# Patient Record
Sex: MALE | Race: WHITE | NOT HISPANIC OR LATINO | Employment: OTHER | URBAN - METROPOLITAN AREA
[De-identification: names, ages, dates, MRNs, and addresses within clinical notes are randomized per-mention and may not be internally consistent; named-entity substitution may affect disease eponyms.]

---

## 2023-04-09 PROBLEM — N18.31 CHRONIC RENAL IMPAIRMENT, STAGE 3A (MULTI): Status: ACTIVE | Noted: 2023-04-09

## 2023-04-09 PROBLEM — T14.8XXA WOUND OF SKIN: Status: ACTIVE | Noted: 2023-04-09

## 2023-04-09 PROBLEM — I73.9 PAD (PERIPHERAL ARTERY DISEASE) (CMS-HCC): Status: ACTIVE | Noted: 2023-04-09

## 2023-04-09 PROBLEM — F17.211 CIGARETTE NICOTINE DEPENDENCE IN REMISSION: Status: ACTIVE | Noted: 2023-04-09

## 2023-04-09 PROBLEM — E55.9 VITAMIN D DEFICIENCY: Status: ACTIVE | Noted: 2023-04-09

## 2023-04-09 PROBLEM — M79.89 SWELLING OF LOWER EXTREMITY: Status: ACTIVE | Noted: 2023-04-09

## 2023-04-09 PROBLEM — S81.802A LEG WOUND, LEFT, INITIAL ENCOUNTER: Status: ACTIVE | Noted: 2023-04-09

## 2023-04-09 PROBLEM — I65.29 ARTERIOSCLEROSIS OF CAROTID ARTERY: Status: ACTIVE | Noted: 2023-04-09

## 2023-04-09 PROBLEM — R60.9 EDEMA: Status: ACTIVE | Noted: 2023-04-09

## 2023-04-09 PROBLEM — R06.02 SOB (SHORTNESS OF BREATH) ON EXERTION: Status: ACTIVE | Noted: 2023-04-09

## 2023-04-09 PROBLEM — D75.839 THROMBOCYTOSIS: Status: ACTIVE | Noted: 2023-04-09

## 2023-04-09 PROBLEM — R79.89 SERUM CREATININE RAISED: Status: ACTIVE | Noted: 2023-04-09

## 2023-04-09 PROBLEM — J44.9 CHRONIC OBSTRUCTIVE PULMONARY DISEASE (MULTI): Status: ACTIVE | Noted: 2023-04-09

## 2023-04-09 PROBLEM — D72.829 ELEVATED WBC COUNT: Status: ACTIVE | Noted: 2023-04-09

## 2023-04-09 PROBLEM — E87.5 HYPERKALEMIA: Status: ACTIVE | Noted: 2023-04-09

## 2023-04-09 PROBLEM — I77.9 AORTO-ILIAC DISEASE (CMS-HCC): Status: ACTIVE | Noted: 2023-04-09

## 2023-04-09 PROBLEM — I25.10 ATHEROSCLEROTIC HEART DISEASE OF NATIVE CORONARY ARTERY WITHOUT ANGINA PECTORIS: Status: ACTIVE | Noted: 2023-04-09

## 2023-04-09 PROBLEM — R97.20 PSA ELEVATION: Status: ACTIVE | Noted: 2023-04-09

## 2023-04-09 PROBLEM — I65.23 ASYMPTOMATIC BILATERAL CAROTID ARTERY STENOSIS: Status: ACTIVE | Noted: 2023-04-09

## 2023-04-09 PROBLEM — R73.03 PREDIABETES: Status: ACTIVE | Noted: 2023-04-09

## 2023-04-09 PROBLEM — E78.5 HYPERLIPIDEMIA: Status: ACTIVE | Noted: 2023-04-09

## 2023-04-09 PROBLEM — C61 PROSTATE CANCER (MULTI): Status: ACTIVE | Noted: 2023-04-09

## 2023-04-09 PROBLEM — R31.29 MICROSCOPIC HEMATURIA: Status: ACTIVE | Noted: 2023-04-09

## 2023-04-09 PROBLEM — R53.81 MALAISE: Status: ACTIVE | Noted: 2023-04-09

## 2023-04-09 PROBLEM — S49.90XA SHOULDER INJURY: Status: ACTIVE | Noted: 2023-04-09

## 2023-04-09 PROBLEM — N52.9 ERECTILE DYSFUNCTION: Status: ACTIVE | Noted: 2023-04-09

## 2023-04-09 PROBLEM — R79.89 LOW VITAMIN B12 LEVEL: Status: ACTIVE | Noted: 2023-04-09

## 2023-04-09 PROBLEM — F17.201 NICOTINE DEPENDENCE IN REMISSION: Status: ACTIVE | Noted: 2023-04-09

## 2023-04-09 PROBLEM — I10 BENIGN ESSENTIAL HYPERTENSION: Status: ACTIVE | Noted: 2023-04-09

## 2023-04-09 PROBLEM — L03.116 LEFT LEG CELLULITIS: Status: ACTIVE | Noted: 2023-04-09

## 2023-04-09 RX ORDER — ASPIRIN 81 MG/1
1 TABLET ORAL DAILY
COMMUNITY

## 2023-04-09 RX ORDER — LOSARTAN POTASSIUM 100 MG/1
1 TABLET ORAL DAILY
COMMUNITY
Start: 2022-08-30 | End: 2023-11-02 | Stop reason: HOSPADM

## 2023-04-09 RX ORDER — ATORVASTATIN CALCIUM 40 MG/1
1 TABLET, FILM COATED ORAL DAILY
COMMUNITY
Start: 2014-12-19 | End: 2023-05-25 | Stop reason: SDUPTHER

## 2023-04-09 RX ORDER — VARENICLINE TARTRATE 0.5 (11)-1
KIT ORAL
COMMUNITY
Start: 2022-06-27 | End: 2023-08-25 | Stop reason: WASHOUT

## 2023-04-09 RX ORDER — IPRATROPIUM BROMIDE AND ALBUTEROL SULFATE 2.5; .5 MG/3ML; MG/3ML
3 SOLUTION RESPIRATORY (INHALATION) 3 TIMES DAILY PRN
COMMUNITY
Start: 2022-03-09

## 2023-04-09 RX ORDER — FLUTICASONE FUROATE, UMECLIDINIUM BROMIDE AND VILANTEROL TRIFENATATE 100; 62.5; 25 UG/1; UG/1; UG/1
1 POWDER RESPIRATORY (INHALATION)
COMMUNITY
Start: 2022-01-27 | End: 2023-04-28

## 2023-04-09 RX ORDER — LANOLIN ALCOHOL/MO/W.PET/CERES
1 CREAM (GRAM) TOPICAL DAILY
COMMUNITY
Start: 2017-07-24 | End: 2023-10-10 | Stop reason: HOSPADM

## 2023-04-09 RX ORDER — CHLORTHALIDONE 25 MG/1
1 TABLET ORAL DAILY
COMMUNITY
Start: 2018-05-18 | End: 2023-06-16

## 2023-04-09 RX ORDER — CARVEDILOL 25 MG/1
1 TABLET ORAL 2 TIMES DAILY
COMMUNITY
Start: 2019-05-03 | End: 2023-05-25 | Stop reason: SDUPTHER

## 2023-04-09 RX ORDER — IBUPROFEN 100 MG/5ML
1 SUSPENSION, ORAL (FINAL DOSE FORM) ORAL DAILY
COMMUNITY
End: 2023-10-05 | Stop reason: ENTERED-IN-ERROR

## 2023-04-09 RX ORDER — ALBUTEROL SULFATE 90 UG/1
2 AEROSOL, METERED RESPIRATORY (INHALATION) EVERY 4 HOURS
COMMUNITY
Start: 2022-01-25 | End: 2023-04-28

## 2023-04-09 RX ORDER — COLLAGENASE SANTYL 250 [ARB'U]/G
OINTMENT TOPICAL
COMMUNITY
Start: 2022-05-25 | End: 2023-10-10 | Stop reason: HOSPADM

## 2023-04-24 DIAGNOSIS — R06.02 SOB (SHORTNESS OF BREATH) ON EXERTION: ICD-10-CM

## 2023-04-28 DIAGNOSIS — J43.9 PULMONARY EMPHYSEMA, UNSPECIFIED EMPHYSEMA TYPE (MULTI): ICD-10-CM

## 2023-04-28 RX ORDER — ALBUTEROL SULFATE 90 UG/1
2 AEROSOL, METERED RESPIRATORY (INHALATION) EVERY 4 HOURS
Qty: 3 G | Refills: 1 | Status: SHIPPED | OUTPATIENT
Start: 2023-04-28 | End: 2023-08-16

## 2023-04-28 RX ORDER — FLUTICASONE FUROATE, UMECLIDINIUM BROMIDE AND VILANTEROL TRIFENATATE 100; 62.5; 25 UG/1; UG/1; UG/1
POWDER RESPIRATORY (INHALATION)
Qty: 3 EACH | Refills: 3 | Status: SHIPPED | OUTPATIENT
Start: 2023-04-28

## 2023-05-18 ENCOUNTER — APPOINTMENT (OUTPATIENT)
Dept: PRIMARY CARE | Facility: CLINIC | Age: 73
End: 2023-05-18
Payer: MEDICARE

## 2023-05-25 ENCOUNTER — OFFICE VISIT (OUTPATIENT)
Dept: PRIMARY CARE | Facility: CLINIC | Age: 73
End: 2023-05-25
Payer: MEDICARE

## 2023-05-25 VITALS
DIASTOLIC BLOOD PRESSURE: 72 MMHG | SYSTOLIC BLOOD PRESSURE: 136 MMHG | WEIGHT: 158 LBS | HEART RATE: 72 BPM | BODY MASS INDEX: 25.5 KG/M2

## 2023-05-25 DIAGNOSIS — C61 PROSTATE CANCER (MULTI): ICD-10-CM

## 2023-05-25 DIAGNOSIS — Z87.891 FORMER HEAVY TOBACCO SMOKER: ICD-10-CM

## 2023-05-25 DIAGNOSIS — I77.9 AORTO-ILIAC DISEASE (CMS-HCC): ICD-10-CM

## 2023-05-25 DIAGNOSIS — R73.03 PREDIABETES: ICD-10-CM

## 2023-05-25 DIAGNOSIS — I10 HYPERTENSION, UNSPECIFIED TYPE: ICD-10-CM

## 2023-05-25 DIAGNOSIS — J44.9 CHRONIC OBSTRUCTIVE PULMONARY DISEASE, UNSPECIFIED COPD TYPE (MULTI): ICD-10-CM

## 2023-05-25 DIAGNOSIS — E78.5 HYPERLIPIDEMIA, UNSPECIFIED HYPERLIPIDEMIA TYPE: ICD-10-CM

## 2023-05-25 DIAGNOSIS — N18.31 CHRONIC RENAL IMPAIRMENT, STAGE 3A (MULTI): ICD-10-CM

## 2023-05-25 DIAGNOSIS — D75.839 THROMBOCYTOSIS: ICD-10-CM

## 2023-05-25 DIAGNOSIS — Z12.5 SCREENING PSA (PROSTATE SPECIFIC ANTIGEN): ICD-10-CM

## 2023-05-25 DIAGNOSIS — R79.89 LOW VITAMIN B12 LEVEL: ICD-10-CM

## 2023-05-25 DIAGNOSIS — E78.2 MIXED HYPERLIPIDEMIA: ICD-10-CM

## 2023-05-25 DIAGNOSIS — J96.02 ACUTE RESPIRATORY FAILURE WITH HYPERCAPNIA (MULTI): Primary | ICD-10-CM

## 2023-05-25 DIAGNOSIS — R53.81 MALAISE: ICD-10-CM

## 2023-05-25 DIAGNOSIS — E55.9 VITAMIN D DEFICIENCY: ICD-10-CM

## 2023-05-25 DIAGNOSIS — I10 BENIGN ESSENTIAL HYPERTENSION: ICD-10-CM

## 2023-05-25 DIAGNOSIS — R22.1 NECK MASS: ICD-10-CM

## 2023-05-25 PROCEDURE — G0439 PPPS, SUBSEQ VISIT: HCPCS | Performed by: INTERNAL MEDICINE

## 2023-05-25 PROCEDURE — 99214 OFFICE O/P EST MOD 30 MIN: CPT | Performed by: INTERNAL MEDICINE

## 2023-05-25 PROCEDURE — 3075F SYST BP GE 130 - 139MM HG: CPT | Performed by: INTERNAL MEDICINE

## 2023-05-25 PROCEDURE — 1160F RVW MEDS BY RX/DR IN RCRD: CPT | Performed by: INTERNAL MEDICINE

## 2023-05-25 PROCEDURE — 1170F FXNL STATUS ASSESSED: CPT | Performed by: INTERNAL MEDICINE

## 2023-05-25 PROCEDURE — 1159F MED LIST DOCD IN RCRD: CPT | Performed by: INTERNAL MEDICINE

## 2023-05-25 PROCEDURE — 3078F DIAST BP <80 MM HG: CPT | Performed by: INTERNAL MEDICINE

## 2023-05-25 PROCEDURE — 1036F TOBACCO NON-USER: CPT | Performed by: INTERNAL MEDICINE

## 2023-05-25 RX ORDER — ATORVASTATIN CALCIUM 40 MG/1
40 TABLET, FILM COATED ORAL DAILY
Qty: 90 TABLET | Refills: 2 | Status: SHIPPED | OUTPATIENT
Start: 2023-05-25 | End: 2023-11-02 | Stop reason: HOSPADM

## 2023-05-25 RX ORDER — CARVEDILOL 25 MG/1
25 TABLET ORAL 2 TIMES DAILY
Qty: 90 TABLET | Refills: 2 | Status: SHIPPED | OUTPATIENT
Start: 2023-05-25 | End: 2023-08-16

## 2023-05-25 ASSESSMENT — ENCOUNTER SYMPTOMS
SLEEP DISTURBANCE: 0
HEADACHES: 0
RECTAL PAIN: 0
CHOKING: 0
DIZZINESS: 0
CHEST TIGHTNESS: 0
APPETITE CHANGE: 0
EYE DISCHARGE: 0
EYE REDNESS: 0
FREQUENCY: 0
MYALGIAS: 0
ADENOPATHY: 1
DIAPHORESIS: 0
TROUBLE SWALLOWING: 0
NUMBNESS: 0
FACIAL SWELLING: 0
CHILLS: 0
POLYPHAGIA: 0
RHINORRHEA: 0
POLYDIPSIA: 0
PALPITATIONS: 0
FLANK PAIN: 0
EYE PAIN: 0
SHORTNESS OF BREATH: 0
ABDOMINAL PAIN: 0
SEIZURES: 0
LIGHT-HEADEDNESS: 0
ABDOMINAL DISTENTION: 0
FACIAL ASYMMETRY: 0
BLOOD IN STOOL: 0
WEAKNESS: 0
STRIDOR: 0
SORE THROAT: 0
BACK PAIN: 0
ARTHRALGIAS: 0
SINUS PRESSURE: 0
NECK STIFFNESS: 0
WHEEZING: 0
JOINT SWELLING: 0
NECK PAIN: 0
DIFFICULTY URINATING: 0
HEMATURIA: 0
WOUND: 1
ANAL BLEEDING: 0
COLOR CHANGE: 0
SPEECH DIFFICULTY: 0
DIARRHEA: 0
DYSURIA: 0
NAUSEA: 0
VOMITING: 0
SINUS PAIN: 0
CONSTIPATION: 0
TREMORS: 0
VOICE CHANGE: 0
BRUISES/BLEEDS EASILY: 0
PHOTOPHOBIA: 0
EYE ITCHING: 0
ACTIVITY CHANGE: 0
COUGH: 0
FATIGUE: 0

## 2023-05-25 ASSESSMENT — ACTIVITIES OF DAILY LIVING (ADL)
TAKING_MEDICATION: INDEPENDENT
BATHING: INDEPENDENT
DOING_HOUSEWORK: INDEPENDENT
GROCERY_SHOPPING: INDEPENDENT
MANAGING_FINANCES: INDEPENDENT
DRESSING: INDEPENDENT

## 2023-05-25 ASSESSMENT — PATIENT HEALTH QUESTIONNAIRE - PHQ9
SUM OF ALL RESPONSES TO PHQ9 QUESTIONS 1 AND 2: 1
2. FEELING DOWN, DEPRESSED OR HOPELESS: NOT AT ALL
1. LITTLE INTEREST OR PLEASURE IN DOING THINGS: SEVERAL DAYS

## 2023-05-25 NOTE — PROGRESS NOTES
Subjective   Patient ID: Phong Wong is a 72 y.o. male who presents for Medicare Annual Wellness Visit Subsequent.    Patient presents for follow-up.  He has been compliant with his medications and diet but not exercise.  He has been complaining of a lump on the right side of his neck for the past 3 weeks.  He reports no pain.  It has not changed in size.  He otherwise feels okay.  His wound on his left leg may be slightly worse.  He denies any headaches, no dizziness, no chest pain or shortness of breath.  Denies abdominal pain no nausea vomiting or diarrhea.  He reports no new musculoskeletal complaints.  His partner feels that he is not getting out of the house much.  Denies         Review of Systems   Constitutional:  Negative for activity change, appetite change, chills, diaphoresis and fatigue.   HENT:  Negative for congestion, dental problem, drooling, ear discharge, ear pain, facial swelling, hearing loss, mouth sores, nosebleeds, postnasal drip, rhinorrhea, sinus pressure, sinus pain, sneezing, sore throat, tinnitus, trouble swallowing and voice change.    Eyes:  Negative for photophobia, pain, discharge, redness, itching and visual disturbance.   Respiratory:  Negative for cough, choking, chest tightness, shortness of breath, wheezing and stridor.    Cardiovascular:  Negative for chest pain, palpitations and leg swelling.   Gastrointestinal:  Negative for abdominal distention, abdominal pain, anal bleeding, blood in stool, constipation, diarrhea, nausea, rectal pain and vomiting.   Endocrine: Negative for cold intolerance, heat intolerance, polydipsia, polyphagia and polyuria.   Genitourinary:  Negative for decreased urine volume, difficulty urinating, dysuria, enuresis, flank pain, frequency, genital sores, hematuria and urgency.   Musculoskeletal:  Negative for arthralgias, back pain, gait problem, joint swelling, myalgias, neck pain and neck stiffness.   Skin:  Positive for wound. Negative for color  change, pallor and rash.   Neurological:  Negative for dizziness, tremors, seizures, syncope, facial asymmetry, speech difficulty, weakness, light-headedness, numbness and headaches.   Hematological:  Positive for adenopathy. Does not bruise/bleed easily.   Psychiatric/Behavioral:  Negative for sleep disturbance.        Objective   /72   Pulse 72   Wt 71.7 kg (158 lb)   BMI 25.50 kg/m²     Physical Exam  Constitutional:       Appearance: Normal appearance.   Neck:      Comments: 1 x 1 cm firm mass on right side of neck.  Nontender  Cardiovascular:      Rate and Rhythm: Normal rate and regular rhythm.      Heart sounds: No murmur heard.     No gallop.   Pulmonary:      Effort: No respiratory distress.      Breath sounds: No wheezing or rales.   Abdominal:      General: There is no distension.      Palpations: There is no mass.      Tenderness: There is no abdominal tenderness. There is no guarding.   Musculoskeletal:      Right lower leg: No edema.      Left lower leg: No edema.   Skin:     Comments: Erythema left lower leg.  No drainage   Neurological:      Mental Status: He is alert.         Assessment/Plan   diagnoses and all orders for this visit:  Acute respiratory failure with hypercapnia (CMS/HCC)-follow-up with pulmonary  Aorto-iliac disease (CMS/HCC) we will modify risk factors.  Follow-up with vascular  Chronic obstructive pulmonary disease, unspecified COPD type (CMS/HCC)-stable symptoms with present management  Thrombocytosis (CMS/HCC)-recheck CBC  Prostate cancer (CMS/HCC)-check PSA-recheck creatinine  Chronic renal impairment, stage 3a  -     Urinalysis with Reflex Microscopic; Future  Hypertension, unspecified type-he will follow a low-salt diet and exercise.  -     Albumin , Urine Random; Future  -     Comprehensive Metabolic Panel; Future  -     Uric Acid; Future  Prediabetes-we will recheck hemoglobin A1c.  Benign essential hypertension-low-salt diet and exercise-we will check a fast lipid  profile  Mixed hyperlipidemia  -     Lipid Panel; Future  Low vitamin B12 level  Malaise  -     CBC and Auto Differential; Future  -     TSH with reflex to Free T4 if abnormal; Future  Vitamin D deficiency  -     Vitamin D, Total; Future  Screening PSA (prostate specific antigen)  -     Prostate Specific Antigen; Future  Neck mass  -     CT soft tissue neck w IV contrast; Future  Former heavy tobacco smoker  -     CT lung screening low dose; Future  Health maintenance-refuses immunizations.  Cologuard has been done  Leg wound-he will schedule appointment with wound clinic

## 2023-05-25 NOTE — PATIENT INSTRUCTIONS
Please take medication as prescribed.  Schedule appointment with wound clinic.  Obtain fasting blood work.  Schedule yearly CT scans.  Follow-up in 1 month.

## 2023-05-31 ENCOUNTER — LAB (OUTPATIENT)
Dept: LAB | Facility: LAB | Age: 73
End: 2023-05-31
Payer: MEDICARE

## 2023-05-31 DIAGNOSIS — Z12.5 SCREENING PSA (PROSTATE SPECIFIC ANTIGEN): ICD-10-CM

## 2023-05-31 DIAGNOSIS — R53.81 MALAISE: ICD-10-CM

## 2023-05-31 DIAGNOSIS — R73.03 PREDIABETES: ICD-10-CM

## 2023-05-31 DIAGNOSIS — E78.2 MIXED HYPERLIPIDEMIA: ICD-10-CM

## 2023-05-31 DIAGNOSIS — I10 HYPERTENSION, UNSPECIFIED TYPE: ICD-10-CM

## 2023-05-31 DIAGNOSIS — N18.31 CHRONIC RENAL IMPAIRMENT, STAGE 3A (MULTI): ICD-10-CM

## 2023-05-31 DIAGNOSIS — E55.9 VITAMIN D DEFICIENCY: ICD-10-CM

## 2023-05-31 LAB
ALANINE AMINOTRANSFERASE (SGPT) (U/L) IN SER/PLAS: 14 U/L (ref 10–52)
ALBUMIN (G/DL) IN SER/PLAS: 4 G/DL (ref 3.4–5)
ALKALINE PHOSPHATASE (U/L) IN SER/PLAS: 118 U/L (ref 33–136)
ANION GAP IN SER/PLAS: 16 MMOL/L (ref 10–20)
ASPARTATE AMINOTRANSFERASE (SGOT) (U/L) IN SER/PLAS: 20 U/L (ref 9–39)
BAND NEUTROPHILS (10*3/UL) BLOOD MANUAL COUNT - WAM: 0.97 X10E9/L (ref 0–0.5)
BASOPHILS (10*3/UL) IN BLOOD BY MANUAL COUNT - WAM: 0.48 X10E9/L (ref 0–0.1)
BASOPHILS/100 LEUKOCYTES IN BLOOD BY MANUAL COUNT - WAM: 5.1 % (ref 0–2)
BILIRUBIN TOTAL (MG/DL) IN SER/PLAS: 0.8 MG/DL (ref 0–1.2)
BURR CELLS PRESENCE IN BLOOD BY LIGHT MICROSCOPY: NORMAL
CALCIDIOL (25 OH VITAMIN D3) (NG/ML) IN SER/PLAS: 53 NG/ML
CALCIUM (MG/DL) IN SER/PLAS: 9.6 MG/DL (ref 8.6–10.6)
CARBON DIOXIDE, TOTAL (MMOL/L) IN SER/PLAS: 25 MMOL/L (ref 21–32)
CHLORIDE (MMOL/L) IN SER/PLAS: 107 MMOL/L (ref 98–107)
CHOLESTEROL (MG/DL) IN SER/PLAS: 127 MG/DL (ref 0–199)
CHOLESTEROL IN HDL (MG/DL) IN SER/PLAS: 36.1 MG/DL
CHOLESTEROL/HDL RATIO: 3.5
CREATININE (MG/DL) IN SER/PLAS: 1.92 MG/DL (ref 0.5–1.3)
EOSINOPHILS (10*3/UL) IN BLOOD BY MANUAL COUNT - WAM: 0.4 X10E9/L (ref 0–0.4)
EOSINOPHILS/100 LEUKOCYTES IN BLOOD BY MANUAL COUNT - WAM: 4.3 % (ref 0–6)
ERYTHROCYTE DISTRIBUTION WIDTH (RATIO) BY AUTOMATED COUNT: 19.2 % (ref 11.5–14.5)
ERYTHROCYTE MEAN CORPUSCULAR HEMOGLOBIN CONCENTRATION (G/DL) BY AUTOMATED: 31.3 G/DL (ref 32–36)
ERYTHROCYTE MEAN CORPUSCULAR VOLUME (FL) BY AUTOMATED COUNT: 90 FL (ref 80–100)
ERYTHROCYTES (10*6/UL) IN BLOOD BY AUTOMATED COUNT: 5.21 X10E12/L (ref 4.5–5.9)
ESTIMATED AVERAGE GLUCOSE FOR HBA1C: 111 MG/DL
GFR MALE: 37 ML/MIN/1.73M2
GLUCOSE (MG/DL) IN SER/PLAS: 96 MG/DL (ref 74–99)
HEMATOCRIT (%) IN BLOOD BY AUTOMATED COUNT: 47 % (ref 41–52)
HEMOGLOBIN (G/DL) IN BLOOD: 14.7 G/DL (ref 13.5–17.5)
HEMOGLOBIN A1C/HEMOGLOBIN TOTAL IN BLOOD: 5.5 %
IMMATURE GRANULOCYTES/100 LEUKOCYTES IN BLOOD BY AUTOMATED COUNT: 8.9 % (ref 0–0.9)
LDL: 64 MG/DL (ref 0–99)
LEUKOCYTES (10*3/UL) IN BLOOD BY AUTOMATED COUNT: 9.4 X10E9/L (ref 4.4–11.3)
LYMPHOCYTES (10*3/UL) IN BLOOD BY MANUAL COUNT - WAM: 1.36 X10E9/L (ref 0.8–3)
LYMPHOCYTES/100 LEUKOCYTES IN BLOOD BY MANUAL COUNT - WAM: 14.5 % (ref 13–44)
MANUAL DIFFERENTIAL Y/N: ABNORMAL
MONOCYTES (10*3/UL) IN BLOOD BY MANUAL COUNT - WAM: 0.88 X10E9/L (ref 0.05–0.8)
MONOCYTES/100 LEUKOCYTES IN BLOOD BY MANUAL COUNT - WAM: 9.4 % (ref 2–10)
MYELOCYTES (10*3/UL) IN BLOOD BY MANUAL COUNT - WAM: 0.08 X10E9/L (ref 0–0)
MYELOCYTES/100 LEUKOCYTES IN BLOOD BY MANUAL COUNT - WAM: 0.8 % (ref 0–0)
NEUTROPHILS (SEGS+BANDS) (10*3/UL) MANUAL COUNT - WAM: 6.2 X10E9/L (ref 1.6–5.5)
NEUTROPHILS BAND FORM/100 LEUKOCYTES IN BLOOD BY MANUAL COUNT - WAM: 10.3 % (ref 0–5)
NRBC (PER 100 WBCS) BY AUTOMATED COUNT: 1.1 /100 WBC (ref 0–0)
PLATELETS (10*3/UL) IN BLOOD AUTOMATED COUNT: 304 X10E9/L (ref 150–450)
POTASSIUM (MMOL/L) IN SER/PLAS: 4.6 MMOL/L (ref 3.5–5.3)
PROSTATE SPECIFIC AG (NG/ML) IN SER/PLAS: 0.21 NG/ML (ref 0–4)
PROTEIN TOTAL: 6.4 G/DL (ref 6.4–8.2)
RBC MORPHOLOGY IN BLOOD: NORMAL
SEGMENTED NEUTROPHILS (10*3/UL) BLOOD MANUAL - WAM: 5.23 X10E9/L (ref 1.6–5)
SEGMENTED NEUTROPHILS/100 LEUKOCYTES BY MANUAL COUNT -: 55.6 % (ref 40–80)
SODIUM (MMOL/L) IN SER/PLAS: 143 MMOL/L (ref 136–145)
THYROTROPIN (MIU/L) IN SER/PLAS BY DETECTION LIMIT <= 0.05 MIU/L: 2.77 MIU/L (ref 0.44–3.98)
TRIGLYCERIDE (MG/DL) IN SER/PLAS: 136 MG/DL (ref 0–149)
URATE (MG/DL) IN SER/PLAS: 10.2 MG/DL (ref 4–7.5)
UREA NITROGEN (MG/DL) IN SER/PLAS: 38 MG/DL (ref 6–23)
VLDL: 27 MG/DL (ref 0–40)

## 2023-05-31 PROCEDURE — 84550 ASSAY OF BLOOD/URIC ACID: CPT

## 2023-05-31 PROCEDURE — 80053 COMPREHEN METABOLIC PANEL: CPT

## 2023-05-31 PROCEDURE — 36415 COLL VENOUS BLD VENIPUNCTURE: CPT

## 2023-05-31 PROCEDURE — 85025 COMPLETE CBC W/AUTO DIFF WBC: CPT

## 2023-05-31 PROCEDURE — 84153 ASSAY OF PSA TOTAL: CPT

## 2023-05-31 PROCEDURE — 83036 HEMOGLOBIN GLYCOSYLATED A1C: CPT

## 2023-05-31 PROCEDURE — 84443 ASSAY THYROID STIM HORMONE: CPT

## 2023-05-31 PROCEDURE — 80061 LIPID PANEL: CPT

## 2023-05-31 PROCEDURE — 82306 VITAMIN D 25 HYDROXY: CPT

## 2023-06-02 LAB
ALBUMIN (MG/L) IN URINE: 255.5 MG/L
ALBUMIN/CREATININE (UG/MG) IN URINE: 181.2 UG/MG CRT (ref 0–30)
APPEARANCE, URINE: ABNORMAL
BILIRUBIN, URINE: NEGATIVE
BLOOD, URINE: NEGATIVE
COLOR, URINE: YELLOW
CREATININE (MG/DL) IN URINE: 141 MG/DL (ref 20–370)
GLUCOSE, URINE: NEGATIVE MG/DL
KETONES, URINE: NEGATIVE MG/DL
LEUKOCYTE ESTERASE, URINE: NEGATIVE
MUCUS, URINE: NORMAL /LPF
NITRITE, URINE: NEGATIVE
PH, URINE: 5 (ref 5–8)
PROTEIN, URINE: ABNORMAL MG/DL
RBC, URINE: <1 /HPF (ref 0–5)
SPECIFIC GRAVITY, URINE: 1.01 (ref 1–1.03)
URIC ACID (URATE) CRYSTALS, URINE: NORMAL /HPF
UROBILINOGEN, URINE: <2 MG/DL (ref 0–1.9)
WBC, URINE: 1 /HPF (ref 0–5)

## 2023-06-04 DIAGNOSIS — N18.30 CHRONIC RENAL IMPAIRMENT, STAGE 3 (MODERATE), UNSPECIFIED WHETHER STAGE 3A OR 3B CKD (MULTI): Primary | ICD-10-CM

## 2023-06-15 DIAGNOSIS — I10 BENIGN ESSENTIAL HYPERTENSION: Primary | ICD-10-CM

## 2023-06-15 DIAGNOSIS — R91.8 LUNG MASS: ICD-10-CM

## 2023-06-15 DIAGNOSIS — R22.1 NECK MASS: Primary | ICD-10-CM

## 2023-06-16 RX ORDER — CHLORTHALIDONE 25 MG/1
TABLET ORAL
Qty: 90 TABLET | Refills: 1 | Status: SHIPPED | OUTPATIENT
Start: 2023-06-16 | End: 2023-10-10 | Stop reason: HOSPADM

## 2023-07-12 DIAGNOSIS — R06.02 SOB (SHORTNESS OF BREATH) ON EXERTION: ICD-10-CM

## 2023-08-09 DIAGNOSIS — I10 BENIGN ESSENTIAL HYPERTENSION: ICD-10-CM

## 2023-08-16 RX ORDER — ALBUTEROL SULFATE 90 UG/1
2 AEROSOL, METERED RESPIRATORY (INHALATION) EVERY 4 HOURS
Qty: 9 G | Refills: 2 | Status: SHIPPED | OUTPATIENT
Start: 2023-08-16 | End: 2023-10-10 | Stop reason: HOSPADM

## 2023-08-16 RX ORDER — CARVEDILOL 25 MG/1
25 TABLET ORAL 2 TIMES DAILY
Qty: 180 TABLET | Refills: 1 | Status: SHIPPED | OUTPATIENT
Start: 2023-08-16 | End: 2023-10-10 | Stop reason: HOSPADM

## 2023-08-25 ENCOUNTER — OFFICE VISIT (OUTPATIENT)
Dept: PRIMARY CARE | Facility: CLINIC | Age: 73
End: 2023-08-25
Payer: MEDICARE

## 2023-08-25 VITALS
BODY MASS INDEX: 23.73 KG/M2 | HEART RATE: 60 BPM | WEIGHT: 147 LBS | SYSTOLIC BLOOD PRESSURE: 114 MMHG | DIASTOLIC BLOOD PRESSURE: 68 MMHG

## 2023-08-25 DIAGNOSIS — I25.10 ATHEROSCLEROSIS OF NATIVE CORONARY ARTERY OF NATIVE HEART WITHOUT ANGINA PECTORIS: ICD-10-CM

## 2023-08-25 DIAGNOSIS — R73.01 IMPAIRED FASTING GLUCOSE: ICD-10-CM

## 2023-08-25 DIAGNOSIS — I10 BENIGN ESSENTIAL HYPERTENSION: ICD-10-CM

## 2023-08-25 DIAGNOSIS — E78.2 MIXED HYPERLIPIDEMIA: ICD-10-CM

## 2023-08-25 DIAGNOSIS — I77.9 AORTO-ILIAC DISEASE (CMS-HCC): ICD-10-CM

## 2023-08-25 DIAGNOSIS — C61 PROSTATE CANCER (MULTI): ICD-10-CM

## 2023-08-25 DIAGNOSIS — N18.31 CHRONIC RENAL IMPAIRMENT, STAGE 3A (MULTI): ICD-10-CM

## 2023-08-25 DIAGNOSIS — I73.9 PAD (PERIPHERAL ARTERY DISEASE) (CMS-HCC): ICD-10-CM

## 2023-08-25 DIAGNOSIS — H35.3231 EXUDATIVE AGE-RELATED MACULAR DEGENERATION, BILATERAL, WITH ACTIVE CHOROIDAL NEOVASCULARIZATION (MULTI): Primary | ICD-10-CM

## 2023-08-25 DIAGNOSIS — I65.29 ARTERIOSCLEROSIS OF CAROTID ARTERY, UNSPECIFIED LATERALITY: ICD-10-CM

## 2023-08-25 DIAGNOSIS — J44.9 CHRONIC OBSTRUCTIVE PULMONARY DISEASE, UNSPECIFIED COPD TYPE (MULTI): ICD-10-CM

## 2023-08-25 DIAGNOSIS — L98.491 NON-PRESSURE CHRONIC ULCER OF SKIN OF OTHER SITES LIMITED TO BREAKDOWN OF SKIN (MULTI): ICD-10-CM

## 2023-08-25 PROBLEM — R22.1 NECK MASS: Status: ACTIVE | Noted: 2023-08-25

## 2023-08-25 PROBLEM — L85.3 XEROSIS CUTIS: Status: ACTIVE | Noted: 2022-02-08

## 2023-08-25 PROBLEM — I87.2 CHRONIC VENOUS INSUFFICIENCY: Status: ACTIVE | Noted: 2022-02-08

## 2023-08-25 PROBLEM — L30.8 OTHER SPECIFIED DERMATITIS: Status: ACTIVE | Noted: 2022-02-08

## 2023-08-25 PROBLEM — K55.069: Status: ACTIVE | Noted: 2023-08-25

## 2023-08-25 PROBLEM — H61.22 IMPACTED CERUMEN OF LEFT EAR: Status: ACTIVE | Noted: 2023-08-25

## 2023-08-25 PROBLEM — R22.0 MASS OF SOFT PALATE: Status: ACTIVE | Noted: 2023-08-25

## 2023-08-25 LAB
POC FINGERSTICK BLOOD GLUCOSE: 141 MG/DL (ref 70–100)
POC HEMOGLOBIN A1C: 5.1 % (ref 4.2–6.5)

## 2023-08-25 PROCEDURE — 83036 HEMOGLOBIN GLYCOSYLATED A1C: CPT | Performed by: INTERNAL MEDICINE

## 2023-08-25 PROCEDURE — 1036F TOBACCO NON-USER: CPT | Performed by: INTERNAL MEDICINE

## 2023-08-25 PROCEDURE — 3078F DIAST BP <80 MM HG: CPT | Performed by: INTERNAL MEDICINE

## 2023-08-25 PROCEDURE — 1159F MED LIST DOCD IN RCRD: CPT | Performed by: INTERNAL MEDICINE

## 2023-08-25 PROCEDURE — 99213 OFFICE O/P EST LOW 20 MIN: CPT | Performed by: INTERNAL MEDICINE

## 2023-08-25 PROCEDURE — 3074F SYST BP LT 130 MM HG: CPT | Performed by: INTERNAL MEDICINE

## 2023-08-25 PROCEDURE — 82962 GLUCOSE BLOOD TEST: CPT | Performed by: INTERNAL MEDICINE

## 2023-08-25 PROCEDURE — 1160F RVW MEDS BY RX/DR IN RCRD: CPT | Performed by: INTERNAL MEDICINE

## 2023-08-25 ASSESSMENT — ENCOUNTER SYMPTOMS
ABDOMINAL DISTENTION: 0
SEIZURES: 0
WEAKNESS: 0
RECTAL PAIN: 0
VOICE CHANGE: 0
CHILLS: 0
APPETITE CHANGE: 1
STRIDOR: 0
POLYDIPSIA: 0
SINUS PAIN: 0
FREQUENCY: 0
ACTIVITY CHANGE: 0
EYE ITCHING: 0
COLOR CHANGE: 0
ADENOPATHY: 0
HYPERTENSION: 1
ANAL BLEEDING: 0
EYE DISCHARGE: 0
WHEEZING: 0
FLANK PAIN: 0
PALPITATIONS: 0
NECK PAIN: 0
RHINORRHEA: 0
DIFFICULTY URINATING: 0
BLOOD IN STOOL: 0
TROUBLE SWALLOWING: 0
DIARRHEA: 0
FATIGUE: 1
NUMBNESS: 0
EYE REDNESS: 0
POLYPHAGIA: 0
HEADACHES: 0
NAUSEA: 0
BACK PAIN: 0
PHOTOPHOBIA: 0
EYE PAIN: 0
CHOKING: 0
HEMATURIA: 0
ABDOMINAL PAIN: 0
SPEECH DIFFICULTY: 0
COUGH: 0
BRUISES/BLEEDS EASILY: 0
DYSURIA: 0
WOUND: 0
VOMITING: 0
NECK STIFFNESS: 0
SLEEP DISTURBANCE: 0
FACIAL SWELLING: 0
MYALGIAS: 0
ARTHRALGIAS: 0
TREMORS: 0
SHORTNESS OF BREATH: 1
CHEST TIGHTNESS: 0
CONSTIPATION: 0
JOINT SWELLING: 0
SINUS PRESSURE: 0
DIZZINESS: 0
FACIAL ASYMMETRY: 0
DIAPHORESIS: 0
SORE THROAT: 0
LIGHT-HEADEDNESS: 0

## 2023-08-25 NOTE — PROGRESS NOTES
Subjective   Patient ID: Phong Wong is a 73 y.o. male who presents for Hypertension and Diabetes.    Patient presents for follow-up.  He has been compliant with his medications and diet but not exercise.  He has started chemo and will start radiation. .  He does complain of some fatigue his appetite is improved.  He had all of his teeth removed. He is tolerating his treatments well.  He denies any headaches, no dizziness, no sinus problems, no chest pain but does report baseline dyspnea on exertion.  He denies abdominal pain no nausea vomiting or diarrhea.  He reports no pain.  His lower extremity swelling has resolved.  He does have some fatigue.    Hypertension  Associated symptoms include shortness of breath. Pertinent negatives include no chest pain, headaches, neck pain or palpitations.   Diabetes  Pertinent negatives for hypoglycemia include no dizziness, headaches, pallor, seizures, speech difficulty or tremors. Associated symptoms include fatigue. Pertinent negatives for diabetes include no chest pain, no polydipsia, no polyphagia, no polyuria and no weakness.        Review of Systems   Constitutional:  Positive for appetite change and fatigue. Negative for activity change, chills and diaphoresis.   HENT:  Negative for congestion, dental problem, drooling, ear discharge, ear pain, facial swelling, hearing loss, mouth sores, nosebleeds, postnasal drip, rhinorrhea, sinus pressure, sinus pain, sneezing, sore throat, tinnitus, trouble swallowing and voice change.    Eyes:  Negative for photophobia, pain, discharge, redness, itching and visual disturbance.   Respiratory:  Positive for shortness of breath. Negative for cough, choking, chest tightness, wheezing and stridor.    Cardiovascular:  Negative for chest pain, palpitations and leg swelling.   Gastrointestinal:  Negative for abdominal distention, abdominal pain, anal bleeding, blood in stool, constipation, diarrhea, nausea, rectal pain and vomiting.    Endocrine: Negative for cold intolerance, heat intolerance, polydipsia, polyphagia and polyuria.   Genitourinary:  Negative for decreased urine volume, difficulty urinating, dysuria, enuresis, flank pain, frequency, genital sores, hematuria and urgency.   Musculoskeletal:  Negative for arthralgias, back pain, gait problem, joint swelling, myalgias, neck pain and neck stiffness.   Skin:  Negative for color change, pallor, rash and wound.   Neurological:  Negative for dizziness, tremors, seizures, syncope, facial asymmetry, speech difficulty, weakness, light-headedness, numbness and headaches.   Hematological:  Negative for adenopathy. Does not bruise/bleed easily.   Psychiatric/Behavioral:  Negative for sleep disturbance.        Objective   /68   Pulse 60   Wt 66.7 kg (147 lb)   BMI 23.73 kg/m²     Physical Exam  Constitutional:       Appearance: Normal appearance.   Cardiovascular:      Rate and Rhythm: Normal rate and regular rhythm.      Heart sounds: No murmur heard.     No gallop.   Pulmonary:      Effort: No respiratory distress.      Breath sounds: No wheezing or rales.   Abdominal:      General: There is no distension.      Palpations: There is no mass.      Tenderness: There is no abdominal tenderness. There is no guarding.   Musculoskeletal:      Right lower leg: No edema.      Left lower leg: No edema.   Neurological:      Mental Status: He is alert.         Assessment/Plan   Diagnoses and all orders for this visit:  Exudative age-related macular degeneration, bilateral, with active choroidal neovascularization (CMS/HCC)- stable  Impaired fasting glucose  -     POCT Fingerstick Glucose manually resulted  -     POCT glycosylated hemoglobin (Hb A1C) manually resulted  Non-pressure chronic ulcer of skin of other sites limited to breakdown of skin (CMS/HCC)-resolved  PAD (peripheral artery disease) (CMS/HCC)-modify risk factors  Mixed hyperlipidemia-we will continue with statin  Benign essential  hypertension-stable on present medication  Atherosclerosis of native coronary artery of native heart without angina pectoris-modify risk factors.  Arteriosclerosis of carotid artery, unspecified laterality  Aorto-iliac disease (CMS/HCC)  Chronic renal impairment, stage 3a (CMS/HCC)  Prostate cancer (CMS/HCC)-follow-up with urology  Chronic obstructive pulmonary disease, unspecified COPD type (CMS/HCC)-use trilogy as prescribed.  Throat cancer-follow-up with oncology  [facility].  Refuses immunizations.

## 2023-09-29 ENCOUNTER — HOSPITAL ENCOUNTER (OUTPATIENT)
Dept: RADIATION ONCOLOGY | Facility: HOSPITAL | Age: 73
Setting detail: RADIATION/ONCOLOGY SERIES
Discharge: STILL ADMITTED OR ADMINISTRATIVE | End: 2023-09-29
Payer: MEDICARE

## 2023-10-02 ENCOUNTER — HOSPITAL ENCOUNTER (OUTPATIENT)
Dept: RADIATION ONCOLOGY | Facility: HOSPITAL | Age: 73
Setting detail: RADIATION/ONCOLOGY SERIES
Discharge: STILL ADMITTED OR ADMINISTRATIVE | End: 2023-10-02
Payer: MEDICARE

## 2023-10-02 VITALS
DIASTOLIC BLOOD PRESSURE: 61 MMHG | TEMPERATURE: 96.6 F | WEIGHT: 135.58 LBS | RESPIRATION RATE: 18 BRPM | HEART RATE: 122 BPM | BODY MASS INDEX: 21.88 KG/M2 | SYSTOLIC BLOOD PRESSURE: 128 MMHG

## 2023-10-02 DIAGNOSIS — R22.0 MASS OF SOFT PALATE: Primary | ICD-10-CM

## 2023-10-02 DIAGNOSIS — C05.1 MALIGNANT NEOPLASM OF SOFT PALATE (MULTI): ICD-10-CM

## 2023-10-02 DIAGNOSIS — R22.1 NECK MASS: ICD-10-CM

## 2023-10-02 DIAGNOSIS — Z51.0 ENCOUNTER FOR ANTINEOPLASTIC RADIATION THERAPY: ICD-10-CM

## 2023-10-02 LAB
RAD ONC MSQ ACTUAL FRACTIONS DELIVERED: 33
RAD ONC MSQ ACTUAL SESSION DELIVERED DOSE: 200 CGRAY
RAD ONC MSQ ACTUAL TOTAL DOSE: 6600 CGRAY
RAD ONC MSQ ELAPSED DAYS: 47
RAD ONC MSQ LAST DATE: NORMAL
RAD ONC MSQ PRESCRIBED FRACTIONAL DOSE: 200 CGRAY
RAD ONC MSQ PRESCRIBED NUMBER OF FRACTIONS: 35
RAD ONC MSQ PRESCRIBED TECHNIQUE: NORMAL
RAD ONC MSQ PRESCRIBED TOTAL DOSE: 7000 CGRAY
RAD ONC MSQ PRESCRIPTION PATTERN COMMENT: NORMAL
RAD ONC MSQ START DATE: NORMAL
RAD ONC MSQ TREATMENT COURSE NUMBER: 2
RAD ONC MSQ TREATMENT SITE: NORMAL

## 2023-10-02 PROCEDURE — 77014 CHG CT GUIDANCE RADIATION THERAPY FLDS PLACEMENT: CPT | Performed by: RADIOLOGY

## 2023-10-02 PROCEDURE — 77386 HC INTENSITY-MODULATED RADIATION THERAPY (IMRT), COMPLEX: CPT | Performed by: RADIOLOGY

## 2023-10-02 RX ORDER — HEPARIN 100 UNIT/ML
500 SYRINGE INTRAVENOUS AS NEEDED
Status: CANCELLED | OUTPATIENT
Start: 2023-10-02

## 2023-10-02 RX ORDER — NALOXONE HYDROCHLORIDE 4 MG/.1ML
4 SPRAY NASAL AS NEEDED
Qty: 2 EACH | Refills: 3
Start: 2023-10-02 | End: 2023-10-04

## 2023-10-02 RX ORDER — HEPARIN SODIUM,PORCINE/PF 10 UNIT/ML
50 SYRINGE (ML) INTRAVENOUS AS NEEDED
Status: CANCELLED | OUTPATIENT
Start: 2023-10-02

## 2023-10-02 NOTE — PROGRESS NOTES
Patient ID: Phong Wong is a 73 y.o. male.  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 Juan Jose Fletcher   MedOn: Dr. Kyunghee Burkitt, X. Katherine Feng, PA-C   RadOnc: Dr. Vianca Steen     CURRENT THERAPY:   8/16 - 10/4/23: Recieved concurrent chemoradiation with 7 weekly Carboplatin (2mg/AUC)/Paclitaxel (45mg/m2)  and 70gy RT in 35fx     CURRENT SITES OF DISEASE:  Soft palate, BOT, Left palatine tonsil  B/L Cervical LN     ONCOLOGIC ISSUES:   Odynophagia  Fatigue     ONCOLOGIC HISTORY  - Pt had 2 months hx of throat discomfort with lump in right neck  - 6/13/23: CT neck showed a mass 2.8 x 2.5 x 2.9 cm in the right lower cervical neck soft tissues. Two suspicious nodes were observed, one at level 3 on the right and another at level 2 on the left.  - 6/28/23: seen by Dr. Fletcher. 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 Based on the findings, Dr. Fletcher did not recommend surgery due to the location  of the primary tumor and the presence of yvette disease  - 6/30/23: PET/CT showed intense FDG avidity within the soft palate, extending to the base of the tongue and left palatine tonsil. Multiple FDG avid left cervical level II nodes were observed, along with an FDG avid mass in the region of the right cervical  level II/III, infiltrating the right SCM muscle and encasing and invading the right jugular vein. FDG avidity was also detected in the region of the left fossa Rosenmuller and left medial pterygoid muscle.  - 8/16 - 10/4/23: Recieved concurrent chemoradiation with 7 weekly Carboplatin (2mg/AUC)/Paclitaxel (45mg/m2)  and 70gy RT in 35fx           Past Medical History:   Past Medical History:  07/27/2017: Personal history of diseases of the blood and blood-  forming organs and certain disorders involving the immune mechanism      Comment:   History of thrombocytosis   HTN, HLD, COPD, prostate cancer in 2017 (s/p radiation)  Surgical History:    Past Surgical History:   Procedure Laterality Date    CT ABDOMEN PELVIS ANGIOGRAM W AND/OR WO IV CONTRAST  9/3/2014    CT ABDOMEN PELVIS ANGIOGRAM W AND/OR WO IV CONTRAST 9/3/2014 [facility]    IR ANGIOGRAM AORTA ABDOMEN  2014    IR ANGIOGRAM AORTA ABDOMEN 2014 [facility]   Aortoiliofemoral vascular bypass in   Social History:    Social History     Socioeconomic History    Marital status:      Spouse name: Not on file    Number of children: Not on file    Years of education: Not on file    Highest education level: Not on file   Occupational History    Not on file   Tobacco Use    Smoking status: Former     Packs/day: 1.00     Years: 40.00     Additional pack years: 0.00     Total pack years: 40.00     Types: Cigarettes     Quit date:      Years since quittin.7    Smokeless tobacco: Never   Substance and Sexual Activity    Alcohol use: Yes     Alcohol/week: 12.0 standard drinks of alcohol     Types: 12 Cans of beer per week    Drug use: Never    Sexual activity: Not on file   Other Topics Concern    Not on file   Social History Narrative    Not on file     Social Determinants of Health     Financial Resource Strain: Not on file   Food Insecurity: Not on file   Transportation Needs: Not on file   Physical Activity: Not on file   Stress: Not on file   Social Connections: Not on file   Intimate Partner Violence: Not on file   Housing Stability: Not on file      Family History:    Family History   Problem Relation Name Age of Onset    Aortic aneurysm Father          abdominal     Family Oncology History:    Cancer-related family history is not on file.        HISTORY OF PRESENT ILLNESS    Chief Complaint  Squamous Cell Carcinoma of oropharynx    Interval History  Phong Wnog is a 73 y.o. male with recent diagnosis of locally advanced oral cavity cancer, completed  concurrent chemoradiation with weekly Carboplatin (2mg/AUC)/Paclitaxel  (45mg/m2) on 10/4/23.    Subjective  Patient presents on last day for concurrent chemoradiation. He completed 7 cycle of Carbo/Taxol. He reports the following:    Over the last 2 weeks he has become increasingly fatigued and weak. He had a difficult time getting to into the car this afternoon.  He denies much pain in his throat, only requiring tylenol for throat pain.  He reports loss of appetite and copious mucous production leading to significant decrease in PO intake. Patient's SO reports he's has only taken in 1 carton of Boost yesterday.   He has a lot of burning and pain from radiation in skin of his neck.     ROS  Review of Systems   Constitutional:  Positive for fatigue. Negative for chills and fever. Unexpected weight change: weight loss.  HENT:   Positive for mouth sores, sore throat and trouble swallowing. Negative for hearing loss, lump/mass, nosebleeds and tinnitus.    Respiratory:  Negative for cough and shortness of breath.    Cardiovascular:  Negative for chest pain and leg swelling.   Gastrointestinal:  Negative for abdominal pain, constipation, diarrhea, nausea and vomiting.   Musculoskeletal:  Negative for arthralgias.   Skin:  Positive for wound (skin of left neck with burning pain). Negative for rash.   Neurological:  Positive for dizziness and light-headedness. Negative for headaches and numbness.       Allergies  Allergies   Allergen Reactions    Codeine Nausea Only        Medications  Current Outpatient Medications   Medication Instructions    albuterol 90 mcg/actuation inhaler 2 puffs, inhalation, Every 4 hours    ascorbic acid (Vitamin C) 1,000 mg tablet 1 tablet, oral, Daily    aspirin 81 mg EC tablet 1 tablet, oral, Daily    atorvastatin (LIPITOR) 40 mg, oral, Daily    carvedilol (COREG) 25 mg, oral, 2 times daily    chlorthalidone (Hygroton) 25 mg tablet TAKE 1 TABLET EVERY DAY    collagenase (SantyL) 250 unit/gram  ointment Apply to wound as directed once daily for 30 days    cyanocobalamin (Vitamin B-12) 1,000 mcg tablet 1 tablet, oral, Daily    fluticasone-umeclidin-vilanter (Trelegy Ellipta) 100-62.5-25 mcg blister with device INHALE 1 PUFF EVERY DAY    ipratropium-albuteroL (Duo-Neb) 0.5-2.5 mg/3 mL nebulizer solution INHALE 1 VIAL 3 times daily    LORazepam (Ativan) 0.5 mg tablet TAKE 1 TABLET BY MOUTH ONCE DAILY FOR AGITATION (TAKE 35 MINUTES BEFORE RADIATION)    losartan (Cozaar) 100 mg tablet 1 tablet, oral, Daily    naloxone (NARCAN) 4 mg, nasal, As needed, May repeat every 2-3 minutes if needed, alternating nostrils, until medical assistance becomes available.          OBJECTIVE:  VS:  BP 88/67   Pulse 103   Temp 36.7 °C (98.1 °F)   Resp 18   SpO2 98%   Weight: 9/27: 63.9kg, 10/4: 59.4kg    Physical Exam  Constitutional:       Appearance: He is well-developed. He is ill-appearing.   HENT:      Head: Normocephalic and atraumatic.      Right Ear: External ear normal. No tenderness.      Left Ear: External ear normal. No tenderness.      Nose: Nose normal.      Mouth/Throat:      Mouth: No injury or oral lesions.      Tongue: No lesions.      Pharynx: Oropharynx is clear. No posterior oropharyngeal erythema.      Comments: Thick mucous present  Eyes:      Extraocular Movements: Extraocular movements intact.      Conjunctiva/sclera: Conjunctivae normal.      Pupils: Pupils are equal, round, and reactive to light.   Neck:      Thyroid: No thyroid mass.   Cardiovascular:      Rate and Rhythm: Regular rhythm. Tachycardia present.   Pulmonary:      Effort: Pulmonary effort is normal. No respiratory distress.      Breath sounds: Normal breath sounds.   Abdominal:      General: Bowel sounds are normal. There is no distension or abdominal bruit.      Palpations: Abdomen is soft. There is no mass.      Tenderness: There is no abdominal tenderness.   Musculoskeletal:         General: Normal range of motion.      Cervical  back: Normal range of motion and neck supple. No signs of trauma. Normal range of motion.      Right lower leg: No edema.      Left lower leg: No edema.   Lymphadenopathy:      Cervical: No cervical adenopathy.      Upper Body:      Right upper body: No axillary adenopathy.      Left upper body: No axillary adenopathy.   Skin:     General: Skin is warm and dry.      Coloration: Skin is pale.      Findings: Lesion (radiation dermatitis in bilateral neck) present.   Neurological:      General: No focal deficit present.      Mental Status: He is alert and oriented to person, place, and time.      Gait: Gait is intact.   Psychiatric:         Mood and Affect: Mood and affect normal.         Behavior: Behavior is cooperative.           Diagnostic Results     Results from last 7 days   Lab Units 10/07/23  1107 10/06/23  0448 10/05/23  1034 10/04/23  1350   WBC AUTO x10*3/uL 2.2* 1.6* 1.0* 1.1*   HEMOGLOBIN g/dL 10.1* 8.4* 5.4* 6.3*   HEMATOCRIT % 29.9* 26.7* 16.0* 18.5*   PLATELETS AUTO x10*3/uL 180 129* 111* 164   NEUTROS ABS x10*3/uL  --  1.18*  --   --    LYMPHS ABS AUTO x10*3/uL  --  0.10*  --   --    MONOS ABS AUTO x10*3/uL  --  0.27  --   --    EOS ABS MAN x10*3/uL 0.00  --  0.00 0.00   EOS ABS AUTO x10*3/uL  --  0.00  --   --    NEUTROS PCT AUTO %  --  75.2  --   --    LYMPHO PCT MAN % 5.0  --  3.0 8.0   LYMPHS PCT AUTO %  --  6.4  --   --    MONO PCT MAN % 10.0  --  24.0 29.0   MONOS PCT AUTO %  --  17.2  --   --    EOSINO PCT MAN % 0.0  --  0.0 0.0   EOS PCT AUTO %  --  0.0  --   --        Recent Labs     10/05/23  1034 10/05/23  2119 10/06/23  0448 10/07/23  1107   GLUCOSE 123* 121* 114* 117*   * 137 137 140   K 2.7* 3.3* 3.2* 3.2*   CL 95* 100 100 99   CO2 29 27 25 30   BUN 32* 31* 34* 44*   CREATININE 1.59* 1.26 1.20 1.29   EGFR 46* 61 65 59*   CALCIUM 7.8* 7.5* 8.1* 8.2*   MG 1.80  --   --  1.90   PHOS 3.0  --   --   --    ALBUMIN 2.7*  --   --   --    PROT 4.6*  --   --   --      Results from last 7  days   Lab Units 10/05/23  1034 10/04/23  1350   BILIRUBIN TOTAL mg/dL 1.0 1.3*   ALK PHOS U/L 57 69   ALT U/L 9* 10   AST U/L 13 10       Results for orders placed or performed during the hospital encounter of 10/02/23 (from the past 96 hour(s))   Rad Onc Msq Treatment Summary   Result Value Ref Range    Treatment Site Soft palate + B neck     Course Number 2     Prescribed Fractional Dose 200 cGray    Prescribed Total Dose 7,000 cGray    Actual Fractions Delivered 33     Prescription Pattern Comment Daily CBCT. Align to the bony anatomy     Actual Session Delivered Dose 200 cGray    Actual Total Dose 6,600 cGray    Prescribed Technique IMRT     Elapsed Days 47     Start Date 8/16/2023     Last Date 10/2/2023     Prescribed Number of Fractions 35          No images are attached to the encounter.    Assessment/Plan      ASSESSMENT  Phong Wong is a 73 y.o. male with recent diagnosis of locally advanced oral cavity cancer. Completed concurrent chemoradiation with 7 weekly Carboplatin (2mg/AUC)/Paclitaxel  (45mg/m2) on 10/4/2     # Locally advanced oral cavity cancer, T3N2M0  - 6/30/23: PET/CT showed intense FDG avidity within the soft palate, extending to the base of the tongue and left palatine tonsil. Multiple uptake in left cervical level II nodes, right cervical level  II/III, infiltrating the right SCM muscle and encasing and invading the right jugular vein. FDG avidity was also detected in the region of the left fossa Rosenmuller and left medial pterygoid muscle.  - 7/13/23: pt is doing well, no pain, discussed with patient about carboplatin+ paclitaxel as his chemotherapy along with radiation due to his GFR 30s.  Chemo related side effects were reviewed with patient, consent obtained.   - Patient required dental extractions and as a result start date was delayed from 7/26 to 8/15/23  - 8/30: tolerating chemo well, does have some fatigue but no n/v. No peripheral neuropathy   - 9/6: Continue to tolerate chemo  well, no n/v, is feeling more tired. Eating well, tolerating soft foods and supplementing with Boost VHC 2x per day. Denies peripheral neuropathy  - 9/13: continue to tolerate chemo well w/o n/v. Energy is lower but stable. Eating soft foods and Boost VHC x 2 per day  - 9/20: Tolerating chemo well, minimal odynophagia, does have some weight loss.  - 10/4: Completed last dose of Carbo/Taxol last week. Recall patient did not have a [facility] visit as both providers are out of office. He had las RT today and noted to have significantly increased fatigue, weakness, weight loss in the last week resulting from decreased PO intake.     # Anemia  - Hgb has been dropping since starting chemo, however today his hgb is <7 and will require a blood transfusion. This is likely due to chemotherapy as there are no s/s of bleeds.   - Blood consent obtained today, 1U PRBC ordered for tomorrow, arranged for blood transfusion tomorrow.    # Odynophagia  - 9/6: Starting to some pain with swallowing this week. Now taking Oxycodone 5mg before meals and has good control  - 9/13: Odynophagia well controlled with Oxycodone 5mg prior to meals. Using glutamine rinses. Does have some weight loss.    - 9/20: Mild odynophagia well controlled with oxycodone 5mg as needed.   - 10/4: Pt became to sedated with Oxycodone 10mg last week so this was held and his throat pain is currently controlled with Tylenol      # Neutropenia: improving  - 9/6: . Proceed to C4 today, will monitor prior to next cycle. May need to hold C4  - Return precaution for neutropenic fevers provided for patient and his significant other, they voiced understanding   - 9/13 ANC 1290.  - 9/20 . Ok for C6 Carbo/Taxol. Return precaution provided for neutropenic fevers again.   - 10/4 . Afebrile. Return precaution for neutropenic fever provided again.     # Dehydration  - Patient reported to have significantly decreased PO fluid intake  - 10/4 Creat 1.58, GFR 46  -  Replete with 1L NS in Infusion     # Hypokalemia  - 9/20: K 3.3.  Likely due to chemo  - Replete with KCl 20mg ER PO x 1 in infusion  - - 9/27: K 3.8   - 10/4: K 2.9, Replete with KCL 40mg IV in infusion    # Hypomagnesemia  - 9/20: Mg 1.48. Likely due to chemo  - Replete with Mag 2g in infusion.   - 10/4: Mg 1.94    # Weight Loss  - Drastic difference in appearance on today's visit vs last [facility] visit 2 weeks ago. Pt with temporal wasting and sunken cheeks. Dietician consulted on nutrition and weight loss.     # Weakness  - Multifactorial, due to chemo, anemia, dehydration, decrease PO intake. See Above.    # Left neck skin lesion  - Pt with burning pain if left skin of neck. Did not have ointment with them today in infusion  - Obtained aquaphor with meplex dressing and instructed patient to apply to neck in Infusion today    PLAN  -- 1L NS in Infusion for dehydration   -- KCL 40mEQ IV over 2 hrs for hypokalemia  -- Start OTC Mucinex for thick mucous  -- Encourage patient to increase PO intake  -- RTC tomorrow for ACC visit and blood transfusion, likely will require admission for failure to thrive if patient condition does not improve  -- Go to ED if a fever >100.4 F develops  -- Call 911 to go to the nearest ED if weakness worsens.

## 2023-10-03 ENCOUNTER — HOSPITAL ENCOUNTER (OUTPATIENT)
Dept: RADIATION ONCOLOGY | Facility: HOSPITAL | Age: 73
Setting detail: RADIATION/ONCOLOGY SERIES
Discharge: STILL ADMITTED OR ADMINISTRATIVE | End: 2023-10-03
Payer: MEDICARE

## 2023-10-03 DIAGNOSIS — C05.1 MALIGNANT NEOPLASM OF SOFT PALATE (MULTI): ICD-10-CM

## 2023-10-03 DIAGNOSIS — Z51.0 ENCOUNTER FOR ANTINEOPLASTIC RADIATION THERAPY: ICD-10-CM

## 2023-10-03 LAB
RAD ONC MSQ ACTUAL FRACTIONS DELIVERED: 34
RAD ONC MSQ ACTUAL SESSION DELIVERED DOSE: 200 CGRAY
RAD ONC MSQ ACTUAL TOTAL DOSE: 6800 CGRAY
RAD ONC MSQ ELAPSED DAYS: 48
RAD ONC MSQ LAST DATE: NORMAL
RAD ONC MSQ PRESCRIBED FRACTIONAL DOSE: 200 CGRAY
RAD ONC MSQ PRESCRIBED NUMBER OF FRACTIONS: 35
RAD ONC MSQ PRESCRIBED TECHNIQUE: NORMAL
RAD ONC MSQ PRESCRIBED TOTAL DOSE: 7000 CGRAY
RAD ONC MSQ PRESCRIPTION PATTERN COMMENT: NORMAL
RAD ONC MSQ START DATE: NORMAL
RAD ONC MSQ TREATMENT COURSE NUMBER: 2
RAD ONC MSQ TREATMENT SITE: NORMAL

## 2023-10-03 PROCEDURE — 77386 HC INTENSITY-MODULATED RADIATION THERAPY (IMRT), COMPLEX: CPT | Performed by: RADIOLOGY

## 2023-10-03 PROCEDURE — 77014 CHG CT GUIDANCE RADIATION THERAPY FLDS PLACEMENT: CPT | Performed by: RADIOLOGY

## 2023-10-04 ENCOUNTER — NUTRITION (OUTPATIENT)
Dept: HEMATOLOGY/ONCOLOGY | Facility: HOSPITAL | Age: 73
End: 2023-10-04
Payer: MEDICARE

## 2023-10-04 ENCOUNTER — INFUSION (OUTPATIENT)
Dept: HEMATOLOGY/ONCOLOGY | Facility: HOSPITAL | Age: 73
End: 2023-10-04
Payer: MEDICARE

## 2023-10-04 ENCOUNTER — OFFICE VISIT (OUTPATIENT)
Dept: HEMATOLOGY/ONCOLOGY | Facility: HOSPITAL | Age: 73
End: 2023-10-04
Payer: MEDICARE

## 2023-10-04 ENCOUNTER — DOCUMENTATION (OUTPATIENT)
Dept: HEMATOLOGY/ONCOLOGY | Facility: HOSPITAL | Age: 73
End: 2023-10-04
Payer: MEDICARE

## 2023-10-04 ENCOUNTER — PHARMACY VISIT (OUTPATIENT)
Dept: PHARMACY | Facility: CLINIC | Age: 73
End: 2023-10-04
Payer: COMMERCIAL

## 2023-10-04 ENCOUNTER — RADIATION ONCOLOGY OTV (OUTPATIENT)
Dept: RADIATION ONCOLOGY | Facility: HOSPITAL | Age: 73
End: 2023-10-04
Payer: MEDICARE

## 2023-10-04 ENCOUNTER — HOSPITAL ENCOUNTER (OUTPATIENT)
Dept: RADIATION ONCOLOGY | Facility: HOSPITAL | Age: 73
Setting detail: RADIATION/ONCOLOGY SERIES
Discharge: STILL ADMITTED OR ADMINISTRATIVE | End: 2023-10-04
Payer: MEDICARE

## 2023-10-04 VITALS
WEIGHT: 130.95 LBS | SYSTOLIC BLOOD PRESSURE: 102 MMHG | RESPIRATION RATE: 18 BRPM | HEART RATE: 114 BPM | DIASTOLIC BLOOD PRESSURE: 67 MMHG | BODY MASS INDEX: 21.14 KG/M2 | TEMPERATURE: 97.3 F

## 2023-10-04 VITALS
DIASTOLIC BLOOD PRESSURE: 67 MMHG | TEMPERATURE: 98.1 F | SYSTOLIC BLOOD PRESSURE: 88 MMHG | RESPIRATION RATE: 18 BRPM | OXYGEN SATURATION: 98 % | HEART RATE: 103 BPM

## 2023-10-04 DIAGNOSIS — Z51.0 ENCOUNTER FOR ANTINEOPLASTIC RADIATION THERAPY: ICD-10-CM

## 2023-10-04 DIAGNOSIS — D64.81 ANEMIA ASSOCIATED WITH CHEMOTHERAPY: ICD-10-CM

## 2023-10-04 DIAGNOSIS — T45.1X5A CHEMOTHERAPY-INDUCED FATIGUE: ICD-10-CM

## 2023-10-04 DIAGNOSIS — T14.8XXA WOUND OF SKIN: ICD-10-CM

## 2023-10-04 DIAGNOSIS — E86.0 DEHYDRATION: ICD-10-CM

## 2023-10-04 DIAGNOSIS — R53.83 CHEMOTHERAPY-INDUCED FATIGUE: ICD-10-CM

## 2023-10-04 DIAGNOSIS — C05.1 MALIGNANT NEOPLASM OF SOFT PALATE (MULTI): ICD-10-CM

## 2023-10-04 DIAGNOSIS — E87.6 HYPOKALEMIA: ICD-10-CM

## 2023-10-04 DIAGNOSIS — C10.9 SQUAMOUS CELL CARCINOMA OF OROPHARYNX (MULTI): ICD-10-CM

## 2023-10-04 DIAGNOSIS — R79.89 SERUM CREATININE RAISED: ICD-10-CM

## 2023-10-04 DIAGNOSIS — C10.9 SQUAMOUS CELL CARCINOMA OF OROPHARYNX (MULTI): Primary | ICD-10-CM

## 2023-10-04 DIAGNOSIS — R63.0 LOSS OF APPETITE: ICD-10-CM

## 2023-10-04 DIAGNOSIS — T45.1X5A ANEMIA ASSOCIATED WITH CHEMOTHERAPY: ICD-10-CM

## 2023-10-04 LAB
ABO GROUP (TYPE) IN BLOOD: NORMAL
ABO GROUP (TYPE) IN BLOOD: NORMAL
ALBUMIN SERPL BCP-MCNC: 3.5 G/DL (ref 3.4–5)
ALP SERPL-CCNC: 69 U/L (ref 33–136)
ALT SERPL W P-5'-P-CCNC: 10 U/L (ref 10–52)
ANION GAP SERPL CALC-SCNC: 17 MMOL/L (ref 10–20)
ANTIBODY SCREEN: NORMAL
AST SERPL W P-5'-P-CCNC: 10 U/L (ref 9–39)
BASOPHILS # BLD MANUAL: 0 X10*3/UL (ref 0–0.1)
BASOPHILS NFR BLD MANUAL: 0 %
BILIRUB SERPL-MCNC: 1.3 MG/DL (ref 0–1.2)
BUN SERPL-MCNC: 38 MG/DL (ref 6–23)
CALCIUM SERPL-MCNC: 9 MG/DL (ref 8.6–10.3)
CHLORIDE SERPL-SCNC: 87 MMOL/L (ref 98–107)
CO2 SERPL-SCNC: 32 MMOL/L (ref 21–32)
CREAT SERPL-MCNC: 1.58 MG/DL (ref 0.5–1.3)
DACRYOCYTES BLD QL SMEAR: ABNORMAL
DOHLE BOD BLD QL SMEAR: PRESENT
EOSINOPHIL # BLD MANUAL: 0 X10*3/UL (ref 0–0.4)
EOSINOPHIL NFR BLD MANUAL: 0 %
ERYTHROCYTE [DISTWIDTH] IN BLOOD BY AUTOMATED COUNT: 15 % (ref 11.5–14.5)
GFR SERPL CREATININE-BSD FRML MDRD: 46 ML/MIN/1.73M*2
GLUCOSE SERPL-MCNC: 132 MG/DL (ref 74–99)
HCT VFR BLD AUTO: 18.5 % (ref 41–52)
HGB BLD-MCNC: 6.3 G/DL (ref 13.5–17.5)
IMM GRANULOCYTES # BLD AUTO: 0.15 X10*3/UL (ref 0–0.5)
IMM GRANULOCYTES NFR BLD AUTO: 13.8 % (ref 0–0.9)
LYMPHOCYTES # BLD MANUAL: 0.09 X10*3/UL (ref 0.8–3)
LYMPHOCYTES NFR BLD MANUAL: 8 %
MAGNESIUM SERPL-MCNC: 1.94 MG/DL (ref 1.6–2.4)
MCH RBC QN AUTO: 28.8 PG (ref 26–34)
MCHC RBC AUTO-ENTMCNC: 34.1 G/DL (ref 32–36)
MCV RBC AUTO: 85 FL (ref 80–100)
MONOCYTES # BLD MANUAL: 0.32 X10*3/UL (ref 0.05–0.8)
MONOCYTES NFR BLD MANUAL: 29 %
NEUTROPHILS # BLD MANUAL: 0.7 X10*3/UL (ref 1.6–5.5)
NEUTS BAND # BLD MANUAL: 0.18 X10*3/UL (ref 0–0.5)
NEUTS BAND NFR BLD MANUAL: 16 %
NEUTS SEG # BLD MANUAL: 0.52 X10*3/UL (ref 1.6–5)
NEUTS SEG NFR BLD MANUAL: 47 %
NRBC BLD-RTO: 0 /100 WBCS (ref 0–0)
OVALOCYTES BLD QL SMEAR: ABNORMAL
PLATELET # BLD AUTO: 164 X10*3/UL (ref 150–450)
PMV BLD AUTO: 10.4 FL (ref 7.5–11.5)
POTASSIUM SERPL-SCNC: 2.9 MMOL/L (ref 3.5–5.3)
PROT SERPL-MCNC: 6.5 G/DL (ref 6.4–8.2)
RAD ONC MSQ ACTUAL FRACTIONS DELIVERED: 35
RAD ONC MSQ ACTUAL SESSION DELIVERED DOSE: 200 CGRAY
RAD ONC MSQ ACTUAL TOTAL DOSE: 7000 CGRAY
RAD ONC MSQ ELAPSED DAYS: 49
RAD ONC MSQ LAST DATE: NORMAL
RAD ONC MSQ PRESCRIBED FRACTIONAL DOSE: 200 CGRAY
RAD ONC MSQ PRESCRIBED NUMBER OF FRACTIONS: 35
RAD ONC MSQ PRESCRIBED TECHNIQUE: NORMAL
RAD ONC MSQ PRESCRIBED TOTAL DOSE: 7000 CGRAY
RAD ONC MSQ PRESCRIPTION PATTERN COMMENT: NORMAL
RAD ONC MSQ START DATE: NORMAL
RAD ONC MSQ TREATMENT COURSE NUMBER: 2
RAD ONC MSQ TREATMENT SITE: NORMAL
RBC # BLD AUTO: 2.19 X10*6/UL (ref 4.5–5.9)
RBC MORPH BLD: ABNORMAL
RH FACTOR (ANTIGEN D): NORMAL
RH FACTOR (ANTIGEN D): NORMAL
SODIUM SERPL-SCNC: 133 MMOL/L (ref 136–145)
TOTAL CELLS COUNTED BLD: 100
TOXIC GRANULES BLD QL SMEAR: PRESENT
WBC # BLD AUTO: 1.1 X10*3/UL (ref 4.4–11.3)

## 2023-10-04 PROCEDURE — 1160F RVW MEDS BY RX/DR IN RCRD: CPT | Performed by: STUDENT IN AN ORGANIZED HEALTH CARE EDUCATION/TRAINING PROGRAM

## 2023-10-04 PROCEDURE — 1036F TOBACCO NON-USER: CPT | Performed by: STUDENT IN AN ORGANIZED HEALTH CARE EDUCATION/TRAINING PROGRAM

## 2023-10-04 PROCEDURE — 96376 TX/PRO/DX INJ SAME DRUG ADON: CPT

## 2023-10-04 PROCEDURE — 96365 THER/PROPH/DIAG IV INF INIT: CPT

## 2023-10-04 PROCEDURE — 83735 ASSAY OF MAGNESIUM: CPT | Performed by: RADIOLOGY

## 2023-10-04 PROCEDURE — 85027 COMPLETE CBC AUTOMATED: CPT | Performed by: RADIOLOGY

## 2023-10-04 PROCEDURE — 77386 HC INTENSITY-MODULATED RADIATION THERAPY (IMRT), COMPLEX: CPT | Performed by: RADIOLOGY

## 2023-10-04 PROCEDURE — 85007 BL SMEAR W/DIFF WBC COUNT: CPT | Performed by: STUDENT IN AN ORGANIZED HEALTH CARE EDUCATION/TRAINING PROGRAM

## 2023-10-04 PROCEDURE — 1159F MED LIST DOCD IN RCRD: CPT | Performed by: STUDENT IN AN ORGANIZED HEALTH CARE EDUCATION/TRAINING PROGRAM

## 2023-10-04 PROCEDURE — 3074F SYST BP LT 130 MM HG: CPT | Performed by: STUDENT IN AN ORGANIZED HEALTH CARE EDUCATION/TRAINING PROGRAM

## 2023-10-04 PROCEDURE — 80053 COMPREHEN METABOLIC PANEL: CPT | Performed by: RADIOLOGY

## 2023-10-04 PROCEDURE — 86900 BLOOD TYPING SEROLOGIC ABO: CPT | Mod: 59 | Performed by: STUDENT IN AN ORGANIZED HEALTH CARE EDUCATION/TRAINING PROGRAM

## 2023-10-04 PROCEDURE — 77427 RADIATION TX MANAGEMENT X5: CPT | Performed by: RADIOLOGY

## 2023-10-04 PROCEDURE — 2500000004 HC RX 250 GENERAL PHARMACY W/ HCPCS (ALT 636 FOR OP/ED): Performed by: STUDENT IN AN ORGANIZED HEALTH CARE EDUCATION/TRAINING PROGRAM

## 2023-10-04 PROCEDURE — 99215 OFFICE O/P EST HI 40 MIN: CPT | Performed by: STUDENT IN AN ORGANIZED HEALTH CARE EDUCATION/TRAINING PROGRAM

## 2023-10-04 PROCEDURE — 1126F AMNT PAIN NOTED NONE PRSNT: CPT | Performed by: STUDENT IN AN ORGANIZED HEALTH CARE EDUCATION/TRAINING PROGRAM

## 2023-10-04 PROCEDURE — 99417 PROLNG OP E/M EACH 15 MIN: CPT | Performed by: STUDENT IN AN ORGANIZED HEALTH CARE EDUCATION/TRAINING PROGRAM

## 2023-10-04 PROCEDURE — 86850 RBC ANTIBODY SCREEN: CPT | Performed by: STUDENT IN AN ORGANIZED HEALTH CARE EDUCATION/TRAINING PROGRAM

## 2023-10-04 PROCEDURE — 36415 COLL VENOUS BLD VENIPUNCTURE: CPT | Performed by: STUDENT IN AN ORGANIZED HEALTH CARE EDUCATION/TRAINING PROGRAM

## 2023-10-04 PROCEDURE — 86901 BLOOD TYPING SEROLOGIC RH(D): CPT | Mod: 59 | Performed by: STUDENT IN AN ORGANIZED HEALTH CARE EDUCATION/TRAINING PROGRAM

## 2023-10-04 PROCEDURE — 3078F DIAST BP <80 MM HG: CPT | Performed by: STUDENT IN AN ORGANIZED HEALTH CARE EDUCATION/TRAINING PROGRAM

## 2023-10-04 PROCEDURE — 77014 CHG CT GUIDANCE RADIATION THERAPY FLDS PLACEMENT: CPT | Performed by: RADIOLOGY

## 2023-10-04 PROCEDURE — RXMED WILLOW AMBULATORY MEDICATION CHARGE

## 2023-10-04 PROCEDURE — 77336 RADIATION PHYSICS CONSULT: CPT | Performed by: RADIOLOGY

## 2023-10-04 PROCEDURE — G2212 PROLONG OUTPT/OFFICE VIS: HCPCS | Performed by: STUDENT IN AN ORGANIZED HEALTH CARE EDUCATION/TRAINING PROGRAM

## 2023-10-04 RX ORDER — POTASSIUM CHLORIDE 14.9 MG/ML
20 INJECTION INTRAVENOUS
Status: CANCELLED | OUTPATIENT
Start: 2023-10-04 | End: 2023-10-04

## 2023-10-04 RX ORDER — ACETAMINOPHEN 325 MG/1
650 TABLET ORAL ONCE
OUTPATIENT
Start: 2023-10-05

## 2023-10-04 RX ORDER — POTASSIUM CHLORIDE 14.9 MG/ML
20 INJECTION INTRAVENOUS ONCE
Status: DISCONTINUED | OUTPATIENT
Start: 2023-10-04 | End: 2023-10-04 | Stop reason: HOSPADM

## 2023-10-04 RX ORDER — HEPARIN SODIUM,PORCINE/PF 10 UNIT/ML
50 SYRINGE (ML) INTRAVENOUS AS NEEDED
Status: CANCELLED | OUTPATIENT
Start: 2023-10-04

## 2023-10-04 RX ORDER — ALBUTEROL SULFATE 0.83 MG/ML
3 SOLUTION RESPIRATORY (INHALATION) AS NEEDED
OUTPATIENT
Start: 2023-10-05

## 2023-10-04 RX ORDER — DEXAMETHASONE 4 MG/1
4 TABLET ORAL EVERY MORNING
Qty: 14 TABLET | Refills: 0 | Status: SHIPPED | OUTPATIENT
Start: 2023-10-04 | End: 2023-10-04 | Stop reason: SDUPTHER

## 2023-10-04 RX ORDER — PROCHLORPERAZINE MALEATE 10 MG
10 TABLET ORAL EVERY 6 HOURS PRN
COMMUNITY
Start: 2023-08-17 | End: 2023-10-10 | Stop reason: HOSPADM

## 2023-10-04 RX ORDER — ONDANSETRON HYDROCHLORIDE 8 MG/1
8 TABLET, FILM COATED ORAL EVERY 8 HOURS PRN
COMMUNITY
Start: 2023-08-17 | End: 2023-11-29 | Stop reason: ALTCHOICE

## 2023-10-04 RX ORDER — NALOXONE HYDROCHLORIDE 4 MG/.1ML
4 SPRAY NASAL AS NEEDED
Qty: 2 EACH | Refills: 1 | Status: SHIPPED | OUTPATIENT
Start: 2023-10-04 | End: 2023-11-02 | Stop reason: HOSPADM

## 2023-10-04 RX ORDER — OXYCODONE HYDROCHLORIDE 5 MG/1
5 TABLET ORAL EVERY 6 HOURS PRN
COMMUNITY
Start: 2023-09-05 | End: 2023-10-19 | Stop reason: ALTCHOICE

## 2023-10-04 RX ORDER — FAMOTIDINE 10 MG/ML
20 INJECTION INTRAVENOUS ONCE AS NEEDED
OUTPATIENT
Start: 2023-10-05

## 2023-10-04 RX ORDER — POTASSIUM CHLORIDE 14.9 MG/ML
20 INJECTION INTRAVENOUS ONCE
Status: CANCELLED | OUTPATIENT
Start: 2023-10-04

## 2023-10-04 RX ORDER — DIPHENHYDRAMINE HYDROCHLORIDE 50 MG/ML
50 INJECTION INTRAMUSCULAR; INTRAVENOUS AS NEEDED
OUTPATIENT
Start: 2023-10-05

## 2023-10-04 RX ORDER — DIPHENHYDRAMINE HCL 25 MG
25 CAPSULE ORAL ONCE
OUTPATIENT
Start: 2023-10-05

## 2023-10-04 RX ORDER — DEXAMETHASONE 4 MG/1
4 TABLET ORAL EVERY MORNING
Qty: 14 TABLET | Refills: 0 | Status: SHIPPED | OUTPATIENT
Start: 2023-10-04 | End: 2023-11-02 | Stop reason: HOSPADM

## 2023-10-04 RX ORDER — HEPARIN 100 UNIT/ML
500 SYRINGE INTRAVENOUS AS NEEDED
Status: CANCELLED | OUTPATIENT
Start: 2023-10-04

## 2023-10-04 RX ORDER — EPINEPHRINE 0.3 MG/.3ML
0.3 INJECTION SUBCUTANEOUS EVERY 5 MIN PRN
OUTPATIENT
Start: 2023-10-05

## 2023-10-04 RX ORDER — POTASSIUM CHLORIDE 14.9 MG/ML
20 INJECTION INTRAVENOUS
Status: COMPLETED | OUTPATIENT
Start: 2023-10-04 | End: 2023-10-04

## 2023-10-04 RX ADMIN — POTASSIUM CHLORIDE 20 MEQ: 14.9 INJECTION, SOLUTION INTRAVENOUS at 16:16

## 2023-10-04 RX ADMIN — POTASSIUM CHLORIDE 20 MEQ: 14.9 INJECTION, SOLUTION INTRAVENOUS at 17:15

## 2023-10-04 RX ADMIN — SODIUM CHLORIDE 1500 ML: 9 INJECTION, SOLUTION INTRAVENOUS at 15:38

## 2023-10-04 ASSESSMENT — COLUMBIA-SUICIDE SEVERITY RATING SCALE - C-SSRS
6. HAVE YOU EVER DONE ANYTHING, STARTED TO DO ANYTHING, OR PREPARED TO DO ANYTHING TO END YOUR LIFE?: NO
1. IN THE PAST MONTH, HAVE YOU WISHED YOU WERE DEAD OR WISHED YOU COULD GO TO SLEEP AND NOT WAKE UP?: NO
2. HAVE YOU ACTUALLY HAD ANY THOUGHTS OF KILLING YOURSELF?: NO

## 2023-10-04 ASSESSMENT — PATIENT HEALTH QUESTIONNAIRE - PHQ9
2. FEELING DOWN, DEPRESSED OR HOPELESS: NOT AT ALL
SUM OF ALL RESPONSES TO PHQ9 QUESTIONS 1 AND 2: 0
1. LITTLE INTEREST OR PLEASURE IN DOING THINGS: NOT AT ALL

## 2023-10-04 ASSESSMENT — PAIN SCALES - GENERAL: PAINLEVEL: 0-NO PAIN

## 2023-10-04 NOTE — PROGRESS NOTES
1422 Hemoglobin 6.3 result reported by , Christiane Iyer.  1433 Message sent to RIVER Velazquez, and result acknowledged.  1458 Potassium 2.9 level reported by , Christiane Iyer.  Result reported to RIVER Velazquez.

## 2023-10-04 NOTE — PROGRESS NOTES
"NUTRITION Follow-up NOTE  Reason for Visit:  Phong Wong is a 73 y.o. male with squamous cell carcinoma of oropharynx who presents for continued weight loss, electrolyte imbalances, and low hemoglobin.    10/4 marks last radiation treatment. Patient seen in infusion. Receiving IVF; receiving blood transfusion on 10/5. Primarily spoke with patient's wife as patient was in pain.     Anthropometrics:  Anthropometrics  IBW/kg (Dietitian Calculated): 64.9 kg (UBW 66 kg)  Percent of IBW: 91.5 %  Weight Change  Weight History / % Weight Change: 4% in 2 days; 11% in 1 month  Significant Weight Loss: Yes  Interpretation of Weight Loss: >5% in 1 month      Wt Readings from Last 10 Encounters:   10/04/23 59.4 kg (130 lb 15.3 oz)   10/02/23 61.5 kg (135 lb 9.3 oz)   08/25/23 66.7 kg (147 lb)   05/25/23 71.7 kg (158 lb)   09/30/22 71.3 kg (157 lb 2 oz)   08/30/22 66.2 kg (146 lb)   06/27/22 68.5 kg (151 lb)   06/27/22 68.9 kg (152 lb 0.1 oz)   05/27/22 70.8 kg (156 lb 0.8 oz)   05/16/22 68.9 kg (152 lb)     Lab Results   Component Value Date    HGB 6.3 (LL) 10/04/2023     Lab Results   Component Value Date     (L) 10/04/2023    K 2.9 (LL) 10/04/2023    CL 87 (L) 10/04/2023    CO2 32 10/04/2023     Lab Results   Component Value Date    CREATININE 1.58 (H) 10/04/2023          Food And Nutrient Intake:  Food and Nutrient History  Food and Nutrient History: Patient with little food over past week due to low appetite. No longer drinking Boost VHC (530 kcal, 22 g protien)  Energy Intake: Poor < 50 %  Fluid Intake: Having \"some\" fluids. Receiving IVF in infusion.  Food Allergy: NKFA.  GI Symptoms: constipation (Denies N/V. Constipation may be due to low intake over past week. Last bm today.)  Oral Problems: chewing difficulty (Following soft diet; primarily ONS and other liquids.)  Dentition:  (dental removal. Removed 8/4.)  Sleep Duration/Quality:  (Patient sleeping majority of day due to pain and fatigue. Sleep is impacting " food intake in addition to low appetite.)     Food Intake  Amount of Food: Minimal. Maybe once per day.                                            Food Supplement Intake  Oral Nutrition Supplements: Boost Very High Calorie (Infrequent over past week.)    Medication and Complementary/Alternative Medicine Use  Prescription Medication Use: Discussed possibility of meeting with supportive oncology for appetite stimulant. Wife stated they would rather hold off at this time and see how appetite progresses with end of treatment.      Nutrition Focused Physical Exam:  Subcutaneous Fat Loss  Orbital Fat Pads: Severe (dark circles, hollowing and loose skin)  Buccal Fat Pads: Severe (hollow, sunken and narrow face)  Muscle Wasting  Temporalis: Severe (hollowed scooping depression)  Pectoralis (Clavicular Region): Severe (protruding prominent clavicle)  Edema  Edema: none        Energy Needs  Estimated Energy Needs  Total Energy Estimated Needs (kCal): 1900 kCal  Total Estimated Energy Need per Day (kCal/kg): 30 kCal/kg  Method for Estimating Needs: 30 kcal/kg IBW  Estimated Fluid Needs  Total Fluid Estimated Needs (mL): 1900 mL  Total Fluid Estimated Needs (mL/kg): 30 mL/kg  Method for Estimating Needs: 1 ml/kcal or per team  Estimated Protein Needs  Total Protein Estimated Needs (g): 64 g  Total Protein Estimated Needs (g/kg): 1 g/kg  Method for Estimating Needs: 1.0 g/kg IBW        Diagnosis   Malnutrition Diagnosis  Patient has Malnutrition Diagnosis: Yes  Diagnosis Status: New  Malnutrition Diagnosis: Severe malnutrition related to acute disease or injury ((cancer treatment))  As Evidenced by: severe fat and muscle loss and >5 weight loss in 1 month.  Nutrition Diagnosis  Patient has Nutrition Diagnosis: Yes  Diagnosis Status (1): Ongoing  Nutrition Diagnosis 1: Biting/chewing (masticatory) difficility  Related to (1): Squamous Cell Carcinoma of Oropharynx  As Evidenced by (1): full dental extraction on  8/4.    Interventions/Recommendations   Food and Nutrition Delivery  Meals & Snacks: Energy-modified diet  Goals: Include high calorie fluids.        Patient Instructions   Encourage patient to continue with Boost VHC, with goal of 3 per day to reduce weight loss. Eat soft foods as able. Continue with fluids (water, gatorade) and glutamine.     Monitoring and Evaluation   Food/Nutrient Related History Monitoring  Monitoring and Evaluation Plan: Energy intake, Fluid intake, Vitamin intake  Energy Intake: Estimated energy intake  Fluid Intake: Estimated fluid intake  Vitamin Intake: Measured vitamin intake  Criteria: Would benefit from MVI given low intake.  Biochemical Data, Medical Tests and Procedures  Monitoring and Evaluation Plan: Electrolyte/renal panel, Nutritional anemia profile  Electrolyte and Renal Panel: Sodium, Potassium, Chloride  Nutritional Anemia Profile: Hemoglobin, Hematocrit        Time Spent  Prep time on day of patient encounter: 5 minutes  Time spent directly with patient, family or caregiver: 15 minutes  Additional Time Spent on Patient Care Activities: 0 minutes  Documentation Time: 20 minutes  Other Time Spent: 0 minutes  Total: 40 minutes        Readiness to Change : Good  Level of Understanding : Good  Anticipated Compliant : Fair

## 2023-10-04 NOTE — PATIENT INSTRUCTIONS
Encourage patient to continue with Boost VHC, with goal of 3 per day to reduce weight loss. Eat soft foods as able. Continue with fluids (water, gatorade) and glutamine.

## 2023-10-04 NOTE — PROGRESS NOTES
Radiation Oncology On Treatment Visit    Patient Name:  Phong Wong  MRN:  [id number]  :  1950    Referring Provider: No ref. provider found  Primary Care Provider: Harshil Weiss MD  Care Team: Patient Care Team:  Harshil Weiss MD as PCP - General  Harshil Weiss MD as PCP - [employer] PCP  Vianca Steen MD as Radiation Oncologist (Radiation Oncology)    Date of Service: 10/4/2023     Diagnosis:   Specialty Problems          Radiation Oncology Problems    Prostate cancer (CMS/HCC)        Squamous cell carcinoma of oropharynx (CMS/HCC)         Treatment Summary:  Radiation Treatments       No active radiation treatments to show.          SUBJECTIVE: Pt has completed 35/35fx for HN CA. He continues to require Silavdine creams and mepilex for his bilateral moist desquamation. He has decreased appetite,  leading to ongoing weight loss, nutrition following.   The patient had ongoing issues with increasing somnolence over the weekend concerning his wife, the patient was recently increased to oxycodone 10mg q6h PRN for pain, which may have been the cause. The patient was prescribed naloxone and counseled to lessen his opioid use, and take tylenol. At this time has been taking tylenol 500mg 3-4 times a day. No swallowing difficulties, but has thick secretions. Continues to use glutamine mixed with gatorade.     OBJECTIVE:   Vital Signs:  /67 (BP Location: Left arm, Patient Position: Standing, BP Cuff Size: Adult)   Pulse (!) 114   Temp 36.3 °C (97.3 °F) (Temporal)   Resp 18   Wt 59.4 kg (130 lb 15.3 oz)   BMI 21.14 kg/m²    Pain Scale: The patient's current pain level was assessed.  They report currently having a pain of 2 out of 10.    Other Pertinent Findings: Sitting on wheelchair. No acute distress. Bilateral neck confluent moist desquamation. Early mucositis in oral cavity with thick secretions.    Toxicity Assessment          10/4/2023    13:08   Toxicity Assessment   Adverse  Events Reviewed (WDL) No (Exceptions to WDL)   Treatment  and neck   Anorexia Grade 2       loss of appetite   Anxiety Grade 1       on Ativan prior to treatment   Dehydration Grade 2       loss of drive to drink   Depression Grade 0   Dermatitis Radiation Grade 3       Silvadine and mepilex   Diarrhea Grade 0   Fatigue Grade 3       rest and activity balance   Nausea Grade 0   Pain Grade 0   Treatment Related Secondary Malignancy Grade 0   Tumor Pain Grade 0   Vomiting Grade 0   Dysphagia Grade 1       slight   Mucositis Oral Grade 2       redness   Dry Mouth Grade 2       worse at night   Oral Pain Grade 0   Hoarseness Grade 0   Voice Alteration Grade 0          Assessment / Plan:  The patient is doing well, tolerated radiation relatively well. Discussed the ongoing changes in his pain medications, and if he requires it, to take oxycodone 5mg about 2 or 3 times a day. Will send for narcan as well. Patient will be going to infusion today, labs were drawn as well. Will have follow up scheduled for vitals and skin check, as well as infusions scheduled next week.     George Sapp MD PGY-3    I saw and evaluated the patient with the resident. I personally obtained the key and critical portions of the history and physical exam, reviewed IGRT/dosimetry and directly counseled the patient of the treatment plan. I reviewed the resident documentation and discussed the patient with the resident. I agree with the resident's medical decision making as documented in the note.     Fortunato Ellington MD

## 2023-10-05 ENCOUNTER — APPOINTMENT (OUTPATIENT)
Dept: RADIOLOGY | Facility: HOSPITAL | Age: 73
End: 2023-10-05
Payer: MEDICARE

## 2023-10-05 ENCOUNTER — HOSPITAL ENCOUNTER (OUTPATIENT)
Facility: HOSPITAL | Age: 73
Setting detail: OBSERVATION
Discharge: NURSING, REHAB OR LONG-TERM CARE | End: 2023-10-10
Attending: EMERGENCY MEDICINE | Admitting: INTERNAL MEDICINE
Payer: MEDICARE

## 2023-10-05 ENCOUNTER — PHARMACY VISIT (OUTPATIENT)
Dept: PHARMACY | Facility: CLINIC | Age: 73
End: 2023-10-05
Payer: COMMERCIAL

## 2023-10-05 DIAGNOSIS — D64.81 ANEMIA ASSOCIATED WITH CHEMOTHERAPY: ICD-10-CM

## 2023-10-05 DIAGNOSIS — E87.6 HYPOKALEMIA: ICD-10-CM

## 2023-10-05 DIAGNOSIS — D64.9 ANEMIA, UNSPECIFIED TYPE: ICD-10-CM

## 2023-10-05 DIAGNOSIS — R22.1 NECK MASS: ICD-10-CM

## 2023-10-05 DIAGNOSIS — T45.1X5A ANEMIA ASSOCIATED WITH CHEMOTHERAPY: ICD-10-CM

## 2023-10-05 DIAGNOSIS — H35.3231 EXUDATIVE AGE-RELATED MACULAR DEGENERATION, BILATERAL, WITH ACTIVE CHOROIDAL NEOVASCULARIZATION (MULTI): ICD-10-CM

## 2023-10-05 DIAGNOSIS — R53.1 GENERALIZED WEAKNESS: Primary | ICD-10-CM

## 2023-10-05 DIAGNOSIS — E78.5 HYPERLIPIDEMIA, UNSPECIFIED HYPERLIPIDEMIA TYPE: ICD-10-CM

## 2023-10-05 DIAGNOSIS — R13.12 OROPHARYNGEAL DYSPHAGIA: ICD-10-CM

## 2023-10-05 LAB
ABO GROUP (TYPE) IN BLOOD: NORMAL
ABO GROUP (TYPE) IN BLOOD: NORMAL
ALBUMIN SERPL BCP-MCNC: 2.7 G/DL (ref 3.4–5)
ALP SERPL-CCNC: 57 U/L (ref 33–136)
ALT SERPL W P-5'-P-CCNC: 9 U/L (ref 10–52)
ANION GAP SERPL CALC-SCNC: 13 MMOL/L (ref 10–20)
ANION GAP SERPL CALC-SCNC: 14 MMOL/L (ref 10–20)
ANTIBODY SCREEN: NORMAL
AST SERPL W P-5'-P-CCNC: 13 U/L (ref 9–39)
BASOPHILS # BLD MANUAL: 0 X10*3/UL (ref 0–0.1)
BASOPHILS NFR BLD MANUAL: 0 %
BILIRUB SERPL-MCNC: 1 MG/DL (ref 0–1.2)
BUN SERPL-MCNC: 31 MG/DL (ref 6–23)
BUN SERPL-MCNC: 32 MG/DL (ref 6–23)
CALCIUM SERPL-MCNC: 7.5 MG/DL (ref 8.6–10.3)
CALCIUM SERPL-MCNC: 7.8 MG/DL (ref 8.6–10.3)
CHLORIDE SERPL-SCNC: 100 MMOL/L (ref 98–107)
CHLORIDE SERPL-SCNC: 95 MMOL/L (ref 98–107)
CO2 SERPL-SCNC: 27 MMOL/L (ref 21–32)
CO2 SERPL-SCNC: 29 MMOL/L (ref 21–32)
CREAT SERPL-MCNC: 1.26 MG/DL (ref 0.5–1.3)
CREAT SERPL-MCNC: 1.59 MG/DL (ref 0.5–1.3)
EOSINOPHIL # BLD MANUAL: 0 X10*3/UL (ref 0–0.4)
EOSINOPHIL NFR BLD MANUAL: 0 %
ERYTHROCYTE [DISTWIDTH] IN BLOOD BY AUTOMATED COUNT: 15.3 % (ref 11.5–14.5)
GFR SERPL CREATININE-BSD FRML MDRD: 46 ML/MIN/1.73M*2
GFR SERPL CREATININE-BSD FRML MDRD: 61 ML/MIN/1.73M*2
GLUCOSE SERPL-MCNC: 121 MG/DL (ref 74–99)
GLUCOSE SERPL-MCNC: 123 MG/DL (ref 74–99)
HCT VFR BLD AUTO: 16 % (ref 41–52)
HGB BLD-MCNC: 5.4 G/DL (ref 13.5–17.5)
IMM GRANULOCYTES # BLD AUTO: 0 X10*3/UL (ref 0–0.5)
IMM GRANULOCYTES NFR BLD AUTO: 0 % (ref 0–0.9)
LYMPHOCYTES # BLD MANUAL: 0.03 X10*3/UL (ref 0.8–3)
LYMPHOCYTES NFR BLD MANUAL: 3 %
MAGNESIUM SERPL-MCNC: 1.8 MG/DL (ref 1.6–2.4)
MCH RBC QN AUTO: 29.5 PG (ref 26–34)
MCHC RBC AUTO-ENTMCNC: 33.8 G/DL (ref 32–36)
MCV RBC AUTO: 87 FL (ref 80–100)
METAMYELOCYTES # BLD MANUAL: 0.01 X10*3/UL
METAMYELOCYTES NFR BLD MANUAL: 1 %
MONOCYTES # BLD MANUAL: 0.24 X10*3/UL (ref 0.05–0.8)
MONOCYTES NFR BLD MANUAL: 24 %
NEUTROPHILS # BLD MANUAL: 0.68 X10*3/UL (ref 1.6–5.5)
NEUTS BAND # BLD MANUAL: 0.12 X10*3/UL (ref 0–0.5)
NEUTS BAND NFR BLD MANUAL: 12 %
NEUTS SEG # BLD MANUAL: 0.56 X10*3/UL (ref 1.6–5)
NEUTS SEG NFR BLD MANUAL: 56 %
NRBC BLD-RTO: 2 /100 WBCS (ref 0–0)
PHOSPHATE SERPL-MCNC: 3 MG/DL (ref 2.5–4.9)
PLATELET # BLD AUTO: 111 X10*3/UL (ref 150–450)
PMV BLD AUTO: 10.7 FL (ref 7.5–11.5)
POTASSIUM SERPL-SCNC: 2.7 MMOL/L (ref 3.5–5.3)
POTASSIUM SERPL-SCNC: 3.3 MMOL/L (ref 3.5–5.3)
PROT SERPL-MCNC: 4.6 G/DL (ref 6.4–8.2)
RBC # BLD AUTO: 1.83 X10*6/UL (ref 4.5–5.9)
RBC MORPH BLD: ABNORMAL
RH FACTOR (ANTIGEN D): NORMAL
RH FACTOR (ANTIGEN D): NORMAL
SODIUM SERPL-SCNC: 135 MMOL/L (ref 136–145)
SODIUM SERPL-SCNC: 137 MMOL/L (ref 136–145)
TOTAL CELLS COUNTED BLD: 100
VARIANT LYMPHS # BLD MANUAL: 0.04 X10*3/UL (ref 0–0.3)
VARIANT LYMPHS NFR BLD: 4 %
WBC # BLD AUTO: 1 X10*3/UL (ref 4.4–11.3)

## 2023-10-05 PROCEDURE — 96366 THER/PROPH/DIAG IV INF ADDON: CPT | Performed by: EMERGENCY MEDICINE

## 2023-10-05 PROCEDURE — 86901 BLOOD TYPING SEROLOGIC RH(D): CPT | Performed by: STUDENT IN AN ORGANIZED HEALTH CARE EDUCATION/TRAINING PROGRAM

## 2023-10-05 PROCEDURE — 2580000001 HC RX 258 IV SOLUTIONS: Performed by: STUDENT IN AN ORGANIZED HEALTH CARE EDUCATION/TRAINING PROGRAM

## 2023-10-05 PROCEDURE — 84100 ASSAY OF PHOSPHORUS: CPT | Performed by: STUDENT IN AN ORGANIZED HEALTH CARE EDUCATION/TRAINING PROGRAM

## 2023-10-05 PROCEDURE — 36415 COLL VENOUS BLD VENIPUNCTURE: CPT | Performed by: EMERGENCY MEDICINE

## 2023-10-05 PROCEDURE — 96361 HYDRATE IV INFUSION ADD-ON: CPT | Performed by: EMERGENCY MEDICINE

## 2023-10-05 PROCEDURE — RXMED WILLOW AMBULATORY MEDICATION CHARGE

## 2023-10-05 PROCEDURE — 36415 COLL VENOUS BLD VENIPUNCTURE: CPT | Performed by: STUDENT IN AN ORGANIZED HEALTH CARE EDUCATION/TRAINING PROGRAM

## 2023-10-05 PROCEDURE — 85027 COMPLETE CBC AUTOMATED: CPT | Performed by: STUDENT IN AN ORGANIZED HEALTH CARE EDUCATION/TRAINING PROGRAM

## 2023-10-05 PROCEDURE — 96365 THER/PROPH/DIAG IV INF INIT: CPT | Performed by: EMERGENCY MEDICINE

## 2023-10-05 PROCEDURE — 2500000004 HC RX 250 GENERAL PHARMACY W/ HCPCS (ALT 636 FOR OP/ED): Performed by: EMERGENCY MEDICINE

## 2023-10-05 PROCEDURE — P9040 RBC LEUKOREDUCED IRRADIATED: HCPCS

## 2023-10-05 PROCEDURE — 86920 COMPATIBILITY TEST SPIN: CPT | Mod: 59

## 2023-10-05 PROCEDURE — 96375 TX/PRO/DX INJ NEW DRUG ADDON: CPT | Performed by: EMERGENCY MEDICINE

## 2023-10-05 PROCEDURE — 2500000001 HC RX 250 WO HCPCS SELF ADMINISTERED DRUGS (ALT 637 FOR MEDICARE OP): Performed by: STUDENT IN AN ORGANIZED HEALTH CARE EDUCATION/TRAINING PROGRAM

## 2023-10-05 PROCEDURE — 80048 BASIC METABOLIC PNL TOTAL CA: CPT | Performed by: NURSE PRACTITIONER

## 2023-10-05 PROCEDURE — 99222 1ST HOSP IP/OBS MODERATE 55: CPT | Performed by: NURSE PRACTITIONER

## 2023-10-05 PROCEDURE — 80053 COMPREHEN METABOLIC PANEL: CPT | Performed by: STUDENT IN AN ORGANIZED HEALTH CARE EDUCATION/TRAINING PROGRAM

## 2023-10-05 PROCEDURE — 2500000004 HC RX 250 GENERAL PHARMACY W/ HCPCS (ALT 636 FOR OP/ED): Performed by: STUDENT IN AN ORGANIZED HEALTH CARE EDUCATION/TRAINING PROGRAM

## 2023-10-05 PROCEDURE — 83735 ASSAY OF MAGNESIUM: CPT | Performed by: STUDENT IN AN ORGANIZED HEALTH CARE EDUCATION/TRAINING PROGRAM

## 2023-10-05 PROCEDURE — G0378 HOSPITAL OBSERVATION PER HR: HCPCS

## 2023-10-05 PROCEDURE — 71045 X-RAY EXAM CHEST 1 VIEW: CPT | Mod: FOREIGN READ | Performed by: RADIOLOGY

## 2023-10-05 PROCEDURE — 85007 BL SMEAR W/DIFF WBC COUNT: CPT | Performed by: STUDENT IN AN ORGANIZED HEALTH CARE EDUCATION/TRAINING PROGRAM

## 2023-10-05 PROCEDURE — 36430 TRANSFUSION BLD/BLD COMPNT: CPT

## 2023-10-05 PROCEDURE — 99285 EMERGENCY DEPT VISIT HI MDM: CPT | Mod: 25 | Performed by: EMERGENCY MEDICINE

## 2023-10-05 PROCEDURE — 71045 X-RAY EXAM CHEST 1 VIEW: CPT | Mod: FR

## 2023-10-05 RX ORDER — POTASSIUM CHLORIDE 1.5 G/1.58G
40 POWDER, FOR SOLUTION ORAL ONCE
Status: COMPLETED | OUTPATIENT
Start: 2023-10-05 | End: 2023-10-05

## 2023-10-05 RX ORDER — POTASSIUM CHLORIDE 14.9 MG/ML
20 INJECTION INTRAVENOUS ONCE
Status: COMPLETED | OUTPATIENT
Start: 2023-10-05 | End: 2023-10-05

## 2023-10-05 RX ORDER — OXYCODONE HYDROCHLORIDE 5 MG/1
5 TABLET ORAL 3 TIMES DAILY PRN
Status: DISCONTINUED | OUTPATIENT
Start: 2023-10-05 | End: 2023-10-08

## 2023-10-05 RX ORDER — ALBUTEROL SULFATE 0.83 MG/ML
2.5 SOLUTION RESPIRATORY (INHALATION) EVERY 4 HOURS
Status: DISCONTINUED | OUTPATIENT
Start: 2023-10-05 | End: 2023-10-05

## 2023-10-05 RX ORDER — SILVER SULFADIAZINE 10 G/1000G
1 CREAM TOPICAL 3 TIMES DAILY
COMMUNITY
End: 2023-10-10 | Stop reason: HOSPADM

## 2023-10-05 RX ORDER — ONDANSETRON 4 MG/1
4 TABLET, ORALLY DISINTEGRATING ORAL EVERY 8 HOURS PRN
Status: DISCONTINUED | OUTPATIENT
Start: 2023-10-05 | End: 2023-10-10 | Stop reason: HOSPADM

## 2023-10-05 RX ORDER — BUDESONIDE 0.5 MG/2ML
0.5 INHALANT ORAL
Status: DISCONTINUED | OUTPATIENT
Start: 2023-10-05 | End: 2023-10-10 | Stop reason: HOSPADM

## 2023-10-05 RX ORDER — IPRATROPIUM BROMIDE AND ALBUTEROL SULFATE 2.5; .5 MG/3ML; MG/3ML
3 SOLUTION RESPIRATORY (INHALATION) 4 TIMES DAILY PRN
Status: DISCONTINUED | OUTPATIENT
Start: 2023-10-05 | End: 2023-10-10 | Stop reason: HOSPADM

## 2023-10-05 RX ORDER — CHLORTHALIDONE 25 MG/1
1 TABLET ORAL DAILY
COMMUNITY
End: 2023-10-10 | Stop reason: HOSPADM

## 2023-10-05 RX ORDER — HYDROMORPHONE HYDROCHLORIDE 1 MG/ML
1 INJECTION, SOLUTION INTRAMUSCULAR; INTRAVENOUS; SUBCUTANEOUS ONCE
Status: COMPLETED | OUTPATIENT
Start: 2023-10-05 | End: 2023-10-05

## 2023-10-05 RX ORDER — ASPIRIN 81 MG/1
81 TABLET ORAL DAILY
Status: DISCONTINUED | OUTPATIENT
Start: 2023-10-05 | End: 2023-10-10 | Stop reason: HOSPADM

## 2023-10-05 RX ORDER — ATORVASTATIN CALCIUM 40 MG/1
40 TABLET, FILM COATED ORAL NIGHTLY
Status: DISCONTINUED | OUTPATIENT
Start: 2023-10-06 | End: 2023-10-10 | Stop reason: HOSPADM

## 2023-10-05 RX ORDER — POLYETHYLENE GLYCOL 3350 17 G/17G
17 POWDER, FOR SOLUTION ORAL DAILY PRN
Status: DISCONTINUED | OUTPATIENT
Start: 2023-10-05 | End: 2023-10-10 | Stop reason: HOSPADM

## 2023-10-05 RX ORDER — ATORVASTATIN CALCIUM 40 MG/1
40 TABLET, FILM COATED ORAL NIGHTLY
COMMUNITY
End: 2023-10-10 | Stop reason: HOSPADM

## 2023-10-05 RX ORDER — DEXTROMETHORPHAN HYDROBROMIDE, GUAIFENESIN 5; 100 MG/5ML; MG/5ML
650 LIQUID ORAL EVERY 8 HOURS PRN
COMMUNITY
End: 2023-10-10 | Stop reason: HOSPADM

## 2023-10-05 RX ORDER — DOCUSATE SODIUM 100 MG/1
100 CAPSULE, LIQUID FILLED ORAL 2 TIMES DAILY
Status: DISCONTINUED | OUTPATIENT
Start: 2023-10-05 | End: 2023-10-10 | Stop reason: HOSPADM

## 2023-10-05 RX ORDER — DEXAMETHASONE 4 MG/1
4 TABLET ORAL EVERY MORNING
Status: DISCONTINUED | OUTPATIENT
Start: 2023-10-06 | End: 2023-10-10 | Stop reason: HOSPADM

## 2023-10-05 RX ORDER — ALBUTEROL SULFATE 90 UG/1
2 AEROSOL, METERED RESPIRATORY (INHALATION) EVERY 4 HOURS
Status: DISCONTINUED | OUTPATIENT
Start: 2023-10-05 | End: 2023-10-05 | Stop reason: ALTCHOICE

## 2023-10-05 RX ORDER — FLUTICASONE FUROATE AND VILANTEROL 100; 25 UG/1; UG/1
1 POWDER RESPIRATORY (INHALATION)
Status: DISCONTINUED | OUTPATIENT
Start: 2023-10-05 | End: 2023-10-05 | Stop reason: ALTCHOICE

## 2023-10-05 RX ORDER — ONDANSETRON HYDROCHLORIDE 2 MG/ML
4 INJECTION, SOLUTION INTRAVENOUS EVERY 8 HOURS PRN
Status: DISCONTINUED | OUTPATIENT
Start: 2023-10-05 | End: 2023-10-10 | Stop reason: HOSPADM

## 2023-10-05 RX ORDER — PANTOPRAZOLE SODIUM 40 MG/1
40 TABLET, DELAYED RELEASE ORAL
Status: DISCONTINUED | OUTPATIENT
Start: 2023-10-06 | End: 2023-10-10 | Stop reason: HOSPADM

## 2023-10-05 RX ORDER — ALBUTEROL SULFATE 0.83 MG/ML
2.5 SOLUTION RESPIRATORY (INHALATION) EVERY 4 HOURS PRN
Status: DISCONTINUED | OUTPATIENT
Start: 2023-10-05 | End: 2023-10-10 | Stop reason: HOSPADM

## 2023-10-05 RX ORDER — ASCORBIC ACID 500 MG
1000 TABLET ORAL DAILY
Status: DISCONTINUED | OUTPATIENT
Start: 2023-10-06 | End: 2023-10-10 | Stop reason: HOSPADM

## 2023-10-05 RX ORDER — FORMOTEROL FUMARATE DIHYDRATE 20 UG/2ML
20 SOLUTION RESPIRATORY (INHALATION)
Status: DISCONTINUED | OUTPATIENT
Start: 2023-10-05 | End: 2023-10-10 | Stop reason: HOSPADM

## 2023-10-05 RX ORDER — HEPARIN SODIUM 5000 [USP'U]/ML
5000 INJECTION, SOLUTION INTRAVENOUS; SUBCUTANEOUS EVERY 8 HOURS
Status: DISCONTINUED | OUTPATIENT
Start: 2023-10-05 | End: 2023-10-05

## 2023-10-05 RX ADMIN — POTASSIUM CHLORIDE 40 MEQ: 1.5 FOR SOLUTION ORAL at 12:10

## 2023-10-05 RX ADMIN — POTASSIUM CHLORIDE 20 MEQ: 14.9 INJECTION, SOLUTION INTRAVENOUS at 12:10

## 2023-10-05 RX ADMIN — HYDROMORPHONE HYDROCHLORIDE 1 MG: 1 INJECTION, SOLUTION INTRAMUSCULAR; INTRAVENOUS; SUBCUTANEOUS at 15:00

## 2023-10-05 RX ADMIN — HYDROMORPHONE HYDROCHLORIDE 0.5 MG: 1 INJECTION, SOLUTION INTRAMUSCULAR; INTRAVENOUS; SUBCUTANEOUS at 10:50

## 2023-10-05 RX ADMIN — SODIUM CHLORIDE, SODIUM LACTATE, POTASSIUM CHLORIDE, AND CALCIUM CHLORIDE 1000 ML: 600; 310; 30; 20 INJECTION, SOLUTION INTRAVENOUS at 10:25

## 2023-10-05 SDOH — ECONOMIC STABILITY: TRANSPORTATION INSECURITY
IN THE PAST 12 MONTHS, HAS LACK OF TRANSPORTATION KEPT YOU FROM MEETINGS, WORK, OR FROM GETTING THINGS NEEDED FOR DAILY LIVING?: NO

## 2023-10-05 SDOH — SOCIAL STABILITY: SOCIAL INSECURITY: WITHIN THE LAST YEAR, HAVE YOU BEEN AFRAID OF YOUR PARTNER OR EX-PARTNER?: NO

## 2023-10-05 SDOH — ECONOMIC STABILITY: INCOME INSECURITY: IN THE LAST 12 MONTHS, WAS THERE A TIME WHEN YOU WERE NOT ABLE TO PAY THE MORTGAGE OR RENT ON TIME?: NO

## 2023-10-05 SDOH — SOCIAL STABILITY: SOCIAL INSECURITY: ABUSE: ADULT

## 2023-10-05 SDOH — ECONOMIC STABILITY: FOOD INSECURITY: WITHIN THE PAST 12 MONTHS, THE FOOD YOU BOUGHT JUST DIDN'T LAST AND YOU DIDN'T HAVE MONEY TO GET MORE.: NEVER TRUE

## 2023-10-05 SDOH — HEALTH STABILITY: PHYSICAL HEALTH: ON AVERAGE, HOW MANY DAYS PER WEEK DO YOU ENGAGE IN MODERATE TO STRENUOUS EXERCISE (LIKE A BRISK WALK)?: 0 DAYS

## 2023-10-05 SDOH — HEALTH STABILITY: MENTAL HEALTH: HOW MANY STANDARD DRINKS CONTAINING ALCOHOL DO YOU HAVE ON A TYPICAL DAY?: PATIENT DOES NOT DRINK

## 2023-10-05 SDOH — ECONOMIC STABILITY: INCOME INSECURITY: HOW HARD IS IT FOR YOU TO PAY FOR THE VERY BASICS LIKE FOOD, HOUSING, MEDICAL CARE, AND HEATING?: NOT VERY HARD

## 2023-10-05 SDOH — SOCIAL STABILITY: SOCIAL INSECURITY: WERE YOU ABLE TO COMPLETE ALL THE BEHAVIORAL HEALTH SCREENINGS?: YES

## 2023-10-05 SDOH — SOCIAL STABILITY: SOCIAL INSECURITY: WITHIN THE LAST YEAR, HAVE YOU BEEN HUMILIATED OR EMOTIONALLY ABUSED IN OTHER WAYS BY YOUR PARTNER OR EX-PARTNER?: NO

## 2023-10-05 SDOH — ECONOMIC STABILITY: HOUSING INSECURITY
IN THE LAST 12 MONTHS, WAS THERE A TIME WHEN YOU DID NOT HAVE A STEADY PLACE TO SLEEP OR SLEPT IN A SHELTER (INCLUDING NOW)?: NO

## 2023-10-05 SDOH — SOCIAL STABILITY: SOCIAL INSECURITY: HAS ANYONE EVER THREATENED TO HURT YOUR FAMILY OR YOUR PETS?: NO

## 2023-10-05 SDOH — HEALTH STABILITY: PHYSICAL HEALTH: ON AVERAGE, HOW MANY MINUTES DO YOU ENGAGE IN EXERCISE AT THIS LEVEL?: 0 MIN

## 2023-10-05 SDOH — SOCIAL STABILITY: SOCIAL NETWORK
DO YOU BELONG TO ANY CLUBS OR ORGANIZATIONS SUCH AS CHURCH GROUPS UNIONS, FRATERNAL OR ATHLETIC GROUPS, OR SCHOOL GROUPS?: YES

## 2023-10-05 SDOH — SOCIAL STABILITY: SOCIAL INSECURITY: DO YOU FEEL ANYONE HAS EXPLOITED OR TAKEN ADVANTAGE OF YOU FINANCIALLY OR OF YOUR PERSONAL PROPERTY?: NO

## 2023-10-05 SDOH — SOCIAL STABILITY: SOCIAL INSECURITY: DOES ANYONE TRY TO KEEP YOU FROM HAVING/CONTACTING OTHER FRIENDS OR DOING THINGS OUTSIDE YOUR HOME?: NO

## 2023-10-05 SDOH — ECONOMIC STABILITY: TRANSPORTATION INSECURITY
IN THE PAST 12 MONTHS, HAS THE LACK OF TRANSPORTATION KEPT YOU FROM MEDICAL APPOINTMENTS OR FROM GETTING MEDICATIONS?: NO

## 2023-10-05 SDOH — SOCIAL STABILITY: SOCIAL INSECURITY: HAVE YOU HAD THOUGHTS OF HARMING ANYONE ELSE?: NO

## 2023-10-05 SDOH — SOCIAL STABILITY: SOCIAL INSECURITY
WITHIN THE LAST YEAR, HAVE YOU BEEN KICKED, HIT, SLAPPED, OR OTHERWISE PHYSICALLY HURT BY YOUR PARTNER OR EX-PARTNER?: NO

## 2023-10-05 SDOH — ECONOMIC STABILITY: FOOD INSECURITY: WITHIN THE PAST 12 MONTHS, YOU WORRIED THAT YOUR FOOD WOULD RUN OUT BEFORE YOU GOT MONEY TO BUY MORE.: NEVER TRUE

## 2023-10-05 SDOH — SOCIAL STABILITY: SOCIAL NETWORK
IN A TYPICAL WEEK, HOW MANY TIMES DO YOU TALK ON THE PHONE WITH FAMILY, FRIENDS, OR NEIGHBORS?: MORE THAN THREE TIMES A WEEK

## 2023-10-05 SDOH — HEALTH STABILITY: MENTAL HEALTH
STRESS IS WHEN SOMEONE FEELS TENSE, NERVOUS, ANXIOUS, OR CAN'T SLEEP AT NIGHT BECAUSE THEIR MIND IS TROUBLED. HOW STRESSED ARE YOU?: TO SOME EXTENT

## 2023-10-05 SDOH — SOCIAL STABILITY: SOCIAL INSECURITY: ARE THERE ANY APPARENT SIGNS OF INJURIES/BEHAVIORS THAT COULD BE RELATED TO ABUSE/NEGLECT?: NO

## 2023-10-05 SDOH — HEALTH STABILITY: MENTAL HEALTH: HOW OFTEN DO YOU HAVE A DRINK CONTAINING ALCOHOL?: NEVER

## 2023-10-05 SDOH — ECONOMIC STABILITY: INCOME INSECURITY: IN THE PAST 12 MONTHS, HAS THE ELECTRIC, GAS, OIL, OR WATER COMPANY THREATENED TO SHUT OFF SERVICE IN YOUR HOME?: NO

## 2023-10-05 SDOH — HEALTH STABILITY: MENTAL HEALTH: HOW OFTEN DO YOU HAVE 6 OR MORE DRINKS ON ONE OCCASION?: NEVER

## 2023-10-05 SDOH — SOCIAL STABILITY: SOCIAL INSECURITY
WITHIN THE LAST YEAR, HAVE TO BEEN RAPED OR FORCED TO HAVE ANY KIND OF SEXUAL ACTIVITY BY YOUR PARTNER OR EX-PARTNER?: NO

## 2023-10-05 SDOH — ECONOMIC STABILITY: HOUSING INSECURITY: IN THE LAST 12 MONTHS, HOW MANY PLACES HAVE YOU LIVED?: 1

## 2023-10-05 SDOH — SOCIAL STABILITY: SOCIAL INSECURITY: ARE YOU OR HAVE YOU BEEN THREATENED OR ABUSED PHYSICALLY, EMOTIONALLY, OR SEXUALLY BY ANYONE?: NO

## 2023-10-05 SDOH — SOCIAL STABILITY: SOCIAL NETWORK: HOW OFTEN DO YOU ATTEND CHURCH OR RELIGIOUS SERVICES?: NEVER

## 2023-10-05 SDOH — SOCIAL STABILITY: SOCIAL NETWORK: HOW OFTEN DO YOU GET TOGETHER WITH FRIENDS OR RELATIVES?: MORE THAN THREE TIMES A WEEK

## 2023-10-05 SDOH — SOCIAL STABILITY: SOCIAL NETWORK: ARE YOU MARRIED, WIDOWED, DIVORCED, SEPARATED, NEVER MARRIED, OR LIVING WITH A PARTNER?: MARRIED

## 2023-10-05 SDOH — SOCIAL STABILITY: SOCIAL INSECURITY: DO YOU FEEL UNSAFE GOING BACK TO THE PLACE WHERE YOU ARE LIVING?: NO

## 2023-10-05 SDOH — SOCIAL STABILITY: SOCIAL NETWORK: HOW OFTEN DO YOU ATTENT MEETINGS OF THE CLUB OR ORGANIZATION YOU BELONG TO?: NEVER

## 2023-10-05 ASSESSMENT — COGNITIVE AND FUNCTIONAL STATUS - GENERAL
WALKING IN HOSPITAL ROOM: A LOT
TURNING FROM BACK TO SIDE WHILE IN FLAT BAD: A LITTLE
DRESSING REGULAR UPPER BODY CLOTHING: A LOT
DRESSING REGULAR LOWER BODY CLOTHING: A LITTLE
MOVING TO AND FROM BED TO CHAIR: A LITTLE
DAILY ACTIVITIY SCORE: 17
CLIMB 3 TO 5 STEPS WITH RAILING: A LOT
STANDING UP FROM CHAIR USING ARMS: A LITTLE
MOBILITY SCORE: 16
TOILETING: A LITTLE
MOVING FROM LYING ON BACK TO SITTING ON SIDE OF FLAT BED WITH BEDRAILS: A LITTLE
HELP NEEDED FOR BATHING: A LOT
PERSONAL GROOMING: A LITTLE

## 2023-10-05 ASSESSMENT — PAIN - FUNCTIONAL ASSESSMENT
PAIN_FUNCTIONAL_ASSESSMENT: 0-10

## 2023-10-05 ASSESSMENT — PATIENT HEALTH QUESTIONNAIRE - PHQ9
SUM OF ALL RESPONSES TO PHQ9 QUESTIONS 1 & 2: 0
1. LITTLE INTEREST OR PLEASURE IN DOING THINGS: NOT AT ALL
2. FEELING DOWN, DEPRESSED OR HOPELESS: NOT AT ALL

## 2023-10-05 ASSESSMENT — PAIN SCALES - GENERAL
PAINLEVEL_OUTOF10: 7
PAINLEVEL_OUTOF10: 6
PAINLEVEL_OUTOF10: 6
PAINLEVEL_OUTOF10: 0 - NO PAIN
PAINLEVEL_OUTOF10: 7
PAINLEVEL_OUTOF10: 2
PAINLEVEL_OUTOF10: 0 - NO PAIN
PAINLEVEL_OUTOF10: 2
PAINLEVEL_OUTOF10: 5 - MODERATE PAIN

## 2023-10-05 ASSESSMENT — LIFESTYLE VARIABLES
AUDIT-C TOTAL SCORE: 0
AUDIT-C TOTAL SCORE: 0
HOW OFTEN DO YOU HAVE A DRINK CONTAINING ALCOHOL: NEVER
HOW MANY STANDARD DRINKS CONTAINING ALCOHOL DO YOU HAVE ON A TYPICAL DAY: PATIENT DOES NOT DRINK
AUDIT-C TOTAL SCORE: 0
SKIP TO QUESTIONS 9-10: 1
HOW OFTEN DO YOU HAVE 6 OR MORE DRINKS ON ONE OCCASION: NEVER
SKIP TO QUESTIONS 9-10: 1

## 2023-10-05 ASSESSMENT — ENCOUNTER SYMPTOMS
SHORTNESS OF BREATH: 1
WEAKNESS: 1

## 2023-10-05 ASSESSMENT — COLUMBIA-SUICIDE SEVERITY RATING SCALE - C-SSRS
2. HAVE YOU ACTUALLY HAD ANY THOUGHTS OF KILLING YOURSELF?: NO
1. IN THE PAST MONTH, HAVE YOU WISHED YOU WERE DEAD OR WISHED YOU COULD GO TO SLEEP AND NOT WAKE UP?: NO
6. HAVE YOU EVER DONE ANYTHING, STARTED TO DO ANYTHING, OR PREPARED TO DO ANYTHING TO END YOUR LIFE?: NO

## 2023-10-05 NOTE — PROGRESS NOTES
Pharmacy Medication History Review    Phong Wong is a 73 y.o. male admitted for Anemia. Pharmacy reviewed the patient's [id number] medications and allergies for accuracy.    The list below reflectives the updated PTA list. Please review each medication in order reconciliation for additional clarification and justification.  (Not in a hospital admission)       The list below reflectives the updated allergy list. Please review each documented allergy for additional clarification and justification.  Allergies  Reviewed by PRISCILA Cordero on 10/5/2023        Severity Reactions Comments    Codeine Not Specified Nausea Only             Below are additional concerns with the patient's PTA list.  Prior to Admission Medications   Prescriptions Last Dose Informant Patient Reported? Taking?   LORazepam (Ativan) 0.5 mg tablet Not Taking  No No   Sig: TAKE 1 TABLET BY MOUTH ONCE DAILY FOR AGITATION (TAKE 35 MINUTES BEFORE RADIATION)   Patient not taking: Reported on 10/5/2023   albuterol 90 mcg/actuation inhaler Unknown  No No   Sig: INHALE 2 PUFFS EVERY 4 HOURS   Patient not taking: Reported on 10/5/2023   aspirin 81 mg EC tablet 10/4/2023 at pm  Yes No   Sig: Take 1 tablet (81 mg) by mouth once daily.   atorvastatin (Lipitor) 40 mg tablet 10/4/2023 at pm  No No   Sig: Take 1 tablet (40 mg) by mouth once daily.   Patient not taking: Reported on 10/4/2023   atorvastatin (Lipitor) 40 mg tablet 10/4/2023 at pm  Yes No   Sig: Take 1 tablet (40 mg) by mouth once daily at bedtime.   carvedilol (Coreg) 25 mg tablet More than a month  No No   Sig: TAKE 1 TABLET TWICE DAILY   Patient not taking: Reported on 10/4/2023   chlorthalidone (Hygroton) 25 mg tablet Unknown  No No   Sig: TAKE 1 TABLET EVERY DAY   Patient not taking: Reported on 10/5/2023   chlorthalidone (Hygroton) 25 mg tablet 10/4/2023 at pm  Yes No   Sig: Take 1 tablet (25 mg) by mouth once daily.   collagenase (SantyL) 250 unit/gram ointment   Yes No   Sig: Apply  to wound as directed once daily for 30 days   cyanocobalamin (Vitamin B-12) 1,000 mcg tablet   Yes No   Sig: Take 1 tablet (1,000 mcg) by mouth once daily.   dexAMETHasone (Decadron) 4 mg tablet 10/5/2023 at am  No No   Sig: Take 1 tablet (4 mg) by mouth once daily in the morning for 14 days.   fluticasone-umeclidin-vilanter (Trelegy Ellipta) 100-62.5-25 mcg blister with device 10/5/2023 at am  No No   Sig: INHALE 1 PUFF EVERY DAY   ipratropium-albuteroL (Duo-Neb) 0.5-2.5 mg/3 mL nebulizer solution Unknown  Yes No   Sig: 3 mL 3 times a day as needed for wheezing or shortness of breath.   losartan (Cozaar) 100 mg tablet More than a month  Yes No   Sig: Take 1 tablet (100 mg) by mouth once daily.   naloxone (Narcan) 4 mg/0.1 mL nasal spray Unknown  No No   Sig: Administer 1 spray (4 mg) into affected nostril(s) if needed for opioid reversal or respiratory depression for up to 2 doses. May repeat every 2-3 minutes if needed, alternating nostrils, until medical assistance becomes available.   Patient not taking: Reported on 10/5/2023   ondansetron (Zofran) 8 mg tablet Unknown  Yes No   Sig: Take 1 tablet (8 mg) by mouth every 8 hours if needed for nausea or vomiting.   oxyCODONE (Roxicodone) 5 mg immediate release tablet   Yes No   Sig: Take 1 tablet (5 mg) by mouth every 6 hours if needed for severe pain (7 - 10). Per wife recently got the 10mg but this was too much for patient. Was extremely drowsy and hallucinating   prochlorperazine (Compazine) 10 mg tablet Unknown  Yes No   Sig: Take 1 tablet (10 mg) by mouth every 6 hours if needed for nausea or vomiting.   silver sulfADIAZINE (Silvadene) 1 % cream   Yes No   Sig: Apply 1 Application topically 3 times a day.      Facility-Administered Medications: None      Per wife at bedside    Audrey Rasheed [facility]

## 2023-10-05 NOTE — PROGRESS NOTES
Home with wife      10/05/23 0161   Current Planned Discharge Disposition   Current Planned Discharge Disposition Home

## 2023-10-05 NOTE — NURSING NOTE
Received to Obs. 18 accompanied by ER nurse, and significant other Emelia. Pt. Required sliding over from bed to stretcher by 2 people. Pt. Soiled with stool upon arrival. ER nurse had 2nd unit of blood in  hand to be started in this unit. Pt. Resp. Even and unlabored. Sig. Other at bedside.

## 2023-10-05 NOTE — PROGRESS NOTES
10/05/23 1511   [facility] Disability Status   Are you deaf or do you have serious difficulty hearing? N   Are you blind or do you have serious difficulty seeing, even when wearing glasses? N   Because of a physical, mental, or emotional condition, do you have serious difficulty concentrating, remembering, or making decisions? (5 years old or older) N   Do you have serious difficulty walking or climbing stairs? Y  (has WC for long distances)   Do you have serious difficulty dressing or bathing? Y   Because of a physical, mental, or emotional condition, do you have serious difficulty doing errands alone such as visiting the doctor? Y  (was driving recently)

## 2023-10-05 NOTE — PROGRESS NOTES
10/05/23 1512   Physical Activity   On average, how many days per week do you engage in moderate to strenuous exercise (like a brisk walk)? 0 days   On average, how many minutes do you engage in exercise at this level? 0 min   Financial Resource Strain   How hard is it for you to pay for the very basics like food, housing, medical care, and heating? Not very   Housing Stability   In the last 12 months, was there a time when you were not able to pay the mortgage or rent on time? N   In the last 12 months, how many places have you lived? 1   In the last 12 months, was there a time when you did not have a steady place to sleep or slept in a shelter (including now)? N   Transportation Needs   In the past 12 months, has lack of transportation kept you from medical appointments or from getting medications? no   In the past 12 months, has lack of transportation kept you from meetings, work, or from getting things needed for daily living? No   Food Insecurity   Within the past 12 months, you worried that your food would run out before you got the money to buy more. Never true   Within the past 12 months, the food you bought just didn't last and you didn't have money to get more. Never true   Stress   Do you feel stress - tense, restless, nervous, or anxious, or unable to sleep at night because your mind is troubled all the time - these days? To some exte   Social Connections   In a typical week, how many times do you talk on the phone with family, friends, or neighbors? More than 3   How often do you get together with friends or relatives? More than 3   How often do you attend Buddhism or Episcopal services? Never   Do you belong to any clubs or organizations such as Buddhism groups, unions, fraternal or athletic groups, or school groups? Yes   How often do you attend meetings of the clubs or organizations you belong to? Never   Are you , , , , never , or living with a partner?     Intimate Partner Violence   Within the last year, have you been afraid of your partner or ex-partner? No   Within the last year, have you been humiliated or emotionally abused in other ways by your partner or ex-partner? No   Within the last year, have you been kicked, hit, slapped, or otherwise physically hurt by your partner or ex-partner? No   Within the last year, have you been raped or forced to have any kind of sexual activity by your partner or ex-partner? No   Alcohol Use   Q1: How often do you have a drink containing alcohol? Never  (no alcohol x 1 month)   Q2: How many drinks containing alcohol do you have on a typical day when you are drinking? None   Q3: How often do you have six or more drinks on one occasion? Never   Utilities   In the past 12 months has the electric, gas, oil, or water company threatened to shut off services in your home? No

## 2023-10-05 NOTE — ED PROVIDER NOTES
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  Patient reports he was due to present for blood transfusion this morning however he was unable to secondary to weakness.  Additionally, he notes significant right-sided neck pain following radiation therapy yesterday.  He denies any fevers or chills, chest pain, shortness of breath.  He does endorse decreased p.o. intake lately however denies any difficulty swallowing.                  [place] Coma Scale Score: 15                  Patient History   Past Medical History:   Diagnosis Date    Personal history of diseases of the blood and blood-forming organs and certain disorders involving the immune mechanism 2017    History of thrombocytosis     Past Surgical History:   Procedure Laterality Date    CT ABDOMEN PELVIS ANGIOGRAM W AND/OR WO IV CONTRAST  9/3/2014    CT ABDOMEN PELVIS ANGIOGRAM W AND/OR WO IV CONTRAST 9/3/2014 [facility]    IR ANGIOGRAM AORTA ABDOMEN  2014    IR ANGIOGRAM AORTA ABDOMEN 2014 [facility]     Family History   Problem Relation Name Age of Onset    Aortic aneurysm Father          abdominal     Social History     Tobacco Use    Smoking status: Former     Packs/day: 1.00     Years: 40.00     Additional pack years: 0.00     Total pack years: 40.00     Types: Cigarettes     Quit date:      Years since quittin.7    Smokeless tobacco: Never   Substance Use Topics    Alcohol use: Yes     Alcohol/week: 12.0 standard drinks of alcohol     Types: 12 Cans of beer per week    Drug use: Never       Physical Exam   ED Triage Vitals   Temp Heart Rate Resp BP   10/05/23 0943 10/05/23 0943 10/05/23 0943 10/05/23 0943   36.4 °C (97.5 °F) 98 16  108/65      SpO2 Temp src Heart Rate Source Patient Position   10/05/23 0943 -- -- 10/05/23 1015   97 %   Lying      BP Location FiO2 (%)     10/05/23 1015 --     Left arm        Physical Exam  Constitutional:       Appearance: He is not toxic-appearing.   HENT:      Mouth/Throat:      Mouth: Mucous membranes are dry.   Eyes:      Extraocular Movements: Extraocular movements intact.      Pupils: Pupils are equal, round, and reactive to light.   Neck:        Comments: Area of hyperpigmentation with tenderness to palpation  Cardiovascular:      Rate and Rhythm: Normal rate and regular rhythm.   Pulmonary:      Effort: Pulmonary effort is normal.      Breath sounds: Normal breath sounds.   Abdominal:      General: Abdomen is flat.      Palpations: Abdomen is soft.   Musculoskeletal:         General: Normal range of motion.      Cervical back: Erythema present. No rigidity or crepitus. No spinous process tenderness.   Skin:     General: Skin is warm and dry.   Neurological:      General: No focal deficit present.      Mental Status: He is alert.         ED Course & MDM   Diagnoses as of 10/06/23 1345   Generalized weakness   Anemia, unspecified type   Hypokalemia       Medical Decision Making  73-year-old male with history of squamous cell carcinoma of the head and neck on chemotherapy and status post radiation therapy yesterday who presents with neck pain and weakness.  Patient had blood work at his appointment for radiation yesterday that revealed anemia however he was unable to make it to his blood transfusion appointment this morning secondary to generalized weakness.  On exam, patient's vitals are normal, he appears mildly uncomfortable but is nontoxic appearing otherwise he has skin breakdown with overlying erythema and tenderness to palpation of the right neck consistent with postradiation changes.  No palpable crepitus, no fevers or chills, does not appear to be superimposed bacterial infection.  We will proceed  with blood work, type and screen and pain control.    Patient was found to be anemic as well as markedly hypokalemic.  Patient was consented for blood products and administered packed red blood cells in addition to receiving potassium replacement.  Patient will be admitted for pain control, blood transfusion and potassium replacement.       Fabrice Gunter,   Resident  10/06/23 0204       Fabrice Gunter,   Resident  10/06/23 2139

## 2023-10-05 NOTE — H&P
History Of Present Illness  Phong Wong is a 73 y.o. male presenting with anemia, hypokalemia, and weakness.  He is accompanied by his significant other who is at the bedside.  Patient himself is awake and responsive, but drowsy.  His significant other is at the bedside providing most of the history.  She endorses that he just finished his radiation therapy yesterday, a course of 7 weeks of radiation.  He gets blood drawn with every radiation appointment and on his labs yesterday he was noted to be anemic and hypokalemic.  Patient does have past medical history of COPD, CAD, CAD/PAD, hypertension, prostate cancer s/p  chemo (reported to be in remission), and oropharyngeal cancer, received final radiation treatment yesterday. 7 weeks of radiation.  His significant other does endorses that he has been more noticeably weak and short of breath over this past week.  He was reported to come to the emergency room after his labs were resulted and the anemia and hypokalemia was noted.     Past Medical History  COPD, CAD, CAD/PAD, hypertension, prostate cancer s/p  chemo (reported to be in remission), and oropharyngeal cancer, received final radiation treatment yesterday. 7 weeks of radiation.    Surgical History  He has a past surgical history that includes CT angio abdomen pelvis w and or wo IV IV contrast (9/3/2014) and IR angiogram aorta abdomen (11/6/2014).     Social History  He reports that he quit smoking about 7 years ago. His smoking use included cigarettes. He has a 40.00 pack-year smoking history. He has never used smokeless tobacco. He reports current alcohol use of about 12.0 standard drinks of alcohol per week. He reports that he does not use drugs.    Family History  Family History   Problem Relation Name Age of Onset    Aortic aneurysm Father          abdominal        Allergies  Codeine    Review of Systems   Respiratory:  Positive for shortness of breath.    Neurological:  Positive for weakness.   All other  "systems reviewed and are negative.       Physical Exam  Constitutional:       Comments: Thin, underweight    Cardiovascular:      Rate and Rhythm: Normal rate and regular rhythm.      Pulses: Normal pulses.      Heart sounds: Normal heart sounds. No murmur heard.     No gallop.   Pulmonary:      Effort: Pulmonary effort is normal. No respiratory distress.      Breath sounds: No wheezing or rhonchi.      Comments: Diminished bilaterally   Abdominal:      General: Abdomen is flat. There is no distension.      Palpations: Abdomen is soft.      Tenderness: There is no abdominal tenderness. There is no guarding.   Musculoskeletal:         General: Normal range of motion.   Skin:     General: Skin is warm.      Capillary Refill: Capillary refill takes less than 2 seconds.      Findings: Erythema present.      Comments: Discoloration/ erythema to neck (from radiation)   Neurological:      Mental Status: He is alert and oriented to person, place, and time.          Last Recorded Vitals  Blood pressure (!) 92/44, pulse 70, temperature 37.2 °C (98.9 °F), temperature source Temporal, resp. rate 15, height 1.702 m (5' 7\"), weight 54 kg (119 lb 0.8 oz), SpO2 96 %.    Relevant Results    Scheduled medications    Continuous medications    PRN medications    Results for orders placed or performed during the hospital encounter of 10/05/23 (from the past 24 hour(s))   CBC and Auto Differential   Result Value Ref Range    WBC 1.0 (LL) 4.4 - 11.3 x10*3/uL    nRBC 2.0 (H) 0.0 - 0.0 /100 WBCs    RBC 1.83 (L) 4.50 - 5.90 x10*6/uL    Hemoglobin 5.4 (LL) 13.5 - 17.5 g/dL    Hematocrit 16.0 (L) 41.0 - 52.0 %    MCV 87 80 - 100 fL    MCH 29.5 26.0 - 34.0 pg    MCHC 33.8 32.0 - 36.0 g/dL    RDW 15.3 (H) 11.5 - 14.5 %    Platelets 111 (L) 150 - 450 x10*3/uL    MPV 10.7 7.5 - 11.5 fL    Immature Granulocytes %, Automated 0.0 0.0 - 0.9 %    Immature Granulocytes Absolute, Automated 0.00 0.00 - 0.50 x10*3/uL   Phosphorus   Result Value Ref Range "    Phosphorus 3.0 2.5 - 4.9 mg/dL   Magnesium   Result Value Ref Range    Magnesium 1.80 1.60 - 2.40 mg/dL   Comprehensive metabolic panel   Result Value Ref Range    Glucose 123 (H) 74 - 99 mg/dL    Sodium 135 (L) 136 - 145 mmol/L    Potassium 2.7 (LL) 3.5 - 5.3 mmol/L    Chloride 95 (L) 98 - 107 mmol/L    Bicarbonate 29 21 - 32 mmol/L    Anion Gap 14 10 - 20 mmol/L    Urea Nitrogen 32 (H) 6 - 23 mg/dL    Creatinine 1.59 (H) 0.50 - 1.30 mg/dL    eGFR 46 (L) >60 mL/min/1.73m*2    Calcium 7.8 (L) 8.6 - 10.3 mg/dL    Albumin 2.7 (L) 3.4 - 5.0 g/dL    Alkaline Phosphatase 57 33 - 136 U/L    Total Protein 4.6 (L) 6.4 - 8.2 g/dL    AST 13 9 - 39 U/L    Bilirubin, Total 1.0 0.0 - 1.2 mg/dL    ALT 9 (L) 10 - 52 U/L   Type And Screen   Result Value Ref Range    ABO TYPE AB     Rh TYPE NEG     ANTIBODY SCREEN NEG    Manual Differential   Result Value Ref Range    Neutrophils %, Manual 56.0 40.0 - 80.0 %    Bands %, Manual 12.0 0.0 - 5.0 %    Lymphocytes %, Manual 3.0 13.0 - 44.0 %    Monocytes %, Manual 24.0 2.0 - 10.0 %    Eosinophils %, Manual 0.0 0.0 - 6.0 %    Basophils %, Manual 0.0 0.0 - 2.0 %    Atypical Lymphocytes %, Manual 4.0 0.0 - 2.0 %    Metamyelocytes %, Manual 1.0 0.0 - 0.0 %    Seg Neutrophils Absolute, Manual 0.56 (L) 1.60 - 5.00 x10*3/uL    Bands Absolute, Manual 0.12 0.00 - 0.50 x10*3/uL    Lymphocytes Absolute, Manual 0.03 (L) 0.80 - 3.00 x10*3/uL    Monocytes Absolute, Manual 0.24 0.05 - 0.80 x10*3/uL    Eosinophils Absolute, Manual 0.00 0.00 - 0.40 x10*3/uL    Basophils Absolute, Manual 0.00 0.00 - 0.10 x10*3/uL    Atypical Lymphs Absolute, Manual 0.04 0.00 - 0.30 x10*3/uL    Metamyelocytes Absolute, Manual 0.01 0.00 - 0.00 x10*3/uL    Total Cells Counted 100     Neutrophils Absolute, Manual 0.68 (L) 1.60 - 5.50 x10*3/uL    RBC Morphology No significant RBC morphology present    VERIFY ABO/Rh Group Test   Result Value Ref Range    ABO TYPE AB     Rh TYPE NEG    Prepare RBC: 2 Units, Irradiated,  Leukocytes Reduced (CMV reduced risk)   Result Value Ref Range    PRODUCT CODE N5966J64     Unit Number [id number]     Unit ABO A     Unit RH NEG     XM INTEP COMP     Dispense Status XM     Blood Expiration Date October 09, 2023 23:59 EDT     PRODUCT BLOOD TYPE      UNIT VOLUME 350         XR chest 1 view    Result Date: 10/5/2023  STUDY: Chest Radiograph; 10-5-2023 at 11:16 AM INDICATION: Generalized weakness. COMPARISON: 8- XR CHEST 2V ACCESSION NUMBER(S): [id number] ORDERING CLINICIAN: LUIS ROMAN TECHNIQUE:  Frontal chest was obtained at 11:02 hours. FINDINGS: CARDIOMEDIASTINAL SILHOUETTE: Cardiomediastinal silhouette is normal in size and configuration.  LUNGS: Lungs are clear.  ABDOMEN: No remarkable upper abdominal findings.  BONES: No acute osseous changes.    No radiographic evidence of acute cardiopulmonary disease. Signed by Phuc Horton MD          Assessment/Plan   Principal Problem:    Anemia  Phong Wong is a 73 y.o. male presenting with anemia, hypokalemia, and weakness.  He is accompanied by his significant other who is at the bedside.  Patient himself is awake and responsive, but drowsy.  His significant other is at the bedside providing most of the history.  She endorses that he just finished his radiation therapy yesterday, a course of 7 weeks of radiation.  He gets blood drawn with every radiation appointment and on his labs yesterday he was noted to be anemic and hypokalemic.  Patient does have past medical history of COPD, CAD, CAD/PAD, hypertension, prostate cancer s/p  chemo (reported to be in remission), and oropharyngeal cancer, received final radiation treatment yesterday. 7 weeks of radiation.  His significant other does endorses that he has been more noticeably weak and short of breath over this past week.  He was reported to come to the emergency room after his labs were resulted and the anemia and hypokalemia was noted.       Anemia  -ED provider spoke to his  oncologist, anemia believed to be secondary to radiation  -Hemoglobin 5.4, 2 units packed red blood cells ordered  -Recheck in a.m.  -Likely secondary to radiation, but will monitor for signs and symptoms of bleeding  -no heparin or lovenox due to anemia    Hypokalemia  -K  2.7  -IV and oral supplements ordered while in ED  -Likely secondary to chlorthalidone  -Hold chlorthalidone while here  -Recheck of potassium ordered for tonight  -Telemetry     History of hypertension  -Significant other at bedside endorses he was taken off of his carvedilol and losartan due to soft Bps   -Blood pressures remain soft, hold chlorthalidone due to soft BPs and hypokalemia    CKD  -Creatinine 1.59, appears at baseline  -Avoid nephrotoxic medications    COPD  -Trilogy at home, formulary ordered while here  -As needed nebulizers    Cancer related pain  -Drowsy but awakens and follows commands on assessment, received IV pain meds in ED.  Drowsiness likely secondary to IV narcotics.  Pupils responsive and equal  -Continue with p.o. oxycodone    DC plan:  DC home when medically stable    DVT prophylaxis:  Held due to anemia    Labs/Testing reviewed        I spent 45 minutes in the professional and overall care of this patient.      Renetta Joseph, APRN-CNP

## 2023-10-05 NOTE — PROGRESS NOTES
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  Patient reports he was due to present for blood transfusion this morning however he was unable to secondary to weakness.  Additionally, he notes significant right-sided neck pain following radiation therapy yesterday.     Plan per Medical/Surgical team: treatment of weakness & anemia with transfusion of 2 units of PRBC, hematology consult, IV fluids  Status: observation  Payor source: [employer]  Discharge disposition: home with wife  Potential Barriers: H/H 5.4/16.0, K 2.7  ADOD: 10/6/2023  AMBER Diamond RN, BSN Transitional Care Coordinator ED# [phone]      10/05/23 2595   Discharge Planning   Living Arrangements Spouse/significant other   Support Systems Spouse/significant other   Assistance Needed wound care on neck & buttock   Type of Residence Private residence   Number of Stairs to Enter Residence 2   Number of Stairs Within Residence 2  (sleeps in recliner on first floor)   Do you have animals or pets at home? No   Home or Post Acute Services None   Patient expects to be discharged to: home with wife   Does the patient need discharge transport arranged? No

## 2023-10-05 NOTE — PROGRESS NOTES
Patient ID: Phong Wong is a 73 y.o. male.    Subjective    HPI    Objective    BSA: There is no height or weight on file to calculate BSA.  BP 88/67   Pulse 103   Temp 36.7 °C (98.1 °F)   Resp 18   SpO2 98%      Physical Exam    Performance Status:  {ECOG performance status:80198}    Assessment/Plan    Oncology History    No history exists.        {Assess/PlanSmartLinks:72768}

## 2023-10-06 ENCOUNTER — PHARMACY VISIT (OUTPATIENT)
Dept: PHARMACY | Facility: CLINIC | Age: 73
End: 2023-10-06
Payer: COMMERCIAL

## 2023-10-06 DIAGNOSIS — C10.9 SQUAMOUS CELL CARCINOMA OF OROPHARYNX (MULTI): Primary | ICD-10-CM

## 2023-10-06 PROBLEM — Z78.9 IMPAIRED MOBILITY AND ADLS: Status: ACTIVE | Noted: 2023-10-06

## 2023-10-06 PROBLEM — Z74.09 IMPAIRED MOBILITY AND ADLS: Status: ACTIVE | Noted: 2023-10-06

## 2023-10-06 PROBLEM — Z78.9 DEFICIT IN ACTIVITIES OF DAILY LIVING (ADL): Status: ACTIVE | Noted: 2023-10-06

## 2023-10-06 LAB
ANION GAP SERPL CALC-SCNC: 15 MMOL/L (ref 10–20)
APPEARANCE UR: CLEAR
BACTERIA #/AREA URNS AUTO: ABNORMAL /HPF
BASOPHILS # BLD AUTO: 0.01 X10*3/UL (ref 0–0.1)
BASOPHILS NFR BLD AUTO: 0.6 %
BILIRUB UR STRIP.AUTO-MCNC: NEGATIVE MG/DL
BLOOD EXPIRATION DATE: NORMAL
BLOOD EXPIRATION DATE: NORMAL
BUN SERPL-MCNC: 34 MG/DL (ref 6–23)
BURR CELLS BLD QL SMEAR: NORMAL
CALCIUM SERPL-MCNC: 8.1 MG/DL (ref 8.6–10.3)
CHLORIDE SERPL-SCNC: 100 MMOL/L (ref 98–107)
CO2 SERPL-SCNC: 25 MMOL/L (ref 21–32)
COLOR UR: YELLOW
CREAT SERPL-MCNC: 1.2 MG/DL (ref 0.5–1.3)
DACRYOCYTES BLD QL SMEAR: NORMAL
DISPENSE STATUS: NORMAL
DISPENSE STATUS: NORMAL
EOSINOPHIL # BLD AUTO: 0 X10*3/UL (ref 0–0.4)
EOSINOPHIL NFR BLD AUTO: 0 %
ERYTHROCYTE [DISTWIDTH] IN BLOOD BY AUTOMATED COUNT: 15.9 % (ref 11.5–14.5)
GFR SERPL CREATININE-BSD FRML MDRD: 65 ML/MIN/1.73M*2
GLUCOSE SERPL-MCNC: 114 MG/DL (ref 74–99)
GLUCOSE UR STRIP.AUTO-MCNC: NEGATIVE MG/DL
HCT VFR BLD AUTO: 26.7 % (ref 41–52)
HGB BLD-MCNC: 8.4 G/DL (ref 13.5–17.5)
IMM GRANULOCYTES # BLD AUTO: 0.01 X10*3/UL (ref 0–0.5)
IMM GRANULOCYTES NFR BLD AUTO: 0.6 % (ref 0–0.9)
KETONES UR STRIP.AUTO-MCNC: NEGATIVE MG/DL
LEUKOCYTE ESTERASE UR QL STRIP.AUTO: NEGATIVE
LYMPHOCYTES # BLD AUTO: 0.1 X10*3/UL (ref 0.8–3)
LYMPHOCYTES NFR BLD AUTO: 6.4 %
MCH RBC QN AUTO: 29.2 PG (ref 26–34)
MCHC RBC AUTO-ENTMCNC: 31.5 G/DL (ref 32–36)
MCV RBC AUTO: 93 FL (ref 80–100)
MONOCYTES # BLD AUTO: 0.27 X10*3/UL (ref 0.05–0.8)
MONOCYTES NFR BLD AUTO: 17.2 %
NEUTROPHILS # BLD AUTO: 1.18 X10*3/UL (ref 1.6–5.5)
NEUTROPHILS NFR BLD AUTO: 75.2 %
NITRITE UR QL STRIP.AUTO: NEGATIVE
NRBC BLD-RTO: 0 /100 WBCS (ref 0–0)
OVALOCYTES BLD QL SMEAR: NORMAL
PH UR STRIP.AUTO: 5 [PH]
PLATELET # BLD AUTO: 129 X10*3/UL (ref 150–450)
PMV BLD AUTO: 10.9 FL (ref 7.5–11.5)
POLYCHROMASIA BLD QL SMEAR: NORMAL
POTASSIUM SERPL-SCNC: 3.2 MMOL/L (ref 3.5–5.3)
PRODUCT BLOOD TYPE: 2800
PRODUCT BLOOD TYPE: NORMAL
PRODUCT CODE: NORMAL
PRODUCT CODE: NORMAL
PROT UR STRIP.AUTO-MCNC: ABNORMAL MG/DL
RBC # BLD AUTO: 2.88 X10*6/UL (ref 4.5–5.9)
RBC # UR STRIP.AUTO: NEGATIVE /UL
RBC #/AREA URNS AUTO: ABNORMAL /HPF
RBC MORPH BLD: NORMAL
SODIUM SERPL-SCNC: 137 MMOL/L (ref 136–145)
SP GR UR STRIP.AUTO: 1.02
UNIT ABO: NORMAL
UNIT ABO: NORMAL
UNIT NUMBER: NORMAL
UNIT NUMBER: NORMAL
UNIT RH: NORMAL
UNIT RH: NORMAL
UNIT VOLUME: 350
UNIT VOLUME: 350
UROBILINOGEN UR STRIP.AUTO-MCNC: <2 MG/DL
WBC # BLD AUTO: 1.6 X10*3/UL (ref 4.4–11.3)
WBC #/AREA URNS AUTO: ABNORMAL /HPF
XM INTEP: NORMAL
XM INTEP: NORMAL

## 2023-10-06 PROCEDURE — 84132 ASSAY OF SERUM POTASSIUM: CPT | Performed by: NURSE PRACTITIONER

## 2023-10-06 PROCEDURE — 2500000004 HC RX 250 GENERAL PHARMACY W/ HCPCS (ALT 636 FOR OP/ED): Performed by: NURSE PRACTITIONER

## 2023-10-06 PROCEDURE — G0378 HOSPITAL OBSERVATION PER HR: HCPCS

## 2023-10-06 PROCEDURE — 97166 OT EVAL MOD COMPLEX 45 MIN: CPT | Mod: GO

## 2023-10-06 PROCEDURE — 81001 URINALYSIS AUTO W/SCOPE: CPT | Performed by: EMERGENCY MEDICINE

## 2023-10-06 PROCEDURE — 96375 TX/PRO/DX INJ NEW DRUG ADDON: CPT | Performed by: EMERGENCY MEDICINE

## 2023-10-06 PROCEDURE — 2500000001 HC RX 250 WO HCPCS SELF ADMINISTERED DRUGS (ALT 637 FOR MEDICARE OP): Performed by: NURSE PRACTITIONER

## 2023-10-06 PROCEDURE — 99232 SBSQ HOSP IP/OBS MODERATE 35: CPT | Performed by: NURSE PRACTITIONER

## 2023-10-06 PROCEDURE — 36415 COLL VENOUS BLD VENIPUNCTURE: CPT | Performed by: NURSE PRACTITIONER

## 2023-10-06 PROCEDURE — 85025 COMPLETE CBC W/AUTO DIFF WBC: CPT | Performed by: NURSE PRACTITIONER

## 2023-10-06 RX ORDER — FUROSEMIDE 10 MG/ML
40 INJECTION INTRAMUSCULAR; INTRAVENOUS ONCE
Status: COMPLETED | OUTPATIENT
Start: 2023-10-06 | End: 2023-10-06

## 2023-10-06 RX ORDER — BUDESONIDE 1 MG/2ML
1 INHALANT ORAL
Qty: 60 ML | Refills: 0 | Status: SHIPPED | OUTPATIENT
Start: 2023-10-06 | End: 2023-11-29 | Stop reason: ALTCHOICE

## 2023-10-06 RX ORDER — POTASSIUM CHLORIDE 20 MEQ/1
40 TABLET, EXTENDED RELEASE ORAL ONCE
Status: COMPLETED | OUTPATIENT
Start: 2023-10-06 | End: 2023-10-06

## 2023-10-06 RX ADMIN — POTASSIUM CHLORIDE 40 MEQ: 1500 TABLET, EXTENDED RELEASE ORAL at 10:16

## 2023-10-06 RX ADMIN — DEXAMETHASONE 4 MG: 4 TABLET ORAL at 09:53

## 2023-10-06 RX ADMIN — OXYCODONE HYDROCHLORIDE 5 MG: 5 TABLET ORAL at 03:49

## 2023-10-06 RX ADMIN — PANTOPRAZOLE SODIUM 40 MG: 40 TABLET, DELAYED RELEASE ORAL at 10:15

## 2023-10-06 RX ADMIN — FUROSEMIDE 40 MG: 10 INJECTION, SOLUTION INTRAMUSCULAR; INTRAVENOUS at 12:07

## 2023-10-06 RX ADMIN — OXYCODONE HYDROCHLORIDE AND ACETAMINOPHEN 1000 MG: 500 TABLET ORAL at 09:53

## 2023-10-06 RX ADMIN — OXYCODONE HYDROCHLORIDE 5 MG: 5 TABLET ORAL at 10:14

## 2023-10-06 ASSESSMENT — ACTIVITIES OF DAILY LIVING (ADL)
PATIENT'S MEMORY ADEQUATE TO SAFELY COMPLETE DAILY ACTIVITIES?: YES
WALKS IN HOME: NEEDS ASSISTANCE
FEEDING YOURSELF: NEEDS ASSISTANCE
ADEQUATE_TO_COMPLETE_ADL: YES
HEARING - LEFT EAR: FUNCTIONAL
HEARING - RIGHT EAR: FUNCTIONAL
ADL_ASSISTANCE: NEEDS ASSISTANCE
BATHING_ASSISTANCE: MAXIMAL
EFFECT OF PAIN ON DAILY ACTIVITIES: DECREASED PARTICIPATION
BATHING: NEEDS ASSISTANCE
DRESSING YOURSELF: NEEDS ASSISTANCE
TOILETING: NEEDS ASSISTANCE
GROOMING: NEEDS ASSISTANCE
BATHING_ASSISTANCE: OTHER (COMMENT)
JUDGMENT_ADEQUATE_SAFELY_COMPLETE_DAILY_ACTIVITIES: YES

## 2023-10-06 ASSESSMENT — COGNITIVE AND FUNCTIONAL STATUS - GENERAL
TOILETING: TOTAL
DRESSING REGULAR LOWER BODY CLOTHING: A LOT
EATING MEALS: A LITTLE
PERSONAL GROOMING: A LOT
HELP NEEDED FOR BATHING: A LOT
DRESSING REGULAR UPPER BODY CLOTHING: A LOT
DAILY ACTIVITIY SCORE: 12

## 2023-10-06 ASSESSMENT — PAIN DESCRIPTION - DESCRIPTORS: DESCRIPTORS: BURNING;ACHING;SORE

## 2023-10-06 ASSESSMENT — PAIN SCALES - PAIN ASSESSMENT IN ADVANCED DEMENTIA (PAINAD): BREATHING: NORMAL

## 2023-10-06 ASSESSMENT — PAIN SCALES - GENERAL
PAINLEVEL_OUTOF10: 7
PAINLEVEL_OUTOF10: 9
PAINLEVEL_OUTOF10: 10 - WORST POSSIBLE PAIN
PAINLEVEL_OUTOF10: 0 - NO PAIN

## 2023-10-06 ASSESSMENT — PAIN - FUNCTIONAL ASSESSMENT
PAIN_FUNCTIONAL_ASSESSMENT: 0-10
PAIN_FUNCTIONAL_ASSESSMENT: 0-10

## 2023-10-06 NOTE — NURSING NOTE
Visiting with wife. Attempting to eat soft foods despite discomfort with swallowing. Bed alarm engaged.

## 2023-10-06 NOTE — PROGRESS NOTES
Phong Wong is a 73 y.o. male on day 0 of admission presenting with Anemia.      Subjective   Awake in bed, reports feeling better than he did yesterday    Objective     Last Recorded Vitals  /60 (BP Location: Right arm, Patient Position: Lying)   Pulse 68   Temp 36.9 °C (98.4 °F) (Tympanic)   Resp 17   Wt 54 kg (119 lb 0.8 oz)   SpO2 97%   Intake/Output last 3 Shifts:    Intake/Output Summary (Last 24 hours) at 10/6/2023 1138  Last data filed at 10/5/2023 2230  Gross per 24 hour   Intake 1004.17 ml   Output --   Net 1004.17 ml       Admission Weight  Weight: 54 kg (119 lb 0.8 oz) (10/05/23 0943)    Daily Weight  10/05/23 : 54 kg (119 lb 0.8 oz)    Image Results  XR chest 1 view  Narrative: STUDY:  Chest Radiograph; 10-5-2023 at 11:16 AM  INDICATION:  Generalized weakness.  COMPARISON:  8- XR CHEST 2V  ACCESSION NUMBER(S):  [id number]  ORDERING CLINICIAN:  LUIS ROMAN  TECHNIQUE:  Frontal chest was obtained at 11:02 hours.  FINDINGS:  CARDIOMEDIASTINAL SILHOUETTE:  Cardiomediastinal silhouette is normal in size and configuration.     LUNGS:  Lungs are clear.     ABDOMEN:  No remarkable upper abdominal findings.     BONES:  No acute osseous changes.  Impression: No radiographic evidence of acute cardiopulmonary disease.  Signed by Phuc Horton MD      Physical Exam  Constitutional:       Comments: Thin   Cardiovascular:      Rate and Rhythm: Normal rate and regular rhythm.      Heart sounds: No murmur heard.     No gallop.   Pulmonary:      Effort: Pulmonary effort is normal.      Breath sounds: Wheezing present.      Comments: Diminished bilaterally with slight expiratory wheezing   Abdominal:      General: Abdomen is flat. Bowel sounds are normal.      Palpations: Abdomen is soft.   Musculoskeletal:         General: No swelling.   Skin:     Findings: Erythema present.      Comments: Erythema and some scabbing to right side of neck (s/p radiation)   Neurological:      Mental Status: He is  alert and oriented to person, place, and time. Mental status is at baseline.   Psychiatric:         Behavior: Behavior normal.             Relevant Results    Scheduled medications  ascorbic acid, 1,000 mg, oral, Daily  [Held by provider] aspirin, 81 mg, oral, Daily  atorvastatin, 40 mg, oral, Nightly  budesonide, 0.5 mg, nebulization, BID  dexAMETHasone, 4 mg, oral, q AM  docusate sodium, 100 mg, oral, BID  formoterol, 20 mcg, nebulization, BID  furosemide, 40 mg, intravenous, Once  pantoprazole, 40 mg, oral, Daily before breakfast  tiotropium, 2 puff, inhalation, Daily      Continuous medications     PRN medications  PRN medications: albuterol, ipratropium-albuteroL, ondansetron ODT **OR** ondansetron, oxyCODONE, polyethylene glycol    Results for orders placed or performed during the hospital encounter of 10/05/23 (from the past 24 hour(s))   VERIFY ABO/Rh Group Test   Result Value Ref Range    ABO TYPE AB     Rh TYPE NEG    Basic metabolic panel   Result Value Ref Range    Glucose 121 (H) 74 - 99 mg/dL    Sodium 137 136 - 145 mmol/L    Potassium 3.3 (L) 3.5 - 5.3 mmol/L    Chloride 100 98 - 107 mmol/L    Bicarbonate 27 21 - 32 mmol/L    Anion Gap 13 10 - 20 mmol/L    Urea Nitrogen 31 (H) 6 - 23 mg/dL    Creatinine 1.26 0.50 - 1.30 mg/dL    eGFR 61 >60 mL/min/1.73m*2    Calcium 7.5 (L) 8.6 - 10.3 mg/dL   Prepare RBC: 2 Units, Irradiated, Leukocytes Reduced (CMV reduced risk)   Result Value Ref Range    PRODUCT CODE G3622V98     Unit Number [id number]     Unit ABO A     Unit RH NEG     XM INTEP COMP     Dispense Status TR     Blood Expiration Date October 09, 2023 23:59 EDT     PRODUCT BLOOD TYPE      UNIT VOLUME 350     PRODUCT CODE Z7933Y88     Unit Number [id number]     Unit ABO AB     Unit RH NEG     XM INTEP COMP     Dispense Status TR     Blood Expiration Date November 02, 2023 23:59 EDT     PRODUCT BLOOD TYPE 2800     UNIT VOLUME 350    Basic metabolic panel   Result Value Ref Range    Glucose 114  (H) 74 - 99 mg/dL    Sodium 137 136 - 145 mmol/L    Potassium 3.2 (L) 3.5 - 5.3 mmol/L    Chloride 100 98 - 107 mmol/L    Bicarbonate 25 21 - 32 mmol/L    Anion Gap 15 10 - 20 mmol/L    Urea Nitrogen 34 (H) 6 - 23 mg/dL    Creatinine 1.20 0.50 - 1.30 mg/dL    eGFR 65 >60 mL/min/1.73m*2    Calcium 8.1 (L) 8.6 - 10.3 mg/dL   CBC and Auto Differential   Result Value Ref Range    WBC 1.6 (L) 4.4 - 11.3 x10*3/uL    nRBC 0.0 0.0 - 0.0 /100 WBCs    RBC 2.88 (L) 4.50 - 5.90 x10*6/uL    Hemoglobin 8.4 (L) 13.5 - 17.5 g/dL    Hematocrit 26.7 (L) 41.0 - 52.0 %    MCV 93 80 - 100 fL    MCH 29.2 26.0 - 34.0 pg    MCHC 31.5 (L) 32.0 - 36.0 g/dL    RDW 15.9 (H) 11.5 - 14.5 %    Platelets 129 (L) 150 - 450 x10*3/uL    MPV 10.9 7.5 - 11.5 fL    Neutrophils % 75.2 40.0 - 80.0 %    Immature Granulocytes %, Automated 0.6 0.0 - 0.9 %    Lymphocytes % 6.4 13.0 - 44.0 %    Monocytes % 17.2 2.0 - 10.0 %    Eosinophils % 0.0 0.0 - 6.0 %    Basophils % 0.6 0.0 - 2.0 %    Neutrophils Absolute 1.18 (L) 1.60 - 5.50 x10*3/uL    Immature Granulocytes Absolute, Automated 0.01 0.00 - 0.50 x10*3/uL    Lymphocytes Absolute 0.10 (L) 0.80 - 3.00 x10*3/uL    Monocytes Absolute 0.27 0.05 - 0.80 x10*3/uL    Eosinophils Absolute 0.00 0.00 - 0.40 x10*3/uL    Basophils Absolute 0.01 0.00 - 0.10 x10*3/uL   Morphology   Result Value Ref Range    RBC Morphology See Below     Polychromasia Mild     Ovalocytes Few     Teardrop Cells Few     Ge Cells Few      XR chest 1 view    Result Date: 10/5/2023  STUDY: Chest Radiograph; 10-5-2023 at 11:16 AM INDICATION: Generalized weakness. COMPARISON: 8- XR CHEST 2V ACCESSION NUMBER(S): [id number] ORDERING CLINICIAN: LUIS ROMAN TECHNIQUE:  Frontal chest was obtained at 11:02 hours. FINDINGS: CARDIOMEDIASTINAL SILHOUETTE: Cardiomediastinal silhouette is normal in size and configuration.  LUNGS: Lungs are clear.  ABDOMEN: No remarkable upper abdominal findings.  BONES: No acute osseous changes.    No  radiographic evidence of acute cardiopulmonary disease. Signed by Phuc Horton MD          Assessment/Plan   This patient currently has cardiac telemetry ordered; if you would like to modify or discontinue the telemetry order, click here to go to the orders activity to modify/discontinue the order.        Phong Wong is a 73 y.o. male presenting with anemia, hypokalemia, and weakness.  He is accompanied by his significant other who is at the bedside.  Patient himself is awake and responsive, but drowsy.  His significant other is at the bedside providing most of the history.  She endorses that he just finished his radiation therapy yesterday, a course of 7 weeks of radiation.  He gets blood drawn with every radiation appointment and on his labs yesterday he was noted to be anemic and hypokalemic.  Patient does have past medical history of COPD, CAD, CAD/PAD, hypertension, prostate cancer s/p  chemo (reported to be in remission), and oropharyngeal cancer, received final radiation treatment yesterday. 7 weeks of radiation.  His significant other does endorses that he has been more noticeably weak and short of breath over this past week.  He was reported to come to the emergency room after his labs were resulted and the anemia and hypokalemia was noted.       Principal Problem:    Anemia        Malnutrition     -s/p radiation and cancer  -consider dietician consult outpatient     I agree with the dietitian's malnutrition diagnosis.     Anemia  -ED provider spoke to his oncologist, anemia believed to be secondary to radiation  -Hemoglobin 5.4 on admit, 2 units packed red blood cells ordered  -Hgb 8.4 this AM  -Likely secondary to radiation, but will monitor for signs and symptoms of bleeding  -no heparin or lovenox due to anemia  -hemoc was consulted by ED, however oncology is not staffed here at [facility]. Given stabilized hgb and improved K, will DC with follow up      Hypokalemia  -K  2.7 on shivam   -IV and oral  supplements ordered while in ED  -Likely secondary to chlorthalidone  -Hold chlorthalidone while here  -Recheck of potassium ordered for tonight  -Telemetry  - repeat 3.2 this AM. Supplemented with 40 PO MEq  -slight wheezing on exam, no distress. 40 mg IV lasix as he got 2 units of blood yesterday. He is already on decadron and has nebulizer and medication for nebulizer at home (RN verified with significant other)     History of hypertension  -Significant other at bedside endorses he was taken off of his carvedilol and losartan due to soft Bps   -Blood pressures remain soft, hold chlorthalidone due to soft BPs and hypokalemia     CKD  -Creatinine 1.59, appears at baseline  -Avoid nephrotoxic medications     COPD  -Trilogy at home, formulary ordered while here  -As needed nebulizers     DC plan:  DC home when medically stable     DVT prophylaxis:  Held due to anemia     Labs/Testing reviewed      Labs/Testing reviewed    Interdisciplinary team rounding completed with hospitalist, nurse, TCC    NP discussed plan and lab/testing results with Dr. Nowak        I spent 45 minutes in the professional and overall care of this patient. Monitor wheezing following IV lasix, could be due to 2 units PRBC yesterday. Already on decadron, has nebulizer at home. Likely Dc today with follow up labs and oncology appt at DC. -130, continue to hold chlorthalidone and follow with PCP         1340 Patient initially expressed wanting to go home today, however after further talking to patient with SO at bedside, concern he cannot care for self at home. Interested in SNF/ Acute rehab. PT/OT ordered, DC pending jerry Joseph, LIDIA-CNP

## 2023-10-06 NOTE — PROGRESS NOTES
Pending consult to heme onc.  [place] [facility] [place]  Patient did receive 2 units of pRBCs during this admission.  Hemoglobin was 8.4 this morning.  Plan remains for patient to return home upon discharge.  Will continue to follow for discharge planning needs.    1410  Patient is not sure he feels safe going home now due to weakness.  Adry Jackson will order PT/OT.  Patient was given SNF list from [facility] to review.  I asked patient to review list and pick his top 3-4 facilities for referrals to be sent.      Klarissa Mccord RN

## 2023-10-06 NOTE — CARE PLAN
Problem: Balance  Goal: STG - Maintains dynamic sitting balance without upper extremity support to complete ADL  Description: INTERVENTIONS:  1. Practice sitting on the edge of a bed/mat with minimal support.  2. Educate patient about maintining total hip precautions while maintaining balance.  3. Educate patient about pressure relief.  4. Educate patient about use of assistive device.  Outcome: Progressing     Problem: Bathing  Goal: STG - Patient will bathe body with min assist  Outcome: Progressing     Problem: Grooming  Goal: STG - Patient completes grooming with sup/setup assist  Outcome: Progressing     Problem: Transfers  Goal: STG - Patient will perform toilet transfer with min assist   Description: BSC and/or toilet  Walker vs pivot transfer  Outcome: Progressing     Expected end date: 10/20/23

## 2023-10-06 NOTE — PROGRESS NOTES
Occupational Therapy    Evaluation    Patient Name: Phong Wong  MRN: [id number]  Today's Date: 10/6/2023  Time Calculation  Start Time: 1706  Stop Time: 1735  Time Calculation (min): 29 min    Assessment  IP OT Assessment  OT Assessment: Patient is deconditioned and would benefit from further skilled OT for max functional potential.  Prognosis: Fair  Barriers to Discharge: Other (Comment) (Pain; Wife unable to care for patient in current condition)  Evaluation/Treatment Tolerance: Patient limited by fatigue, Patient limited by pain  Medical Staff Made Aware: Yes    Plan:  Treatment Interventions: ADL retraining, Functional transfer training, Endurance training, Patient/family training, Equipment evaluation/education  OT Frequency: 3 times per week  OT Discharge Recommendations: Moderate intensity level of continued care  OT Recommended Transfer Status: Maximum assist    Subjective     General:  General  Reason for Referral: Difficulty with ADL; Anemia, hypokalemia, weakness  Referred By: OSMIN Stone  Past Medical History Relevant to Rehab: presented to ED due to c/o increasing weakness and SOB; PMH: COPD, CAD/PAD, hypertension, prostate cancer s/p  chemo (reported to be in remission), and oropharyngeal cancer, received final radiation treatment yesterday (7 weeks of radiation)  Family/Caregiver Present: Yes  Caregiver Feedback: Wife present and expressed concerns regarding ability to care for patient at home  Prior to Session Communication: Bedside nurse (RN cleared patient for OT session with no indication of adverse effects of participation.)  Patient Position Received: Bed, 3 rail up, Alarm off, not on at start of session  Preferred Learning Style: verbal  General Comment: Awake in bed; agreeable to Eval; Wife assisted with answering questions because it's painful for patient to speak too much    Precautions:  Medical Precautions: Fall precautions  Precautions Comment: IV, tele, male external  catheter; raw skin both sides of neck; patient reports presence of bed sore     Pain:  Pain Assessment  Pain Assessment: 0-10  Pain Score: 10 - Worst possible pain (Initially reported no pain then c/o 10/10 pain with minimal movement)  Pain Type: Chronic pain  Pain Location: Neck  Pain Orientation: Right, Left  Pain Descriptors: Burning, Aching, Sore  Pain Frequency: Intermittent  Pain Onset: Unable to tell  Effect of Pain on Daily Activities: Decreased participation  Pain Interventions: Repositioned, Rest  Response to Interventions: favorable    Objective   Cognition:  Overall Cognitive Status: Impaired (A&O x4; impaired insight; self-limiting)       Home Living:  Type of Home: House  Lives With: Spouse  Home Adaptive Equipment: Walker rolling or standard, Cane, Reacher, Long-handled shoehorn (not using  DME/AE prior to admission)  Home Layout: Multi-level  Home Access: Other (Comment) (2 SHAYLEE to foyer plus 2 stairs to living room level; bedroom and full bathroom 2nd floor)  Bathroom Shower/Tub: Tub/shower unit  Bathroom Toilet: Standard  Bathroom Equipment: Shower chair with back, Grab bars in shower  Bathroom Accessibility: 1/2 bathroom foyer level; full bathroom 2nd floor  Home Living Comments: Patient sleeps in lounge chair in living room     Prior Function:  Level of [place]: Needs assistance with ADLs, Needs assistance with homemaking  Receives Help From: Family  ADL Assistance: Needs assistance (Wife has been assisting with ADL due to patient gets very fatigued)  Bath: Other (Comment) (Patient has been sponge bathing primarily; goes upstairs for a shower 1x/wk)  Homemaking Assistance: Needs assistance (Wife primarily completes IADL, but reports patient helps with laundry)  Hand Dominance: Right  Prior Function Comments: Wife reports that patient is physically able to complete ADL, but she has been assisting due to paient gets very fatigued.  Wife reports it has been a month or so since patient was able  to perform ADL with total independence.     ADL:  Eating Assistance: Stand by  Eating Deficit: Increased time to complete, Supervision/safety, Verbal cueing  Grooming Assistance: Maximal (anticipated)  Grooming Deficit: Supervision/safety, Increased time to complete, Verbal cueing, Setup  Bathing Assistance: Maximal (simulated)  Bathing Deficit:  (Patient able to reach under each arm and portions of each leg, but only attempts minimally before refusing to continue further)  LE Dressing Assistance: Maximal (Patient able to reach each sock at bed level, but refused to attempt donning/doffing)  LE Dressing Deficit: Verbal cueing, Increased time to complete (Increased effort)  Toileting Assistance with Device: Total  Toileting Deficit: Urinary Catheter, Incontinent  Functional Assistance: Maximal  ADL Comments: Despite significant encouragement, patient refused to fully attempt ADL    Activity Tolerance:  Endurance: Tolerates less than 10 min exercise, no significant change in vital signs  Activity Tolerance Comments: Poor    Bed Mobility/Transfers: Bed Mobility  Bed Mobility: Yes  Bed Mobility 1  Bed Mobility 1: Supine to sitting (attempted)  Level of Assistance 1: Dependent  Bed Mobility Comments 1: Unable to fully progress to sitting eob due to patient refused to fully attempt beyond slightly moving legs toward eob, which patient can do with min assist.  Patient is limited by neck pain, but was not receptive to attempting bed mobility in different ways to minimize pain.   and Transfers  Transfer: No (Patient declined)      Vision:  WFL    Sensation: WFL     Strength:  Strength Comments: BUE strength WFL (limited BUE endurance and functional use due to c/o neck pain)     Coordination:  Movements are Fluid and Coordinated: Yes     Hand Function:  Hand Function  Gross Grasp: Functional  Coordination: Functional    Extremities: RUE   RUE : Within Functional Limits and LUE   LUE: Within Functional Limits    Outcome  Measures: [facility] Daily Activity  Putting on and taking off regular lower body clothing: A lot  Bathing (including washing, rinsing, drying): A lot  Putting on and taking off regular upper body clothing: A lot  Toileting, which includes using toilet, bedpan or urinal: Total  Taking care of personal grooming such as brushing teeth: A lot  Eating Meals: A little  Daily Activity - Total Score: 12      Education Documentation  Precautions, taught by Steffen Shankar OT at 10/6/2023  6:43 PM.  Learner: Family, Patient  Readiness: Refuses  Method: Explanation, Demonstration  Response: Verbalizes Understanding, Needs Reinforcement    Home Exercise Program, taught by Steffen Shankar OT at 10/6/2023  6:43 PM.  Learner: Family, Patient  Readiness: Refuses  Method: Explanation, Demonstration  Response: Verbalizes Understanding, Needs Reinforcement    ADL Training, taught by Steffen Shankar OT at 10/6/2023  6:43 PM.  Learner: Family, Patient  Readiness: Refuses  Method: Explanation, Demonstration  Response: Verbalizes Understanding, Needs Reinforcement    Education Comments  No comments found.

## 2023-10-06 NOTE — DISCHARGE SUMMARY
Phong Wong is a 73 y.o. male on day 0 of admission presenting with Anemia.      Subjective   Awake in bed, reports feeling better than he did yesterday    Objective     Last Recorded Vitals  /61 (BP Location: Right arm, Patient Position: Lying)   Pulse 69   Temp 36.7 °C (98.1 °F) (Tympanic)   Resp 16   Wt 54 kg (119 lb 0.8 oz)   SpO2 95%   Intake/Output last 3 Shifts:    Intake/Output Summary (Last 24 hours) at 10/6/2023 1312  Last data filed at 10/5/2023 2230  Gross per 24 hour   Intake 1004.17 ml   Output --   Net 1004.17 ml         Admission Weight  Weight: 54 kg (119 lb 0.8 oz) (10/05/23 0943)    Daily Weight  10/05/23 : 54 kg (119 lb 0.8 oz)    Image Results  XR chest 1 view  Narrative: STUDY:  Chest Radiograph; 10-5-2023 at 11:16 AM  INDICATION:  Generalized weakness.  COMPARISON:  8- XR CHEST 2V  ACCESSION NUMBER(S):  [id number]  ORDERING CLINICIAN:  LUIS ROMAN  TECHNIQUE:  Frontal chest was obtained at 11:02 hours.  FINDINGS:  CARDIOMEDIASTINAL SILHOUETTE:  Cardiomediastinal silhouette is normal in size and configuration.     LUNGS:  Lungs are clear.     ABDOMEN:  No remarkable upper abdominal findings.     BONES:  No acute osseous changes.  Impression: No radiographic evidence of acute cardiopulmonary disease.  Signed by Phuc Horton MD      Physical Exam  Constitutional:       Comments: Thin   Cardiovascular:      Rate and Rhythm: Normal rate and regular rhythm.      Heart sounds: No murmur heard.     No gallop.   Pulmonary:      Effort: Pulmonary effort is normal.      Breath sounds: Wheezing present.      Comments: Diminished bilaterally with slight expiratory wheezing   Abdominal:      General: Abdomen is flat. Bowel sounds are normal.      Palpations: Abdomen is soft.   Musculoskeletal:         General: No swelling.   Skin:     Findings: Erythema present.      Comments: Erythema and some scabbing to right side of neck (s/p radiation)   Neurological:      Mental Status: He is  alert and oriented to person, place, and time. Mental status is at baseline.   Psychiatric:         Behavior: Behavior normal.             Relevant Results    Scheduled medications  ascorbic acid, 1,000 mg, oral, Daily  [Held by provider] aspirin, 81 mg, oral, Daily  atorvastatin, 40 mg, oral, Nightly  budesonide, 0.5 mg, nebulization, BID  dexAMETHasone, 4 mg, oral, q AM  docusate sodium, 100 mg, oral, BID  formoterol, 20 mcg, nebulization, BID  pantoprazole, 40 mg, oral, Daily before breakfast  tiotropium, 2 puff, inhalation, Daily      Continuous medications     PRN medications  PRN medications: albuterol, ipratropium-albuteroL, ondansetron ODT **OR** ondansetron, oxyCODONE, polyethylene glycol    Results for orders placed or performed during the hospital encounter of 10/05/23 (from the past 24 hour(s))   VERIFY ABO/Rh Group Test   Result Value Ref Range    ABO TYPE AB     Rh TYPE NEG    Basic metabolic panel   Result Value Ref Range    Glucose 121 (H) 74 - 99 mg/dL    Sodium 137 136 - 145 mmol/L    Potassium 3.3 (L) 3.5 - 5.3 mmol/L    Chloride 100 98 - 107 mmol/L    Bicarbonate 27 21 - 32 mmol/L    Anion Gap 13 10 - 20 mmol/L    Urea Nitrogen 31 (H) 6 - 23 mg/dL    Creatinine 1.26 0.50 - 1.30 mg/dL    eGFR 61 >60 mL/min/1.73m*2    Calcium 7.5 (L) 8.6 - 10.3 mg/dL   Prepare RBC: 2 Units, Irradiated, Leukocytes Reduced (CMV reduced risk)   Result Value Ref Range    PRODUCT CODE U9768R03     Unit Number [id number]     Unit ABO A     Unit RH NEG     XM INTEP COMP     Dispense Status TR     Blood Expiration Date October 09, 2023 23:59 EDT     PRODUCT BLOOD TYPE      UNIT VOLUME 350     PRODUCT CODE I7964V77     Unit Number [id number]     Unit ABO AB     Unit RH NEG     XM INTEP COMP     Dispense Status TR     Blood Expiration Date November 02, 2023 23:59 EDT     PRODUCT BLOOD TYPE 2800     UNIT VOLUME 350    Basic metabolic panel   Result Value Ref Range    Glucose 114 (H) 74 - 99 mg/dL    Sodium 137 136 -  145 mmol/L    Potassium 3.2 (L) 3.5 - 5.3 mmol/L    Chloride 100 98 - 107 mmol/L    Bicarbonate 25 21 - 32 mmol/L    Anion Gap 15 10 - 20 mmol/L    Urea Nitrogen 34 (H) 6 - 23 mg/dL    Creatinine 1.20 0.50 - 1.30 mg/dL    eGFR 65 >60 mL/min/1.73m*2    Calcium 8.1 (L) 8.6 - 10.3 mg/dL   CBC and Auto Differential   Result Value Ref Range    WBC 1.6 (L) 4.4 - 11.3 x10*3/uL    nRBC 0.0 0.0 - 0.0 /100 WBCs    RBC 2.88 (L) 4.50 - 5.90 x10*6/uL    Hemoglobin 8.4 (L) 13.5 - 17.5 g/dL    Hematocrit 26.7 (L) 41.0 - 52.0 %    MCV 93 80 - 100 fL    MCH 29.2 26.0 - 34.0 pg    MCHC 31.5 (L) 32.0 - 36.0 g/dL    RDW 15.9 (H) 11.5 - 14.5 %    Platelets 129 (L) 150 - 450 x10*3/uL    MPV 10.9 7.5 - 11.5 fL    Neutrophils % 75.2 40.0 - 80.0 %    Immature Granulocytes %, Automated 0.6 0.0 - 0.9 %    Lymphocytes % 6.4 13.0 - 44.0 %    Monocytes % 17.2 2.0 - 10.0 %    Eosinophils % 0.0 0.0 - 6.0 %    Basophils % 0.6 0.0 - 2.0 %    Neutrophils Absolute 1.18 (L) 1.60 - 5.50 x10*3/uL    Immature Granulocytes Absolute, Automated 0.01 0.00 - 0.50 x10*3/uL    Lymphocytes Absolute 0.10 (L) 0.80 - 3.00 x10*3/uL    Monocytes Absolute 0.27 0.05 - 0.80 x10*3/uL    Eosinophils Absolute 0.00 0.00 - 0.40 x10*3/uL    Basophils Absolute 0.01 0.00 - 0.10 x10*3/uL   Morphology   Result Value Ref Range    RBC Morphology See Below     Polychromasia Mild     Ovalocytes Few     Teardrop Cells Few     Ge Cells Few    Urinalysis with Reflex Microscopic   Result Value Ref Range    Color, Urine Yellow Straw, Yellow    Appearance, Urine Clear Clear    Specific Gravity, Urine 1.016 1.005 - 1.035    pH, Urine 5.0 5.0, 5.5, 6.0, 6.5, 7.0, 7.5, 8.0    Protein, Urine 30 (1+) (N) NEGATIVE mg/dL    Glucose, Urine NEGATIVE NEGATIVE mg/dL    Blood, Urine NEGATIVE NEGATIVE    Ketones, Urine NEGATIVE NEGATIVE mg/dL    Bilirubin, Urine NEGATIVE NEGATIVE    Urobilinogen, Urine <2.0 <2.0 mg/dL    Nitrite, Urine NEGATIVE NEGATIVE    Leukocyte Esterase, Urine NEGATIVE NEGATIVE    Urinalysis Microscopic Only   Result Value Ref Range    WBC, Urine NONE 1-5, NONE /HPF    RBC, Urine NONE NONE, 1-2, 3-5 /HPF    Bacteria, Urine 1+ (A) NONE SEEN /HPF     XR chest 1 view    Result Date: 10/5/2023  STUDY: Chest Radiograph; 10-5-2023 at 11:16 AM INDICATION: Generalized weakness. COMPARISON: 8- XR CHEST 2V ACCESSION NUMBER(S): [id number] ORDERING CLINICIAN: LUIS ROMAN TECHNIQUE:  Frontal chest was obtained at 11:02 hours. FINDINGS: CARDIOMEDIASTINAL SILHOUETTE: Cardiomediastinal silhouette is normal in size and configuration.  LUNGS: Lungs are clear.  ABDOMEN: No remarkable upper abdominal findings.  BONES: No acute osseous changes.    No radiographic evidence of acute cardiopulmonary disease. Signed by Phuc Horton MD          Assessment/Plan   This patient currently has cardiac telemetry ordered; if you would like to modify or discontinue the telemetry order, click here to go to the orders activity to modify/discontinue the order.        Phong Wong is a 73 y.o. male presenting with anemia, hypokalemia, and weakness.  He is accompanied by his significant other who is at the bedside.  Patient himself is awake and responsive, but drowsy.  His significant other is at the bedside providing most of the history.  She endorses that he just finished his radiation therapy yesterday, a course of 7 weeks of radiation.  He gets blood drawn with every radiation appointment and on his labs yesterday he was noted to be anemic and hypokalemic.  Patient does have past medical history of COPD, CAD, CAD/PAD, hypertension, prostate cancer s/p  chemo (reported to be in remission), and oropharyngeal cancer, received final radiation treatment yesterday. 7 weeks of radiation.  His significant other does endorses that he has been more noticeably weak and short of breath over this past week.  He was reported to come to the emergency room after his labs were resulted and the anemia and hypokalemia was noted.        Principal Problem:    Anemia        Malnutrition     -s/p radiation and cancer  -consider dietician consult outpatient     I agree with the dietitian's malnutrition diagnosis.     Anemia  -ED provider spoke to his oncologist, anemia believed to be secondary to radiation  -Hemoglobin 5.4 on admit, 2 units packed red blood cells ordered  -Hgb 8.4 this AM  -Likely secondary to radiation, but will monitor for signs and symptoms of bleeding  -no heparin or lovenox due to anemia  -hemoc was consulted by ED, however oncology is not staffed here at [facility]. Given stabilized hgb and improved K, will DC with follow up      Hypokalemia  -K  2.7 on shivam   -IV and oral supplements ordered while in ED  -Likely secondary to chlorthalidone  -Hold chlorthalidone while here  -Recheck of potassium ordered for tonight  -Telemetry  - repeat 3.2 this AM. Supplemented with 40 PO MEq, follow up labs ordered OP, stop chlorthalidone   -slight wheezing on exam, no distress. 40 mg IV lasix as he got 2 units of blood yesterday. He is already on decadron and has nebulizer and medication for nebulizer at home (RN verified with significant other)     History of hypertension  -Significant other at bedside endorses he was taken off of his carvedilol and losartan due to soft Bps   -Blood pressures remain soft, hold chlorthalidone due to soft BPs and hypokalemia     CKD  -Creatinine 1.59, appears at baseline  -Avoid nephrotoxic medications     COPD  -Trilogy at home, formulary ordered while here  -As needed nebulizers     DC plan:  DC home when medically stable     DVT prophylaxis:  Held due to anemia     Labs/Testing reviewed      Labs/Testing reviewed    Interdisciplinary team rounding completed with hospitalist, nurse, TCC    NP discussed plan and lab/testing results with Dr. Nowak        I spent 45 minutes in the professional and overall care of this patient. Monitor wheezing following IV lasix, could be due to 2 units PRBC yesterday. Already  on decadron, has nebulizer at home. Likely Dc today with follow up labs and oncology appt at DC. -130, continue to hold chlorthalidone and follow with PCP         Renetta Joseph, LIDIA-CNP

## 2023-10-07 LAB
ANION GAP SERPL CALC-SCNC: 14 MMOL/L (ref 10–20)
BASOPHILS # BLD MANUAL: 0 X10*3/UL (ref 0–0.1)
BASOPHILS NFR BLD MANUAL: 0 %
BUN SERPL-MCNC: 44 MG/DL (ref 6–23)
CALCIUM SERPL-MCNC: 8.2 MG/DL (ref 8.6–10.3)
CHLORIDE SERPL-SCNC: 99 MMOL/L (ref 98–107)
CO2 SERPL-SCNC: 30 MMOL/L (ref 21–32)
CREAT SERPL-MCNC: 1.29 MG/DL (ref 0.5–1.3)
DACRYOCYTES BLD QL SMEAR: ABNORMAL
EOSINOPHIL # BLD MANUAL: 0 X10*3/UL (ref 0–0.4)
EOSINOPHIL NFR BLD MANUAL: 0 %
ERYTHROCYTE [DISTWIDTH] IN BLOOD BY AUTOMATED COUNT: 16.1 % (ref 11.5–14.5)
GFR SERPL CREATININE-BSD FRML MDRD: 59 ML/MIN/1.73M*2
GLUCOSE SERPL-MCNC: 117 MG/DL (ref 74–99)
HCT VFR BLD AUTO: 29.9 % (ref 41–52)
HGB BLD-MCNC: 10.1 G/DL (ref 13.5–17.5)
IMM GRANULOCYTES # BLD AUTO: 0.01 X10*3/UL (ref 0–0.5)
IMM GRANULOCYTES NFR BLD AUTO: 0.4 % (ref 0–0.9)
LYMPHOCYTES # BLD MANUAL: 0.11 X10*3/UL (ref 0.8–3)
LYMPHOCYTES NFR BLD MANUAL: 5 %
MAGNESIUM SERPL-MCNC: 1.9 MG/DL (ref 1.6–2.4)
MCH RBC QN AUTO: 29.2 PG (ref 26–34)
MCHC RBC AUTO-ENTMCNC: 33.8 G/DL (ref 32–36)
MCV RBC AUTO: 86 FL (ref 80–100)
MONOCYTES # BLD MANUAL: 0.22 X10*3/UL (ref 0.05–0.8)
MONOCYTES NFR BLD MANUAL: 10 %
NEUTROPHILS # BLD MANUAL: 1.87 X10*3/UL (ref 1.6–5.5)
NEUTS BAND # BLD MANUAL: 0.13 X10*3/UL (ref 0–0.5)
NEUTS BAND NFR BLD MANUAL: 6 %
NEUTS SEG # BLD MANUAL: 1.74 X10*3/UL (ref 1.6–5)
NEUTS SEG NFR BLD MANUAL: 79 %
NRBC BLD-RTO: 0 /100 WBCS (ref 0–0)
OVALOCYTES BLD QL SMEAR: ABNORMAL
PLATELET # BLD AUTO: 180 X10*3/UL (ref 150–450)
PMV BLD AUTO: 10.6 FL (ref 7.5–11.5)
POLYCHROMASIA BLD QL SMEAR: ABNORMAL
POTASSIUM SERPL-SCNC: 3.2 MMOL/L (ref 3.5–5.3)
RBC # BLD AUTO: 3.46 X10*6/UL (ref 4.5–5.9)
RBC MORPH BLD: ABNORMAL
SODIUM SERPL-SCNC: 140 MMOL/L (ref 136–145)
TOTAL CELLS COUNTED BLD: 100
WBC # BLD AUTO: 2.2 X10*3/UL (ref 4.4–11.3)

## 2023-10-07 PROCEDURE — 94640 AIRWAY INHALATION TREATMENT: CPT

## 2023-10-07 PROCEDURE — 36415 COLL VENOUS BLD VENIPUNCTURE: CPT | Performed by: NURSE PRACTITIONER

## 2023-10-07 PROCEDURE — 2500000001 HC RX 250 WO HCPCS SELF ADMINISTERED DRUGS (ALT 637 FOR MEDICARE OP): Performed by: NURSE PRACTITIONER

## 2023-10-07 PROCEDURE — 2500000004 HC RX 250 GENERAL PHARMACY W/ HCPCS (ALT 636 FOR OP/ED): Performed by: NURSE PRACTITIONER

## 2023-10-07 PROCEDURE — G0378 HOSPITAL OBSERVATION PER HR: HCPCS

## 2023-10-07 PROCEDURE — 83735 ASSAY OF MAGNESIUM: CPT | Performed by: NURSE PRACTITIONER

## 2023-10-07 PROCEDURE — 2500000002 HC RX 250 W HCPCS SELF ADMINISTERED DRUGS (ALT 637 FOR MEDICARE OP, ALT 636 FOR OP/ED): Performed by: NURSE PRACTITIONER

## 2023-10-07 PROCEDURE — 80048 BASIC METABOLIC PNL TOTAL CA: CPT | Performed by: NURSE PRACTITIONER

## 2023-10-07 PROCEDURE — 85007 BL SMEAR W/DIFF WBC COUNT: CPT | Performed by: NURSE PRACTITIONER

## 2023-10-07 PROCEDURE — 85027 COMPLETE CBC AUTOMATED: CPT | Performed by: NURSE PRACTITIONER

## 2023-10-07 PROCEDURE — 99232 SBSQ HOSP IP/OBS MODERATE 35: CPT | Performed by: NURSE PRACTITIONER

## 2023-10-07 RX ORDER — POLYETHYLENE GLYCOL 3350 17 G/17G
17 POWDER, FOR SOLUTION ORAL DAILY
Status: DISCONTINUED | OUTPATIENT
Start: 2023-10-07 | End: 2023-10-10 | Stop reason: HOSPADM

## 2023-10-07 RX ORDER — POTASSIUM CHLORIDE 1.5 G/1.58G
20 POWDER, FOR SOLUTION ORAL ONCE
Status: DISCONTINUED | OUTPATIENT
Start: 2023-10-07 | End: 2023-10-07

## 2023-10-07 RX ORDER — AMLODIPINE BESYLATE 2.5 MG/1
2.5 TABLET ORAL DAILY
Status: DISCONTINUED | OUTPATIENT
Start: 2023-10-07 | End: 2023-10-07

## 2023-10-07 RX ORDER — LOSARTAN POTASSIUM 25 MG/1
25 TABLET ORAL DAILY
Status: DISCONTINUED | OUTPATIENT
Start: 2023-10-07 | End: 2023-10-08

## 2023-10-07 RX ORDER — POTASSIUM CHLORIDE 1.5 G/1.58G
40 POWDER, FOR SOLUTION ORAL DAILY
Status: DISCONTINUED | OUTPATIENT
Start: 2023-10-07 | End: 2023-10-10 | Stop reason: HOSPADM

## 2023-10-07 RX ADMIN — LOSARTAN POTASSIUM 25 MG: 25 TABLET, FILM COATED ORAL at 17:35

## 2023-10-07 RX ADMIN — BUDESONIDE 0.5 MG: 0.5 INHALANT RESPIRATORY (INHALATION) at 20:12

## 2023-10-07 RX ADMIN — BUDESONIDE 0.5 MG: 0.5 INHALANT RESPIRATORY (INHALATION) at 08:59

## 2023-10-07 RX ADMIN — OXYCODONE HYDROCHLORIDE 5 MG: 5 TABLET ORAL at 13:03

## 2023-10-07 RX ADMIN — DEXAMETHASONE 4 MG: 4 TABLET ORAL at 13:03

## 2023-10-07 RX ADMIN — POTASSIUM CHLORIDE 40 MEQ: 1.5 POWDER, FOR SOLUTION ORAL at 17:36

## 2023-10-07 RX ADMIN — DOCUSATE SODIUM 100 MG: 100 CAPSULE, LIQUID FILLED ORAL at 21:07

## 2023-10-07 RX ADMIN — FORMOTEROL FUMARATE DIHYDRATE 20 MCG: 20 SOLUTION RESPIRATORY (INHALATION) at 20:12

## 2023-10-07 RX ADMIN — FORMOTEROL FUMARATE DIHYDRATE 20 MCG: 20 SOLUTION RESPIRATORY (INHALATION) at 09:00

## 2023-10-07 RX ADMIN — PANTOPRAZOLE SODIUM 40 MG: 40 TABLET, DELAYED RELEASE ORAL at 06:53

## 2023-10-07 RX ADMIN — TIOTROPIUM BROMIDE INHALATION SPRAY 2 PUFF: 3.12 SPRAY, METERED RESPIRATORY (INHALATION) at 09:01

## 2023-10-07 RX ADMIN — ATORVASTATIN CALCIUM 40 MG: 40 TABLET, FILM COATED ORAL at 21:07

## 2023-10-07 ASSESSMENT — ENCOUNTER SYMPTOMS
HEADACHES: 0
FATIGUE: 1
ARTHRALGIAS: 0
DIZZINESS: 1
CONSTIPATION: 0
LIGHT-HEADEDNESS: 1
SORE THROAT: 1
WOUND: 1
DIARRHEA: 0
VOMITING: 0
NUMBNESS: 0
CHILLS: 0
SHORTNESS OF BREATH: 0
LEG SWELLING: 0
ABDOMINAL PAIN: 0
COUGH: 0
TROUBLE SWALLOWING: 1
NAUSEA: 0
FEVER: 0

## 2023-10-07 ASSESSMENT — PAIN - FUNCTIONAL ASSESSMENT
PAIN_FUNCTIONAL_ASSESSMENT: 0-10
PAIN_FUNCTIONAL_ASSESSMENT: 0-10

## 2023-10-07 ASSESSMENT — PAIN SCALES - GENERAL
PAINLEVEL_OUTOF10: 0 - NO PAIN
PAINLEVEL_OUTOF10: 0 - NO PAIN

## 2023-10-07 NOTE — NURSING NOTE
Mepilex on patient;s sacral area changed. Two open areas noted one to either side of sacrum. No drainage noted, left side wound is circular, approximately 57cm in diameter, wound on right is smaller, about 4cm with irregular edges. New Mepilex placed after patient was incontinent of stool.

## 2023-10-07 NOTE — PROGRESS NOTES
Subjective   Feels ok, denies new complaints.   Voice muffled.     Objective     Last Recorded Vitals  /78  Pulse 79   Temp 37 °C (98.6 °F) (Tympanic)   Resp 20   Wt 54 kg (119 lb 0.8 oz)   SpO2 97%     Physical Exam:  Constitutional: NAD, alert and cooperative  Eyes: no icterus  ENMT: mucous membranes moist, no lesions  Head/Neck: neck wound with peeling skin and weeping   Respiratory/Thorax: diminished bilaterally, non-labored breathing, no cough, on RA  Cardiovascular: distant apical, RRR, no murmurs heard  Gastrointestinal: ND/S/NT  : no [place], no SP/flank discomfort  Musculoskeletal: no joint swelling, ROM intact  Extremities: no edema  Neurological: non-focal  Skin: warm and dry  Psych: calm, stable mood     Scheduled medications  ascorbic acid, 1,000 mg, oral, Daily  [Held by provider] aspirin, 81 mg, oral, Daily  atorvastatin, 40 mg, oral, Nightly  budesonide, 0.5 mg, nebulization, BID  dexAMETHasone, 4 mg, oral, q AM  docusate sodium, 100 mg, oral, BID  formoterol, 20 mcg, nebulization, BID  pantoprazole, 40 mg, oral, Daily before breakfast  polyethylene glycol, 17 g, oral, Daily  potassium chloride, 20 mEq, oral, Once  tiotropium, 2 puff, inhalation, Daily    I've reviewed all imaging and labs relevant to this admission      Assessment/Plan   Phong Wong is a 73 y.o. male with past medical history of COPD, CAD/PAD, hypertension, CKD, HLD, remote prostate cancer s/p chemo (in remission), oropharyngeal cancer on current chemo and radiation, p/w generalized weakness, SOB,  work up notable for anemia and hypokalemia.    Anemia  Pancytopenia   -likely due to chemo   -s/p 2 U PRBC, HH incremented to 10.1/29.9  -pt denies any s/s bleeding, has never had any scopes, OP follow up      Hypokalemia  -likely secondary to chlorthalidone (held)  -started on daily Kcl for now, recheck Mg    Oropharyngeal cancer  Neck wound due to RT  -wound care c/s   -swallow eval for possible dysphagia     HTN  -BP now  elevated, add Losartan back      CKD  -Creatinine improved, monitor      COPD  - stable, as needed nebulizers     DC plan:  -PT/OT, SNF planning      DVT/GI prophylaxis:  -SCDs, consider pharmacologic ppx based on HH, continue PPI    D/w Dr Patsy Altamirano, APRN-CNP

## 2023-10-08 LAB
ANION GAP SERPL CALC-SCNC: 12 MMOL/L (ref 10–20)
BUN SERPL-MCNC: 39 MG/DL (ref 6–23)
CALCIUM SERPL-MCNC: 7.8 MG/DL (ref 8.6–10.3)
CHLORIDE SERPL-SCNC: 97 MMOL/L (ref 98–107)
CO2 SERPL-SCNC: 30 MMOL/L (ref 21–32)
CREAT SERPL-MCNC: 1.13 MG/DL (ref 0.5–1.3)
ERYTHROCYTE [DISTWIDTH] IN BLOOD BY AUTOMATED COUNT: 15.9 % (ref 11.5–14.5)
GFR SERPL CREATININE-BSD FRML MDRD: 69 ML/MIN/1.73M*2
GLUCOSE SERPL-MCNC: 118 MG/DL (ref 74–99)
HCT VFR BLD AUTO: 30.7 % (ref 41–52)
HGB BLD-MCNC: 10.3 G/DL (ref 13.5–17.5)
MCH RBC QN AUTO: 29.4 PG (ref 26–34)
MCHC RBC AUTO-ENTMCNC: 33.6 G/DL (ref 32–36)
MCV RBC AUTO: 88 FL (ref 80–100)
NRBC BLD-RTO: 0 /100 WBCS (ref 0–0)
PLATELET # BLD AUTO: 225 X10*3/UL (ref 150–450)
PMV BLD AUTO: 10.5 FL (ref 7.5–11.5)
POTASSIUM SERPL-SCNC: 3.3 MMOL/L (ref 3.5–5.3)
RBC # BLD AUTO: 3.5 X10*6/UL (ref 4.5–5.9)
SODIUM SERPL-SCNC: 136 MMOL/L (ref 136–145)
WBC # BLD AUTO: 2.7 X10*3/UL (ref 4.4–11.3)

## 2023-10-08 PROCEDURE — 80048 BASIC METABOLIC PNL TOTAL CA: CPT | Performed by: NURSE PRACTITIONER

## 2023-10-08 PROCEDURE — 36415 COLL VENOUS BLD VENIPUNCTURE: CPT | Performed by: NURSE PRACTITIONER

## 2023-10-08 PROCEDURE — 2500000001 HC RX 250 WO HCPCS SELF ADMINISTERED DRUGS (ALT 637 FOR MEDICARE OP): Performed by: NURSE PRACTITIONER

## 2023-10-08 PROCEDURE — 94640 AIRWAY INHALATION TREATMENT: CPT

## 2023-10-08 PROCEDURE — 2500000004 HC RX 250 GENERAL PHARMACY W/ HCPCS (ALT 636 FOR OP/ED): Performed by: NURSE PRACTITIONER

## 2023-10-08 PROCEDURE — 2500000004 HC RX 250 GENERAL PHARMACY W/ HCPCS (ALT 636 FOR OP/ED): Performed by: INTERNAL MEDICINE

## 2023-10-08 PROCEDURE — G0378 HOSPITAL OBSERVATION PER HR: HCPCS

## 2023-10-08 PROCEDURE — 99232 SBSQ HOSP IP/OBS MODERATE 35: CPT | Performed by: NURSE PRACTITIONER

## 2023-10-08 PROCEDURE — 97161 PT EVAL LOW COMPLEX 20 MIN: CPT | Mod: GP

## 2023-10-08 PROCEDURE — 85027 COMPLETE CBC AUTOMATED: CPT | Performed by: NURSE PRACTITIONER

## 2023-10-08 PROCEDURE — 96376 TX/PRO/DX INJ SAME DRUG ADON: CPT | Performed by: EMERGENCY MEDICINE

## 2023-10-08 PROCEDURE — 2500000002 HC RX 250 W HCPCS SELF ADMINISTERED DRUGS (ALT 637 FOR MEDICARE OP, ALT 636 FOR OP/ED): Performed by: NURSE PRACTITIONER

## 2023-10-08 RX ORDER — POTASSIUM CHLORIDE 1.5 G/1.58G
20 POWDER, FOR SOLUTION ORAL ONCE
Status: DISCONTINUED | OUTPATIENT
Start: 2023-10-08 | End: 2023-10-09

## 2023-10-08 RX ORDER — MORPHINE SULFATE 20 MG/ML
5 SOLUTION ORAL EVERY 6 HOURS PRN
Status: DISCONTINUED | OUTPATIENT
Start: 2023-10-08 | End: 2023-10-10 | Stop reason: HOSPADM

## 2023-10-08 RX ORDER — LOSARTAN POTASSIUM 50 MG/1
50 TABLET ORAL DAILY
Status: DISCONTINUED | OUTPATIENT
Start: 2023-10-09 | End: 2023-10-10 | Stop reason: HOSPADM

## 2023-10-08 RX ORDER — LOSARTAN POTASSIUM 25 MG/1
25 TABLET ORAL ONCE
Status: COMPLETED | OUTPATIENT
Start: 2023-10-08 | End: 2023-10-08

## 2023-10-08 RX ORDER — LIDOCAINE HYDROCHLORIDE 20 MG/ML
1.25 SOLUTION OROPHARYNGEAL EVERY 6 HOURS
Status: DISCONTINUED | OUTPATIENT
Start: 2023-10-08 | End: 2023-10-10

## 2023-10-08 RX ORDER — SPIRONOLACTONE 25 MG/1
25 TABLET ORAL
Status: DISCONTINUED | OUTPATIENT
Start: 2023-10-08 | End: 2023-10-10 | Stop reason: HOSPADM

## 2023-10-08 RX ORDER — SILVER SULFADIAZINE 10 G/1000G
CREAM TOPICAL DAILY
Status: DISCONTINUED | OUTPATIENT
Start: 2023-10-08 | End: 2023-10-10 | Stop reason: HOSPADM

## 2023-10-08 RX ADMIN — MORPHINE SULFATE 5 MG: 20 SOLUTION ORAL at 19:38

## 2023-10-08 RX ADMIN — PANTOPRAZOLE SODIUM 40 MG: 40 TABLET, DELAYED RELEASE ORAL at 06:12

## 2023-10-08 RX ADMIN — FORMOTEROL FUMARATE DIHYDRATE 20 MCG: 20 SOLUTION RESPIRATORY (INHALATION) at 19:13

## 2023-10-08 RX ADMIN — ATORVASTATIN CALCIUM 40 MG: 40 TABLET, FILM COATED ORAL at 21:00

## 2023-10-08 RX ADMIN — SILVER SULFADIAZINE 1 APPLICATION: 10 CREAM TOPICAL at 15:49

## 2023-10-08 RX ADMIN — LOSARTAN POTASSIUM 25 MG: 25 TABLET, FILM COATED ORAL at 09:07

## 2023-10-08 RX ADMIN — SPIRONOLACTONE 25 MG: 25 TABLET ORAL at 21:23

## 2023-10-08 RX ADMIN — LOSARTAN POTASSIUM 25 MG: 25 TABLET, FILM COATED ORAL at 10:30

## 2023-10-08 RX ADMIN — HYDROMORPHONE HYDROCHLORIDE 0.4 MG: 1 INJECTION, SOLUTION INTRAMUSCULAR; INTRAVENOUS; SUBCUTANEOUS at 06:12

## 2023-10-08 RX ADMIN — OXYCODONE HYDROCHLORIDE AND ACETAMINOPHEN 1000 MG: 500 TABLET ORAL at 09:07

## 2023-10-08 RX ADMIN — LIDOCAINE HYDROCHLORIDE 1.25 ML: 20 SOLUTION ORAL; TOPICAL at 23:24

## 2023-10-08 RX ADMIN — DEXAMETHASONE 4 MG: 4 TABLET ORAL at 09:07

## 2023-10-08 RX ADMIN — LIDOCAINE HYDROCHLORIDE 1.25 ML: 20 SOLUTION ORAL; TOPICAL at 18:33

## 2023-10-08 RX ADMIN — OXYCODONE HYDROCHLORIDE 5 MG: 5 TABLET ORAL at 00:33

## 2023-10-08 RX ADMIN — BUDESONIDE 0.5 MG: 0.5 INHALANT RESPIRATORY (INHALATION) at 19:13

## 2023-10-08 RX ADMIN — DOCUSATE SODIUM 100 MG: 100 CAPSULE, LIQUID FILLED ORAL at 09:07

## 2023-10-08 RX ADMIN — POTASSIUM CHLORIDE 40 MEQ: 1.5 POWDER, FOR SOLUTION ORAL at 09:07

## 2023-10-08 ASSESSMENT — PAIN SCALES - GENERAL
PAINLEVEL_OUTOF10: 10 - WORST POSSIBLE PAIN
PAINLEVEL_OUTOF10: 9
PAINLEVEL_OUTOF10: 10 - WORST POSSIBLE PAIN
PAINLEVEL_OUTOF10: 5 - MODERATE PAIN
PAINLEVEL_OUTOF10: 6
PAINLEVEL_OUTOF10: 9
PAINLEVEL_OUTOF10: 5 - MODERATE PAIN
PAINLEVEL_OUTOF10: 10 - WORST POSSIBLE PAIN

## 2023-10-08 ASSESSMENT — COGNITIVE AND FUNCTIONAL STATUS - GENERAL
MOVING TO AND FROM BED TO CHAIR: A LITTLE
WALKING IN HOSPITAL ROOM: TOTAL
MOVING FROM LYING ON BACK TO SITTING ON SIDE OF FLAT BED WITH BEDRAILS: A LOT
STANDING UP FROM CHAIR USING ARMS: A LITTLE
WALKING IN HOSPITAL ROOM: A LOT
CLIMB 3 TO 5 STEPS WITH RAILING: A LOT
DRESSING REGULAR UPPER BODY CLOTHING: A LITTLE
PERSONAL GROOMING: A LITTLE
MOVING TO AND FROM BED TO CHAIR: TOTAL
MOBILITY SCORE: 16
STANDING UP FROM CHAIR USING ARMS: TOTAL
MOBILITY SCORE: 8
DRESSING REGULAR LOWER BODY CLOTHING: A LITTLE
HELP NEEDED FOR BATHING: A LITTLE
CLIMB 3 TO 5 STEPS WITH RAILING: TOTAL
MOVING FROM LYING ON BACK TO SITTING ON SIDE OF FLAT BED WITH BEDRAILS: A LITTLE
DAILY ACTIVITIY SCORE: 19
TURNING FROM BACK TO SIDE WHILE IN FLAT BAD: A LITTLE
TOILETING: A LITTLE
TURNING FROM BACK TO SIDE WHILE IN FLAT BAD: A LOT

## 2023-10-08 ASSESSMENT — PAIN - FUNCTIONAL ASSESSMENT
PAIN_FUNCTIONAL_ASSESSMENT: 0-10

## 2023-10-08 ASSESSMENT — ACTIVITIES OF DAILY LIVING (ADL): ADL_ASSISTANCE: NEEDS ASSISTANCE

## 2023-10-08 NOTE — PROGRESS NOTES
Physical Therapy    Physical Therapy Evaluation    Patient Name: Phong Wong  MRN: [id number]  Today's Date: 10/8/2023   Time Calculation  Start Time: 1216  Stop Time: 1244  Time Calculation (min): 28 min    Assessment/Plan   PT Assessment  PT Assessment Results: Decreased strength, Decreased endurance, Impaired balance, Decreased mobility, Pain  Rehab Prognosis: Fair  Barriers to Discharge: Elevated neck pain  Evaluation/Treatment Tolerance: Patient limited by pain  Medical Staff Made Aware: Yes  Strengths: Ability to acquire knowledge, Support and attitude of living partners  Barriers to Participation:  (elevated neck pain)  End of Session Communication: Bedside nurse  Assessment Comment: Pt presents with deficits in strength, elevated neck pain, and inability ot trasnfer out of bed this date. Currently pt is below the reported PLOF beingunable to sist edge of bed. Pt would benefit from continued PT during the current admission to improve tolerance to attempt and improve tolerance to upright activity as pain management allows.  End of Session Patient Position: Bed, 3 rail up, Alarm off, not on at start of session  IP OR SWING BED PT PLAN  Inpatient or Swing Bed: Inpatient  PT Plan  PT Plan: Skilled PT  PT Frequency: 2 times per week  PT Discharge Recommendations: Moderate intensity level of continued care  PT Recommended Transfer Status: Total assist      Subjective   General Visit Information:  General  Reason for Referral: Impaired mobility  Referred By: REED Mcguire  Past Medical History Relevant to Rehab: COPD, CAD,/PAD, HTN, CKD, HLD, remote prostate cancer s/p chemo, oropharyngeal cancer current chemo ad radiation.  Family/Caregiver Present: Yes (Wife)  Caregiver Feedback:  (some PLOF information obtained from pt's wife d/t elevated throat pain reported by pt.)  Prior to Session Communication: Bedside nurse  Patient Position Received: Bed, 3 rail up, Alarm off, not on at start of session  Preferred Learning Style:  verbal, kinesthetic, visual  General Comment:  (Pt supine in bed upon PT arrival. Cleared to participate with RN, and agreeable to PT evaluation.)  Home Living:  Home Living  Type of Home: House  Lives With: Spouse  Home Adaptive Equipment: Walker rolling or standard, Cane (Rollator)  Home Layout: Multi-level  Home Access: Stairs to enter with rails (2 steps from patio, then 2 steps to living room with 1 HR)  Entrance Stairs-Number of Steps: 4  Bathroom Shower/Tub: Tub/shower unit  Bathroom Toilet: Standard  Bathroom Accessibility: 1/2 bathroom foyer level; full bathroom 2nd floor  Prior Level of Function:  Prior Function Per Pt/Caregiver Report  Level of Coleman: Needs assistance with functional transfers  Receives Help From: Family  ADL Assistance: Needs assistance (d/t fatigue)  Ambulatory Assistance: Independent (Cane PRN but most of the time no AD)  Precautions:  Precautions  Medical Precautions: Fall precautions  Vital Signs:       Objective   Pain:  Pain Assessment  Pain Assessment: 0-10  Pain Score: 10 - Worst possible pain  Pain Location: Neck  Pain Orientation: Anterior  Pain Interventions:  (Positive repsonse to cold compress placed on neck by wife.)  Cognition:  Cognition  Orientation Level: Disoriented to place (Reported place as [place], [place]. Pt oriented to place)    General Assessments:  General Observation  General Observation: Increased time required for hygiene care as pt had BM during subjective portion of PT evaluation. Increased time required for rest breaks d/t elevated neck pain.             Activity Tolerance  Endurance: Tolerates less than 10 min exercise, no significant change in vital signs, Decreased tolerance for upright activites (Pt unable to tolerate upright activity at this time d/t elevated neck pain.)              Functional Assessments:  Bed Mobility  Bed Mobility: Yes  Bed Mobility 1  Bed Mobility 1: Rolling right, Rolling left  Level of Assistance 1: Moderate assistance,  Minimal verbal cues  Bed Mobility Comments 1: Rolling left and right x 1. VC for bilateral knee bend to decrease difficulty of rolling. Pt able to participate with grab and pull on bed rail to left but not to right.    Transfers  Transfer: No  Extremity/Trunk Assessments:  RLE   RLE : Exceptions to WFL  Strength RLE  RLE Overall Strength:  (At least 2+/5)  LLE   LLE :  (At least 2+/5)  Outcome Measures:  [facility] Basic Mobility  Turning from your back to your side while in a flat bed without using bedrails: A lot  Moving from lying on your back to sitting on the side of a flat bed without using bedrails: A lot  Moving to and from bed to chair (including a wheelchair): Total  Standing up from a chair using your arms (e.g. wheelchair or bedside chair): Total  To walk in hospital room: Total  Climbing 3-5 steps with railing: Total  Basic Mobility - Total Score: 8    Encounter Problems       Encounter Problems (Active)       Balance       STG - Maintains dynamic sitting balance without upper extremity support to complete ADL (Progressing)       Start:  10/06/23    Expected End:  10/20/23       INTERVENTIONS:  1. Practice sitting on the edge of a bed/mat with minimal support.  2. Educate patient about maintining total hip precautions while maintaining balance.  3. Educate patient about pressure relief.  4. Educate patient about use of assistive device.            Balance       Pt performs all sitting balance with mod assist x 1 and standing balance with mod assist x 1 with LRAD (Progressing)       Start:  10/08/23    Expected End:  10/22/23               Mobility       STG - Patient will ambulate >10 ft with mod assist x 1 and LRAD (Progressing)       Start:  10/08/23    Expected End:  10/22/23               Safety       STG - Patient will demonstrate safety requirements appropriate to situation/environment (Progressing)       Start:  10/08/23    Expected End:  10/22/23               Strength       Pt will demonstrate BLE  MMT grade of at least 4/5 in all MMT tested during initial PT evaluation (Progressing)       Start:  10/08/23    Expected End:  10/22/23               Transfers       STG - Patient will perform toilet transfer with min assist  (Progressing)       Start:  10/06/23    Expected End:  10/20/23       BSC and/or toilet  Walker vs pivot transfer            Transfers       STG - Patient to transfer to and from sit to supine with min assist x 1 (Progressing)       Start:  10/08/23    Expected End:  10/22/23            STG - Patient will transfer sit to and from stand with mod assist x 1 and LRAD (Progressing)       Start:  10/08/23    Expected End:  10/22/23                   Education Documentation  Precautions, taught by Asad Lee PT at 10/8/2023  1:21 PM.  Learner: Family, Patient  Readiness: Acceptance  Method: Explanation, Demonstration  Response: Verbalizes Understanding, Needs Reinforcement    Body Mechanics, taught by Asad Lee PT at 10/8/2023  1:21 PM.  Learner: Family, Patient  Readiness: Acceptance  Method: Explanation, Demonstration  Response: Verbalizes Understanding, Needs Reinforcement    Mobility Training, taught by Asad Lee PT at 10/8/2023  1:21 PM.  Learner: Family, Patient  Readiness: Acceptance  Method: Explanation, Demonstration  Response: Verbalizes Understanding, Needs Reinforcement    Education Comments  No comments found.

## 2023-10-08 NOTE — CARE PLAN
Problem: Balance  Goal: Pt performs all sitting balance with mod assist x 1 and standing balance with mod assist x 1 with LRAD  Outcome: Progressing     Problem: Mobility  Goal: STG - Patient will ambulate >10 ft with mod assist x 1 and LRAD  Outcome: Progressing     Problem: Safety  Goal: STG - Patient will demonstrate safety requirements appropriate to situation/environment  Outcome: Progressing     Problem: Transfers  Goal: STG - Patient to transfer to and from sit to supine with min assist x 1  Outcome: Progressing  Goal: STG - Patient will transfer sit to and from stand with mod assist x 1 and LRAD  Outcome: Progressing     Problem: Strength  Goal: Pt will demonstrate BLE MMT grade of at least 4/5 in all MMT tested during initial PT evaluation  Outcome: Progressing

## 2023-10-08 NOTE — PROGRESS NOTES
Subjective   Reports mouth and neck pain, does not want to take PO meds.   Feels depressed.     Objective     Last Recorded Vitals  /78  Pulse 79   Temp 37 °C (98.6 °F) (Tympanic)   Resp 20   Wt 54 kg (119 lb 0.8 oz)   SpO2 97%     Physical Exam:  Constitutional: NAD, alert and cooperative  Eyes: no icterus  ENMT: mucous membranes moist, no lesions  Head/Neck: neck wound with peeling skin and weeping   Respiratory/Thorax: diminished bilaterally, non-labored breathing, no cough, on RA  Cardiovascular: distant apical, RRR, no murmurs heard  Gastrointestinal: ND/S/NT  : no Clark, no SP/flank discomfort  Musculoskeletal: no joint swelling, ROM intact  Extremities: no edema  Neurological: non-focal  Skin: warm and dry  Psych: calm, depressed mood     Scheduled medications      albuterol 2.5 mg /3 mL (0.083 %) nebulizer solution 2.5 mg, 2.5 mg, nebulization, q4h PRN, Keon Trujillo MD    ascorbic acid (Vitamin C) tablet 1,000 mg, 1,000 mg, oral, Daily, [facility] LIDIA Funez-CNP, 1,000 mg at 10/08/23 0907    [Held by provider] aspirin EC tablet 81 mg, 81 mg, oral, Daily, [facility] LIDIA Funez-CNP    atorvastatin (Lipitor) tablet 40 mg, 40 mg, oral, Nightly, [facility] LIDIA Funez-CNP, 40 mg at 10/07/23 2107    budesonide (Pulmicort) 0.5 mg/2 mL nebulizer solution 0.5 mg, 0.5 mg, nebulization, BID, [facility] LIDIA Funez-CNP, 0.5 mg at 10/07/23 2012    dexAMETHasone (Decadron) tablet 4 mg, 4 mg, oral, q AM, [facility] LIDIA Funez-CNP, 4 mg at 10/08/23 0907    docusate sodium (Colace) capsule 100 mg, 100 mg, oral, BID, [facility] LIDIA Funez-CNP, 100 mg at 10/08/23 0907    formoterol (Perforomist) 20 mcg/2 mL nebulizer solution 20 mcg, 20 mcg, nebulization, BID, Renetta Funez, LIDIA-CNP, 20 mcg at 10/07/23 2012    HYDROmorphone (Dilaudid) injection 0.4 mg, 0.4 mg, intravenous, q3h PRN, Sneha Altamirano, APRN-CNP    ipratropium-albuteroL (Duo-Neb) 0.5-2.5 mg/3 mL nebulizer solution 3 mL, 3 mL,  nebulization, 4x daily PRN, OSMIN Stone    lidocaine (Xylocaine) 2 % mouth solution 1.25 mL, 1.25 mL, Swish & Spit, q6h, OSMIN Zarate, 1.25 mL at 10/08/23 1833    [START ON 10/9/2023] losartan (Cozaar) tablet 50 mg, 50 mg, oral, Daily, OSMIN Zarate    morphine 100 mg/5 mL (20 mg/mL) concentrated oral solution 5 mg, 5 mg, oral, q6h PRN, OSMIN Zarate    ondansetron ODT (Zofran-ODT) disintegrating tablet 4 mg, 4 mg, oral, q8h PRN **OR** ondansetron (Zofran) injection 4 mg, 4 mg, intravenous, q8h PRN, OSMIN Stone    pantoprazole (ProtoNix) EC tablet 40 mg, 40 mg, oral, Daily before breakfast, OSMIN Stone, 40 mg at 10/08/23 0612    polyethylene glycol (Glycolax, Miralax) packet 17 g, 17 g, oral, Daily PRN, OSMIN Stone    polyethylene glycol (Glycolax, Miralax) packet 17 g, 17 g, oral, Daily, OSMIN Zarate    potassium chloride (Klor-Con) packet 40 mEq, 40 mEq, oral, Daily, OSMIN Zarate, 40 mEq at 10/08/23 0907    silver sulfADIAZINE (Silvadene) 1 % cream, , Topical, Daily, OSMIN Zarate, 1 Application at 10/08/23 1549    spironolactone (Aldactone) tablet 25 mg, 25 mg, oral, q24h VANIA, OSMIN Zarate    tiotropium (Spiriva Respimat) 2.5 mcg/actuation inhaler 2 puff, 2 puff, inhalation, Daily, 2 puff at 10/07/23 0901 **AND** [DISCONTINUED] fluticasone furoate-vilanteroL (Breo Ellipta) 100-25 mcg/dose inhaler 1 puff, 1 puff, inhalation, Daily, Renetta Funez, LIDIA-CNP    I've reviewed all imaging and labs relevant to this admission      Assessment/Plan   Phong Wong is a 73 y.o. male with past medical history of COPD, CAD/PAD, hypertension, CKD, HLD, remote prostate cancer s/p chemo (in remission), oropharyngeal cancer on current chemo and radiation, p/w generalized weakness, SOB,  work up notable for anemia and hypokalemia.    Anemia  Pancytopenia   -likely due to chemo    -s/p 2 U PRBC, HH incremented to 10.1/29.9 yesterday, no labs drawn today (d/w RN)  -pt denies any s/s bleeding, has never had any scopes, OP follow up      Hypokalemia  -no labs drawn today - reordered   -likely secondary to chlorthalidone (held) and poor PO intake   -continue daily Kcl (monitor on added Aldactone), Mg stable     Oropharyngeal cancer  Neck wound due to RT  -wound care c/s, continue home Silvadene  -swallow eval  -Pall care c/s for pain control / symptom management including depression     HTN  -BP elevated, Losartan titrated, Aldactone added (monitor K)      CKD  -Creatinine improved, monitor      COPD  - stable, as needed nebulizers    DVT/GI prophylaxis:  -SCDs, consider pharmacologic ppx based on HH, continue PPI     DC plan:  -PT/OT, SNF planning  -Pall care c/s      D/w Dr Preet Altamirano, APRN-CNP

## 2023-10-08 NOTE — PROGRESS NOTES
Phong Wong is a 73 y.o. male on day 0 of admission presenting with Anemia.    Subjective          Objective     Pt is medically ready. He cannot make his own decision, per him.  PT needs to see  I spoke to listed contact, kristen, who said she is his s.o.  She will look at list I left at bedside, but asked that I send to [facility] [place]    Last Recorded Vitals                  Assessment/Plan   Principal Problem:    Anemia  Active Problems:    Impaired mobility and ADLs    Deficit in activities of daily living (ADL)               Mariah Figueredo RN

## 2023-10-09 LAB
ANION GAP SERPL CALC-SCNC: 12 MMOL/L (ref 10–20)
BUN SERPL-MCNC: 37 MG/DL (ref 6–23)
CALCIUM SERPL-MCNC: 8.2 MG/DL (ref 8.6–10.3)
CHLORIDE SERPL-SCNC: 97 MMOL/L (ref 98–107)
CO2 SERPL-SCNC: 32 MMOL/L (ref 21–32)
CREAT SERPL-MCNC: 1.17 MG/DL (ref 0.5–1.3)
ERYTHROCYTE [DISTWIDTH] IN BLOOD BY AUTOMATED COUNT: 15.9 % (ref 11.5–14.5)
GFR SERPL CREATININE-BSD FRML MDRD: 67 ML/MIN/1.73M*2
GLUCOSE SERPL-MCNC: 96 MG/DL (ref 74–99)
HCT VFR BLD AUTO: 30.6 % (ref 41–52)
HGB BLD-MCNC: 10.1 G/DL (ref 13.5–17.5)
MCH RBC QN AUTO: 29.3 PG (ref 26–34)
MCHC RBC AUTO-ENTMCNC: 33 G/DL (ref 32–36)
MCV RBC AUTO: 89 FL (ref 80–100)
NRBC BLD-RTO: 0 /100 WBCS (ref 0–0)
PLATELET # BLD AUTO: 209 X10*3/UL (ref 150–450)
PMV BLD AUTO: 10.5 FL (ref 7.5–11.5)
POTASSIUM SERPL-SCNC: 3.3 MMOL/L (ref 3.5–5.3)
RBC # BLD AUTO: 3.45 X10*6/UL (ref 4.5–5.9)
SODIUM SERPL-SCNC: 138 MMOL/L (ref 136–145)
WBC # BLD AUTO: 2.4 X10*3/UL (ref 4.4–11.3)

## 2023-10-09 PROCEDURE — 2500000001 HC RX 250 WO HCPCS SELF ADMINISTERED DRUGS (ALT 637 FOR MEDICARE OP): Performed by: PHYSICIAN ASSISTANT

## 2023-10-09 PROCEDURE — 85027 COMPLETE CBC AUTOMATED: CPT | Performed by: NURSE PRACTITIONER

## 2023-10-09 PROCEDURE — 80048 BASIC METABOLIC PNL TOTAL CA: CPT | Performed by: NURSE PRACTITIONER

## 2023-10-09 PROCEDURE — 94760 N-INVAS EAR/PLS OXIMETRY 1: CPT

## 2023-10-09 PROCEDURE — 36415 COLL VENOUS BLD VENIPUNCTURE: CPT | Performed by: NURSE PRACTITIONER

## 2023-10-09 PROCEDURE — G0378 HOSPITAL OBSERVATION PER HR: HCPCS

## 2023-10-09 PROCEDURE — 97530 THERAPEUTIC ACTIVITIES: CPT | Mod: GO

## 2023-10-09 PROCEDURE — 92610 EVALUATE SWALLOWING FUNCTION: CPT | Mod: GN

## 2023-10-09 PROCEDURE — 99223 1ST HOSP IP/OBS HIGH 75: CPT | Performed by: INTERNAL MEDICINE

## 2023-10-09 PROCEDURE — 2500000001 HC RX 250 WO HCPCS SELF ADMINISTERED DRUGS (ALT 637 FOR MEDICARE OP): Performed by: NURSE PRACTITIONER

## 2023-10-09 PROCEDURE — 97535 SELF CARE MNGMENT TRAINING: CPT | Mod: GO,59

## 2023-10-09 PROCEDURE — 94640 AIRWAY INHALATION TREATMENT: CPT

## 2023-10-09 PROCEDURE — 99232 SBSQ HOSP IP/OBS MODERATE 35: CPT | Performed by: NURSE PRACTITIONER

## 2023-10-09 PROCEDURE — 2500000004 HC RX 250 GENERAL PHARMACY W/ HCPCS (ALT 636 FOR OP/ED): Performed by: NURSE PRACTITIONER

## 2023-10-09 PROCEDURE — 2500000002 HC RX 250 W HCPCS SELF ADMINISTERED DRUGS (ALT 637 FOR MEDICARE OP, ALT 636 FOR OP/ED): Performed by: NURSE PRACTITIONER

## 2023-10-09 RX ORDER — POTASSIUM CHLORIDE 14.9 MG/ML
20 INJECTION INTRAVENOUS
Status: DISCONTINUED | OUTPATIENT
Start: 2023-10-09 | End: 2023-10-09

## 2023-10-09 RX ORDER — POTASSIUM CHLORIDE 1.5 G/1.58G
40 POWDER, FOR SOLUTION ORAL ONCE
Status: COMPLETED | OUTPATIENT
Start: 2023-10-09 | End: 2023-10-09

## 2023-10-09 RX ORDER — POTASSIUM CHLORIDE 20 MEQ/1
40 TABLET, EXTENDED RELEASE ORAL ONCE
Status: DISCONTINUED | OUTPATIENT
Start: 2023-10-09 | End: 2023-10-09

## 2023-10-09 RX ADMIN — BUDESONIDE 0.5 MG: 0.5 INHALANT RESPIRATORY (INHALATION) at 07:19

## 2023-10-09 RX ADMIN — POTASSIUM CHLORIDE 40 MEQ: 1.5 POWDER, FOR SOLUTION ORAL at 08:26

## 2023-10-09 RX ADMIN — TIOTROPIUM BROMIDE INHALATION SPRAY 2 PUFF: 3.12 SPRAY, METERED RESPIRATORY (INHALATION) at 07:22

## 2023-10-09 RX ADMIN — ATORVASTATIN CALCIUM 40 MG: 40 TABLET, FILM COATED ORAL at 22:35

## 2023-10-09 RX ADMIN — FORMOTEROL FUMARATE DIHYDRATE 20 MCG: 20 SOLUTION RESPIRATORY (INHALATION) at 07:19

## 2023-10-09 RX ADMIN — OXYCODONE HYDROCHLORIDE AND ACETAMINOPHEN 1000 MG: 500 TABLET ORAL at 08:27

## 2023-10-09 RX ADMIN — LOSARTAN POTASSIUM 50 MG: 50 TABLET, FILM COATED ORAL at 08:27

## 2023-10-09 RX ADMIN — FORMOTEROL FUMARATE DIHYDRATE 20 MCG: 20 SOLUTION RESPIRATORY (INHALATION) at 19:37

## 2023-10-09 RX ADMIN — BUDESONIDE 0.5 MG: 0.5 INHALANT RESPIRATORY (INHALATION) at 19:38

## 2023-10-09 RX ADMIN — DEXAMETHASONE 4 MG: 4 TABLET ORAL at 08:27

## 2023-10-09 RX ADMIN — SILVER SULFADIAZINE 1 APPLICATION: 10 CREAM TOPICAL at 08:43

## 2023-10-09 ASSESSMENT — COGNITIVE AND FUNCTIONAL STATUS - GENERAL
DAILY ACTIVITIY SCORE: 9
PERSONAL GROOMING: A LOT
TURNING FROM BACK TO SIDE WHILE IN FLAT BAD: A LITTLE
STANDING UP FROM CHAIR USING ARMS: A LOT
PERSONAL GROOMING: TOTAL
TOILETING: TOTAL
EATING MEALS: A LITTLE
DAILY ACTIVITIY SCORE: 12
DRESSING REGULAR LOWER BODY CLOTHING: A LOT
DRESSING REGULAR LOWER BODY CLOTHING: A LOT
WALKING IN HOSPITAL ROOM: A LOT
MOVING FROM LYING ON BACK TO SITTING ON SIDE OF FLAT BED WITH BEDRAILS: A LITTLE
MOVING TO AND FROM BED TO CHAIR: A LITTLE
MOBILITY SCORE: 15
HELP NEEDED FOR BATHING: A LOT
DRESSING REGULAR UPPER BODY CLOTHING: TOTAL
EATING MEALS: A LITTLE
DRESSING REGULAR UPPER BODY CLOTHING: A LOT
TOILETING: TOTAL
CLIMB 3 TO 5 STEPS WITH RAILING: A LOT
HELP NEEDED FOR BATHING: TOTAL

## 2023-10-09 ASSESSMENT — PAIN - FUNCTIONAL ASSESSMENT
PAIN_FUNCTIONAL_ASSESSMENT: 0-10
PAIN_FUNCTIONAL_ASSESSMENT: 0-10
PAIN_FUNCTIONAL_ASSESSMENT: FLACC (FACE, LEGS, ACTIVITY, CRY, CONSOLABILITY)
PAIN_FUNCTIONAL_ASSESSMENT: 0-10

## 2023-10-09 ASSESSMENT — PAIN SCALES - GENERAL
PAINLEVEL_OUTOF10: 0 - NO PAIN
PAINLEVEL_OUTOF10: 0 - NO PAIN
PAINLEVEL_OUTOF10: 3
PAINLEVEL_OUTOF10: 3

## 2023-10-09 NOTE — CONSULTS
Inpatient consult to Palliative Care  Consult performed by: Irene Yi MD  Consult ordered by: Sneha Altamirano, APRN-CNP        Reason For Consult  Reason for Consult: symptom management       Subjective   HISTORY OF PRESENT ILLNESS: Phong Wong is a 73 y.o. 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  Patient just completed the 7-week course of chemoradiation.  Palliative care consulted for symptom management.    Cancer history  June 2023-diagnosed with SCC oropharynx  8/16 - 10/4/23: Recieved concurrent chemoradiation with 7 weekly Carboplatin (2mg/AUC)/Paclitaxel (45mg/m2)  and 70gy RT in 35fx   Pain Assessment:  Onset: Pain started months back secondary to cancer.  But this new pain since radiation  Location:  In the anterior neck area.  Just completed radiation.  Skin with postradiation burn changes  Duration: Intermittent.  Reports this pain to be intermittent-sudden in onset, disappears fast.  Characteristics: Describes as sharp pain that is sharp in duration, but otherwise dull pain in the background.  Patient reports that his pain is much better today-since admission.  At home has been on oxycodone 10 mg (up to 4 times a day).  Over the weekend had required 3 doses of 0.4 mg, 0.5 mg and 1 mg IV (X once).  Patient states that his pain is better managed today.  Okay to continue with home regimen of oxycodone 10 mg now     Symptom Assessment:  Pain: as above  Headache: none  Dizziness:none  Lack of energy: somewhat  Difficulty sleeping: a little.  Associated with pain.  Patient reports feeling better today  Anxiety: somewhat.  Patient is on lorazepam 0.5 mg as needed/once a day-takes it as anxious  Pain in mouth/swallowing: very much  secondary to underlying cancer.  Has been using lidocaine mouth solution  Dry mouth: very much.  Associated with postradiation changes  Taste changes: very much.  On soft diet  Shortness of breath: none  Lack of appetite: as above;underlying cancer.  On soft diet  Denying any nausea or vomiting currently      SOCIAL HISTORY  Patient lives at home with his partner  Social History:  reports that he quit smoking about 7 years ago. His smoking use included cigarettes. He has a 40.00 pack-year smoking history. He has never used smokeless tobacco. He reports current alcohol use of about 12.0 standard drinks of alcohol per week. He reports that he does not use drugs.  Personal history    Moravian and Importance of Moravian:  Not discussed Hinduism or spirituality today    Caregiving/Caregiver Support  Post chemoradiation, currently weak.  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     Family History  Family History   Problem Relation Name Age of Onset    Aortic aneurysm Father          abdominal            Objective   Scheduled medications  ascorbic acid, 1,000 mg, oral, Daily  [Held by provider] aspirin, 81 mg, oral, Daily  atorvastatin, 40 mg, oral, Nightly  budesonide, 0.5 mg, nebulization, BID  dexAMETHasone, 4 mg, oral, q AM  docusate sodium, 100 mg, oral, BID  formoterol, 20 mcg, nebulization, BID  lidocaine, 1.25 mL, Swish & Spit, q6h  losartan, 50 mg, oral, Daily  pantoprazole, 40  mg, oral, Daily before breakfast  polyethylene glycol, 17 g, oral, Daily  [Held by provider] potassium chloride, 40 mEq, oral, Daily  silver sulfADIAZINE, , Topical, Daily  spironolactone, 25 mg, oral, q24h VANIA  tiotropium, 2 puff, inhalation, Daily      Continuous medications     PRN medications  PRN medications: albuterol, HYDROmorphone, ipratropium-albuteroL, morphine, ondansetron ODT **OR** ondansetron, polyethylene glycol          Allergies:   Allergies   Allergen Reactions    Codeine Nausea Only                  PHYSICAL EXAMINATION  Vital Signs:   Vital signs reviewed  Vitals:    10/09/23 1151   BP: 154/61   Pulse: 74   Resp: 17   Temp: 36.7 °C (98.1 °F)   SpO2: 95%        Physical Exam    Review of Systems:   Review of systems negative unless noted in HPI.  Constitutional:  activity change, appetite change, fatigue and unexpected weight change.   HENT:  sore throat and trouble swallowing.    Eyes:  Negative for discharge and visual disturbance.   Respiratory:  Negative for cough and shortness of breath.    Cardiovascular:  Negative for chest pain, palpitations and leg swelling.   Gastrointestinal:  Negative for abdominal pain, constipation, diarrhea and nausea.   Musculoskeletal:  Negative for myalgias.   Skin: post radiation skin changes in the neck  Neurological:  Negative for dizziness, weakness and headaches.   Psychiatric/Behavioral:  Negative for confusion and hallucinations.        Physical Exam:  Constitutional:       General: Frail, cachetic patient.  Alert, oriented X3  HENT:      Head: Normocephalic.      Mouth: Dry mouth     Neck-erythematous, skin changes noted on anterior aspect of neck-postradiation skin  Eyes:      Conjunctiva/sclera: Conjunctivae clear, sclerae white. No discharge.     Pupils: Pupils are equal, round, and reactive to light.   Neck:      Aortoiliofemoral vascular bypass in 2014   Cardiovascular:      Rate and Rhythm: Normal rate and regular rhythm.      Heart sounds: S1,  S2  Pulmonary:      Effort: Generally decreased air entry, no respiratory distress      Breath sounds: Clear to auscultation  Abdominal:      General: There is no distension.      Tenderness: There is no abdominal tenderness. There is no guarding.     Musculoskeletal:         General: No deformity.   Skin:     Coloration: Skin is not jaundiced.   Neurological:      General: No focal deficit present.      Mental Status: He is oriented to person, place, and time.   Psychiatric:         Behavior: Behavior normal. Behavior is cooperative.       Relevant Results  Lab Results   Component Value Date    WBC 2.4 (L) 10/09/2023    HGB 10.1 (L) 10/09/2023    HCT 30.6 (L) 10/09/2023    MCV 89 10/09/2023     10/09/2023      Lab Results   Component Value Date    GLUCOSE 96 10/09/2023    CALCIUM 8.2 (L) 10/09/2023     10/09/2023    K 3.3 (L) 10/09/2023    CO2 32 10/09/2023    CL 97 (L) 10/09/2023    BUN 37 (H) 10/09/2023    CREATININE 1.17 10/09/2023      Lab Results   Component Value Date    ALT 9 (L) 10/05/2023    AST 13 10/05/2023    ALKPHOS 57 10/05/2023    BILITOT 1.0 10/05/2023        Relevant Imaging    XR chest 1 view    Result Date: 10/5/2023   FINDINGS: CARDIOMEDIASTINAL SILHOUETTE: Cardiomediastinal silhouette is normal in size and configuration.  LUNGS: Lungs are clear.  ABDOMEN: No remarkable upper abdominal findings.  BONES: No acute osseous changes. No radiographic evidence of acute cardiopulmonary disease. S        ASSESSMENT/PLAN   73 y.o. 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  Patient just completed the 7-week course of chemoradiation.  Palliative care consulted for symptom management.    Pain  Pain is: cancer related pain and pain  related to postradiation treatment to the neck  Type: somatic and neuropathic combination due to radiation as well as underlying cancer  Pain control: well-controlled.  Patient reports that his pain is much better controlled today compared to last 2 days.  Has been on morphine 5 mg every 6 as needed and Dilaudid 0.4 mg every 3 as needed  Home regimen: Has been on oxycodone 10 mg every 6 as per oarss  Opioid Use  Medication Management:   - OARRS report reviewed with no aberrant behavior; consistent with  prescriptions/records and patient history  -Patient okay to continue with home regime-would recommend using oxycodone 10 mg every 4 as needed if necessary.  Will need further follow-up with oncology +/- supportive oncology as outpatient for ongoing pain management support  Postradiation neck changes-erythema/burn from XRT.  Patient has been seen by wound nurse-Silvadene ointment prescribed  Mucositis + odynophagia  Recommend BMX every 4 as needed (swish and spit), mouth care, artificial saliva  Dyspnea  No shortness of breath reported at this point.  Comfortable on room air  Nausea/vomiting not reported  Constipation  No constipation; currently on docusate 100 mg BID.  As patient on opioids, would recommend a stimulant with softener.  Recommend senna 2 tabs twice daily  Altered Mood  Chronic anxiety (not new) related to health concerns   controlled with home regimen  Current regimen: Lorazepam 0.5 mg-takes it once a day  Sleeping Difficulty:  No concerns reported now  Decreased appetite  Related to malignancy, chemotherapy, taste changes, and disease process  Nutrition following  Weight loss  Current regimen: Patient currently on soft diet regime; defer to oncology interdisciplinary team regarding  management of nutrition (??+/- need for enteral nutrition)  Goals of Care/Advance care planning  Defer to oncology team    Anemia/pancytopenia: Secondary to chemo RT.  Patient was admitted with hemoglobin of 5.4, white  cell count 1, platelets 111.  Patient's status post blood transfusion.  Counts uptrending-White cell count 2.4, hemoglobin 10.1, platelets 209    Squamous cell cancer of the oropharynx  Diagnosed in June 2023.  Patient completed 7-week course of chemo RT (treatment plan as described above under cancer history)       Discharge and Social Considerations  Current plan is for patient to be discharged to SNF  Would recommend oncology +/-supportive oncology follow-up after discharge    Patient care discussed with Obs NP Renetta Bae and also with Oncology PA Lyssa Patten         Thank you for consulting palliative care for this patient                                      \

## 2023-10-09 NOTE — PROGRESS NOTES
"Occupational Therapy    Occupational Therapy Treatment    Name: Phong Wong  MRN: [id number]  : 1950  Date: 10/09/23  Time Calculation  Start Time: 1258  Stop Time: 1339  Time Calculation (min): 41 min    Assessment:  OT Assessment: Patient is deconditioned and would benefit from continued skilled OT for max functional potential.  Prognosis: Good  Barriers to Discharge: None  Evaluation/Treatment Tolerance: Patient tolerated treatment well (Patient rests as needed to manage discomfort.  Patient stated, \"Everything is in slow motion.\")  Medical Staff Made Aware: Yes  Plan:  Treatment Interventions: ADL retraining, Functional transfer training, Endurance training, Patient/family training, Equipment evaluation/education  OT Frequency: 3 times per week  OT Discharge Recommendations: Moderate intensity level of continued care  OT Recommended Transfer Status: Maximum assist    Subjective   General:  OT Last Visit  OT Received On: 10/09/23  Family/Caregiver Present: Yes (significant other present)  Prior to Session Communication: Bedside nurse (RN cleared patient for OT session with no indication of adverse effects of participation.)  Patient Position Received: Bed, 2 rail up, Alarm off, not on at start of session  Preferred Learning Style: verbal  General Comment: Sitting up in bed; agreeable to tx session.  Patient reported feeling much better.     Pain Assessment:  Pain Assessment  Pain Assessment: 0-10  Pain Score: 3  Pain Type: Acute pain  Pain Location: Neck  Pain Orientation: Right, Left, Anterior  Pain Descriptors: Burning, Aching, Sore  Pain Frequency: Intermittent  Pain Onset: Unable to tell  Clinical Progression: Gradually improving  Effect of Pain on Daily Activities: Decreased participation  Pain Interventions:  (RN provided meds and reported that patient received wound care this date.)  Response to Interventions: favorable     Objective   Activities of Daily Living: UE Bathing  UE Bathing Comments: " Patient declined bathing tasks during session     LE Dressing  LE Dressing: Yes  Sock Level of Assistance: Maximum assistance, Minimal verbal cues  LE Dressing Where Assessed: Edge of bed  LE Dressing Comments: Patient able to pull up each leg while seated eob and partially doff each sock.    Toileting  Toileting Level of Assistance: Dependent  Where Assessed: Bed level  Toileting Comments: Incontinent of stool.  Patient unable to assist with hygiene or management of supplies, but able to maintain side-lying position left and right to allow for completion of hygiene.    Functional Standing Tolerance:  Functional Standing Tolerance  Functional Standing Tolerance Comments: Patient declined attempting to stand  Bed Mobility/Transfers: Bed Mobility  Bed Mobility: Yes  Bed Mobility 1  Bed Mobility 1: Supine to sitting  Level of Assistance 1: Moderate assistance, Moderate verbal cues  Bed Mobility Comments 1: increased time req'd; hob elevated; able to use bed rail to assist  Bed Mobility 2  Bed Mobility  2: Sitting to supine  Level of Assistance 2: Maximum assistance, Moderate verbal cues  Bed Mobility Comments 2: flat bed  Bed Mobility 3  Bed Mobility 3: Rolling right, Rolling left  Level of Assistance 3: Moderate assistance, Minimal verbal cues  Bed Mobility Comments 3: able to use bed rails to assist on each side    Transfers  Transfer: No (Patient declined)  Transfer 1  Trials/Comments 1: Max assist x1 for partial stand and scoot toward hob.  Patient able to push from surface with BUE to assist with lifting hips and able to bear weight through BLE to scoot 4-5x.  Patient declined to attempt full stand.     Therapy/Activity:      Balance/Neuromuscular Re-Education  Balance/Neuromuscular Re-Education Activity Performed: Yes  Balance/Neuromuscular Re-Education Activity 1: Good static sitting balance at eob; Fair dynamic sitting balance with CGA req'd when attempting LB ADL     Other Activity:  Other Activity  Other  Activity Performed: Yes  Other Activity 1: Instructed on safe technique, body positioning, and hand placement to perform bed mobility and to encourage active participation.  Facilitated safe participation in ADL.    Outcome Measures:  [facility] Daily Activity  Putting on and taking off regular lower body clothing: A lot  Bathing (including washing, rinsing, drying): A lot  Putting on and taking off regular upper body clothing: A lot  Toileting, which includes using toilet, bedpan or urinal: Total  Taking care of personal grooming such as brushing teeth: A lot  Eating Meals: A little  Daily Activity - Total Score: 12    Education Documentation  ADL Training, taught by Steffen Shankar OT at 10/9/2023  2:18 PM.  Learner: Significant Other, Patient  Readiness: Acceptance  Method: Explanation  Response: Verbalizes Understanding    Education Comments  No comments found.

## 2023-10-09 NOTE — CARE PLAN
Problem: Balance  Goal: STG - Maintains dynamic sitting balance without upper extremity support to complete ADL  Description: INTERVENTIONS:  1. Practice sitting on the edge of a bed/mat with minimal support.  2. Educate patient about maintining total hip precautions while maintaining balance.  3. Educate patient about pressure relief.  4. Educate patient about use of assistive device.  Outcome: Progressing     Problem: Bathing  Goal: STG - Patient will bathe body with min assist  Outcome: Progressing     Problem: Grooming  Goal: STG - Patient completes grooming with sup/setup assist  Outcome: Progressing     Problem: Transfers  Goal: STG - Patient will perform toilet transfer with min assist   Description: BSC and/or toilet  Walker vs pivot transfer  Outcome: Progressing

## 2023-10-09 NOTE — CARE PLAN
Problem: Bathing  Goal: STG - Patient will bathe body with min assist  10/9/2023 0604 by Khushbu Gracia RN  Outcome: Progressing  10/9/2023 0604 by Khushbu Gracia RN  Outcome: Progressing  10/9/2023 0602 by Khushbu Gracia RN  Outcome: Progressing     Problem: Grooming  Goal: STG - Patient completes grooming with sup/setup assist  10/9/2023 0604 by Khushbu Gracia RN  Outcome: Progressing  10/9/2023 0604 by Khushbu Gracia RN  Outcome: Progressing  10/9/2023 0602 by Khushbu Gracia RN  Outcome: Progressing     Problem: Fall/Injury  Goal: Not fall by end of shift  10/9/2023 0604 by Khushbu Gracia RN  Outcome: Progressing  10/9/2023 0604 by Khushbu Gracia RN  Outcome: Progressing  10/9/2023 0602 by Khushbu Gracia RN  Outcome: Progressing  Goal: Be free from injury by end of the shift  10/9/2023 0604 by Khushbu Gracia RN  Outcome: Progressing  10/9/2023 0604 by Khushbu Gracia RN  Outcome: Progressing  10/9/2023 0602 by Khushbu Gracia RN  Outcome: Progressing  Goal: Verbalize understanding of personal risk factors for fall in the hospital  10/9/2023 0604 by Khushbu Gracia RN  Outcome: Progressing  10/9/2023 0604 by Khushbu Gracia RN  Outcome: Progressing  10/9/2023 0602 by Khushbu Gracia RN  Outcome: Progressing  Goal: Verbalize understanding of risk factor reduction measures to prevent injury from fall in the home  10/9/2023 0604 by Khushbu Gracia RN  Outcome: Progressing  10/9/2023 0604 by Khushbu Gracia RN  Outcome: Progressing  10/9/2023 0602 by Khushbu Gracia RN  Outcome: Progressing  Goal: Use assistive devices by end of the shift  10/9/2023 0604 by Khushbu Gracia RN  Outcome: Progressing  10/9/2023 0604 by Khushbu Gracia RN  Outcome: Progressing  10/9/2023 0602 by Khushbu Gracia RN  Outcome: Progressing  Goal: Pace activities to prevent fatigue by end of the shift  10/9/2023 0604 by Khushbu Gracia RN  Outcome: Progressing  10/9/2023 0604 by Khushbu Gracia RN  Outcome:  Progressing  10/9/2023 0602 by Khushbu Gracia RN  Outcome: Progressing   The patient's goals for the shift include      The clinical goals for the shift include pt will report decrease in pain by end of shift

## 2023-10-09 NOTE — CARE PLAN
The patient's goals for the shift include pain able to swallow some food    The clinical goals for the shift include decrease in pain    The patient has been resting in bed with family at bedside.  Wound to buttocks area changed, area cleaned and new dressing applied. The patient was given verbal education on silver that was applied to radiation burns to cobian, verbalized understanding.     Over the shift, the patient did not make progress toward the following goals. Barriers to progression include patient was hesitant to try a different diet. Recommendations to address these barriers include soft diet.

## 2023-10-09 NOTE — CONSULTS
Wound Care Consult     Visit Date: 10/9/2023      Patient Name: Phong Wong         MRN: [id number]           YOB: 1950     Reason for Consult: Assessment complete. Co visit with Teri Rene RN and [facility] student. Additional supplies left in room.         Wound History: Patient recently finished multiple weeks of radiation      Pertinent Labs:   Albumin   Date Value Ref Range Status   10/05/2023 2.7 (L) 3.4 - 5.0 g/dL Final     ALBUMIN (MG/L) IN URINE   Date Value Ref Range Status   06/01/2023 255.5 Not Established mg/L Final       Wound Assessment:  Wound 10/05/23 Radiation Throat Anterior;Bilateral (Active)   Wound Image   10/09/23 1037   Site Assessment Painful;Red;Sloughing;Yellow;Blanchable erythema 10/09/23 1037   Tressa-Wound Assessment Intact;Dry;Clean;Blanchable erythema 10/09/23 1037   Non-staged Wound Description Full thickness 10/09/23 1037   Shape Irregular  10/09/23 1037   Wound Length (cm) 6.5 cm 10/09/23 1037   Wound Width (cm) 32 cm 10/09/23 1037   Wound Surface Area (cm^2) 208 cm^2 10/09/23 1037   Wound Depth (cm) 0.3 cm 10/09/23 1037   Wound Volume (cm^3) 62.4 cm^3 10/09/23 1037   State of Healing Slough 10/09/23 1037   Margins Undefined edges 10/09/23 1037   Drainage Description Serous 10/09/23 1037   Drainage Amount Scant 10/09/23 1037   Dressing Open to air;Foam 10/09/23 1037   Dressing Status Other (Comment) 10/09/23 1037       Wound 10/05/23 Pressure Injury Buttocks Left;Right (Active)   Site Assessment Pink;Red 10/09/23 0800   Pressure Injury Stage 2 10/09/23 0800   Drainage Amount None 10/08/23 1940   Dressing Foam;Xeroform 10/09/23 0800       Wound Team Summary Assessment: Notified Renetta Funez of wound care recommendations of silvadene ointment and secure with Mepilex Transfer dressing.      Wound Team Plan: While in bed patient should only be on one fitted sheet, and one chux. Please, do not use brief while patient is resting in bed. Elevate heels off the bed surface at  all times. Turn and reposition at least every 2 hours.     Thank you for this consultations, while inpatient please contact with any questions or changes in condition.     Wendy Cohen RN BSN,[facility],CWOCN  [phone]    10/9/2023  2:07 PM

## 2023-10-09 NOTE — CARE PLAN
The patient's goals for the shift include      The clinical goals for the shift include pt will report decrease in pain by end of shift    Problem: Bathing  Goal: STG - Patient will bathe body with min assist  Outcome: Progressing     Problem: Grooming  Goal: STG - Patient completes grooming with sup/setup assist  Outcome: Progressing     Problem: Fall/Injury  Goal: Not fall by end of shift  Outcome: Progressing  Goal: Be free from injury by end of the shift  Outcome: Progressing  Goal: Verbalize understanding of personal risk factors for fall in the hospital  Outcome: Progressing  Goal: Verbalize understanding of risk factor reduction measures to prevent injury from fall in the home  Outcome: Progressing  Goal: Use assistive devices by end of the shift  Outcome: Progressing  Goal: Pace activities to prevent fatigue by end of the shift  Outcome: Progressing

## 2023-10-09 NOTE — PROGRESS NOTES
Spiritual Care Visit     Other health caregiver(s) were attending to the pt.  (Physical therapist) There will be another visit scheduled.

## 2023-10-09 NOTE — PROGRESS NOTES
"Phong Wong is a 73 y.o. male on day 0 of admission presenting with Anemia.    Subjective   Awake in bed, reports pain is currently controlled        Objective   Physical Exam  Constitutional:       Comments: Thin, underweight    Cardiovascular:      Rate and Rhythm: Normal rate and regular rhythm.      Pulses: Normal pulses.      Heart sounds: Normal heart sounds. No murmur heard.     No gallop.   Pulmonary:      Effort: Pulmonary effort is normal. No respiratory distress.      Breath sounds: No wheezing or rhonchi.      Comments: Diminished bilaterally   Abdominal:      General: Abdomen is flat. There is no distension.      Palpations: Abdomen is soft.      Tenderness: There is no abdominal tenderness. There is no guarding.   Musculoskeletal:         General: Normal range of motion.   Skin:     General: Skin is warm.      Capillary Refill: Capillary refill takes less than 2 seconds.      Findings: Erythema present.      Comments: Discoloration/ erythema to neck (from radiation). Currently more dark red in color and scabbing   Neurological:      Mental Status: He is alert and oriented to person, place, and time.     Last Recorded Vitals  Blood pressure 154/61, pulse 74, temperature 36.7 °C (98.1 °F), temperature source Temporal, resp. rate 17, height 1.702 m (5' 7\"), weight 54 kg (119 lb 0.8 oz), SpO2 95 %.  Intake/Output last 3 Shifts:  I/O last 3 completed shifts:  In: 240 (4.4 mL/kg) [P.O.:240]  Out: 2250 (41.7 mL/kg) [Urine:2250 (1.2 mL/kg/hr)]  Weight: 54 kg     Relevant Results    Scheduled medications  ascorbic acid, 1,000 mg, oral, Daily  [Held by provider] aspirin, 81 mg, oral, Daily  atorvastatin, 40 mg, oral, Nightly  budesonide, 0.5 mg, nebulization, BID  dexAMETHasone, 4 mg, oral, q AM  docusate sodium, 100 mg, oral, BID  formoterol, 20 mcg, nebulization, BID  lidocaine, 1.25 mL, Swish & Spit, q6h  losartan, 50 mg, oral, Daily  pantoprazole, 40 mg, oral, Daily before breakfast  polyethylene glycol, 17 " g, oral, Daily  [Held by provider] potassium chloride, 40 mEq, oral, Daily  silver sulfADIAZINE, , Topical, Daily  spironolactone, 25 mg, oral, q24h VANIA  tiotropium, 2 puff, inhalation, Daily      Continuous medications     PRN medications  PRN medications: albuterol, HYDROmorphone, ipratropium-albuteroL, morphine, ondansetron ODT **OR** ondansetron, polyethylene glycol    Results for orders placed or performed during the hospital encounter of 10/05/23 (from the past 96 hour(s))   VERIFY ABO/Rh Group Test   Result Value Ref Range    ABO TYPE AB     Rh TYPE NEG    Basic metabolic panel   Result Value Ref Range    Glucose 121 (H) 74 - 99 mg/dL    Sodium 137 136 - 145 mmol/L    Potassium 3.3 (L) 3.5 - 5.3 mmol/L    Chloride 100 98 - 107 mmol/L    Bicarbonate 27 21 - 32 mmol/L    Anion Gap 13 10 - 20 mmol/L    Urea Nitrogen 31 (H) 6 - 23 mg/dL    Creatinine 1.26 0.50 - 1.30 mg/dL    eGFR 61 >60 mL/min/1.73m*2    Calcium 7.5 (L) 8.6 - 10.3 mg/dL   Prepare RBC: 2 Units, Irradiated, Leukocytes Reduced (CMV reduced risk)   Result Value Ref Range    PRODUCT CODE N9288C79     Unit Number [id number]     Unit ABO A     Unit RH NEG     XM INTEP COMP     Dispense Status TR     Blood Expiration Date October 09, 2023 23:59 EDT     PRODUCT BLOOD TYPE      UNIT VOLUME 350     PRODUCT CODE W0710N03     Unit Number [id number]     Unit ABO AB     Unit RH NEG     XM INTEP COMP     Dispense Status TR     Blood Expiration Date November 02, 2023 23:59 EDT     PRODUCT BLOOD TYPE 2800     UNIT VOLUME 350    Basic metabolic panel   Result Value Ref Range    Glucose 114 (H) 74 - 99 mg/dL    Sodium 137 136 - 145 mmol/L    Potassium 3.2 (L) 3.5 - 5.3 mmol/L    Chloride 100 98 - 107 mmol/L    Bicarbonate 25 21 - 32 mmol/L    Anion Gap 15 10 - 20 mmol/L    Urea Nitrogen 34 (H) 6 - 23 mg/dL    Creatinine 1.20 0.50 - 1.30 mg/dL    eGFR 65 >60 mL/min/1.73m*2    Calcium 8.1 (L) 8.6 - 10.3 mg/dL   CBC and Auto Differential   Result Value Ref  Range    WBC 1.6 (L) 4.4 - 11.3 x10*3/uL    nRBC 0.0 0.0 - 0.0 /100 WBCs    RBC 2.88 (L) 4.50 - 5.90 x10*6/uL    Hemoglobin 8.4 (L) 13.5 - 17.5 g/dL    Hematocrit 26.7 (L) 41.0 - 52.0 %    MCV 93 80 - 100 fL    MCH 29.2 26.0 - 34.0 pg    MCHC 31.5 (L) 32.0 - 36.0 g/dL    RDW 15.9 (H) 11.5 - 14.5 %    Platelets 129 (L) 150 - 450 x10*3/uL    MPV 10.9 7.5 - 11.5 fL    Neutrophils % 75.2 40.0 - 80.0 %    Immature Granulocytes %, Automated 0.6 0.0 - 0.9 %    Lymphocytes % 6.4 13.0 - 44.0 %    Monocytes % 17.2 2.0 - 10.0 %    Eosinophils % 0.0 0.0 - 6.0 %    Basophils % 0.6 0.0 - 2.0 %    Neutrophils Absolute 1.18 (L) 1.60 - 5.50 x10*3/uL    Immature Granulocytes Absolute, Automated 0.01 0.00 - 0.50 x10*3/uL    Lymphocytes Absolute 0.10 (L) 0.80 - 3.00 x10*3/uL    Monocytes Absolute 0.27 0.05 - 0.80 x10*3/uL    Eosinophils Absolute 0.00 0.00 - 0.40 x10*3/uL    Basophils Absolute 0.01 0.00 - 0.10 x10*3/uL   Morphology   Result Value Ref Range    RBC Morphology See Below     Polychromasia Mild     Ovalocytes Few     Teardrop Cells Few     [place] Cells Few    Urinalysis with Reflex Microscopic   Result Value Ref Range    Color, Urine Yellow Straw, Yellow    Appearance, Urine Clear Clear    Specific Gravity, Urine 1.016 1.005 - 1.035    pH, Urine 5.0 5.0, 5.5, 6.0, 6.5, 7.0, 7.5, 8.0    Protein, Urine 30 (1+) (N) NEGATIVE mg/dL    Glucose, Urine NEGATIVE NEGATIVE mg/dL    Blood, Urine NEGATIVE NEGATIVE    Ketones, Urine NEGATIVE NEGATIVE mg/dL    Bilirubin, Urine NEGATIVE NEGATIVE    Urobilinogen, Urine <2.0 <2.0 mg/dL    Nitrite, Urine NEGATIVE NEGATIVE    Leukocyte Esterase, Urine NEGATIVE NEGATIVE   Urinalysis Microscopic Only   Result Value Ref Range    WBC, Urine NONE 1-5, NONE /HPF    RBC, Urine NONE NONE, 1-2, 3-5 /HPF    Bacteria, Urine 1+ (A) NONE SEEN /HPF   CBC and Auto Differential   Result Value Ref Range    WBC 2.2 (L) 4.4 - 11.3 x10*3/uL    nRBC 0.0 0.0 - 0.0 /100 WBCs    RBC 3.46 (L) 4.50 - 5.90 x10*6/uL     Hemoglobin 10.1 (L) 13.5 - 17.5 g/dL    Hematocrit 29.9 (L) 41.0 - 52.0 %    MCV 86 80 - 100 fL    MCH 29.2 26.0 - 34.0 pg    MCHC 33.8 32.0 - 36.0 g/dL    RDW 16.1 (H) 11.5 - 14.5 %    Platelets 180 150 - 450 x10*3/uL    MPV 10.6 7.5 - 11.5 fL    Immature Granulocytes %, Automated 0.4 0.0 - 0.9 %    Immature Granulocytes Absolute, Automated 0.01 0.00 - 0.50 x10*3/uL   Basic Metabolic Panel   Result Value Ref Range    Glucose 117 (H) 74 - 99 mg/dL    Sodium 140 136 - 145 mmol/L    Potassium 3.2 (L) 3.5 - 5.3 mmol/L    Chloride 99 98 - 107 mmol/L    Bicarbonate 30 21 - 32 mmol/L    Anion Gap 14 10 - 20 mmol/L    Urea Nitrogen 44 (H) 6 - 23 mg/dL    Creatinine 1.29 0.50 - 1.30 mg/dL    eGFR 59 (L) >60 mL/min/1.73m*2    Calcium 8.2 (L) 8.6 - 10.3 mg/dL   Manual Differential   Result Value Ref Range    Neutrophils %, Manual 79.0 40.0 - 80.0 %    Bands %, Manual 6.0 0.0 - 5.0 %    Lymphocytes %, Manual 5.0 13.0 - 44.0 %    Monocytes %, Manual 10.0 2.0 - 10.0 %    Eosinophils %, Manual 0.0 0.0 - 6.0 %    Basophils %, Manual 0.0 0.0 - 2.0 %    Seg Neutrophils Absolute, Manual 1.74 1.60 - 5.00 x10*3/uL    Bands Absolute, Manual 0.13 0.00 - 0.50 x10*3/uL    Lymphocytes Absolute, Manual 0.11 (L) 0.80 - 3.00 x10*3/uL    Monocytes Absolute, Manual 0.22 0.05 - 0.80 x10*3/uL    Eosinophils Absolute, Manual 0.00 0.00 - 0.40 x10*3/uL    Basophils Absolute, Manual 0.00 0.00 - 0.10 x10*3/uL    Total Cells Counted 100     Neutrophils Absolute, Manual 1.87 1.60 - 5.50 x10*3/uL    RBC Morphology See Below     Polychromasia Mild     Ovalocytes Few     Teardrop Cells Few    Magnesium   Result Value Ref Range    Magnesium 1.90 1.60 - 2.40 mg/dL   CBC   Result Value Ref Range    WBC 2.7 (L) 4.4 - 11.3 x10*3/uL    nRBC 0.0 0.0 - 0.0 /100 WBCs    RBC 3.50 (L) 4.50 - 5.90 x10*6/uL    Hemoglobin 10.3 (L) 13.5 - 17.5 g/dL    Hematocrit 30.7 (L) 41.0 - 52.0 %    MCV 88 80 - 100 fL    MCH 29.4 26.0 - 34.0 pg    MCHC 33.6 32.0 - 36.0 g/dL    RDW  15.9 (H) 11.5 - 14.5 %    Platelets 225 150 - 450 x10*3/uL    MPV 10.5 7.5 - 11.5 fL   Basic Metabolic Panel   Result Value Ref Range    Glucose 118 (H) 74 - 99 mg/dL    Sodium 136 136 - 145 mmol/L    Potassium 3.3 (L) 3.5 - 5.3 mmol/L    Chloride 97 (L) 98 - 107 mmol/L    Bicarbonate 30 21 - 32 mmol/L    Anion Gap 12 10 - 20 mmol/L    Urea Nitrogen 39 (H) 6 - 23 mg/dL    Creatinine 1.13 0.50 - 1.30 mg/dL    eGFR 69 >60 mL/min/1.73m*2    Calcium 7.8 (L) 8.6 - 10.3 mg/dL   Basic Metabolic Panel   Result Value Ref Range    Glucose 96 74 - 99 mg/dL    Sodium 138 136 - 145 mmol/L    Potassium 3.3 (L) 3.5 - 5.3 mmol/L    Chloride 97 (L) 98 - 107 mmol/L    Bicarbonate 32 21 - 32 mmol/L    Anion Gap 12 10 - 20 mmol/L    Urea Nitrogen 37 (H) 6 - 23 mg/dL    Creatinine 1.17 0.50 - 1.30 mg/dL    eGFR 67 >60 mL/min/1.73m*2    Calcium 8.2 (L) 8.6 - 10.3 mg/dL   CBC   Result Value Ref Range    WBC 2.4 (L) 4.4 - 11.3 x10*3/uL    nRBC 0.0 0.0 - 0.0 /100 WBCs    RBC 3.45 (L) 4.50 - 5.90 x10*6/uL    Hemoglobin 10.1 (L) 13.5 - 17.5 g/dL    Hematocrit 30.6 (L) 41.0 - 52.0 %    MCV 89 80 - 100 fL    MCH 29.3 26.0 - 34.0 pg    MCHC 33.0 32.0 - 36.0 g/dL    RDW 15.9 (H) 11.5 - 14.5 %    Platelets 209 150 - 450 x10*3/uL    MPV 10.5 7.5 - 11.5 fL      XR chest 1 view    Result Date: 10/5/2023  STUDY: Chest Radiograph; 10-5-2023 at 11:16 AM INDICATION: Generalized weakness. COMPARISON: 8- XR CHEST 2V ACCESSION NUMBER(S): [id number] ORDERING CLINICIAN: LUIS ROMAN TECHNIQUE:  Frontal chest was obtained at 11:02 hours. FINDINGS: CARDIOMEDIASTINAL SILHOUETTE: Cardiomediastinal silhouette is normal in size and configuration.  LUNGS: Lungs are clear.  ABDOMEN: No remarkable upper abdominal findings.  BONES: No acute osseous changes.    No radiographic evidence of acute cardiopulmonary disease. Signed by Phuc Horton MD                Malnutrition          I agree with the dietitian's malnutrition diagnosis.      Assessment/Plan    Principal Problem:    Anemia  Active Problems:    Impaired mobility and ADLs    Deficit in activities of daily living (ADL)    Phong Wong is a 73 y.o. male presenting with anemia, hypokalemia, and weakness.  He is accompanied by his significant other who is at the bedside.  Patient himself is awake and responsive, but drowsy.  His significant other is at the bedside providing most of the history.  She endorses that he just finished his radiation therapy yesterday, a course of 7 weeks of radiation.  He gets blood drawn with every radiation appointment and on his labs yesterday he was noted to be anemic and hypokalemic.  Patient does have past medical history of COPD, CAD, CAD/PAD, hypertension, prostate cancer s/p  chemo (reported to be in remission), and oropharyngeal cancer, received final radiation treatment yesterday. 7 weeks of radiation.  His significant other does endorses that he has been more noticeably weak and short of breath over this past week.  He was reported to come to the emergency room after his labs were resulted and the anemia and hypokalemia was noted.     Anemia  Pancytopenia   -likely due to chemo   -s/p 2 U PRBC, Hgb stable since transfusion  -pt denies any s/s bleeding, has never had any scopes, OP follow up      Hypokalemia  -3.3 this AM, repleted   -likely secondary to chlorthalidone (held) and poor PO intake   -continue daily Kcl (monitor on, added Aldactone), Mg stable   -continue aldactone added this stay      Oropharyngeal cancer  Neck wound due to RT  -wound care c/s, continue home Silvadene  -swallow eval  -Pall care c/s for pain control , appreciate recs. Currently pain is improved.   -malnutrition: dietician consulted       HTN  -BP elevated, Losartan titrated, Aldactone added (monitor K)      CKD  -Creatinine improved, monitor      COPD  - stable, as needed nebulizers     DVT/GI prophylaxis:  -SCDs, consider pharmacologic ppx based on HH, continue PPI     DC plan:  -PT/OT, SNF  planning  -Pall care c/s         I spent 40  minutes in the professional and overall care of this patient.      Renetta Joseph, LIDIA-CNP

## 2023-10-09 NOTE — PROGRESS NOTES
Speech-Language Pathology    Inpatient Speech-Language Pathology Clinical Swallow Evaluation    Patient Name: Phong Wong  MRN: [id number]  Today's Date: 10/9/2023   Time Calculation  Start Time: 1015  Stop Time: 1025  Time Calculation (min): 10 min         Current Problem:   Patient Active Problem List   Diagnosis    Arteriosclerosis of carotid artery    Asymptomatic bilateral carotid artery stenosis    Aorto-iliac disease (CMS/HCC)    Atherosclerotic heart disease of native coronary artery without angina pectoris    PAD (peripheral artery disease) (CMS/HCC)    Benign essential hypertension    Chronic obstructive pulmonary disease (CMS/HCC)    Chronic renal impairment, stage 3a (CMS/HCC)    Cigarette nicotine dependence in remission    Nicotine dependence in remission    Edema    Elevated WBC count    Erectile dysfunction    Hyperkalemia    Hyperlipidemia    Left leg cellulitis    Low vitamin B12 level    Malaise    Microscopic hematuria    Prediabetes    Prostate cancer (CMS/HCC)    PSA elevation    Serum creatinine raised    Shoulder injury    SOB (shortness of breath) on exertion    Leg wound, left, initial encounter    Swelling of lower extremity    Thrombocytosis    Vitamin D deficiency    Wound of skin    Acute respiratory failure with hypercapnia (CMS/HCC)    Acute intestinal infarction (CMS/HCC)    Chronic venous insufficiency    Impacted cerumen of left ear    Mass of soft palate    Neck mass    Non-pressure chronic ulcer of skin of other sites limited to breakdown of skin (CMS/HCC)    Other specified dermatitis    Xerosis cutis    Exudative age-related macular degeneration, bilateral, with active choroidal neovascularization (CMS/HCC)    Squamous cell carcinoma of oropharynx (CMS/HCC)    Anemia associated with chemotherapy    Anemia    Impaired mobility and ADLs    Deficit in activities of daily living (ADL)         Recommendations:  Risk for Aspiration: Yes  Additional Recommendations: Dysphagia  "treatment  Medication Administration Recommendations: Crushed, With Pureed  Dysphagia Goals: Patient will tolerate recommended diet without observed clinical signs of aspiration    Assessment:   Evaluation limited d/t Pt declined all PO trials, including breakfast meal items when available.   Nsg informed SLP that Pt took meds crushed in puree, with c/o odynophagia if crushed pieces of pills were \"too big\".      Plan:  Inpatient/Swing Bed or Outpatient: Inpatient  Treatment/Interventions: Assess diet tolerance  SLP Plan: Skilled SLP  SLP Frequency: 2x per week  Duration: 1 week  Discussed POC: Patient  Patient/Caregiver Agreeable: Yes      Subjective   Current Problem:  Pt with recent h/o oropharyngeal cancer, newly completed chemo/radiation therapy prior to admission. Visible neck wound, which Pt is protective of. SLP aware that palliative care is consulted.  Pt reports difficulty eating, low appetite, but refuses diet consistency changes to facilitate PO intake when offered.    General Visit Information:  Patient Class: Inpatient  Living Environment: Home  Ordering Physician: Evelia  Reason for Referral: Dysphagia  Referred By: REED Mcguire  Past Medical History Relevant to Rehab: COPD, CAD,/PAD, HTN, CKD, HLD, remote prostate cancer s/p chemo, oropharyngeal cancer current chemo ad radiation.  Patient Seen During This Visit: Yes  [employer] Diet: Pt reports Regular diet  Current Diet : Regular      Objective   Pain:  Pain Assessment: 0-10  Pain Score: 0 - No pain     Oral/Motor Assessment:  Oral Hygiene: WFL  Dentition: Edentulous  Oral Motor: Within Functional Limits    Consistencies Trialed:  Consistencies Trialed: Unable to assess  Consistencies Trialed:  (Pt declined)    Clinical Observations:  Patient Positioning: Upright in Bed  Management of Oral Secretions: Adequate      "

## 2023-10-09 NOTE — PROGRESS NOTES
Left message for [facility] on [facility]--[facility] and [facility] all accepted.  Patient will need auth.    [facility] returned my call and [facility] is [facility].  Asked [facility] to start auth with [employer].  SNF updated that they are [facility] and we will start auth.              Klarissa Mccord RN

## 2023-10-10 VITALS
RESPIRATION RATE: 16 BRPM | BODY MASS INDEX: 18.69 KG/M2 | HEART RATE: 76 BPM | TEMPERATURE: 97.2 F | WEIGHT: 119.05 LBS | HEIGHT: 67 IN | SYSTOLIC BLOOD PRESSURE: 132 MMHG | OXYGEN SATURATION: 98 % | DIASTOLIC BLOOD PRESSURE: 68 MMHG

## 2023-10-10 DIAGNOSIS — C10.9 SQUAMOUS CELL CARCINOMA OF OROPHARYNX (MULTI): Primary | ICD-10-CM

## 2023-10-10 LAB
ANION GAP SERPL CALC-SCNC: 14 MMOL/L (ref 10–20)
BUN SERPL-MCNC: 36 MG/DL (ref 6–23)
CALCIUM SERPL-MCNC: 8 MG/DL (ref 8.6–10.3)
CHLORIDE SERPL-SCNC: 96 MMOL/L (ref 98–107)
CO2 SERPL-SCNC: 29 MMOL/L (ref 21–32)
CREAT SERPL-MCNC: 1.14 MG/DL (ref 0.5–1.3)
ERYTHROCYTE [DISTWIDTH] IN BLOOD BY AUTOMATED COUNT: 16.1 % (ref 11.5–14.5)
GFR SERPL CREATININE-BSD FRML MDRD: 69 ML/MIN/1.73M*2
GLUCOSE SERPL-MCNC: 98 MG/DL (ref 74–99)
HCT VFR BLD AUTO: 31.2 % (ref 41–52)
HGB BLD-MCNC: 10.1 G/DL (ref 13.5–17.5)
MCH RBC QN AUTO: 28.6 PG (ref 26–34)
MCHC RBC AUTO-ENTMCNC: 32.4 G/DL (ref 32–36)
MCV RBC AUTO: 88 FL (ref 80–100)
NRBC BLD-RTO: 0 /100 WBCS (ref 0–0)
PLATELET # BLD AUTO: 224 X10*3/UL (ref 150–450)
PMV BLD AUTO: 10.7 FL (ref 7.5–11.5)
POTASSIUM SERPL-SCNC: 3.5 MMOL/L (ref 3.5–5.3)
RBC # BLD AUTO: 3.53 X10*6/UL (ref 4.5–5.9)
SODIUM SERPL-SCNC: 135 MMOL/L (ref 136–145)
WBC # BLD AUTO: 3.2 X10*3/UL (ref 4.4–11.3)

## 2023-10-10 PROCEDURE — 36415 COLL VENOUS BLD VENIPUNCTURE: CPT | Performed by: NURSE PRACTITIONER

## 2023-10-10 PROCEDURE — 2500000004 HC RX 250 GENERAL PHARMACY W/ HCPCS (ALT 636 FOR OP/ED): Performed by: NURSE PRACTITIONER

## 2023-10-10 PROCEDURE — 2500000001 HC RX 250 WO HCPCS SELF ADMINISTERED DRUGS (ALT 637 FOR MEDICARE OP): Performed by: NURSE PRACTITIONER

## 2023-10-10 PROCEDURE — 99232 SBSQ HOSP IP/OBS MODERATE 35: CPT | Performed by: NURSE PRACTITIONER

## 2023-10-10 PROCEDURE — 85027 COMPLETE CBC AUTOMATED: CPT | Performed by: NURSE PRACTITIONER

## 2023-10-10 PROCEDURE — 80048 BASIC METABOLIC PNL TOTAL CA: CPT | Performed by: NURSE PRACTITIONER

## 2023-10-10 PROCEDURE — 92526 ORAL FUNCTION THERAPY: CPT | Mod: GN

## 2023-10-10 PROCEDURE — G0378 HOSPITAL OBSERVATION PER HR: HCPCS

## 2023-10-10 RX ORDER — SPIRONOLACTONE 25 MG/1
25 TABLET ORAL
Start: 2023-10-11 | End: 2023-11-02 | Stop reason: HOSPADM

## 2023-10-10 RX ORDER — MORPHINE SULFATE 20 MG/ML
5 SOLUTION ORAL EVERY 6 HOURS PRN
Qty: 30 ML | Refills: 0 | Status: SHIPPED | OUTPATIENT
Start: 2023-10-10 | End: 2023-11-02 | Stop reason: HOSPADM

## 2023-10-10 RX ORDER — SILVER SULFADIAZINE 10 G/1000G
CREAM TOPICAL DAILY
Start: 2023-10-11 | End: 2024-01-12 | Stop reason: ALTCHOICE

## 2023-10-10 RX ADMIN — OXYCODONE HYDROCHLORIDE AND ACETAMINOPHEN 1000 MG: 500 TABLET ORAL at 09:29

## 2023-10-10 RX ADMIN — PANTOPRAZOLE SODIUM 40 MG: 40 TABLET, DELAYED RELEASE ORAL at 07:11

## 2023-10-10 RX ADMIN — DOCUSATE SODIUM 100 MG: 100 CAPSULE, LIQUID FILLED ORAL at 09:30

## 2023-10-10 RX ADMIN — LOSARTAN POTASSIUM 50 MG: 50 TABLET, FILM COATED ORAL at 09:30

## 2023-10-10 RX ADMIN — SPIRONOLACTONE 25 MG: 25 TABLET ORAL at 09:29

## 2023-10-10 RX ADMIN — DEXAMETHASONE 4 MG: 4 TABLET ORAL at 09:00

## 2023-10-10 RX ADMIN — SILVER SULFADIAZINE: 10 CREAM TOPICAL at 09:00

## 2023-10-10 ASSESSMENT — COGNITIVE AND FUNCTIONAL STATUS - GENERAL
MOVING FROM LYING ON BACK TO SITTING ON SIDE OF FLAT BED WITH BEDRAILS: A LOT
WALKING IN HOSPITAL ROOM: A LOT
DRESSING REGULAR UPPER BODY CLOTHING: A LOT
MOBILITY SCORE: 12
PERSONAL GROOMING: A LOT
EATING MEALS: A LOT
MOVING TO AND FROM BED TO CHAIR: A LOT
TOILETING: A LOT
STANDING UP FROM CHAIR USING ARMS: A LOT
HELP NEEDED FOR BATHING: A LOT
DRESSING REGULAR LOWER BODY CLOTHING: A LOT
DAILY ACTIVITIY SCORE: 12
CLIMB 3 TO 5 STEPS WITH RAILING: A LOT
TURNING FROM BACK TO SIDE WHILE IN FLAT BAD: A LOT

## 2023-10-10 ASSESSMENT — PAIN - FUNCTIONAL ASSESSMENT
PAIN_FUNCTIONAL_ASSESSMENT: 0-10
PAIN_FUNCTIONAL_ASSESSMENT: 0-10

## 2023-10-10 ASSESSMENT — PAIN SCALES - GENERAL
PAINLEVEL_OUTOF10: 0 - NO PAIN
PAINLEVEL_OUTOF10: 3

## 2023-10-10 NOTE — NURSING NOTE
Pt discharge to [facility] at this time. Discharge  instructions and medications reviewed with pt. Discharge paperwork sent with pt. Report called to facility. All personal belongings sent with pt.

## 2023-10-10 NOTE — PROGRESS NOTES
Speech-Language Pathology    Inpatient  Speech-Language Pathology Treatment     Patient Name: Phong Wong  MRN: [id number]  Today's Date: 10/10/2023  Time Calculation  Start Time: 0850  Stop Time: 0910  Time Calculation (min): 20 min         Current Problem:   Patient Active Problem List   Diagnosis    Arteriosclerosis of carotid artery    Asymptomatic bilateral carotid artery stenosis    Aorto-iliac disease (CMS/HCC)    Atherosclerotic heart disease of native coronary artery without angina pectoris    PAD (peripheral artery disease) (CMS/HCC)    Benign essential hypertension    Chronic obstructive pulmonary disease (CMS/HCC)    Chronic renal impairment, stage 3a (CMS/HCC)    Cigarette nicotine dependence in remission    Nicotine dependence in remission    Edema    Elevated WBC count    Erectile dysfunction    Hyperkalemia    Hyperlipidemia    Left leg cellulitis    Low vitamin B12 level    Malaise    Microscopic hematuria    Prediabetes    Prostate cancer (CMS/HCC)    PSA elevation    Serum creatinine raised    Shoulder injury    SOB (shortness of breath) on exertion    Leg wound, left, initial encounter    Swelling of lower extremity    Thrombocytosis    Vitamin D deficiency    Wound of skin    Acute respiratory failure with hypercapnia (CMS/HCC)    Acute intestinal infarction (CMS/HCC)    Chronic venous insufficiency    Impacted cerumen of left ear    Mass of soft palate    Neck mass    Non-pressure chronic ulcer of skin of other sites limited to breakdown of skin (CMS/HCC)    Other specified dermatitis    Xerosis cutis    Exudative age-related macular degeneration, bilateral, with active choroidal neovascularization (CMS/HCC)    Squamous cell carcinoma of oropharynx (CMS/HCC)    Anemia associated with chemotherapy    Anemia    Impaired mobility and ADLs    Deficit in activities of daily living (ADL)         SLP Assessment:   Pt seen bedside for dysphagia tx. Pt slow to eat, with only sips of thin liquids trialed  during 20min treatment period, and deferred puree breakfast items for later time.     Plan:  Inpatient/Swing Bed or Outpatient: Inpatient  SLP TX Plan: Continue Plan of Care  SLP Plan: Skilled SLP  SLP Frequency: 2x per week  Duration: 1 week  SLP Discharge Recommendations: Continue skilled SLP services at the next level of care  Discussed POC: Patient  Patient/Caregiver Agreeable: Yes      Subjective     Most Recent Visit:  SLP Received On: 10/07/23    General Visit Information:   Reason for Referral: Dysphagia  Referred By: REED Mcguire  Past Medical History Relevant to Rehab: COPD, CAD,/PAD, HTN, CKD, HLD, remote prostate cancer s/p chemo, oropharyngeal cancer current chemo ad radiation.  Patient Seen During This Visit: Yes  Pt agreeable to trying soft diet for ease/comfort of PO intake. Meds to cont to be crushed in puree.      Objective     Therapeutic Swallow:  Therapeutic Swallow Intervention : PO Trials  Solid Diet Recommendations: Soft & bite sized/chopped (IDDSI Level 6)  Liquid Diet Recommendations: Thin (IDDSI Level 0)  Swallow Comments: Breakfast items present, deferred puree trials for later time. Pt did trial thin liquids: 4x sips by cup with no overt s/s aspiration; 3/5 single sips by straw with immediate cough. Pt did not report concerns with coughing with PO intake. Pt noted to orally hold bolus for approx 3-5 seconds prior to swallow initiation.    ST Plan of Care:  Pt will tolerate PO trials without overt signs and symptoms of aspiration 95% of opportunities in order to determine least restrictive diet level.   - Ongoing 10/10/23

## 2023-10-10 NOTE — CONSULTS
Nutrition Assessment Note  Assessment    Assessment:  Reason for Assessment  Reason for Assessment: Provider consult order    Pt admitted for anemia    Chart reviewed and pt visited. Pt known to nutrition services. Per pt diffuculty swallowing medications only. # 2 months ago, usually eats 2 meals a day.     Pt agreeable to supplement while admitted.    Past Medical History:   Diagnosis Date    Personal history of diseases of the blood and blood-forming organs and certain disorders involving the immune mechanism 07/27/2017    History of thrombocytosis     Results for orders placed or performed during the hospital encounter of 10/05/23 (from the past 24 hour(s))   Basic Metabolic Panel   Result Value Ref Range    Glucose 98 74 - 99 mg/dL    Sodium 135 (L) 136 - 145 mmol/L    Potassium 3.5 3.5 - 5.3 mmol/L    Chloride 96 (L) 98 - 107 mmol/L    Bicarbonate 29 21 - 32 mmol/L    Anion Gap 14 10 - 20 mmol/L    Urea Nitrogen 36 (H) 6 - 23 mg/dL    Creatinine 1.14 0.50 - 1.30 mg/dL    eGFR 69 >60 mL/min/1.73m*2    Calcium 8.0 (L) 8.6 - 10.3 mg/dL   CBC   Result Value Ref Range    WBC 3.2 (L) 4.4 - 11.3 x10*3/uL    nRBC 0.0 0.0 - 0.0 /100 WBCs    RBC 3.53 (L) 4.50 - 5.90 x10*6/uL    Hemoglobin 10.1 (L) 13.5 - 17.5 g/dL    Hematocrit 31.2 (L) 41.0 - 52.0 %    MCV 88 80 - 100 fL    MCH 28.6 26.0 - 34.0 pg    MCHC 32.4 32.0 - 36.0 g/dL    RDW 16.1 (H) 11.5 - 14.5 %    Platelets 224 150 - 450 x10*3/uL    MPV 10.7 7.5 - 11.5 fL     Scheduled medications  ascorbic acid, 1,000 mg, oral, Daily  [Held by provider] aspirin, 81 mg, oral, Daily  atorvastatin, 40 mg, oral, Nightly  budesonide, 0.5 mg, nebulization, BID  dexAMETHasone, 4 mg, oral, q AM  docusate sodium, 100 mg, oral, BID  formoterol, 20 mcg, nebulization, BID  losartan, 50 mg, oral, Daily  pantoprazole, 40 mg, oral, Daily before breakfast  polyethylene glycol, 17 g, oral, Daily  [Held by provider] potassium chloride, 40 mEq, oral, Daily  silver sulfADIAZINE, ,  "Topical, Daily  spironolactone, 25 mg, oral, q24h VANIA  tiotropium, 2 puff, inhalation, Daily      Continuous medications     PRN medications  PRN medications: albuterol, HYDROmorphone, ipratropium-albuteroL, lidocaine-diphenhydrAMINE-Maalox 1:1:1, magic mouthwash (lidocaine, diphenhydrAMINE, Maalox 1:1:1), morphine, ondansetron ODT **OR** ondansetron, polyethylene glycol  Dietary Orders (From admission, onward)       Start     Ordered    10/10/23 1237  Oral nutritional supplements  Until discontinued        Question Answer Comment   Deliver with All meals    Select supplement: Ensure Plus High Protein        10/10/23 1240    10/10/23 0958  Adult diet Regular; Bite size food 6; Thin 0  Diet effective now        Question Answer Comment   Diet type Regular    Texture Bite size food 6    Fluid consistency Thin 0        10/10/23 0957                    Anthropometrics:  Height: 170.2 cm (5' 7.01\")  Weight: 54 kg (119 lb 0.8 oz)  BMI (Calculated): 18.64    Weight Change: 0    Weight Change  Weight History / % Weight Change: 66.7kg 8/28/23, 71.7kg 5/25/23  Significant Weight Loss: Yes  Interpretation of Weight Loss: >10% in 6 months      Estimated Energy Needs  Total Energy Estimated Needs (kCal): 1620 kCal  Total Estimated Energy Need per Day (kCal/kg): 1890 kCal/kg  Method for Estimating Needs: 30-35    Estimated Protein Needs  Total Protein Estimated Needs (g): 80 g  Total Protein Estimated Needs (g/kg): 135 g/kg  Method for Estimating Needs: 1.5-2.5    Estimated Fluid Needs  Method for Estimating Needs: 1ml/kcal or per MD    Nutrition Focused Physical Findings:  Subcutaneous Fat Loss  Orbital Fat Pads: Severe (dark circles, hollowing and loose skin)  Buccal Fat Pads: Mild-Moderate (flat cheeks, minimal bounce)    Muscle Wasting  Temporalis: Severe (hollowed scooping depression)  Pectoralis (Clavicular Region): Severe (protruding prominent clavicle)    Edema  Edema: none      Physical Findings (Nutrition " Deficiency/Toxicity)  Skin: Positive (burn, stage 2 pressure injury buttocks)    Diagnosis   Diagnosis:  Malnutrition Diagnosis  Patient has Malnutrition Diagnosis: Yes  Diagnosis Status: New  Malnutrition Diagnosis: Severe malnutrition related to chronic disease or condition  As Evidenced by: >7.5% wt loss in 3 months, >10% wt loss in 6 months, prolonged poor intake prior to hospital admit of < 75% of estimated energy needs in > 1 month, mild to severe subcutaneous fat loss and severe muscle wasting present    Patient has Nutrition Diagnosis: Yes  Nutrition Diagnosis 1: Increased nutrient needs  Diagnosis Status (1): New  Related to (1): pressure injury  As Evidenced by (1): stage 2 buttocks       Interventions/Recommendations   Interventions/Recommendations:  Ensure TID for encouraged intake.  Consider appetite stimulant  Should pt intake remain sub optimal, consider alternate route for nutrition    Education Documentation  No documentation found.        Monitoring and Evaluation   Monitoring/Evaluation:  Monitor diet and supplement intake and tolerance  Daily weights  Labs as clinically indicated      Follow Up  Time Spent (min): 60 minutes  Last Date of Nutrition Visit: 10/10/23  Nutrition Follow-Up Needed?: Dietitian to reassess per policy  Follow up Comment: NA full assessment

## 2023-10-10 NOTE — PROGRESS NOTES
Auth still pending with [employer] for [facility] [address].  Asked [facility] to send updates  to the  facility.  Asked for auth to be escalated by [facility].  Will continue to follow for discharge planning needs.               Klarissa Mccord RN

## 2023-10-10 NOTE — DISCHARGE SUMMARY
"Phong Wong is a 73 y.o. male on day 0 of admission presenting with Anemia.    Subjective   Awake in bed.  Reports he is doing well.  Does endorse pain to neck.       Objective     Physical Exam  Physical Exam  Constitutional:       Comments: Thin, underweight    Cardiovascular:      Rate and Rhythm: Normal rate and regular rhythm.      Pulses: Normal pulses.      Heart sounds: Normal heart sounds. No murmur heard.     No gallop.   Pulmonary:      Effort: Pulmonary effort is normal. No respiratory distress.      Breath sounds: No wheezing or rhonchi.      Comments: Diminished bilaterally   Abdominal:      General: Abdomen is flat. There is no distension.      Palpations: Abdomen is soft.      Tenderness: There is no abdominal tenderness. There is no guarding.   Musculoskeletal:         General: Normal range of motion.   Skin:     General: Skin is warm.      Capillary Refill: Capillary refill takes less than 2 seconds.      Findings: Erythema present.      Comments: Discoloration/ erythema to neck (from radiation). Currently more dark red in color and scabbing   Neurological:      Mental Status: He is alert and oriented to person, place, and time.     Last Recorded Vitals  Blood pressure 135/74, pulse 78, temperature 36.3 °C (97.3 °F), temperature source Temporal, resp. rate 15, height 1.702 m (5' 7.01\"), weight 54 kg (119 lb 0.8 oz), SpO2 95 %.  Intake/Output last 3 Shifts:  No intake/output data recorded.    Relevant Results    Scheduled medications  ascorbic acid, 1,000 mg, oral, Daily  [Held by provider] aspirin, 81 mg, oral, Daily  atorvastatin, 40 mg, oral, Nightly  budesonide, 0.5 mg, nebulization, BID  dexAMETHasone, 4 mg, oral, q AM  docusate sodium, 100 mg, oral, BID  formoterol, 20 mcg, nebulization, BID  losartan, 50 mg, oral, Daily  pantoprazole, 40 mg, oral, Daily before breakfast  polyethylene glycol, 17 g, oral, Daily  [Held by provider] potassium chloride, 40 mEq, oral, Daily  silver sulfADIAZINE, , " Topical, Daily  spironolactone, 25 mg, oral, q24h VANIA  tiotropium, 2 puff, inhalation, Daily      Continuous medications     PRN medications  PRN medications: albuterol, HYDROmorphone, ipratropium-albuteroL, lidocaine-diphenhydrAMINE-Maalox 1:1:1, morphine, ondansetron ODT **OR** ondansetron, polyethylene glycol    Results for orders placed or performed during the hospital encounter of 10/05/23 (from the past 96 hour(s))   CBC and Auto Differential   Result Value Ref Range    WBC 2.2 (L) 4.4 - 11.3 x10*3/uL    nRBC 0.0 0.0 - 0.0 /100 WBCs    RBC 3.46 (L) 4.50 - 5.90 x10*6/uL    Hemoglobin 10.1 (L) 13.5 - 17.5 g/dL    Hematocrit 29.9 (L) 41.0 - 52.0 %    MCV 86 80 - 100 fL    MCH 29.2 26.0 - 34.0 pg    MCHC 33.8 32.0 - 36.0 g/dL    RDW 16.1 (H) 11.5 - 14.5 %    Platelets 180 150 - 450 x10*3/uL    MPV 10.6 7.5 - 11.5 fL    Immature Granulocytes %, Automated 0.4 0.0 - 0.9 %    Immature Granulocytes Absolute, Automated 0.01 0.00 - 0.50 x10*3/uL   Basic Metabolic Panel   Result Value Ref Range    Glucose 117 (H) 74 - 99 mg/dL    Sodium 140 136 - 145 mmol/L    Potassium 3.2 (L) 3.5 - 5.3 mmol/L    Chloride 99 98 - 107 mmol/L    Bicarbonate 30 21 - 32 mmol/L    Anion Gap 14 10 - 20 mmol/L    Urea Nitrogen 44 (H) 6 - 23 mg/dL    Creatinine 1.29 0.50 - 1.30 mg/dL    eGFR 59 (L) >60 mL/min/1.73m*2    Calcium 8.2 (L) 8.6 - 10.3 mg/dL   Manual Differential   Result Value Ref Range    Neutrophils %, Manual 79.0 40.0 - 80.0 %    Bands %, Manual 6.0 0.0 - 5.0 %    Lymphocytes %, Manual 5.0 13.0 - 44.0 %    Monocytes %, Manual 10.0 2.0 - 10.0 %    Eosinophils %, Manual 0.0 0.0 - 6.0 %    Basophils %, Manual 0.0 0.0 - 2.0 %    Seg Neutrophils Absolute, Manual 1.74 1.60 - 5.00 x10*3/uL    Bands Absolute, Manual 0.13 0.00 - 0.50 x10*3/uL    Lymphocytes Absolute, Manual 0.11 (L) 0.80 - 3.00 x10*3/uL    Monocytes Absolute, Manual 0.22 0.05 - 0.80 x10*3/uL    Eosinophils Absolute, Manual 0.00 0.00 - 0.40 x10*3/uL    Basophils Absolute,  Manual 0.00 0.00 - 0.10 x10*3/uL    Total Cells Counted 100     Neutrophils Absolute, Manual 1.87 1.60 - 5.50 x10*3/uL    RBC Morphology See Below     Polychromasia Mild     Ovalocytes Few     Teardrop Cells Few    Magnesium   Result Value Ref Range    Magnesium 1.90 1.60 - 2.40 mg/dL   CBC   Result Value Ref Range    WBC 2.7 (L) 4.4 - 11.3 x10*3/uL    nRBC 0.0 0.0 - 0.0 /100 WBCs    RBC 3.50 (L) 4.50 - 5.90 x10*6/uL    Hemoglobin 10.3 (L) 13.5 - 17.5 g/dL    Hematocrit 30.7 (L) 41.0 - 52.0 %    MCV 88 80 - 100 fL    MCH 29.4 26.0 - 34.0 pg    MCHC 33.6 32.0 - 36.0 g/dL    RDW 15.9 (H) 11.5 - 14.5 %    Platelets 225 150 - 450 x10*3/uL    MPV 10.5 7.5 - 11.5 fL   Basic Metabolic Panel   Result Value Ref Range    Glucose 118 (H) 74 - 99 mg/dL    Sodium 136 136 - 145 mmol/L    Potassium 3.3 (L) 3.5 - 5.3 mmol/L    Chloride 97 (L) 98 - 107 mmol/L    Bicarbonate 30 21 - 32 mmol/L    Anion Gap 12 10 - 20 mmol/L    Urea Nitrogen 39 (H) 6 - 23 mg/dL    Creatinine 1.13 0.50 - 1.30 mg/dL    eGFR 69 >60 mL/min/1.73m*2    Calcium 7.8 (L) 8.6 - 10.3 mg/dL   Basic Metabolic Panel   Result Value Ref Range    Glucose 96 74 - 99 mg/dL    Sodium 138 136 - 145 mmol/L    Potassium 3.3 (L) 3.5 - 5.3 mmol/L    Chloride 97 (L) 98 - 107 mmol/L    Bicarbonate 32 21 - 32 mmol/L    Anion Gap 12 10 - 20 mmol/L    Urea Nitrogen 37 (H) 6 - 23 mg/dL    Creatinine 1.17 0.50 - 1.30 mg/dL    eGFR 67 >60 mL/min/1.73m*2    Calcium 8.2 (L) 8.6 - 10.3 mg/dL   CBC   Result Value Ref Range    WBC 2.4 (L) 4.4 - 11.3 x10*3/uL    nRBC 0.0 0.0 - 0.0 /100 WBCs    RBC 3.45 (L) 4.50 - 5.90 x10*6/uL    Hemoglobin 10.1 (L) 13.5 - 17.5 g/dL    Hematocrit 30.6 (L) 41.0 - 52.0 %    MCV 89 80 - 100 fL    MCH 29.3 26.0 - 34.0 pg    MCHC 33.0 32.0 - 36.0 g/dL    RDW 15.9 (H) 11.5 - 14.5 %    Platelets 209 150 - 450 x10*3/uL    MPV 10.5 7.5 - 11.5 fL   Basic Metabolic Panel   Result Value Ref Range    Glucose 98 74 - 99 mg/dL    Sodium 135 (L) 136 - 145 mmol/L     Potassium 3.5 3.5 - 5.3 mmol/L    Chloride 96 (L) 98 - 107 mmol/L    Bicarbonate 29 21 - 32 mmol/L    Anion Gap 14 10 - 20 mmol/L    Urea Nitrogen 36 (H) 6 - 23 mg/dL    Creatinine 1.14 0.50 - 1.30 mg/dL    eGFR 69 >60 mL/min/1.73m*2    Calcium 8.0 (L) 8.6 - 10.3 mg/dL   CBC   Result Value Ref Range    WBC 3.2 (L) 4.4 - 11.3 x10*3/uL    nRBC 0.0 0.0 - 0.0 /100 WBCs    RBC 3.53 (L) 4.50 - 5.90 x10*6/uL    Hemoglobin 10.1 (L) 13.5 - 17.5 g/dL    Hematocrit 31.2 (L) 41.0 - 52.0 %    MCV 88 80 - 100 fL    MCH 28.6 26.0 - 34.0 pg    MCHC 32.4 32.0 - 36.0 g/dL    RDW 16.1 (H) 11.5 - 14.5 %    Platelets 224 150 - 450 x10*3/uL    MPV 10.7 7.5 - 11.5 fL     XR chest 1 view    Result Date: 10/5/2023  STUDY: Chest Radiograph; 10-5-2023 at 11:16 AM INDICATION: Generalized weakness. COMPARISON: 8- XR CHEST 2V ACCESSION NUMBER(S): [id number] ORDERING CLINICIAN: LUIS ROMAN TECHNIQUE:  Frontal chest was obtained at 11:02 hours. FINDINGS: CARDIOMEDIASTINAL SILHOUETTE: Cardiomediastinal silhouette is normal in size and configuration.  LUNGS: Lungs are clear.  ABDOMEN: No remarkable upper abdominal findings.  BONES: No acute osseous changes.    No radiographic evidence of acute cardiopulmonary disease. Signed by Phuc Horton MD          Assessment/Plan   Principal Problem:    Anemia  Active Problems:    Impaired mobility and ADLs    Deficit in activities of daily living (ADL)    Phong Wong is a 73 y.o. male presenting with anemia, hypokalemia, and weakness.  He is accompanied by his significant other who is at the bedside.  Patient himself is awake and responsive, but drowsy.  His significant other is at the bedside providing most of the history.  She endorses that he just finished his radiation therapy yesterday, a course of 7 weeks of radiation.  He gets blood drawn with every radiation appointment and on his labs yesterday he was noted to be anemic and hypokalemic.  Patient does have past medical history of COPD, CAD,  CAD/PAD, hypertension, prostate cancer s/p  chemo (reported to be in remission), and oropharyngeal cancer, received final radiation treatment yesterday. 7 weeks of radiation.  His significant other does endorses that he has been more noticeably weak and short of breath over this past week.  He was reported to come to the emergency room after his labs were resulted and the anemia and hypokalemia was noted.      Anemia  Pancytopenia   -likely due to chemo   -s/p 2 U PRBC, Hgb stable since transfusion  -pt denies any s/s bleeding, has never had any scopes, OP follow up      Hypokalemia  -3.3 this AM, repleted   -likely secondary to chlorthalidone (held) and poor PO intake   -continue daily Kcl (monitor on, added Aldactone), Mg stable   -continue aldactone added this stay      Oropharyngeal cancer  Neck wound due to RT  -wound care c/s, continue home Silvadene  -swallow eval  -Pall care c/s for pain control , appreciate recs. Currently pain is improved.   -malnutrition: dietician consulted       HTN  -BP elevated, Losartan titrated, Aldactone added (monitor K)      CKD  -Creatinine improved, monitor      COPD  - stable, as needed nebulizers     DVT/GI prophylaxis:  -SCDs, consider pharmacologic ppx based on HH, continue PPI     DC plan:  -PT/OT, SNF planning             Your medication list        START taking these medications        Instructions Last Dose Given Next Dose Due   budesonide 1 mg/2 mL nebulizer solution  Commonly known as: Pulmicort      Take 2 mL (1 mg) by nebulization 2 times a day. Rinse mouth after use       lidocaine-diphenhydrAMINE-Maalox 1:1:1 282--40 mg/30 mL liquid mouthwash  Commonly known as: Magic Mouthwash      Swish and spit 10 mL every 6 hours if needed (oral pain).       morphine 100 mg/5 mL (20 mg/mL) concentrated oral solution      Take 0.3 mL (6 mg) by mouth every 6 hours if needed for severe pain (7 - 10) for up to 3 days.       spironolactone 25 mg tablet  Commonly known as:  Aldactone  Start taking on: October 11, 2023      Take 1 tablet (25 mg) by mouth once every 24 hours. Do not start before October 11, 2023.              CHANGE how you take these medications        Instructions Last Dose Given Next Dose Due   silver sulfADIAZINE 1 % cream  Commonly known as: Silvadene  Start taking on: October 11, 2023  What changed:   how much to take  when to take this      Apply topically once daily. Do not start before October 11, 2023.              CONTINUE taking these medications        Instructions Last Dose Given Next Dose Due   aspirin 81 mg EC tablet           dexAMETHasone 4 mg tablet  Commonly known as: Decadron      Take 1 tablet (4 mg) by mouth once daily in the morning for 14 days.       ipratropium-albuteroL 0.5-2.5 mg/3 mL nebulizer solution  Commonly known as: Duo-Neb           losartan 100 mg tablet  Commonly known as: Cozaar           naloxone 4 mg/0.1 mL nasal spray  Commonly known as: Narcan      Administer 1 spray (4 mg) into affected nostril(s) if needed for opioid reversal or respiratory depression for up to 2 doses. May repeat every 2-3 minutes if needed, alternating nostrils, until medical assistance becomes available.       ondansetron 8 mg tablet  Commonly known as: Zofran           oxyCODONE 5 mg immediate release tablet  Commonly known as: Roxicodone           Trelegy Ellipta 100-62.5-25 mcg blister with device  Generic drug: fluticasone-umeclidin-vilanter      INHALE 1 PUFF EVERY DAY              STOP taking these medications      chlorthalidone 25 mg tablet  Commonly known as: [place]groton               ASK your doctor about these medications        Instructions Last Dose Given Next Dose Due   atorvastatin 40 mg tablet  Commonly known as: Lipitor      Take 1 tablet (40 mg) by mouth once daily.                 Where to Get Your Medications        These medications were sent to [facility]  [address], [address], [place] [address]      Hours: 8 AM to 6  PM Mon-Fri, 9 AM to 1 PM Saturday Phone: [phone]   budesonide 1 mg/2 mL nebulizer solution       You can get these medications from any pharmacy    Bring a paper prescription for each of these medications  morphine 100 mg/5 mL (20 mg/mL) concentrated oral solution       Information about where to get these medications is not yet available    Ask your nurse or doctor about these medications  lidocaine-diphenhydrAMINE-Maalox 1:1:1 173--40 mg/30 mL liquid mouthwash  silver sulfADIAZINE 1 % cream  spironolactone 25 mg tablet           I spent 40 minutes in the professional and overall care of this patient.      Renetta Joseph, APRN-CNP

## 2023-10-10 NOTE — PROGRESS NOTES
"Phong Wong is a 73 y.o. male on day 0 of admission presenting with Anemia.    Subjective   Awake in bed.  Reports he is doing well.  Does endorse pain to neck.       Objective     Physical Exam  Physical Exam  Constitutional:       Comments: Thin, underweight    Cardiovascular:      Rate and Rhythm: Normal rate and regular rhythm.      Pulses: Normal pulses.      Heart sounds: Normal heart sounds. No murmur heard.     No gallop.   Pulmonary:      Effort: Pulmonary effort is normal. No respiratory distress.      Breath sounds: No wheezing or rhonchi.      Comments: Diminished bilaterally   Abdominal:      General: Abdomen is flat. There is no distension.      Palpations: Abdomen is soft.      Tenderness: There is no abdominal tenderness. There is no guarding.   Musculoskeletal:         General: Normal range of motion.   Skin:     General: Skin is warm.      Capillary Refill: Capillary refill takes less than 2 seconds.      Findings: Erythema present.      Comments: Discoloration/ erythema to neck (from radiation). Currently more dark red in color and scabbing   Neurological:      Mental Status: He is alert and oriented to person, place, and time.     Last Recorded Vitals  Blood pressure 125/57, pulse 77, temperature 36 °C (96.8 °F), temperature source Temporal, resp. rate 16, height 1.702 m (5' 7\"), weight 54 kg (119 lb 0.8 oz), SpO2 95 %.  Intake/Output last 3 Shifts:  No intake/output data recorded.    Relevant Results    Scheduled medications  ascorbic acid, 1,000 mg, oral, Daily  [Held by provider] aspirin, 81 mg, oral, Daily  atorvastatin, 40 mg, oral, Nightly  budesonide, 0.5 mg, nebulization, BID  dexAMETHasone, 4 mg, oral, q AM  docusate sodium, 100 mg, oral, BID  formoterol, 20 mcg, nebulization, BID  losartan, 50 mg, oral, Daily  pantoprazole, 40 mg, oral, Daily before breakfast  polyethylene glycol, 17 g, oral, Daily  [Held by provider] potassium chloride, 40 mEq, oral, Daily  silver sulfADIAZINE, , " Topical, Daily  spironolactone, 25 mg, oral, q24h VANIA  tiotropium, 2 puff, inhalation, Daily      Continuous medications     PRN medications  PRN medications: albuterol, HYDROmorphone, ipratropium-albuteroL, lidocaine-diphenhydrAMINE-Maalox 1:1:1, magic mouthwash (lidocaine, diphenhydrAMINE, Maalox 1:1:1), morphine, ondansetron ODT **OR** ondansetron, polyethylene glycol    Results for orders placed or performed during the hospital encounter of 10/05/23 (from the past 96 hour(s))   CBC and Auto Differential   Result Value Ref Range    WBC 2.2 (L) 4.4 - 11.3 x10*3/uL    nRBC 0.0 0.0 - 0.0 /100 WBCs    RBC 3.46 (L) 4.50 - 5.90 x10*6/uL    Hemoglobin 10.1 (L) 13.5 - 17.5 g/dL    Hematocrit 29.9 (L) 41.0 - 52.0 %    MCV 86 80 - 100 fL    MCH 29.2 26.0 - 34.0 pg    MCHC 33.8 32.0 - 36.0 g/dL    RDW 16.1 (H) 11.5 - 14.5 %    Platelets 180 150 - 450 x10*3/uL    MPV 10.6 7.5 - 11.5 fL    Immature Granulocytes %, Automated 0.4 0.0 - 0.9 %    Immature Granulocytes Absolute, Automated 0.01 0.00 - 0.50 x10*3/uL   Basic Metabolic Panel   Result Value Ref Range    Glucose 117 (H) 74 - 99 mg/dL    Sodium 140 136 - 145 mmol/L    Potassium 3.2 (L) 3.5 - 5.3 mmol/L    Chloride 99 98 - 107 mmol/L    Bicarbonate 30 21 - 32 mmol/L    Anion Gap 14 10 - 20 mmol/L    Urea Nitrogen 44 (H) 6 - 23 mg/dL    Creatinine 1.29 0.50 - 1.30 mg/dL    eGFR 59 (L) >60 mL/min/1.73m*2    Calcium 8.2 (L) 8.6 - 10.3 mg/dL   Manual Differential   Result Value Ref Range    Neutrophils %, Manual 79.0 40.0 - 80.0 %    Bands %, Manual 6.0 0.0 - 5.0 %    Lymphocytes %, Manual 5.0 13.0 - 44.0 %    Monocytes %, Manual 10.0 2.0 - 10.0 %    Eosinophils %, Manual 0.0 0.0 - 6.0 %    Basophils %, Manual 0.0 0.0 - 2.0 %    Seg Neutrophils Absolute, Manual 1.74 1.60 - 5.00 x10*3/uL    Bands Absolute, Manual 0.13 0.00 - 0.50 x10*3/uL    Lymphocytes Absolute, Manual 0.11 (L) 0.80 - 3.00 x10*3/uL    Monocytes Absolute, Manual 0.22 0.05 - 0.80 x10*3/uL    Eosinophils  Absolute, Manual 0.00 0.00 - 0.40 x10*3/uL    Basophils Absolute, Manual 0.00 0.00 - 0.10 x10*3/uL    Total Cells Counted 100     Neutrophils Absolute, Manual 1.87 1.60 - 5.50 x10*3/uL    RBC Morphology See Below     Polychromasia Mild     Ovalocytes Few     Teardrop Cells Few    Magnesium   Result Value Ref Range    Magnesium 1.90 1.60 - 2.40 mg/dL   CBC   Result Value Ref Range    WBC 2.7 (L) 4.4 - 11.3 x10*3/uL    nRBC 0.0 0.0 - 0.0 /100 WBCs    RBC 3.50 (L) 4.50 - 5.90 x10*6/uL    Hemoglobin 10.3 (L) 13.5 - 17.5 g/dL    Hematocrit 30.7 (L) 41.0 - 52.0 %    MCV 88 80 - 100 fL    MCH 29.4 26.0 - 34.0 pg    MCHC 33.6 32.0 - 36.0 g/dL    RDW 15.9 (H) 11.5 - 14.5 %    Platelets 225 150 - 450 x10*3/uL    MPV 10.5 7.5 - 11.5 fL   Basic Metabolic Panel   Result Value Ref Range    Glucose 118 (H) 74 - 99 mg/dL    Sodium 136 136 - 145 mmol/L    Potassium 3.3 (L) 3.5 - 5.3 mmol/L    Chloride 97 (L) 98 - 107 mmol/L    Bicarbonate 30 21 - 32 mmol/L    Anion Gap 12 10 - 20 mmol/L    Urea Nitrogen 39 (H) 6 - 23 mg/dL    Creatinine 1.13 0.50 - 1.30 mg/dL    eGFR 69 >60 mL/min/1.73m*2    Calcium 7.8 (L) 8.6 - 10.3 mg/dL   Basic Metabolic Panel   Result Value Ref Range    Glucose 96 74 - 99 mg/dL    Sodium 138 136 - 145 mmol/L    Potassium 3.3 (L) 3.5 - 5.3 mmol/L    Chloride 97 (L) 98 - 107 mmol/L    Bicarbonate 32 21 - 32 mmol/L    Anion Gap 12 10 - 20 mmol/L    Urea Nitrogen 37 (H) 6 - 23 mg/dL    Creatinine 1.17 0.50 - 1.30 mg/dL    eGFR 67 >60 mL/min/1.73m*2    Calcium 8.2 (L) 8.6 - 10.3 mg/dL   CBC   Result Value Ref Range    WBC 2.4 (L) 4.4 - 11.3 x10*3/uL    nRBC 0.0 0.0 - 0.0 /100 WBCs    RBC 3.45 (L) 4.50 - 5.90 x10*6/uL    Hemoglobin 10.1 (L) 13.5 - 17.5 g/dL    Hematocrit 30.6 (L) 41.0 - 52.0 %    MCV 89 80 - 100 fL    MCH 29.3 26.0 - 34.0 pg    MCHC 33.0 32.0 - 36.0 g/dL    RDW 15.9 (H) 11.5 - 14.5 %    Platelets 209 150 - 450 x10*3/uL    MPV 10.5 7.5 - 11.5 fL   Basic Metabolic Panel   Result Value Ref Range     Glucose 98 74 - 99 mg/dL    Sodium 135 (L) 136 - 145 mmol/L    Potassium 3.5 3.5 - 5.3 mmol/L    Chloride 96 (L) 98 - 107 mmol/L    Bicarbonate 29 21 - 32 mmol/L    Anion Gap 14 10 - 20 mmol/L    Urea Nitrogen 36 (H) 6 - 23 mg/dL    Creatinine 1.14 0.50 - 1.30 mg/dL    eGFR 69 >60 mL/min/1.73m*2    Calcium 8.0 (L) 8.6 - 10.3 mg/dL   CBC   Result Value Ref Range    WBC 3.2 (L) 4.4 - 11.3 x10*3/uL    nRBC 0.0 0.0 - 0.0 /100 WBCs    RBC 3.53 (L) 4.50 - 5.90 x10*6/uL    Hemoglobin 10.1 (L) 13.5 - 17.5 g/dL    Hematocrit 31.2 (L) 41.0 - 52.0 %    MCV 88 80 - 100 fL    MCH 28.6 26.0 - 34.0 pg    MCHC 32.4 32.0 - 36.0 g/dL    RDW 16.1 (H) 11.5 - 14.5 %    Platelets 224 150 - 450 x10*3/uL    MPV 10.7 7.5 - 11.5 fL     XR chest 1 view    Result Date: 10/5/2023  STUDY: Chest Radiograph; 10-5-2023 at 11:16 AM INDICATION: Generalized weakness. COMPARISON: 8- XR CHEST 2V ACCESSION NUMBER(S): [id number] ORDERING CLINICIAN: LUIS ROMAN TECHNIQUE:  Frontal chest was obtained at 11:02 hours. FINDINGS: CARDIOMEDIASTINAL SILHOUETTE: Cardiomediastinal silhouette is normal in size and configuration.  LUNGS: Lungs are clear.  ABDOMEN: No remarkable upper abdominal findings.  BONES: No acute osseous changes.    No radiographic evidence of acute cardiopulmonary disease. Signed by Phuc Horton MD          Assessment/Plan   Principal Problem:    Anemia  Active Problems:    Impaired mobility and ADLs    Deficit in activities of daily living (ADL)    Phong Wong is a 73 y.o. male presenting with anemia, hypokalemia, and weakness.  He is accompanied by his significant other who is at the bedside.  Patient himself is awake and responsive, but drowsy.  His significant other is at the bedside providing most of the history.  She endorses that he just finished his radiation therapy yesterday, a course of 7 weeks of radiation.  He gets blood drawn with every radiation appointment and on his labs yesterday he was noted to be anemic and  hypokalemic.  Patient does have past medical history of COPD, CAD, CAD/PAD, hypertension, prostate cancer s/p  chemo (reported to be in remission), and oropharyngeal cancer, received final radiation treatment yesterday. 7 weeks of radiation.  His significant other does endorses that he has been more noticeably weak and short of breath over this past week.  He was reported to come to the emergency room after his labs were resulted and the anemia and hypokalemia was noted.      Anemia  Pancytopenia   -likely due to chemo   -s/p 2 U PRBC, Hgb stable since transfusion  -pt denies any s/s bleeding, has never had any scopes, OP follow up      Hypokalemia  -3.3 this AM, repleted   -likely secondary to chlorthalidone (held) and poor PO intake   -continue daily Kcl (monitor on, added Aldactone), Mg stable   -continue aldactone added this stay      Oropharyngeal cancer  Neck wound due to RT  -wound care c/s, continue home Silvadene  -swallow eval  -Pall care c/s for pain control , appreciate recs. Currently pain is improved.   -malnutrition: dietician consulted       HTN  -BP elevated, Losartan titrated, Aldactone added (monitor K)      CKD  -Creatinine improved, monitor      COPD  - stable, as needed nebulizers     DVT/GI prophylaxis:  -SCDs, consider pharmacologic ppx based on HH, continue PPI     DC plan:  -PT/OT, SNF planning            I spent 40 minutes in the professional and overall care of this patient.      LIDIA Sanchez-CNP

## 2023-10-11 ENCOUNTER — TELEPHONE (OUTPATIENT)
Dept: RADIATION ONCOLOGY | Facility: HOSPITAL | Age: 73
End: 2023-10-11
Payer: MEDICARE

## 2023-10-11 NOTE — TELEPHONE ENCOUNTER
Called pt to remind of appointment on 10/18/23 at 2:30. Pt's phone went to voicemail left number if needs to reschedule.

## 2023-10-12 ENCOUNTER — APPOINTMENT (OUTPATIENT)
Dept: HEMATOLOGY/ONCOLOGY | Facility: HOSPITAL | Age: 73
End: 2023-10-12
Payer: MEDICARE

## 2023-10-12 ENCOUNTER — NURSING HOME VISIT (OUTPATIENT)
Dept: POST ACUTE CARE | Facility: EXTERNAL LOCATION | Age: 73
End: 2023-10-12
Payer: MEDICARE

## 2023-10-12 DIAGNOSIS — C10.9 OROPHARYNGEAL CANCER (MULTI): ICD-10-CM

## 2023-10-12 DIAGNOSIS — D64.81 ANEMIA DUE TO ANTINEOPLASTIC CHEMOTHERAPY: Primary | ICD-10-CM

## 2023-10-12 DIAGNOSIS — J45.909 ASTHMA, UNSPECIFIED ASTHMA SEVERITY, UNSPECIFIED WHETHER COMPLICATED, UNSPECIFIED WHETHER PERSISTENT (HHS-HCC): ICD-10-CM

## 2023-10-12 DIAGNOSIS — E46 PROTEIN-CALORIE MALNUTRITION, UNSPECIFIED SEVERITY (MULTI): ICD-10-CM

## 2023-10-12 DIAGNOSIS — E87.6 HYPOKALEMIA: ICD-10-CM

## 2023-10-12 DIAGNOSIS — T45.1X5A ANEMIA DUE TO ANTINEOPLASTIC CHEMOTHERAPY: Primary | ICD-10-CM
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
## 2023-10-12 DIAGNOSIS — N18.31 CHRONIC RENAL IMPAIRMENT, STAGE 3A (MULTI): ICD-10-CM

## 2023-10-12 DIAGNOSIS — I10 BENIGN ESSENTIAL HYPERTENSION: ICD-10-CM

## 2023-10-12 DIAGNOSIS — R52 PAIN AFTER RADIATION THERAPY: ICD-10-CM

## 2023-10-12 DIAGNOSIS — T14.8XXA WOUND OF SKIN: ICD-10-CM

## 2023-10-12 PROCEDURE — 99310 SBSQ NF CARE HIGH MDM 45: CPT | Performed by: FAMILY MEDICINE
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
## 2023-10-13 ENCOUNTER — TELEPHONE (OUTPATIENT)
Dept: HEMATOLOGY/ONCOLOGY | Facility: HOSPITAL | Age: 73
End: 2023-10-13
Payer: MEDICARE

## 2023-10-13 NOTE — TELEPHONE ENCOUNTER
NUTRITION Follow-up NOTE  Reason for Visit:  Phong Wong is a 73 y.o. male with squamous cell carcinoma of oropharynx who presents for weight loss, electrolyte imbalance, and anemia.    Attempted to follow up with patient over the phone. Left voicemail with callback number. Will see in [facility] 10/17.     Lab Results   Component Value Date/Time    GLUCOSE 98 10/10/2023 0424     (L) 10/10/2023 0424    K 3.5 10/10/2023 0424    CL 96 (L) 10/10/2023 0424    CO2 29 10/10/2023 0424    ANIONGAP 14 10/10/2023 0424    BUN 36 (H) 10/10/2023 0424    CREATININE 1.14 10/10/2023 0424    EGFR 69 10/10/2023 0424    CALCIUM 8.0 (L) 10/10/2023 0424    ALBUMIN 2.7 (L) 10/05/2023 1034    ALKPHOS 57 10/05/2023 1034    PROT 4.6 (L) 10/05/2023 1034    AST 13 10/05/2023 1034    BILITOT 1.0 10/05/2023 1034    ALT 9 (L) 10/05/2023 1034     Lab Results   Component Value Date/Time    VITD25 53 05/31/2023 1210       Wt Readings from Last 10 Encounters:   10/10/23 54 kg (119 lb 0.8 oz)   10/04/23 59.4 kg (130 lb 15.3 oz)   10/02/23 61.5 kg (135 lb 9.3 oz)   08/25/23 66.7 kg (147 lb)   05/25/23 71.7 kg (158 lb)   09/30/22 71.3 kg (157 lb 2 oz)   08/30/22 66.2 kg (146 lb)   06/27/22 68.5 kg (151 lb)   06/27/22 68.9 kg (152 lb 0.1 oz)   05/27/22 70.8 kg (156 lb 0.8 oz)

## 2023-10-14 PROBLEM — H61.22 IMPACTED CERUMEN OF LEFT EAR: Status: RESOLVED | Noted: 2023-08-25 | Resolved: 2023-10-14

## 2023-10-14 PROBLEM — R52 PAIN AFTER RADIATION THERAPY: Status: ACTIVE | Noted: 2023-10-14

## 2023-10-14 PROBLEM — D72.829 ELEVATED WBC COUNT: Status: RESOLVED | Noted: 2023-04-09 | Resolved: 2023-10-14

## 2023-10-14 PROBLEM — J45.909 ASTHMA (HHS-HCC): Status: ACTIVE | Noted: 2023-10-14

## 2023-10-14 PROBLEM — E87.6 HYPOKALEMIA: Status: ACTIVE | Noted: 2023-10-14

## 2023-10-14 PROBLEM — E46 PROTEIN-CALORIE MALNUTRITION, UNSPECIFIED SEVERITY (MULTI): Status: ACTIVE | Noted: 2023-10-14
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
## 2023-10-14 NOTE — ASSESSMENT & PLAN NOTE
K+ 3.9 on 10/10 likely secondary to chlorthalidone in hospital and poor PO intake. Chlorthalidone stopped aldactone started Mg stable continue aldactone

## 2023-10-14 NOTE — ASSESSMENT & PLAN NOTE
H&H on 10/12 - 9.8/29.8, likely due to chemo, s/p 2 U PRBC, while in hospital Hgb stable since transfusion pt denies any s/s bleeding, has never had any scopes, OP follow up
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
## 2023-10-17 ENCOUNTER — APPOINTMENT (OUTPATIENT)
Dept: HEMATOLOGY/ONCOLOGY | Facility: HOSPITAL | Age: 73
End: 2023-10-17
Payer: MEDICARE

## 2023-10-17 ENCOUNTER — NURSING HOME VISIT (OUTPATIENT)
Dept: POST ACUTE CARE | Facility: EXTERNAL LOCATION | Age: 73
End: 2023-10-17
Payer: MEDICARE

## 2023-10-17 DIAGNOSIS — R52 PAIN: ICD-10-CM

## 2023-10-17 DIAGNOSIS — C10.9 OROPHARYNGEAL CANCER (MULTI): ICD-10-CM

## 2023-10-17 DIAGNOSIS — K59.00 CONSTIPATION, UNSPECIFIED CONSTIPATION TYPE: ICD-10-CM

## 2023-10-17 DIAGNOSIS — N18.2 STAGE 2 CHRONIC KIDNEY DISEASE: ICD-10-CM

## 2023-10-17 DIAGNOSIS — I10 BENIGN ESSENTIAL HYPERTENSION: ICD-10-CM

## 2023-10-17 DIAGNOSIS — D64.9 ANEMIA, UNSPECIFIED TYPE: Primary | ICD-10-CM

## 2023-10-17 DIAGNOSIS — K21.9 GASTROESOPHAGEAL REFLUX DISEASE, UNSPECIFIED WHETHER ESOPHAGITIS PRESENT: ICD-10-CM

## 2023-10-17 DIAGNOSIS — E78.5 HYPERLIPIDEMIA, UNSPECIFIED HYPERLIPIDEMIA TYPE: ICD-10-CM

## 2023-10-17 DIAGNOSIS — R26.89 IMPAIRED GAIT AND MOBILITY: ICD-10-CM

## 2023-10-17 DIAGNOSIS — M62.81 GENERALIZED MUSCLE WEAKNESS: ICD-10-CM

## 2023-10-17 DIAGNOSIS — J44.9 CHRONIC OBSTRUCTIVE PULMONARY DISEASE, UNSPECIFIED COPD TYPE (MULTI): ICD-10-CM

## 2023-10-17 DIAGNOSIS — E87.6 HYPOKALEMIA: ICD-10-CM

## 2023-10-17 DIAGNOSIS — S11.90XS OPEN NECK WOUND, SEQUELA: ICD-10-CM

## 2023-10-17 DIAGNOSIS — D61.818 PANCYTOPENIA (MULTI): ICD-10-CM

## 2023-10-17 PROCEDURE — 99310 SBSQ NF CARE HIGH MDM 45: CPT | Performed by: NURSE PRACTITIONER

## 2023-10-17 ASSESSMENT — ENCOUNTER SYMPTOMS
LIGHT-HEADEDNESS: 1
DIARRHEA: 0
ABDOMINAL PAIN: 0
FATIGUE: 1
WOUND: 1
ARTHRALGIAS: 0
VOMITING: 0
COUGH: 0
DIZZINESS: 1
NAUSEA: 0
NUMBNESS: 0
LEG SWELLING: 0
CHILLS: 0
CONSTIPATION: 0
HEADACHES: 0
FEVER: 0
SHORTNESS OF BREATH: 0

## 2023-10-17 NOTE — PROGRESS NOTES
Patient ID: Phong Wong is a 73 y.o. male.  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 Juan Jose Fletcher   MedOn: Dr. Kyunghee Burkitt, X. Katherine Feng, PA-C   RadOnc: Dr. Vianca Steen     Prior Therapy:   8/16 - 10/4/23: Recieved concurrent chemoradiation with 7 weekly Carboplatin (2mg/AUC)/Paclitaxel (45mg/m2)  and 70gy RT in 35fx     Site of Disease:  Soft palate, BOT, Left palatine tonsil  B/L Cervical LN     Oncologic Issues:   Odynophagia  Fatigue     ONCOLOGIC HISTORY  - Pt had 2 months hx of throat discomfort with lump in right neck  - 6/13/23: CT neck showed a mass 2.8 x 2.5 x 2.9 cm in the right lower cervical neck soft tissues. Two suspicious nodes were observed, one at level 3 on the right and another at level 2 on the left.  - 6/28/23: seen by Dr. Fletcher. 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 Based on the findings, Dr. Fletcher did not recommend surgery due to the location  of the primary tumor and the presence of yvette disease  - 6/30/23: PET/CT showed intense FDG avidity within the soft palate, extending to the base of the tongue and left palatine tonsil. Multiple FDG avid left cervical level II nodes were observed, along with an FDG avid mass in the region of the right cervical  level II/III, infiltrating the right SCM muscle and encasing and invading the right jugular vein. FDG avidity was also detected in the region of the left fossa Rosenmuller and left medial pterygoid muscle.  - 8/16 - 10/4/23: Recieved concurrent chemoradiation with 7 weekly Carboplatin (2mg/AUC)/Paclitaxel (45mg/m2)  and 70gy RT in 35fx    Admissions:  10/5 - 10/10/23: Admitted for extreme weakness, HECTOR, pancytopenia including anemia requiring blood transfusion. D/C'ed to acute rehab        Past Medical History:   Past Medical History:  07/27/2017:  Personal history of diseases of the blood and blood-  forming organs and certain disorders involving the immune mechanism      Comment:  History of thrombocytosis   HTN, HLD, COPD, prostate cancer in 2017 (s/p radiation)  Surgical History:    Past Surgical History:   Procedure Laterality Date    CT ABDOMEN PELVIS ANGIOGRAM W AND/OR WO IV CONTRAST  9/3/2014    CT ABDOMEN PELVIS ANGIOGRAM W AND/OR WO IV CONTRAST 9/3/2014 [facility]    IR ANGIOGRAM AORTA ABDOMEN  2014    IR ANGIOGRAM AORTA ABDOMEN 2014 [facility]   Aortoiliofemoral vascular bypass in   Social History:    Social History     Socioeconomic History    Marital status:      Spouse name: Not on file    Number of children: Not on file    Years of education: Not on file    Highest education level: Not on file   Occupational History    Not on file   Tobacco Use    Smoking status: Former     Packs/day: 1.00     Years: 40.00     Additional pack years: 0.00     Total pack years: 40.00     Types: Cigarettes     Quit date:      Years since quittin.7    Smokeless tobacco: Never   Substance and Sexual Activity    Alcohol use: Yes     Alcohol/week: 12.0 standard drinks of alcohol     Types: 12 Cans of beer per week    Drug use: Never    Sexual activity: Not on file   Other Topics Concern    Not on file   Social History Narrative    Not on file     Social Determinants of Health     Financial Resource Strain: Low Risk  (10/5/2023)    Overall Financial Resource Strain (CARDIA)     Difficulty of Paying Living Expenses: Not very hard   Food Insecurity: No Food Insecurity (10/5/2023)    Hunger Vital Sign     Worried About Running Out of Food in the Last Year: Never true     Ran Out of Food in the Last Year: Never true   Transportation Needs: No Transportation Needs (10/5/2023)    PRAPARE - Transportation     Lack of Transportation (Medical): No     Lack of Transportation (Non-Medical): No   Physical Activity: Inactive  (10/5/2023)    Exercise Vital Sign     Days of Exercise per Week: 0 days     Minutes of Exercise per Session: 0 min   Stress: Stress Concern Present (10/5/2023)    [employer] [place] [employer] - Occupational Stress Questionnaire     Feeling of Stress : To some extent   Social Connections: Moderately Integrated (10/5/2023)    Social Connection and Isolation Panel [NHANES]     Frequency of Communication with Friends and Family: More than three times a week     Frequency of Social Gatherings with Friends and Family: More than three times a week     Attends [employer] Services: Never     Active Member of Clubs or Organizations: Yes     Attends Club or Organization Meetings: Never     Marital Status:    Intimate Partner Violence: Not At Risk (10/5/2023)    Humiliation, Afraid, Rape, and Kick questionnaire     Fear of Current or Ex-Partner: No     Emotionally Abused: No     Physically Abused: No     Sexually Abused: No   Housing Stability: Low Risk  (10/5/2023)    Housing Stability Vital Sign     Unable to Pay for Housing in the Last Year: No     Number of Places Lived in the Last Year: 1     Unstable Housing in the Last Year: No      Family History:    Family History   Problem Relation Name Age of Onset    Aortic aneurysm Father          abdominal     Family Oncology History:    Cancer-related family history is not on file.      Subjective   Chief Complaint: Squamous Cell Carcinoma of oropharynx    HPI  Interval History  Phong Wong is a 73 y.o. male with recent diagnosis of locally advanced oral cavity cancer, completed concurrent chemoradiation with 7 weekly Carboplatin (2mg/AUC)/Paclitaxel (45mg/m2) on 10/4/23.    Patient presents for 2 week post treatment follow up.   He was admitted 10/5 - 10/10/23 for extreme weakness, HECTOR, pancytopenia including anemia requiring blood transfusion. D/C'ed to acute rehab,  He is very drowsy today in clinic. His partner reports he has been this say since discharge  to acute rehab on 10/10/23. Has had very minimum PO intake of food/liquids. Has not been able to complete PT/OT at the [facility] due to extreme fatigue.    On review of patient's med list from [facility] ([address]), it appears patient has orders for Morphine 100mg/5mL, 5mL PO Q6hr PRN for pain. When referenced with his discharge paperwork from [facility] on 10/10, he was suppose to have Morphine 100mg/5mL, 0.3mL PO Q6hr.     Called [facility] and spoke with patient's nurse, whom confirmed that patient has been dosed to 5mL since he arrived at the [facility], last dose at 1AM, for 100mg of Morphine PO.   Patient also on Losartan 100mg QD and Spironolactone 25mg QD   Patient's extreme drowsiness could be related to delayed metabolism of high dose of opioid and hypotension.       ROS  Review of Systems   Constitutional:  Positive for fatigue. Negative for chills and fever. Unexpected weight change: weight loss.  HENT:   Negative for hearing loss, lump/mass, mouth sores, nosebleeds, sore throat, tinnitus and trouble swallowing.    Respiratory:  Negative for cough and shortness of breath.    Cardiovascular:  Negative for chest pain and leg swelling.   Gastrointestinal:  Negative for abdominal pain, constipation, diarrhea, nausea and vomiting.   Musculoskeletal:  Negative for arthralgias.   Skin:  Positive for wound (skin of left neck with burning pain). Negative for rash.   Neurological:  Positive for dizziness and light-headedness. Negative for headaches and numbness.       Allergies  Allergies   Allergen Reactions    Codeine Nausea Only        Medications  Current Outpatient Medications   Medication Instructions    aspirin 81 mg EC tablet 1 tablet, oral, Daily    atorvastatin (LIPITOR) 40 mg, oral, Daily    budesonide (Pulmicort) 1 mg/2 mL nebulizer solution Take 2 mL (1 mg) by nebulization 2 times a day. Rinse mouth after use    dexAMETHasone (DECADRON) 4 mg, oral, Every morning    fluticasone-umeclidin-vilanter (Trelegy Ellipta)  100-62.5-25 mcg blister with device INHALE 1 PUFF EVERY DAY    ipratropium-albuteroL (Duo-Neb) 0.5-2.5 mg/3 mL nebulizer solution 3 mL 3 times a day as needed for wheezing or shortness of breath.    lido-diphen-Maalox 1:1:1 Magic Mouthwash 10 mL, Swish & Spit, Every 6 hours PRN    losartan (Cozaar) 100 mg tablet 1 tablet, oral, Daily    naloxone (NARCAN) 4 mg, nasal, As needed, May repeat every 2-3 minutes if needed, alternating nostrils, until medical assistance becomes available.    ondansetron (ZOFRAN) 8 mg, oral, Every 8 hours PRN    oxyCODONE (ROXICODONE) 5 mg, oral, Every 6 hours PRN, Per wife recently got the 10mg but this was too much for patient. Was extremely drowsy and hallucinating     silver sulfADIAZINE (Silvadene) 1 % cream Topical, Daily    spironolactone (ALDACTONE) 25 mg, oral, Every 24 hours scheduled        Objective   VS:  /66 (BP Location: Left arm, Patient Position: Sitting)   Pulse (!) 113 Comment: RIVER Patten Notified  Temp 35.9 °C (96.6 °F) (Core)   SpO2 (!) 78% Comment: Wave length inconsistant, PA notified , patient asympomatic  Weight   Wt Readings from Last 7 Encounters:   10/10/23 54 kg (119 lb 0.8 oz)   10/04/23 59.4 kg (130 lb 15.3 oz)   10/02/23 61.5 kg (135 lb 9.3 oz)   08/25/23 66.7 kg (147 lb)   05/25/23 71.7 kg (158 lb)   09/30/22 71.3 kg (157 lb 2 oz)   08/30/22 66.2 kg (146 lb)         Physical Exam  Constitutional:       Appearance: He is underweight. He is ill-appearing.   HENT:      Head: Normocephalic and atraumatic.      Right Ear: External ear normal. No tenderness.      Left Ear: External ear normal. No tenderness.      Nose: Nose normal.      Mouth/Throat:      Mouth: No injury or oral lesions.      Tongue: No lesions.      Pharynx: Oropharynx is clear. No posterior oropharyngeal erythema.      Comments: Thick mucous present  Eyes:      Extraocular Movements: Extraocular movements intact.      Conjunctiva/sclera: Conjunctivae normal.      Pupils: Pupils are equal,  round, and reactive to light.   Neck:      Thyroid: No thyroid mass.   Cardiovascular:      Rate and Rhythm: Regular rhythm. Tachycardia present.   Pulmonary:      Effort: Pulmonary effort is normal. No respiratory distress.      Breath sounds: Normal breath sounds.   Abdominal:      General: Bowel sounds are normal. There is no distension or abdominal bruit.      Palpations: Abdomen is soft. There is no mass.      Tenderness: There is no abdominal tenderness.   Musculoskeletal:         General: Normal range of motion.      Cervical back: Normal range of motion and neck supple.      Right lower leg: No edema.      Left lower leg: No edema.   Lymphadenopathy:      Cervical: No cervical adenopathy.      Upper Body:      Right upper body: No axillary adenopathy.      Left upper body: No axillary adenopathy.   Skin:     General: Skin is warm and dry.      Coloration: Skin is pale.      Findings: Lesion (radiation dermatitis in bilateral neck) present.      Comments: Desquamation of skin of bilateral neck       Neurological:      General: No focal deficit present.      Mental Status: He is oriented to person, place, and time. He is lethargic.      Gait: Gait is intact.   Psychiatric:         Mood and Affect: Mood and affect normal.           Diagnostic Results   No labs obtained at time of this clinic visit as a rapid response was called while patient was at outpatient lab.                           Assessment/Plan    ASSESSMENT  Phong Wong is a 73 y.o. male with recent diagnosis of locally advanced oral cavity cancer. Completed concurrent chemoradiation with 7 weekly Carboplatin (2mg/AUC)/Paclitaxel (45mg/m2) on 10/4/2. Admitted 10/5 - 10/10 for extreme weakness, HECTOR, pancytopenia including anemia requiring blood transfusion. D/C'ed to acute rehab. Presented to clinic today with extreme drowsiness, hypotension, hypoxia, likely opioid overdose , ultimately leading to rapid response call. Code team was called and  transferred patient to ED for escalation of care.      # Opioid overdose  - Patient's SNF has been erroneously given much too high of Morphine PO dose for PRN pain management. He's been dosed with 100mg PO Morphine at a time with most recent dose at 1AM this morning.   - With his poor baseline kidney function, likely causing low metabolism of morphine, causing his extreme drowsiness.    - Unfortunately not able to administer readily in Narcan in outpatient setting . Code Blue was called    # Hypotension/Dehydration  - Labile BP in clinic, SBP ranging from 60s to 110s. Poor skin turgor.   - Low PO in the last week at SNF, however has been given Losartan 100mg QD and Spironolactone 25mg QD, likely worsening hypotension    # Hypoxia  - O2 fluctuating from 70s to 90s, has poor waveform on pulse ox  - Patient with shallow breathing, and falling asleep every 5 seconds    # Locally advanced oral cavity cancer, T3N2M0  - 6/30/23: PET/CT showed intense FDG avidity within the soft palate, extending to the base of the tongue and left palatine tonsil. Multiple uptake in left cervical level II nodes, right cervical level  II/III, infiltrating the right SCM muscle and encasing and invading the right jugular vein. FDG avidity was also detected in the region of the left fossa Rosenmuller and left medial pterygoid muscle.  - 7/13/23: pt is doing well, no pain, discussed with patient about carboplatin+ paclitaxel as his chemotherapy along with radiation due to his GFR 30s.  Chemo related side effects were reviewed with patient, consent obtained.   - Patient required dental extractions and as a result start date was delayed from 7/26 to 8/15/23  - 8/30: tolerating chemo well, does have some fatigue but no n/v. No peripheral neuropathy   - 9/6: Continue to tolerate chemo well, no n/v, is feeling more tired. Eating well, tolerating soft foods and supplementing with Boost VHC 2x per day. Denies peripheral neuropathy  - 9/13: continue to  tolerate chemo well w/o n/v. Energy is lower but stable. Eating soft foods and Boost VHC x 2 per day  - 9/20: Tolerating chemo well, minimal odynophagia, does have some weight loss.  - 10/4: Completed last dose of Carbo/Taxol last week. Recall patient did not have a medon visit as both providers are out of office. He had las RT today and noted to have significantly increased fatigue, weakness, weight loss in the last week resulting from decreased PO intake.     # Anemia  - Hgb has been dropping since starting chemo, however today his hgb is <7 and will require a blood transfusion. This is likely due to chemotherapy as there are no s/s of bleeds.   - Blood consent obtained today, 1U PRBC ordered for tomorrow, arranged for blood transfusion tomorrow.    # Odynophagia  - 9/6: Starting to some pain with swallowing this week. Now taking Oxycodone 5mg before meals and has good control  - 9/13: Odynophagia well controlled with Oxycodone 5mg prior to meals. Using glutamine rinses. Does have some weight loss.    - 9/20: Mild odynophagia well controlled with oxycodone 5mg as needed.   - 10/4: Pt became to sedated with Oxycodone 10mg last week so this was held and his throat pain is currently controlled with Tylenol   - 10/18: Reports odynophagia largely resolvead     # Neutropenia: improving  - 9/6: . Proceed to C4 today, will monitor prior to next cycle. May need to hold C4  - Return precaution for neutropenic fevers provided for patient and his significant other, they voiced understanding   - 9/13 ANC 1290.  - 9/20 . Ok for C6 Carbo/Taxol. Return precaution provided for neutropenic fevers again.   - 10/4 . Afebrile. Return precaution for neutropenic fever provided again.      # Hypokalemia  - 9/20: K 3.3.  Likely due to chemo  - Replete with KCl 20mg ER PO x 1 in infusion  - - 9/27: K 3.8   - 10/4: K 2.9, Replete with KCL 40mg IV in infusion    # Hypomagnesemia  - 9/20: Mg 1.48. Likely due to chemo  -  Replete with Mag 2g in infusion.   - 10/4: Mg 1.94    # Weight Loss  - Continue to have very poor PO intake. Pt with temporal wasting and sunken cheeks. Dietician consulted on nutrition and weight loss.     # Weakness  - Multifactorial, due to chemo, anemia, dehydration, decrease PO intake. See Above.    # Left neck skin lesion  - Pt with burning pain if left skin of neck. Did not have ointment with them today in infusion  - Obtained aquaphor with meplex dressing and instructed patient to apply to neck in Infusion today    PLAN  -- Rapid response called for hypotension, hypoxia, likely opioid overdose  -- Accompanied patient and his partner to ED, provided in person hand off to ED physician      Acute Rehab - [facility]  Phone: [phone]  Fax: [phone]

## 2023-10-17 NOTE — LETTER
Patient: Phong Wong  : 1950    Encounter Date: 10/17/2023    Name: Phong Wong    YOB: 1950    Code Status: FULL CODE    Chief Complaint:  New patient; initial visit;  Anemia; Pancytopenia; etc....      HPI   73 year old male with medical history of COPD, CAD, CAD/PAD, hypertension, prostate cancer s/p  chemo (reported to be in remission), and oropharyngeal cancer, received final radiation treatment recently.    HOSPITAL COURSE:  Patient presented to [facility] ER with anemia, hypokalemia, and weakness.    He was accompanied by his significant other.  Patient was awake and responsive, but drowsy.    His significant other provided most of the history.    She reported that he finished his radiation therapy the day before he presented to the ER.  It was a total of 7 weeks of radiation.    Patient was more noticeably weak and short of breath the week before he presented to the ER.   He was getting a blood draw with every radiation appointment.  In his most recent labs he was noted to be anemic and hypokalemic.    Patient was told to go to the emergency room after his labs were resulted.   He had anemia, pancytopenia  and hypokalemia.  On 10/5/23 H & H was 5.4 & 16.   On 10/5/23 WBC were 1 and platelets were 111.  On 10/10/23 H & H 10.1 & 31.2.  On 10/10/23 WBC 3.2 and platelets were 224.  Patient was found to have pancytopenia, likely due to the chemo.    He was given 2 U PRBC and his hemoglobin improved.   His potassium level was low 3.3 which was likely secondary to chlorthalidone which was held and his poor PO intake.  Patient has oropharyngeal cancer and a neck wound from his recent radiation therapy.  Wound care was consulted recommendation was to continue  with home Silvadene.  He had a swallow evaluation performed. Palliative care consulted for pain control.   Dietician was consulted for malnutrition.   Plans to send patient to SNF after discharge from hospital.    Patient admitted to the [facility] skilled services on 10/10/23.    Hospital and chronic diagnoses as follows:    Anemia; Pancytopenia:    It is likely due to chemo.  On 10/5/23 H & H was 5.4 & 16.  Patient is s/p 2 U PRBC.  Hgb has improved since transfusion.  On 10/10/23 H & H 10.1 & 31.2  Labs as follows:  On 10/5/23 H & H was 5.4 & 16.   On 10/5/23 WBC were 1 and platelets were 111.  On 10/10/23 H & H 10.1 & 31.2.  On 10/10/23 WBC 3.2 and platelets were 224.  Patient denied any s/s bleeding.  He has not had any scopes.  Recommended that patient follow up with GI outpatient.   Patient seen today.  Calm and cooperative.  Follows commands.  Denies chest pain.  No headaches.  No dizziness.  No SOB.       Oropharyngeal cancer; Neck wound due to radiation; pain:  Discharged on dexamethasone 4 mg PO Q AM.   Wound care was consulted in the hospital.  Recommendations to continue home Silvadene for the neck wound.  Swallow evaluation was performed.   Palliative care was consulted for pain control.  Pain was controlled in the hospital.  Discharged on oxycodone PRN and morphine PRN for pain.  No complaints of pain today.   Patient also has malnutrition, dietician was consulted.    Needs routinely follow up with oncology.     Hypokalemia  Potassium was 3.3.  It was likely secondary to chlorthalidone (held) and poor PO intake.  Patient was treated with potassium chloride in the hospital.  He was also started on aldactone.     Benign essential HTN:  Patient's Bps were elevated.  His Losartan was titrated and Aldactone was added (monitor K).  Discharged on Losartan 100 mg PO every day and spironolactone 25 mg PO every day.   Recent /60.   No complaints of chest pain.     CKD stage 2:  His renal function was monitored in the hospital.  His creatinine improved.  Recent renal function on 10/12/23: CR 1.4 ; BUN 37; GFR 50.     COPD  On Duonebs, budesonide, formoterol and tiotropium.   On RA.  No oxygen desaturations reported.     GERD:  On  "pantoprazole.  No complaints of reflux or N/V.    Hyperlipidemia:  On atorvastatin.    Constipation:  On Colace and Miralax.   No complaints of constipation.    Generalized muscle weakness; impaired gait and mobility:  Patient at the [facility] for skilled services/PT/OT.  Very weak, unable to ambulate.                 Reviewed [facility] hospital records from recent hospitalization.  Reviewed  EMR  Reviewed medical, social, surgical and family history.  Reviewed all current medications and performed medication reconciliation.  Performed prescription drug management.  Reviewed vital signs AND lab results  Reviewed [facility] Documentation  Discussed patient with nursing.  Spent greater than 50 minutes on visit.        ROS: 10 point ROS performed; Negative unless noted in HPI.    Social History:  Former cigarette smoker-quit smoking about 7 years ago.  Smoked for 40 pack years.  Alcohol use-12 drinks of alcohol each week.  No illicit drug use.    Surgical History:  CT angio abdomen pelvis.  IR angiogram aorta abdomen.    Family History:  Father-Aortic aneurysm.    RECENT LABS:  BMP: Date: 10/12/2023 Na 137 K 3.9 Cr 1.4 BUN 37 glucose 72 Ca 8.1 GFR 50  CBC: Date: 10/12/2023 WBC 3 Hgb 9.8 hematocrit 29.6 platelets 239  Liver function: Date: 10/12/2023 ALT 9 AST 13 alkaline phos.  51 bilirubin 0.9 albumin 2.8 protein 5           Lab Results   Component Value Date    WBC 4.8 10/22/2023    HGB 7.8 (L) 10/22/2023    HCT 24.9 (L) 10/22/2023     10/22/2023    CHOL 127 05/31/2023    TRIG 136 05/31/2023    HDL 36.1 (A) 05/31/2023    ALT 10 10/22/2023    AST 15 10/22/2023     (L) 10/22/2023    K 4.8 10/22/2023     10/22/2023    CREATININE 1.20 10/22/2023    BUN 13 10/22/2023    CO2 23 10/22/2023    TSH 2.77 05/31/2023    PSA 0.21 05/31/2023    INR 1.1 10/18/2023    HGBA1C 5.1 08/25/2023             /60   Pulse 84   Temp 36.7 °C (98 °F)   Resp 18   Ht 1.702 m (5' 7\")   Wt 54.1 kg (119 " lb 3.2 oz)   SpO2 98%   BMI 18.67 kg/m²      Physical Exam  Vitals and nursing note reviewed.   Constitutional:       General: He is awake.      Appearance: He is underweight. He is ill-appearing.   HENT:      Head: Normocephalic.      Right Ear: External ear normal.      Left Ear: External ear normal.      Nose: Nose normal.      Mouth/Throat:      Mouth: Mucous membranes are moist.      Pharynx: Oropharynx is clear.   Eyes:      Extraocular Movements: Extraocular movements intact.      Conjunctiva/sclera: Conjunctivae normal.      Pupils: Pupils are equal, round, and reactive to light.   Neck:      Comments: Discoloration and erythema to neck from radiation.  Cardiovascular:      Rate and Rhythm: Normal rate and regular rhythm.      Pulses: Normal pulses.      Heart sounds: Normal heart sounds.   Pulmonary:      Effort: Pulmonary effort is normal.      Breath sounds: Normal breath sounds.      Comments: Diminished lung sounds in bilateral bases.   Abdominal:      General: Bowel sounds are normal.      Palpations: Abdomen is soft.   Musculoskeletal:         General: Normal range of motion.      Comments: Generalized muscle weakness.   Skin:     General: Skin is warm and dry.   Neurological:      General: No focal deficit present.      Mental Status: He is alert and oriented to person, place, and time. Mental status is at baseline.      Motor: Weakness present.      Coordination: Coordination abnormal.      Gait: Gait abnormal.   Psychiatric:         Mood and Affect: Mood is depressed. Affect is flat.         Behavior: Behavior normal. Behavior is cooperative.          Assessment/Plan   Ordered CMP and CBC with diff. For tomorrow.    Anemia; Pancytopenia:    Anemia was treated in the hospital, now s/p PRBC.  Monitor CBC.  Monitor H & H.  Monitor for s/s of bleeding.     Oropharyngeal cancer; Neck wound due to radiation; pain:  Continue dexamethasone 4 mg PO Q AM.   Wound care was consulted in the  hospital.  Continue Silvadene for the neck wound.  Swallow evaluation was performed.   Palliative care was consulted for pain control.  Pain was controlled in the hospital.  Discharged on oxycodone PRN and morphine PRN for pain.  Monitor for pain.  Patient also has malnutrition, dietician was consulted in the hospital.   Follow up with oncologist.  Follow up with in house dietician at the [place].    Hypokalemia:  Treated in the hospital.  Monitor potassium level.     Benign essential HTN:  Monitor Bps.  Continue Losartan 100 mg PO every day and spironolactone 25 mg PO every day.      CKD:  Monitor renal function.  Encourage PO fluids.     COPD  Continue Duonebs, budesonide, formoterol and tiotropium.   Monitor oxygen saturations.    GERD:  Continue pantoprazole.  Monitor for reflux.    Hyperlipidemia:  Continue atorvastatin.    Constipation:  Continue Colace and Miralax.   Monitor for constipation.    Generalized muscle weakness; impaired gait and mobility:  Continue at the [facility] for skilled services/PT/OT.      Problem List Items Addressed This Visit          Cardiac and Vasculature    Benign essential hypertension    Hyperlipidemia       Genitourinary and Reproductive    Hypokalemia       Hematology and Neoplasia    Oropharyngeal cancer (CMS/HCC)    Pancytopenia (CMS/HCC)       Pulmonary and Pneumonias    Chronic obstructive pulmonary disease (CMS/HCC)     Other Visit Diagnoses       Anemia, unspecified type    -  Primary    Open neck wound, sequela        Pain        Stage 2 chronic kidney disease        Gastroesophageal reflux disease, unspecified whether esophagitis present        Constipation, unspecified constipation type        Generalized muscle weakness        Impaired gait and mobility                OSMIN Harris       Electronically Signed By: OSMIN Harris   10/22/23  9:21 PM

## 2023-10-18 ENCOUNTER — NUTRITION (OUTPATIENT)
Dept: HEMATOLOGY/ONCOLOGY | Facility: HOSPITAL | Age: 73
End: 2023-10-18
Payer: MEDICARE

## 2023-10-18 ENCOUNTER — SOCIAL WORK (OUTPATIENT)
Dept: CASE MANAGEMENT | Facility: HOSPITAL | Age: 73
End: 2023-10-18
Payer: MEDICARE

## 2023-10-18 ENCOUNTER — HOSPITAL ENCOUNTER (INPATIENT)
Facility: HOSPITAL | Age: 73
LOS: 15 days | Discharge: NURSING, REHAB OR LONG-TERM CARE | DRG: 917 | End: 2023-11-02
Attending: EMERGENCY MEDICINE | Admitting: INTERNAL MEDICINE
Payer: MEDICARE

## 2023-10-18 ENCOUNTER — OFFICE VISIT (OUTPATIENT)
Dept: HEMATOLOGY/ONCOLOGY | Facility: HOSPITAL | Age: 73
DRG: 917 | End: 2023-10-18
Payer: MEDICARE

## 2023-10-18 ENCOUNTER — LAB (OUTPATIENT)
Dept: LAB | Facility: HOSPITAL | Age: 73
DRG: 917 | End: 2023-10-18
Payer: MEDICARE

## 2023-10-18 ENCOUNTER — APPOINTMENT (OUTPATIENT)
Dept: RADIOLOGY | Facility: HOSPITAL | Age: 73
DRG: 917 | End: 2023-10-18
Payer: MEDICARE

## 2023-10-18 ENCOUNTER — APPOINTMENT (OUTPATIENT)
Dept: RADIATION ONCOLOGY | Facility: HOSPITAL | Age: 73
End: 2023-10-18
Payer: MEDICARE

## 2023-10-18 ENCOUNTER — APPOINTMENT (OUTPATIENT)
Dept: HEMATOLOGY/ONCOLOGY | Facility: HOSPITAL | Age: 73
End: 2023-10-18
Payer: MEDICARE

## 2023-10-18 VITALS
SYSTOLIC BLOOD PRESSURE: 119 MMHG | OXYGEN SATURATION: 78 % | TEMPERATURE: 96.6 F | DIASTOLIC BLOOD PRESSURE: 66 MMHG | HEART RATE: 113 BPM

## 2023-10-18 DIAGNOSIS — C10.9 OROPHARYNGEAL CANCER (MULTI): Primary | ICD-10-CM

## 2023-10-18 DIAGNOSIS — C10.9 SQUAMOUS CELL CARCINOMA OF OROPHARYNX (MULTI): ICD-10-CM

## 2023-10-18 DIAGNOSIS — N17.9 AKI (ACUTE KIDNEY INJURY) (CMS-HCC): ICD-10-CM

## 2023-10-18 DIAGNOSIS — R19.7 DIARRHEA DUE TO MALABSORPTION (HHS-HCC): ICD-10-CM

## 2023-10-18 DIAGNOSIS — Z74.09 IMPAIRED MOBILITY AND ADLS: ICD-10-CM

## 2023-10-18 DIAGNOSIS — C61 PROSTATE CANCER (MULTI): ICD-10-CM

## 2023-10-18 DIAGNOSIS — C10.9 OROPHARYNGEAL CANCER (MULTI): ICD-10-CM

## 2023-10-18 DIAGNOSIS — M79.605 PAIN IN BOTH LOWER EXTREMITIES: ICD-10-CM

## 2023-10-18 DIAGNOSIS — R22.1 NECK MASS: ICD-10-CM

## 2023-10-18 DIAGNOSIS — M79.604 PAIN IN BOTH LOWER EXTREMITIES: ICD-10-CM

## 2023-10-18 DIAGNOSIS — R52 PAIN AFTER RADIATION THERAPY: ICD-10-CM

## 2023-10-18 DIAGNOSIS — Z78.9 IMPAIRED MOBILITY AND ADLS: ICD-10-CM

## 2023-10-18 DIAGNOSIS — R33.9 URINARY RETENTION: ICD-10-CM

## 2023-10-18 DIAGNOSIS — K90.9 DIARRHEA DUE TO MALABSORPTION (HHS-HCC): ICD-10-CM

## 2023-10-18 DIAGNOSIS — T40.2X1A OPIOID OVERDOSE, ACCIDENTAL OR UNINTENTIONAL, INITIAL ENCOUNTER (MULTI): ICD-10-CM
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
## 2023-10-18 DIAGNOSIS — I10 BENIGN ESSENTIAL HYPERTENSION: ICD-10-CM

## 2023-10-18 DIAGNOSIS — I95.89 HYPOTENSION DUE TO HYPOVOLEMIA: ICD-10-CM

## 2023-10-18 DIAGNOSIS — R40.0 DROWSINESS: ICD-10-CM

## 2023-10-18 DIAGNOSIS — E43 SEVERE PROTEIN-CALORIE MALNUTRITION (MULTI): ICD-10-CM

## 2023-10-18 DIAGNOSIS — E46 PROTEIN-CALORIE MALNUTRITION, UNSPECIFIED SEVERITY (MULTI): Primary | ICD-10-CM

## 2023-10-18 DIAGNOSIS — R60.0 LOCALIZED EDEMA: ICD-10-CM

## 2023-10-18 DIAGNOSIS — R09.02 HYPOXIA: ICD-10-CM

## 2023-10-18 DIAGNOSIS — E86.1 HYPOTENSION DUE TO HYPOVOLEMIA: ICD-10-CM

## 2023-10-18 DIAGNOSIS — R53.1 GENERAL WEAKNESS: ICD-10-CM

## 2023-10-18 DIAGNOSIS — R57.1 HYPOVOLEMIC SHOCK (MULTI): ICD-10-CM

## 2023-10-18 LAB
ALBUMIN SERPL BCP-MCNC: 2.8 G/DL (ref 3.4–5)
ALP SERPL-CCNC: 56 U/L (ref 33–136)
ALT SERPL W P-5'-P-CCNC: 7 U/L (ref 10–52)
ANION GAP BLDA CALCULATED.4IONS-SCNC: 11 MMO/L (ref 10–25)
ANION GAP BLDV CALCULATED.4IONS-SCNC: 11 MMOL/L (ref 10–25)
ANION GAP BLDV CALCULATED.4IONS-SCNC: 13 MMOL/L (ref 10–25)
ANION GAP SERPL CALC-SCNC: 16 MMOL/L (ref 10–20)
APTT PPP: 30 SECONDS (ref 27–38)
AST SERPL W P-5'-P-CCNC: 11 U/L (ref 9–39)
BASE EXCESS BLDA CALC-SCNC: 3.8 MMOL/L (ref -2–3)
BASE EXCESS BLDV CALC-SCNC: -0.8 MMOL/L (ref -2–3)
BASE EXCESS BLDV CALC-SCNC: 1.3 MMOL/L (ref -2–3)
BASOPHILS # BLD AUTO: 0.02 X10*3/UL (ref 0–0.1)
BASOPHILS NFR BLD AUTO: 0.3 %
BILIRUB SERPL-MCNC: 0.7 MG/DL (ref 0–1.2)
BNP SERPL-MCNC: 47 PG/ML (ref 0–99)
BODY TEMPERATURE: 37 DEGREES CELSIUS
BUN SERPL-MCNC: 88 MG/DL (ref 6–23)
CA-I BLDA-SCNC: 1.19 MMOL/L (ref 1.1–1.33)
CA-I BLDV-SCNC: 1 MMOL/L (ref 1.1–1.33)
CA-I BLDV-SCNC: 1.13 MMOL/L (ref 1.1–1.33)
CALCIUM SERPL-MCNC: 8.5 MG/DL (ref 8.6–10.6)
CARDIAC TROPONIN I PNL SERPL HS: 104 NG/L (ref 0–53)
CARDIAC TROPONIN I PNL SERPL HS: 108 NG/L (ref 0–53)
CHLORIDE BLDA-SCNC: 101 MMOL/L (ref 98–107)
CHLORIDE BLDV-SCNC: 105 MMOL/L (ref 98–107)
CHLORIDE BLDV-SCNC: 99 MMOL/L (ref 98–107)
CHLORIDE SERPL-SCNC: 99 MMOL/L (ref 98–107)
CO2 SERPL-SCNC: 27 MMOL/L (ref 21–32)
CREAT SERPL-MCNC: 2.52 MG/DL (ref 0.5–1.3)
EOSINOPHIL # BLD AUTO: 0.04 X10*3/UL (ref 0–0.4)
EOSINOPHIL NFR BLD AUTO: 0.6 %
ERYTHROCYTE [DISTWIDTH] IN BLOOD BY AUTOMATED COUNT: 16.7 % (ref 11.5–14.5)
GFR SERPL CREATININE-BSD FRML MDRD: 27 ML/MIN/1.73M*2
GLUCOSE BLDA-MCNC: 94 MG/DL (ref 74–99)
GLUCOSE BLDV-MCNC: 109 MG/DL (ref 74–99)
GLUCOSE BLDV-MCNC: 139 MG/DL (ref 74–99)
GLUCOSE SERPL-MCNC: 132 MG/DL (ref 74–99)
HCO3 BLDA-SCNC: 27.4 MMOL/L (ref 22–26)
HCO3 BLDV-SCNC: 24.8 MMOL/L (ref 22–26)
HCO3 BLDV-SCNC: 27.8 MMOL/L (ref 22–26)
HCT VFR BLD AUTO: 22.4 % (ref 41–52)
HCT VFR BLD EST: 19 % (ref 41–52)
HCT VFR BLD EST: 21 % (ref 41–52)
HCT VFR BLD EST: 24 % (ref 41–52)
HGB BLD-MCNC: 7.6 G/DL (ref 13.5–17.5)
HGB BLDA-MCNC: 6.9 G/DL (ref 13.5–17.5)
HGB BLDV-MCNC: 6.2 G/DL (ref 13.5–17.5)
HGB BLDV-MCNC: 8 G/DL (ref 13.5–17.5)
IMM GRANULOCYTES # BLD AUTO: 0.05 X10*3/UL (ref 0–0.5)
IMM GRANULOCYTES NFR BLD AUTO: 0.7 % (ref 0–0.9)
INHALED O2 CONCENTRATION: 100 %
INR PPP: 1.1 (ref 0.9–1.1)
LACTATE BLDA-SCNC: 0.8 MMOL/L (ref 0.4–2)
LACTATE BLDV-SCNC: 3.5 MMOL/L (ref 0.4–2)
LACTATE BLDV-SCNC: 4.7 MMOL/L (ref 0.4–2)
LYMPHOCYTES # BLD AUTO: 0.62 X10*3/UL (ref 0.8–3)
LYMPHOCYTES NFR BLD AUTO: 9 %
MAGNESIUM SERPL-MCNC: 2.25 MG/DL (ref 1.6–2.4)
MCH RBC QN AUTO: 29.2 PG (ref 26–34)
MCHC RBC AUTO-ENTMCNC: 33.9 G/DL (ref 32–36)
MCV RBC AUTO: 86 FL (ref 80–100)
MONOCYTES # BLD AUTO: 0.69 X10*3/UL (ref 0.05–0.8)
MONOCYTES NFR BLD AUTO: 10 %
NEUTROPHILS # BLD AUTO: 5.49 X10*3/UL (ref 1.6–5.5)
NEUTROPHILS NFR BLD AUTO: 79.4 %
NRBC BLD-RTO: 0 /100 WBCS (ref 0–0)
OXYHGB MFR BLDA: 97.1 % (ref 94–98)
OXYHGB MFR BLDV: 21 % (ref 45–75)
OXYHGB MFR BLDV: 29.2 % (ref 45–75)
PCO2 BLDA: 36 MM HG (ref 38–42)
PCO2 BLDV: 45 MM HG (ref 41–51)
PCO2 BLDV: 54 MM HG (ref 41–51)
PH BLDA: 7.49 PH (ref 7.38–7.42)
PH BLDV: 7.32 PH (ref 7.33–7.43)
PH BLDV: 7.35 PH (ref 7.33–7.43)
PLATELET # BLD AUTO: 516 X10*3/UL (ref 150–450)
PMV BLD AUTO: 9.8 FL (ref 7.5–11.5)
PO2 BLDA: 129 MM HG (ref 85–95)
PO2 BLDV: 23 MM HG (ref 35–45)
PO2 BLDV: 26 MM HG (ref 35–45)
POTASSIUM BLDA-SCNC: 4 MMOL/L (ref 3.5–5.3)
POTASSIUM BLDV-SCNC: 4.1 MMOL/L (ref 3.5–5.3)
POTASSIUM BLDV-SCNC: 5.7 MMOL/L (ref 3.5–5.3)
POTASSIUM SERPL-SCNC: 3.9 MMOL/L (ref 3.5–5.3)
PROT SERPL-MCNC: 5.5 G/DL (ref 6.4–8.2)
PROTHROMBIN TIME: 12.8 SECONDS (ref 9.8–12.8)
RBC # BLD AUTO: 2.6 X10*6/UL (ref 4.5–5.9)
RBC MORPH BLD: NORMAL
SAO2 % BLDA: 99 % (ref 94–100)
SAO2 % BLDV: 21 % (ref 45–75)
SAO2 % BLDV: 30 % (ref 45–75)
SARS-COV-2 RNA RESP QL NAA+PROBE: NOT DETECTED
SODIUM BLDA-SCNC: 135 MMOL/L (ref 136–145)
SODIUM BLDV-SCNC: 135 MMOL/L (ref 136–145)
SODIUM BLDV-SCNC: 136 MMOL/L (ref 136–145)
SODIUM SERPL-SCNC: 138 MMOL/L (ref 136–145)
WBC # BLD AUTO: 6.9 X10*3/UL (ref 4.4–11.3)

## 2023-10-18 PROCEDURE — 99285 EMERGENCY DEPT VISIT HI MDM: CPT | Performed by: EMERGENCY MEDICINE

## 2023-10-18 PROCEDURE — 85730 THROMBOPLASTIN TIME PARTIAL: CPT | Mod: CMCLAB | Performed by: EMERGENCY MEDICINE

## 2023-10-18 PROCEDURE — 1210000001 HC SEMI-PRIVATE ROOM DAILY

## 2023-10-18 PROCEDURE — 71045 X-RAY EXAM CHEST 1 VIEW: CPT | Mod: FY

## 2023-10-18 PROCEDURE — 99215 OFFICE O/P EST HI 40 MIN: CPT | Performed by: STUDENT IN AN ORGANIZED HEALTH CARE EDUCATION/TRAINING PROGRAM

## 2023-10-18 PROCEDURE — 84484 ASSAY OF TROPONIN QUANT: CPT | Performed by: STUDENT IN AN ORGANIZED HEALTH CARE EDUCATION/TRAINING PROGRAM

## 2023-10-18 PROCEDURE — 1160F RVW MEDS BY RX/DR IN RCRD: CPT | Performed by: STUDENT IN AN ORGANIZED HEALTH CARE EDUCATION/TRAINING PROGRAM

## 2023-10-18 PROCEDURE — 84484 ASSAY OF TROPONIN QUANT: CPT | Performed by: EMERGENCY MEDICINE

## 2023-10-18 PROCEDURE — 83605 ASSAY OF LACTIC ACID: CPT

## 2023-10-18 PROCEDURE — 2500000004 HC RX 250 GENERAL PHARMACY W/ HCPCS (ALT 636 FOR OP/ED): Performed by: STUDENT IN AN ORGANIZED HEALTH CARE EDUCATION/TRAINING PROGRAM

## 2023-10-18 PROCEDURE — 71045 X-RAY EXAM CHEST 1 VIEW: CPT | Performed by: STUDENT IN AN ORGANIZED HEALTH CARE EDUCATION/TRAINING PROGRAM

## 2023-10-18 PROCEDURE — G2212 PROLONG OUTPT/OFFICE VIS: HCPCS | Performed by: STUDENT IN AN ORGANIZED HEALTH CARE EDUCATION/TRAINING PROGRAM

## 2023-10-18 PROCEDURE — 96360 HYDRATION IV INFUSION INIT: CPT | Mod: 59

## 2023-10-18 PROCEDURE — 83605 ASSAY OF LACTIC ACID: CPT | Mod: CMCLAB

## 2023-10-18 PROCEDURE — 80053 COMPREHEN METABOLIC PANEL: CPT | Mod: CMCLAB | Performed by: EMERGENCY MEDICINE

## 2023-10-18 PROCEDURE — 1036F TOBACCO NON-USER: CPT | Performed by: STUDENT IN AN ORGANIZED HEALTH CARE EDUCATION/TRAINING PROGRAM

## 2023-10-18 PROCEDURE — 83735 ASSAY OF MAGNESIUM: CPT | Mod: CMCLAB | Performed by: EMERGENCY MEDICINE

## 2023-10-18 PROCEDURE — 84295 ASSAY OF SERUM SODIUM: CPT | Mod: CMCLAB

## 2023-10-18 PROCEDURE — 93010 ELECTROCARDIOGRAM REPORT: CPT | Performed by: EMERGENCY MEDICINE

## 2023-10-18 PROCEDURE — 83880 ASSAY OF NATRIURETIC PEPTIDE: CPT | Mod: CMCLAB | Performed by: EMERGENCY MEDICINE

## 2023-10-18 PROCEDURE — 85610 PROTHROMBIN TIME: CPT | Mod: CMCLAB | Performed by: EMERGENCY MEDICINE

## 2023-10-18 PROCEDURE — 51702 INSERT TEMP BLADDER CATH: CPT

## 2023-10-18 PROCEDURE — 83605 ASSAY OF LACTIC ACID: CPT | Mod: CMCLAB | Performed by: EMERGENCY MEDICINE

## 2023-10-18 PROCEDURE — 87075 CULTR BACTERIA EXCEPT BLOOD: CPT | Performed by: STUDENT IN AN ORGANIZED HEALTH CARE EDUCATION/TRAINING PROGRAM

## 2023-10-18 PROCEDURE — 99417 PROLNG OP E/M EACH 15 MIN: CPT | Performed by: STUDENT IN AN ORGANIZED HEALTH CARE EDUCATION/TRAINING PROGRAM

## 2023-10-18 PROCEDURE — 2500000004 HC RX 250 GENERAL PHARMACY W/ HCPCS (ALT 636 FOR OP/ED): Performed by: EMERGENCY MEDICINE

## 2023-10-18 PROCEDURE — 3074F SYST BP LT 130 MM HG: CPT | Performed by: STUDENT IN AN ORGANIZED HEALTH CARE EDUCATION/TRAINING PROGRAM

## 2023-10-18 PROCEDURE — 2500000001 HC RX 250 WO HCPCS SELF ADMINISTERED DRUGS (ALT 637 FOR MEDICARE OP): Performed by: STUDENT IN AN ORGANIZED HEALTH CARE EDUCATION/TRAINING PROGRAM

## 2023-10-18 PROCEDURE — 85025 COMPLETE CBC W/AUTO DIFF WBC: CPT | Mod: CMCLAB | Performed by: EMERGENCY MEDICINE

## 2023-10-18 PROCEDURE — 1159F MED LIST DOCD IN RCRD: CPT | Performed by: STUDENT IN AN ORGANIZED HEALTH CARE EDUCATION/TRAINING PROGRAM

## 2023-10-18 PROCEDURE — 3078F DIAST BP <80 MM HG: CPT | Performed by: STUDENT IN AN ORGANIZED HEALTH CARE EDUCATION/TRAINING PROGRAM

## 2023-10-18 PROCEDURE — 1126F AMNT PAIN NOTED NONE PRSNT: CPT | Performed by: STUDENT IN AN ORGANIZED HEALTH CARE EDUCATION/TRAINING PROGRAM

## 2023-10-18 PROCEDURE — 87635 SARS-COV-2 COVID-19 AMP PRB: CPT | Mod: CMCLAB | Performed by: EMERGENCY MEDICINE

## 2023-10-18 PROCEDURE — 36415 COLL VENOUS BLD VENIPUNCTURE: CPT | Mod: CMCLAB | Performed by: EMERGENCY MEDICINE

## 2023-10-18 PROCEDURE — 2500000001 HC RX 250 WO HCPCS SELF ADMINISTERED DRUGS (ALT 637 FOR MEDICARE OP)

## 2023-10-18 RX ORDER — CHLORTHALIDONE 25 MG/1
TABLET ORAL DAILY
COMMUNITY
End: 2023-10-19 | Stop reason: SINTOL

## 2023-10-18 RX ORDER — ACETAMINOPHEN 325 MG/1
650 TABLET ORAL EVERY 6 HOURS PRN
Status: DISCONTINUED | OUTPATIENT
Start: 2023-10-18 | End: 2023-11-02 | Stop reason: HOSPADM

## 2023-10-18 RX ORDER — ONDANSETRON HYDROCHLORIDE 8 MG/1
8 TABLET, FILM COATED ORAL EVERY 8 HOURS PRN
Status: DISCONTINUED | OUTPATIENT
Start: 2023-10-18 | End: 2023-11-02 | Stop reason: HOSPADM

## 2023-10-18 RX ORDER — SODIUM CHLORIDE, SODIUM LACTATE, POTASSIUM CHLORIDE, CALCIUM CHLORIDE 600; 310; 30; 20 MG/100ML; MG/100ML; MG/100ML; MG/100ML
500 INJECTION, SOLUTION INTRAVENOUS CONTINUOUS
Status: ACTIVE | OUTPATIENT
Start: 2023-10-18 | End: 2023-10-18

## 2023-10-18 RX ORDER — BUDESONIDE 0.5 MG/2ML
1 INHALANT ORAL
Status: DISCONTINUED | OUTPATIENT
Start: 2023-10-18 | End: 2023-10-29

## 2023-10-18 RX ORDER — OXYCODONE HYDROCHLORIDE 5 MG/1
5 TABLET ORAL EVERY 6 HOURS PRN
Status: DISCONTINUED | OUTPATIENT
Start: 2023-10-18 | End: 2023-10-19 | Stop reason: SDUPTHER

## 2023-10-18 RX ORDER — CARVEDILOL 25 MG/1
TABLET ORAL
COMMUNITY
End: 2023-10-19 | Stop reason: ALTCHOICE

## 2023-10-18 RX ORDER — FLUTICASONE FUROATE AND VILANTEROL 100; 25 UG/1; UG/1
1 POWDER RESPIRATORY (INHALATION)
Status: DISCONTINUED | OUTPATIENT
Start: 2023-10-19 | End: 2023-11-02 | Stop reason: HOSPADM

## 2023-10-18 RX ORDER — IPRATROPIUM BROMIDE AND ALBUTEROL SULFATE 2.5; .5 MG/3ML; MG/3ML
3 SOLUTION RESPIRATORY (INHALATION) 3 TIMES DAILY PRN
Status: DISCONTINUED | OUTPATIENT
Start: 2023-10-18 | End: 2023-11-02 | Stop reason: HOSPADM

## 2023-10-18 RX ORDER — LIDOCAINE HYDROCHLORIDE 20 MG/ML
JELLY TOPICAL
Status: COMPLETED
Start: 2023-10-18 | End: 2023-10-18

## 2023-10-18 RX ORDER — SILVER SULFADIAZINE 10 G/1000G
CREAM TOPICAL DAILY
Status: DISCONTINUED | OUTPATIENT
Start: 2023-10-19 | End: 2023-11-02 | Stop reason: HOSPADM

## 2023-10-18 RX ORDER — VANCOMYCIN HYDROCHLORIDE 1 G/200ML
1000 INJECTION, SOLUTION INTRAVENOUS ONCE
Status: COMPLETED | OUTPATIENT
Start: 2023-10-18 | End: 2023-10-18

## 2023-10-18 RX ORDER — ASPIRIN 81 MG/1
81 TABLET ORAL DAILY
Status: DISCONTINUED | OUTPATIENT
Start: 2023-10-19 | End: 2023-10-22

## 2023-10-18 RX ORDER — LIDOCAINE HYDROCHLORIDE 20 MG/ML
1 JELLY TOPICAL ONCE
Status: COMPLETED | OUTPATIENT
Start: 2023-10-18 | End: 2023-10-18

## 2023-10-18 RX ADMIN — OXYCODONE HYDROCHLORIDE 5 MG: 5 TABLET ORAL at 21:12

## 2023-10-18 RX ADMIN — LIDOCAINE HYDROCHLORIDE 1 APPLICATION: 20 JELLY TOPICAL at 23:16

## 2023-10-18 RX ADMIN — SODIUM CHLORIDE, POTASSIUM CHLORIDE, SODIUM LACTATE AND CALCIUM CHLORIDE 1000 ML: 600; 310; 30; 20 INJECTION, SOLUTION INTRAVENOUS at 15:03

## 2023-10-18 RX ADMIN — SODIUM CHLORIDE, POTASSIUM CHLORIDE, SODIUM LACTATE AND CALCIUM CHLORIDE 500 ML/HR: 600; 310; 30; 20 INJECTION, SOLUTION INTRAVENOUS at 20:53

## 2023-10-18 RX ADMIN — VANCOMYCIN HYDROCHLORIDE 1000 MG: 1 INJECTION, SOLUTION INTRAVENOUS at 21:49

## 2023-10-18 RX ADMIN — AMPICILLIN SODIUM AND SULBACTAM SODIUM 3 G: 2; 1 INJECTION, POWDER, FOR SOLUTION INTRAMUSCULAR; INTRAVENOUS at 20:54

## 2023-10-18 ASSESSMENT — LIFESTYLE VARIABLES
HAVE YOU EVER FELT YOU SHOULD CUT DOWN ON YOUR DRINKING: NO
REASON UNABLE TO ASSESS: NO
HAVE PEOPLE ANNOYED YOU BY CRITICIZING YOUR DRINKING: NO
EVER FELT BAD OR GUILTY ABOUT YOUR DRINKING: NO
EVER HAD A DRINK FIRST THING IN THE MORNING TO STEADY YOUR NERVES TO GET RID OF A HANGOVER: NO

## 2023-10-18 ASSESSMENT — PAIN - FUNCTIONAL ASSESSMENT: PAIN_FUNCTIONAL_ASSESSMENT: WONG-BAKER FACES

## 2023-10-18 ASSESSMENT — PAIN SCALES - WONG BAKER
WONGBAKER_NUMERICALRESPONSE: HURTS LITTLE MORE
WONGBAKER_NUMERICALRESPONSE: HURTS WHOLE LOT

## 2023-10-18 ASSESSMENT — PAIN SCALES - GENERAL: PAINLEVEL: 0-NO PAIN

## 2023-10-18 NOTE — PROGRESS NOTES
NUTRITION Follow-up NOTE  Reason for Visit:  Phong Wong is a 73 y.o. male who presents for follow up with med onc and RT.  His spouse is with him  I am told he is residing at the [facility] [place] phone [phone]  He was last seen on 10-4-2023- he continues with poor po intake   Patient was unable to stand for weight today- he appears very Fatigued- was not awake for conversation,   He is in a wheel chair,    Lab Results   Component Value Date/Time    GLUCOSE 132 (H) 10/18/2023 1513     10/18/2023 1513    K 3.9 10/18/2023 1513    CL 99 10/18/2023 1513    CO2 27 10/18/2023 1513    ANIONGAP 16 10/18/2023 1513    BUN 88 (H) 10/18/2023 1513    CREATININE 2.52 (H) 10/18/2023 1513    EGFR 27 (L) 10/18/2023 1513    CALCIUM 8.5 (L) 10/18/2023 1513    ALBUMIN 2.8 (L) 10/18/2023 1513    ALKPHOS 56 10/18/2023 1513    PROT 5.5 (L) 10/18/2023 1513    AST 11 10/18/2023 1513    BILITOT 0.7 10/18/2023 1513    ALT 7 (L) 10/18/2023 1513     Lab Results   Component Value Date/Time    VITD25 53 05/31/2023 1210   The above labs werenot available to me at the time of my meeting with the patient.  Also it should be noted after I met with patient a code blue was called in the lab and he was admitted     Anthropometrics:       Was able to stand for a weight today. It is suspected that weight loss continues. Based on po intake and how patient presents    Wt Readings from Last 10 Encounters:   10/18/23 54 kg (119 lb 0.8 oz)   10/10/23 54 kg (119 lb 0.8 oz)   10/04/23 59.4 kg (130 lb 15.3 oz)   10/02/23 61.5 kg (135 lb 9.3 oz)   08/25/23 66.7 kg (147 lb)   05/25/23 71.7 kg (158 lb)   09/30/22 71.3 kg (157 lb 2 oz)   08/30/22 66.2 kg (146 lb)   06/27/22 68.5 kg (151 lb)   06/27/22 68.9 kg (152 lb 0.1 oz)        Food And Nutrient Intake:  Food and Nutrient History  Food and Nutrient History: rehab serving foodhe doesnot like  Energy Intake: Poor < 50 %  GI Symptoms: anorexia, vomiting (yesterday vomited)  Oral Problems: chewing  difficulty, dysgeusia (not in pain anymore,but wasmoaning)     At Facility- taking mechanical soft diet- minced and moist MM5 permitted thin liquids    At facility is ordered Boost VHC TID                                                 Food Supplement Intake  Oral Nutrition Supplements:  (last1.5 weks ate less food so is drinking So now drinking 1 boost VHC)           Nutrition Focused Physical Exam:  Subcutaneous Fat Loss  Orbital Fat Pads: Severe (dark circles, hollowing and loose skin)  Buccal Fat Pads: Severe (hollow, sunken and narrow face)  Muscle Wasting  Temporalis: Severe (hollowed scooping depression)  Pectoralis (Clavicular Region): Severe (protruding prominent clavicle)  Quadriceps: Severe (depressions on inner and outer thigh)  Edema  Edema: none        Energy Needs     1620 calories (30 sydnee/kg on 54 kg)   Due to current labs will place protein needs at 54 g  1620 ml of free water       Diagnosis   Malnutrition Diagnosis  Patient has Malnutrition Diagnosis: Yes  Diagnosis Status: Ongoing  Malnutrition Diagnosis: Severe malnutrition related to starvation  As Evidenced by: severe fat and muscle loos with potential further weight loss and continued poor po intake  Nutrition Diagnosis  Patient has Nutrition Diagnosis: Yes  Diagnosis Status (1): Ongoing  Nutrition Diagnosis 1: Biting/chewing (masticatory) difficility    Interventions/Recommendations   Food and Nutrition Delivery  Enteral Nutrition: Insert enteral feeding tube        There are no Patient Instructions on file for this visit.    Monitoring and Evaluation   Food/Nutrient Related History Monitoring  Monitoring and Evaluation Plan: Enteral and parenteral nutrition intake (at this time felt patient needed enteral nutrtion support- I felt a NG would be a potential choice.  Spouse wanted to give it until next week to make this decision as she felt he is turning the corner.  She understood the reasopning for the intervention)

## 2023-10-18 NOTE — ED PROVIDER NOTES
"HPI   Chief Complaint   Patient presents with    Altered Mental Status     Lethargic on arrival to appointment         73-year-old male with a history of chemo-induced pancytopenia, head and neck cancer just finishing focal beam radiation not currently on active chemo cycle, chronic COPD not on home oxygen presenting to the emergency department as CODE BLUE from [facility].  Ancillary history was obtained from the patient's partner, as well as [facility] clinical staff who \"present with the patient.  According to staff patient is currently at a rehab facility or nursing home, he over the past 3 days has received 100 mg of morphine p.o. daily for the past 3 days last dose at 1 in the morning, noted to be somnolent at a follow-up appointment today with diminished respiratory effort and rate, hypoxic and CODE BLUE was activated.  On arrival patient is able to provide his own surrogate history stating that he has been in usual state of health awake alert and oriented x3 denies any falls injuries or trauma no vomiting or diarrhea.  He endorses poor oral intake due to neck discomfort from his radiation denying any vomiting.  No headache no vision disturbance.  He denies any chest pain or trouble breathing.                  Noa Coma Scale Score: 14                  Patient History   Past Medical History:   Diagnosis Date    Personal history of diseases of the blood and blood-forming organs and certain disorders involving the immune mechanism 07/27/2017    History of thrombocytosis     Past Surgical History:   Procedure Laterality Date    CT ABDOMEN PELVIS ANGIOGRAM W AND/OR WO IV CONTRAST  9/3/2014    CT ABDOMEN PELVIS ANGIOGRAM W AND/OR WO IV CONTRAST 9/3/2014 [facility]    IR ANGIOGRAM AORTA ABDOMEN  11/6/2014    IR ANGIOGRAM AORTA ABDOMEN 11/6/2014 [facility]IB LEGAC[facility]     Family History   Problem Relation Name Age of Onset    Aortic aneurysm Father          abdominal     Social History     Tobacco Use    Smoking " status: Former     Packs/day: 1.00     Years: 40.00     Additional pack years: 0.00     Total pack years: 40.00     Types: Cigarettes     Quit date: 2016     Years since quittin.8    Smokeless tobacco: Never   Substance Use Topics    Alcohol use: Yes     Alcohol/week: 12.0 standard drinks of alcohol     Types: 12 Cans of beer per week    Drug use: Never       Physical Exam   ED Triage Vitals   Temp Heart Rate Resp BP   10/18/23 1450 10/18/23 1440 10/18/23 1440 10/18/23 1440   35.5 °C (95.9 °F) 68 26 79/58      SpO2 Temp Source Heart Rate Source Patient Position   10/18/23 1440 10/18/23 1450 10/18/23 1440 --   (!) 86 % Tympanic Monitor       BP Location FiO2 (%)     10/18/23 1440 10/18/23 1450     Right arm 100 %       Physical Exam  Constitutional:       General: He is not in acute distress.     Comments: Chronically ill-appearing, thin cachectic   HENT:      Head: Normocephalic and atraumatic.      Mouth/Throat:      Comments: Dry mm  Eyes:      Extraocular Movements: Extraocular movements intact.      Comments: Meiotic, sym pupils   Neck:      Comments: Radiation burns to ant and b/l neck, no overlying erythema/induration, no weeping  Pulmonary:      Effort: Pulmonary effort is normal.      Breath sounds: Normal breath sounds.      Comments: Diminished b/l  Chest:      Chest wall: No mass or lacerations.   Abdominal:      Palpations: Abdomen is soft.      Comments: Mild distension, no ttp, no fluid wave   Musculoskeletal:         General: No swelling. Normal range of motion.      Cervical back: Neck supple.   Neurological:      Mental Status: He is oriented to person, place, and time. He is lethargic.      Comments: Awake, slightly sleepy         ED Course & MDM   Diagnoses as of 10/18/23 1646   Hypovolemic shock (CMS/[facility])   Opioid overdose, accidental or unintentional, initial encounter (CMS/[facility])   General weakness   HECTOR (acute kidney injury) (CMS/[facility])       Medical Decision Making  EKG interpreted  independently by me revealing normal sinus rhythm with rate of 90, R axis deviation noted, no ST or T wave segment changes concerning for ischemia normal R wave progression    -Patient presenting as CODE BLUE, now awake.  I confirmed that the patient's current mental state is actually improved come paired to when he was seen and evaluated in the clinic prompting CODE BLUE activation.  The concern was for patient having overdosed on morphine given the escalated dose that he recently started 3 days ago, the miotic pupils, describe diminished respiratory effort and depth would suggest that the patient may have had a component of overdose prompting his hypoxia.  He is certainly a lot more awake currently, he has normal rate, depth of his respiratory function he denies any chest pain or trouble breathing.  He was placed on pulse oximetry and capnography we will continue to monitor.  We will hold on Narcan at this point but treat if the patient declines  -EKG was reassuring nonischemic  -According to patient and significant other he has not had any melena or hematochezia no bleeding events I doubt critical anemia, we will check electrolytes, blood counts.  Patient does have a known history of pancytopenia we will transfuse as indicated.  -Patient has no chest pain or trouble breathing he is awake he is oriented he is able to give me a good history, there is no obvious RV strain on EKG, and I doubt pulmonary embolism.  Currently patient saturation in the room on low supplemental oxygen is actually markedly improved.  -Patient was hypotensive on arrival dry tongue, poor skin turgor poor oral intake as endorsed by him and his treatment team concerning for dehydration, the patient was bolused 500 mg LR with improvement.  No fever no sign of any infection, his neck demonstrates skin wounds consistent with his radiation treatment but there is no sign of superinfection.  No concern for infection at this time, we will monitor the  patient closely currently his hypotension seems best explained by hypovolemia in the setting of poor oral intake.    UPDATE  -Patient received 1.5 L of LR with marked improvement of blood pressure, 110s systolic, the patient is mentating slightly better.  -Labs were obtained, reviewed independently, significant for acute on chronic anemia, hemoglobin 7.6, the patient is not having any melena or hematochezia, soft abdomen no falls injuries or trauma I have no concern for blood loss.  More likely this is secondary to chronic disease with a known history of recurrent pancytopenia.  Not on active chemo cycle currently.  CBC otherwise notable for thrombocytosis  -Lytes w an acute renal insufficiency, appears prerenal, hydrating but furthers my suspicion that his hypotensive event was more secondary to hypovolemia.  -Given the patient's renal failure, we will admit him for hydration, observation    Procedure  Procedures     Richy Kelly, DO  10/20/23 0735

## 2023-10-18 NOTE — PROGRESS NOTES
Spiritual Care Visit    Responding to Code Blue,  visited with the patient's significant other while the code team was helping the patient, and helped escort the significant other to the ED.  spent time with her in the ED, offering support, answering questions, and helping clarify what was going on.  offered a non-anxious and supportive presence throughout the time together. She expressed no other spiritual needs at this time.     Rev. Bandar Mejia, Supportive Oncology     Clinical Encounter Type  Visited With: Family  Routine Visit: Introduction  Continue Visiting: Yes  Crisis Visit:  (Code Blue)

## 2023-10-18 NOTE — ED TRIAGE NOTES
Pt brought in from [facility] for outpt eval. On arrival, pt was lethargic and slow to respond. RA SaO2 85%. Placed on O2 15L with some response. Pt given 100mg Morphine at facility before transport

## 2023-10-18 NOTE — PROGRESS NOTES
Covering SW responded to Code for patient who was in Lab. Patient sig deepti Emelia was present. SW provided support. Patient went to ED. He is currently at the [facility] and came with the w/c. DAMON took w/c and offered to call facility to  w/c. Stanley tatum also came with the van and her car was at the facility. She needed a ride back. DAMON called [facility] [phone] to check on the ride.  was on the way to . He met patient at the ED entrance to get the w/c and sig other. SW assisted.

## 2023-10-19 LAB
ABO GROUP (TYPE) IN BLOOD: NORMAL
ALBUMIN SERPL BCP-MCNC: 2.5 G/DL (ref 3.4–5)
ALP SERPL-CCNC: 51 U/L (ref 33–136)
ALT SERPL W P-5'-P-CCNC: 8 U/L (ref 10–52)
ANION GAP SERPL CALC-SCNC: 12 MMOL/L (ref 10–20)
ANTIBODY SCREEN: NORMAL
APPEARANCE UR: CLEAR
AST SERPL W P-5'-P-CCNC: 14 U/L (ref 9–39)
BASOPHILS # BLD MANUAL: 0.18 X10*3/UL (ref 0–0.1)
BASOPHILS NFR BLD MANUAL: 4.6 %
BILIRUB SERPL-MCNC: 0.6 MG/DL (ref 0–1.2)
BILIRUB UR STRIP.AUTO-MCNC: NEGATIVE MG/DL
BUN SERPL-MCNC: 65 MG/DL (ref 6–23)
CALCIUM SERPL-MCNC: 8.1 MG/DL (ref 8.6–10.6)
CHLORIDE SERPL-SCNC: 104 MMOL/L (ref 98–107)
CO2 SERPL-SCNC: 27 MMOL/L (ref 21–32)
COLOR UR: YELLOW
CREAT SERPL-MCNC: 1.66 MG/DL (ref 0.5–1.3)
EOSINOPHIL # BLD MANUAL: 0 X10*3/UL (ref 0–0.4)
EOSINOPHIL NFR BLD MANUAL: 0 %
ERYTHROCYTE [DISTWIDTH] IN BLOOD BY AUTOMATED COUNT: 16.3 % (ref 11.5–14.5)
ERYTHROCYTE [DISTWIDTH] IN BLOOD BY AUTOMATED COUNT: 16.5 % (ref 11.5–14.5)
GFR SERPL CREATININE-BSD FRML MDRD: 44 ML/MIN/1.73M*2
GLUCOSE SERPL-MCNC: 84 MG/DL (ref 74–99)
GLUCOSE UR STRIP.AUTO-MCNC: NEGATIVE MG/DL
HCT VFR BLD AUTO: 18.7 % (ref 41–52)
HCT VFR BLD AUTO: 19.1 % (ref 41–52)
HGB BLD-MCNC: 6.3 G/DL (ref 13.5–17.5)
HGB BLD-MCNC: 6.5 G/DL (ref 13.5–17.5)
HOLD SPECIMEN: NORMAL
IMM GRANULOCYTES # BLD AUTO: 0.01 X10*3/UL (ref 0–0.5)
IMM GRANULOCYTES NFR BLD AUTO: 0.3 % (ref 0–0.9)
KETONES UR STRIP.AUTO-MCNC: NEGATIVE MG/DL
LACTATE SERPL-SCNC: 0.9 MMOL/L (ref 0.4–2)
LEUKOCYTE ESTERASE UR QL STRIP.AUTO: NEGATIVE
LYMPHOCYTES # BLD MANUAL: 0.62 X10*3/UL (ref 0.8–3)
LYMPHOCYTES NFR BLD MANUAL: 15.4 %
MAGNESIUM SERPL-MCNC: 1.82 MG/DL (ref 1.6–2.4)
MCH RBC QN AUTO: 29.2 PG (ref 26–34)
MCH RBC QN AUTO: 29.3 PG (ref 26–34)
MCHC RBC AUTO-ENTMCNC: 33.7 G/DL (ref 32–36)
MCHC RBC AUTO-ENTMCNC: 34 G/DL (ref 32–36)
MCV RBC AUTO: 86 FL (ref 80–100)
MCV RBC AUTO: 87 FL (ref 80–100)
MONOCYTES # BLD MANUAL: 0.12 X10*3/UL (ref 0.05–0.8)
MONOCYTES NFR BLD MANUAL: 3.1 %
MUCOUS THREADS #/AREA URNS AUTO: ABNORMAL /LPF
NEUTS SEG # BLD MANUAL: 3.08 X10*3/UL (ref 1.6–5)
NEUTS SEG NFR BLD MANUAL: 76.9 %
NITRITE UR QL STRIP.AUTO: NEGATIVE
NRBC BLD-RTO: 0 /100 WBCS (ref 0–0)
NRBC BLD-RTO: 0 /100 WBCS (ref 0–0)
PH UR STRIP.AUTO: 6 [PH]
PHOSPHATE SERPL-MCNC: 3.5 MG/DL (ref 2.5–4.9)
PLATELET # BLD AUTO: 350 X10*3/UL (ref 150–450)
PLATELET # BLD AUTO: 351 X10*3/UL (ref 150–450)
PMV BLD AUTO: 9.8 FL (ref 7.5–11.5)
PMV BLD AUTO: 9.8 FL (ref 7.5–11.5)
POTASSIUM SERPL-SCNC: 3.4 MMOL/L (ref 3.5–5.3)
PROCALCITONIN SERPL-MCNC: 0.85 NG/ML
PROT SERPL-MCNC: 4.8 G/DL (ref 6.4–8.2)
PROT UR STRIP.AUTO-MCNC: NEGATIVE MG/DL
RBC # BLD AUTO: 2.16 X10*6/UL (ref 4.5–5.9)
RBC # BLD AUTO: 2.22 X10*6/UL (ref 4.5–5.9)
RBC # UR STRIP.AUTO: ABNORMAL /UL
RBC #/AREA URNS AUTO: ABNORMAL /HPF
RBC MORPH BLD: ABNORMAL
RH FACTOR (ANTIGEN D): NORMAL
SODIUM SERPL-SCNC: 140 MMOL/L (ref 136–145)
SP GR UR STRIP.AUTO: 1.01
SQUAMOUS #/AREA URNS AUTO: ABNORMAL /HPF
TOTAL CELLS COUNTED BLD: 65
UROBILINOGEN UR STRIP.AUTO-MCNC: 4 MG/DL
VANCOMYCIN SERPL-MCNC: 9.5 UG/ML (ref 5–20)
WBC # BLD AUTO: 4 X10*3/UL (ref 4.4–11.3)
WBC # BLD AUTO: 4 X10*3/UL (ref 4.4–11.3)
WBC #/AREA URNS AUTO: ABNORMAL /HPF

## 2023-10-19 PROCEDURE — 85027 COMPLETE CBC AUTOMATED: CPT | Mod: CMCLAB

## 2023-10-19 PROCEDURE — 87449 NOS EACH ORGANISM AG IA: CPT | Mod: CMCLAB

## 2023-10-19 PROCEDURE — 99232 SBSQ HOSP IP/OBS MODERATE 35: CPT

## 2023-10-19 PROCEDURE — 2500000002 HC RX 250 W HCPCS SELF ADMINISTERED DRUGS (ALT 637 FOR MEDICARE OP, ALT 636 FOR OP/ED): Performed by: STUDENT IN AN ORGANIZED HEALTH CARE EDUCATION/TRAINING PROGRAM

## 2023-10-19 PROCEDURE — P9040 RBC LEUKOREDUCED IRRADIATED: HCPCS

## 2023-10-19 PROCEDURE — 84100 ASSAY OF PHOSPHORUS: CPT | Performed by: STUDENT IN AN ORGANIZED HEALTH CARE EDUCATION/TRAINING PROGRAM

## 2023-10-19 PROCEDURE — 85027 COMPLETE CBC AUTOMATED: CPT | Mod: CMCLAB | Performed by: STUDENT IN AN ORGANIZED HEALTH CARE EDUCATION/TRAINING PROGRAM

## 2023-10-19 PROCEDURE — 84145 PROCALCITONIN (PCT): CPT | Performed by: STUDENT IN AN ORGANIZED HEALTH CARE EDUCATION/TRAINING PROGRAM

## 2023-10-19 PROCEDURE — 36415 COLL VENOUS BLD VENIPUNCTURE: CPT | Mod: CMCLAB | Performed by: STUDENT IN AN ORGANIZED HEALTH CARE EDUCATION/TRAINING PROGRAM

## 2023-10-19 PROCEDURE — C9113 INJ PANTOPRAZOLE SODIUM, VIA: HCPCS

## 2023-10-19 PROCEDURE — 2500000004 HC RX 250 GENERAL PHARMACY W/ HCPCS (ALT 636 FOR OP/ED): Performed by: STUDENT IN AN ORGANIZED HEALTH CARE EDUCATION/TRAINING PROGRAM

## 2023-10-19 PROCEDURE — 2500000001 HC RX 250 WO HCPCS SELF ADMINISTERED DRUGS (ALT 637 FOR MEDICARE OP)

## 2023-10-19 PROCEDURE — 36415 COLL VENOUS BLD VENIPUNCTURE: CPT | Mod: CMCLAB

## 2023-10-19 PROCEDURE — 85007 BL SMEAR W/DIFF WBC COUNT: CPT | Mod: CMCLAB | Performed by: STUDENT IN AN ORGANIZED HEALTH CARE EDUCATION/TRAINING PROGRAM

## 2023-10-19 PROCEDURE — 36430 TRANSFUSION BLD/BLD COMPNT: CPT

## 2023-10-19 PROCEDURE — 86850 RBC ANTIBODY SCREEN: CPT | Performed by: STUDENT IN AN ORGANIZED HEALTH CARE EDUCATION/TRAINING PROGRAM

## 2023-10-19 PROCEDURE — 2500000001 HC RX 250 WO HCPCS SELF ADMINISTERED DRUGS (ALT 637 FOR MEDICARE OP): Performed by: STUDENT IN AN ORGANIZED HEALTH CARE EDUCATION/TRAINING PROGRAM

## 2023-10-19 PROCEDURE — 87899 AGENT NOS ASSAY W/OPTIC: CPT

## 2023-10-19 PROCEDURE — 81001 URINALYSIS AUTO W/SCOPE: CPT

## 2023-10-19 PROCEDURE — 83605 ASSAY OF LACTIC ACID: CPT | Performed by: STUDENT IN AN ORGANIZED HEALTH CARE EDUCATION/TRAINING PROGRAM

## 2023-10-19 PROCEDURE — 86920 COMPATIBILITY TEST SPIN: CPT

## 2023-10-19 PROCEDURE — 80202 ASSAY OF VANCOMYCIN: CPT | Mod: CMCLAB | Performed by: STUDENT IN AN ORGANIZED HEALTH CARE EDUCATION/TRAINING PROGRAM

## 2023-10-19 PROCEDURE — 2500000004 HC RX 250 GENERAL PHARMACY W/ HCPCS (ALT 636 FOR OP/ED)

## 2023-10-19 PROCEDURE — 80053 COMPREHEN METABOLIC PANEL: CPT | Performed by: STUDENT IN AN ORGANIZED HEALTH CARE EDUCATION/TRAINING PROGRAM

## 2023-10-19 PROCEDURE — 83735 ASSAY OF MAGNESIUM: CPT | Mod: CMCLAB | Performed by: STUDENT IN AN ORGANIZED HEALTH CARE EDUCATION/TRAINING PROGRAM

## 2023-10-19 PROCEDURE — 1210000001 HC SEMI-PRIVATE ROOM DAILY

## 2023-10-19 RX ORDER — VANCOMYCIN HYDROCHLORIDE 1 G/20ML
INJECTION, POWDER, LYOPHILIZED, FOR SOLUTION INTRAVENOUS DAILY PRN
Status: DISCONTINUED | OUTPATIENT
Start: 2023-10-19 | End: 2023-10-20

## 2023-10-19 RX ORDER — PANTOPRAZOLE SODIUM 40 MG/10ML
40 INJECTION, POWDER, LYOPHILIZED, FOR SOLUTION INTRAVENOUS
Status: DISCONTINUED | OUTPATIENT
Start: 2023-10-19 | End: 2023-10-20

## 2023-10-19 RX ORDER — DEXTROSE MONOHYDRATE, SODIUM CHLORIDE, AND POTASSIUM CHLORIDE 50; 1.49; 9 G/1000ML; G/1000ML; G/1000ML
75 INJECTION, SOLUTION INTRAVENOUS CONTINUOUS
Status: DISCONTINUED | OUTPATIENT
Start: 2023-10-19 | End: 2023-10-19

## 2023-10-19 RX ORDER — MAGNESIUM SULFATE HEPTAHYDRATE 40 MG/ML
2 INJECTION, SOLUTION INTRAVENOUS ONCE
Status: COMPLETED | OUTPATIENT
Start: 2023-10-19 | End: 2023-10-19

## 2023-10-19 RX ORDER — DEXTROSE MONOHYDRATE, SODIUM CHLORIDE, AND POTASSIUM CHLORIDE 50; 1.49; 9 G/1000ML; G/1000ML; G/1000ML
75 INJECTION, SOLUTION INTRAVENOUS CONTINUOUS
Status: DISCONTINUED | OUTPATIENT
Start: 2023-10-19 | End: 2023-10-20

## 2023-10-19 RX ORDER — OXYCODONE HCL 5 MG/5 ML
5 SOLUTION, ORAL ORAL EVERY 6 HOURS PRN
Status: DISCONTINUED | OUTPATIENT
Start: 2023-10-19 | End: 2023-11-02 | Stop reason: HOSPADM

## 2023-10-19 RX ORDER — ACETAMINOPHEN 500 MG
1000 TABLET ORAL EVERY 6 HOURS PRN
COMMUNITY
End: 2023-11-02 | Stop reason: HOSPADM

## 2023-10-19 RX ADMIN — OXYCODONE HYDROCHLORIDE 5 MG: 5 SOLUTION ORAL at 16:04

## 2023-10-19 RX ADMIN — IPRATROPIUM BROMIDE AND ALBUTEROL SULFATE 3 ML: .5; 3 SOLUTION RESPIRATORY (INHALATION) at 09:35

## 2023-10-19 RX ADMIN — PANTOPRAZOLE SODIUM 40 MG: 40 INJECTION, POWDER, FOR SOLUTION INTRAVENOUS at 18:23

## 2023-10-19 RX ADMIN — SILVER SULFADIAZINE: 10 CREAM TOPICAL at 09:45

## 2023-10-19 RX ADMIN — BUDESONIDE 1 MG: 0.5 SUSPENSION RESPIRATORY (INHALATION) at 20:55

## 2023-10-19 RX ADMIN — MAGNESIUM SULFATE HEPTAHYDRATE 2 G: 40 INJECTION, SOLUTION INTRAVENOUS at 10:27

## 2023-10-19 RX ADMIN — AMPICILLIN SODIUM AND SULBACTAM SODIUM 3 G: 2; 1 INJECTION, POWDER, FOR SOLUTION INTRAMUSCULAR; INTRAVENOUS at 09:45

## 2023-10-19 RX ADMIN — FLUTICASONE FUROATE AND VILANTEROL TRIFENATATE 1 PUFF: 100; 25 POWDER RESPIRATORY (INHALATION) at 09:35

## 2023-10-19 RX ADMIN — AMPICILLIN SODIUM AND SULBACTAM SODIUM 3 G: 2; 1 INJECTION, POWDER, FOR SOLUTION INTRAMUSCULAR; INTRAVENOUS at 21:47

## 2023-10-19 RX ADMIN — BUDESONIDE 1 MG: 0.5 SUSPENSION RESPIRATORY (INHALATION) at 03:07

## 2023-10-19 RX ADMIN — POTASSIUM CHLORIDE, DEXTROSE MONOHYDRATE AND SODIUM CHLORIDE 75 ML/HR: 150; 5; 900 INJECTION, SOLUTION INTRAVENOUS at 16:04

## 2023-10-19 RX ADMIN — BUDESONIDE 1 MG: 0.5 SUSPENSION RESPIRATORY (INHALATION) at 09:34

## 2023-10-19 RX ADMIN — ASPIRIN 81 MG: 81 TABLET, COATED ORAL at 09:00

## 2023-10-19 ASSESSMENT — PAIN SCALES - GENERAL: PAINLEVEL_OUTOF10: 5 - MODERATE PAIN

## 2023-10-19 NOTE — H&P
HPI:  Mr. Wong is a 72 yo male with hx of COPD, CAD, PAD, hypertension, pancytopenia (2/2 chemo), prostate cancer s/p chemo (reported to be in remission), and oropharyngeal cancer (s/p Carboplatin/Paclitaxel and XRT, last tx 10/4/23) presenting with weakness and lethargy from outpt [facility] appointment. Most of history is from his partner (Pauline). She reports that pt became more lethargic throughout the day and was difficult to awaken. Pt was getting his outpt labs drawn and became hypotensive (79/58) and was taken to the ED. Of note, pt was recently admitted to [facility] for weakness and anemia (hgb 5.4 from baseline of 8-9). Anemia was thought to be due to chemotherapy and pt was discharged to a SNF. Pt's partner reports several weeks of poor PO intake due to poor appetite and pain at the radiation site. He has been taking 1 boost per day (down from 2 due to poor appetite). She denies pt having any difficulty with swallowing.    In the ED, pt denies any fever, chills, pain, or SOB. Pt notes most of his discomfort is due to the burns on his neck however it is currently not bothering him.     Home Meds:  aspirin 81 mg 1 tablet oral Daily  atorvastatin calcium 40 mg oral Daily  Patient not taking: Reported on 10/4/2023  budesonide 1 mg/2 mL Take 2 mL (1 mg) by nebulization 2 times a day. Rinse mouth after use  dexamethasone 4 mg oral Every morning  fluticasone/umeclidin/vilanter 100-62.5-25 mcg INHALE 1 PUFF EVERY DAY  ipratropium/albuterol sulfate 0.5-2.5 mg/3 mL 3 mL 3 times a day as needed for wheezing or shortness of breath.  losartan potassium 100 mg 1 tablet oral Daily  magnesium, aluminum hydroxide,lidocaine... 581--40 mg/30 mL 10 mL Swish & Spit Every 6 hours PRN  morphine sulfate 6 mg oral Every 6 hours PRN  naloxone HCl 4 mg nasal As needed  ondansetron HCl 8 mg oral Every 8 hours PRN  oxycodone HCl 5 mg oral Every 6 hours PRN, Per wife recently got the 10mg but this was too much for patient. Was  "extremely drowsy and hallucinating  silver sulfadiazine 1 % Topical Daily  spironolactone 25 mg oral Every 24 hours scheduled    ED Course:  VITALS:  Temp35.5 (95.9) HR85 RR22 /52 O2:100    LABS:  CBC: 6.9>7.6 (hgb 10.1 in 10/10/23)<516  BMP: 138/3.9/99/27/88/2.5 (bl Cr: 1.2)  M.25  LFTs: ALT 7 AST 11 alkphos 56 alb 2.8 Tbili 0.7  COAG: INR 1.1  VB.322/253/23/27.8 lactate 4.7  Trop: 108  BNP: 47 (: 46, 3/22: 589)    -Interventions: 1L LR  COVID: negative    IMAGING:  CXR:    Impression:     1.  No evidence of acute cardiopulmonary process.           OBJECTIVES:  Vitals:  BP (!) 101/47   Pulse 78   Temp 36 °C (96.8 °F)   Resp 14   Ht 1.778 m (5' 10\")   Wt 54 kg (119 lb 0.8 oz)   SpO2 93%   BMI 17.08 kg/m²      Physical Exam:  General: thin, chronically ill appearing, no acute distress, AAOx3, drowsy but awakes easily  HEENT: EOM intact, PERRLA, open burn areas in anterior neck area  CV: regular rate and rhythm, normal S1/S2, no murmur  Pulm: normal respiratory effort, expiratory wheezing, on 2L NC  Abd: soft, nontender, nondistended  Extremities: warm, no LE edema  Neuro: moves all extremities equally, CN II - XII grossly intact  Psych: normal mood and affect  Skin: warm, dry, intact     ASSESSMENT&PLAN    ASSESSMENT AND PLAN:  Mr. Wong is a 74 yo male with hx of COPD, CAD, PAD, hypertension, pancytopenia (2/2 chemo), prostate cancer s/p chemo (reported to be in remission), and oropharyngeal cancer (s/p Carboplatin/Paclitaxel and XRT, last tx 10/4/23) presenting with weakness [facility] in setting of several weeks of poor PO intake. Pt not showing overt infectious symptoms but iso immunocompromised state and initial elevated lactate and hypotension, will start empiric abx.     #weakness, lethargy  Ddx: poor PO intake vs infectious vs uremia vs iatrogenic (pain meds)  -obtain BCx, procal  -hold home coreg 25 bid, chlorthalidone 25, losartan 100, spirono " 25  -start vanc and unasyn (possible source could be open skin wounds, did not empirically cover for pseudomonas as not neutropenic and no prior cx growing pseudomonas)    #lactic acidosis  -ddx: dehydration vs infectious  -s/p 1L LR in ED, plan on another 1L of LR   -obtain AM lactate    #HECTOR  -likely iso poor PO intake and FTT  -hold home chlorthalidone 25, losartan 100, spirono 25    #acute on chronic anemia  -pt had a recent admission 10/3-10/6 at [facility] for hgb 5 from baseline of 8-9. Thought to be 2/2 chemo. Incremented from hgb 5 to 10 after 2 units  -suspect hgb 10 due to over incrementation at the time as current hgb 7.6 is closer to his baseline and lack of overt bleeding  -Hold dvt ppx  -obtain type and screen    #copd  -c/w home inhaler (ellipta)    #orophargeal Ca (s/p chemo XRT, completed tx 10/4/23)  -hold home dexamethasone 4mg iso possible infection  -pain regimen: tylenol prn, oxy 5 prn for moderate pain (of note, pt had become lethargic in the past with oxy 10)    F: prn  E: prn  N: regular  GI ppx: none  DVT ppx: held iso anemia    Code Status: FULL CODE  Surrogate Decision Maker: Emelia (partner): [phone]

## 2023-10-19 NOTE — ED NOTES
Pharmacy Medication History Review    Phong Wong is a 73 y.o. male admitted for Hypovolemic shock (CMS/[facility]). Pharmacy reviewed the patient's [id number] medications and allergies for accuracy.    The list below reflects the updated PTA list. Please review each medication in order reconciliation for additional clarification and justification.  Prior to Admission Medications   Prescriptions Last Dose Informant Comments   acetaminophen (Tylenol) 500 mg tablet  Other    Sig: Take 2 tablets (1,000 mg) by mouth every 6 hours if needed for mild pain (1 - 3). Not to exceed 4000mg daily    aspirin 81 mg EC tablet  Other    Sig: Take 1 tablet (81 mg) by mouth once daily.    atorvastatin (Lipitor) 40 mg tablet Not included in SNF med list Other Previously reported that patient wasn't taking on 10/4/23   Sig: Take 1 tablet (40 mg) by mouth once daily.    budesonide (Pulmicort) 1 mg/2 mL nebulizer solution  Other    Sig: Take 2 mL (1 mg) by nebulization 2 times a day. Rinse mouth after use    ------------------------------------------------------------      fluticasone-umeclidin-vilanter (Trelegy Ellipta) 100-62.5-25 mcg blister with device  Other    Sig: INHALE 1 PUFF EVERY DAY    ipratropium-albuteroL (Duo-Neb) 0.5-2.5 mg/3 mL nebulizer solution  Other    Sig: Take 3 mL by nebulization 3 times a day as needed for wheezing or shortness of breath.    lido-diphen-Maalox 1:1:1 Magic Mouthwash Not included in SNF list Other    Sig: Swish and spit 10 mL every 6 hours if needed (oral pain).    losartan (Cozaar) 100 mg tablet  Other    Sig: Take 1 tablet (100 mg) by mouth once daily.    morphine 100 mg/5 mL (20 mg/mL) concentrated oral solution  Other    Sig: Take 0.3 mL (6 mg) by mouth every 6 hours if needed for severe pain (7 - 10) for up to 3 days.    naloxone (Narcan) 4 mg/0.1 mL nasal spray Not included in SNF med list Other    Sig: Administer 1 spray (4 mg) into affected nostril(s) if needed for opioid reversal or  respiratory depression for up to 2 doses. May repeat every 2-3 minutes if needed, alternating nostrils, until medical assistance becomes available.    ondansetron (Zofran) 8 mg tablet  Other    Sig: Take 1 tablet (8 mg) by mouth every 8 hours if needed for nausea or vomiting.    silver sulfADIAZINE (Silvadene) 1 % cream  Other    Sig: Apply topically once daily. Do not start before October 11, 2023.    Patient taking differently: Apply 1 Application topically once daily. On neck    spironolactone (Aldactone) 25 mg tablet  Other    Sig: Take 1 tablet (25 mg) by mouth once every 24 hours. Do not start before October 11, 2023.       Facility-Administered Medications: None            The list below reflectives the updated allergy list. Please review each documented allergy for additional clarification and justification.  Allergies  Reviewed by Vianca Garnett RN on 10/18/2023        Severity Reactions Comments    Codeine Not Specified Nausea Only           Sources: [facility]) med list, dispense history    Home pharmacy: [id number] [place] [id number] [address]    Below are additional concerns with the patient's PTA list.  Patient is currently at an acute skilled nursing facility, so his medications on based on the current medications he receives from facility. The patient previously had the following medications but they were not included in the SNF list and he has not been taking them at the [facility]: atorvastatin 40mg, carvedilol 25mg BID, chlorthalidone 25mg every day, dexamethasone. Some BP meds were stopped because his BP was more well controlled.    Vianca Prater  [facility] PGY1 Pharmacy Resident  Please reach out via Epic Chat for questions, or if no response call [employer] or [facility]

## 2023-10-19 NOTE — PROGRESS NOTES
Wound Care Progress Note     Visit Date: 10/19/2023      Patient Name: Phong Wong         MRN: [id number]                Reason for Visit: consult for skin injury to hips and sacrum        Wound History:Hx of COPD, CAD, PAD, hypertension, pancytopenia (2/2 chemo), prostate cancer s/p chemo (reported to be in remission), and oropharyngeal cancer (s/p Carboplatin/Paclitaxel and XRT, last tx 10/4/23)      Pertinent Labs:   Albumin   Date Value Ref Range Status   10/19/2023 2.5 (L) 3.4 - 5.0 g/dL Final     ALBUMIN (MG/L) IN URINE   Date Value Ref Range Status   06/01/2023 255.5 Not Established mg/L Final           Wound Assessment:  Wound 10/18/23 Pressure Injury Sacrum (Active)   Date First Assessed/Time First Assessed: 10/18/23 1500   Present on Original Admission: Yes  Hand Hygiene Completed: Yes  Primary Wound Type: (c) Pressure Injury  Location: Sacrum  Wound Outcome: Unknown (No longer present)      Assessments 10/19/2023 12:14 PM   Wound Image     Site Assessment Purple   Tressa-Wound Assessment (!) Scarred;Pink;Other (Comment)   Pressure Injury Stage Deep Tissue   Wound Length (cm) 1 cm   Wound Width (cm) 0.5 cm   Wound Surface Area (cm^2) 0.5 cm^2   Dressing Silicone border dressing   Dressing Changed New       Active Orders   Date Order Priority Status Authorizing Provider   10/19/23 1119 Inpatient Consult to Wound and Ostomy Nurse Routine Active Mercedez Dunbar MD           NEW    Urethral Catheter Non-latex (Active)   Placement Date/Time: 10/18/23 2300   Hand Hygiene Completed: Yes  Catheter Type: Non-latex  Catheter Balloon Size: 10 mL  Urine Returned: Yes   Number of days: 0     Urethral Catheter Non-latex (Active)   Site Assessment Clean;Skin intact 10/18/23 2312   Collection Container Standard drainage bag 10/18/23 2312   Securement Method Securing device (Describe) 10/18/23 2312   Reason for Continuing Urinary Catheterization acute urinary retention 10/18/23 2312   Output (mL) 1000 mL 10/18/23 2312   $  Urethral Catheter Charge Indwelling cath 10/18/23 2312                   Wound 10/05/23 Radiation Throat Anterior;Bilateral (Active)   Date First Assessed/Time First Assessed: 10/05/23 1018   Present on Original Admission: Yes  Primary Wound Type: Radiation  Location: Throat  Wound Location Orientation: Anterior;Bilateral   Number of days: 14       Wound 10/05/23 Pressure Injury Buttocks Left;Right (Active)   Date First Assessed/Time First Assessed: 10/05/23 1018   Present on Original Admission: Yes  Primary Wound Type: Pressure Injury  Location: Buttocks  Wound Location Orientation: Left;Right   Number of days: 14       Wound 10/18/23 Pressure Injury Sacrum (Active)   Date First Assessed/Time First Assessed: 10/18/23 1500   Present on Original Admission: Yes  Hand Hygiene Completed: Yes  Primary Wound Type: (c) Pressure Injury  Location: Sacrum  Wound Outcome: Unknown (No longer present)   Number of days: 0     Wound 10/05/23 Radiation Throat Anterior;Bilateral (Active)       Wound 10/05/23 Pressure Injury Buttocks Left;Right (Active)       Wound 10/18/23 Pressure Injury Sacrum (Active)   Wound Image   10/19/23 1214   Site Assessment Purple 10/19/23 1214   Tressa-Wound Assessment Scarred;Pink;Other (Comment) 10/19/23 1214   Pressure Injury Stage DTPI 10/19/23 1214   Wound Length (cm) 1 cm 10/19/23 1214   Wound Width (cm) 0.5 cm 10/19/23 1214   Wound Surface Area (cm^2) 0.5 cm^2 10/19/23 1214   Dressing Silicone border dressing 10/19/23 1214   Dressing Changed New 10/19/23 1214       Wound location:sacrum   size: 1 x 0.5 cm                            undermining: no      tracking: no                                       Wound type: deep tissue pressure injury   Wound bed: blue/ purple intact   Draining: none  Periwound skin: Large area of brown chronic skin discoloration, (3 ) areas of previous injury now healed and scarred . B/L hips intact.   Therapeutic surface: Soy     Recommendation:   Apply Mepilex Sacral  Border dressing. Assess under dressing each shift. Change every other day  Apply Mepilex to knees to pad body prominences  Apply EHOB waffle mattress to [place] then transfer with patient to hospitla bed  Turn every 2 hours   Elevate heels          Wound Team Plan: Reconsult as needed      Leora Castellano RN CWON   10/19/2023  12:47 PM

## 2023-10-19 NOTE — PROGRESS NOTES
"Phong Wong is a 73 y.o. male on day 1 of admission presenting with Hypovolemic shock (CMS/HCC).    Subjective   NAEO. SBPs soft, 90-100s. Endorses pain at anterior neck wound sites. Denies fever, chills, dizziness, SOB, CP, abdominal pain, N/V/D. Oriented to P/P/T but unsure of why he was in the hospital, trouble recalling what happened yesterday.       Objective     Visit Vitals  /78   Pulse 85   Temp 36 °C (96.8 °F)   Resp 18   Ht 1.778 m (5' 10\")   Wt 54 kg (119 lb 0.8 oz)   SpO2 100%   BMI 17.08 kg/m²   Smoking Status Former   BSA 1.63 m²      Physical Exam  Constitutional:       General: He is not in acute distress.     Appearance: He is underweight. He is ill-appearing (chronic).      Comments: Drowsy but awakes easily   HENT:      Mouth/Throat:      Mouth: Mucous membranes are dry. Injury (excoriations on upper palate with ?yellow exudate, no active signs of bleeding) present.   Eyes:      Extraocular Movements: Extraocular movements intact.   Cardiovascular:      Rate and Rhythm: Normal rate and regular rhythm.   Pulmonary:      Effort: Pulmonary effort is normal. No respiratory distress.      Breath sounds: Normal breath sounds.      Comments: On RA  Abdominal:      General: Abdomen is flat. Bowel sounds are normal.      Palpations: Abdomen is soft.   Genitourinary:     Comments: Clark catheter  Musculoskeletal:      Cervical back: Signs of trauma (anterior neck open burns, no discharge) present.      Right lower leg: No edema.      Left lower leg: No edema.   Skin:     General: Skin is warm and dry.      Findings: Bruising (arms) present.   Neurological:      Mental Status: He is oriented to person, place, and time.      Cranial Nerves: No cranial nerve deficit.         Intake/Output last 3 Shifts:  I/O last 3 completed shifts:  In: 1300 (24.1 mL/kg) [IV Piggyback:1300]  Out: 2725 (50.5 mL/kg) [Urine:2725 (1.4 mL/kg/hr)]  Weight: 54 kg     This patient has a urinary catheter   Reason for the urinary " catheter remaining today? urinary retention/bladder outlet obstruction, acute or chronic    Assessment/Plan   Principal Problem:    Hypovolemic shock (CMS/HCC)    Phong Wong is a 73M with PMH of oropharyngeal cancer s/p chemo/XRT, prostate cancer s/p XRT, pancytopenia, HTN, CAD, PAD, and COPD who was admitted on 10/18/23 for lethargy and weakness from outpatient heme/onc appointment ISO several weeks of poor PO intake and opioid use. Patient hypotensive and hypoxic with elevated lactate on admission, concerning for infection. Recent odynophagia after chemo/XRT, questionable infectious etiology given neutropenia and clinical picture. Now pending sepsis workup, GI, nutrition, wound care consults.    UPDATES 10/19:  - Consult GI, nutrition, wound care  - Sepsis workup pending, de-escalate antibiotics tomorrow if negative    #Weakness  #Hypotension, resolved  #Hypoxia, resolved  :: Differential includes sepsis/infection, morphine overdose, poor PO intake, chemo/XRT  :: S/p LR 1.5L and NRFM 15L  - Empiric vanc/Unaysn to cover soft tissue infections  - UA, Ucx, Bcx pending  - PT/OT recs    #Failure to thrive  #Odynophagia  #Oropharyngeal cancer T3N2M0 s/p carbo/Taxol/XRT (8/15/23-10/4/23)  :: Med-onc Dr. Bruce Garcia/Lyssa Patten PA-C, rad-onc Dr. Vianca Steen  :: CXR with no acute cardiopulmonary process  - Continue Tylenol prn, oxy 5 liquid prior to meals, STOP morphine  - Maintenance D5W NS @ 75cc/hr  - Consult GI, concern for HSV/CMV esophagitis given neutropenia and new odynophagia  - Consult nutrition for poor PO intake, currently on puree with Ensure   - Spoke with patient about potential need for enteral nutrition, he will consider it  - Consult wound care    #HECTOR  #Acute urinary retention  #Prostate cancer s/p XRT (2018)  :: Admission Cr 2.5 (baseline Cr 1.2)  :: Admission PVR 1205cc > Clark 1725cc tea-colored urine out  - Likely multifactorial d/t poor PO intake, acute urinary retention  - Trend Cr, hold  nephrotoxic meds  - Continue Clark    #Acute on chronic anemia  #Neutropenia  :: ANC and Hb has been downtrending throughout recent chemo/XRT   - No signs of overt bleeding  - Active T&S, transfuse if Hb<7  - Hold AC at this time, SCDs only    #HTN  #CAD  #PAD  - Hold home ASA ISO acute anemia  - Hold home losartan ISO HECTOR    #COPD  - Continue home budesonide nebs, Breo Ellipta inh, duonebs prn    #Lactic acidosis, resolved  :: Admission lactate 4.7, VBG pH 7.32, pCO2 54, pO2 23  - Suspect dehydration vs infection  - Now s/p LR 1.5L with ABG pH 7.49, CO2 36, pO2 129  - Repeat AM lactate 0.9    F: D5W NS @ 75cc/hr  E: K>4, Mg>2  N: puree with Ensure  A: PIV  DVT ppx: SCDs  CODE STATUS: full code (confirmed on admission)  NOK: Emelia (wife) [phone]

## 2023-10-19 NOTE — CONSULTS
[facility]   [facility]  INITIAL CONSULT NOTE     Source of Information: The source of the history was patient     Consult requested by: Service: Robin    Reason for Consult: Odynophagia    Admission Chief Complaint: Failure to thrive      SUBJECTIVE     Mr. Wong is a 74 yo male with hx of COPD, CAD, PAD, hypertension, pancytopenia (2/2 chemo), prostate cancer s/p chemo (reported to be in remission), and oropharyngeal cancer (s/p Carboplatin/Paclitaxel and XRT, last tx 10/4/23) admitted since 10/18/23 with weakness and lethargy from OP oncology appointment.     Pt was getting his outpt labs drawn and became hypotensive (79/58) and was taken to the ED. Of note, pt was recently admitted to [facility] for weakness and anemia (hgb 5.4 from baseline of 8-9). Anemia was thought to be due to chemotherapy and pt was discharged to a SNF. Pt's partner reports several weeks of poor PO intake due to poor appetite and pain at the radiation site. He has been taking 1 boost per day (down from 2 due to poor appetite). Endorses difficulty with swallowing mainly due to severe pain in his throat. Denies any sensation of food getting stuck. This has led to weight loss and lack of adequate daily nutrition.     Denies taking any blood thinners except for aspirin. Denies any gastric surgeries in the past.     ROS: 12 point ROS negative unless    ROS: Complete review of systems obtained, negative unless otherwise indicated above.     Allergies   Allergen Reactions    Codeine Nausea Only     Past Medical History:   Diagnosis Date    Personal history of diseases of the blood and blood-forming organs and certain disorders involving the immune mechanism 07/27/2017    History of thrombocytosis     Past Surgical History:   Procedure Laterality Date    CT ABDOMEN PELVIS ANGIOGRAM W AND/OR WO IV CONTRAST  9/3/2014    CT ABDOMEN PELVIS ANGIOGRAM W AND/OR WO IV CONTRAST 9/3/2014 [facility] ANCILLARY  LEGACY    IR ANGIOGRAM AORTA ABDOMEN  11/6/2014    IR ANGIOGRAM AORTA ABDOMEN 11/6/2014 [facility] SURG AIB LEGACY     Family History   Problem Relation Name Age of Onset    Aortic aneurysm Father          abdominal     Social History     Social History Narrative    Not on file     [unfilled]    Medications:  Scheduled medications  ampicillin-sulbactam, 3 g, intravenous, q12h  aspirin, 81 mg, oral, Daily  budesonide, 1 mg, nebulization, BID  fluticasone furoate-vilanteroL, 1 puff, inhalation, Daily  pantoprazole, 40 mg, intravenous, Daily before breakfast  silver sulfADIAZINE, , Topical, Daily      Continuous medications  potassium chloride-D5-0.9%NaCl, 75 mL/hr, Last Rate: 75 mL/hr (10/19/23 1604)      PRN medications  PRN medications: acetaminophen, ipratropium-albuteroL, lidocaine-diphenhydrAMINE-Maalox 1:1:1, ondansetron, oxyCODONE, phenoL, vancomycin       EXAM     Vital signs:  [unfilled]    Physical Exam  Alert and oriented x 3  CTAB  Neck with severe inflammation and necrotic tissue likely from radiation effects  Skin warm and dry  Abd soft, NT. Appears to have laparoscopic incisions scar. Unclear what surgery this was from.       DATA                                                                            Labs     Lab Results   Component Value Date    WBC 4.0 (L) 10/19/2023    WBC 4.0 (L) 10/19/2023    WBC 6.9 10/18/2023    HGB 6.3 (LL) 10/19/2023    HGB 6.5 (LL) 10/19/2023    HGB 7.6 (L) 10/18/2023    MCV 87 10/19/2023    MCV 86 10/19/2023    MCV 86 10/18/2023     10/19/2023     10/19/2023     (H) 10/18/2023       Lab Results   Component Value Date    GLUCOSE 84 10/19/2023    CALCIUM 8.1 (L) 10/19/2023     10/19/2023    K 3.4 (L) 10/19/2023    CO2 27 10/19/2023     10/19/2023    BUN 65 (H) 10/19/2023    CREATININE 1.66 (H) 10/19/2023       Lab Results   Component Value Date    ALT 8 (L) 10/19/2023    ALT 7 (L) 10/18/2023    ALT 9 (L) 10/05/2023    AST 14 10/19/2023    AST 11  10/18/2023    AST 13 10/05/2023    ALKPHOS 51 10/19/2023    ALKPHOS 56 10/18/2023    ALKPHOS 57 10/05/2023    BILITOT 0.6 10/19/2023    BILITOT 0.7 10/18/2023    BILITOT 1.0 10/05/2023                                                                                  Imaging           === 06/04/23 ===    US RENAL COMPLETE    - Impression -  Atrophied appearance of the left kidney.    Splenomegaly.  === 07/13/23 ===    CT RADIATION THERAPY PLANNING                                                                           GI Procedures       ASSESSMENT / PLAN                  Assessment and Recommendations:   Mr. Wong is a 74 yo male with hx of COPD, CAD, PAD, hypertension, pancytopenia (2/2 chemo), prostate cancer s/p chemo (reported to be in remission), and oropharyngeal cancer (s/p Carboplatin/Paclitaxel and XRT, last tx 10/4/23) admitted since 10/18/23 with weakness and lethargy from OP oncology appointment. GI consulted for evaluation of poor PO intake from severe odynophagia 2/2 radiation induced esophagitis and oropharyngeal mucositis.     Recommendations  - NPO at MN  - Plan for EGD with anesthesia on 10/20/23 for PEG tube placement and evaluation for esophagitis.   - Please give 2 gram Cefazolin around 30 minutes prior to scheduled endoscopy time (give when transport is called)    Thank you for the consultation.      Please do not hesitate to contact us again on by paging the consultation team again between the weekday hours of 7 AM - 5 PM. If there is an urgent concern during the weekend, after-hours, or holidays; then please page the on-call GI fellow at [phone]. Thank you.     ------------------------------------------------------------------------  Zach Martin MD  Gastroenterology Fellow    After 5PM and on Weekends, please page on-call fellow.    Final recommendations pending attending attestation.      I saw and evaluated the patient. I personally obtained the key and critical portions of the history and  physical exam or was physically present for key and critical portions performed by the trainee. I agree with the trainee's medical decision making as described in the trainee's note.    Nazario Hunt MD

## 2023-10-19 NOTE — PROGRESS NOTES
"Vancomycin Dosing by Pharmacy- INITIAL    Phong Wong is a 73 y.o. year old male who Pharmacy has been consulted for vancomycin dosing for cellulitis, skin and soft tissue. Based on the patient's indication and renal status this patient will be dosed based on a goal trough/random level of 15-20.     Renal function is significant for HECTOR, sCr currently declining. Will dose by levels.    Visit Vitals  BP (!) 101/47   Pulse 78   Temp 36 °C (96.8 °F)   Resp 14        Lab Results   Component Value Date    CREATININE 2.52 (H) 10/18/2023    CREATININE 1.14 10/10/2023    CREATININE 1.17 10/09/2023    CREATININE 1.13 10/08/2023        Patient weight is No results found for: \"PTWEIGHT\"    No results found for: \"CULTURE\"     I/O last 3 completed shifts:  In: 1000 (18.5 mL/kg) [IV Piggyback:1000]  Out: - (0 mL/kg)   Weight: 54 kg   [unfilled]    Lab Results   Component Value Date    PATIENTTEMP 37.0 10/18/2023    PATIENTTEMP 37.0 10/18/2023    PATIENTTEMP 37.0 10/18/2023          Assessment/Plan     Patient will be given a loading dose of 1,000 mg.  Follow-up level will be ordered on 10/19 at 20:00 unless clinically indicated sooner.  Will continue to monitor renal function daily while on vancomycin and order serum creatinine at least every 48 hours if not already ordered.  Follow for continued vancomycin needs, clinical response, and signs/symptoms of toxicity.       Ray Briggs, PharmD       "

## 2023-10-20 ENCOUNTER — ANESTHESIA (OUTPATIENT)
Dept: GASTROENTEROLOGY | Facility: HOSPITAL | Age: 73
DRG: 917 | End: 2023-10-20
Payer: MEDICARE

## 2023-10-20 ENCOUNTER — APPOINTMENT (OUTPATIENT)
Dept: GASTROENTEROLOGY | Facility: HOSPITAL | Age: 73
DRG: 917 | End: 2023-10-20
Payer: MEDICARE

## 2023-10-20 ENCOUNTER — APPOINTMENT (OUTPATIENT)
Dept: VASCULAR MEDICINE | Facility: HOSPITAL | Age: 73
DRG: 917 | End: 2023-10-20
Payer: MEDICARE

## 2023-10-20 ENCOUNTER — ANESTHESIA EVENT (OUTPATIENT)
Dept: GASTROENTEROLOGY | Facility: HOSPITAL | Age: 73
DRG: 917 | End: 2023-10-20
Payer: MEDICARE

## 2023-10-20 PROBLEM — D61.818 PANCYTOPENIA (MULTI): Status: ACTIVE | Noted: 2023-10-05

## 2023-10-20 PROBLEM — M79.604 PAIN IN BOTH LOWER EXTREMITIES: Status: ACTIVE | Noted: 2023-10-20

## 2023-10-20 PROBLEM — M79.605 PAIN IN BOTH LOWER EXTREMITIES: Status: ACTIVE | Noted: 2023-10-20

## 2023-10-20 LAB
ALBUMIN SERPL BCP-MCNC: 2 G/DL (ref 3.4–5)
ANION GAP SERPL CALC-SCNC: 13 MMOL/L (ref 10–20)
BASOPHILS # BLD AUTO: 0.02 X10*3/UL (ref 0–0.1)
BASOPHILS # BLD MANUAL: 0 X10*3/UL (ref 0–0.1)
BASOPHILS NFR BLD AUTO: 0.5 %
BASOPHILS NFR BLD MANUAL: 0 %
BLOOD EXPIRATION DATE: NORMAL
BUN SERPL-MCNC: 34 MG/DL (ref 6–23)
CALCIUM SERPL-MCNC: 6.8 MG/DL (ref 8.6–10.6)
CHLORIDE SERPL-SCNC: 110 MMOL/L (ref 98–107)
CO2 SERPL-SCNC: 23 MMOL/L (ref 21–32)
CREAT SERPL-MCNC: 1.23 MG/DL (ref 0.5–1.3)
DISPENSE STATUS: NORMAL
EOSINOPHIL # BLD AUTO: 0.05 X10*3/UL (ref 0–0.4)
EOSINOPHIL # BLD MANUAL: 0 X10*3/UL (ref 0–0.4)
EOSINOPHIL NFR BLD AUTO: 1.3 %
EOSINOPHIL NFR BLD MANUAL: 0 %
ERYTHROCYTE [DISTWIDTH] IN BLOOD BY AUTOMATED COUNT: 17 % (ref 11.5–14.5)
ERYTHROCYTE [DISTWIDTH] IN BLOOD BY AUTOMATED COUNT: 17.2 % (ref 11.5–14.5)
GFR SERPL CREATININE-BSD FRML MDRD: 63 ML/MIN/1.73M*2
GLUCOSE SERPL-MCNC: 298 MG/DL (ref 74–99)
HCT VFR BLD AUTO: 19.7 % (ref 41–52)
HCT VFR BLD AUTO: 21.4 % (ref 41–52)
HGB BLD-MCNC: 6.7 G/DL (ref 13.5–17.5)
HGB BLD-MCNC: 7.3 G/DL (ref 13.5–17.5)
IMM GRANULOCYTES # BLD AUTO: 0.03 X10*3/UL (ref 0–0.5)
IMM GRANULOCYTES # BLD AUTO: 0.03 X10*3/UL (ref 0–0.5)
IMM GRANULOCYTES NFR BLD AUTO: 0.8 % (ref 0–0.9)
IMM GRANULOCYTES NFR BLD AUTO: 0.8 % (ref 0–0.9)
LACTATE BLDV-SCNC: 4.5 MMOL/L (ref 0.4–2)
LEGIONELLA AG UR QL: NEGATIVE
LYMPHOCYTES # BLD AUTO: 0.41 X10*3/UL (ref 0.8–3)
LYMPHOCYTES # BLD MANUAL: 0.19 X10*3/UL (ref 0.8–3)
LYMPHOCYTES NFR BLD AUTO: 10.6 %
LYMPHOCYTES NFR BLD MANUAL: 4.9 %
MAGNESIUM SERPL-MCNC: 1.8 MG/DL (ref 1.6–2.4)
MCH RBC QN AUTO: 28.6 PG (ref 26–34)
MCH RBC QN AUTO: 28.7 PG (ref 26–34)
MCHC RBC AUTO-ENTMCNC: 34 G/DL (ref 32–36)
MCHC RBC AUTO-ENTMCNC: 34.1 G/DL (ref 32–36)
MCV RBC AUTO: 84 FL (ref 80–100)
MCV RBC AUTO: 84 FL (ref 80–100)
MONOCYTES # BLD AUTO: 0.43 X10*3/UL (ref 0.05–0.8)
MONOCYTES # BLD MANUAL: 0.11 X10*3/UL (ref 0.05–0.8)
MONOCYTES NFR BLD AUTO: 11.2 %
MONOCYTES NFR BLD MANUAL: 3 %
NEUTROPHILS # BLD AUTO: 2.91 X10*3/UL (ref 1.6–5.5)
NEUTROPHILS NFR BLD AUTO: 75.6 %
NEUTS SEG # BLD MANUAL: 3.5 X10*3/UL (ref 1.6–5)
NEUTS SEG NFR BLD MANUAL: 92.1 %
NRBC BLD-RTO: 0 /100 WBCS (ref 0–0)
NRBC BLD-RTO: 0 /100 WBCS (ref 0–0)
PHOSPHATE SERPL-MCNC: 2.4 MG/DL (ref 2.5–4.9)
PLATELET # BLD AUTO: 286 X10*3/UL (ref 150–450)
PLATELET # BLD AUTO: 342 X10*3/UL (ref 150–450)
PMV BLD AUTO: 9.7 FL (ref 7.5–11.5)
PMV BLD AUTO: 9.8 FL (ref 7.5–11.5)
POTASSIUM SERPL-SCNC: 3.9 MMOL/L (ref 3.5–5.3)
PRODUCT BLOOD TYPE: 2800
PRODUCT CODE: NORMAL
RBC # BLD AUTO: 2.34 X10*6/UL (ref 4.5–5.9)
RBC # BLD AUTO: 2.54 X10*6/UL (ref 4.5–5.9)
RBC MORPH BLD: ABNORMAL
S PNEUM AG UR QL: NEGATIVE
SODIUM SERPL-SCNC: 142 MMOL/L (ref 136–145)
TOTAL CELLS COUNTED BLD: 101
UNIT ABO: NORMAL
UNIT NUMBER: NORMAL
UNIT RH: NORMAL
UNIT VOLUME: 350
WBC # BLD AUTO: 3.8 X10*3/UL (ref 4.4–11.3)
WBC # BLD AUTO: 3.9 X10*3/UL (ref 4.4–11.3)
XM INTEP: NORMAL

## 2023-10-20 PROCEDURE — 2500000002 HC RX 250 W HCPCS SELF ADMINISTERED DRUGS (ALT 637 FOR MEDICARE OP, ALT 636 FOR OP/ED): Performed by: STUDENT IN AN ORGANIZED HEALTH CARE EDUCATION/TRAINING PROGRAM

## 2023-10-20 PROCEDURE — 3E0G76Z INTRODUCTION OF NUTRITIONAL SUBSTANCE INTO UPPER GI, VIA NATURAL OR ARTIFICIAL OPENING: ICD-10-PCS | Performed by: HOSPITALIST

## 2023-10-20 PROCEDURE — 43246 EGD PLACE GASTROSTOMY TUBE: CPT | Performed by: INTERNAL MEDICINE

## 2023-10-20 PROCEDURE — 0DB28ZX EXCISION OF MIDDLE ESOPHAGUS, VIA NATURAL OR ARTIFICIAL OPENING ENDOSCOPIC, DIAGNOSTIC: ICD-10-PCS | Performed by: INTERNAL MEDICINE

## 2023-10-20 PROCEDURE — 3700000001 HC GENERAL ANESTHESIA TIME - INITIAL BASE CHARGE: Performed by: INTERNAL MEDICINE

## 2023-10-20 PROCEDURE — 7100000009 HC PHASE TWO TIME - INITIAL BASE CHARGE: Performed by: INTERNAL MEDICINE

## 2023-10-20 PROCEDURE — 2500000005 HC RX 250 GENERAL PHARMACY W/O HCPCS

## 2023-10-20 PROCEDURE — 43239 EGD BIOPSY SINGLE/MULTIPLE: CPT | Performed by: INTERNAL MEDICINE

## 2023-10-20 PROCEDURE — A43246 PR EDG PERCUTANEOUS PLACEMENT GASTROSTOMY TUBE: Performed by: ANESTHESIOLOGY

## 2023-10-20 PROCEDURE — 88341 IMHCHEM/IMCYTCHM EA ADD ANTB: CPT | Mod: TC,SUR | Performed by: INTERNAL MEDICINE

## 2023-10-20 PROCEDURE — 1170000001 HC PRIVATE ONCOLOGY ROOM DAILY

## 2023-10-20 PROCEDURE — 2500000004 HC RX 250 GENERAL PHARMACY W/ HCPCS (ALT 636 FOR OP/ED)

## 2023-10-20 PROCEDURE — 7100000010 HC PHASE TWO TIME - EACH INCREMENTAL 1 MINUTE: Performed by: INTERNAL MEDICINE

## 2023-10-20 PROCEDURE — 93970 EXTREMITY STUDY: CPT | Performed by: INTERNAL MEDICINE

## 2023-10-20 PROCEDURE — 0DB18ZX EXCISION OF UPPER ESOPHAGUS, VIA NATURAL OR ARTIFICIAL OPENING ENDOSCOPIC, DIAGNOSTIC: ICD-10-PCS | Performed by: INTERNAL MEDICINE

## 2023-10-20 PROCEDURE — 88341 IMHCHEM/IMCYTCHM EA ADD ANTB: CPT | Performed by: PATHOLOGY

## 2023-10-20 PROCEDURE — 36415 COLL VENOUS BLD VENIPUNCTURE: CPT | Mod: CMCLAB

## 2023-10-20 PROCEDURE — 85007 BL SMEAR W/DIFF WBC COUNT: CPT | Mod: CMCLAB | Performed by: STUDENT IN AN ORGANIZED HEALTH CARE EDUCATION/TRAINING PROGRAM

## 2023-10-20 PROCEDURE — 88305 TISSUE EXAM BY PATHOLOGIST: CPT | Performed by: PATHOLOGY

## 2023-10-20 PROCEDURE — C9113 INJ PANTOPRAZOLE SODIUM, VIA: HCPCS

## 2023-10-20 PROCEDURE — 85025 COMPLETE CBC W/AUTO DIFF WBC: CPT | Mod: CMCLAB

## 2023-10-20 PROCEDURE — 2500000004 HC RX 250 GENERAL PHARMACY W/ HCPCS (ALT 636 FOR OP/ED): Performed by: STUDENT IN AN ORGANIZED HEALTH CARE EDUCATION/TRAINING PROGRAM

## 2023-10-20 PROCEDURE — 2780000003 HC OR 278 NO HCPCS: Performed by: INTERNAL MEDICINE

## 2023-10-20 PROCEDURE — 88342 IMHCHEM/IMCYTCHM 1ST ANTB: CPT | Performed by: PATHOLOGY

## 2023-10-20 PROCEDURE — 85027 COMPLETE CBC AUTOMATED: CPT | Performed by: STUDENT IN AN ORGANIZED HEALTH CARE EDUCATION/TRAINING PROGRAM

## 2023-10-20 PROCEDURE — 80069 RENAL FUNCTION PANEL: CPT | Mod: CMCLAB

## 2023-10-20 PROCEDURE — 88305 TISSUE EXAM BY PATHOLOGIST: CPT | Mod: TC,SUR | Performed by: INTERNAL MEDICINE

## 2023-10-20 PROCEDURE — 93970 EXTREMITY STUDY: CPT

## 2023-10-20 PROCEDURE — 0DH63UZ INSERTION OF FEEDING DEVICE INTO STOMACH, PERCUTANEOUS APPROACH: ICD-10-PCS | Performed by: INTERNAL MEDICINE

## 2023-10-20 PROCEDURE — 99100 ANES PT EXTEME AGE<1 YR&>70: CPT | Performed by: ANESTHESIOLOGY

## 2023-10-20 PROCEDURE — 83735 ASSAY OF MAGNESIUM: CPT | Mod: CMCLAB

## 2023-10-20 PROCEDURE — 3700000002 HC GENERAL ANESTHESIA TIME - EACH INCREMENTAL 1 MINUTE: Performed by: INTERNAL MEDICINE

## 2023-10-20 PROCEDURE — 99232 SBSQ HOSP IP/OBS MODERATE 35: CPT

## 2023-10-20 PROCEDURE — A43246 PR EDG PERCUTANEOUS PLACEMENT GASTROSTOMY TUBE

## 2023-10-20 PROCEDURE — 36415 COLL VENOUS BLD VENIPUNCTURE: CPT | Mod: CMCLAB | Performed by: STUDENT IN AN ORGANIZED HEALTH CARE EDUCATION/TRAINING PROGRAM

## 2023-10-20 RX ORDER — PROPOFOL 10 MG/ML
INJECTION, EMULSION INTRAVENOUS AS NEEDED
Status: DISCONTINUED | OUTPATIENT
Start: 2023-10-20 | End: 2023-10-20

## 2023-10-20 RX ORDER — OXYCODONE HYDROCHLORIDE 5 MG/1
5 TABLET ORAL EVERY 4 HOURS PRN
OUTPATIENT
Start: 2023-10-20

## 2023-10-20 RX ORDER — ACETAMINOPHEN 325 MG/1
650 TABLET ORAL EVERY 4 HOURS PRN
OUTPATIENT
Start: 2023-10-20

## 2023-10-20 RX ORDER — FENTANYL CITRATE 50 UG/ML
50 INJECTION, SOLUTION INTRAMUSCULAR; INTRAVENOUS EVERY 5 MIN PRN
OUTPATIENT
Start: 2023-10-20

## 2023-10-20 RX ORDER — ONDANSETRON HYDROCHLORIDE 2 MG/ML
4 INJECTION, SOLUTION INTRAVENOUS ONCE AS NEEDED
OUTPATIENT
Start: 2023-10-20

## 2023-10-20 RX ORDER — PANTOPRAZOLE SODIUM 40 MG/1
40 TABLET, DELAYED RELEASE ORAL
Status: DISCONTINUED | OUTPATIENT
Start: 2023-10-20 | End: 2023-10-21

## 2023-10-20 RX ORDER — LIDOCAINE HYDROCHLORIDE 10 MG/ML
0.1 INJECTION, SOLUTION EPIDURAL; INFILTRATION; INTRACAUDAL; PERINEURAL ONCE
OUTPATIENT
Start: 2023-10-20 | End: 2023-10-20

## 2023-10-20 RX ORDER — LIDOCAINE HYDROCHLORIDE 20 MG/ML
INJECTION, SOLUTION INFILTRATION; PERINEURAL AS NEEDED
Status: DISCONTINUED | OUTPATIENT
Start: 2023-10-20 | End: 2023-10-20

## 2023-10-20 RX ORDER — DEXTROSE MONOHYDRATE, SODIUM CHLORIDE, AND POTASSIUM CHLORIDE 50; 1.49; 9 G/1000ML; G/1000ML; G/1000ML
75 INJECTION, SOLUTION INTRAVENOUS CONTINUOUS
Status: DISPENSED | OUTPATIENT
Start: 2023-10-20 | End: 2023-10-21

## 2023-10-20 RX ORDER — SODIUM CHLORIDE, SODIUM LACTATE, POTASSIUM CHLORIDE, CALCIUM CHLORIDE 600; 310; 30; 20 MG/100ML; MG/100ML; MG/100ML; MG/100ML
100 INJECTION, SOLUTION INTRAVENOUS CONTINUOUS
OUTPATIENT
Start: 2023-10-20

## 2023-10-20 RX ADMIN — POTASSIUM CHLORIDE, DEXTROSE MONOHYDRATE AND SODIUM CHLORIDE 75 ML/HR: 150; 5; 900 INJECTION, SOLUTION INTRAVENOUS at 15:58

## 2023-10-20 RX ADMIN — FLUTICASONE FUROATE AND VILANTEROL TRIFENATATE 1 PUFF: 100; 25 POWDER RESPIRATORY (INHALATION) at 10:36

## 2023-10-20 RX ADMIN — LIDOCAINE HYDROCHLORIDE 60 MG: 20 INJECTION, SOLUTION INFILTRATION; PERINEURAL at 12:25

## 2023-10-20 RX ADMIN — VANCOMYCIN HYDROCHLORIDE 1250 MG: 1.25 INJECTION, POWDER, LYOPHILIZED, FOR SOLUTION INTRAVENOUS at 09:00

## 2023-10-20 RX ADMIN — PROPOFOL 40 MG: 10 INJECTION, EMULSION INTRAVENOUS at 12:28

## 2023-10-20 RX ADMIN — SILVER SULFADIAZINE: 10 CREAM TOPICAL at 10:36

## 2023-10-20 RX ADMIN — PANTOPRAZOLE SODIUM 40 MG: 40 INJECTION, POWDER, FOR SOLUTION INTRAVENOUS at 09:35

## 2023-10-20 RX ADMIN — PROPOFOL 20 MG: 10 INJECTION, EMULSION INTRAVENOUS at 12:30

## 2023-10-20 RX ADMIN — PROPOFOL 40 MG: 10 INJECTION, EMULSION INTRAVENOUS at 12:26

## 2023-10-20 RX ADMIN — PROPOFOL 30 MG: 10 INJECTION, EMULSION INTRAVENOUS at 12:42

## 2023-10-20 RX ADMIN — BUDESONIDE 1 MG: 0.5 SUSPENSION RESPIRATORY (INHALATION) at 10:45

## 2023-10-20 RX ADMIN — PANTOPRAZOLE SODIUM 40 MG: 40 TABLET, DELAYED RELEASE ORAL at 19:20

## 2023-10-20 RX ADMIN — SODIUM CHLORIDE, SODIUM LACTATE, POTASSIUM CHLORIDE, AND CALCIUM CHLORIDE: 600; 310; 30; 20 INJECTION, SOLUTION INTRAVENOUS at 12:14

## 2023-10-20 RX ADMIN — LIDOCAINE HYDROCHLORIDE 20 MG: 20 INJECTION, SOLUTION INFILTRATION; PERINEURAL at 12:28

## 2023-10-20 RX ADMIN — PROPOFOL 40 MG: 10 INJECTION, EMULSION INTRAVENOUS at 12:51

## 2023-10-20 RX ADMIN — AMPICILLIN SODIUM AND SULBACTAM SODIUM 3 G: 2; 1 INJECTION, POWDER, FOR SOLUTION INTRAMUSCULAR; INTRAVENOUS at 09:45

## 2023-10-20 RX ADMIN — PROPOFOL 30 MG: 10 INJECTION, EMULSION INTRAVENOUS at 12:40

## 2023-10-20 SDOH — SOCIAL STABILITY: SOCIAL INSECURITY: DO YOU FEEL ANYONE HAS EXPLOITED OR TAKEN ADVANTAGE OF YOU FINANCIALLY OR OF YOUR PERSONAL PROPERTY?: NO

## 2023-10-20 SDOH — SOCIAL STABILITY: SOCIAL INSECURITY: ARE YOU OR HAVE YOU BEEN THREATENED OR ABUSED PHYSICALLY, EMOTIONALLY, OR SEXUALLY BY ANYONE?: NO

## 2023-10-20 SDOH — SOCIAL STABILITY: SOCIAL INSECURITY: ARE THERE ANY APPARENT SIGNS OF INJURIES/BEHAVIORS THAT COULD BE RELATED TO ABUSE/NEGLECT?: NO

## 2023-10-20 SDOH — SOCIAL STABILITY: SOCIAL INSECURITY: DO YOU FEEL UNSAFE GOING BACK TO THE PLACE WHERE YOU ARE LIVING?: NO

## 2023-10-20 SDOH — SOCIAL STABILITY: SOCIAL INSECURITY: DOES ANYONE TRY TO KEEP YOU FROM HAVING/CONTACTING OTHER FRIENDS OR DOING THINGS OUTSIDE YOUR HOME?: NO

## 2023-10-20 SDOH — HEALTH STABILITY: MENTAL HEALTH: CURRENT SMOKER: 0

## 2023-10-20 SDOH — SOCIAL STABILITY: SOCIAL INSECURITY: HAS ANYONE EVER THREATENED TO HURT YOUR FAMILY OR YOUR PETS?: NO

## 2023-10-20 SDOH — SOCIAL STABILITY: SOCIAL INSECURITY: ABUSE: ADULT

## 2023-10-20 SDOH — SOCIAL STABILITY: SOCIAL INSECURITY: HAVE YOU HAD THOUGHTS OF HARMING ANYONE ELSE?: NO

## 2023-10-20 SDOH — SOCIAL STABILITY: SOCIAL INSECURITY: WERE YOU ABLE TO COMPLETE ALL THE BEHAVIORAL HEALTH SCREENINGS?: YES

## 2023-10-20 ASSESSMENT — ACTIVITIES OF DAILY LIVING (ADL)
JUDGMENT_ADEQUATE_SAFELY_COMPLETE_DAILY_ACTIVITIES: YES
TOILETING: NEEDS ASSISTANCE
PATIENT'S MEMORY ADEQUATE TO SAFELY COMPLETE DAILY ACTIVITIES?: YES
HEARING - RIGHT EAR: FUNCTIONAL
HEARING - LEFT EAR: FUNCTIONAL
LACK_OF_TRANSPORTATION: NO
WALKS IN HOME: UNABLE TO ASSESS
ADEQUATE_TO_COMPLETE_ADL: YES
FEEDING YOURSELF: NEEDS ASSISTANCE
BATHING: NEEDS ASSISTANCE
GROOMING: NEEDS ASSISTANCE
DRESSING YOURSELF: NEEDS ASSISTANCE

## 2023-10-20 ASSESSMENT — COGNITIVE AND FUNCTIONAL STATUS - GENERAL
HELP NEEDED FOR BATHING: A LOT
WALKING IN HOSPITAL ROOM: A LOT
PERSONAL GROOMING: A LOT
TOILETING: TOTAL
DRESSING REGULAR LOWER BODY CLOTHING: A LOT
TURNING FROM BACK TO SIDE WHILE IN FLAT BAD: A LOT
DAILY ACTIVITIY SCORE: 12
DRESSING REGULAR UPPER BODY CLOTHING: A LOT
PATIENT BASELINE BEDBOUND: YES
MOVING TO AND FROM BED TO CHAIR: A LOT
EATING MEALS: A LITTLE
STANDING UP FROM CHAIR USING ARMS: A LOT
CLIMB 3 TO 5 STEPS WITH RAILING: TOTAL
MOVING FROM LYING ON BACK TO SITTING ON SIDE OF FLAT BED WITH BEDRAILS: A LITTLE
MOBILITY SCORE: 12

## 2023-10-20 ASSESSMENT — LIFESTYLE VARIABLES
HOW OFTEN DO YOU HAVE A DRINK CONTAINING ALCOHOL: MONTHLY OR LESS
AUDIT-C TOTAL SCORE: 3
SKIP TO QUESTIONS 9-10: 0
HOW OFTEN DO YOU HAVE 6 OR MORE DRINKS ON ONE OCCASION: MONTHLY
AUDIT-C TOTAL SCORE: 3
HOW MANY STANDARD DRINKS CONTAINING ALCOHOL DO YOU HAVE ON A TYPICAL DAY: 1 OR 2

## 2023-10-20 ASSESSMENT — PATIENT HEALTH QUESTIONNAIRE - PHQ9
SUM OF ALL RESPONSES TO PHQ9 QUESTIONS 1 & 2: 2
1. LITTLE INTEREST OR PLEASURE IN DOING THINGS: SEVERAL DAYS
2. FEELING DOWN, DEPRESSED OR HOPELESS: SEVERAL DAYS

## 2023-10-20 ASSESSMENT — PAIN SCALES - GENERAL
PAINLEVEL_OUTOF10: 0 - NO PAIN

## 2023-10-20 ASSESSMENT — ENCOUNTER SYMPTOMS
SORE THROAT: 0
TROUBLE SWALLOWING: 0

## 2023-10-20 ASSESSMENT — PAIN - FUNCTIONAL ASSESSMENT
PAIN_FUNCTIONAL_ASSESSMENT: 0-10
PAIN_FUNCTIONAL_ASSESSMENT: 0-10

## 2023-10-20 NOTE — SIGNIFICANT EVENT
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  PEG tube placed in the body of the stomach  The duodenum appeared normal.        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  A [employer] PEG tube measuring 20 Fr was successfully placed in the body of the stomach via the pull technique after the site was identified via transillumination, visualized indentation and needle passed through abdominal wall; distance from external bolster to external end of tube: 2.5 cm; scope reinserted and tube rotated freely to confirm placement  The duodenum appeared normal.        Recommendation  Okay to use PEG tube for feeds, flushes and meds after 3 hours.   Start Pantoprazole 40 BID  GI Consult team to round on patient, post procedure.  Await pathology results    Thank you for the consultation.  The consulting team will sign off now.  Please do not hesitate to contact us again on by paging the consultation team again between the weekday hours of 7 AM - 5 PM. If there is an urgent concern during the weekend, after-hours, or holidays; then please page the [place] GI fellow at [phone]. Thank you.    Zach Martin

## 2023-10-20 NOTE — PROGRESS NOTES
"Vancomycin Dosing by Pharmacy- FOLLOW UP    Phong Wong is a 73 y.o. year old male who Pharmacy has been consulted for vancomycin dosing for cellulitis, skin and soft tissue. Based on the patient's indication and renal status this patient is being dosed based on a goal trough/random level of 10-15.     Renal function is currently improving, still continue to dose by level    Most recent trough level: 9.5 mcg/mL    Visit Vitals  /55   Pulse 70   Temp 36.5 °C (97.7 °F) (Temporal)   Resp 13        Lab Results   Component Value Date    CREATININE 1.66 (H) 10/19/2023    CREATININE 2.52 (H) 10/18/2023    CREATININE 1.14 10/10/2023    CREATININE 1.17 10/09/2023        Patient weight is No results found for: \"PTWEIGHT\"    No results found for: \"CULTURE\"     I/O last 3 completed shifts:  In: 1839.2 (34.1 mL/kg) [I.V.:539.2 (10 mL/kg); IV Piggyback:1300]  Out: 2725 (50.5 mL/kg) [Urine:2725 (1.4 mL/kg/hr)]  Weight: 54 kg   [unfilled]    Lab Results   Component Value Date    PATIENTTEMP 37.0 10/18/2023    PATIENTTEMP 37.0 10/18/2023    PATIENTTEMP 37.0 10/18/2023        Assessment/Plan    Below goal random/trough level. Orders placed for new vancomycin regimen of 1250mg X1 dose  The next level will be obtained on 10/21 w/ Am labs. May be obtained sooner if clinically indicated.   Will continue to monitor renal function daily while on vancomycin and order serum creatinine at least every 48 hours if not already ordered.  Follow for continued vancomycin needs, clinical response, and signs/symptoms of toxicity.       Quynh Qureshi, PharmD           "

## 2023-10-20 NOTE — PROGRESS NOTES
Vancomycin Dosing by Pharmacy- Cessation of Therapy    Consult to pharmacy for vancomycin dosing has been discontinued by the prescriber, pharmacy will sign off at this time.    Please call pharmacy if there are further questions or re-enter a consult if vancomycin is resumed.     Kd Urbina, JemimaD

## 2023-10-20 NOTE — ANESTHESIA POSTPROCEDURE EVALUATION
Patient: Phong Wong    Procedure Summary       Date: 10/20/23 Room / Location: [facility]    Anesthesia Start: 1214 Anesthesia Stop: 1313    Procedure: EGD Diagnosis:       Pain after radiation therapy      Protein-calorie malnutrition, unspecified severity (CMS/HCC)      Oropharyngeal cancer (CMS/HCC)    Scheduled Providers: Azeem Bob MD; Lila Muniz MD; Lila Molina RN Responsible Provider: Lila Muniz MD    Anesthesia Type: MAC ASA Status: 3            Anesthesia Type: MAC    Vitals Value Taken Time   /60 10/20/23 1335   Temp 37.3 °C (99.1 °F) 10/20/23 1315   Pulse 66 10/20/23 1335   Resp 14 10/20/23 1335   SpO2 100 % 10/20/23 1335       Anesthesia Post Evaluation    Patient location during evaluation: PACU  Level of consciousness: awake  Pain management: adequate  Airway patency: patent  Cardiovascular status: acceptable  Respiratory status: acceptable  Hydration status: acceptable        There were no known notable events for this encounter.
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
## 2023-10-20 NOTE — ANESTHESIA PREPROCEDURE EVALUATION
Patient: Phong Wong    Procedure Information       Date/Time: 10/20/23 0700    Procedure: EGD    Location: [facility] ([facility]            Relevant Problems   Anesthesia (within normal limits)      Cardiovascular   (+) Arteriosclerosis of carotid artery   (+) Asymptomatic bilateral carotid artery stenosis   (+) Atherosclerotic heart disease of native coronary artery without angina pectoris   (+) Benign essential hypertension   (+) Hyperlipidemia   (+) PAD (peripheral artery disease) (CMS/HCC)      /Renal   (+) Chronic renal impairment, stage 3a (CMS/HCC)      Neuro/Psych   (+) Arteriosclerosis of carotid artery   (+) Asymptomatic bilateral carotid artery stenosis      Pulmonary   (+) Asthma   (+) Chronic obstructive pulmonary disease (CMS/HCC)   (+) SOB (shortness of breath) on exertion      GI/Hepatic   (+) Oropharyngeal cancer (CMS/HCC)      Hematology   (+) Anemia associated with chemotherapy   (+) Pancytopenia (CMS/HCC)       Clinical information reviewed:                 There were no vitals filed for this visit.    Past Surgical History:   Procedure Laterality Date    CT ABDOMEN PELVIS ANGIOGRAM W AND/OR WO IV CONTRAST  9/3/2014    CT ABDOMEN PELVIS ANGIOGRAM W AND/OR WO IV CONTRAST 9/3/2014 [facility]    IR ANGIOGRAM AORTA ABDOMEN  11/6/2014    IR ANGIOGRAM AORTA ABDOMEN 11/6/2014 [facility] [place] [facility]     Past Medical History:   Diagnosis Date    Personal history of diseases of the blood and blood-forming organs and certain disorders involving the immune mechanism 07/27/2017    History of thrombocytosis     No current facility-administered medications for this visit.    Current Outpatient Medications:     acetaminophen (Tylenol) 500 mg tablet, Take 2 tablets (1,000 mg) by mouth every 6 hours if needed for mild pain (1 - 3). Not to exceed 4000mg daily, Disp: , Rfl:     aspirin 81 mg EC tablet, Take 1 tablet (81 mg) by mouth once daily., Disp: , Rfl:     atorvastatin (Lipitor) 40 mg tablet,  Take 1 tablet (40 mg) by mouth once daily. (Patient not taking: Reported on 10/4/2023), Disp: 90 tablet, Rfl: 2    budesonide (Pulmicort) 1 mg/2 mL nebulizer solution, Take 2 mL (1 mg) by nebulization 2 times a day. Rinse mouth after use, Disp: 60 mL, Rfl: 0    dexAMETHasone (Decadron) 4 mg tablet, Take 1 tablet (4 mg) by mouth once daily in the morning for 14 days., Disp: 14 tablet, Rfl: 0    fluticasone-umeclidin-vilanter (Trelegy Ellipta) 100-62.5-25 mcg blister with device, INHALE 1 PUFF EVERY DAY, Disp: 3 each, Rfl: 3    ipratropium-albuteroL (Duo-Neb) 0.5-2.5 mg/3 mL nebulizer solution, Take 3 mL by nebulization 3 times a day as needed for wheezing or shortness of breath., Disp: , Rfl:     lido-diphen-Maalox 1:1:1 Magic Mouthwash, Swish and spit 10 mL every 6 hours if needed (oral pain)., Disp: , Rfl: 0    losartan (Cozaar) 100 mg tablet, Take 1 tablet (100 mg) by mouth once daily., Disp: , Rfl:     naloxone (Narcan) 4 mg/0.1 mL nasal spray, Administer 1 spray (4 mg) into affected nostril(s) if needed for opioid reversal or respiratory depression for up to 2 doses. May repeat every 2-3 minutes if needed, alternating nostrils, until medical assistance becomes available., Disp: 2 each, Rfl: 1    ondansetron (Zofran) 8 mg tablet, Take 1 tablet (8 mg) by mouth every 8 hours if needed for nausea or vomiting., Disp: , Rfl:     silver sulfADIAZINE (Silvadene) 1 % cream, Apply topically once daily. Do not start before October 11, 2023. (Patient taking differently: Apply 1 Application topically once daily. On neck), Disp: , Rfl:     spironolactone (Aldactone) 25 mg tablet, Take 1 tablet (25 mg) by mouth once every 24 hours. Do not start before October 11, 2023., Disp: , Rfl:     Facility-Administered Medications Ordered in Other Visits:     acetaminophen (Tylenol) tablet 650 mg, 650 mg, oral, q6h PRN, Vianca Bruno MD    ampicillin-sulbactam (Unasyn) in sodium chloride 0.9 % 100 mL 3 g, 3 g, intravenous, q12h, Rodriguez  Paola Rosa MD, Stopped at 10/19/23 2217    aspirin EC tablet 81 mg, 81 mg, oral, Daily, Vianca Bruno MD, 81 mg at 10/19/23 0900    budesonide (Pulmicort) 0.5 mg/2 mL nebulizer solution 1 mg, 1 mg, nebulization, BID, Vianca Bruno MD, 1 mg at 10/19/23 2055    ceFAZolin (Ancef) 2 g in dextrose 5 % in water (D5W) 100 mL IV, 2 g, intravenous, Once, Michael Rosa MD    dextrose 5 % and sodium chloride 0.9 % with KCl 20 mEq/L infusion, 75 mL/hr, intravenous, Continuous, Helena Martinez MD, Last Rate: 75 mL/hr at 10/19/23 1604, 75 mL/hr at 10/19/23 1604    fluticasone furoate-vilanteroL (Breo Ellipta) 100-25 mcg/dose inhaler 1 puff, 1 puff, inhalation, Daily, Vianca Bruno MD, 1 puff at 10/19/23 0935    ipratropium-albuteroL (Duo-Neb) 0.5-2.5 mg/3 mL nebulizer solution 3 mL, 3 mL, nebulization, TID PRN, Vianca Bruno MD, 3 mL at 10/19/23 0935    lido-diphen-Maalox 1:1:1 Magic Mouthwash, 10 mL, Swish & Spit, q6h PRN, Vianca Bruno MD    ondansetron (Zofran) tablet 8 mg, 8 mg, oral, q8h PRN, Vianca Bruno MD    oxyCODONE (Roxicodone) solution 5 mg, 5 mg, oral, q6h PRN, Michael Rosa MD, 5 mg at 10/19/23 1604    pantoprazole (ProtoNix) injection 40 mg, 40 mg, intravenous, Daily before breakfast, Michael Rosa MD, 40 mg at 10/19/23 1823    phenoL (Chloraseptic) 1.4 % mouth/throat spray 1 spray, 1 spray, Mouth/Throat, q2h PRN, Vianca Bruno MD    silver sulfADIAZINE (Silvadene) 1 % cream, , Topical, Daily, Vianca Bruno MD, Given at 10/19/23 0945    vancomycin (Vancocin) in dextrose 5 % water (D5W) 250 mL IV 1,250 mg, 1,250 mg, intravenous, Once, Vianca Bruno MD    vancomycin (Vancocin) placeholder, , miscellaneous, Daily PRN, Vianca Bruno MD  Prior to Admission medications    Medication Sig Start Date End Date Taking? Authorizing Provider   acetaminophen (Tylenol) 500 mg tablet Take 2 tablets (1,000 mg) by mouth every 6 hours if needed for mild pain (1 - 3). Not to exceed 4000mg daily     Historical Provider, MD   aspirin 81 mg EC tablet Take 1 tablet (81 mg) by mouth once daily.    Historical Provider, MD   atorvastatin (Lipitor) 40 mg tablet Take 1 tablet (40 mg) by mouth once daily.  Patient not taking: Reported on 10/4/2023 5/25/23   Harshil Weiss MD   budesonide (Pulmicort) 1 mg/2 mL nebulizer solution Take 2 mL (1 mg) by nebulization 2 times a day. Rinse mouth after use 10/6/23   OSMIN Stone   dexAMETHasone (Decadron) 4 mg tablet Take 1 tablet (4 mg) by mouth once daily in the morning for 14 days. 10/4/23 10/18/23  Lyssa Patten PA-C   fluticasone-umeclidin-vilanter (Trelegy Ellipta) 100-62.5-25 mcg blister with device INHALE 1 PUFF EVERY DAY 4/28/23   Harshil Weiss MD   ipratropium-albuteroL (Duo-Neb) 0.5-2.5 mg/3 mL nebulizer solution Take 3 mL by nebulization 3 times a day as needed for wheezing or shortness of breath. 3/9/22   Historical Provider, MD   lido-diphen-Maalox 1:1:1 Magic Mouthwash Swish and spit 10 mL every 6 hours if needed (oral pain). 10/10/23 11/9/23  OSMIN Stone   losartan (Cozaar) 100 mg tablet Take 1 tablet (100 mg) by mouth once daily. 8/30/22   Historical Provider, MD   morphine 100 mg/5 mL (20 mg/mL) concentrated oral solution Take 0.3 mL (6 mg) by mouth every 6 hours if needed for severe pain (7 - 10) for up to 3 days. 10/10/23 10/19/23  OSMIN Stone   naloxone (Narcan) 4 mg/0.1 mL nasal spray Administer 1 spray (4 mg) into affected nostril(s) if needed for opioid reversal or respiratory depression for up to 2 doses. May repeat every 2-3 minutes if needed, alternating nostrils, until medical assistance becomes available. 10/4/23   George Sapp MD   ondansetron (Zofran) 8 mg tablet Take 1 tablet (8 mg) by mouth every 8 hours if needed for nausea or vomiting. 8/17/23   Historical Provider, MD   silver sulfADIAZINE (Silvadene) 1 % cream Apply topically once daily. Do not start before October 11, 2023.  Patient taking  differently: Apply 1 Application topically once daily. On neck 10/11/23   OSMIN Stone   spironolactone (Aldactone) 25 mg tablet Take 1 tablet (25 mg) by mouth once every 24 hours. Do not start before 2023. 10/11/23   OSMIN Stone   carvedilol (Coreg) 25 mg tablet Take by mouth 2 times a day with meals.  10/19/23  Historical Provider, MD   chlorthalidone (Hygroton) 25 mg tablet Take by mouth once daily.  10/19/23  Historical Provider, MD   oxyCODONE (Roxicodone) 5 mg immediate release tablet Take 1 tablet (5 mg) by mouth every 6 hours if needed for severe pain (7 - 10). Per wife recently got the 10mg but this was too much for patient. Was extremely drowsy and hallucinating 9/5/23 10/19/23  Historical Provider, MD     Allergies   Allergen Reactions    Codeine Nausea Only     Social History     Tobacco Use    Smoking status: Former     Packs/day: 1.00     Years: 40.00     Additional pack years: 0.00     Total pack years: 40.00     Types: Cigarettes     Quit date:      Years since quittin.8    Smokeless tobacco: Never   Substance Use Topics    Alcohol use: Yes     Alcohol/week: 12.0 standard drinks of alcohol     Types: 12 Cans of beer per week         Chemistry    Lab Results   Component Value Date/Time     10/20/2023 0542    K 3.9 10/20/2023 0542     (H) 10/20/2023 0542    CO2 23 10/20/2023 0542    BUN 34 (H) 10/20/2023 0542    CREATININE 1.23 10/20/2023 0542    Lab Results   Component Value Date/Time    CALCIUM 6.8 (L) 10/20/2023 0542    ALKPHOS 51 10/19/2023 0736    AST 14 10/19/2023 0736    ALT 8 (L) 10/19/2023 0736    BILITOT 0.6 10/19/2023 0736          Lab Results   Component Value Date/Time    WBC 3.9 (L) 10/20/2023 0050    HGB 7.3 (L) 10/20/2023 0050    HCT 21.4 (L) 10/20/2023 005     10/20/2023 0050     Lab Results   Component Value Date/Time    PROTIME 12.8 10/18/2023 1513    INR 1.1 10/18/2023 1513       NPO Detail:  No data recorded      Physical Exam    Airway  Mallampati: II  TM distance: >3 FB  Neck ROM: full     Cardiovascular    Dental   (+) upper dentures, lower dentures     Pulmonary    Abdominal            Anesthesia Plan    ASA 3     MAC     The patient is not a current smoker.    intravenous induction   Anesthetic plan and risks discussed with patient and spouse.    Plan discussed with CAA and attending.

## 2023-10-21 LAB
ALBUMIN SERPL BCP-MCNC: 2.4 G/DL (ref 3.4–5)
ALBUMIN SERPL BCP-MCNC: 2.5 G/DL (ref 3.4–5)
ANION GAP SERPL CALC-SCNC: 10 MMOL/L (ref 10–20)
ANION GAP SERPL CALC-SCNC: 14 MMOL/L (ref 10–20)
BASOPHILS # BLD AUTO: 0.01 X10*3/UL (ref 0–0.1)
BASOPHILS NFR BLD AUTO: 0.3 %
BUN SERPL-MCNC: 15 MG/DL (ref 6–23)
BUN SERPL-MCNC: 20 MG/DL (ref 6–23)
CALCIUM SERPL-MCNC: 7.8 MG/DL (ref 8.6–10.6)
CALCIUM SERPL-MCNC: 8 MG/DL (ref 8.6–10.6)
CHLORIDE SERPL-SCNC: 104 MMOL/L (ref 98–107)
CHLORIDE SERPL-SCNC: 107 MMOL/L (ref 98–107)
CO2 SERPL-SCNC: 24 MMOL/L (ref 21–32)
CO2 SERPL-SCNC: 25 MMOL/L (ref 21–32)
CREAT SERPL-MCNC: 1.19 MG/DL (ref 0.5–1.3)
CREAT SERPL-MCNC: 1.22 MG/DL (ref 0.5–1.3)
EOSINOPHIL # BLD AUTO: 0.03 X10*3/UL (ref 0–0.4)
EOSINOPHIL NFR BLD AUTO: 0.8 %
ERYTHROCYTE [DISTWIDTH] IN BLOOD BY AUTOMATED COUNT: 17.7 % (ref 11.5–14.5)
GFR SERPL CREATININE-BSD FRML MDRD: 63 ML/MIN/1.73M*2
GFR SERPL CREATININE-BSD FRML MDRD: 65 ML/MIN/1.73M*2
GLUCOSE SERPL-MCNC: 93 MG/DL (ref 74–99)
GLUCOSE SERPL-MCNC: 94 MG/DL (ref 74–99)
HCT VFR BLD AUTO: 24.9 % (ref 41–52)
HGB BLD-MCNC: 7.8 G/DL (ref 13.5–17.5)
IMM GRANULOCYTES # BLD AUTO: 0.01 X10*3/UL (ref 0–0.5)
IMM GRANULOCYTES NFR BLD AUTO: 0.3 % (ref 0–0.9)
LYMPHOCYTES # BLD AUTO: 0.39 X10*3/UL (ref 0.8–3)
LYMPHOCYTES NFR BLD AUTO: 10.6 %
MAGNESIUM SERPL-MCNC: 1.71 MG/DL (ref 1.6–2.4)
MAGNESIUM SERPL-MCNC: 2.23 MG/DL (ref 1.6–2.4)
MCH RBC QN AUTO: 28.6 PG (ref 26–34)
MCHC RBC AUTO-ENTMCNC: 31.3 G/DL (ref 32–36)
MCV RBC AUTO: 91 FL (ref 80–100)
MONOCYTES # BLD AUTO: 0.42 X10*3/UL (ref 0.05–0.8)
MONOCYTES NFR BLD AUTO: 11.4 %
NEUTROPHILS # BLD AUTO: 2.82 X10*3/UL (ref 1.6–5.5)
NEUTROPHILS NFR BLD AUTO: 76.6 %
NRBC BLD-RTO: 0 /100 WBCS (ref 0–0)
PHOSPHATE SERPL-MCNC: 2.9 MG/DL (ref 2.5–4.9)
PHOSPHATE SERPL-MCNC: 3.4 MG/DL (ref 2.5–4.9)
PLATELET # BLD AUTO: 321 X10*3/UL (ref 150–450)
PMV BLD AUTO: 9.3 FL (ref 7.5–11.5)
POTASSIUM SERPL-SCNC: 3.8 MMOL/L (ref 3.5–5.3)
POTASSIUM SERPL-SCNC: 3.9 MMOL/L (ref 3.5–5.3)
RBC # BLD AUTO: 2.73 X10*6/UL (ref 4.5–5.9)
SODIUM SERPL-SCNC: 138 MMOL/L (ref 136–145)
SODIUM SERPL-SCNC: 138 MMOL/L (ref 136–145)
WBC # BLD AUTO: 3.7 X10*3/UL (ref 4.4–11.3)

## 2023-10-21 PROCEDURE — 2500000005 HC RX 250 GENERAL PHARMACY W/O HCPCS

## 2023-10-21 PROCEDURE — 85025 COMPLETE CBC W/AUTO DIFF WBC: CPT | Mod: CMCLAB

## 2023-10-21 PROCEDURE — 92610 EVALUATE SWALLOWING FUNCTION: CPT | Mod: GN

## 2023-10-21 PROCEDURE — 83735 ASSAY OF MAGNESIUM: CPT | Mod: CMCLAB

## 2023-10-21 PROCEDURE — 51701 INSERT BLADDER CATHETER: CPT

## 2023-10-21 PROCEDURE — 2500000004 HC RX 250 GENERAL PHARMACY W/ HCPCS (ALT 636 FOR OP/ED): Performed by: STUDENT IN AN ORGANIZED HEALTH CARE EDUCATION/TRAINING PROGRAM

## 2023-10-21 PROCEDURE — 2500000004 HC RX 250 GENERAL PHARMACY W/ HCPCS (ALT 636 FOR OP/ED)

## 2023-10-21 PROCEDURE — 1170000001 HC PRIVATE ONCOLOGY ROOM DAILY

## 2023-10-21 PROCEDURE — 99232 SBSQ HOSP IP/OBS MODERATE 35: CPT

## 2023-10-21 PROCEDURE — C9113 INJ PANTOPRAZOLE SODIUM, VIA: HCPCS

## 2023-10-21 PROCEDURE — 2500000001 HC RX 250 WO HCPCS SELF ADMINISTERED DRUGS (ALT 637 FOR MEDICARE OP): Performed by: STUDENT IN AN ORGANIZED HEALTH CARE EDUCATION/TRAINING PROGRAM

## 2023-10-21 PROCEDURE — 80069 RENAL FUNCTION PANEL: CPT

## 2023-10-21 PROCEDURE — 36415 COLL VENOUS BLD VENIPUNCTURE: CPT | Mod: CMCLAB

## 2023-10-21 PROCEDURE — 83735 ASSAY OF MAGNESIUM: CPT

## 2023-10-21 RX ORDER — THIAMINE HYDROCHLORIDE 100 MG/ML
100 INJECTION, SOLUTION INTRAMUSCULAR; INTRAVENOUS DAILY
Status: COMPLETED | OUTPATIENT
Start: 2023-10-21 | End: 2023-10-27

## 2023-10-21 RX ORDER — PANTOPRAZOLE SODIUM 40 MG/10ML
40 INJECTION, POWDER, LYOPHILIZED, FOR SOLUTION INTRAVENOUS 2 TIMES DAILY
Status: DISCONTINUED | OUTPATIENT
Start: 2023-10-21 | End: 2023-10-24 | Stop reason: ALTCHOICE

## 2023-10-21 RX ORDER — MAGNESIUM SULFATE HEPTAHYDRATE 40 MG/ML
2 INJECTION, SOLUTION INTRAVENOUS ONCE
Status: COMPLETED | OUTPATIENT
Start: 2023-10-21 | End: 2023-10-21

## 2023-10-21 RX ADMIN — SILVER SULFADIAZINE: 10 CREAM TOPICAL at 08:49

## 2023-10-21 RX ADMIN — PANTOPRAZOLE SODIUM 40 MG: 40 TABLET, DELAYED RELEASE ORAL at 06:25

## 2023-10-21 RX ADMIN — THIAMINE HYDROCHLORIDE 100 MG: 100 INJECTION, SOLUTION INTRAMUSCULAR; INTRAVENOUS at 13:41

## 2023-10-21 RX ADMIN — POTASSIUM CHLORIDE, DEXTROSE MONOHYDRATE AND SODIUM CHLORIDE 75 ML/HR: 150; 5; 900 INJECTION, SOLUTION INTRAVENOUS at 04:03

## 2023-10-21 RX ADMIN — MAGNESIUM SULFATE HEPTAHYDRATE 2 G: 40 INJECTION, SOLUTION INTRAVENOUS at 13:41

## 2023-10-21 RX ADMIN — PANTOPRAZOLE SODIUM 40 MG: 40 INJECTION, POWDER, FOR SOLUTION INTRAVENOUS at 21:31

## 2023-10-21 RX ADMIN — ASPIRIN 81 MG: 81 TABLET, COATED ORAL at 08:49

## 2023-10-21 RX ADMIN — POTASSIUM PHOSPHATE, MONOBASIC POTASSIUM PHOSPHATE, DIBASIC 15 MMOL: 224; 236 INJECTION, SOLUTION, CONCENTRATE INTRAVENOUS at 15:56

## 2023-10-21 ASSESSMENT — COGNITIVE AND FUNCTIONAL STATUS - GENERAL
WALKING IN HOSPITAL ROOM: A LOT
HELP NEEDED FOR BATHING: A LOT
TURNING FROM BACK TO SIDE WHILE IN FLAT BAD: A LOT
TOILETING: A LOT
DAILY ACTIVITIY SCORE: 12
STANDING UP FROM CHAIR USING ARMS: A LOT
MOVING TO AND FROM BED TO CHAIR: A LITTLE
EATING MEALS: A LOT
DAILY ACTIVITIY SCORE: 12
DRESSING REGULAR UPPER BODY CLOTHING: A LOT
DRESSING REGULAR UPPER BODY CLOTHING: A LOT
WALKING IN HOSPITAL ROOM: A LOT
CLIMB 3 TO 5 STEPS WITH RAILING: A LOT
DRESSING REGULAR LOWER BODY CLOTHING: A LOT
MOVING TO AND FROM BED TO CHAIR: A LITTLE
MOVING FROM LYING ON BACK TO SITTING ON SIDE OF FLAT BED WITH BEDRAILS: A LITTLE
HELP NEEDED FOR BATHING: A LOT
PERSONAL GROOMING: A LOT
EATING MEALS: A LOT
DRESSING REGULAR LOWER BODY CLOTHING: A LOT
PERSONAL GROOMING: A LOT
CLIMB 3 TO 5 STEPS WITH RAILING: A LOT
MOVING FROM LYING ON BACK TO SITTING ON SIDE OF FLAT BED WITH BEDRAILS: A LITTLE
MOBILITY SCORE: 14
STANDING UP FROM CHAIR USING ARMS: A LOT
TURNING FROM BACK TO SIDE WHILE IN FLAT BAD: A LOT
TOILETING: A LOT
MOBILITY SCORE: 14

## 2023-10-21 ASSESSMENT — PAIN - FUNCTIONAL ASSESSMENT: PAIN_FUNCTIONAL_ASSESSMENT: 0-10

## 2023-10-21 ASSESSMENT — PAIN SCALES - GENERAL
PAINLEVEL_OUTOF10: 0 - NO PAIN
PAINLEVEL_OUTOF10: 0 - NO PAIN

## 2023-10-21 NOTE — CARE PLAN
The patient's goals for the shift include      The clinical goals for the shift include free of injury and no pain complaints    Problem: Fall/Injury  Goal: Not fall by end of shift  10/20/2023 2009 by Mary Beth Stephens RN  Outcome: Progressing  10/20/2023 2008 by Mary Beth Stephens RN  Outcome: Progressing  Goal: Be free from injury by end of the shift  10/20/2023 2009 by Mary Beth Stephens RN  Outcome: Progressing  10/20/2023 2008 by Mary Beth Stephens RN  Outcome: Progressing  Goal: Verbalize understanding of personal risk factors for fall in the hospital  10/20/2023 2009 by Mary Beth Stephens RN  Outcome: Progressing  10/20/2023 2008 by Mary Beth Stephens RN  Outcome: Progressing  Goal: Verbalize understanding of risk factor reduction measures to prevent injury from fall in the home  10/20/2023 2009 by Mary Beth Stephens RN  Outcome: Progressing  10/20/2023 2008 by Mary Beth Stephens RN  Outcome: Progressing     Problem: Skin  Goal: Decreased wound size/increased tissue granulation at next dressing change  10/20/2023 2208 by Mary Beth Stephens RN  Flowsheets (Taken 10/20/2023 2208)  Decreased wound size/increased tissue granulation at next dressing change: Promote sleep for wound healing  10/20/2023 2009 by Mary Beth Stephens RN  Outcome: Progressing  10/20/2023 2008 by Mary Beth Stephens RN  Outcome: Progressing  Goal: Participates in plan/prevention/treatment measures  10/20/2023 2208 by Mary Beth Stephens RN  Flowsheets (Taken 10/20/2023 2208)  Participates in plan/prevention/treatment measures: Elevate heels  10/20/2023 2009 by Mary Beth Stephens RN  Outcome: Progressing  10/20/2023 2008 by Mary Beth Stephens RN  Outcome: Progressing  Goal: Prevent/manage excess moisture  10/20/2023 2208 by Mary Beth Stephens RN  Flowsheets (Taken 10/20/2023 2208)  Prevent/manage excess moisture: Cleanse incontinence/protect with barrier cream  10/20/2023 2009 by Mary Beth Stephens  RN  Outcome: Progressing  10/20/2023 2008 by Mary Beth Stephens RN  Outcome: Progressing  Goal: Prevent/minimize sheer/friction injuries  10/20/2023 2208 by Mary Beth Stephens RN  Flowsheets (Taken 10/20/2023 2208)  Prevent/minimize sheer/friction injuries: Turn/reposition every 2 hours/use positioning/transfer devices  10/20/2023 2009 by Mary Beth Stephens RN  Outcome: Progressing  10/20/2023 2008 by Mary Beth Stephens RN  Outcome: Progressing  Goal: Promote/optimize nutrition  10/20/2023 2208 by Mary Beth Stephens RN  Flowsheets (Taken 10/20/2023 2208)  Promote/optimize nutrition: Assist with feeding  10/20/2023 2009 by Mary Beth Stephens RN  Outcome: Progressing  10/20/2023 2008 by Mary Beth Stephens RN  Outcome: Progressing  Goal: Promote skin healing  10/20/2023 2208 by Mary Beth Stephens RN  Flowsheets (Taken 10/20/2023 2208)  Promote skin healing: Turn/reposition every 2 hours/use positioning/transfer devices  10/20/2023 2009 by Mary Beth Stephens RN  Outcome: Progressing  10/20/2023 2008 by Mary Beth Stephens RN  Outcome: Progressing

## 2023-10-21 NOTE — PROGRESS NOTES
Brief GI Note:    Examined patient at bedside. PEG site was examined:    -PEG site clean, dry, and nonerythematous  -minimal pain to palpation  -PEG measured ~3.5cm with ~1 cm between bumper and skin  -PEG spins 360 degrees without difficulty  -OK to administer meds, water and enteral feeds through the PEG now  -Would avoid placing any gauze or barriers between the skin and external bumper to avoid buried bumper syndrome  -Monitor site daily for bleeding  -Ensure that patient has an abdominal binder on at all times to prevent self-extubation  -Nutrition consult for enteral feeding  -PEG supplies should be provided at discharge      Thank you for this interesting consult. Gastroenterology will SIGN OFF. If any further questions:  -During weekday hours of 7am-5pm please do not hesitate to contact me on [employer] Chat or page [phone] if there are any further questions between the weekday hours of 7 AM - 5 PM.   -After hours, on weekends, and on holidays, please page [place] [phone]. Thank you.

## 2023-10-21 NOTE — CONSULTS
"Nutrition Initial Assessment:  Reason for Assessment: Tube feeding recommendations  Phong Wong is a 73M with PMH of oropharyngeal cancer s/p chemo/XRT, prostate cancer s/p XRT, pancytopenia, HTN, CAD, PAD, and COPD who was admitted on 10/18/23 for lethargy and weakness from outpatient heme/onc appointment [facility] several weeks of poor PO intake and opioid use. Patient hypotensive and hypoxic with elevated lactate on admission, concerning for infection. Recent odynophagia after chemo/XRT, questionable infectious etiology given neutropenia and clinical picture.     EGD and PEG tube placed 10/20  Pt is on a regular diet    Patient is a 73 y.o. male presenting with: Hypovolemic Shock    Nutrition History:  Food and Nutrient History: Met with patient this morning. Pt reports he ate 75% of oatmeal in total for the past few days. Pt reports that he does not like the food at rehab and does not eat. Pt denies nausea, vomiting, diarrhea, constipation, and stomach pain. Pt reports he does not know when his last BM occured. Pt wanting to go back to sleep and reports that he was drinking boost BID prior to admission. Pt was following with RDN outpatient and last seen 10/18  Food Allergies/Intolerances:  None  Energy intake: Energy Intake: Poor < 50 %  GI Symptoms: None  Vitamin/Herbal Supplement Use: Boost BID  Oral Problems:  pt report the PEG was placed.     Anthropometrics:  Height: 177.8 cm (5' 10\")  Weight: 54 kg (119 lb 0.8 oz)  BMI (Calculated): 17.08  IBW/kg (Dietitian Calculated): 75 kg    Objective/Subjective Weight History:   Wt Readings from Last 7 Encounters:   10/21/23 54 kg (119 lb 0.8 oz)   10/10/23 54 kg (119 lb 0.8 oz)   10/04/23 59.4 kg (130 lb 15.3 oz) (9% wt loss x ~ 2 weeks)-    10/02/23 61.5 kg (135 lb 9.3 oz)   08/25/23 66.7 kg (147 lb) (19% wt loss x ~ 2 months) - significant    05/25/23 71.7 kg (158 lb) (24.7% wt loss x ~ 5 months) - significant    09/30/22 71.3 kg (157 lb 2 oz)       Weight Change " %:  Weight History / % Weight Change: Pt reports wt loss  Significant Weight Loss: Yes  Interpretation of Weight Loss: >5% in 1 month (9% wt loss x 2 weeks; 19% wt loss x ~ 2 months; 24.&% wt loss x ~ 5 months)       Nutrition Focused Physical Exam Findings:  defer: Pt refuse, wanting to sleep  Edema:  Edema: none   Physical Findings:  Skin: Positive    Objective Data:  Nutrition Significant Labs:  Results for orders placed or performed during the hospital encounter of 10/18/23 (from the past 24 hour(s))   CBC and Auto Differential   Result Value Ref Range    WBC 3.8 (L) 4.4 - 11.3 x10*3/uL    nRBC 0.0 0.0 - 0.0 /100 WBCs    RBC 2.34 (L) 4.50 - 5.90 x10*6/uL    Hemoglobin 6.7 (L) 13.5 - 17.5 g/dL    Hematocrit 19.7 (L) 41.0 - 52.0 %    MCV 84 80 - 100 fL    MCH 28.6 26.0 - 34.0 pg    MCHC 34.0 32.0 - 36.0 g/dL    RDW 17.2 (H) 11.5 - 14.5 %    Platelets 286 150 - 450 x10*3/uL    MPV 9.8 7.5 - 11.5 fL    Immature Granulocytes %, Automated 0.8 0.0 - 0.9 %    Immature Granulocytes Absolute, Automated 0.03 0.00 - 0.50 x10*3/uL   Manual Differential   Result Value Ref Range    Neutrophils %, Manual 92.1 40.0 - 80.0 %    Lymphocytes %, Manual 4.9 13.0 - 44.0 %    Monocytes %, Manual 3.0 2.0 - 10.0 %    Eosinophils %, Manual 0.0 0.0 - 6.0 %    Basophils %, Manual 0.0 0.0 - 2.0 %    Seg Neutrophils Absolute, Manual 3.50 1.60 - 5.00 x10*3/uL    Lymphocytes Absolute, Manual 0.19 (L) 0.80 - 3.00 x10*3/uL    Monocytes Absolute, Manual 0.11 0.05 - 0.80 x10*3/uL    Eosinophils Absolute, Manual 0.00 0.00 - 0.40 x10*3/uL    Basophils Absolute, Manual 0.00 0.00 - 0.10 x10*3/uL    Total Cells Counted 101     RBC Morphology No significant RBC morphology present    CBC and Auto Differential   Result Value Ref Range    WBC 3.7 (L) 4.4 - 11.3 x10*3/uL    nRBC 0.0 0.0 - 0.0 /100 WBCs    RBC 2.73 (L) 4.50 - 5.90 x10*6/uL    Hemoglobin 7.8 (L) 13.5 - 17.5 g/dL    Hematocrit 24.9 (L) 41.0 - 52.0 %    MCV 91 80 - 100 fL    MCH 28.6 26.0 - 34.0  pg    MCHC 31.3 (L) 32.0 - 36.0 g/dL    RDW 17.7 (H) 11.5 - 14.5 %    Platelets 321 150 - 450 x10*3/uL    MPV 9.3 7.5 - 11.5 fL    Neutrophils % 76.6 40.0 - 80.0 %    Immature Granulocytes %, Automated 0.3 0.0 - 0.9 %    Lymphocytes % 10.6 13.0 - 44.0 %    Monocytes % 11.4 2.0 - 10.0 %    Eosinophils % 0.8 0.0 - 6.0 %    Basophils % 0.3 0.0 - 2.0 %    Neutrophils Absolute 2.82 1.60 - 5.50 x10*3/uL    Immature Granulocytes Absolute, Automated 0.01 0.00 - 0.50 x10*3/uL    Lymphocytes Absolute 0.39 (L) 0.80 - 3.00 x10*3/uL    Monocytes Absolute 0.42 0.05 - 0.80 x10*3/uL    Eosinophils Absolute 0.03 0.00 - 0.40 x10*3/uL    Basophils Absolute 0.01 0.00 - 0.10 x10*3/uL   Renal Function Panel   Result Value Ref Range    Glucose 93 74 - 99 mg/dL    Sodium 138 136 - 145 mmol/L    Potassium 3.8 3.5 - 5.3 mmol/L    Chloride 107 98 - 107 mmol/L    Bicarbonate 25 21 - 32 mmol/L    Anion Gap 10 10 - 20 mmol/L    Urea Nitrogen 20 6 - 23 mg/dL    Creatinine 1.22 0.50 - 1.30 mg/dL    eGFR 63 >60 mL/min/1.73m*2    Calcium 8.0 (L) 8.6 - 10.6 mg/dL    Phosphorus 2.9 2.5 - 4.9 mg/dL    Albumin 2.4 (L) 3.4 - 5.0 g/dL   Magnesium   Result Value Ref Range    Magnesium 1.71 1.60 - 2.40 mg/dL     Nutrition Specific Mediations:  Scheduled medications  aspirin, 81 mg, oral, Daily  budesonide, 1 mg, nebulization, BID  fluticasone furoate-vilanteroL, 1 puff, inhalation, Daily  pantoprazole, 40 mg, oral, BID AC  silver sulfADIAZINE, , Topical, Daily      Continuous medications  potassium chloride-D5-0.9%NaCl, 75 mL/hr, Last Rate: 75 mL/hr (10/21/23 0624)      PRN medications  PRN medications: acetaminophen, ipratropium-albuteroL, lidocaine-diphenhydrAMINE-Maalox 1:1:1, ondansetron, oxyCODONE, phenoL    I/O:     Intake/Output Summary (Last 24 hours) at 10/21/2023 1203  Last data filed at 10/21/2023 1000  Gross per 24 hour   Intake 1652.5 ml   Output 890 ml   Net 762.5 ml       Dietary Orders (From admission, onward)       Start     Ordered     10/21/23 1100  Adult diet Regular  Diet effective now        Question:  Diet type  Answer:  Regular    10/21/23 1059                     Estimated Needs:   Total Energy Estimated Needs (kCal): 1700 kCal   Method for Estimating Needs: MSJ= 1306 x 1.3; ABW x 35  Daily kcal needs range: 1137-1779  Total Protein Estimated Needs (g): 65 g   Method for Estimating Needs: ABW x 1.2-1.5  Daily protein needs range: 65-80  Total Fluid Estimated Needs (mL):  (per team)   Method for Estimating Needs: per team       Nutrition Diagnosis:  Malnutrition Diagnosis  Patient has Malnutrition Diagnosis: Yes  Diagnosis Status: New  Malnutrition Diagnosis: Severe malnutrition related to starvation  As Evidenced by: <=50% of estimated energy requirement for >= 1 month; >10% wt loss x ~ 6months; >7.5% wt loss x ~ 3 months;    (9% wt loss x ~ 2 weeks; 19% wt loss x ~ 2 months; 24.7% wt loss x ~ 5 months) BMI = 17.08    Nutrition Interventions and Recommendations:        Nutrition Prescription:  When able Initiate Isosource 1.5 @ 10 ml/hr and increase by 10 ml every 10 hours as tolerated to a goal rate of 50 ml/hr   Provides 1200 ml total volume, 1800 kcal, 82g PRO, 917 ml free water  Additional Flushes per team, TF provides 917 ml free water  Monitor Phosphorus, Potassium + Magnesium while titrating TF up, if drops occur please hold TF at current rate + replete (once WNL, can continue titrating up).   Recommend 100 mg IV/PO thiamin x 7-10 days d/t patient being at risk for refeeding syndrome.        Time Spent/Follow-up Reminder:   Follow Up  Time Spent (min): 90 minutes  Last Date of Nutrition Visit: 10/21/23  Nutrition Follow-Up Needed?: Dietitian to reassess per policy  Follow up Comment: Peg placed, TF recs provided

## 2023-10-21 NOTE — PROGRESS NOTES
Speech-Language Pathology    Inpatient Speech-Language Pathology Clinical Swallow Evaluation    Patient Name: Phong Wong  MRN: [id number]  Today's Date: 10/21/2023             Current Problem:   72 yo male with hx of COPD, CAD, PAD, hypertension, pancytopenia (2/2 chemo), prostate cancer s/p chemo (reported to be in remission), and oropharyngeal cancer (s/p Carboplatin/Paclitaxel and XRT, last tx 10/4/23) presenting with weakness and lethargy from outpt [facility] appointment. Pt now s/p EGD with PEG placement 10/20 findings concerning for esophagitis.     Speech Language Pathologist consulted for dysphagia and poor po intake with odynophagia 6/10 pain. Previous visit with  ENT per MD in June 2023.     Recommendations:  NPO continue alternative forms of nutrition and medications.    Complete oral care frequently throughout the day.   Allow 5-7 ice chips or small sips of water every 1-2 hours; after oral care.    Modified Barium Swallow Study.   ENT consult given oropharyngeal CA with odynophagia       Assessment:  Mr. Wong received in bed alert and cooperative. Pt oriented x2-3 with intact voice. Oral mechanism exam remarkable for bilateral radiation neck burns with blistering and oozing tissue. Pt consumed water sips x3 with notable multiple swallows per sips followed by evidence of airway invasion via post prandial cough. Additional PO trials not presented given risk of aspiration and suspected pharyngeal deficits. Pt will require additional imaging of the swallow via Modified Barium Swallow Study to determine PO safety and efficiency.       Plan:  Complete MBSS   Pt is motivated and wants to continue to eat.    Goals w/ frequency 2x a week  Patient will tolerate the least restrictive diet without overt s/s of oropharyngeal dysphagia 100% of the time.     Extensive education provided to patient re: current swallow function, recommendations/results and dysphagia POC.     Consultations/Referrals/Coordination of  Services: ENT

## 2023-10-22 VITALS
SYSTOLIC BLOOD PRESSURE: 135 MMHG | TEMPERATURE: 98 F | WEIGHT: 119.2 LBS | DIASTOLIC BLOOD PRESSURE: 60 MMHG | OXYGEN SATURATION: 98 % | RESPIRATION RATE: 18 BRPM | HEIGHT: 67 IN | HEART RATE: 84 BPM | BODY MASS INDEX: 18.71 KG/M2

## 2023-10-22 LAB
ALBUMIN SERPL BCP-MCNC: 2.3 G/DL (ref 3.4–5)
ALBUMIN SERPL BCP-MCNC: 2.4 G/DL (ref 3.4–5)
ALP SERPL-CCNC: 61 U/L (ref 33–136)
ALT SERPL W P-5'-P-CCNC: 10 U/L (ref 10–52)
ANION GAP SERPL CALC-SCNC: 12 MMOL/L (ref 10–20)
ANION GAP SERPL CALC-SCNC: 13 MMOL/L (ref 10–20)
AST SERPL W P-5'-P-CCNC: 15 U/L (ref 9–39)
BACTERIA BLD CULT: NORMAL
BASOPHILS # BLD AUTO: 0.02 X10*3/UL (ref 0–0.1)
BASOPHILS NFR BLD AUTO: 0.4 %
BILIRUB SERPL-MCNC: 0.6 MG/DL (ref 0–1.2)
BUN SERPL-MCNC: 13 MG/DL (ref 6–23)
BUN SERPL-MCNC: 13 MG/DL (ref 6–23)
CALCIUM SERPL-MCNC: 7.7 MG/DL (ref 8.6–10.6)
CALCIUM SERPL-MCNC: 7.8 MG/DL (ref 8.6–10.6)
CHLORIDE SERPL-SCNC: 104 MMOL/L (ref 98–107)
CHLORIDE SERPL-SCNC: 105 MMOL/L (ref 98–107)
CO2 SERPL-SCNC: 23 MMOL/L (ref 21–32)
CO2 SERPL-SCNC: 24 MMOL/L (ref 21–32)
CREAT SERPL-MCNC: 1.15 MG/DL (ref 0.5–1.3)
CREAT SERPL-MCNC: 1.2 MG/DL (ref 0.5–1.3)
EOSINOPHIL # BLD AUTO: 0.05 X10*3/UL (ref 0–0.4)
EOSINOPHIL NFR BLD AUTO: 1 %
ERYTHROCYTE [DISTWIDTH] IN BLOOD BY AUTOMATED COUNT: 17.2 % (ref 11.5–14.5)
GFR SERPL CREATININE-BSD FRML MDRD: 65 ML/MIN/1.73M*2
GFR SERPL CREATININE-BSD FRML MDRD: 68 ML/MIN/1.73M*2
GLUCOSE SERPL-MCNC: 101 MG/DL (ref 74–99)
GLUCOSE SERPL-MCNC: 95 MG/DL (ref 74–99)
HCT VFR BLD AUTO: 24.9 % (ref 41–52)
HGB BLD-MCNC: 7.8 G/DL (ref 13.5–17.5)
IMM GRANULOCYTES # BLD AUTO: 0.03 X10*3/UL (ref 0–0.5)
IMM GRANULOCYTES NFR BLD AUTO: 0.6 % (ref 0–0.9)
LYMPHOCYTES # BLD AUTO: 0.38 X10*3/UL (ref 0.8–3)
LYMPHOCYTES NFR BLD AUTO: 7.9 %
MAGNESIUM SERPL-MCNC: 1.86 MG/DL (ref 1.6–2.4)
MAGNESIUM SERPL-MCNC: 2.39 MG/DL (ref 1.6–2.4)
MCH RBC QN AUTO: 29.3 PG (ref 26–34)
MCHC RBC AUTO-ENTMCNC: 31.3 G/DL (ref 32–36)
MCV RBC AUTO: 94 FL (ref 80–100)
MONOCYTES # BLD AUTO: 0.48 X10*3/UL (ref 0.05–0.8)
MONOCYTES NFR BLD AUTO: 10 %
NEUTROPHILS # BLD AUTO: 3.83 X10*3/UL (ref 1.6–5.5)
NEUTROPHILS NFR BLD AUTO: 80.1 %
NRBC BLD-RTO: 0 /100 WBCS (ref 0–0)
PHOSPHATE SERPL-MCNC: 3.1 MG/DL (ref 2.5–4.9)
PHOSPHATE SERPL-MCNC: 3.3 MG/DL (ref 2.5–4.9)
PLATELET # BLD AUTO: 306 X10*3/UL (ref 150–450)
PMV BLD AUTO: 9.5 FL (ref 7.5–11.5)
POTASSIUM SERPL-SCNC: 4.1 MMOL/L (ref 3.5–5.3)
POTASSIUM SERPL-SCNC: 4.8 MMOL/L (ref 3.5–5.3)
PROT SERPL-MCNC: 4.5 G/DL (ref 6.4–8.2)
RBC # BLD AUTO: 2.66 X10*6/UL (ref 4.5–5.9)
SODIUM SERPL-SCNC: 135 MMOL/L (ref 136–145)
SODIUM SERPL-SCNC: 137 MMOL/L (ref 136–145)
WBC # BLD AUTO: 4.8 X10*3/UL (ref 4.4–11.3)

## 2023-10-22 PROCEDURE — 36415 COLL VENOUS BLD VENIPUNCTURE: CPT | Mod: CMCLAB

## 2023-10-22 PROCEDURE — 80053 COMPREHEN METABOLIC PANEL: CPT | Mod: CMCLAB

## 2023-10-22 PROCEDURE — 84100 ASSAY OF PHOSPHORUS: CPT | Mod: CMCLAB

## 2023-10-22 PROCEDURE — 99232 SBSQ HOSP IP/OBS MODERATE 35: CPT

## 2023-10-22 PROCEDURE — 2500000004 HC RX 250 GENERAL PHARMACY W/ HCPCS (ALT 636 FOR OP/ED)

## 2023-10-22 PROCEDURE — 2500000001 HC RX 250 WO HCPCS SELF ADMINISTERED DRUGS (ALT 637 FOR MEDICARE OP)

## 2023-10-22 PROCEDURE — 80069 RENAL FUNCTION PANEL: CPT | Mod: CMCLAB

## 2023-10-22 PROCEDURE — 97161 PT EVAL LOW COMPLEX 20 MIN: CPT | Mod: GP | Performed by: PHYSICAL MEDICINE & REHABILITATION

## 2023-10-22 PROCEDURE — 83735 ASSAY OF MAGNESIUM: CPT | Mod: CMCLAB

## 2023-10-22 PROCEDURE — C9113 INJ PANTOPRAZOLE SODIUM, VIA: HCPCS

## 2023-10-22 PROCEDURE — 85025 COMPLETE CBC W/AUTO DIFF WBC: CPT | Mod: CMCLAB

## 2023-10-22 PROCEDURE — 1170000001 HC PRIVATE ONCOLOGY ROOM DAILY

## 2023-10-22 RX ORDER — NAPROXEN SODIUM 220 MG/1
81 TABLET, FILM COATED ORAL DAILY
Status: DISCONTINUED | OUTPATIENT
Start: 2023-10-22 | End: 2023-11-02 | Stop reason: HOSPADM

## 2023-10-22 RX ORDER — TAMSULOSIN HYDROCHLORIDE 0.4 MG/1
0.4 CAPSULE ORAL
Status: DISCONTINUED | OUTPATIENT
Start: 2023-10-22 | End: 2023-10-22

## 2023-10-22 RX ORDER — DOXAZOSIN 4 MG/1
4 TABLET ORAL DAILY
Status: DISCONTINUED | OUTPATIENT
Start: 2023-10-22 | End: 2023-11-02 | Stop reason: HOSPADM

## 2023-10-22 RX ORDER — MAGNESIUM SULFATE HEPTAHYDRATE 40 MG/ML
2 INJECTION, SOLUTION INTRAVENOUS ONCE
Status: COMPLETED | OUTPATIENT
Start: 2023-10-22 | End: 2023-10-22

## 2023-10-22 RX ADMIN — MAGNESIUM SULFATE HEPTAHYDRATE 2 G: 40 INJECTION, SOLUTION INTRAVENOUS at 14:24

## 2023-10-22 RX ADMIN — PANTOPRAZOLE SODIUM 40 MG: 40 INJECTION, POWDER, FOR SOLUTION INTRAVENOUS at 08:39

## 2023-10-22 RX ADMIN — SILVER SULFADIAZINE: 10 CREAM TOPICAL at 08:39

## 2023-10-22 RX ADMIN — PANTOPRAZOLE SODIUM 40 MG: 40 INJECTION, POWDER, FOR SOLUTION INTRAVENOUS at 21:00

## 2023-10-22 RX ADMIN — DOXAZOSIN 4 MG: 4 TABLET ORAL at 16:36

## 2023-10-22 RX ADMIN — THIAMINE HYDROCHLORIDE 100 MG: 100 INJECTION, SOLUTION INTRAMUSCULAR; INTRAVENOUS at 08:39

## 2023-10-22 RX ADMIN — ASPIRIN 81 MG CHEWABLE TABLET 81 MG: 81 TABLET CHEWABLE at 08:39

## 2023-10-22 ASSESSMENT — COGNITIVE AND FUNCTIONAL STATUS - GENERAL
STANDING UP FROM CHAIR USING ARMS: A LITTLE
MOVING FROM LYING ON BACK TO SITTING ON SIDE OF FLAT BED WITH BEDRAILS: A LITTLE
MOVING TO AND FROM BED TO CHAIR: A LITTLE
MOBILITY SCORE: 15
TURNING FROM BACK TO SIDE WHILE IN FLAT BAD: A LITTLE
CLIMB 3 TO 5 STEPS WITH RAILING: TOTAL
WALKING IN HOSPITAL ROOM: A LOT

## 2023-10-22 ASSESSMENT — PAIN SCALES - GENERAL
PAINLEVEL_OUTOF10: 0 - NO PAIN
PAINLEVEL_OUTOF10: 0 - NO PAIN

## 2023-10-22 ASSESSMENT — PAIN - FUNCTIONAL ASSESSMENT: PAIN_FUNCTIONAL_ASSESSMENT: 0-10

## 2023-10-22 NOTE — CARE PLAN
Problem: Skin  Goal: Prevent/manage excess moisture  10/22/2023 0332 by Mary Beth Stephens RN  Flowsheets (Taken 10/20/2023 2208)  Prevent/manage excess moisture: Cleanse incontinence/protect with barrier cream     Problem: Skin  Goal: Prevent/minimize sheer/friction injuries  10/22/2023 0332 by Mary Beth Stephens RN  Flowsheets (Taken 10/20/2023 2208)  Prevent/minimize sheer/friction injuries: Turn/reposition every 2 hours/use positioning/transfer devices     Problem: Skin  Goal: Prevent/minimize sheer/friction injuries  10/22/2023 0330 by Mary Beth Stephens RN  Outcome: Progressing     Problem: Skin  Goal: Promote/optimize nutrition  10/22/2023 0332 by Mary Beth Stephens RN  Flowsheets (Taken 10/20/2023 2208)  Promote/optimize nutrition: Assist with feeding     Problem: Skin  Goal: Promote skin healing  10/22/2023 0332 by Mary Beth Stephens RN  Flowsheets (Taken 10/20/2023 2208)  Promote skin healing: Turn/reposition every 2 hours/use positioning/transfer devices   The patient's goals for the shift include

## 2023-10-22 NOTE — PROGRESS NOTES
Physical Therapy    Physical Therapy Evaluation    Patient Name: Phong Wong  MRN: [id number]  Today's Date: 10/22/2023   Time Calculation  Start Time: 0920  Stop Time: 0930  Time Calculation (min): 10 min    Assessment/Plan   PT Assessment  PT Assessment Results: Decreased strength, Decreased endurance, Impaired balance, Decreased mobility  Rehab Prognosis: Fair  Evaluation/Treatment Tolerance: Patient limited by fatigue, Patient limited by pain (Pain in dorsum of feet which patient reports is new pain. Comfortable and without pain when returned to supine)  Medical Staff Made Aware: Yes  End of Session Communication: Bedside nurse  Assessment Comment: Patient presents with decline in functional mobility as compared to baseline with need for min A during bed mobility, transfers and side steps at EOB. Patient could benefit from continued PT services in order to maximize functional mobility prior to D/C.  End of Session Patient Position: Bed, 3 rail up, Alarm on  IP OR SWING BED PT PLAN  Inpatient or Swing Bed: Inpatient  PT Plan  Treatment/Interventions: Bed mobility, Transfer training, Gait training, Stair training, Balance training, Neuromuscular re-education, Strengthening, Endurance training, Range of motion, Therapeutic exercise, Therapeutic activity, Positioning, Postural re-education  PT Plan: Skilled PT  PT Frequency: 3 times per week  PT Discharge Recommendations: Moderate intensity level of continued care  PT Recommended Transfer Status: Assist x1, Assistive device  PT - OK to Discharge: Yes (Evaluation Completed)      Subjective   General Visit Information:  General  Reason for Referral: 73 year old male wtih recent dx of oral cavity cancer admitted with opioid overdose from facility  Past Medical History Relevant to Rehab: HTN, COPD  Prior to Session Communication: Bedside nurse  Patient Position Received: Bed, 3 rail up  General Comment: Patient fatigued upon minimal exertion but pleasant and agreeable to  PT Eval  Home Living:  Home Living  Type of Home: House  Lives With: Significant other (able to assist 24/7)  Home Adaptive Equipment: Cane, Walker rolling or standard, Wheelchair-manual  Home Layout: Two level, Stairs to alternate level with rails  Alternate Level Stairs-Rails: Right  Alternate Level Stairs-Number of Steps: 2  Home Access: Stairs to enter with rails  Entrance Stairs-Rails: Right  Entrance Stairs-Number of Steps: 2  Prior Level of Function:  Prior Function Per Pt/Caregiver Report  Level of [place]: Independent with ADLs and functional transfers (From SNF, reports he was not ambulating at SNF)  Ambulatory Assistance: Independent  Precautions:  Precautions  Medical Precautions: Fall precautions      Objective   Pain:  Pain Assessment  Pain Assessment: 0-10  Pain Score: 0 - No pain  Cognition:  Cognition  Overall Cognitive Status: Within Functional Limits    Activity Tolerance  Endurance: Tolerates less than 10 min exercise with changes in vital signs    Static Sitting Balance  Static Sitting-Balance Support: Bilateral upper extremity supported, Feet supported  Static Sitting-Level of Assistance: Contact guard    Static Standing Balance  Static Standing-Balance Support: Bilateral upper extremity supported  Static Standing-Level of Assistance: Minimum assistance  Functional Assessments:  Bed Mobility  Bed Mobility: Yes  Bed Mobility 1  Bed Mobility 1: Supine to sitting  Level of Assistance 1: Minimum assistance  Bed Mobility 2  Bed Mobility  2: Sitting to supine  Level of Assistance 2: Minimum assistance    Transfers  Transfer: Yes  Transfer 1  Transfer From 1: Sit to  Transfer to 1: Stand  Transfer Device 1: Walker  Transfer Level of Assistance 1: Minimum assistance  Transfers 2  Transfer From 2: Stand to  Transfer to 2: Sit  Transfer Device 2: Walker  Transfer Level of Assistance 2: Minimum assistance    Ambulation/Gait Training  Ambulation/Gait Training Performed: Yes  Ambulation/Gait Training  1  Device 1: Rolling walker  Assistance 1: Minimum assistance  Quality of Gait 1: Narrow base of support (difficulty weight shifting, forward flexed posture)  Comments/Distance (ft) 1: 3 side steps EOB (L)  Extremity/Trunk Assessments:  RUE   RUE : Exceptions to WFL (Grossly 3+/5)  LUE   LUE:  (Grossly 3+/5)  RLE   RLE : Exceptions to WFL (Grossly 3+/5)  LLE   LLE :  (Grossly 3+/5)  Outcome Measures:  [facility] Basic Mobility  Turning from your back to your side while in a flat bed without using bedrails: A little  Moving from lying on your back to sitting on the side of a flat bed without using bedrails: A little  Moving to and from bed to chair (including a wheelchair): A little  Standing up from a chair using your arms (e.g. wheelchair or bedside chair): A little  To walk in hospital room: A lot  Climbing 3-5 steps with railing: Total  Basic Mobility - Total Score: 15    Encounter Problems       Encounter Problems (Active)       Mobility       STG - Patient will ambulate household distance with RW and mod I (Progressing)       Start:  10/22/23    Expected End:  11/05/23            STG - Patient will navigate 2+ 2 steps with rail (Progressing)       Start:  10/22/23    Expected End:  11/05/23               Transfers       STG - Transfer from bed to chair mod I with RW (Progressing)       Start:  10/22/23    Expected End:  11/05/23            STG - Patient will perform bed mobility independent  (Progressing)       Start:  10/22/23    Expected End:  11/05/23            STG - Patient will transfer sit to and from stand mod I with RW (Progressing)       Start:  10/22/23    Expected End:  11/05/23

## 2023-10-22 NOTE — PROGRESS NOTES
1327: Spoke to pts s/o (Emelia) regarding discharge planning. Both her and pt agree they would like referral placed for  Rehab in [place] instead of returning to [facility]. Referral placed and team updated.

## 2023-10-22 NOTE — CARE PLAN
Problem: Fall/Injury  Goal: Not fall by end of shift  10/22/2023 0330 by Mary Beth Stephens RN  Outcome: Progressing     Problem: Fall/Injury  Goal: Be free from injury by end of the shift  10/22/2023 0330 by Mary Beth Stephens RN  Outcome: Progressing     Problem: Fall/Injury  Goal: Verbalize understanding of personal risk factors for fall in the hospital  10/22/2023 0330 by Mary Beth Stephens RN  Outcome: Progressing     Problem: Fall/Injury  Goal: Verbalize understanding of risk factor reduction measures to prevent injury from fall in the home  10/22/2023 0330 by Mary Beth Stephens RN  Outcome: Progressing     Problem: Skin  Goal: Participates in plan/prevention/treatment measures  10/22/2023 0330 by Mary Beth Stephens RN  Outcome: Progressing     Problem: Skin  Goal: Prevent/manage excess moisture  10/22/2023 0330 by Mary Beth Stephens RN  Outcome: Progressing     Problem: Skin  Goal: Prevent/minimize sheer/friction injuries  10/22/2023 0330 by Mary Beth Stephens RN  Outcome: Progressing     Problem: Skin  Goal: Promote/optimize nutrition  10/22/2023 0330 by Mary Beth Stephens RN  Outcome: Progressing     Problem: Skin  Goal: Promote skin healing  10/22/2023 0330 by Mary Beth Stephens RN  Outcome: Progressing     Problem: Pain  Goal: Takes deep breaths with improved pain control throughout the shift  10/22/2023 0330 by Mary Beth Stephens RN  Outcome: Progressing     Problem: Pain  Goal: Turns in bed with improved pain control throughout the shift  10/22/2023 0330 by Mary Beth Stephens RN  Outcome: Progressing       The clinical goals for the shift include Pt will have no complaints of pain throughout shift.

## 2023-10-22 NOTE — PROGRESS NOTES
"Phong Wong is a 73 y.o. male on day 4 of admission presenting with Hypovolemic shock (CMS/HCC).    Subjective   Tolerating PEG tube feeds. Clark reinserted yesterday after failing trial of void and straight cath x3. Feels well has no complaints       Objective     Physical Exam  Constitutional:       General: He is not in acute distress.     Appearance: He is underweight. He is ill-appearing (chronic).   HENT:      Mouth/Throat:      Mouth: Mucous membranes are dry. Injury (excoriations on upper palate with ?yellow exudate, no active signs of bleeding) present.   Eyes:      Extraocular Movements: Extraocular movements intact.   Cardiovascular:      Rate and Rhythm: Normal rate and regular rhythm.   Pulmonary:      Effort: Pulmonary effort is normal. No respiratory distress.      Breath sounds: Normal breath sounds.      Comments: On RA  Abdominal:      General: Abdomen is flat. Bowel sounds are normal.      Palpations: Abdomen is soft.   Genitourinary:     Comments: Clark catheter  Musculoskeletal:         General: Tenderness (bilateral calves and with foot dorsi/plantar flexion) present. No signs of injury.      Cervical back: Signs of trauma (anterior neck open burns, no discharge) present.      Right lower leg: No edema.      Left lower leg: No edema.   Skin:     General: Skin is warm and dry.      Findings: Bruising (arms) present.   Neurological:      Mental Status: He is oriented to person, place, and time.      Cranial Nerves: No cranial nerve deficit.     Last Recorded Vitals  Blood pressure 154/70, pulse 78, temperature 36.8 °C (98.2 °F), temperature source Temporal, resp. rate 18, height 1.778 m (5' 10\"), weight 54 kg (119 lb 0.8 oz), SpO2 99 %.  Intake/Output last 3 Shifts:  I/O last 3 completed shifts:  In: 2112.5 (39.1 mL/kg) [I.V.:50 (0.9 mL/kg); NG/GT:30; IV Piggyback:2032.5]  Out: 1540 (28.5 mL/kg) [Urine:1540 (0.8 mL/kg/hr)]  Weight: 54 kg         Assessment/Plan   Principal Problem:    Hypovolemic " shock (CMS/HCC)  Active Problems:    Pain in both lower extremities    Phong Wong is a 73M with PMH of oropharyngeal cancer s/p chemo/XRT, prostate cancer s/p XRT, pancytopenia, HTN, CAD, PAD, and COPD who was admitted on 10/18/23 for lethargy and weakness from outpatient heme/onc appointment ISO several weeks of poor PO intake and opioid use. Patient hypotensive and hypoxic with elevated lactate on admission, concerning for infection. Recent odynophagia after chemo/XRT, questionable infectious etiology given neutropenia and clinical picture. s/p EGD and PEG tube. Started tube feeds.      UPDATES 10/22:  - s/p EGD and PEG tube placement 10/20, uptitrating TF   - BID RFP, Phos to monitor for refeeding syndrome  - SLP recommend NPO, MBBS Monday  - Clark reinserted for retention, will start flomax and attempt TOV tomorrow     #Failure to thrive  #Odynophagia  #Oropharyngeal cancer T3N2M0 s/p carbo/Taxol/XRT (8/15/23-10/4/23)  :: Med-onc Dr. Bruce Garcia/Lyssa Patten PA-C, rad-onc Dr. Vianca Steen  :: CXR with no acute cardiopulmonary process  - Continue Tylenol prn, oxy 5 liquid prior to meals, STOP morphine  - Maintenance D5W NS @ 75cc/hr  - Consult GI, EGD 10/20: evidence of radiation and reflux esophagitis  - Consult nutrition for poor PO intake              - PEG tube placement 10/20, starting TF per nutrition recs              - SLP consulted; recommend NPO for know with plans for MBSS Monday  - Consult wound care, appreciate recs     #Weakness  #Hypotension, resolved  #Hypoxia, resolved  :: Differential includes sepsis/infection, morphine overdose, poor PO intake, chemo/XRT  :: S/p LR 1.5L and NRFM 15L  - Empiric vanc/Unaysn to cover soft tissue infections 10/18-10/20  - UA neg, urine PNA panel neg, Bcx NGTD  - Maintenance D5W NS @ 75cc/hr  - PT/OT recs     #Acute urinary retention  #HECTOR, resolved  #Prostate cancer s/p XRT (2018)  :: Admission Cr 2.5 (baseline Cr 1.2)  :: Admission PVR 1205cc > Clark 1725cc  tea-colored urine out  - Likely multifactorial d/t poor PO intake, acute urinary retention  - Trend Cr, hold nephrotoxic meds  - Tamsulosin started 10/22, plan for TOV 10/23     #Acute on chronic anemia  #Neutropenia  :: ANC and Hb has been downtrending throughout recent chemo/XRT   - S/p 1u PRBC this admission  - No signs of overt bleeding  - Active T&S, transfuse if Hb<7  - Hold AC at this time, SCDs only     #HTN  #CAD  #PAD  - Hold home ASA ISO acute anemia  - Hold home losartan ISO HECTOR     #COPD  - Continue home budesonide nebs, Breo Ellipta inh, duonebs prn     #Lactic acidosis, resolved  :: Admission lactate 4.7, VBG pH 7.32, pCO2 54, pO2 23  - Suspect dehydration vs infection  - Now s/p LR 1.5L with ABG pH 7.49, CO2 36, pO2 129  - Repeat AM lactate 0.9     F: D5W NS @ 75cc/hr  E: K>4, Mg>2, Phos >3  N: NPO, TF via PEG  A: PIV  DVT ppx: SCDs  CODE STATUS: full code (confirmed on admission)  NOK: Emelia (wife) [phone]      Raf Cardona MD

## 2023-10-22 NOTE — PROGRESS NOTES
Name: Phong Wong    YOB: 1950    Code Status: FULL CODE    Chief Complaint:  New patient; initial visit;  Anemia; Pancytopenia; etc....      HPI   73 year old male with medical history of COPD, CAD, CAD/PAD, hypertension, prostate cancer s/p  chemo (reported to be in remission), and oropharyngeal cancer, received final radiation treatment recently.    HOSPITAL COURSE:  Patient presented to [facility] ER with anemia, hypokalemia, and weakness.    He was accompanied by his significant other.  Patient was awake and responsive, but drowsy.    His significant other provided most of the history.    She reported that he finished his radiation therapy the day before he presented to the ER.  It was a total of 7 weeks of radiation.    Patient was more noticeably weak and short of breath the week before he presented to the ER.   He was getting a blood draw with every radiation appointment.  In his most recent labs he was noted to be anemic and hypokalemic.    Patient was told to go to the emergency room after his labs were resulted.   He had anemia, pancytopenia  and hypokalemia.  On 10/5/23 H & H was 5.4 & 16.   On 10/5/23 WBC were 1 and platelets were 111.  On 10/10/23 H & H 10.1 & 31.2.  On 10/10/23 WBC 3.2 and platelets were 224.  Patient was found to have pancytopenia, likely due to the chemo.    He was given 2 U PRBC and his hemoglobin improved.   His potassium level was low 3.3 which was likely secondary to chlorthalidone which was held and his poor PO intake.  Patient has oropharyngeal cancer and a neck wound from his recent radiation therapy.  Wound care was consulted recommendation was to continue  with home Silvadene.  He had a swallow evaluation performed. Palliative care consulted for pain control.   Dietician was consulted for malnutrition.   Plans to send patient to SNF after discharge from hospital.    Patient admitted to the [facility] for skilled services on 10/10/23.    Hospital and  chronic diagnoses as follows:    Anemia; Pancytopenia:    It is likely due to chemo.  On 10/5/23 H & H was 5.4 & 16.  Patient is s/p 2 U PRBC.  Hgb has improved since transfusion.  On 10/10/23 H & H 10.1 & 31.2  Labs as follows:  On 10/5/23 H & H was 5.4 & 16.   On 10/5/23 WBC were 1 and platelets were 111.  On 10/10/23 H & H 10.1 & 31.2.  On 10/10/23 WBC 3.2 and platelets were 224.  Patient denied any s/s bleeding.  He has not had any scopes.  Recommended that patient follow up with GI outpatient.   Patient seen today.  Calm and cooperative.  Follows commands.  Denies chest pain.  No headaches.  No dizziness.  No SOB.       Oropharyngeal cancer; Neck wound due to radiation; pain:  Discharged on dexamethasone 4 mg PO Q AM.   Wound care was consulted in the hospital.  Recommendations to continue home Silvadene for the neck wound.  Swallow evaluation was performed.   Palliative care was consulted for pain control.  Pain was controlled in the hospital.  Discharged on oxycodone PRN and morphine PRN for pain.  No complaints of pain today.   Patient also has malnutrition, dietician was consulted.    Needs routinely follow up with oncology.     Hypokalemia  Potassium was 3.3.  It was likely secondary to chlorthalidone (held) and poor PO intake.  Patient was treated with potassium chloride in the hospital.  He was also started on aldactone.     Benign essential HTN:  Patient's Bps were elevated.  His Losartan was titrated and Aldactone was added (monitor K).  Discharged on Losartan 100 mg PO every day and spironolactone 25 mg PO every day.   Recent /60.   No complaints of chest pain.     CKD stage 2:  His renal function was monitored in the hospital.  His creatinine improved.  Recent renal function on 10/12/23: CR 1.4 ; BUN 37; GFR 50.     COPD  On Duonebs, budesonide, formoterol and tiotropium.   On RA.  No oxygen desaturations reported.     GERD:  On pantoprazole.  No complaints of reflux or  "N/V.    Hyperlipidemia:  On atorvastatin.    Constipation:  On Colace and Miralax.   No complaints of constipation.    Generalized muscle weakness; impaired gait and mobility:  Patient at the [facility] for skilled services/PT/OT.  Very weak, unable to ambulate.                 Reviewed [facility] hospital records from recent hospitalization.  Reviewed  EMR  Reviewed medical, social, surgical and family history.  Reviewed all current medications and performed medication reconciliation.  Performed prescription drug management.  Reviewed vital signs AND lab results  Reviewed [facility] Documentation  Discussed patient with nursing.  Spent greater than 50 minutes on visit.        ROS: 10 point ROS performed; Negative unless noted in HPI.    Social History:  Former cigarette smoker-quit smoking about 7 years ago.  Smoked for 40 pack years.  Alcohol use-12 drinks of alcohol each week.  No illicit drug use.    Surgical History:  CT angio abdomen pelvis.  IR angiogram aorta abdomen.    Family History:  Father-Aortic aneurysm.    RECENT LABS:  BMP: Date: 10/12/2023 Na 137 K 3.9 Cr 1.4 BUN 37 glucose 72 Ca 8.1 GFR 50  CBC: Date: 10/12/2023 WBC 3 Hgb 9.8 hematocrit 29.6 platelets 239  Liver function: Date: 10/12/2023 ALT 9 AST 13 alkaline phos.  51 bilirubin 0.9 albumin 2.8 protein 5           Lab Results   Component Value Date    WBC 4.8 10/22/2023    HGB 7.8 (L) 10/22/2023    HCT 24.9 (L) 10/22/2023     10/22/2023    CHOL 127 05/31/2023    TRIG 136 05/31/2023    HDL 36.1 (A) 05/31/2023    ALT 10 10/22/2023    AST 15 10/22/2023     (L) 10/22/2023    K 4.8 10/22/2023     10/22/2023    CREATININE 1.20 10/22/2023    BUN 13 10/22/2023    CO2 23 10/22/2023    TSH 2.77 05/31/2023    PSA 0.21 05/31/2023    INR 1.1 10/18/2023    HGBA1C 5.1 08/25/2023             /60   Pulse 84   Temp 36.7 °C (98 °F)   Resp 18   Ht 1.702 m (5' 7\")   Wt 54.1 kg (119 lb 3.2 oz)   SpO2 98%   BMI 18.67 kg/m²  "     Physical Exam  Vitals and nursing note reviewed.   Constitutional:       General: He is awake.      Appearance: He is underweight. He is ill-appearing.   HENT:      Head: Normocephalic.      Right Ear: External ear normal.      Left Ear: External ear normal.      Nose: Nose normal.      Mouth/Throat:      Mouth: Mucous membranes are moist.      Pharynx: Oropharynx is clear.   Eyes:      Extraocular Movements: Extraocular movements intact.      Conjunctiva/sclera: Conjunctivae normal.      Pupils: Pupils are equal, round, and reactive to light.   Neck:      Comments: Discoloration and erythema to neck from radiation.  Cardiovascular:      Rate and Rhythm: Normal rate and regular rhythm.      Pulses: Normal pulses.      Heart sounds: Normal heart sounds.   Pulmonary:      Effort: Pulmonary effort is normal.      Breath sounds: Normal breath sounds.      Comments: Diminished lung sounds in bilateral bases.   Abdominal:      General: Bowel sounds are normal.      Palpations: Abdomen is soft.   Musculoskeletal:         General: Normal range of motion.      Comments: Generalized muscle weakness.   Skin:     General: Skin is warm and dry.   Neurological:      General: No focal deficit present.      Mental Status: He is alert and oriented to person, place, and time. Mental status is at baseline.      Motor: Weakness present.      Coordination: Coordination abnormal.      Gait: Gait abnormal.   Psychiatric:         Mood and Affect: Mood is depressed. Affect is flat.         Behavior: Behavior normal. Behavior is cooperative.          Assessment/Plan    Ordered CMP and CBC with diff. For tomorrow.    Anemia; Pancytopenia:    Anemia was treated in the hospital, now s/p PRBC.  Monitor CBC.  Monitor H & H.  Monitor for s/s of bleeding.     Oropharyngeal cancer; Neck wound due to radiation; pain:  Continue dexamethasone 4 mg PO Q AM.   Wound care was consulted in the hospital.  Continue Silvadene for the neck wound.  Swallow  evaluation was performed.   Palliative care was consulted for pain control.  Pain was controlled in the hospital.  Discharged on oxycodone PRN and morphine PRN for pain.  Monitor for pain.  Patient also has malnutrition, dietician was consulted in the hospital.   Follow up with oncologist.  Follow up with in house dietician at the [facility].    Hypokalemia:  Treated in the hospital.  Monitor potassium level.     Benign essential HTN:  Monitor Bps.  Continue Losartan 100 mg PO every day and spironolactone 25 mg PO every day.      CKD:  Monitor renal function.  Encourage PO fluids.     COPD  Continue Duonebs, budesonide, formoterol and tiotropium.   Monitor oxygen saturations.    GERD:  Continue pantoprazole.  Monitor for reflux.    Hyperlipidemia:  Continue atorvastatin.    Constipation:  Continue Colace and Miralax.   Monitor for constipation.    Generalized muscle weakness; impaired gait and mobility:  Continue at the [facility] for skilled services/PT/OT.      Problem List Items Addressed This Visit          Cardiac and Vasculature    Benign essential hypertension    Hyperlipidemia       Genitourinary and Reproductive    Hypokalemia       Hematology and Neoplasia    Oropharyngeal cancer (CMS/HCC)    Pancytopenia (CMS/HCC)       Pulmonary and Pneumonias    Chronic obstructive pulmonary disease (CMS/HCC)     Other Visit Diagnoses       Anemia, unspecified type    -  Primary    Open neck wound, sequela        Pain        Stage 2 chronic kidney disease        Gastroesophageal reflux disease, unspecified whether esophagitis present        Constipation, unspecified constipation type        Generalized muscle weakness        Impaired gait and mobility                Aleida Can, APRN-CNP

## 2023-10-22 NOTE — NURSING NOTE
At 2100 pt bladder scanned showing a residual of 500 ml. Team notified and order placed to straight cath. Straight cath attempt unsuccessful and team notified. Order placed to insert rainey. Rainey successfully inserted immediately putting out 500 ml of clear/yellow urine.

## 2023-10-23 ENCOUNTER — APPOINTMENT (OUTPATIENT)
Dept: RADIOLOGY | Facility: HOSPITAL | Age: 73
DRG: 917 | End: 2023-10-23
Payer: MEDICARE

## 2023-10-23 LAB
ALBUMIN SERPL BCP-MCNC: 2.3 G/DL (ref 3.4–5)
ALP SERPL-CCNC: 62 U/L (ref 33–136)
ALT SERPL W P-5'-P-CCNC: 10 U/L (ref 10–52)
ANION GAP SERPL CALC-SCNC: 12 MMOL/L (ref 10–20)
AST SERPL W P-5'-P-CCNC: 13 U/L (ref 9–39)
BACTERIA BLD CULT: NORMAL
BASOPHILS # BLD AUTO: 0.01 X10*3/UL (ref 0–0.1)
BASOPHILS NFR BLD AUTO: 0.3 %
BILIRUB SERPL-MCNC: 0.4 MG/DL (ref 0–1.2)
BUN SERPL-MCNC: 15 MG/DL (ref 6–23)
CALCIUM SERPL-MCNC: 7.5 MG/DL (ref 8.6–10.6)
CHLORIDE SERPL-SCNC: 103 MMOL/L (ref 98–107)
CO2 SERPL-SCNC: 24 MMOL/L (ref 21–32)
CREAT SERPL-MCNC: 1.12 MG/DL (ref 0.5–1.3)
EOSINOPHIL # BLD AUTO: 0.02 X10*3/UL (ref 0–0.4)
EOSINOPHIL NFR BLD AUTO: 0.6 %
ERYTHROCYTE [DISTWIDTH] IN BLOOD BY AUTOMATED COUNT: 16.8 % (ref 11.5–14.5)
GFR SERPL CREATININE-BSD FRML MDRD: 70 ML/MIN/1.73M*2
GLUCOSE SERPL-MCNC: 92 MG/DL (ref 74–99)
HCT VFR BLD AUTO: 21.9 % (ref 41–52)
HGB BLD-MCNC: 7 G/DL (ref 13.5–17.5)
IMM GRANULOCYTES # BLD AUTO: 0.01 X10*3/UL (ref 0–0.5)
IMM GRANULOCYTES NFR BLD AUTO: 0.3 % (ref 0–0.9)
LYMPHOCYTES # BLD AUTO: 0.28 X10*3/UL (ref 0.8–3)
LYMPHOCYTES NFR BLD AUTO: 8.9 %
MAGNESIUM SERPL-MCNC: 1.84 MG/DL (ref 1.6–2.4)
MCH RBC QN AUTO: 28.9 PG (ref 26–34)
MCHC RBC AUTO-ENTMCNC: 32 G/DL (ref 32–36)
MCV RBC AUTO: 91 FL (ref 80–100)
MONOCYTES # BLD AUTO: 0.29 X10*3/UL (ref 0.05–0.8)
MONOCYTES NFR BLD AUTO: 9.2 %
NEUTROPHILS # BLD AUTO: 2.55 X10*3/UL (ref 1.6–5.5)
NEUTROPHILS NFR BLD AUTO: 80.7 %
NRBC BLD-RTO: 0 /100 WBCS (ref 0–0)
PHOSPHATE SERPL-MCNC: 2.8 MG/DL (ref 2.5–4.9)
PLATELET # BLD AUTO: 235 X10*3/UL (ref 150–450)
PMV BLD AUTO: 9.8 FL (ref 7.5–11.5)
POTASSIUM SERPL-SCNC: 4.3 MMOL/L (ref 3.5–5.3)
PROT SERPL-MCNC: 4.4 G/DL (ref 6.4–8.2)
RBC # BLD AUTO: 2.42 X10*6/UL (ref 4.5–5.9)
SODIUM SERPL-SCNC: 135 MMOL/L (ref 136–145)
WBC # BLD AUTO: 3.2 X10*3/UL (ref 4.4–11.3)

## 2023-10-23 PROCEDURE — 94640 AIRWAY INHALATION TREATMENT: CPT

## 2023-10-23 PROCEDURE — 84100 ASSAY OF PHOSPHORUS: CPT

## 2023-10-23 PROCEDURE — 2500000004 HC RX 250 GENERAL PHARMACY W/ HCPCS (ALT 636 FOR OP/ED)

## 2023-10-23 PROCEDURE — 99233 SBSQ HOSP IP/OBS HIGH 50: CPT

## 2023-10-23 PROCEDURE — 36415 COLL VENOUS BLD VENIPUNCTURE: CPT | Mod: CMCLAB

## 2023-10-23 PROCEDURE — 74230 X-RAY XM SWLNG FUNCJ C+: CPT

## 2023-10-23 PROCEDURE — 74230 X-RAY XM SWLNG FUNCJ C+: CPT | Performed by: STUDENT IN AN ORGANIZED HEALTH CARE EDUCATION/TRAINING PROGRAM

## 2023-10-23 PROCEDURE — 80053 COMPREHEN METABOLIC PANEL: CPT | Mod: CMCLAB

## 2023-10-23 PROCEDURE — 92611 MOTION FLUOROSCOPY/SWALLOW: CPT | Mod: GN | Performed by: SPEECH-LANGUAGE PATHOLOGIST

## 2023-10-23 PROCEDURE — 2500000001 HC RX 250 WO HCPCS SELF ADMINISTERED DRUGS (ALT 637 FOR MEDICARE OP)

## 2023-10-23 PROCEDURE — 1170000001 HC PRIVATE ONCOLOGY ROOM DAILY

## 2023-10-23 PROCEDURE — 83735 ASSAY OF MAGNESIUM: CPT | Mod: CMCLAB

## 2023-10-23 PROCEDURE — 2500000002 HC RX 250 W HCPCS SELF ADMINISTERED DRUGS (ALT 637 FOR MEDICARE OP, ALT 636 FOR OP/ED): Performed by: STUDENT IN AN ORGANIZED HEALTH CARE EDUCATION/TRAINING PROGRAM

## 2023-10-23 PROCEDURE — 36415 COLL VENOUS BLD VENIPUNCTURE: CPT

## 2023-10-23 PROCEDURE — 2500000001 HC RX 250 WO HCPCS SELF ADMINISTERED DRUGS (ALT 637 FOR MEDICARE OP): Performed by: EMERGENCY MEDICINE

## 2023-10-23 PROCEDURE — 85025 COMPLETE CBC W/AUTO DIFF WBC: CPT | Mod: CMCLAB

## 2023-10-23 PROCEDURE — C9113 INJ PANTOPRAZOLE SODIUM, VIA: HCPCS

## 2023-10-23 RX ORDER — TAMSULOSIN HYDROCHLORIDE 0.4 MG/1
0.4 CAPSULE ORAL
Status: DISCONTINUED | OUTPATIENT
Start: 2023-10-23 | End: 2023-10-23

## 2023-10-23 RX ADMIN — BUDESONIDE 1 MG: 0.5 SUSPENSION RESPIRATORY (INHALATION) at 10:51

## 2023-10-23 RX ADMIN — PANTOPRAZOLE SODIUM 40 MG: 40 INJECTION, POWDER, FOR SOLUTION INTRAVENOUS at 08:57

## 2023-10-23 RX ADMIN — ASPIRIN 81 MG CHEWABLE TABLET 81 MG: 81 TABLET CHEWABLE at 08:57

## 2023-10-23 RX ADMIN — BARIUM SULFATE 15 ML: 400 PASTE ORAL at 08:28

## 2023-10-23 RX ADMIN — PANTOPRAZOLE SODIUM 40 MG: 40 INJECTION, POWDER, FOR SOLUTION INTRAVENOUS at 20:47

## 2023-10-23 RX ADMIN — SILVER SULFADIAZINE: 10 CREAM TOPICAL at 09:08

## 2023-10-23 RX ADMIN — THIAMINE HYDROCHLORIDE 100 MG: 100 INJECTION, SOLUTION INTRAMUSCULAR; INTRAVENOUS at 08:58

## 2023-10-23 RX ADMIN — DOXAZOSIN 4 MG: 4 TABLET ORAL at 08:57

## 2023-10-23 RX ADMIN — BUDESONIDE 1 MG: 0.5 SUSPENSION RESPIRATORY (INHALATION) at 20:52

## 2023-10-23 RX ADMIN — BARIUM SULFATE 35 ML: 0.81 POWDER, FOR SUSPENSION ORAL at 08:29

## 2023-10-23 ASSESSMENT — COGNITIVE AND FUNCTIONAL STATUS - GENERAL
EATING MEALS: A LOT
MOBILITY SCORE: 14
DRESSING REGULAR UPPER BODY CLOTHING: A LOT
WALKING IN HOSPITAL ROOM: A LOT
TOILETING: A LOT
STANDING UP FROM CHAIR USING ARMS: A LOT
CLIMB 3 TO 5 STEPS WITH RAILING: A LOT
MOBILITY SCORE: 16
MOVING FROM LYING ON BACK TO SITTING ON SIDE OF FLAT BED WITH BEDRAILS: A LITTLE
TURNING FROM BACK TO SIDE WHILE IN FLAT BAD: A LITTLE
WALKING IN HOSPITAL ROOM: A LOT
EATING MEALS: A LOT
PERSONAL GROOMING: A LOT
MOVING FROM LYING ON BACK TO SITTING ON SIDE OF FLAT BED WITH BEDRAILS: A LITTLE
HELP NEEDED FOR BATHING: A LOT
DRESSING REGULAR LOWER BODY CLOTHING: A LOT
MOVING TO AND FROM BED TO CHAIR: A LOT
TOILETING: A LOT
MOVING TO AND FROM BED TO CHAIR: A LITTLE
PERSONAL GROOMING: A LOT
HELP NEEDED FOR BATHING: A LOT
DRESSING REGULAR UPPER BODY CLOTHING: A LOT
DAILY ACTIVITIY SCORE: 12
CLIMB 3 TO 5 STEPS WITH RAILING: A LOT
TURNING FROM BACK TO SIDE WHILE IN FLAT BAD: A LITTLE
DAILY ACTIVITIY SCORE: 12
DRESSING REGULAR LOWER BODY CLOTHING: A LOT
STANDING UP FROM CHAIR USING ARMS: A LITTLE

## 2023-10-23 ASSESSMENT — PAIN SCALES - GENERAL
PAINLEVEL_OUTOF10: 0 - NO PAIN
PAINLEVEL_OUTOF10: 0 - NO PAIN

## 2023-10-23 NOTE — PROGRESS NOTES
Speech-Language Pathology    SLP Inpatient MBSS Evaluation    Patient Name: Phong Wong  MRN: [id number]  Today's Date: 10/23/2023   Time Calculation  Start Time: 0803  Stop Time: 0823  Time Calculation (min): 20 min     History  72 yo male with hx of COPD, CAD, PAD, hypertension, pancytopenia (2/2 chemo), prostate cancer s/p chemo (reported to be in remission), and oropharyngeal cancer (s/p Carboplatin/Paclitaxel and XRT, last tx 10/4/23) presenting with weakness and lethargy from outpt [facility] appointment. Pt now s/p EGD with PEG placement 10/20 findings concerning for esophagitis.        Recommendations/Treatment:  Solid Consistency: Regular (IDDSI Level 7)  Liquid Consistency: Thin (IDDSI Level 0)  Liquid Administration Via: Cup  Medication Administration: Take one pill at a time  Compensatory Strategy Recommendations: Single sips, Slow rate  Postural Changes and/or Swallow Maneuvers: Upright 90 degrees for all PO intake, Remain upright for 30 minutes after meal      Assessment/Impression:  MBSS completed once verbal consent provided. Pt. Given thin, puree and solid textures. Demonstrate slow mastication due to dentition recently pulled. Adequate bolus control and containment across all textures. Min pharyngeal dysphagia  with aspiration marked by mistiming of laryngeal vestibular closure when consuming consecutive swallows of thin liquids. Aspiration was sensate and expelled from the superior aspect of the trachea. Puree and solids were tolerated well with each bolus passing through the pharyngeal lumen without evidence of residue, penetration or aspiration. Pt ready to resume a PO diet.           Prognosis:   Good         Plan:  Treatment/Interventions: Pharyngeal exercises  SLP Plan: Skilled SLP  SLP Frequency: 2x per week  Duration: 3 weeks  SLP Discharge Recommendations: Continue skilled speech therapy services post discharge  Solid Consistency: Regular (IDDSI Level 7)  Liquid Consistency: Thin (IDDSI Level  0)  Discussed POC: Patient  Discussed Risks/Benefits: Yes  Patient/Caregiver Agreeable: Yes  SLP - OK to Discharge: Yes    Goals:  Pt. will tolerate least restrictive diet without overt s/s of aspiration with 100% accuracy.   Pt will perform pharyngeal strengthening exercises to avert affects of radiation.       Inpatient:  Education Documentation  Extensive education provided to pt re: anatomy/physiology of swallow function, s/s of aspiration, risk of aspiration, results, diet recommendations and MBSS procedure. Pt verbalized understanding and in agreement.

## 2023-10-23 NOTE — PROGRESS NOTES
"Phong Wong is a 73 y.o. male on day 5 of admission presenting with Hypovolemic shock (CMS/HCC).    Subjective   NAEO, VSS. Tolerating PEG tube feeds. Throat pain has improved while NPO.     Objective     Physical Exam  Constitutional:       General: He is not in acute distress.     Appearance: He is underweight. He is ill-appearing (chronic).   HENT:      Mouth/Throat:      Mouth: Mucous membranes are dry. Injury (excoriations on upper palate with ?yellow exudate, no active signs of bleeding) present.   Eyes:      Extraocular Movements: Extraocular movements intact.   Cardiovascular:      Rate and Rhythm: Normal rate and regular rhythm.   Pulmonary:      Effort: Pulmonary effort is normal. No respiratory distress.      Breath sounds: Normal breath sounds.      Comments: On RA  Abdominal:      General: Abdomen is flat. Bowel sounds are normal.      Palpations: Abdomen is soft.   Genitourinary:     Comments: Clark catheter  Musculoskeletal:         General: No tenderness present. No signs of injury.      Cervical back: Signs of trauma (anterior neck open burns, no discharge) present.      Right lower leg: No edema.      Left lower leg: No edema.   Skin:     General: Skin is warm and dry.      Findings: Bruising (arms) present.   Neurological:      Mental Status: He is oriented to person, place, and time.      Cranial Nerves: No cranial nerve deficit.     Last Recorded Vitals  Blood pressure 106/60, pulse (!) 111, temperature 36.6 °C (97.9 °F), temperature source Temporal, resp. rate 18, height 1.778 m (5' 10\"), weight 54 kg (119 lb 0.8 oz), SpO2 99 %.  Intake/Output last 3 Shifts:  I/O last 3 completed shifts:  In: 990 (18.3 mL/kg) [NG/GT:740; IV Piggyback:250]  Out: 1625 (30.1 mL/kg) [Urine:1625 (0.8 mL/kg/hr)]  Weight: 54 kg     Lab Results   Component Value Date    WBC 3.2 (L) 10/23/2023    HGB 7.0 (L) 10/23/2023    HCT 21.9 (L) 10/23/2023     10/23/2023    CHOL 127 05/31/2023    TRIG 136 05/31/2023    HDL " 36.1 (A) 05/31/2023    ALT 10 10/23/2023    AST 13 10/23/2023     (L) 10/23/2023    K 4.3 10/23/2023     10/23/2023    CREATININE 1.12 10/23/2023    BUN 15 10/23/2023    CO2 24 10/23/2023    TSH 2.77 05/31/2023    PSA 0.21 05/31/2023    INR 1.1 10/18/2023    HGBA1C 5.1 08/25/2023       Assessment/Plan   Principal Problem:    Hypovolemic shock (CMS/HCC)  Active Problems:    Pain in both lower extremities    Phong Wong is a 73M with PMH of oropharyngeal cancer s/p chemo/XRT, prostate cancer s/p XRT, pancytopenia, HTN, CAD, PAD, and COPD who was admitted on 10/18/23 for lethargy and weakness from outpatient heme/onc appointment ISO several weeks of poor PO intake and opioid use. Patient hypotensive and hypoxic with elevated lactate on admission, concerning for infection. Recent odynophagia after chemo/XRT, questionable infectious etiology given neutropenia and clinical picture. s/p EGD and PEG tube. Started tube feeds.      UPDATES 10/23:  - MBBS today, okay for regular diet/thin liquids  - TOV today, on doxazosin  - Recheck PM CBC, transfuse Hb<7  - S/p EGD and PEG tube placement 10/20, uptitrating TF   - BID RFP to monitor for refeeding syndrome     #Failure to thrive  #Odynophagia  #Oropharyngeal cancer T3N2M0 s/p carbo/Taxol/XRT (8/15/23-10/4/23)  :: Med-onc Dr. Bruce Garcia/Lyssa Patten PA-C, rad-onc Dr. Vianca Steen  :: CXR with no acute cardiopulmonary process  - Continue Tylenol prn, oxy 5 liquid prior to meals, STOP morphine  - Maintenance D5W NS @ 75cc/hr  - Consult GI, EGD 10/20: evidence of radiation and reflux esophagitis  - Consult nutrition for poor PO intake              - PEG tube placement 10/20, starting TF per nutrition recs              - SLP consulted; recommend NPO for know with plans for MBSS Monday  - Consult wound care, appreciate recs     #Weakness  #Hypotension, resolved  #Hypoxia, resolved  :: Differential includes sepsis/infection, morphine overdose, poor PO intake,  chemo/XRT  :: S/p LR 1.5L and NRFM 15L  - Empiric vanc/Unaysn to cover soft tissue infections 10/18-10/20  - UA neg, urine PNA panel neg, Bcx NGTD  - PT/OT recs     #Acute urinary retention  #HECTOR, resolved  #Prostate cancer s/p XRT (2018)  :: Admission Cr 2.5 (baseline Cr 1.2)  :: Admission PVR 1205cc > Clark 1725cc tea-colored urine out  - Likely multifactorial d/t poor PO intake, acute urinary retention  - Trend Cr, hold nephrotoxic meds  - Doxazosin started 10/22,  TOV today     #Acute on chronic anemia  #Neutropenia  :: ANC and Hb has been downtrending throughout recent chemo/XRT   - S/p 1u PRBC this admission  - No signs of overt bleeding  - Active T&S, transfuse if Hb<7  - Hold AC at this time, SCDs only     #HTN  #CAD  #PAD  - Hold home ASA ISO acute anemia  - Hold home losartan ISO HECTOR     #COPD  - Continue home budesonide nebs, Breo Ellipta inh, duonebs prn     #Lactic acidosis, resolved  :: Admission lactate 4.7, VBG pH 7.32, pCO2 54, pO2 23  - Suspect dehydration vs infection  - Now s/p LR 1.5L with ABG pH 7.49, CO2 36, pO2 129  - Repeat AM lactate 0.9     F: PRN  E: K>4, Mg>2, Phos >3  N: regular/thin liquids, TF via PEG  A: PIV  DVT ppx: SCDs  CODE STATUS: full code (confirmed on admission)  NOK: Emelia (wife) [phone]

## 2023-10-24 PROBLEM — M79.604 PAIN IN BOTH LOWER EXTREMITIES: Status: RESOLVED | Noted: 2023-10-20 | Resolved: 2023-10-24

## 2023-10-24 PROBLEM — M79.605 PAIN IN BOTH LOWER EXTREMITIES: Status: RESOLVED | Noted: 2023-10-20 | Resolved: 2023-10-24

## 2023-10-24 PROBLEM — R57.1 HYPOVOLEMIC SHOCK (MULTI): Status: RESOLVED | Noted: 2023-10-18 | Resolved: 2023-10-24

## 2023-10-24 LAB
ABO GROUP (TYPE) IN BLOOD: NORMAL
ALBUMIN SERPL BCP-MCNC: 2.2 G/DL (ref 3.4–5)
ALBUMIN SERPL BCP-MCNC: 2.3 G/DL (ref 3.4–5)
ALBUMIN SERPL BCP-MCNC: 2.3 G/DL (ref 3.4–5)
ALP SERPL-CCNC: 55 U/L (ref 33–136)
ALT SERPL W P-5'-P-CCNC: 10 U/L (ref 10–52)
ANION GAP SERPL CALC-SCNC: 11 MMOL/L (ref 10–20)
ANION GAP SERPL CALC-SCNC: 13 MMOL/L (ref 10–20)
ANION GAP SERPL CALC-SCNC: 9 MMOL/L (ref 10–20)
ANTIBODY SCREEN: NORMAL
AST SERPL W P-5'-P-CCNC: 10 U/L (ref 9–39)
BASOPHILS # BLD AUTO: 0.01 X10*3/UL (ref 0–0.1)
BASOPHILS NFR BLD AUTO: 0.3 %
BILIRUB SERPL-MCNC: 0.3 MG/DL (ref 0–1.2)
BLOOD EXPIRATION DATE: NORMAL
BUN SERPL-MCNC: 17 MG/DL (ref 6–23)
BUN SERPL-MCNC: 18 MG/DL (ref 6–23)
BUN SERPL-MCNC: 19 MG/DL (ref 6–23)
CALCIUM SERPL-MCNC: 7.5 MG/DL (ref 8.6–10.6)
CALCIUM SERPL-MCNC: 7.6 MG/DL (ref 8.6–10.6)
CALCIUM SERPL-MCNC: 7.7 MG/DL (ref 8.6–10.6)
CHLORIDE SERPL-SCNC: 101 MMOL/L (ref 98–107)
CHLORIDE SERPL-SCNC: 102 MMOL/L (ref 98–107)
CHLORIDE SERPL-SCNC: 103 MMOL/L (ref 98–107)
CO2 SERPL-SCNC: 24 MMOL/L (ref 21–32)
CO2 SERPL-SCNC: 25 MMOL/L (ref 21–32)
CO2 SERPL-SCNC: 27 MMOL/L (ref 21–32)
CREAT SERPL-MCNC: 1.06 MG/DL (ref 0.5–1.3)
CREAT SERPL-MCNC: 1.12 MG/DL (ref 0.5–1.3)
CREAT SERPL-MCNC: 1.28 MG/DL (ref 0.5–1.3)
DISPENSE STATUS: NORMAL
EOSINOPHIL # BLD AUTO: 0.03 X10*3/UL (ref 0–0.4)
EOSINOPHIL NFR BLD AUTO: 0.9 %
ERYTHROCYTE [DISTWIDTH] IN BLOOD BY AUTOMATED COUNT: 16.5 % (ref 11.5–14.5)
ERYTHROCYTE [DISTWIDTH] IN BLOOD BY AUTOMATED COUNT: 16.6 % (ref 11.5–14.5)
ERYTHROCYTE [DISTWIDTH] IN BLOOD BY AUTOMATED COUNT: 17.2 % (ref 11.5–14.5)
FERRITIN SERPL-MCNC: 888 NG/ML (ref 20–300)
FOLATE SERPL-MCNC: 8.4 NG/ML
GFR SERPL CREATININE-BSD FRML MDRD: 60 ML/MIN/1.73M*2
GFR SERPL CREATININE-BSD FRML MDRD: 70 ML/MIN/1.73M*2
GFR SERPL CREATININE-BSD FRML MDRD: 75 ML/MIN/1.73M*2
GLUCOSE SERPL-MCNC: 106 MG/DL (ref 74–99)
GLUCOSE SERPL-MCNC: 92 MG/DL (ref 74–99)
GLUCOSE SERPL-MCNC: 95 MG/DL (ref 74–99)
HCT VFR BLD AUTO: 19.7 % (ref 41–52)
HCT VFR BLD AUTO: 20.9 % (ref 41–52)
HCT VFR BLD AUTO: 23.8 % (ref 41–52)
HGB BLD-MCNC: 6.3 G/DL (ref 13.5–17.5)
HGB BLD-MCNC: 6.7 G/DL (ref 13.5–17.5)
HGB BLD-MCNC: 7.5 G/DL (ref 13.5–17.5)
HGB RETIC QN: 35 PG (ref 28–38)
IMM GRANULOCYTES # BLD AUTO: 0.01 X10*3/UL (ref 0–0.5)
IMM GRANULOCYTES NFR BLD AUTO: 0.3 % (ref 0–0.9)
IMMATURE RETIC FRACTION: 13.9 %
LDH SERPL L TO P-CCNC: 125 U/L (ref 84–246)
LYMPHOCYTES # BLD AUTO: 0.29 X10*3/UL (ref 0.8–3)
LYMPHOCYTES NFR BLD AUTO: 8.7 %
MAGNESIUM SERPL-MCNC: 1.62 MG/DL (ref 1.6–2.4)
MAGNESIUM SERPL-MCNC: 1.66 MG/DL (ref 1.6–2.4)
MAGNESIUM SERPL-MCNC: 1.71 MG/DL (ref 1.6–2.4)
MCH RBC QN AUTO: 28.4 PG (ref 26–34)
MCH RBC QN AUTO: 28.7 PG (ref 26–34)
MCH RBC QN AUTO: 29 PG (ref 26–34)
MCHC RBC AUTO-ENTMCNC: 31.5 G/DL (ref 32–36)
MCHC RBC AUTO-ENTMCNC: 32 G/DL (ref 32–36)
MCHC RBC AUTO-ENTMCNC: 32.1 G/DL (ref 32–36)
MCV RBC AUTO: 89 FL (ref 80–100)
MCV RBC AUTO: 91 FL (ref 80–100)
MCV RBC AUTO: 91 FL (ref 80–100)
MONOCYTES # BLD AUTO: 0.39 X10*3/UL (ref 0.05–0.8)
MONOCYTES NFR BLD AUTO: 11.7 %
NEUTROPHILS # BLD AUTO: 2.59 X10*3/UL (ref 1.6–5.5)
NEUTROPHILS NFR BLD AUTO: 78.1 %
NRBC BLD-RTO: 0 /100 WBCS (ref 0–0)
PHOSPHATE SERPL-MCNC: 2.9 MG/DL (ref 2.5–4.9)
PHOSPHATE SERPL-MCNC: 3.4 MG/DL (ref 2.5–4.9)
PLATELET # BLD AUTO: 200 X10*3/UL (ref 150–450)
PLATELET # BLD AUTO: 215 X10*3/UL (ref 150–450)
PLATELET # BLD AUTO: 219 X10*3/UL (ref 150–450)
PMV BLD AUTO: 9.7 FL (ref 7.5–11.5)
PMV BLD AUTO: 9.8 FL (ref 7.5–11.5)
PMV BLD AUTO: 9.9 FL (ref 7.5–11.5)
POTASSIUM SERPL-SCNC: 4.1 MMOL/L (ref 3.5–5.3)
POTASSIUM SERPL-SCNC: 4.2 MMOL/L (ref 3.5–5.3)
POTASSIUM SERPL-SCNC: 4.3 MMOL/L (ref 3.5–5.3)
PRODUCT BLOOD TYPE: 9500
PRODUCT CODE: NORMAL
PROT SERPL-MCNC: 4.3 G/DL (ref 6.4–8.2)
RBC # BLD AUTO: 2.22 X10*6/UL (ref 4.5–5.9)
RBC # BLD AUTO: 2.31 X10*6/UL (ref 4.5–5.9)
RBC # BLD AUTO: 2.61 X10*6/UL (ref 4.5–5.9)
RETICS #: 0.04 X10*6/UL (ref 0.02–0.11)
RETICS/RBC NFR AUTO: 2 % (ref 0.5–2)
RH FACTOR (ANTIGEN D): NORMAL
SODIUM SERPL-SCNC: 133 MMOL/L (ref 136–145)
SODIUM SERPL-SCNC: 135 MMOL/L (ref 136–145)
SODIUM SERPL-SCNC: 135 MMOL/L (ref 136–145)
UNIT ABO: NORMAL
UNIT NUMBER: NORMAL
UNIT RH: NORMAL
UNIT VOLUME: 282
VIT B12 SERPL-MCNC: 1297 PG/ML (ref 211–911)
WBC # BLD AUTO: 3.3 X10*3/UL (ref 4.4–11.3)
WBC # BLD AUTO: 3.3 X10*3/UL (ref 4.4–11.3)
WBC # BLD AUTO: 3.7 X10*3/UL (ref 4.4–11.3)
XM INTEP: NORMAL

## 2023-10-24 PROCEDURE — 97530 THERAPEUTIC ACTIVITIES: CPT | Mod: GP

## 2023-10-24 PROCEDURE — 85025 COMPLETE CBC W/AUTO DIFF WBC: CPT

## 2023-10-24 PROCEDURE — 2500000001 HC RX 250 WO HCPCS SELF ADMINISTERED DRUGS (ALT 637 FOR MEDICARE OP)

## 2023-10-24 PROCEDURE — 86850 RBC ANTIBODY SCREEN: CPT

## 2023-10-24 PROCEDURE — 36415 COLL VENOUS BLD VENIPUNCTURE: CPT | Mod: CMCLAB

## 2023-10-24 PROCEDURE — 2500000004 HC RX 250 GENERAL PHARMACY W/ HCPCS (ALT 636 FOR OP/ED)

## 2023-10-24 PROCEDURE — 97530 THERAPEUTIC ACTIVITIES: CPT | Mod: GO

## 2023-10-24 PROCEDURE — 82746 ASSAY OF FOLIC ACID SERUM: CPT

## 2023-10-24 PROCEDURE — 82607 VITAMIN B-12: CPT

## 2023-10-24 PROCEDURE — 83735 ASSAY OF MAGNESIUM: CPT

## 2023-10-24 PROCEDURE — 86920 COMPATIBILITY TEST SPIN: CPT

## 2023-10-24 PROCEDURE — 1170000001 HC PRIVATE ONCOLOGY ROOM DAILY

## 2023-10-24 PROCEDURE — P9040 RBC LEUKOREDUCED IRRADIATED: HCPCS

## 2023-10-24 PROCEDURE — 85045 AUTOMATED RETICULOCYTE COUNT: CPT

## 2023-10-24 PROCEDURE — 92526 ORAL FUNCTION THERAPY: CPT | Mod: GN | Performed by: SPEECH-LANGUAGE PATHOLOGIST

## 2023-10-24 PROCEDURE — 85027 COMPLETE CBC AUTOMATED: CPT

## 2023-10-24 PROCEDURE — 80069 RENAL FUNCTION PANEL: CPT

## 2023-10-24 PROCEDURE — 82728 ASSAY OF FERRITIN: CPT

## 2023-10-24 PROCEDURE — 94640 AIRWAY INHALATION TREATMENT: CPT

## 2023-10-24 PROCEDURE — 36430 TRANSFUSION BLD/BLD COMPNT: CPT

## 2023-10-24 PROCEDURE — 2500000002 HC RX 250 W HCPCS SELF ADMINISTERED DRUGS (ALT 637 FOR MEDICARE OP, ALT 636 FOR OP/ED): Performed by: STUDENT IN AN ORGANIZED HEALTH CARE EDUCATION/TRAINING PROGRAM

## 2023-10-24 PROCEDURE — C9113 INJ PANTOPRAZOLE SODIUM, VIA: HCPCS

## 2023-10-24 PROCEDURE — 99233 SBSQ HOSP IP/OBS HIGH 50: CPT

## 2023-10-24 PROCEDURE — 80053 COMPREHEN METABOLIC PANEL: CPT

## 2023-10-24 PROCEDURE — 97165 OT EVAL LOW COMPLEX 30 MIN: CPT | Mod: GO

## 2023-10-24 PROCEDURE — 83615 LACTATE (LD) (LDH) ENZYME: CPT | Mod: CMCLAB

## 2023-10-24 RX ORDER — LANOLIN ALCOHOL/MO/W.PET/CERES
400 CREAM (GRAM) TOPICAL DAILY
Status: DISCONTINUED | OUTPATIENT
Start: 2023-10-24 | End: 2023-10-25

## 2023-10-24 RX ORDER — PANTOPRAZOLE SODIUM 40 MG/1
40 TABLET, DELAYED RELEASE ORAL DAILY
Qty: 30 TABLET | Refills: 0
Start: 2023-10-25 | End: 2023-10-25 | Stop reason: SDUPTHER

## 2023-10-24 RX ORDER — PANTOPRAZOLE SODIUM 40 MG/1
40 TABLET, DELAYED RELEASE ORAL DAILY
Status: DISCONTINUED | OUTPATIENT
Start: 2023-10-25 | End: 2023-11-02 | Stop reason: HOSPADM

## 2023-10-24 RX ORDER — ACETAMINOPHEN 325 MG/1
650 TABLET ORAL EVERY 6 HOURS PRN
Qty: 30 TABLET | Refills: 2
Start: 2023-10-24 | End: 2023-11-27 | Stop reason: WASHOUT

## 2023-10-24 RX ORDER — DOXAZOSIN 4 MG/1
4 TABLET ORAL DAILY
Qty: 30 TABLET | Refills: 2
Start: 2023-10-24 | End: 2023-10-25 | Stop reason: SDUPTHER

## 2023-10-24 RX ADMIN — BUDESONIDE 1 MG: 0.5 SUSPENSION RESPIRATORY (INHALATION) at 10:03

## 2023-10-24 RX ADMIN — ASPIRIN 81 MG CHEWABLE TABLET 81 MG: 81 TABLET CHEWABLE at 08:43

## 2023-10-24 RX ADMIN — Medication 400 MG: at 08:43

## 2023-10-24 RX ADMIN — PANTOPRAZOLE SODIUM 40 MG: 40 INJECTION, POWDER, FOR SOLUTION INTRAVENOUS at 08:43

## 2023-10-24 RX ADMIN — FLUTICASONE FUROATE AND VILANTEROL TRIFENATATE 1 PUFF: 100; 25 POWDER RESPIRATORY (INHALATION) at 10:02

## 2023-10-24 RX ADMIN — SILVER SULFADIAZINE: 10 CREAM TOPICAL at 08:46

## 2023-10-24 RX ADMIN — DOXAZOSIN 4 MG: 4 TABLET ORAL at 08:44

## 2023-10-24 RX ADMIN — THIAMINE HYDROCHLORIDE 100 MG: 100 INJECTION, SOLUTION INTRAMUSCULAR; INTRAVENOUS at 08:43

## 2023-10-24 ASSESSMENT — COGNITIVE AND FUNCTIONAL STATUS - GENERAL
HELP NEEDED FOR BATHING: A LITTLE
WALKING IN HOSPITAL ROOM: A LOT
DRESSING REGULAR UPPER BODY CLOTHING: A LITTLE
DRESSING REGULAR LOWER BODY CLOTHING: A LITTLE
MOBILITY SCORE: 15
DAILY ACTIVITIY SCORE: 18
MOVING FROM LYING ON BACK TO SITTING ON SIDE OF FLAT BED WITH BEDRAILS: A LITTLE
DRESSING REGULAR UPPER BODY CLOTHING: A LITTLE
EATING MEALS: A LITTLE
TOILETING: TOTAL
STANDING UP FROM CHAIR USING ARMS: A LITTLE
TURNING FROM BACK TO SIDE WHILE IN FLAT BAD: A LITTLE
DAILY ACTIVITIY SCORE: 16
WALKING IN HOSPITAL ROOM: A LITTLE
TOILETING: A LITTLE
MOBILITY SCORE: 18
TOILETING: TOTAL
CLIMB 3 TO 5 STEPS WITH RAILING: TOTAL
CLIMB 3 TO 5 STEPS WITH RAILING: A LITTLE
CLIMB 3 TO 5 STEPS WITH RAILING: TOTAL
PERSONAL GROOMING: A LITTLE
MOVING TO AND FROM BED TO CHAIR: A LITTLE
PERSONAL GROOMING: A LITTLE
WALKING IN HOSPITAL ROOM: A LOT
STANDING UP FROM CHAIR USING ARMS: A LITTLE
TURNING FROM BACK TO SIDE WHILE IN FLAT BAD: A LITTLE
MOVING TO AND FROM BED TO CHAIR: A LITTLE
MOVING FROM LYING ON BACK TO SITTING ON SIDE OF FLAT BED WITH BEDRAILS: A LITTLE
HELP NEEDED FOR BATHING: A LITTLE
EATING MEALS: A LITTLE
EATING MEALS: A LITTLE
DRESSING REGULAR LOWER BODY CLOTHING: A LITTLE
MOVING FROM LYING ON BACK TO SITTING ON SIDE OF FLAT BED WITH BEDRAILS: A LITTLE
STANDING UP FROM CHAIR USING ARMS: A LITTLE
DRESSING REGULAR UPPER BODY CLOTHING: A LITTLE
DRESSING REGULAR LOWER BODY CLOTHING: A LITTLE
MOBILITY SCORE: 15
PERSONAL GROOMING: A LITTLE
TURNING FROM BACK TO SIDE WHILE IN FLAT BAD: A LITTLE
DAILY ACTIVITIY SCORE: 16
HELP NEEDED FOR BATHING: A LITTLE
MOVING TO AND FROM BED TO CHAIR: A LITTLE

## 2023-10-24 ASSESSMENT — ACTIVITIES OF DAILY LIVING (ADL): ADL_ASSISTANCE: NEEDS ASSISTANCE

## 2023-10-24 ASSESSMENT — PAIN - FUNCTIONAL ASSESSMENT
PAIN_FUNCTIONAL_ASSESSMENT: 0-10

## 2023-10-24 ASSESSMENT — PAIN SCALES - GENERAL
PAINLEVEL_OUTOF10: 3
PAINLEVEL_OUTOF10: 0 - NO PAIN

## 2023-10-24 NOTE — PROGRESS NOTES
Physical Therapy    Physical Therapy Treatment    Patient Name: Phong Wong  MRN: [id number]  Today's Date: 10/24/2023  Time Calculation  Start Time: 1134  Stop Time: 1144  Time Calculation (min): 10 min       Assessment/Plan   PT Assessment  PT Assessment Results: Decreased strength, Decreased endurance, Impaired balance, Decreased mobility  Rehab Prognosis: Fair  Evaluation/Treatment Tolerance: Patient limited by fatigue, Patient limited by pain (Pain in dorsum of feet which patient reports is new pain. Comfortable and without pain when returned to supine)  Medical Staff Made Aware: Yes  End of Session Communication: Bedside nurse  Assessment Comment: Patient presents with decline in functional mobility as compared to baseline with need for min A during bed mobility, transfers and side steps at EOB. Patient could benefit from continued PT services in order to maximize functional mobility prior to D/C.  End of Session Patient Position: Bed, 3 rail up, Alarm on  PT Plan  Inpatient/Swing Bed or Outpatient: Inpatient  PT Plan  Treatment/Interventions: Bed mobility, Transfer training, Gait training, Stair training, Balance training, Neuromuscular re-education, Strengthening, Endurance training, Range of motion, Therapeutic exercise, Therapeutic activity, Positioning, Postural re-education  PT Plan: Skilled PT  PT Frequency: 3 times per week  PT Discharge Recommendations: Moderate intensity level of continued care  PT Recommended Transfer Status: Assist x1, Assistive device  PT - OK to Discharge: Yes (Evaluation Completed)      General Visit Information:   PT  Visit  PT Received On: 10/24/23  General  Prior to Session Communication: Bedside nurse  Patient Position Received: Up in chair  General Comment: pt alert and agreeable for therapy. pt expressing he wants to return to bed.    Subjective   Precautions:  Precautions  Medical Precautions: Fall precautions  Vital Signs:       Objective   Pain:  Pain Assessment  Pain  Assessment: 0-10  Pain Score: 3  Pain Location: Neck  Cognition:  Cognition  Overall Cognitive Status: Within Functional Limits  Orientation Level: Oriented X4  Insight: Mild  Impulsive: Mildly  Postural Control:  Postural Control  Postural Control: Within Functional Limits  Extremity/Trunk Assessments:        RLE   RLE : Exceptions to WFL  Strength RLE  RLE Overall Strength: Greater than or equal to 3/5 as evidenced by functional mobility  LLE   LLE : Exceptions to WFL  Strength LLE  LLE Overall Strength: Greater than or equal to 3/5 as evidenced by functional mobility  Activity Tolerance:  Activity Tolerance  Endurance: Tolerates less than 10 min exercise, no significant change in vital signs  Treatments:  Therapeutic Exercise  Therapeutic Exercise Performed: Yes  Therapeutic Exercise Activity 1: 1x3 APs BLE  Therapeutic Exercise Activity 2: 1x3 heel slides BLE  Therapeutic Exercise Activity 3: educated on SLR for BLEs    Therapeutic Activity  Therapeutic Activity Performed: Yes  Therapeutic Activity 1: pt in chair at start of session. provided VC on positioning prior to standing with FWW. pt able to complete STS minAx1 with FWW. x1 minor LOB, able to recover with Joseluis    Bed Mobility  Bed Mobility: Yes  Bed Mobility 1  Bed Mobility 1: Sitting to supine  Level of Assistance 1: Minimum assistance, Minimal verbal cues    Ambulation/Gait Training  Ambulation/Gait Training Performed: Yes  Ambulation/Gait Training 1  Surface 1: Level tile  Device 1: Rolling walker  Assistance 1: Minimum assistance  Quality of Gait 1: Narrow base of support  Comments/Distance (ft) 1: 4 lateral steps from bed to chair. provided VC on sequencing  Transfers  Transfer: Yes  Transfer 1  Transfer From 1: Chair with arms to  Transfer to 1: Stand  Technique 1: Sit to stand  Transfer Device 1: Walker  Transfer Level of Assistance 1: Minimum assistance  Trials/Comments 1: x1  Transfers 2  Transfer From 2: Stand to  Transfer to 2: Bed  Technique  2: Stand to sit  Transfer Device 2: Walker  Transfer Level of Assistance 2: Minimum assistance  Trials/Comments 2: x1    Outcome Measures:  [facility] Basic Mobility  Turning from your back to your side while in a flat bed without using bedrails: A little  Moving from lying on your back to sitting on the side of a flat bed without using bedrails: A little  Moving to and from bed to chair (including a wheelchair): A little  Standing up from a chair using your arms (e.g. wheelchair or bedside chair): A little  To walk in hospital room: A lot  Climbing 3-5 steps with railing: Total  Basic Mobility - Total Score: 15    Education Documentation  Handouts, taught by Lillian Aguirre PT at 10/24/2023  3:02 PM.  Learner: Patient  Readiness: Acceptance  Method: Explanation  Response: Verbalizes Understanding    Precautions, taught by Lillian Aguirre PT at 10/24/2023  3:02 PM.  Learner: Patient  Readiness: Acceptance  Method: Explanation  Response: Verbalizes Understanding    Body Mechanics, taught by Lillian Aguirre PT at 10/24/2023  3:02 PM.  Learner: Patient  Readiness: Acceptance  Method: Explanation  Response: Verbalizes Understanding    Home Exercise Program, taught by Lillian Aguirre PT at 10/24/2023  3:02 PM.  Learner: Patient  Readiness: Acceptance  Method: Explanation  Response: Verbalizes Understanding    Mobility Training, taught by Lillian Aguirre PT at 10/24/2023  3:02 PM.  Learner: Patient  Readiness: Acceptance  Method: Explanation  Response: Verbalizes Understanding    Education Comments  No comments found.        OP EDUCATION:  Education  Individual(s) Educated: Patient  Education Provided: Fall Risk, Body Mechanics, Home Exercise Program, Posture  Patient Response to Education: Patient/Caregiver Verbalized Understanding of Information  Education Comment: educatd pt on supine ther-ex for LEs including APs, SLR, heel slides, QS    Encounter Problems       Encounter Problems  (Active)       Mobility       STG - Patient will ambulate household distance with RW and mod I (Progressing)       Start:  10/22/23    Expected End:  11/05/23            STG - Patient will navigate 2+ 2 steps with rail (Progressing)       Start:  10/22/23    Expected End:  11/05/23               Pain - Adult          Transfers       STG - Transfer from bed to chair mod I with RW (Progressing)       Start:  10/22/23    Expected End:  11/05/23            STG - Patient will perform bed mobility independent  (Progressing)       Start:  10/22/23    Expected End:  11/05/23            STG - Patient will transfer sit to and from stand mod I with RW (Progressing)       Start:  10/22/23    Expected End:  11/05/23
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
## 2023-10-24 NOTE — PROGRESS NOTES
Speech-Language Pathology    Inpatient  Speech-Language Pathology Treatment     Patient Name: Phong Wong  MRN: [id number]  Today's Date: 10/24/2023  Time Calculation  Start Time: 1000  Stop Time: 1025  Time Calculation (min): 25 min         Current Problem:   1. Protein-calorie malnutrition, unspecified severity (CMS/HCC)  EGD w PEG Tube Placement    EGD w PEG Tube Placement    Surgical Pathology Exam    Surgical Pathology Exam      2. Hypovolemic shock (CMS/HCC)  Surgical Pathology Exam    Surgical Pathology Exam      3. Opioid overdose, accidental or unintentional, initial encounter (CMS/HCC)  Surgical Pathology Exam    Surgical Pathology Exam      4. General weakness  Surgical Pathology Exam    Surgical Pathology Exam      5. HECTOR (acute kidney injury) (CMS/HCC)  Surgical Pathology Exam    Surgical Pathology Exam      6. Pain after radiation therapy  EGD w PEG Tube Placement    EGD w PEG Tube Placement    Surgical Pathology Exam    Surgical Pathology Exam      7. Oropharyngeal cancer (CMS/HCC)  EGD w PEG Tube Placement    EGD w PEG Tube Placement    Surgical Pathology Exam    Surgical Pathology Exam    acetaminophen (Tylenol) 325 mg tablet    phenoL (Chloraseptic) 1.4 % aerosol,spray    pantoprazole (ProtoNix) 40 mg EC tablet      8. Pain in both lower extremities  Lower extremity venous duplex bilateral    Lower extremity venous duplex bilateral    Surgical Pathology Exam    Surgical Pathology Exam      9. Localized edema  Lower extremity venous duplex bilateral    Surgical Pathology Exam    Surgical Pathology Exam      10. Prostate cancer (CMS/HCC)  doxazosin (Cardura) 4 mg tablet      11. Impaired mobility and ADLs  Referral to Physical Medicine Rehab          Treatment  Reviewed MBSS results and safe swallow recommendations. Discussed the need to take single sips of thin liquids in order not to aspirate, preventing liquids from entering the lungs. Pt demonstrated good control in consuming small single sips of  water without further cueing. Pt endorsed eating well, choosing easier foods to manipulate due to lack of dentition. Introduced pharyngeal exercises for head and neck to aid in reducing late affects of radiation. Pt performed each exercise x10 each with min verbal cueing. Continue SLP services.           Plan:  SLP Frequency: 2x per week  Duration: 3 weeks  SLP Discharge Recommendations: Continue skilled speech therapy services post discharge  Discussed POC: Patient  Discussed Risks/Benefits: Yes  Patient/Caregiver Agreeable: Yes  SLP - OK to Discharge: Yes          Recommendations:  Solid Consistency: Regular (IDDSI Level 7)  Liquid Consistency: Thin (IDDSI Level 0)  Liquid Administration Via: Cup  Medication Administration: Take one pill at a time  Compensatory Strategy Recommendations: Single sips, Slow rate  Postural Changes and/or Swallow Maneuvers: Upright 90 degrees for all PO intake, Remain upright for 30 minutes after meal      Goals:  Pt. will tolerate least restrictive diet without overt s/s of aspiration with 100% accuracy.   Pt will perform pharyngeal strengthening exercises to avert affects of radiation.         Inpatient:  Education Documentation  Extensive education provided to pt re: anatomy/physiology of swallow function, s/s of aspiration, risk of aspiration, results, diet recommendations and MBSS procedure. Pt verbalized understanding and in agreement.

## 2023-10-24 NOTE — DISCHARGE INSTRUCTIONS
Mr. Wong,    Kalpesh were admitted to [facility] on 10/18/23 for altered mental status and weakness, possibly due to decreased oral intake and recent morphine use. During your hospital stay, we performed an upper scope (EGD) of your GI tract and inserted a PEG tube, which you will receive nutrition and medications through while your throat still hurts with swallowing. The scope showed a lot of inflammation in your esophagitis from the radiation. A Clark catheter was placed because you were having trouble urinating and will remain there until you see the urologist outpatient. We also gave you 2 units of blood due to low blood counts.     Follow-up appointments with primary oncology, radiation oncology and urology will be arranged after you leave.    Sincerely,  Zeeshan Team

## 2023-10-24 NOTE — PROGRESS NOTES
Radiation Oncology Treatment Summary    Patient Name:  Phong Wong  MRN:  [id number]  :  1950    Referring Provider: No ref. provider found  Primary Care Provider: Harshil Weiss MD    Brief History: Phong Wong is a 73 y.o. 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    Radiation Treatment Summary:    The patient underwent VMAT to deliver 70Gy to the tumor in the soft palate and involved lymph nodes, integrated with 63Gy to an intermediate risk volume and 56Gy to a low risk volume, all delivered in 35 fractions.    He was treated between 23 and 10/4/23    Concurrent Chemotherapy:  He received weekly concurrent carboplatin and paclitaxel chemotherapy.            CTCAE Toxicity Overview:   Toxicity Assessment          10/4/2023    13:08   Toxicity Assessment   Adverse Events Reviewed (WDL) No (Exceptions to WDL)   Treatment  and neck   Anorexia Grade 2       loss of appetite   Anxiety Grade 1       on Ativan prior to treatment   Dehydration Grade 2       loss of drive to drink   Depression Grade 0   Dermatitis Radiation Grade 3       Silvadine and mepilex   Diarrhea Grade 0   Fatigue Grade 3       rest and activity balance   Nausea Grade 0   Pain Grade 0   Treatment Related Secondary Malignancy Grade 0   Tumor Pain Grade 0   Vomiting Grade 0   Dysphagia Grade 1       slight   Mucositis Oral Grade 2       redness   Dry Mouth Grade 2       worse at night   Oral Pain Grade 0   Hoarseness Grade 0   Voice Alteration Grade 0     The patient was able to maintain nutrition orally by using narcotics to control pain from mucositis.     Patient Disposition:   The patient will followup in 2 weeks with Mak Berry.

## 2023-10-24 NOTE — CARE PLAN
The patient's goals for the shift include      The clinical goals for the shift include pt will remain injury free      Problem: Fall/Injury  Goal: Not fall by end of shift  Outcome: Progressing  Goal: Be free from injury by end of the shift  Outcome: Progressing  Goal: Verbalize understanding of personal risk factors for fall in the hospital  Outcome: Progressing  Goal: Verbalize understanding of risk factor reduction measures to prevent injury from fall in the home  Outcome: Progressing  Goal: Use assistive devices by end of the shift  Outcome: Progressing  Goal: Pace activities to prevent fatigue by end of the shift  Outcome: Progressing     Problem: Skin  Goal: Decreased wound size/increased tissue granulation at next dressing change  Outcome: Progressing  Flowsheets (Taken 10/24/2023 1629)  Decreased wound size/increased tissue granulation at next dressing change: Promote sleep for wound healing  Goal: Participates in plan/prevention/treatment measures  Outcome: Progressing  Flowsheets (Taken 10/24/2023 1629)  Participates in plan/prevention/treatment measures: Elevate heels  Goal: Prevent/manage excess moisture  Outcome: Progressing  Flowsheets (Taken 10/24/2023 1629)  Prevent/manage excess moisture: Moisturize dry skin  Goal: Prevent/minimize sheer/friction injuries  Outcome: Progressing  Flowsheets (Taken 10/24/2023 1629)  Prevent/minimize sheer/friction injuries: Increase activity/out of bed for meals  Goal: Promote/optimize nutrition  Outcome: Progressing  Flowsheets (Taken 10/24/2023 1629)  Promote/optimize nutrition: Offer water/supplements/favorite foods  Goal: Promote skin healing  Outcome: Progressing  Flowsheets (Taken 10/24/2023 1629)  Promote skin healing: Assess skin/pad under line(s)/device(s)     Problem: Pain  Goal: Takes deep breaths with improved pain control throughout the shift  Outcome: Progressing  Goal: Turns in bed with improved pain control throughout the shift  Outcome: Progressing      Problem: Pain - Adult  Goal: Verbalizes/displays adequate comfort level or baseline comfort level  Outcome: Progressing     Problem: Safety - Adult  Goal: Free from fall injury  Outcome: Progressing     Problem: Discharge Planning  Goal: Discharge to home or other facility with appropriate resources  Outcome: Progressing     Problem: Chronic Conditions and Co-morbidities  Goal: Patient's chronic conditions and co-morbidity symptoms are monitored and maintained or improved  Outcome: Progressing

## 2023-10-24 NOTE — PROGRESS NOTES
Occupational Therapy    Evaluation    Patient Name: Phong Wong  MRN: [id number]  Today's Date: 10/24/2023  Time Calculation  Start Time: 1104  Stop Time: 1127  Time Calculation (min): 23 min    Assessment  IP OT Assessment  OT Assessment: Pt presents with decreased strength and endurance impairing funcitonal performance of ADLs and functional mobility. Pt would benefit from continued OT  Plan:  Treatment Interventions: ADL retraining, Functional transfer training  OT Frequency: 3 times per week  OT Discharge Recommendations: Moderate intensity level of continued care  OT - OK to Discharge: Yes (see discharge recommendation)    Subjective   Current Problem:  1. Protein-calorie malnutrition, unspecified severity (CMS/HCC)  EGD w PEG Tube Placement    EGD w PEG Tube Placement    Surgical Pathology Exam    Surgical Pathology Exam      2. Hypovolemic shock (CMS/HCC)  Surgical Pathology Exam    Surgical Pathology Exam      3. Opioid overdose, accidental or unintentional, initial encounter (CMS/HCC)  Surgical Pathology Exam    Surgical Pathology Exam      4. General weakness  Surgical Pathology Exam    Surgical Pathology Exam      5. HECTOR (acute kidney injury) (CMS/HCC)  Surgical Pathology Exam    Surgical Pathology Exam      6. Pain after radiation therapy  EGD w PEG Tube Placement    EGD w PEG Tube Placement    Surgical Pathology Exam    Surgical Pathology Exam      7. Oropharyngeal cancer (CMS/HCC)  EGD w PEG Tube Placement    EGD w PEG Tube Placement    Surgical Pathology Exam    Surgical Pathology Exam    acetaminophen (Tylenol) 325 mg tablet    phenoL (Chloraseptic) 1.4 % aerosol,spray    pantoprazole (ProtoNix) 40 mg EC tablet      8. Pain in both lower extremities  Lower extremity venous duplex bilateral    Lower extremity venous duplex bilateral    Surgical Pathology Exam    Surgical Pathology Exam      9. Localized edema  Lower extremity venous duplex bilateral    Surgical Pathology Exam    Surgical Pathology Exam       10. Prostate cancer (CMS/HCC)  doxazosin (Cardura) 4 mg tablet      11. Impaired mobility and ADLs  Referral to Physical Medicine Rehab        General:  Reason for Referral: 73 year old male wtih recent dx of oral cavity cancer admitted with opioid overdose from facility  Past Medical History Relevant to Rehab: HTN, COPD  Prior to Session Communication: Bedside nurse  Patient Position Received: Bed, 3 rail up, Alarm on  General Comment: pleasant and cooperative, agreeable to OT   Precautions:  Medical Precautions: Fall precautions    Pain:  Pain Assessment  Pain Assessment: 0-10  Pain Score: 0 - No pain  Lines/Tubes/Drains:  Urethral Catheter Coude 16 Fr. (Active)   Number of days: 2       Gastrostomy/Enterostomy PEG-jejunostomy 1 Umbilicus (Active)   Number of days: 3         Objective   Cognition:  Overall Cognitive Status: Within Functional Limits  Orientation Level: Oriented X4  Safety/Judgement: Exceptions to WFL  Complex Functional Tasks: Minimal  Novel Situations: Minimal  Routine Tasks: Minimal  Unable to Self-Monitor and Self-Correct Consistently: Minimal  Other (Comment): Pt reported fear of falling when performing funcitonal mobility  Insight: Mild  Impulsive: Mildly           Home Living:  Type of Home: House  Lives With: Spouse  Home Adaptive Equipment: Cane, Walker rolling or standard, Wheelchair-manual  Home Layout: Two level, Bed/bath upstairs  Home Access: Stairs to enter with rails  Entrance Stairs-Number of Steps: 2  Bathroom Shower/Tub: Tub/shower unit  Bathroom Toilet: Standard  Bathroom Equipment: Grab bars in shower, Shower chair with back   Prior Function:  Level of [place]: Needs assistance with ADLs (however, MOD I with funcitonal mobility with FWW/cane)  Receives Help From: Family  ADL Assistance: Needs assistance  Ambulatory Assistance: Independent  Vocational: Retired  Leisure: enjoys DIY home projects  Hand Dominance: Right  IADL History:  Current License: Yes  Mode of  Transportation: Car  ADL:  Eating Deficit: Feeding tube  Grooming Deficit:  (SBA with set up seated anticipated, CGA at least standing)  Bathing Deficit:  (min A anticipated 2/2 funcitonal perofrmance)  UE Dressing Deficit:  (SBA with set up seated anticipated)  LE Dressing Deficit:  (min A anticiapted 2/2 ROM and funcitonal performance)  Toileting Deficit:  (total, rainey; min A at least to toilet)  Functional Deficit:  (Pt performed funcitonal mobility from bed to chair with FWW CGA, VC for sequencing)       Bed Mobility/Transfers: Bed Mobility  Bed Mobility: Yes  Bed Mobility 1  Bed Mobility 1: Supine to sitting  Level of Assistance 1: Minimum assistance, Minimal verbal cues   and Transfers  Transfer: Yes  Transfer 1  Transfer From 1: Bed to  Transfer to 1: Stand  Technique 1: Sit to stand  Transfer Device 1: Walker  Transfer Level of Assistance 1: Minimum assistance, Minimal verbal cues  Trials/Comments 1: 2 (Pt required extended time and encouragement due to fear of falling)  Transfers 2  Transfer From 2: Stand to  Transfer to 2: Bed  Technique 2: Stand to sit  Transfer Device 2: Walker  Transfer Level of Assistance 2: Minimum assistance, Minimal verbal cues  Trials/Comments 2: 1  Transfers 3  Transfer From 3: Stand to  Transfer to 3: Chair with arms  Technique 3: Stand to sit  Transfer Device 3: Walker  Transfer Level of Assistance 3: Minimum assistance, Minimal verbal cues  Trials/Comments 3: 1    IADL's:   Current License: Yes  Mode of Transportation: Car  Vision: Vision - Basic Assessment  Current Vision: No visual deficits    Sensation:  Light Touch: No apparent deficits        Hand Function:  Hand Function  Gross Grasp: Functional  Coordination: Functional  Extremities: RUE   RUE : Exceptions to WFL  RUE AROM (degrees)  RUE AROM Comment: WFL  RUE Strength  RUE Overall Strength: Greater than or equal to 3/5 as evidenced by functional mobility, LUE   LUE: Exceptions to WFL  LUE AROM (degrees)  LUE AROM  Comment: WFL  LUE Strength  LUE Overall Strength: Greater than or equal to 3/5 as evidenced by functional mobility,      Outcome Measures: [facility] Daily Activity  Putting on and taking off regular lower body clothing: A little  Bathing (including washing, rinsing, drying): A little  Putting on and taking off regular upper body clothing: A little  Toileting, which includes using toilet, bedpan or urinal: Total  Taking care of personal grooming such as brushing teeth: A little  Eating Meals: A little  Daily Activity - Total Score: 16              ,       OT Adult Other Outcome Measures  4AT: 4AT-  Education Documentation  Precautions, taught by Diann Iniguez OT at 10/24/2023 12:09 PM.  Learner: Patient  Readiness: Acceptance  Method: Explanation  Response: Verbalizes Understanding    ADL Training, taught by Diann Iniguez OT at 10/24/2023 12:09 PM.  Learner: Patient  Readiness: Acceptance  Method: Explanation  Response: Verbalizes Understanding    OT Treatment:  Pt performed multiple STS from bed min A with VC for safety.   OT guided Pt through relaxation techniques (deep breathing, guided imagery) for anxiety symptoms d/t fear of falling while Pt was seated EOB with SBA for postural control         Goals:   Encounter Problems       Encounter Problems (Active)       ADLs       Patient with complete lower body dressing with set-up and supervision level of assistance donning and doffing all LE clothes  (Progressing)       Start:  10/24/23    Expected End:  11/07/23            Patient will complete toileting including hygiene clothing management/hygiene with set-up and stand by assist level of assistance. (Progressing)       Start:  10/24/23    Expected End:  11/07/23               BALANCE       Pt will maintain dynamic standing balance during ADL task with set-up and supervision level of assistance in order to demonstrate decreased risk of falling and improved postural control. (Progressing)       Start:  10/24/23     Expected End:  11/07/23               MOBILITY       Patient will perform Functional mobility mod  Household distances/Community Distances with set-up and supervision level of assistance and least restrictive device in order to improve safety and functional mobility. (Progressing)       Start:  10/24/23    Expected End:  11/07/23                     TRANSFERS       Patient will perform bed mobility supervision level of assistance in order to improve safety and independence with mobility (Progressing)       Start:  10/24/23    Expected End:  11/07/23            Patient will complete all functional transfer with least restrictive device with set-up and supervision level of assistance. (Progressing)       Start:  10/24/23    Expected End:  11/07/23                           10/24/23 at 12:10 PM   Diann Iniguez, OT   #[id number]

## 2023-10-25 LAB
ALBUMIN SERPL BCP-MCNC: 2.4 G/DL (ref 3.4–5)
ALBUMIN SERPL BCP-MCNC: 2.4 G/DL (ref 3.4–5)
ALP SERPL-CCNC: 60 U/L (ref 33–136)
ALT SERPL W P-5'-P-CCNC: 12 U/L (ref 10–52)
ANION GAP SERPL CALC-SCNC: 11 MMOL/L (ref 10–20)
ANION GAP SERPL CALC-SCNC: 9 MMOL/L (ref 10–20)
AST SERPL W P-5'-P-CCNC: 13 U/L (ref 9–39)
BASOPHILS # BLD AUTO: 0.01 X10*3/UL (ref 0–0.1)
BASOPHILS NFR BLD AUTO: 0.3 %
BILIRUB SERPL-MCNC: 0.4 MG/DL (ref 0–1.2)
BUN SERPL-MCNC: 19 MG/DL (ref 6–23)
BUN SERPL-MCNC: 20 MG/DL (ref 6–23)
CALCIUM SERPL-MCNC: 7.5 MG/DL (ref 8.6–10.6)
CALCIUM SERPL-MCNC: 7.8 MG/DL (ref 8.6–10.6)
CHLORIDE SERPL-SCNC: 100 MMOL/L (ref 98–107)
CHLORIDE SERPL-SCNC: 97 MMOL/L (ref 98–107)
CO2 SERPL-SCNC: 26 MMOL/L (ref 21–32)
CO2 SERPL-SCNC: 28 MMOL/L (ref 21–32)
CREAT SERPL-MCNC: 1.05 MG/DL (ref 0.5–1.3)
CREAT SERPL-MCNC: 1.07 MG/DL (ref 0.5–1.3)
EOSINOPHIL # BLD AUTO: 0.04 X10*3/UL (ref 0–0.4)
EOSINOPHIL NFR BLD AUTO: 1.1 %
ERYTHROCYTE [DISTWIDTH] IN BLOOD BY AUTOMATED COUNT: 17.2 % (ref 11.5–14.5)
GFR SERPL CREATININE-BSD FRML MDRD: 74 ML/MIN/1.73M*2
GFR SERPL CREATININE-BSD FRML MDRD: 76 ML/MIN/1.73M*2
GLUCOSE SERPL-MCNC: 110 MG/DL (ref 74–99)
GLUCOSE SERPL-MCNC: 99 MG/DL (ref 74–99)
HCT VFR BLD AUTO: 23.7 % (ref 41–52)
HGB BLD-MCNC: 7.9 G/DL (ref 13.5–17.5)
IMM GRANULOCYTES # BLD AUTO: 0.02 X10*3/UL (ref 0–0.5)
IMM GRANULOCYTES NFR BLD AUTO: 0.6 % (ref 0–0.9)
IRON SATN MFR SERPL: 24 % (ref 25–45)
IRON SERPL-MCNC: 39 UG/DL (ref 35–150)
LYMPHOCYTES # BLD AUTO: 0.31 X10*3/UL (ref 0.8–3)
LYMPHOCYTES NFR BLD AUTO: 8.9 %
MAGNESIUM SERPL-MCNC: 1.45 MG/DL (ref 1.6–2.4)
MAGNESIUM SERPL-MCNC: 1.96 MG/DL (ref 1.6–2.4)
MCH RBC QN AUTO: 29 PG (ref 26–34)
MCHC RBC AUTO-ENTMCNC: 33.3 G/DL (ref 32–36)
MCV RBC AUTO: 87 FL (ref 80–100)
MONOCYTES # BLD AUTO: 0.35 X10*3/UL (ref 0.05–0.8)
MONOCYTES NFR BLD AUTO: 10 %
NEUTROPHILS # BLD AUTO: 2.76 X10*3/UL (ref 1.6–5.5)
NEUTROPHILS NFR BLD AUTO: 79.1 %
NRBC BLD-RTO: 0 /100 WBCS (ref 0–0)
PHOSPHATE SERPL-MCNC: 2.3 MG/DL (ref 2.5–4.9)
PHOSPHATE SERPL-MCNC: 3.8 MG/DL (ref 2.5–4.9)
PLATELET # BLD AUTO: 197 X10*3/UL (ref 150–450)
PMV BLD AUTO: 9.9 FL (ref 7.5–11.5)
POTASSIUM SERPL-SCNC: 3.9 MMOL/L (ref 3.5–5.3)
POTASSIUM SERPL-SCNC: 3.9 MMOL/L (ref 3.5–5.3)
PROT SERPL-MCNC: 4.7 G/DL (ref 6.4–8.2)
RBC # BLD AUTO: 2.72 X10*6/UL (ref 4.5–5.9)
SODIUM SERPL-SCNC: 130 MMOL/L (ref 136–145)
SODIUM SERPL-SCNC: 133 MMOL/L (ref 136–145)
TIBC SERPL-MCNC: 164 UG/DL (ref 240–445)
UIBC SERPL-MCNC: 125 UG/DL (ref 110–370)
WBC # BLD AUTO: 3.5 X10*3/UL (ref 4.4–11.3)

## 2023-10-25 PROCEDURE — 2500000001 HC RX 250 WO HCPCS SELF ADMINISTERED DRUGS (ALT 637 FOR MEDICARE OP)

## 2023-10-25 PROCEDURE — 85025 COMPLETE CBC W/AUTO DIFF WBC: CPT

## 2023-10-25 PROCEDURE — 36415 COLL VENOUS BLD VENIPUNCTURE: CPT

## 2023-10-25 PROCEDURE — 84520 ASSAY OF UREA NITROGEN: CPT

## 2023-10-25 PROCEDURE — 94640 AIRWAY INHALATION TREATMENT: CPT

## 2023-10-25 PROCEDURE — 83550 IRON BINDING TEST: CPT

## 2023-10-25 PROCEDURE — 99222 1ST HOSP IP/OBS MODERATE 55: CPT | Performed by: STUDENT IN AN ORGANIZED HEALTH CARE EDUCATION/TRAINING PROGRAM

## 2023-10-25 PROCEDURE — 2500000004 HC RX 250 GENERAL PHARMACY W/ HCPCS (ALT 636 FOR OP/ED)

## 2023-10-25 PROCEDURE — 83735 ASSAY OF MAGNESIUM: CPT

## 2023-10-25 PROCEDURE — 1170000001 HC PRIVATE ONCOLOGY ROOM DAILY

## 2023-10-25 PROCEDURE — 2500000002 HC RX 250 W HCPCS SELF ADMINISTERED DRUGS (ALT 637 FOR MEDICARE OP, ALT 636 FOR OP/ED): Performed by: STUDENT IN AN ORGANIZED HEALTH CARE EDUCATION/TRAINING PROGRAM

## 2023-10-25 PROCEDURE — 94760 N-INVAS EAR/PLS OXIMETRY 1: CPT

## 2023-10-25 PROCEDURE — 2500000005 HC RX 250 GENERAL PHARMACY W/O HCPCS

## 2023-10-25 PROCEDURE — 84100 ASSAY OF PHOSPHORUS: CPT

## 2023-10-25 PROCEDURE — 80053 COMPREHEN METABOLIC PANEL: CPT

## 2023-10-25 PROCEDURE — 99233 SBSQ HOSP IP/OBS HIGH 50: CPT

## 2023-10-25 RX ORDER — MAGNESIUM SULFATE HEPTAHYDRATE 40 MG/ML
2 INJECTION, SOLUTION INTRAVENOUS ONCE
Status: COMPLETED | OUTPATIENT
Start: 2023-10-25 | End: 2023-10-25

## 2023-10-25 RX ORDER — LANOLIN ALCOHOL/MO/W.PET/CERES
400 CREAM (GRAM) TOPICAL 2 TIMES DAILY
Qty: 60 TABLET | Refills: 0 | Status: SHIPPED
Start: 2023-10-25 | End: 2023-11-29 | Stop reason: ALTCHOICE

## 2023-10-25 RX ORDER — LANOLIN ALCOHOL/MO/W.PET/CERES
400 CREAM (GRAM) TOPICAL 2 TIMES DAILY
Status: DISCONTINUED | OUTPATIENT
Start: 2023-10-25 | End: 2023-11-02 | Stop reason: HOSPADM

## 2023-10-25 RX ORDER — PANTOPRAZOLE SODIUM 40 MG/1
40 TABLET, DELAYED RELEASE ORAL DAILY
Qty: 30 TABLET | Refills: 0
Start: 2023-10-25 | End: 2023-11-29 | Stop reason: ALTCHOICE

## 2023-10-25 RX ORDER — DOXAZOSIN 4 MG/1
4 TABLET ORAL DAILY
Start: 2023-10-25 | End: 2023-11-27 | Stop reason: SDUPTHER

## 2023-10-25 RX ADMIN — DOXAZOSIN 4 MG: 4 TABLET ORAL at 08:51

## 2023-10-25 RX ADMIN — ASPIRIN 81 MG CHEWABLE TABLET 81 MG: 81 TABLET CHEWABLE at 08:51

## 2023-10-25 RX ADMIN — MAGNESIUM SULFATE HEPTAHYDRATE 2 G: 40 INJECTION, SOLUTION INTRAVENOUS at 12:06

## 2023-10-25 RX ADMIN — FLUTICASONE FUROATE AND VILANTEROL TRIFENATATE 1 PUFF: 100; 25 POWDER RESPIRATORY (INHALATION) at 15:50

## 2023-10-25 RX ADMIN — BUDESONIDE 0.5 MG: 0.5 SUSPENSION RESPIRATORY (INHALATION) at 21:22

## 2023-10-25 RX ADMIN — MAGNESIUM OXIDE TAB 400 MG (241.3 MG ELEMENTAL MG) 400 MG: 400 (241.3 MG) TAB at 08:51

## 2023-10-25 RX ADMIN — PANTOPRAZOLE SODIUM 40 MG: 40 TABLET, DELAYED RELEASE ORAL at 08:51

## 2023-10-25 RX ADMIN — THIAMINE HYDROCHLORIDE 100 MG: 100 INJECTION, SOLUTION INTRAMUSCULAR; INTRAVENOUS at 08:51

## 2023-10-25 RX ADMIN — MAGNESIUM OXIDE TAB 400 MG (241.3 MG ELEMENTAL MG) 400 MG: 400 (241.3 MG) TAB at 20:24

## 2023-10-25 RX ADMIN — POTASSIUM PHOSPHATE, MONOBASIC POTASSIUM PHOSPHATE, DIBASIC 21 MMOL: 224; 236 INJECTION, SOLUTION, CONCENTRATE INTRAVENOUS at 22:27

## 2023-10-25 RX ADMIN — BUDESONIDE 1 MG: 0.5 SUSPENSION RESPIRATORY (INHALATION) at 15:51

## 2023-10-25 RX ADMIN — SILVER SULFADIAZINE: 10 CREAM TOPICAL at 08:53

## 2023-10-25 ASSESSMENT — COGNITIVE AND FUNCTIONAL STATUS - GENERAL
STANDING UP FROM CHAIR USING ARMS: A LOT
TOILETING: TOTAL
WALKING IN HOSPITAL ROOM: A LOT
DAILY ACTIVITIY SCORE: 12
DRESSING REGULAR UPPER BODY CLOTHING: A LOT
TURNING FROM BACK TO SIDE WHILE IN FLAT BAD: A LOT
CLIMB 3 TO 5 STEPS WITH RAILING: TOTAL
DRESSING REGULAR LOWER BODY CLOTHING: A LOT
EATING MEALS: A LITTLE
MOVING TO AND FROM BED TO CHAIR: A LOT
MOBILITY SCORE: 12
HELP NEEDED FOR BATHING: A LOT
MOVING FROM LYING ON BACK TO SITTING ON SIDE OF FLAT BED WITH BEDRAILS: A LITTLE
PERSONAL GROOMING: A LOT

## 2023-10-25 ASSESSMENT — ENCOUNTER SYMPTOMS
FEVER: 0
LIGHT-HEADEDNESS: 0
SPEECH DIFFICULTY: 0
DIARRHEA: 0
FREQUENCY: 0
ARTHRALGIAS: 0
BLOOD IN STOOL: 0
NERVOUS/ANXIOUS: 0
CONSTIPATION: 0
CHOKING: 0
COUGH: 0
TROUBLE SWALLOWING: 0
DIFFICULTY URINATING: 0
BACK PAIN: 0
VOMITING: 0
HALLUCINATIONS: 0
WEAKNESS: 0
AGITATION: 0
WOUND: 0
DYSURIA: 0
HEMATURIA: 0
CONFUSION: 0
NAUSEA: 0
TREMORS: 0
DIZZINESS: 0
SHORTNESS OF BREATH: 0
CHILLS: 0

## 2023-10-25 ASSESSMENT — PAIN SCALES - GENERAL
PAINLEVEL_OUTOF10: 0 - NO PAIN
PAINLEVEL_OUTOF10: 0 - NO PAIN

## 2023-10-25 ASSESSMENT — PAIN - FUNCTIONAL ASSESSMENT: PAIN_FUNCTIONAL_ASSESSMENT: 0-10

## 2023-10-25 NOTE — CARE PLAN
The patient's goals for the shift include      The clinical goals for the shift include Pt will have no complaints of pain throughout shift.      Problem: Fall/Injury  Goal: Not fall by end of shift  Outcome: Progressing  Goal: Be free from injury by end of the shift  Outcome: Progressing  Goal: Verbalize understanding of personal risk factors for fall in the hospital  Outcome: Progressing  Goal: Verbalize understanding of risk factor reduction measures to prevent injury from fall in the home  Outcome: Progressing  Goal: Use assistive devices by end of the shift  Outcome: Progressing  Goal: Pace activities to prevent fatigue by end of the shift  Outcome: Progressing     Problem: Skin  Goal: Decreased wound size/increased tissue granulation at next dressing change  Outcome: Progressing  Flowsheets (Taken 10/24/2023 1629 by Lila Parisi RN)  Decreased wound size/increased tissue granulation at next dressing change: Promote sleep for wound healing  Goal: Participates in plan/prevention/treatment measures  Outcome: Progressing  Flowsheets (Taken 10/24/2023 1629 by Lila Parisi RN)  Participates in plan/prevention/treatment measures: Elevate heels  Goal: Prevent/manage excess moisture  Outcome: Progressing  Flowsheets (Taken 10/24/2023 1629 by Lila Parisi RN)  Prevent/manage excess moisture: Moisturize dry skin  Goal: Prevent/minimize sheer/friction injuries  Outcome: Progressing  Flowsheets (Taken 10/24/2023 1629 by Lila Parisi RN)  Prevent/minimize sheer/friction injuries: Increase activity/out of bed for meals  Goal: Promote/optimize nutrition  Outcome: Progressing  Flowsheets (Taken 10/24/2023 1629 by Lila Parisi RN)  Promote/optimize nutrition: Offer water/supplements/favorite foods  Goal: Promote skin healing  Outcome: Progressing  Flowsheets (Taken 10/24/2023 1629 by Lila Parisi RN)  Promote skin healing: Assess skin/pad under line(s)/device(s)     Problem: Pain  Goal: Takes deep breaths with  improved pain control throughout the shift  Outcome: Progressing  Goal: Turns in bed with improved pain control throughout the shift  Outcome: Progressing     Problem: Pain - Adult  Goal: Verbalizes/displays adequate comfort level or baseline comfort level  Outcome: Progressing     Problem: Safety - Adult  Goal: Free from fall injury  Outcome: Progressing     Problem: Discharge Planning  Goal: Discharge to home or other facility with appropriate resources  Outcome: Progressing     Problem: Chronic Conditions and Co-morbidities  Goal: Patient's chronic conditions and co-morbidity symptoms are monitored and maintained or improved  Outcome: Progressing

## 2023-10-25 NOTE — CONSULTS
PM&R Consult Note  Patient: Phong Wong   Age/sex: 73 y.o.   Medical Record #: [id number]     Chief complaint:   Impairments and acitivities limitations in ADLs, mobility, functional communication, and cognition secondary to hypovolemic shock    HPI:   Phong Wong is a 73 y.o. year old male patient with PMH of oropharyngeal cancer s/p chemo/XRT, prostate cancer s/p XRT, pancytopenia, HTN, CAD, PAD, and COPD who was admitted on 10/18/23 for lethargy and weakness from outpatient heme/onc appointment ISO several weeks of poor PO intake and opioid use. Patient is s/p EGD and PEG tube on 10/20. States he has completed chemo/XRT with no further plans for either scheduled by oncology at this time. Reports difficulty with balance. Patient seen and examined with his partner, Emelia in the room.       PM&R was consulted for recommendations and for IRF appropriateness.    Present Status: Stable  PO: PEG tube feeds, MBS on 10/23 with SLP, okay for regular diet/thin liquids  Pain: Denies  Bowel: Bed pan  Bladder: rainey cath  DVT prophylaxis held in setting of hypovolemic shock, received 1 unit of PRBC since admission. SCDs only   Weight bearing status: WBAT      Past Medical History:   Diagnosis Date    Personal history of diseases of the blood and blood-forming organs and certain disorders involving the immune mechanism 07/27/2017    History of thrombocytosis        Past Surgical History:   Procedure Laterality Date    CT ABDOMEN PELVIS ANGIOGRAM W AND/OR WO IV CONTRAST  9/3/2014    CT ABDOMEN PELVIS ANGIOGRAM W AND/OR WO IV CONTRAST 9/3/2014 [facility]LL[facility]    IR ANGIOGRAM AORTA ABDOMEN  11/6/2014    IR ANGIOGRAM AORTA ABDOMEN 11/6/2014 [facility] AIB LEGAC[facility]        Family History   Problem Relation Name Age of Onset    Aortic aneurysm Father          abdominal        Allergies   Allergen Reactions    Codeine Nausea Only        aspirin, 81 mg, oral, Daily  budesonide, 1 mg, nebulization, BID  doxazosin, 4 mg, oral,  Daily  fluticasone furoate-vilanteroL, 1 puff, inhalation, Daily  magnesium oxide, 400 mg, g-tube, BID  pantoprazole, 40 mg, oral, Daily  silver sulfADIAZINE, , Topical, Daily  thiamine, 100 mg, intravenous, Daily         PRN medications: acetaminophen, ipratropium-albuteroL, lidocaine-diphenhydrAMINE-Maalox 1:1:1, ondansetron, oxyCODONE, phenoL     Social History:  Working History: Retired  Social supports: Lives with partner (Emelia) at home  Home situation: Lives in House, with 2 stairs to enter (with rails), with B/B upstairs    Functional History:  Home DME: Cane, walker rolling or standard, wheelchair-manual  Premorbid ADL's: Independent  Premorbid Mobility: Mod I with FWW/cane    Physical Therapy: Poor endurance, min A transfers    Occupational Therapy: OT note from 10/24 - SBA grooming, UE dressing, min a LE dressing, Min A toileting, min a bed mobility    Review of Systems   Constitutional:  Negative for chills and fever.   HENT:  Negative for trouble swallowing.    Respiratory:  Negative for cough, choking and shortness of breath.    Cardiovascular:  Negative for leg swelling.   Gastrointestinal:  Negative for blood in stool, constipation, diarrhea, nausea and vomiting.   Genitourinary:  Negative for difficulty urinating, dysuria, frequency, hematuria and urgency.   Musculoskeletal:  Negative for arthralgias and back pain.   Skin:  Negative for wound.   Neurological:  Negative for dizziness, tremors, speech difficulty, weakness and light-headedness.   Psychiatric/Behavioral:  Negative for agitation, confusion and hallucinations. The patient is not nervous/anxious.         Physical Exam  Constitutional:       General: He is not in acute distress.     Appearance: Normal appearance.   HENT:      Head: Normocephalic and atraumatic.   Eyes:      Extraocular Movements: Extraocular movements intact.   Cardiovascular:      Rate and Rhythm: Normal rate and regular rhythm.   Pulmonary:      Effort: No respiratory  distress.      Breath sounds: No wheezing.   Abdominal:      General: There is no distension.      Tenderness: There is no abdominal tenderness.   Musculoskeletal:         General: No swelling.   Neurological:      Mental Status: He is alert and oriented to person, place, and time.      Motor: No weakness.      Comments: 5/5 strength in b/l UE and LE   Psychiatric:         Mood and Affect: Mood normal.         Behavior: Behavior normal.          Lab Results   Component Value Date    WBC 3.5 (L) 10/25/2023    HGB 7.9 (L) 10/25/2023    HCT 23.7 (L) 10/25/2023    MCV 87 10/25/2023     10/25/2023        Lab Results   Component Value Date    CREATININE 1.07 10/25/2023    BUN 19 10/25/2023     (L) 10/25/2023    K 3.9 10/25/2023     10/25/2023    CO2 26 10/25/2023        IMPRESSION:  Phong Wong is a 73 y.o. year old male patient with PMH of oropharyngeal cancer s/p chemo/XRT, prostate cancer s/p XRT, pancytopenia, HTN, CAD, PAD, and COPD who was admitted on 10/18/23 for lethargy and weakness from outpatient heme/onc appointment ISO several weeks of poor PO intake and opioid use. Patient is s/p EGD and PEG tube on 10/20. States he has completed chemo/XRT with no further plans for either scheduled by oncology at this time.      PM&R was consulted for recommendations and for IRF appropriateness.    Recommendations:  # Neurogenic bowel/bladder  - Recommend daily bowel program of Miralax BID and Docusate 100mg BID  - Goal of one BM daily, at risk for constipation while on opioids for pain management    # Immobility   - Prevent secondary complications   - Skin protection: low air loss mattress, turn q 2 hours in bed, maintain bowel and bladder continence/containment  - Prevention of pulmonary complications: incentive spirometry and pulmonary toileting  - Maintain adequate nutrition and hydration  - Q shift PROM of upper and lower extremities to prevent contracture    # Impaired mobility and Impaired  independence with ADLs and I/ADLs  - Continue PT, working on bed mobility, transfers, balance, endurance, strength, gait, eval for appropriate assistive device  - Continue OT, working on functional cognition, functional mobility, upper limb strength/coordination, balance, endurance, eval for appropriate adaptive equipment, ADLs, and I/ADLs.    # Dispo  - Patient would benefit from an acute inpatient rehab stay to improve functional outcome of impaired mobility, ADL's, transfers, and self-care. Patient would be an excellent candidate for acute rehabilitation once medically stable and is able and motivated to participate in 3 hours/day of therapy.   - Patient would benefit from medical follow up at least three times a week to address and monitor pain, BP, complications and other comorbidities. Patient was independent in mobility and ADLs at baseline prior to this incident. Acute rehab is appropriate to assist in returning to PLOF and living situation.  - Post rehab destination: anticipated home   - Must be off IV pain meds prior to transfer  - For questions, please contact via [facility] Sta[facility]  Physical Medicine and Rehabilitation PGY-4  Available via [employer]     Patient seen and examined with attending Dr. Kaushik Sanders, who agrees with assessment and plan.     NOTE: This note is not finalized until attending reviews and signs.
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
## 2023-10-26 LAB
ALBUMIN SERPL BCP-MCNC: 2.5 G/DL (ref 3.4–5)
ANION GAP SERPL CALC-SCNC: 14 MMOL/L (ref 10–20)
BASOPHILS # BLD AUTO: 0.01 X10*3/UL (ref 0–0.1)
BASOPHILS NFR BLD AUTO: 0.3 %
BUN SERPL-MCNC: 19 MG/DL (ref 6–23)
CALCIUM SERPL-MCNC: 8 MG/DL (ref 8.6–10.6)
CHLORIDE SERPL-SCNC: 99 MMOL/L (ref 98–107)
CO2 SERPL-SCNC: 25 MMOL/L (ref 21–32)
CREAT SERPL-MCNC: 1.06 MG/DL (ref 0.5–1.3)
EOSINOPHIL # BLD AUTO: 0.06 X10*3/UL (ref 0–0.4)
EOSINOPHIL NFR BLD AUTO: 1.8 %
ERYTHROCYTE [DISTWIDTH] IN BLOOD BY AUTOMATED COUNT: 16.9 % (ref 11.5–14.5)
GFR SERPL CREATININE-BSD FRML MDRD: 75 ML/MIN/1.73M*2
GLUCOSE SERPL-MCNC: 95 MG/DL (ref 74–99)
HCT VFR BLD AUTO: 24.3 % (ref 41–52)
HGB BLD-MCNC: 8.1 G/DL (ref 13.5–17.5)
IMM GRANULOCYTES # BLD AUTO: 0.03 X10*3/UL (ref 0–0.5)
IMM GRANULOCYTES NFR BLD AUTO: 0.9 % (ref 0–0.9)
LYMPHOCYTES # BLD AUTO: 0.31 X10*3/UL (ref 0.8–3)
LYMPHOCYTES NFR BLD AUTO: 9.1 %
MAGNESIUM SERPL-MCNC: 1.84 MG/DL (ref 1.6–2.4)
MCH RBC QN AUTO: 29.2 PG (ref 26–34)
MCHC RBC AUTO-ENTMCNC: 33.3 G/DL (ref 32–36)
MCV RBC AUTO: 88 FL (ref 80–100)
MONOCYTES # BLD AUTO: 0.39 X10*3/UL (ref 0.05–0.8)
MONOCYTES NFR BLD AUTO: 11.5 %
NEUTROPHILS # BLD AUTO: 2.6 X10*3/UL (ref 1.6–5.5)
NEUTROPHILS NFR BLD AUTO: 76.4 %
NRBC BLD-RTO: 0 /100 WBCS (ref 0–0)
PHOSPHATE SERPL-MCNC: 2.3 MG/DL (ref 2.5–4.9)
PHOSPHATE SERPL-MCNC: 4.1 MG/DL (ref 2.5–4.9)
PLATELET # BLD AUTO: 201 X10*3/UL (ref 150–450)
PMV BLD AUTO: 9.7 FL (ref 7.5–11.5)
POTASSIUM SERPL-SCNC: 4 MMOL/L (ref 3.5–5.3)
RBC # BLD AUTO: 2.77 X10*6/UL (ref 4.5–5.9)
SODIUM SERPL-SCNC: 134 MMOL/L (ref 136–145)
WBC # BLD AUTO: 3.4 X10*3/UL (ref 4.4–11.3)

## 2023-10-26 PROCEDURE — 2500000004 HC RX 250 GENERAL PHARMACY W/ HCPCS (ALT 636 FOR OP/ED)

## 2023-10-26 PROCEDURE — 2500000001 HC RX 250 WO HCPCS SELF ADMINISTERED DRUGS (ALT 637 FOR MEDICARE OP)

## 2023-10-26 PROCEDURE — 83735 ASSAY OF MAGNESIUM: CPT

## 2023-10-26 PROCEDURE — 92526 ORAL FUNCTION THERAPY: CPT | Mod: GN | Performed by: SPEECH-LANGUAGE PATHOLOGIST

## 2023-10-26 PROCEDURE — 99232 SBSQ HOSP IP/OBS MODERATE 35: CPT

## 2023-10-26 PROCEDURE — 85025 COMPLETE CBC W/AUTO DIFF WBC: CPT

## 2023-10-26 PROCEDURE — 94640 AIRWAY INHALATION TREATMENT: CPT

## 2023-10-26 PROCEDURE — 97530 THERAPEUTIC ACTIVITIES: CPT | Mod: GP

## 2023-10-26 PROCEDURE — 2500000002 HC RX 250 W HCPCS SELF ADMINISTERED DRUGS (ALT 637 FOR MEDICARE OP, ALT 636 FOR OP/ED): Performed by: STUDENT IN AN ORGANIZED HEALTH CARE EDUCATION/TRAINING PROGRAM

## 2023-10-26 PROCEDURE — 36415 COLL VENOUS BLD VENIPUNCTURE: CPT

## 2023-10-26 PROCEDURE — 80069 RENAL FUNCTION PANEL: CPT

## 2023-10-26 PROCEDURE — 1170000001 HC PRIVATE ONCOLOGY ROOM DAILY

## 2023-10-26 RX ADMIN — MAGNESIUM OXIDE TAB 400 MG (241.3 MG ELEMENTAL MG) 400 MG: 400 (241.3 MG) TAB at 20:48

## 2023-10-26 RX ADMIN — PANTOPRAZOLE SODIUM 40 MG: 40 TABLET, DELAYED RELEASE ORAL at 08:48

## 2023-10-26 RX ADMIN — BUDESONIDE 1 MG: 0.5 SUSPENSION RESPIRATORY (INHALATION) at 20:54

## 2023-10-26 RX ADMIN — FLUTICASONE FUROATE AND VILANTEROL TRIFENATATE 1 PUFF: 100; 25 POWDER RESPIRATORY (INHALATION) at 09:46

## 2023-10-26 RX ADMIN — MAGNESIUM OXIDE TAB 400 MG (241.3 MG ELEMENTAL MG) 400 MG: 400 (241.3 MG) TAB at 08:49

## 2023-10-26 RX ADMIN — SILVER SULFADIAZINE: 10 CREAM TOPICAL at 08:49

## 2023-10-26 RX ADMIN — BUDESONIDE 1 MG: 0.5 SUSPENSION RESPIRATORY (INHALATION) at 09:46

## 2023-10-26 RX ADMIN — DOXAZOSIN 4 MG: 4 TABLET ORAL at 08:49

## 2023-10-26 RX ADMIN — THIAMINE HYDROCHLORIDE 100 MG: 100 INJECTION, SOLUTION INTRAMUSCULAR; INTRAVENOUS at 08:49

## 2023-10-26 RX ADMIN — ASPIRIN 81 MG CHEWABLE TABLET 81 MG: 81 TABLET CHEWABLE at 08:49

## 2023-10-26 ASSESSMENT — COGNITIVE AND FUNCTIONAL STATUS - GENERAL
MOVING TO AND FROM BED TO CHAIR: A LITTLE
CLIMB 3 TO 5 STEPS WITH RAILING: TOTAL
STANDING UP FROM CHAIR USING ARMS: A LITTLE
MOVING FROM LYING ON BACK TO SITTING ON SIDE OF FLAT BED WITH BEDRAILS: A LITTLE
WALKING IN HOSPITAL ROOM: A LITTLE
MOBILITY SCORE: 16
TURNING FROM BACK TO SIDE WHILE IN FLAT BAD: A LITTLE

## 2023-10-26 ASSESSMENT — PAIN SCALES - GENERAL
PAINLEVEL_OUTOF10: 0 - NO PAIN
PAINLEVEL_OUTOF10: 0 - NO PAIN

## 2023-10-26 ASSESSMENT — PAIN - FUNCTIONAL ASSESSMENT
PAIN_FUNCTIONAL_ASSESSMENT: 0-10
PAIN_FUNCTIONAL_ASSESSMENT: 0-10
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
## 2023-10-26 NOTE — PROGRESS NOTES
Speech-Language Pathology    Inpatient  Speech-Language Pathology Treatment     Patient Name: Phong Wong  MRN: [id number]  Today's Date: 10/26/2023  Time Calculation  Start Time: 1011  Stop Time: 1038  Time Calculation (min): 27 min         Current Problem:   1. Protein-calorie malnutrition, unspecified severity (CMS/HCC)  EGD w PEG Tube Placement    EGD w PEG Tube Placement    Surgical Pathology Exam    Surgical Pathology Exam    magnesium oxide (Mag-Ox) 400 mg (241.3 mg magnesium) tablet      2. Hypovolemic shock (CMS/HCC)  Surgical Pathology Exam    Surgical Pathology Exam      3. Opioid overdose, accidental or unintentional, initial encounter (CMS/HCC)  Surgical Pathology Exam    Surgical Pathology Exam      4. General weakness  Surgical Pathology Exam    Surgical Pathology Exam      5. HECTOR (acute kidney injury) (CMS/[facility])  Surgical Pathology Exam    Surgical Pathology Exam      6. Pain after radiation therapy  EGD w PEG Tube Placement    EGD w PEG Tube Placement    Surgical Pathology Exam    Surgical Pathology Exam      7. Oropharyngeal cancer (CMS/HCC)  EGD w PEG Tube Placement    EGD w PEG Tube Placement    Surgical Pathology Exam    Surgical Pathology Exam    acetaminophen (Tylenol) 325 mg tablet    phenoL (Chloraseptic) 1.4 % aerosol,spray    pantoprazole (ProtoNix) 40 mg EC tablet    Clinic Appointment Request Follow Up; [facility]    DISCONTINUED: pantoprazole (ProtoNix) 40 mg EC tablet      8. Pain in both lower extremities  Lower extremity venous duplex bilateral    Lower extremity venous duplex bilateral    Surgical Pathology Exam    Surgical Pathology Exam      9. Localized edema  Lower extremity venous duplex bilateral    Surgical Pathology Exam    Surgical Pathology Exam      10. Prostate cancer (CMS/HCC)  doxazosin (Cardura) 4 mg tablet    DISCONTINUED: doxazosin (Cardura) 4 mg tablet      11. Impaired mobility and ADLs  CANCELED: Referral to Physical Medicine Rehab            SLP Assessment:           Plan:  SLP Frequency: 2x per week  Duration: 3 weeks  SLP Discharge Recommendations: Continue skilled speech therapy services post discharge  Discussed POC: Patient  Discussed Risks/Benefits: Yes  Patient/Caregiver Agreeable: Yes  SLP - OK to Discharge: Yes      Subjective   Pt alert and willing to participate in dysphagia therapy.     Treatment:   Pt endorsed doing well with present diet. No coughing noted during meals. Reinforced the need to take single sips of thin liquids in order not to aspirate, preventing liquids from entering the lungs. Pt demonstrated good control in consuming small single sips of water without further cueing. Pt endorsed eating well, choosing easier foods to manipulate due to lack of dentition. Completed pharyngeal exercises for head and neck to aid in reducing late affects of radiation 2 sets of 10 each. Pt would benefit form continued follow up with SLP services in order to avert late affects of radiation. Pt in agreement to follow up with an OP SLP upon discharge.  Pt to be discharge from hospital today.      Plan:  SLP Frequency: 2x per week  Duration: 3 weeks  SLP Discharge Recommendations: Continue skilled speech therapy services post discharge  Discussed POC: Patient  Discussed Risks/Benefits: Yes  Patient/Caregiver Agreeable: Yes  SLP - OK to Discharge: Yes    Recommendations:  Solid Consistency: Regular (IDDSI Level 7)  Liquid Consistency: Thin (IDDSI Level 0)  Liquid Administration Via: Cup  Medication Administration: Take one pill at a time  Compensatory Strategy Recommendations: Single sips, Slow rate  Postural Changes and/or Swallow Maneuvers: Upright 90 degrees for all PO intake, Remain upright for 30 minutes after meal        Goals:  Pt. will tolerate least restrictive diet without overt s/s of aspiration with 100% accuracy.   Pt will perform pharyngeal strengthening exercises to avert affects of radiation.         Inpatient:  Education Documentation  Extensive  education provided to pt re: anatomy/physiology of swallow function, s/s of aspiration, risk of aspiration, results, diet recommendations. Pt verbalized understanding and in agreement.

## 2023-10-26 NOTE — PROGRESS NOTES
Physical Therapy    Physical Therapy Evaluation & Treatment    Patient Name: Phong Wong  MRN: [id number]  Today's Date: 10/26/2023   Time Calculation  Start Time: 1128  Stop Time: 1148  Time Calculation (min): 20 min    Assessment/Plan   PT Assessment  PT Assessment Results: Decreased strength, Decreased endurance, Impaired balance, Decreased mobility  Rehab Prognosis: Fair  Evaluation/Treatment Tolerance: Patient limited by fatigue, Patient limited by pain (Pain in dorsum of feet which patient reports is new pain. Comfortable and without pain when returned to supine)  Medical Staff Made Aware: Yes  End of Session Communication: Bedside nurse  Assessment Comment: pt able to complete supine ther-ex. very motivated to complete exercises. completed transfers with minAx1 and FWW. pt remains deconditioned and would continue to benefit from further PT to improve strength and balance.  End of Session Patient Position: Up in chair, Alarm on  IP OR SWING BED PT PLAN  Inpatient or Swing Bed: Inpatient  PT Plan  Treatment/Interventions: Bed mobility, Transfer training, Gait training, Stair training, Balance training, Neuromuscular re-education, Strengthening, Endurance training, Range of motion, Therapeutic exercise, Therapeutic activity, Positioning, Postural re-education  PT Plan: Skilled PT  PT Frequency: 3 times per week  PT Discharge Recommendations: Moderate intensity level of continued care  PT Recommended Transfer Status: Assist x1, Assistive device  PT - OK to Discharge: Yes (Evaluation Completed)      Subjective     General Visit Information:  General  Prior to Session Communication: Bedside nurse  Patient Position Received: Bed, 3 rail up, Alarm on  General Comment: pt alert and agreeable for therapy. completed supine ther-ex and transfer to chair.  Home Living:  Home Living  Type of Home: House  Lives With: Significant other (able to assist 24/7)  Home Adaptive Equipment: Cane, Walker rolling or standard,  Wheelchair-manual  Home Layout: Two level, Stairs to alternate level with rails  Alternate Level Stairs-Rails: Right  Alternate Level Stairs-Number of Steps: 2  Home Access: Stairs to enter with rails  Entrance Stairs-Rails: Right  Entrance Stairs-Number of Steps: 2  Prior Level of Function:  Prior Function Per Pt/Caregiver Report  Level of [place]: Independent with ADLs and functional transfers (From SNF, reports he was not ambulating at SNF)  Ambulatory Assistance: Independent  Precautions:  Precautions  Medical Precautions: Fall precautions  Vital Signs:       Objective   Pain:  Pain Assessment  Pain Assessment: 0-10  Pain Score: 0 - No pain  Pain Location: Neck  Cognition:  Cognition  Overall Cognitive Status: Within Functional Limits  Orientation Level: Oriented X4  Insight: Mild  Impulsive: Mildly    General Assessments:                Activity Tolerance  Endurance: Tolerates 10 - 20 min exercise with multiple rests         Postural Control  Postural Control: Within Functional Limits            Functional Assessments:  Bed Mobility  Bed Mobility: Yes  Bed Mobility 1  Bed Mobility 1: Supine to sitting  Level of Assistance 1: Minimum assistance, Minimal verbal cues    Transfers  Transfer: Yes  Transfer 1  Transfer From 1: Bed to  Transfer to 1: Stand  Technique 1: Sit to stand  Transfer Device 1: Walker  Transfer Level of Assistance 1: Minimum assistance, Minimal verbal cues  Trials/Comments 1: x1; 3 trials before achieving full stnd  Transfers 2  Transfer From 2: Stand to  Transfer to 2: Chair with arms  Technique 2: Stand to sit  Transfer Device 2: Walker  Transfer Level of Assistance 2: Minimum assistance, Minimal verbal cues  Trials/Comments 2: 1; decreased eccentric control    Ambulation/Gait Training  Ambulation/Gait Training Performed: Yes  Ambulation/Gait Training 1  Surface 1: Level tile  Device 1: Rolling walker  Assistance 1: Minimum assistance  Quality of Gait 1:  (Narrow base of support;  decreased foot clearance; mild posterior lean)  Comments/Distance (ft) 1: 4-5 lateral steps to chair  Extremity/Trunk Assessments:        RLE   RLE : Exceptions to WFL  Strength RLE  RLE Overall Strength: Greater than or equal to 3/5 as evidenced by functional mobility  LLE   LLE : Exceptions to WFL  Strength LLE  LLE Overall Strength: Greater than or equal to 3/5 as evidenced by functional mobility  Treatments:  Therapeutic Exercise  Therapeutic Exercise Performed: Yes  Therapeutic Exercise Activity 1: supine hooklying ADD pillow squeezes 1x10  Therapeutic Exercise Activity 2: 1x10 BLE APs  Therapeutic Exercise Activity 3: 1x10 heel slides BLE    Therapeutic Activity  Therapeutic Activity Performed: Yes  Therapeutic Activity 1: pt sat EOB ~2 minutes with SBA. no LOB. able to scoot hips anteriorly to optimize positioning for standing with SBA. pt able to complete x1 STS with x3 attempts prior to full standing with minAx1 and FWW. able to take transfer to chair with minAx1 and FWW    Bed Mobility  Bed Mobility: Yes  Bed Mobility 1  Bed Mobility 1: Supine to sitting  Level of Assistance 1: Minimum assistance, Minimal verbal cues    Ambulation/Gait Training  Ambulation/Gait Training Performed: Yes  Ambulation/Gait Training 1  Surface 1: Level tile  Device 1: Rolling walker  Assistance 1: Minimum assistance  Quality of Gait 1:  (Narrow base of support; decreased foot clearance; mild posterior lean)  Comments/Distance (ft) 1: 4-5 lateral steps to chair  Transfers  Transfer: Yes  Transfer 1  Transfer From 1: Bed to  Transfer to 1: Stand  Technique 1: Sit to stand  Transfer Device 1: Walker  Transfer Level of Assistance 1: Minimum assistance, Minimal verbal cues  Trials/Comments 1: x1; 3 trials before achieving full stnd  Transfers 2  Transfer From 2: Stand to  Transfer to 2: Chair with arms  Technique 2: Stand to sit  Transfer Device 2: Walker  Transfer Level of Assistance 2: Minimum assistance, Minimal verbal  cues  Trials/Comments 2: 1; decreased eccentric control  Outcome Measures:  [facility] Basic Mobility  Turning from your back to your side while in a flat bed without using bedrails: A little  Moving from lying on your back to sitting on the side of a flat bed without using bedrails: A little  Moving to and from bed to chair (including a wheelchair): A little  Standing up from a chair using your arms (e.g. wheelchair or bedside chair): A little  To walk in hospital room: A little  Climbing 3-5 steps with railing: Total  Basic Mobility - Total Score: 16    Encounter Problems       Encounter Problems (Active)       Mobility       STG - Patient will ambulate household distance with RW and mod I (Progressing)       Start:  10/22/23    Expected End:  11/05/23            STG - Patient will navigate 2+ 2 steps with rail (Progressing)       Start:  10/22/23    Expected End:  11/05/23               Pain - Adult          Transfers       STG - Transfer from bed to chair mod I with RW (Progressing)       Start:  10/22/23    Expected End:  11/05/23            STG - Patient will perform bed mobility independent  (Progressing)       Start:  10/22/23    Expected End:  11/05/23            STG - Patient will transfer sit to and from stand mod I with RW (Progressing)       Start:  10/22/23    Expected End:  11/05/23                   Education Documentation  Handouts, taught by Lillian Aguirre PT at 10/26/2023  3:30 PM.  Learner: Patient  Readiness: Acceptance  Method: Explanation  Response: Verbalizes Understanding    Precautions, taught by Lillian Aguirre PT at 10/26/2023  3:30 PM.  Learner: Patient  Readiness: Acceptance  Method: Explanation  Response: Verbalizes Understanding    Body Mechanics, taught by Lillian Aguirre PT at 10/26/2023  3:30 PM.  Learner: Patient  Readiness: Acceptance  Method: Explanation  Response: Verbalizes Understanding    Home Exercise Program, taught by Lillian Aguirre PT at  10/26/2023  3:30 PM.  Learner: Patient  Readiness: Acceptance  Method: Explanation  Response: Verbalizes Understanding    Mobility Training, taught by Lillian Aguirre PT at 10/26/2023  3:30 PM.  Learner: Patient  Readiness: Acceptance  Method: Explanation  Response: Verbalizes Understanding    Education Comments  No comments found.

## 2023-10-26 NOTE — CARE PLAN
The patient's goals for the shift include      The clinical goals for the shift include Pt will have no complaints of pain throughout shift.  Met.    Problem: Fall/Injury  Goal: Not fall by end of shift  Outcome: Progressing  Goal: Be free from injury by end of the shift  Outcome: Progressing  Goal: Verbalize understanding of personal risk factors for fall in the hospital  Outcome: Progressing  Goal: Verbalize understanding of risk factor reduction measures to prevent injury from fall in the home  Outcome: Progressing     Problem: Skin  Goal: Decreased wound size/increased tissue granulation at next dressing change  Outcome: Progressing  Flowsheets (Taken 10/25/2023 2219)  Decreased wound size/increased tissue granulation at next dressing change: Promote sleep for wound healing  Goal: Participates in plan/prevention/treatment measures  Outcome: Progressing  Flowsheets (Taken 10/24/2023 1629 by Lila Parisi RN)  Participates in plan/prevention/treatment measures: Elevate heels  Goal: Prevent/manage excess moisture  Outcome: Progressing  Flowsheets (Taken 10/25/2023 2219)  Prevent/manage excess moisture: Moisturize dry skin  Goal: Prevent/minimize sheer/friction injuries  Outcome: Progressing  Flowsheets (Taken 10/25/2023 2219)  Prevent/minimize sheer/friction injuries:   HOB 30 degrees or less   Turn/reposition every 2 hours/use positioning/transfer devices   Use pull sheet  Goal: Promote/optimize nutrition  Outcome: Progressing  Flowsheets (Taken 10/25/2023 2219)  Promote/optimize nutrition: Offer water/supplements/favorite foods  Goal: Promote skin healing  Outcome: Progressing  Flowsheets (Taken 10/25/2023 2219)  Promote skin healing:   Assess skin/pad under line(s)/device(s)   Turn/reposition every 2 hours/use positioning/transfer devices     Problem: Pain  Goal: Takes deep breaths with improved pain control throughout the shift  Outcome: Progressing  Goal: Turns in bed with improved pain control throughout the  shift  Outcome: Progressing     Problem: Pain - Adult  Goal: Verbalizes/displays adequate comfort level or baseline comfort level  Outcome: Progressing     Problem: Safety - Adult  Goal: Free from fall injury  Outcome: Progressing     Problem: Chronic Conditions and Co-morbidities  Goal: Patient's chronic conditions and co-morbidity symptoms are monitored and maintained or improved  Outcome: Progressing

## 2023-10-26 NOTE — CARE PLAN
Problem: Skin  Goal: Promote skin healing  Outcome: Progressing     Problem: Fall/Injury  Goal: Not fall by end of shift  Outcome: Met  Goal: Use assistive devices by end of the shift  Outcome: Met     Problem: Safety - Adult  Goal: Free from fall injury  Outcome: Met
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
## 2023-10-26 NOTE — HOSPITAL COURSE
Phong Wong is a 73M with PMH of oropharyngeal cancer s/p chemo/XRT, prostate cancer s/p XRT, pancytopenia, HTN, CAD, PAD, and COPD who was admitted on 10/18/23 for lethargy and weakness after CODE BLUE called from outpatient heme/onc appointment in the setting of several weeks of poor oral intake and erroneously high morphine use at SNF. Patient hypotensive and hypoxic with elevated lactate on admission, concerning for opioid overdose vs infection. Recent odynophagia after chemo/XRT, questionable infectious etiology as well given neutropenia and clinical picture.      Patient received empiric antibiotics with negative sepsis workup. 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 PEG tube inserted on the same day, transitioned to bolus tube feeds on 10/25/23. Patient also had acute urinary retention that required a temporary Clark, started on doxazosin. Finally he required 2u PRBC due to low hemoglobin during this admission, remained hemodynamically stable, no signs or symptoms of overt bleeding, likely related to cancer treatment and malnutrition.     He will be discharged to skilled nursing facility. Please obtain weekly labs to monitor electrolytes for refeeding syndrome and replete as necessary. Follow-up appointments with oncology and PCP will be arranged after discharge.

## 2023-10-27 LAB
ALBUMIN SERPL BCP-MCNC: 2.5 G/DL (ref 3.4–5)
ANION GAP SERPL CALC-SCNC: 12 MMOL/L (ref 10–20)
BUN SERPL-MCNC: 17 MG/DL (ref 6–23)
CALCIUM SERPL-MCNC: 8.2 MG/DL (ref 8.6–10.6)
CHLORIDE SERPL-SCNC: 100 MMOL/L (ref 98–107)
CO2 SERPL-SCNC: 26 MMOL/L (ref 21–32)
CREAT SERPL-MCNC: 1.03 MG/DL (ref 0.5–1.3)
ERYTHROCYTE [DISTWIDTH] IN BLOOD BY AUTOMATED COUNT: 16.5 % (ref 11.5–14.5)
GFR SERPL CREATININE-BSD FRML MDRD: 78 ML/MIN/1.73M*2
GLUCOSE SERPL-MCNC: 93 MG/DL (ref 74–99)
HCT VFR BLD AUTO: 24.8 % (ref 41–52)
HGB BLD-MCNC: 8.1 G/DL (ref 13.5–17.5)
MAGNESIUM SERPL-MCNC: 1.72 MG/DL (ref 1.6–2.4)
MCH RBC QN AUTO: 28.8 PG (ref 26–34)
MCHC RBC AUTO-ENTMCNC: 32.7 G/DL (ref 32–36)
MCV RBC AUTO: 88 FL (ref 80–100)
NRBC BLD-RTO: 0 /100 WBCS (ref 0–0)
PHOSPHATE SERPL-MCNC: 3.2 MG/DL (ref 2.5–4.9)
PLATELET # BLD AUTO: 191 X10*3/UL (ref 150–450)
PMV BLD AUTO: 9.8 FL (ref 7.5–11.5)
POTASSIUM SERPL-SCNC: 4 MMOL/L (ref 3.5–5.3)
RBC # BLD AUTO: 2.81 X10*6/UL (ref 4.5–5.9)
SODIUM SERPL-SCNC: 134 MMOL/L (ref 136–145)
WBC # BLD AUTO: 3.4 X10*3/UL (ref 4.4–11.3)

## 2023-10-27 PROCEDURE — 99232 SBSQ HOSP IP/OBS MODERATE 35: CPT

## 2023-10-27 PROCEDURE — 83735 ASSAY OF MAGNESIUM: CPT

## 2023-10-27 PROCEDURE — 1170000001 HC PRIVATE ONCOLOGY ROOM DAILY

## 2023-10-27 PROCEDURE — 2500000002 HC RX 250 W HCPCS SELF ADMINISTERED DRUGS (ALT 637 FOR MEDICARE OP, ALT 636 FOR OP/ED): Performed by: STUDENT IN AN ORGANIZED HEALTH CARE EDUCATION/TRAINING PROGRAM

## 2023-10-27 PROCEDURE — 80069 RENAL FUNCTION PANEL: CPT

## 2023-10-27 PROCEDURE — 2500000001 HC RX 250 WO HCPCS SELF ADMINISTERED DRUGS (ALT 637 FOR MEDICARE OP)

## 2023-10-27 PROCEDURE — 2500000004 HC RX 250 GENERAL PHARMACY W/ HCPCS (ALT 636 FOR OP/ED)

## 2023-10-27 PROCEDURE — 36415 COLL VENOUS BLD VENIPUNCTURE: CPT

## 2023-10-27 PROCEDURE — 94640 AIRWAY INHALATION TREATMENT: CPT

## 2023-10-27 PROCEDURE — 97530 THERAPEUTIC ACTIVITIES: CPT | Mod: GP,CQ

## 2023-10-27 PROCEDURE — 85027 COMPLETE CBC AUTOMATED: CPT

## 2023-10-27 RX ORDER — LIDOCAINE HYDROCHLORIDE 20 MG/ML
1 JELLY TOPICAL ONCE
Status: COMPLETED | OUTPATIENT
Start: 2023-10-27 | End: 2023-10-27

## 2023-10-27 RX ADMIN — LIDOCAINE HYDROCHLORIDE 1 APPLICATION: 20 JELLY TOPICAL at 13:35

## 2023-10-27 RX ADMIN — PANTOPRAZOLE SODIUM 40 MG: 40 TABLET, DELAYED RELEASE ORAL at 09:13

## 2023-10-27 RX ADMIN — ASPIRIN 81 MG CHEWABLE TABLET 81 MG: 81 TABLET CHEWABLE at 09:13

## 2023-10-27 RX ADMIN — BUDESONIDE 1 MG: 0.5 SUSPENSION RESPIRATORY (INHALATION) at 20:53

## 2023-10-27 RX ADMIN — DOXAZOSIN 4 MG: 4 TABLET ORAL at 09:13

## 2023-10-27 RX ADMIN — THIAMINE HYDROCHLORIDE 100 MG: 100 INJECTION, SOLUTION INTRAMUSCULAR; INTRAVENOUS at 09:13

## 2023-10-27 RX ADMIN — MAGNESIUM OXIDE TAB 400 MG (241.3 MG ELEMENTAL MG) 400 MG: 400 (241.3 MG) TAB at 20:26

## 2023-10-27 RX ADMIN — MAGNESIUM OXIDE TAB 400 MG (241.3 MG ELEMENTAL MG) 400 MG: 400 (241.3 MG) TAB at 09:13

## 2023-10-27 RX ADMIN — SILVER SULFADIAZINE: 10 CREAM TOPICAL at 09:13

## 2023-10-27 ASSESSMENT — COGNITIVE AND FUNCTIONAL STATUS - GENERAL
TURNING FROM BACK TO SIDE WHILE IN FLAT BAD: A LITTLE
MOVING FROM LYING ON BACK TO SITTING ON SIDE OF FLAT BED WITH BEDRAILS: A LITTLE
MOVING TO AND FROM BED TO CHAIR: A LITTLE
DRESSING REGULAR LOWER BODY CLOTHING: A LITTLE
STANDING UP FROM CHAIR USING ARMS: A LOT
WALKING IN HOSPITAL ROOM: A LOT
DAILY ACTIVITIY SCORE: 19
TURNING FROM BACK TO SIDE WHILE IN FLAT BAD: A LOT
CLIMB 3 TO 5 STEPS WITH RAILING: A LOT
DRESSING REGULAR UPPER BODY CLOTHING: A LITTLE
STANDING UP FROM CHAIR USING ARMS: A LITTLE
HELP NEEDED FOR BATHING: A LITTLE
WALKING IN HOSPITAL ROOM: A LITTLE
TOILETING: A LITTLE
PERSONAL GROOMING: A LITTLE
MOBILITY SCORE: 13
MOVING TO AND FROM BED TO CHAIR: A LOT
MOVING FROM LYING ON BACK TO SITTING ON SIDE OF FLAT BED WITH BEDRAILS: A LITTLE
MOBILITY SCORE: 16
CLIMB 3 TO 5 STEPS WITH RAILING: TOTAL

## 2023-10-27 ASSESSMENT — PAIN - FUNCTIONAL ASSESSMENT
PAIN_FUNCTIONAL_ASSESSMENT: 0-10

## 2023-10-27 ASSESSMENT — PAIN SCALES - GENERAL
PAINLEVEL_OUTOF10: 0 - NO PAIN
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
## 2023-10-27 NOTE — NURSING NOTE
Assumed patient care at 1630. Patient resting comfortably in bed. Agree with previous shift assessment. Pt tolerated tube feed and water flush. No needs at this time.
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
## 2023-10-27 NOTE — PROGRESS NOTES
Physical Therapy    Physical Therapy Treatment    Patient Name: Phong Wong  MRN: [id number]  Today's Date: 10/27/2023  Time Calculation  Start Time: 1453  Stop Time: 1519  Time Calculation (min): 26 min       Assessment/Plan   PT Assessment  PT Assessment Results: Decreased strength, Decreased endurance, Impaired balance, Decreased mobility  Rehab Prognosis: Good  Evaluation/Treatment Tolerance: Patient limited by fatigue  Medical Staff Made Aware: Yes  End of Session Communication: Bedside nurse  Assessment Comment: pt able to complete supine ther-ex. very motivated to complete exercises. completed transfers with minAx1 and FWW. pt remains deconditioned and would continue to benefit from further PT to improve strength and balance.  End of Session Patient Position: Bed, 3 rail up, Alarm on  PT Plan  Inpatient/Swing Bed or Outpatient: Inpatient  PT Plan  Treatment/Interventions: Bed mobility, Transfer training, Gait training, Stair training, Balance training, Neuromuscular re-education, Strengthening, Endurance training, Range of motion, Therapeutic exercise, Therapeutic activity, Positioning, Postural re-education  PT Plan: Skilled PT  PT Frequency: 3 times per week  PT Discharge Recommendations: Moderate intensity level of continued care  PT Recommended Transfer Status: Assist x1, Assistive device  PT - OK to Discharge: Yes (Evaluation Completed)      General Visit Information:   PT  Visit  PT Received On: 10/27/23  Response to Previous Treatment: Patient with no complaints from previous session.  General  Family/Caregiver Present: Yes  Caregiver Feedback: wife present  Prior to Session Communication: Bedside nurse  Patient Position Received: Bed, 3 rail up  General Comment: Pt is pelasant and agreeable. Fatigues easily and demos gross LOB when fatigued- requires maxA to avoid fall.    Subjective   Precautions:  Precautions  Medical Precautions: Fall precautions  Vital Signs:       Objective   Pain:  Pain  Assessment  Pain Assessment: 0-10  Pain Score: 0 - No pain    Treatments:  Therapeutic Exercise  Therapeutic Exercise Performed: Yes  Therapeutic Exercise Activity 1: seated AP, LAQs, supine heel slides all 10x/LE with cues for full ROM    Balance/Neuromuscular Re-Education  Balance/Neuromuscular Re-Education Activity Performed: Yes  Balance/Neuromuscular Re-Education Activity 1: Static stand with B UE support on 2WW, ~2min with CGA while addressing hygiene needs.    Bed Mobility  Bed Mobility: Yes  Bed Mobility 1  Bed Mobility 1: Supine to sitting, Sitting to supine  Level of Assistance 1: Close supervision    Ambulation/Gait Training  Ambulation/Gait Training Performed: Yes  Ambulation/Gait Training 1  Surface 1: Level tile  Device 1: Rolling walker  Assistance 1: Contact guard  Comments/Distance (ft) 1: 4 lateral steps toward HOB  Transfers  Transfer: Yes  Transfer 1  Transfer From 1: Sit to, Stand to  Transfer to 1: Sit  Technique 1: Sit to stand, Stand to sit  Transfer Device 1: Walker  Transfer Level of Assistance 1: Contact guard  Trials/Comments 1: 4x  Transfers 2  Transfer From 2: Bed to  Transfer to 2: Chair with arms  Technique 2: Stand pivot  Transfer Device 2: Walker  Transfer Level of Assistance 2: Contact guard  Trials/Comments 2: +LOB when approach/alignment to chair, maxA to sit safely.  Transfers 3  Transfer From 3: Chair with arms to  Transfer to 3: Bed  Technique 3: Stand pivot  Transfer Device 3: Walker  Transfer Level of Assistance 3: Contact guard    Outcome Measures:       [facility] Basic Mobility  Turning from your back to your side while in a flat bed without using bedrails: A little  Moving from lying on your back to sitting on the side of a flat bed without using bedrails: A little  Moving to and from bed to chair (including a wheelchair): A little  Standing up from a chair using your arms (e.g. wheelchair or bedside chair): A little  To walk in hospital room: A little  Climbing 3-5 steps  with railing: Total  Basic Mobility - Total Score: 16    Education Documentation  Handouts, taught by Buffy Orellana PTA at 10/27/2023  3:38 PM.  Learner: Family, Patient  Readiness: Acceptance  Method: Explanation, Demonstration  Response: Verbalizes Understanding, Needs Reinforcement    Precautions, taught by Buffy Orellana PTA at 10/27/2023  3:38 PM.  Learner: Family, Patient  Readiness: Acceptance  Method: Explanation, Demonstration  Response: Verbalizes Understanding, Needs Reinforcement    Body Mechanics, taught by Buffy Orellana PTA at 10/27/2023  3:38 PM.  Learner: Family, Patient  Readiness: Acceptance  Method: Explanation, Demonstration  Response: Verbalizes Understanding, Needs Reinforcement    Home Exercise Program, taught by Buffy Orellana PTA at 10/27/2023  3:38 PM.  Learner: Family, Patient  Readiness: Acceptance  Method: Explanation, Demonstration  Response: Verbalizes Understanding, Needs Reinforcement    Mobility Training, taught by Buffy Orellana PTA at 10/27/2023  3:38 PM.  Learner: Family, Patient  Readiness: Acceptance  Method: Explanation, Demonstration  Response: Verbalizes Understanding, Needs Reinforcement    Education Comments  No comments found.        OP EDUCATION:  Education  Individual(s) Educated: Patient  Education Provided: Fall Risk, Body Mechanics, Home Exercise Program, Posture  Patient Response to Education: Patient/Caregiver Verbalized Understanding of Information  Education Comment: educatd pt on supine ther-ex for LEs including APs, SLR, heel slides, QS    Encounter Problems       Encounter Problems (Active)       Mobility       STG - Patient will ambulate household distance with RW and mod I (Progressing)       Start:  10/22/23    Expected End:  11/05/23            STG - Patient will navigate 2+ 2 steps with rail (Progressing)       Start:  10/22/23    Expected End:  11/05/23               Pain - Adult          Transfers       STG -  Transfer from bed to chair mod I with RW (Progressing)       Start:  10/22/23    Expected End:  11/05/23            STG - Patient will perform bed mobility independent  (Progressing)       Start:  10/22/23    Expected End:  11/05/23            STG - Patient will transfer sit to and from stand mod I with RW (Progressing)       Start:  10/22/23    Expected End:  11/05/23

## 2023-10-28 LAB
ALBUMIN SERPL BCP-MCNC: 2.5 G/DL (ref 3.4–5)
ANION GAP SERPL CALC-SCNC: 15 MMOL/L (ref 10–20)
BUN SERPL-MCNC: 18 MG/DL (ref 6–23)
CALCIUM SERPL-MCNC: 8.2 MG/DL (ref 8.6–10.6)
CHLORIDE SERPL-SCNC: 100 MMOL/L (ref 98–107)
CO2 SERPL-SCNC: 25 MMOL/L (ref 21–32)
CREAT SERPL-MCNC: 1 MG/DL (ref 0.5–1.3)
ERYTHROCYTE [DISTWIDTH] IN BLOOD BY AUTOMATED COUNT: 16.4 % (ref 11.5–14.5)
GFR SERPL CREATININE-BSD FRML MDRD: 80 ML/MIN/1.73M*2
GLUCOSE SERPL-MCNC: 89 MG/DL (ref 74–99)
HCT VFR BLD AUTO: 23.2 % (ref 41–52)
HGB BLD-MCNC: 7.4 G/DL (ref 13.5–17.5)
MAGNESIUM SERPL-MCNC: 1.77 MG/DL (ref 1.6–2.4)
MCH RBC QN AUTO: 28.1 PG (ref 26–34)
MCHC RBC AUTO-ENTMCNC: 31.9 G/DL (ref 32–36)
MCV RBC AUTO: 88 FL (ref 80–100)
NRBC BLD-RTO: 0 /100 WBCS (ref 0–0)
PHOSPHATE SERPL-MCNC: 3.4 MG/DL (ref 2.5–4.9)
PLATELET # BLD AUTO: 198 X10*3/UL (ref 150–450)
PMV BLD AUTO: 10.2 FL (ref 7.5–11.5)
POTASSIUM SERPL-SCNC: 3.9 MMOL/L (ref 3.5–5.3)
RBC # BLD AUTO: 2.63 X10*6/UL (ref 4.5–5.9)
SODIUM SERPL-SCNC: 136 MMOL/L (ref 136–145)
WBC # BLD AUTO: 3.2 X10*3/UL (ref 4.4–11.3)

## 2023-10-28 PROCEDURE — 2500000001 HC RX 250 WO HCPCS SELF ADMINISTERED DRUGS (ALT 637 FOR MEDICARE OP)

## 2023-10-28 PROCEDURE — 94640 AIRWAY INHALATION TREATMENT: CPT

## 2023-10-28 PROCEDURE — 99232 SBSQ HOSP IP/OBS MODERATE 35: CPT

## 2023-10-28 PROCEDURE — 36415 COLL VENOUS BLD VENIPUNCTURE: CPT

## 2023-10-28 PROCEDURE — 2500000002 HC RX 250 W HCPCS SELF ADMINISTERED DRUGS (ALT 637 FOR MEDICARE OP, ALT 636 FOR OP/ED): Performed by: STUDENT IN AN ORGANIZED HEALTH CARE EDUCATION/TRAINING PROGRAM

## 2023-10-28 PROCEDURE — 1170000001 HC PRIVATE ONCOLOGY ROOM DAILY

## 2023-10-28 PROCEDURE — 80069 RENAL FUNCTION PANEL: CPT

## 2023-10-28 PROCEDURE — 2500000004 HC RX 250 GENERAL PHARMACY W/ HCPCS (ALT 636 FOR OP/ED)

## 2023-10-28 PROCEDURE — 85027 COMPLETE CBC AUTOMATED: CPT

## 2023-10-28 PROCEDURE — 83735 ASSAY OF MAGNESIUM: CPT

## 2023-10-28 RX ORDER — MAGNESIUM SULFATE HEPTAHYDRATE 40 MG/ML
2 INJECTION, SOLUTION INTRAVENOUS ONCE
Status: COMPLETED | OUTPATIENT
Start: 2023-10-28 | End: 2023-10-28

## 2023-10-28 RX ORDER — SIMETHICONE 80 MG
40 TABLET,CHEWABLE ORAL 4 TIMES DAILY PRN
Status: DISCONTINUED | OUTPATIENT
Start: 2023-10-28 | End: 2023-11-02 | Stop reason: HOSPADM

## 2023-10-28 RX ADMIN — SIMETHICONE 40 MG: 80 TABLET, CHEWABLE ORAL at 18:36

## 2023-10-28 RX ADMIN — MAGNESIUM OXIDE TAB 400 MG (241.3 MG ELEMENTAL MG) 400 MG: 400 (241.3 MG) TAB at 20:33

## 2023-10-28 RX ADMIN — MAGNESIUM SULFATE HEPTAHYDRATE 2 G: 40 INJECTION, SOLUTION INTRAVENOUS at 12:13

## 2023-10-28 RX ADMIN — BUDESONIDE 1 MG: 0.5 SUSPENSION RESPIRATORY (INHALATION) at 10:38

## 2023-10-28 RX ADMIN — FLUTICASONE FUROATE AND VILANTEROL TRIFENATATE 1 PUFF: 100; 25 POWDER RESPIRATORY (INHALATION) at 10:39

## 2023-10-28 RX ADMIN — PANTOPRAZOLE SODIUM 40 MG: 40 TABLET, DELAYED RELEASE ORAL at 09:07

## 2023-10-28 RX ADMIN — SILVER SULFADIAZINE: 10 CREAM TOPICAL at 09:07

## 2023-10-28 RX ADMIN — MAGNESIUM OXIDE TAB 400 MG (241.3 MG ELEMENTAL MG) 400 MG: 400 (241.3 MG) TAB at 09:07

## 2023-10-28 RX ADMIN — DOXAZOSIN 4 MG: 4 TABLET ORAL at 09:07

## 2023-10-28 RX ADMIN — ASPIRIN 81 MG CHEWABLE TABLET 81 MG: 81 TABLET CHEWABLE at 09:07

## 2023-10-28 ASSESSMENT — COGNITIVE AND FUNCTIONAL STATUS - GENERAL
DRESSING REGULAR LOWER BODY CLOTHING: A LITTLE
MOVING TO AND FROM BED TO CHAIR: A LOT
TURNING FROM BACK TO SIDE WHILE IN FLAT BAD: A LITTLE
CLIMB 3 TO 5 STEPS WITH RAILING: A LOT
DRESSING REGULAR LOWER BODY CLOTHING: A LITTLE
MOBILITY SCORE: 15
STANDING UP FROM CHAIR USING ARMS: A LOT
TURNING FROM BACK TO SIDE WHILE IN FLAT BAD: A LITTLE
MOVING FROM LYING ON BACK TO SITTING ON SIDE OF FLAT BED WITH BEDRAILS: A LITTLE
DRESSING REGULAR UPPER BODY CLOTHING: A LITTLE
PERSONAL GROOMING: A LITTLE
CLIMB 3 TO 5 STEPS WITH RAILING: A LOT
HELP NEEDED FOR BATHING: A LITTLE
DRESSING REGULAR UPPER BODY CLOTHING: A LITTLE
MOBILITY SCORE: 14
WALKING IN HOSPITAL ROOM: A LOT
DAILY ACTIVITIY SCORE: 19
TOILETING: A LITTLE
TOILETING: A LITTLE
MOVING TO AND FROM BED TO CHAIR: A LITTLE
STANDING UP FROM CHAIR USING ARMS: A LOT
MOVING FROM LYING ON BACK TO SITTING ON SIDE OF FLAT BED WITH BEDRAILS: A LITTLE
WALKING IN HOSPITAL ROOM: A LOT
DAILY ACTIVITIY SCORE: 19
PERSONAL GROOMING: A LITTLE
HELP NEEDED FOR BATHING: A LITTLE

## 2023-10-28 ASSESSMENT — PAIN - FUNCTIONAL ASSESSMENT: PAIN_FUNCTIONAL_ASSESSMENT: 0-10

## 2023-10-28 ASSESSMENT — PAIN SCALES - GENERAL
PAINLEVEL_OUTOF10: 0 - NO PAIN
PAINLEVEL_OUTOF10: 0 - NO PAIN

## 2023-10-28 NOTE — CARE PLAN
Problem: Skin  Goal: Decreased wound size/increased tissue granulation at next dressing change  10/28/2023 0321 by Dipika Anguiano RN  Outcome: Progressing  10/28/2023 0320 by Dipika Anguiano RN  Outcome: Progressing     Problem: Skin  Goal: Participates in plan/prevention/treatment measures  10/28/2023 0321 by Dipika Anguiano RN  Outcome: Progressing  10/28/2023 0320 by Dipika Anguiano RN  Outcome: Progressing     Problem: Skin  Goal: Prevent/manage excess moisture  10/28/2023 0321 by Dipika Anguiano RN  Outcome: Progressing  10/28/2023 0320 by Dipika Anguiano RN  Outcome: Progressing      The clinical goals for the shift include pt will remain free from ss of infection throughout shift

## 2023-10-28 NOTE — CARE PLAN
Problem: Fall/Injury  Goal: Be free from injury by end of the shift  Outcome: Progressing  Goal: Verbalize understanding of personal risk factors for fall in the hospital  Outcome: Progressing  Goal: Verbalize understanding of risk factor reduction measures to prevent injury from fall in the home  Outcome: Progressing  Goal: Pace activities to prevent fatigue by end of the shift  Outcome: Progressing     Problem: Skin  Goal: Decreased wound size/increased tissue granulation at next dressing change  Outcome: Progressing  Goal: Participates in plan/prevention/treatment measures  Outcome: Progressing  Goal: Prevent/manage excess moisture  Outcome: Progressing  Goal: Prevent/minimize sheer/friction injuries  Outcome: Progressing  Goal: Promote/optimize nutrition  Outcome: Progressing  Goal: Promote skin healing  Outcome: Progressing     Problem: Pain  Goal: Takes deep breaths with improved pain control throughout the shift  Outcome: Progressing  Goal: Turns in bed with improved pain control throughout the shift  Outcome: Progressing     Problem: Pain - Adult  Goal: Verbalizes/displays adequate comfort level or baseline comfort level  Outcome: Progressing     Problem: Discharge Planning  Goal: Discharge to home or other facility with appropriate resources  Outcome: Progressing     Problem: Chronic Conditions and Co-morbidities  Goal: Patient's chronic conditions and co-morbidity symptoms are monitored and maintained or improved  Outcome: Progressing      The clinical goals for the shift include pt will remain free from ss of infection throughout shift
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
## 2023-10-28 NOTE — CARE PLAN
Problem: Skin  Goal: Decreased wound size/increased tissue granulation at next dressing change  10/28/2023 0522 by Dipika Anguiano, RN  Flowsheets (Taken 10/28/2023 0522)  Decreased wound size/increased tissue granulation at next dressing change: Promote sleep for wound healing  10/28/2023 0321 by Dipika Anguiano RN  Outcome: Progressing  10/28/2023 0320 by Dipika Anguiano RN  Outcome: Progressing   The patient's goals for the shift include      The clinical goals for the shift include pt will remain free from ss of infection throughout shift

## 2023-10-28 NOTE — CARE PLAN
The patient's goals for the shift include      The clinical goals for the shift include Pt will remain safe and free of injury through end of shift 10/28/2023 1900      Problem: Fall/Injury  Goal: Be free from injury by end of the shift  Outcome: Progressing  Goal: Verbalize understanding of personal risk factors for fall in the hospital  Outcome: Progressing  Goal: Verbalize understanding of risk factor reduction measures to prevent injury from fall in the home  Outcome: Progressing  Goal: Pace activities to prevent fatigue by end of the shift  Outcome: Progressing     Problem: Skin  Goal: Decreased wound size/increased tissue granulation at next dressing change  Outcome: Progressing  Flowsheets (Taken 10/28/2023 0935)  Decreased wound size/increased tissue granulation at next dressing change: Promote sleep for wound healing  Goal: Participates in plan/prevention/treatment measures  Outcome: Progressing  Flowsheets (Taken 10/28/2023 0935)  Participates in plan/prevention/treatment measures:   Elevate heels   Increase activity/out of bed for meals  Goal: Prevent/manage excess moisture  Outcome: Progressing  Flowsheets (Taken 10/28/2023 0935)  Prevent/manage excess moisture: Cleanse incontinence/protect with barrier cream  Goal: Prevent/minimize sheer/friction injuries  Outcome: Progressing  Flowsheets (Taken 10/28/2023 0935)  Prevent/minimize sheer/friction injuries:   HOB 30 degrees or less   Increase activity/out of bed for meals  Goal: Promote/optimize nutrition  Outcome: Progressing  Flowsheets (Taken 10/28/2023 0935)  Promote/optimize nutrition:   Consume > 50% meals/supplements   Offer water/supplements/favorite foods  Goal: Promote skin healing  Outcome: Progressing  Flowsheets (Taken 10/28/2023 0935)  Promote skin healing:   Protective dressings over bony prominences   Turn/reposition every 2 hours/use positioning/transfer devices   Rotate device position/do not position patient on device     Problem:  Pain  Goal: Takes deep breaths with improved pain control throughout the shift  Outcome: Progressing  Goal: Turns in bed with improved pain control throughout the shift  Outcome: Progressing     Problem: Pain - Adult  Goal: Verbalizes/displays adequate comfort level or baseline comfort level  Outcome: Progressing     Problem: Discharge Planning  Goal: Discharge to home or other facility with appropriate resources  Outcome: Progressing     Problem: Chronic Conditions and Co-morbidities  Goal: Patient's chronic conditions and co-morbidity symptoms are monitored and maintained or improved  Outcome: Progressing

## 2023-10-29 LAB
ALBUMIN SERPL BCP-MCNC: 2.6 G/DL (ref 3.4–5)
ANION GAP SERPL CALC-SCNC: 13 MMOL/L (ref 10–20)
BUN SERPL-MCNC: 17 MG/DL (ref 6–23)
CALCIUM SERPL-MCNC: 8.2 MG/DL (ref 8.6–10.6)
CHLORIDE SERPL-SCNC: 100 MMOL/L (ref 98–107)
CO2 SERPL-SCNC: 27 MMOL/L (ref 21–32)
CREAT SERPL-MCNC: 1.01 MG/DL (ref 0.5–1.3)
ERYTHROCYTE [DISTWIDTH] IN BLOOD BY AUTOMATED COUNT: 16.6 % (ref 11.5–14.5)
GFR SERPL CREATININE-BSD FRML MDRD: 80 ML/MIN/1.73M*2
GLUCOSE SERPL-MCNC: 88 MG/DL (ref 74–99)
HCT VFR BLD AUTO: 24 % (ref 41–52)
HGB BLD-MCNC: 7.8 G/DL (ref 13.5–17.5)
MAGNESIUM SERPL-MCNC: 2.07 MG/DL (ref 1.6–2.4)
MCH RBC QN AUTO: 28.9 PG (ref 26–34)
MCHC RBC AUTO-ENTMCNC: 32.5 G/DL (ref 32–36)
MCV RBC AUTO: 89 FL (ref 80–100)
NRBC BLD-RTO: 0 /100 WBCS (ref 0–0)
PHOSPHATE SERPL-MCNC: 3.3 MG/DL (ref 2.5–4.9)
PLATELET # BLD AUTO: 215 X10*3/UL (ref 150–450)
PMV BLD AUTO: 9.6 FL (ref 7.5–11.5)
POTASSIUM SERPL-SCNC: 3.9 MMOL/L (ref 3.5–5.3)
RBC # BLD AUTO: 2.7 X10*6/UL (ref 4.5–5.9)
SODIUM SERPL-SCNC: 136 MMOL/L (ref 136–145)
WBC # BLD AUTO: 3.1 X10*3/UL (ref 4.4–11.3)

## 2023-10-29 PROCEDURE — 2500000001 HC RX 250 WO HCPCS SELF ADMINISTERED DRUGS (ALT 637 FOR MEDICARE OP): Performed by: STUDENT IN AN ORGANIZED HEALTH CARE EDUCATION/TRAINING PROGRAM

## 2023-10-29 PROCEDURE — 80069 RENAL FUNCTION PANEL: CPT

## 2023-10-29 PROCEDURE — 85027 COMPLETE CBC AUTOMATED: CPT

## 2023-10-29 PROCEDURE — 1170000001 HC PRIVATE ONCOLOGY ROOM DAILY

## 2023-10-29 PROCEDURE — 36415 COLL VENOUS BLD VENIPUNCTURE: CPT

## 2023-10-29 PROCEDURE — 99232 SBSQ HOSP IP/OBS MODERATE 35: CPT | Performed by: STUDENT IN AN ORGANIZED HEALTH CARE EDUCATION/TRAINING PROGRAM

## 2023-10-29 PROCEDURE — 2500000001 HC RX 250 WO HCPCS SELF ADMINISTERED DRUGS (ALT 637 FOR MEDICARE OP)

## 2023-10-29 PROCEDURE — 83735 ASSAY OF MAGNESIUM: CPT

## 2023-10-29 PROCEDURE — 2500000004 HC RX 250 GENERAL PHARMACY W/ HCPCS (ALT 636 FOR OP/ED)

## 2023-10-29 PROCEDURE — 94640 AIRWAY INHALATION TREATMENT: CPT

## 2023-10-29 RX ORDER — LOSARTAN POTASSIUM 50 MG/1
50 TABLET ORAL DAILY
Status: DISCONTINUED | OUTPATIENT
Start: 2023-10-29 | End: 2023-11-02 | Stop reason: HOSPADM

## 2023-10-29 RX ORDER — BUDESONIDE 0.5 MG/2ML
1 INHALANT ORAL
Status: DISCONTINUED | OUTPATIENT
Start: 2023-10-30 | End: 2023-11-02 | Stop reason: HOSPADM

## 2023-10-29 RX ADMIN — PANTOPRAZOLE SODIUM 40 MG: 40 TABLET, DELAYED RELEASE ORAL at 09:27

## 2023-10-29 RX ADMIN — SIMETHICONE 40 MG: 80 TABLET, CHEWABLE ORAL at 13:18

## 2023-10-29 RX ADMIN — MAGNESIUM OXIDE TAB 400 MG (241.3 MG ELEMENTAL MG) 400 MG: 400 (241.3 MG) TAB at 20:59

## 2023-10-29 RX ADMIN — FLUTICASONE FUROATE AND VILANTEROL TRIFENATATE 1 PUFF: 100; 25 POWDER RESPIRATORY (INHALATION) at 08:00

## 2023-10-29 RX ADMIN — MAGNESIUM OXIDE TAB 400 MG (241.3 MG ELEMENTAL MG) 400 MG: 400 (241.3 MG) TAB at 09:27

## 2023-10-29 RX ADMIN — LOSARTAN POTASSIUM 50 MG: 50 TABLET, FILM COATED ORAL at 09:58

## 2023-10-29 RX ADMIN — SILVER SULFADIAZINE: 10 CREAM TOPICAL at 09:32

## 2023-10-29 RX ADMIN — ASPIRIN 81 MG CHEWABLE TABLET 81 MG: 81 TABLET CHEWABLE at 09:27

## 2023-10-29 RX ADMIN — DOXAZOSIN 4 MG: 4 TABLET ORAL at 09:27

## 2023-10-29 ASSESSMENT — COGNITIVE AND FUNCTIONAL STATUS - GENERAL
MOBILITY SCORE: 13
CLIMB 3 TO 5 STEPS WITH RAILING: A LOT
PERSONAL GROOMING: A LOT
HELP NEEDED FOR BATHING: A LOT
DRESSING REGULAR UPPER BODY CLOTHING: A LITTLE
DRESSING REGULAR LOWER BODY CLOTHING: A LITTLE
TURNING FROM BACK TO SIDE WHILE IN FLAT BAD: A LOT
STANDING UP FROM CHAIR USING ARMS: A LOT
MOVING FROM LYING ON BACK TO SITTING ON SIDE OF FLAT BED WITH BEDRAILS: A LITTLE
WALKING IN HOSPITAL ROOM: A LOT
WALKING IN HOSPITAL ROOM: A LOT
HELP NEEDED FOR BATHING: A LITTLE
EATING MEALS: A LITTLE
DRESSING REGULAR UPPER BODY CLOTHING: A LOT
TOILETING: A LITTLE
DAILY ACTIVITIY SCORE: 14
DRESSING REGULAR LOWER BODY CLOTHING: A LITTLE
TOILETING: A LOT
MOVING TO AND FROM BED TO CHAIR: A LOT
MOVING FROM LYING ON BACK TO SITTING ON SIDE OF FLAT BED WITH BEDRAILS: A LITTLE
DAILY ACTIVITIY SCORE: 19
MOVING TO AND FROM BED TO CHAIR: A LOT
CLIMB 3 TO 5 STEPS WITH RAILING: A LOT
MOBILITY SCORE: 14
TURNING FROM BACK TO SIDE WHILE IN FLAT BAD: A LITTLE
STANDING UP FROM CHAIR USING ARMS: A LOT
PERSONAL GROOMING: A LITTLE

## 2023-10-29 ASSESSMENT — PAIN SCALES - GENERAL
PAINLEVEL_OUTOF10: 0 - NO PAIN
PAINLEVEL_OUTOF10: 0 - NO PAIN

## 2023-10-29 ASSESSMENT — PAIN - FUNCTIONAL ASSESSMENT
PAIN_FUNCTIONAL_ASSESSMENT: 0-10
PAIN_FUNCTIONAL_ASSESSMENT: 0-10

## 2023-10-29 NOTE — CARE PLAN
Problem: Fall/Injury  Goal: Be free from injury by end of the shift  Outcome: Progressing  Goal: Verbalize understanding of personal risk factors for fall in the hospital  Outcome: Progressing  Goal: Verbalize understanding of risk factor reduction measures to prevent injury from fall in the home  Outcome: Progressing  Goal: Pace activities to prevent fatigue by end of the shift  Outcome: Progressing     Problem: Skin  Goal: Decreased wound size/increased tissue granulation at next dressing change  Outcome: Progressing  Flowsheets (Taken 10/29/2023 0755)  Decreased wound size/increased tissue granulation at next dressing change:   Promote sleep for wound healing   Protective dressings over bony prominences  Goal: Participates in plan/prevention/treatment measures  Outcome: Progressing  Flowsheets (Taken 10/29/2023 0755)  Participates in plan/prevention/treatment measures:   Increase activity/out of bed for meals   Elevate heels  Goal: Prevent/manage excess moisture  Outcome: Progressing  Flowsheets (Taken 10/29/2023 0755)  Prevent/manage excess moisture:   Moisturize dry skin   Cleanse incontinence/protect with barrier cream  Goal: Prevent/minimize sheer/friction injuries  Outcome: Progressing  Flowsheets (Taken 10/29/2023 0755)  Prevent/minimize sheer/friction injuries:   HOB 30 degrees or less   Use pull sheet   Turn/reposition every 2 hours/use positioning/transfer devices  Goal: Promote/optimize nutrition  Outcome: Progressing  Flowsheets (Taken 10/29/2023 0755)  Promote/optimize nutrition:   Consume > 50% meals/supplements   Assist with feeding  Goal: Promote skin healing  Outcome: Progressing  Flowsheets (Taken 10/29/2023 0755)  Promote skin healing:   Rotate device position/do not position patient on device   Assess skin/pad under line(s)/device(s)     Problem: Pain  Goal: Takes deep breaths with improved pain control throughout the shift  Outcome: Progressing  Goal: Turns in bed with improved pain control  throughout the shift  Outcome: Progressing     Problem: Pain - Adult  Goal: Verbalizes/displays adequate comfort level or baseline comfort level  Outcome: Progressing     Problem: Discharge Planning  Goal: Discharge to home or other facility with appropriate resources  Outcome: Progressing     Problem: Chronic Conditions and Co-morbidities  Goal: Patient's chronic conditions and co-morbidity symptoms are monitored and maintained or improved  Outcome: Progressing
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
## 2023-10-29 NOTE — CARE PLAN
The patient's goals for the shift include      The clinical goals for the shift include Pt will be free of further compromise to skin integrety this shift.

## 2023-10-30 LAB — GLUCOSE BLD MANUAL STRIP-MCNC: 99 MG/DL (ref 74–99)

## 2023-10-30 PROCEDURE — 99232 SBSQ HOSP IP/OBS MODERATE 35: CPT | Performed by: STUDENT IN AN ORGANIZED HEALTH CARE EDUCATION/TRAINING PROGRAM

## 2023-10-30 PROCEDURE — 82947 ASSAY GLUCOSE BLOOD QUANT: CPT

## 2023-10-30 PROCEDURE — 97535 SELF CARE MNGMENT TRAINING: CPT | Mod: GO | Performed by: OCCUPATIONAL THERAPIST

## 2023-10-30 PROCEDURE — 94640 AIRWAY INHALATION TREATMENT: CPT

## 2023-10-30 PROCEDURE — 2500000002 HC RX 250 W HCPCS SELF ADMINISTERED DRUGS (ALT 637 FOR MEDICARE OP, ALT 636 FOR OP/ED): Performed by: INTERNAL MEDICINE

## 2023-10-30 PROCEDURE — 97110 THERAPEUTIC EXERCISES: CPT | Mod: GP,CQ

## 2023-10-30 PROCEDURE — 2500000004 HC RX 250 GENERAL PHARMACY W/ HCPCS (ALT 636 FOR OP/ED)

## 2023-10-30 PROCEDURE — 2500000001 HC RX 250 WO HCPCS SELF ADMINISTERED DRUGS (ALT 637 FOR MEDICARE OP)

## 2023-10-30 PROCEDURE — 1170000001 HC PRIVATE ONCOLOGY ROOM DAILY

## 2023-10-30 PROCEDURE — 2500000001 HC RX 250 WO HCPCS SELF ADMINISTERED DRUGS (ALT 637 FOR MEDICARE OP): Performed by: STUDENT IN AN ORGANIZED HEALTH CARE EDUCATION/TRAINING PROGRAM

## 2023-10-30 PROCEDURE — 97116 GAIT TRAINING THERAPY: CPT | Mod: GP,CQ

## 2023-10-30 PROCEDURE — 2500000002 HC RX 250 W HCPCS SELF ADMINISTERED DRUGS (ALT 637 FOR MEDICARE OP, ALT 636 FOR OP/ED): Performed by: STUDENT IN AN ORGANIZED HEALTH CARE EDUCATION/TRAINING PROGRAM

## 2023-10-30 PROCEDURE — 92526 ORAL FUNCTION THERAPY: CPT | Mod: GN | Performed by: SPEECH-LANGUAGE PATHOLOGIST

## 2023-10-30 RX ADMIN — FLUTICASONE FUROATE AND VILANTEROL TRIFENATATE 1 PUFF: 100; 25 POWDER RESPIRATORY (INHALATION) at 10:31

## 2023-10-30 RX ADMIN — DOXAZOSIN 4 MG: 4 TABLET ORAL at 09:37

## 2023-10-30 RX ADMIN — LOSARTAN POTASSIUM 50 MG: 50 TABLET, FILM COATED ORAL at 09:37

## 2023-10-30 RX ADMIN — MAGNESIUM OXIDE TAB 400 MG (241.3 MG ELEMENTAL MG) 400 MG: 400 (241.3 MG) TAB at 09:37

## 2023-10-30 RX ADMIN — SILVER SULFADIAZINE: 10 CREAM TOPICAL at 09:38

## 2023-10-30 RX ADMIN — MAGNESIUM OXIDE TAB 400 MG (241.3 MG ELEMENTAL MG) 400 MG: 400 (241.3 MG) TAB at 20:05

## 2023-10-30 RX ADMIN — PANTOPRAZOLE SODIUM 40 MG: 40 TABLET, DELAYED RELEASE ORAL at 09:37

## 2023-10-30 RX ADMIN — BUDESONIDE 1 MG: 0.5 INHALANT RESPIRATORY (INHALATION) at 10:30

## 2023-10-30 RX ADMIN — ASPIRIN 81 MG CHEWABLE TABLET 81 MG: 81 TABLET CHEWABLE at 09:37

## 2023-10-30 ASSESSMENT — COGNITIVE AND FUNCTIONAL STATUS - GENERAL
DAILY ACTIVITIY SCORE: 18
TURNING FROM BACK TO SIDE WHILE IN FLAT BAD: A LITTLE
WALKING IN HOSPITAL ROOM: A LITTLE
TURNING FROM BACK TO SIDE WHILE IN FLAT BAD: A LITTLE
TOILETING: A LOT
TOILETING: A LITTLE
CLIMB 3 TO 5 STEPS WITH RAILING: A LOT
PERSONAL GROOMING: A LITTLE
HELP NEEDED FOR BATHING: A LITTLE
MOBILITY SCORE: 15
DRESSING REGULAR UPPER BODY CLOTHING: A LOT
STANDING UP FROM CHAIR USING ARMS: A LOT
PERSONAL GROOMING: A LOT
MOVING TO AND FROM BED TO CHAIR: A LOT
DRESSING REGULAR LOWER BODY CLOTHING: A LITTLE
MOVING TO AND FROM BED TO CHAIR: A LOT
HELP NEEDED FOR BATHING: A LITTLE
CLIMB 3 TO 5 STEPS WITH RAILING: TOTAL
EATING MEALS: A LITTLE
MOBILITY SCORE: 14
MOVING FROM LYING ON BACK TO SITTING ON SIDE OF FLAT BED WITH BEDRAILS: A LITTLE
MOVING FROM LYING ON BACK TO SITTING ON SIDE OF FLAT BED WITH BEDRAILS: A LITTLE
DRESSING REGULAR UPPER BODY CLOTHING: A LITTLE
WALKING IN HOSPITAL ROOM: A LOT
DRESSING REGULAR LOWER BODY CLOTHING: A LITTLE
DAILY ACTIVITIY SCORE: 15
EATING MEALS: A LITTLE
STANDING UP FROM CHAIR USING ARMS: A LITTLE

## 2023-10-30 ASSESSMENT — ACTIVITIES OF DAILY LIVING (ADL): HOME_MANAGEMENT_TIME_ENTRY: 16

## 2023-10-30 ASSESSMENT — PAIN - FUNCTIONAL ASSESSMENT
PAIN_FUNCTIONAL_ASSESSMENT: 0-10

## 2023-10-30 ASSESSMENT — PAIN SCALES - GENERAL
PAINLEVEL_OUTOF10: 0 - NO PAIN

## 2023-10-30 NOTE — PROGRESS NOTES
Physical Therapy    Physical Therapy Treatment    Patient Name: Phong Wong  MRN: [id number]  Today's Date: 10/30/2023  Time Calculation  Start Time: 1452  Stop Time: 1516  Time Calculation (min): 24 min       Assessment/Plan   PT Assessment  PT Assessment Results: Decreased strength, Decreased endurance, Impaired balance, Decreased mobility  Rehab Prognosis: Good  Evaluation/Treatment Tolerance: Patient limited by fatigue  Medical Staff Made Aware: Yes  End of Session Communication: Bedside nurse  Assessment Comment: Pt. appeared to tolerate treatment well - Pt. willing to sit up in bedside chair end of session.  End of Session Patient Position: Bed, 3 rail up, Alarm on  PT Plan  Inpatient/Swing Bed or Outpatient: Inpatient  PT Plan  Treatment/Interventions: Bed mobility, Transfer training, Gait training, Stair training, Balance training, Neuromuscular re-education, Strengthening, Endurance training, Range of motion, Therapeutic exercise, Therapeutic activity, Positioning, Postural re-education  PT Plan: Skilled PT  PT Frequency: 3 times per week  PT Discharge Recommendations: Moderate intensity level of continued care  PT Recommended Transfer Status: Assist x1, Assistive device  PT - OK to Discharge: Yes (Evaluation Completed)      General Visit Information:   PT  Visit  PT Received On: 10/30/23  Response to Previous Treatment: Patient with no complaints from previous session.  General  Reason for Referral: 73 year old male wtih recent dx of oral cavity cancer admitted with opioid overdose from facility  Past Medical History Relevant to Rehab: HTN, COPD  Family/Caregiver Present: Yes  Prior to Session Communication: Bedside nurse  Patient Position Received: Bed, 3 rail up  General Comment: Pt. is quiet pleasant and willing to participate. Pt. does have decreased endurance however.    Subjective   Precautions:  Precautions  Medical Precautions: Fall precautions  Vital Signs:       Objective   Pain:  Pain  Assessment  Pain Assessment: 0-10  Pain Score: 0 - No pain  Cognition:     Postural Control:     Extremity/Trunk Assessments:                    Activity Tolerance:  Activity Tolerance  Endurance: Tolerates 10 - 20 min exercise with multiple rests  Treatments:  Therapeutic Exercise  Therapeutic Exercise Performed: Yes  Therapeutic Exercise Activity 1: Supine bilat le ex AP'S, QS, SAQ'S, HS, AND ABD X 15 REPS.    Therapeutic Activity  Therapeutic Activity Performed: Yes  Therapeutic Activity 1: Pt. sat EOB for approx. 5 min to get used to change in position. Pt. tolerated sitting well.    Bed Mobility  Bed Mobility: Yes  Bed Mobility 1  Bed Mobility 1:  (Log roll - cga to guide)    Ambulation/Gait Training 1  Surface 1:  (Pt. amb. 7 side steps using a wh. walker and min assist. Pt. also able to amb. 5' to bedside chair using a wh. walker and min assist to steady- cues for knee extension in stance phase- Cues to look ahead.)  Transfers  Transfer: Yes  Transfer 1  Transfer From 1:  (Sit to stand and back min assist for boost and to steady.)    Outcome Measures:  [facility] Basic Mobility  Turning from your back to your side while in a flat bed without using bedrails: A little  Moving from lying on your back to sitting on the side of a flat bed without using bedrails: A little  Moving to and from bed to chair (including a wheelchair): A lot  Standing up from a chair using your arms (e.g. wheelchair or bedside chair): A little  To walk in hospital room: A little  Climbing 3-5 steps with railing: Total  Basic Mobility - Total Score: 15    Education Documentation  Home Exercise Program, taught by Bertha Mock PTA at 10/30/2023  3:24 PM.  Learner: Patient  Readiness: Acceptance  Method: Explanation, Demonstration  Response: Needs Reinforcement    Mobility Training, taught by Bertha Mock PTA at 10/30/2023  3:24 PM.  Learner: Patient  Readiness: Acceptance  Method: Explanation, Demonstration  Response: Needs  Reinforcement    Education Comments  No comments found.        OP EDUCATION:  Education  Individual(s) Educated: Patient  Education Provided: Fall Risk, Body Mechanics, Home Exercise Program, Posture  Patient Response to Education: Patient/Caregiver Verbalized Understanding of Information  Education Comment: educatd pt on supine ther-ex for LEs including APs, SLR, heel slides, QS    Encounter Problems       Encounter Problems (Active)       Mobility       STG - Patient will ambulate household distance with RW and mod I (Progressing)       Start:  10/22/23    Expected End:  11/05/23            STG - Patient will navigate 2+ 2 steps with rail (Progressing)       Start:  10/22/23    Expected End:  11/05/23               Pain - Adult          Transfers       STG - Transfer from bed to chair mod I with RW (Progressing)       Start:  10/22/23    Expected End:  11/05/23            STG - Patient will perform bed mobility independent  (Progressing)       Start:  10/22/23    Expected End:  11/05/23            STG - Patient will transfer sit to and from stand mod I with RW (Progressing)       Start:  10/22/23    Expected End:  11/05/23

## 2023-10-30 NOTE — PROGRESS NOTES
Speech-Language Pathology    Inpatient  Speech-Language Pathology Treatment     Patient Name: Phong Wong  MRN: [id number]  Today's Date: 10/30/2023  Time Calculation  Start Time: 1045  Stop Time: 1110  Time Calculation (min): 25 min         Current Problem:   1. Protein-calorie malnutrition, unspecified severity (CMS/HCC)  EGD w PEG Tube Placement    EGD w PEG Tube Placement    Surgical Pathology Exam    Surgical Pathology Exam    magnesium oxide (Mag-Ox) 400 mg (241.3 mg magnesium) tablet      2. Hypovolemic shock (CMS/HCC)  Surgical Pathology Exam    Surgical Pathology Exam      3. Opioid overdose, accidental or unintentional, initial encounter (CMS/HCC)  Surgical Pathology Exam    Surgical Pathology Exam      4. General weakness  Surgical Pathology Exam    Surgical Pathology Exam      5. HECTOR (acute kidney injury) (CMS/HCC)  Surgical Pathology Exam    Surgical Pathology Exam      6. Pain after radiation therapy  EGD w PEG Tube Placement    EGD w PEG Tube Placement    Surgical Pathology Exam    Surgical Pathology Exam      7. Oropharyngeal cancer (CMS/HCC)  EGD w PEG Tube Placement    EGD w PEG Tube Placement    Surgical Pathology Exam    Surgical Pathology Exam    acetaminophen (Tylenol) 325 mg tablet    phenoL (Chloraseptic) 1.4 % aerosol,spray    pantoprazole (ProtoNix) 40 mg EC tablet    Clinic Appointment Request Follow Up; [facility]    DISCONTINUED: pantoprazole (ProtoNix) 40 mg EC tablet      8. Pain in both lower extremities  Lower extremity venous duplex bilateral    Lower extremity venous duplex bilateral    Surgical Pathology Exam    Surgical Pathology Exam      9. Localized edema  Lower extremity venous duplex bilateral    Surgical Pathology Exam    Surgical Pathology Exam      10. Prostate cancer (CMS/HCC)  doxazosin (Cardura) 4 mg tablet    DISCONTINUED: doxazosin (Cardura) 4 mg tablet    CANCELED: Referral to Urology      11. Impaired mobility and ADLs  CANCELED: Referral to Physical Medicine  Rehab      12. Urinary retention  Referral to Urology            Therapy:   Pt endorsed doing well with present diet. No coughing noted during meals. Reinforced the need to take single sips of thin liquids in order not to aspirate, preventing liquids from entering the lungs. Pt demonstrated good control in consuming small single sips of water without further cueing. Pt endorsed eating well, choosing easier foods to manipulate due to lack of dentition. Completed pharyngeal exercises for head and neck to aid in reducing late affects of radiation 2 sets of 12 each. Pt would benefit form continued follow up with SLP services in order to avert late affects of radiation. Pt in agreement to follow up with an OP SLP upon discharge.    Plan:    SLP Frequency: 2x per week  Duration: 3 weeks  SLP Discharge Recommendations: Continue skilled speech therapy services post discharge  Discussed POC: Patient  Discussed Risks/Benefits: Yes  Patient/Caregiver Agreeable: Yes  SLP - OK to Discharge: Yes     Recommendations:  Solid Consistency: Regular (IDDSI Level 7)  Liquid Consistency: Thin (IDDSI Level 0)  Liquid Administration Via: Cup  Medication Administration: Take one pill at a time  Compensatory Strategy Recommendations: Single sips, Slow rate  Postural Changes and/or Swallow Maneuvers: Upright 90 degrees for all PO intake, Remain upright for 30 minutes after meal        Goals:  Pt. will tolerate least restrictive diet without overt s/s of aspiration with 100% accuracy.   Pt will perform pharyngeal strengthening exercises to avert affects of radiation.         Inpatient:  Education Documentation  Extensive education provided to pt re: anatomy/physiology of swallow function, s/s of aspiration, risk of aspiration, results, diet recommendations. Pt verbalized understanding and in agreement.

## 2023-10-30 NOTE — CARE PLAN
The clinical goals for the shift include patient will tolerate tube feed and regular diet    Patient with stable VS. Only wanted 1 tube feed this shift. RN taught patient and wife how to use PEG, flush with water, & give feed. Had regular breakfast and dinner. No pain. Worked with PT. No nausea. Awaiting discharge back to SNF. Patient remained safe this shift. Leanna Coley RN

## 2023-10-30 NOTE — PROGRESS NOTES
Occupational Therapy    Occupational Therapy Treatment    Name: Phong Wong  MRN: [id number]  : 1950  Date: 10/30/23  Time Calculation  Start Time: 1549  Stop Time: 1605  Time Calculation (min): 16 min    Assessment:  OT Assessment: Improvement noted in bed mobility, transfers and lower body ADL. Patient fatigued this date, but receptive to tx. Patient motivated to return to rehab and improve enough to return home.  Prognosis: Good  End of Session Communication: Bedside nurse  End of Session Patient Position: Bed, 2 rail up, Alarm on  Plan:       Subjective   General:  OT Last Visit  OT Received On: 10/30/23  General  Reason for Referral: 73 year old male wtih recent dx of oral cavity cancer admitted with opioid overdose from facility  Past Medical History Relevant to Rehab: HTN, COPD  Family/Caregiver Present: Yes (partner)  Caregiver Feedback: partner encouraging and supportive  Prior to Session Communication: Bedside nurse  Patient Position Received: Bed, 3 rail up  General Comment: Pt. is quiet pleasant and willing to participate. Pt. does have decreased endurance however.       Pain Assessment:  Pain Assessment  Pain Assessment: 0-10  Pain Score: 0 - No pain     Objective   Activities of Daily Living: LE Dressing  LE Dressing: Yes  Sock Level of Assistance: Contact guard  LE Dressing Where Assessed: Edge of bed    Functional Standing Tolerance:     Bed Mobility/Transfers: Bed Mobility  Bed Mobility: Yes  Bed Mobility 1  Bed Mobility 1:  (supine<>sit)  Level of Assistance 1:  (SBA)    Transfers  Transfer: Yes  Transfer 1  Transfer From 1:  (sit<>stand)  Transfer Device 1: Walker  Transfer Level of Assistance 1:  (verbal cues for technique, min assist 1st trial, CGA 2nd trial)  Trials/Comments 1: elevated bed height       Outcome Measures:  [facility] Daily Activity  Putting on and taking off regular lower body clothing: A little  Bathing (including washing, rinsing, drying): A little  Putting on and taking off  regular upper body clothing: A little  Toileting, which includes using toilet, bedpan or urinal: A little  Taking care of personal grooming such as brushing teeth: A little  Eating Meals: A little  Daily Activity - Total Score: 18        Education Documentation  ADL Training, taught by Elaine Tran OT at 10/30/2023  4:11 PM.  Learner: Significant Other, Patient  Readiness: Acceptance  Method: Explanation  Response: Verbalizes Understanding, Needs Reinforcement  Comment: sit<>stand technique, compensatory technique for sock donning/doffing    Education Comments  No comments found.      Goals:  Encounter Problems       Encounter Problems (Active)       ADLs       Patient with complete lower body dressing with set-up and supervision level of assistance donning and doffing all LE clothes  (Progressing)       Start:  10/24/23    Expected End:  11/07/23            Patient will complete toileting including hygiene clothing management/hygiene with set-up and stand by assist level of assistance. (Progressing)       Start:  10/24/23    Expected End:  11/07/23               BALANCE       Pt will maintain dynamic standing balance during ADL task with set-up and supervision level of assistance in order to demonstrate decreased risk of falling and improved postural control. (Progressing)       Start:  10/24/23    Expected End:  11/07/23               MOBILITY       Patient will perform Functional mobility mod  Household distances/Community Distances with set-up and supervision level of assistance and least restrictive device in order to improve safety and functional mobility. (Progressing)       Start:  10/24/23    Expected End:  11/07/23                       TRANSFERS       Patient will perform bed mobility supervision level of assistance in order to improve safety and independence with mobility (Progressing)       Start:  10/24/23    Expected End:  11/07/23            Patient will complete all functional transfer with least  restrictive device with set-up and supervision level of assistance. (Progressing)       Start:  10/24/23    Expected End:  11/07/23
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
## 2023-10-30 NOTE — CARE PLAN
The patient's goals for the shift include      The clinical goals for the shift include Pt will have no further skin breakdown this shift.      Problem: Fall/Injury  Goal: Be free from injury by end of the shift  Outcome: Progressing  Goal: Verbalize understanding of personal risk factors for fall in the hospital  Outcome: Progressing  Goal: Verbalize understanding of risk factor reduction measures to prevent injury from fall in the home  Outcome: Progressing  Goal: Pace activities to prevent fatigue by end of the shift  Outcome: Progressing     Problem: Skin  Goal: Decreased wound size/increased tissue granulation at next dressing change  Outcome: Progressing  Flowsheets (Taken 10/29/2023 0755 by Hafsa Guillermo, RN)  Decreased wound size/increased tissue granulation at next dressing change:   Promote sleep for wound healing   Protective dressings over bony prominences  Goal: Participates in plan/prevention/treatment measures  Outcome: Progressing  Flowsheets (Taken 10/29/2023 0755 by Hafsa Guillermo, RN)  Participates in plan/prevention/treatment measures:   Increase activity/out of bed for meals   Elevate heels  Goal: Prevent/manage excess moisture  Outcome: Progressing  Flowsheets (Taken 10/29/2023 0755 by Hafsa Guillermo RN)  Prevent/manage excess moisture:   Moisturize dry skin   Cleanse incontinence/protect with barrier cream  Goal: Prevent/minimize sheer/friction injuries  Outcome: Progressing  Flowsheets (Taken 10/29/2023 0755 by Hafsa Guillermo, RN)  Prevent/minimize sheer/friction injuries:   HOB 30 degrees or less   Use pull sheet   Turn/reposition every 2 hours/use positioning/transfer devices  Goal: Promote/optimize nutrition  Outcome: Progressing  Flowsheets (Taken 10/29/2023 0755 by Hafsa Guillermo, RN)  Promote/optimize nutrition:   Consume > 50% meals/supplements   Assist with feeding  Goal: Promote skin healing  Outcome: Progressing  Flowsheets (Taken 10/30/2023 0104)  Promote skin healing:    Turn/reposition every 2 hours/use positioning/transfer devices   Assess skin/pad under line(s)/device(s)     Problem: Pain  Goal: Takes deep breaths with improved pain control throughout the shift  Outcome: Progressing  Goal: Turns in bed with improved pain control throughout the shift  Outcome: Progressing     Problem: Pain - Adult  Goal: Verbalizes/displays adequate comfort level or baseline comfort level  Outcome: Progressing     Problem: Discharge Planning  Goal: Discharge to home or other facility with appropriate resources  Outcome: Progressing     Problem: Chronic Conditions and Co-morbidities  Goal: Patient's chronic conditions and co-morbidity symptoms are monitored and maintained or improved  Outcome: Progressing

## 2023-10-30 NOTE — PROGRESS NOTES
Phong Wong is a 73 y.o. male on day 12 of admission presenting with Hypovolemic shock (CMS/HCC).    Pre-cert still pending from insurance. Reached out to  Rehab for an update. No approval yet.     LISA FLORES RN, TCC

## 2023-10-31 PROCEDURE — 97530 THERAPEUTIC ACTIVITIES: CPT | Mod: GP,CQ

## 2023-10-31 PROCEDURE — 2500000002 HC RX 250 W HCPCS SELF ADMINISTERED DRUGS (ALT 637 FOR MEDICARE OP, ALT 636 FOR OP/ED): Performed by: INTERNAL MEDICINE

## 2023-10-31 PROCEDURE — 94640 AIRWAY INHALATION TREATMENT: CPT

## 2023-10-31 PROCEDURE — 97116 GAIT TRAINING THERAPY: CPT | Mod: GP,CQ

## 2023-10-31 PROCEDURE — 1170000001 HC PRIVATE ONCOLOGY ROOM DAILY

## 2023-10-31 PROCEDURE — 2500000001 HC RX 250 WO HCPCS SELF ADMINISTERED DRUGS (ALT 637 FOR MEDICARE OP)

## 2023-10-31 PROCEDURE — 97110 THERAPEUTIC EXERCISES: CPT | Mod: GP,CQ

## 2023-10-31 PROCEDURE — 2500000001 HC RX 250 WO HCPCS SELF ADMINISTERED DRUGS (ALT 637 FOR MEDICARE OP): Performed by: STUDENT IN AN ORGANIZED HEALTH CARE EDUCATION/TRAINING PROGRAM

## 2023-10-31 PROCEDURE — 2500000002 HC RX 250 W HCPCS SELF ADMINISTERED DRUGS (ALT 637 FOR MEDICARE OP, ALT 636 FOR OP/ED): Performed by: STUDENT IN AN ORGANIZED HEALTH CARE EDUCATION/TRAINING PROGRAM

## 2023-10-31 PROCEDURE — 2500000004 HC RX 250 GENERAL PHARMACY W/ HCPCS (ALT 636 FOR OP/ED)

## 2023-10-31 PROCEDURE — 99232 SBSQ HOSP IP/OBS MODERATE 35: CPT

## 2023-10-31 RX ADMIN — SILVER SULFADIAZINE: 10 CREAM TOPICAL at 09:29

## 2023-10-31 RX ADMIN — DOXAZOSIN 4 MG: 4 TABLET ORAL at 09:29

## 2023-10-31 RX ADMIN — BUDESONIDE 1 MG: 0.5 INHALANT RESPIRATORY (INHALATION) at 10:35

## 2023-10-31 RX ADMIN — PANTOPRAZOLE SODIUM 40 MG: 40 TABLET, DELAYED RELEASE ORAL at 09:29

## 2023-10-31 RX ADMIN — LOSARTAN POTASSIUM 50 MG: 50 TABLET, FILM COATED ORAL at 09:29

## 2023-10-31 RX ADMIN — MAGNESIUM OXIDE TAB 400 MG (241.3 MG ELEMENTAL MG) 400 MG: 400 (241.3 MG) TAB at 09:29

## 2023-10-31 RX ADMIN — ASPIRIN 81 MG CHEWABLE TABLET 81 MG: 81 TABLET CHEWABLE at 09:29

## 2023-10-31 RX ADMIN — MAGNESIUM OXIDE TAB 400 MG (241.3 MG ELEMENTAL MG) 400 MG: 400 (241.3 MG) TAB at 20:33

## 2023-10-31 RX ADMIN — FLUTICASONE FUROATE AND VILANTEROL TRIFENATATE 1 PUFF: 100; 25 POWDER RESPIRATORY (INHALATION) at 10:38

## 2023-10-31 ASSESSMENT — COGNITIVE AND FUNCTIONAL STATUS - GENERAL
MOVING TO AND FROM BED TO CHAIR: A LITTLE
WALKING IN HOSPITAL ROOM: A LITTLE
TURNING FROM BACK TO SIDE WHILE IN FLAT BAD: A LITTLE
MOBILITY SCORE: 17
DRESSING REGULAR LOWER BODY CLOTHING: A LITTLE
DAILY ACTIVITIY SCORE: 18
MOBILITY SCORE: 11
TOILETING: A LITTLE
TURNING FROM BACK TO SIDE WHILE IN FLAT BAD: A LOT
STANDING UP FROM CHAIR USING ARMS: A LOT
MOVING FROM LYING ON BACK TO SITTING ON SIDE OF FLAT BED WITH BEDRAILS: A LOT
DRESSING REGULAR UPPER BODY CLOTHING: A LITTLE
HELP NEEDED FOR BATHING: A LITTLE
STANDING UP FROM CHAIR USING ARMS: A LITTLE
WALKING IN HOSPITAL ROOM: A LOT
MOVING TO AND FROM BED TO CHAIR: A LOT
MOVING FROM LYING ON BACK TO SITTING ON SIDE OF FLAT BED WITH BEDRAILS: A LITTLE
CLIMB 3 TO 5 STEPS WITH RAILING: TOTAL
CLIMB 3 TO 5 STEPS WITH RAILING: A LOT
PERSONAL GROOMING: A LOT

## 2023-10-31 ASSESSMENT — PAIN SCALES - GENERAL
PAINLEVEL_OUTOF10: 0 - NO PAIN
PAINLEVEL_OUTOF10: 0 - NO PAIN

## 2023-10-31 ASSESSMENT — PAIN - FUNCTIONAL ASSESSMENT
PAIN_FUNCTIONAL_ASSESSMENT: 0-10

## 2023-10-31 NOTE — CARE PLAN
The patient's goals for the shift include      The clinical goals for the shift include patient will tolerate tube feed and regular diet      Problem: Fall/Injury  Goal: Be free from injury by end of the shift  Outcome: Progressing  Goal: Verbalize understanding of personal risk factors for fall in the hospital  Outcome: Progressing  Goal: Verbalize understanding of risk factor reduction measures to prevent injury from fall in the home  Outcome: Progressing  Goal: Pace activities to prevent fatigue by end of the shift  Outcome: Progressing     Problem: Skin  Goal: Decreased wound size/increased tissue granulation at next dressing change  Outcome: Progressing  Flowsheets (Taken 10/31/2023 0543)  Decreased wound size/increased tissue granulation at next dressing change:   Promote sleep for wound healing   Protective dressings over bony prominences  Goal: Participates in plan/prevention/treatment measures  Outcome: Progressing  Flowsheets (Taken 10/31/2023 0543)  Participates in plan/prevention/treatment measures:   Discuss with provider PT/OT consult   Increase activity/out of bed for meals  Goal: Prevent/manage excess moisture  Outcome: Progressing  Flowsheets (Taken 10/31/2023 0543)  Prevent/manage excess moisture:   Moisturize dry skin   Monitor for/manage infection if present  Goal: Prevent/minimize sheer/friction injuries  Outcome: Progressing  Flowsheets (Taken 10/31/2023 0543)  Prevent/minimize sheer/friction injuries:   HOB 30 degrees or less   Use pull sheet  Goal: Promote/optimize nutrition  Outcome: Progressing  Flowsheets (Taken 10/31/2023 0543)  Promote/optimize nutrition:   Monitor/record intake including meals   Offer water/supplements/favorite foods  Goal: Promote skin healing  Outcome: Progressing  Flowsheets (Taken 10/31/2023 0543)  Promote skin healing:   Assess skin/pad under line(s)/device(s)   Rotate device position/do not position patient on device     Problem: Pain  Goal: Takes deep breaths  with improved pain control throughout the shift  Outcome: Progressing  Goal: Turns in bed with improved pain control throughout the shift  Outcome: Progressing     Problem: Pain - Adult  Goal: Verbalizes/displays adequate comfort level or baseline comfort level  Outcome: Progressing     Problem: Discharge Planning  Goal: Discharge to home or other facility with appropriate resources  Outcome: Progressing     Problem: Chronic Conditions and Co-morbidities  Goal: Patient's chronic conditions and co-morbidity symptoms are monitored and maintained or improved  Outcome: Progressing
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
## 2023-10-31 NOTE — PROGRESS NOTES
Physical Therapy    Physical Therapy Treatment    Patient Name: Phong Wong  MRN: [id number]  Today's Date: 10/31/2023  Time Calculation  Start Time: 0957  Stop Time: 1037  Time Calculation (min): 40 min       Assessment/Plan   PT Assessment  PT Assessment Results: Decreased strength, Decreased endurance, Impaired balance, Decreased mobility  Rehab Prognosis: Good  Evaluation/Treatment Tolerance: Patient limited by fatigue  Medical Staff Made Aware: Yes  End of Session Communication: Bedside nurse  Assessment Comment: Pt. tolerated treatment well  End of Session Patient Position: Bed, 2 rail up, Alarm on  PT Plan  Inpatient/Swing Bed or Outpatient: Inpatient  PT Plan  Treatment/Interventions: Bed mobility, Transfer training, Gait training, Stair training, Balance training, Neuromuscular re-education, Strengthening, Endurance training, Range of motion, Therapeutic exercise, Therapeutic activity, Positioning, Postural re-education  PT Plan: Skilled PT  PT Frequency: 3 times per week  PT Discharge Recommendations: Moderate intensity level of continued care  PT Recommended Transfer Status: Assist x1, Assistive device  PT - OK to Discharge: Yes (Evaluation Completed)      General Visit Information:   PT  Visit  PT Received On: 10/31/23  Response to Previous Treatment: Patient with no complaints from previous session.  General  Reason for Referral: 73 year old male wtih recent dx of oral cavity cancer admitted with opioid overdose from facility  Past Medical History Relevant to Rehab: HTN, COPD  Family/Caregiver Present: No  Prior to Session Communication: Bedside nurse  Patient Position Received: Bed, 3 rail up  General Comment: Pt. is quiet pleasant and willing to participate. Pt. does have decreased endurance however.    Subjective   Precautions:  Precautions  Medical Precautions: Fall precautions  Vital Signs:       Objective   Pain:  Pain Assessment  Pain Assessment: 0-10  Pain Score:  (denies pain although appears to  "be in pain intermittantly)  Cognition:  Cognition  Overall Cognitive Status: Within Functional Limits  Postural Control:     Extremity/Trunk Assessments:                    Activity Tolerance:  Activity Tolerance  Endurance: Tolerates 10 - 20 min exercise with multiple rests  Treatments:  Therapeutic Exercise  Therapeutic Exercise Performed: Yes  Therapeutic Exercise Activity 1: Supine bilat le ex AP'S, QS, SAQ'S, HS, AND ABD X 15 REPS.    Therapeutic Activity  Therapeutic Activity Performed: Yes  Therapeutic Activity 1: Pt. sat EOB for approx. 5 min to get used to change in position. Pt. tolerated sitting well.    Bed Mobility  Bed Mobility: Yes  Bed Mobility 1  Bed Mobility 1: Supine to sitting, Sitting to supine  Level of Assistance 1:  (cga to steady- attempted to instruct pt. to try a log roll however not following)    Ambulation/Gait Training  Ambulation/Gait Training Performed: Yes  Ambulation/Gait Training 1  Surface 1:  (pt. amb. 35' using a wh. walker and min assist. Assist with walker management and to steady - cues to keep walker closer as he tends to push it out too far ahead.)  Comments/Distance (ft) 1: Pt. reports \"That felt good\" although Pt. does report feeling fatigued.  Transfers  Transfer: Yes  Transfer 1  Transfer From 1:  (Sit to stand and back min assist for boost and to steady.)  Transfer to 1: Sit  Technique 1:  (stand)  Transfer Device 1:  (Stand to sit)    Outcome Measures:  [facility] Basic Mobility  Turning from your back to your side while in a flat bed without using bedrails: A little  Moving from lying on your back to sitting on the side of a flat bed without using bedrails: A little  Moving to and from bed to chair (including a wheelchair): A little  Standing up from a chair using your arms (e.g. wheelchair or bedside chair): A little  To walk in hospital room: A little  Climbing 3-5 steps with railing: A lot  Basic Mobility - Total Score: 17    Education Documentation  Home Exercise " Program, taught by Bertha Mock PTA at 10/31/2023 11:01 AM.  Learner: Patient  Readiness: Acceptance  Method: Explanation, Demonstration  Response: Needs Reinforcement    Mobility Training, taught by Bertha Mock PTA at 10/31/2023 11:01 AM.  Learner: Patient  Readiness: Acceptance  Method: Explanation, Demonstration  Response: Needs Reinforcement    Home Exercise Program, taught by Bertha Mock PTA at 10/30/2023  3:24 PM.  Learner: Patient  Readiness: Acceptance  Method: Explanation, Demonstration  Response: Needs Reinforcement    Mobility Training, taught by Bertha Mock PTA at 10/30/2023  3:24 PM.  Learner: Patient  Readiness: Acceptance  Method: Explanation, Demonstration  Response: Needs Reinforcement    Education Comments  No comments found.        OP EDUCATION:  Education  Individual(s) Educated: Patient  Education Provided: Fall Risk, Body Mechanics, Home Exercise Program, Posture  Patient Response to Education: Patient/Caregiver Verbalized Understanding of Information  Education Comment: educatd pt on supine ther-ex for LEs including APs, SLR, heel slides, QS    Encounter Problems       Encounter Problems (Active)       Mobility       STG - Patient will ambulate household distance with RW and mod I (Progressing)       Start:  10/22/23    Expected End:  11/05/23            STG - Patient will navigate 2+ 2 steps with rail (Progressing)       Start:  10/22/23    Expected End:  11/05/23               Pain - Adult          Transfers       STG - Transfer from bed to chair mod I with RW (Progressing)       Start:  10/22/23    Expected End:  11/05/23            STG - Patient will perform bed mobility independent  (Progressing)       Start:  10/22/23    Expected End:  11/05/23            STG - Patient will transfer sit to and from stand mod I with RW (Progressing)       Start:  10/22/23    Expected End:  11/05/23

## 2023-10-31 NOTE — PROGRESS NOTES
Phong Wong is a 73 y.o. male on day 13 of admission presenting with Hypovolemic shock (CMS/HCC).    Insurance has denied [facility].   Rehab is working on the appeal. Dr. Martinez made aware. Will see if patient is appropriate for skilled nursing if appeal is denied.  PT now rec moderate intensity.  Will give SNF list to family.    10/31 1530  Referrals sent to [facility] after speaking with patient and Emelia at bedside.    LISA FLORES RN, [facility]

## 2023-11-01 PROCEDURE — 2500000002 HC RX 250 W HCPCS SELF ADMINISTERED DRUGS (ALT 637 FOR MEDICARE OP, ALT 636 FOR OP/ED): Performed by: INTERNAL MEDICINE

## 2023-11-01 PROCEDURE — 2500000004 HC RX 250 GENERAL PHARMACY W/ HCPCS (ALT 636 FOR OP/ED)

## 2023-11-01 PROCEDURE — 2500000001 HC RX 250 WO HCPCS SELF ADMINISTERED DRUGS (ALT 637 FOR MEDICARE OP): Performed by: STUDENT IN AN ORGANIZED HEALTH CARE EDUCATION/TRAINING PROGRAM

## 2023-11-01 PROCEDURE — 2500000001 HC RX 250 WO HCPCS SELF ADMINISTERED DRUGS (ALT 637 FOR MEDICARE OP)

## 2023-11-01 PROCEDURE — 94640 AIRWAY INHALATION TREATMENT: CPT

## 2023-11-01 PROCEDURE — 1170000001 HC PRIVATE ONCOLOGY ROOM DAILY

## 2023-11-01 PROCEDURE — 2500000002 HC RX 250 W HCPCS SELF ADMINISTERED DRUGS (ALT 637 FOR MEDICARE OP, ALT 636 FOR OP/ED): Performed by: STUDENT IN AN ORGANIZED HEALTH CARE EDUCATION/TRAINING PROGRAM

## 2023-11-01 RX ADMIN — MAGNESIUM OXIDE TAB 400 MG (241.3 MG ELEMENTAL MG) 400 MG: 400 (241.3 MG) TAB at 20:00

## 2023-11-01 RX ADMIN — FLUTICASONE FUROATE AND VILANTEROL TRIFENATATE 1 PUFF: 100; 25 POWDER RESPIRATORY (INHALATION) at 10:57

## 2023-11-01 RX ADMIN — PANTOPRAZOLE SODIUM 40 MG: 40 TABLET, DELAYED RELEASE ORAL at 09:01

## 2023-11-01 RX ADMIN — BUDESONIDE 1 MG: 0.5 INHALANT RESPIRATORY (INHALATION) at 10:57

## 2023-11-01 RX ADMIN — DOXAZOSIN 4 MG: 4 TABLET ORAL at 09:01

## 2023-11-01 RX ADMIN — SILVER SULFADIAZINE: 10 CREAM TOPICAL at 09:01

## 2023-11-01 RX ADMIN — ASPIRIN 81 MG CHEWABLE TABLET 81 MG: 81 TABLET CHEWABLE at 09:01

## 2023-11-01 RX ADMIN — MAGNESIUM OXIDE TAB 400 MG (241.3 MG ELEMENTAL MG) 400 MG: 400 (241.3 MG) TAB at 09:01

## 2023-11-01 RX ADMIN — LOSARTAN POTASSIUM 50 MG: 50 TABLET, FILM COATED ORAL at 09:01

## 2023-11-01 ASSESSMENT — COGNITIVE AND FUNCTIONAL STATUS - GENERAL
DRESSING REGULAR LOWER BODY CLOTHING: A LITTLE
MOBILITY SCORE: 11
MOVING FROM LYING ON BACK TO SITTING ON SIDE OF FLAT BED WITH BEDRAILS: A LOT
PERSONAL GROOMING: A LOT
TURNING FROM BACK TO SIDE WHILE IN FLAT BAD: A LOT
TOILETING: A LITTLE
STANDING UP FROM CHAIR USING ARMS: A LOT
MOVING TO AND FROM BED TO CHAIR: A LOT
DAILY ACTIVITIY SCORE: 18
HELP NEEDED FOR BATHING: A LITTLE
DRESSING REGULAR UPPER BODY CLOTHING: A LITTLE
WALKING IN HOSPITAL ROOM: A LOT
CLIMB 3 TO 5 STEPS WITH RAILING: TOTAL

## 2023-11-01 ASSESSMENT — PAIN - FUNCTIONAL ASSESSMENT
PAIN_FUNCTIONAL_ASSESSMENT: 0-10
PAIN_FUNCTIONAL_ASSESSMENT: 0-10

## 2023-11-01 ASSESSMENT — PAIN SCALES - GENERAL
PAINLEVEL_OUTOF10: 0 - NO PAIN
PAINLEVEL_OUTOF10: 0 - NO PAIN
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
## 2023-11-01 NOTE — NURSING NOTE
Pt refused feeds through PEG tube overnight. Education provided, states he has been eating food throughout the day and would like to wait for breakfast

## 2023-11-01 NOTE — CARE PLAN
The patient's goals for the shift include      The clinical goals for the shift include pt will have increased oral intake by end of shift      Problem: Skin  Goal: Participates in plan/prevention/treatment measures  Outcome: Not Progressing     Upon assessment it was noted patient has a pressure sore to left heel. Appllied mepilex foam heel protector to bilateral feet. Patient called 5 mins later and asked for both heel protectors to be removed. Patient and wife were educated on preventing further skin damage. Patient still requested for them to be removed. Ordered patient waffle booties. Elevated left heel in two pillows in the mean time. Advised patient to pls try to keep his heel elevated. Stated understanding of instructions. Patient tolerated his feeds.     Problem: Skin  Goal: Participates in plan/prevention/treatment measures  Outcome: Not Progressing

## 2023-11-01 NOTE — CARE PLAN
Problem: Fall/Injury  Goal: Be free from injury by end of the shift  Outcome: Progressing  Goal: Verbalize understanding of personal risk factors for fall in the hospital  Outcome: Progressing  Goal: Verbalize understanding of risk factor reduction measures to prevent injury from fall in the home  Outcome: Progressing  Goal: Pace activities to prevent fatigue by end of the shift  Outcome: Progressing     Problem: Skin  Goal: Decreased wound size/increased tissue granulation at next dressing change  Outcome: Progressing  Goal: Participates in plan/prevention/treatment measures  Outcome: Progressing  Goal: Prevent/manage excess moisture  Outcome: Progressing  Goal: Prevent/minimize sheer/friction injuries  Outcome: Progressing  Goal: Promote/optimize nutrition  Outcome: Progressing  Goal: Promote skin healing  Outcome: Progressing     Problem: Pain  Goal: Takes deep breaths with improved pain control throughout the shift  Outcome: Progressing  Goal: Turns in bed with improved pain control throughout the shift  Outcome: Progressing     Problem: Pain - Adult  Goal: Verbalizes/displays adequate comfort level or baseline comfort level  Outcome: Progressing     Problem: Discharge Planning  Goal: Discharge to home or other facility with appropriate resources  Outcome: Progressing     Problem: Chronic Conditions and Co-morbidities  Goal: Patient's chronic conditions and co-morbidity symptoms are monitored and maintained or improved  Outcome: Progressing   The patient's goals for the shift include      The clinical goals for the shift include pt will tolerate bolus feed and do at least one feed overnight

## 2023-11-01 NOTE — PROGRESS NOTES
Phong Wong is a 73 y.o. male on day 14 of admission presenting with Hypovolemic shock (CMS/HCC).  Updated facilities on patient. Spoke with Emelia about [facility], she stated [facility]. Updated [facility] and began precert.     BOSTON IRBY RN, [facility]

## 2023-11-01 NOTE — CARE PLAN
Problem: Fall/Injury  Goal: Be free from injury by end of the shift  10/31/2023 2322 by Dipika Anguiano RN  Outcome: Progressing  10/31/2023 2320 by Dipika Anguiano RN  Outcome: Progressing  Goal: Verbalize understanding of personal risk factors for fall in the hospital  10/31/2023 2322 by Dipika Anguiano RN  Outcome: Progressing  10/31/2023 2320 by Dipika Anguiano RN  Outcome: Progressing  Goal: Verbalize understanding of risk factor reduction measures to prevent injury from fall in the home  10/31/2023 2322 by Dipika Anguiano RN  Outcome: Progressing  10/31/2023 2320 by Dipika Anguiano RN  Outcome: Progressing  Goal: Pace activities to prevent fatigue by end of the shift  10/31/2023 2322 by Dipika Anguiano RN  Outcome: Progressing  10/31/2023 2320 by Dipika Anguiano RN  Outcome: Progressing     Problem: Skin  Goal: Decreased wound size/increased tissue granulation at next dressing change  10/31/2023 2322 by Dipika Anguiano RN  Outcome: Progressing  10/31/2023 2320 by Dipika Anguiano RN  Outcome: Progressing  Goal: Participates in plan/prevention/treatment measures  10/31/2023 2322 by Dipika Anguiano RN  Outcome: Progressing  10/31/2023 2320 by Dipika Anguiano RN  Outcome: Progressing  Goal: Prevent/manage excess moisture  10/31/2023 2322 by Dipika Anguiano RN  Outcome: Progressing  10/31/2023 2320 by Dipika Anguiano RN  Outcome: Progressing  Goal: Prevent/minimize sheer/friction injuries  10/31/2023 2322 by Dipika Anguiano RN  Outcome: Progressing  10/31/2023 2320 by Dipika Anguiano RN  Outcome: Progressing  Goal: Promote/optimize nutrition  10/31/2023 2322 by Dipika Anguiano RN  Outcome: Progressing  10/31/2023 2320 by Dipika Anguiano RN  Outcome: Progressing  Goal: Promote skin healing  10/31/2023 2322 by Dipika Anguiano RN  Outcome: Progressing  10/31/2023 2320 by Dipika Anguiano RN  Outcome: Progressing     Problem: Pain  Goal:  Takes deep breaths with improved pain control throughout the shift  10/31/2023 2322 by Dipika Anguiano RN  Outcome: Progressing  10/31/2023 2320 by Dipika Anguiano RN  Outcome: Progressing  Goal: Turns in bed with improved pain control throughout the shift  10/31/2023 2322 by Dipika Anguiano RN  Outcome: Progressing  10/31/2023 2320 by Dipika Anguiano RN  Outcome: Progressing     Problem: Pain - Adult  Goal: Verbalizes/displays adequate comfort level or baseline comfort level  10/31/2023 2322 by Dipika Anguiano RN  Outcome: Progressing  10/31/2023 2320 by Dipika Anguiano RN  Outcome: Progressing     Problem: Discharge Planning  Goal: Discharge to home or other facility with appropriate resources  10/31/2023 2322 by Dipika Anguiano RN  Outcome: Progressing  10/31/2023 2320 by Dipika Anguiano RN  Outcome: Progressing     Problem: Chronic Conditions and Co-morbidities  Goal: Patient's chronic conditions and co-morbidity symptoms are monitored and maintained or improved  10/31/2023 2322 by Dipika Anguiano RN  Outcome: Progressing  10/31/2023 2320 by Dipika Anguiano RN  Outcome: Progressing   The patient's goals for the shift include      The clinical goals for the shift include pt will tolerate bolus feed and do at least one feed overnight

## 2023-11-02 VITALS
HEART RATE: 93 BPM | BODY MASS INDEX: 17.04 KG/M2 | RESPIRATION RATE: 18 BRPM | WEIGHT: 119.05 LBS | TEMPERATURE: 99 F | OXYGEN SATURATION: 99 % | HEIGHT: 70 IN | DIASTOLIC BLOOD PRESSURE: 57 MMHG | SYSTOLIC BLOOD PRESSURE: 95 MMHG

## 2023-11-02 LAB
ALBUMIN SERPL BCP-MCNC: 2.7 G/DL (ref 3.4–5)
ANION GAP SERPL CALC-SCNC: 14 MMOL/L (ref 10–20)
BASOPHILS # BLD AUTO: 0.02 X10*3/UL (ref 0–0.1)
BASOPHILS NFR BLD AUTO: 0.6 %
BUN SERPL-MCNC: 19 MG/DL (ref 6–23)
CALCIUM SERPL-MCNC: 8.8 MG/DL (ref 8.6–10.6)
CHLORIDE SERPL-SCNC: 100 MMOL/L (ref 98–107)
CO2 SERPL-SCNC: 25 MMOL/L (ref 21–32)
CREAT SERPL-MCNC: 1.1 MG/DL (ref 0.5–1.3)
EOSINOPHIL # BLD AUTO: 0.17 X10*3/UL (ref 0–0.4)
EOSINOPHIL NFR BLD AUTO: 4.7 %
ERYTHROCYTE [DISTWIDTH] IN BLOOD BY AUTOMATED COUNT: 17.1 % (ref 11.5–14.5)
GFR SERPL CREATININE-BSD FRML MDRD: 72 ML/MIN/1.73M*2
GLUCOSE SERPL-MCNC: 83 MG/DL (ref 74–99)
HCT VFR BLD AUTO: 24.4 % (ref 41–52)
HGB BLD-MCNC: 7.6 G/DL (ref 13.5–17.5)
IMM GRANULOCYTES # BLD AUTO: 0.04 X10*3/UL (ref 0–0.5)
IMM GRANULOCYTES NFR BLD AUTO: 1.1 % (ref 0–0.9)
LYMPHOCYTES # BLD AUTO: 0.38 X10*3/UL (ref 0.8–3)
LYMPHOCYTES NFR BLD AUTO: 10.6 %
MAGNESIUM SERPL-MCNC: 2.06 MG/DL (ref 1.6–2.4)
MCH RBC QN AUTO: 28.4 PG (ref 26–34)
MCHC RBC AUTO-ENTMCNC: 31.1 G/DL (ref 32–36)
MCV RBC AUTO: 91 FL (ref 80–100)
MONOCYTES # BLD AUTO: 0.63 X10*3/UL (ref 0.05–0.8)
MONOCYTES NFR BLD AUTO: 17.5 %
NEUTROPHILS # BLD AUTO: 2.35 X10*3/UL (ref 1.6–5.5)
NEUTROPHILS NFR BLD AUTO: 65.5 %
NRBC BLD-RTO: 0 /100 WBCS (ref 0–0)
PHOSPHATE SERPL-MCNC: 3.9 MG/DL (ref 2.5–4.9)
PLATELET # BLD AUTO: 260 X10*3/UL (ref 150–450)
POTASSIUM SERPL-SCNC: 4.4 MMOL/L (ref 3.5–5.3)
RBC # BLD AUTO: 2.68 X10*6/UL (ref 4.5–5.9)
SODIUM SERPL-SCNC: 135 MMOL/L (ref 136–145)
WBC # BLD AUTO: 3.6 X10*3/UL (ref 4.4–11.3)

## 2023-11-02 PROCEDURE — 94640 AIRWAY INHALATION TREATMENT: CPT

## 2023-11-02 PROCEDURE — 2500000002 HC RX 250 W HCPCS SELF ADMINISTERED DRUGS (ALT 637 FOR MEDICARE OP, ALT 636 FOR OP/ED): Performed by: INTERNAL MEDICINE

## 2023-11-02 PROCEDURE — 99239 HOSP IP/OBS DSCHRG MGMT >30: CPT

## 2023-11-02 PROCEDURE — 2500000002 HC RX 250 W HCPCS SELF ADMINISTERED DRUGS (ALT 637 FOR MEDICARE OP, ALT 636 FOR OP/ED): Performed by: STUDENT IN AN ORGANIZED HEALTH CARE EDUCATION/TRAINING PROGRAM

## 2023-11-02 PROCEDURE — 2500000001 HC RX 250 WO HCPCS SELF ADMINISTERED DRUGS (ALT 637 FOR MEDICARE OP): Performed by: STUDENT IN AN ORGANIZED HEALTH CARE EDUCATION/TRAINING PROGRAM

## 2023-11-02 PROCEDURE — 83735 ASSAY OF MAGNESIUM: CPT

## 2023-11-02 PROCEDURE — 36415 COLL VENOUS BLD VENIPUNCTURE: CPT

## 2023-11-02 PROCEDURE — 84100 ASSAY OF PHOSPHORUS: CPT

## 2023-11-02 PROCEDURE — 2500000004 HC RX 250 GENERAL PHARMACY W/ HCPCS (ALT 636 FOR OP/ED)

## 2023-11-02 PROCEDURE — 2500000001 HC RX 250 WO HCPCS SELF ADMINISTERED DRUGS (ALT 637 FOR MEDICARE OP)

## 2023-11-02 PROCEDURE — 85025 COMPLETE CBC W/AUTO DIFF WBC: CPT

## 2023-11-02 RX ORDER — LOPERAMIDE HYDROCHLORIDE 2 MG/1
2 CAPSULE ORAL 2 TIMES DAILY PRN
Status: DISCONTINUED | OUTPATIENT
Start: 2023-11-02 | End: 2023-11-02 | Stop reason: HOSPADM

## 2023-11-02 RX ORDER — SIMETHICONE 80 MG
40 TABLET,CHEWABLE ORAL 4 TIMES DAILY PRN
Qty: 30 TABLET | Refills: 0
Start: 2023-11-02 | End: 2023-11-29 | Stop reason: ALTCHOICE

## 2023-11-02 RX ORDER — LOSARTAN POTASSIUM 50 MG/1
50 TABLET ORAL DAILY
Qty: 30 TABLET | Refills: 0
Start: 2023-11-02 | End: 2024-03-12 | Stop reason: SDUPTHER

## 2023-11-02 RX ORDER — LOPERAMIDE HYDROCHLORIDE 2 MG/1
2 CAPSULE ORAL 2 TIMES DAILY PRN
Qty: 30 CAPSULE | Refills: 0
Start: 2023-11-02 | End: 2023-11-29 | Stop reason: ALTCHOICE

## 2023-11-02 RX ADMIN — SILVER SULFADIAZINE: 10 CREAM TOPICAL at 09:30

## 2023-11-02 RX ADMIN — ASPIRIN 81 MG CHEWABLE TABLET 81 MG: 81 TABLET CHEWABLE at 09:29

## 2023-11-02 RX ADMIN — DOXAZOSIN 4 MG: 4 TABLET ORAL at 09:29

## 2023-11-02 RX ADMIN — FLUTICASONE FUROATE AND VILANTEROL TRIFENATATE 1 PUFF: 100; 25 POWDER RESPIRATORY (INHALATION) at 09:24

## 2023-11-02 RX ADMIN — BUDESONIDE 1 MG: 0.5 INHALANT RESPIRATORY (INHALATION) at 11:11

## 2023-11-02 RX ADMIN — PANTOPRAZOLE SODIUM 40 MG: 40 TABLET, DELAYED RELEASE ORAL at 09:30

## 2023-11-02 RX ADMIN — LOSARTAN POTASSIUM 50 MG: 50 TABLET, FILM COATED ORAL at 09:29

## 2023-11-02 RX ADMIN — MAGNESIUM OXIDE TAB 400 MG (241.3 MG ELEMENTAL MG) 400 MG: 400 (241.3 MG) TAB at 09:29

## 2023-11-02 ASSESSMENT — PAIN - FUNCTIONAL ASSESSMENT
PAIN_FUNCTIONAL_ASSESSMENT: 0-10

## 2023-11-02 ASSESSMENT — PAIN SCALES - GENERAL
PAINLEVEL_OUTOF10: 0 - NO PAIN

## 2023-11-02 ASSESSMENT — COGNITIVE AND FUNCTIONAL STATUS - GENERAL
HELP NEEDED FOR BATHING: A LITTLE
MOVING FROM LYING ON BACK TO SITTING ON SIDE OF FLAT BED WITH BEDRAILS: A LITTLE
MOVING TO AND FROM BED TO CHAIR: A LOT
TOILETING: A LITTLE
CLIMB 3 TO 5 STEPS WITH RAILING: A LOT
MOBILITY SCORE: 13
DRESSING REGULAR UPPER BODY CLOTHING: A LITTLE
DRESSING REGULAR LOWER BODY CLOTHING: A LITTLE
STANDING UP FROM CHAIR USING ARMS: A LOT
WALKING IN HOSPITAL ROOM: A LOT
PERSONAL GROOMING: A LOT
TURNING FROM BACK TO SIDE WHILE IN FLAT BAD: A LOT
DAILY ACTIVITIY SCORE: 18

## 2023-11-02 NOTE — PROGRESS NOTES
Physical Therapy                 Therapy Communication Note    Patient Name: Phong Wong  MRN: [id number]  Today's Date: 11/2/2023     Discipline: Physical Therapy    Missed Visit Reason: Missed Visit Reason:  (Pt. in the middle of eating at this time.)    Missed Time: Attempt    Comment:

## 2023-11-02 NOTE — NURSING NOTE
Called report to nurse Villarreal at [facility]. Discharge paperwork printed and placed in envelop. IV removed with catheter intact. Leg bag place on the end of the rainey catheter and patient assisted in getting dressed and gathering personal belongings. Pt waiting for transport at this time.

## 2023-11-02 NOTE — NURSING NOTE
Attempted to call report on this patient to [facility] but no answer at this time. Will try again once more.

## 2023-11-02 NOTE — CARE PLAN
Problem: Fall/Injury  Goal: Be free from injury by end of the shift  Outcome: Progressing  Goal: Verbalize understanding of personal risk factors for fall in the hospital  Outcome: Progressing  Goal: Verbalize understanding of risk factor reduction measures to prevent injury from fall in the home  Outcome: Progressing  Goal: Pace activities to prevent fatigue by end of the shift  Outcome: Progressing     Problem: Skin  Goal: Decreased wound size/increased tissue granulation at next dressing change  Outcome: Progressing  Goal: Participates in plan/prevention/treatment measures  Outcome: Progressing  Goal: Prevent/manage excess moisture  Outcome: Progressing  Goal: Prevent/minimize sheer/friction injuries  Outcome: Progressing  Goal: Promote/optimize nutrition  Outcome: Progressing  Goal: Promote skin healing  Outcome: Progressing     Problem: Pain  Goal: Takes deep breaths with improved pain control throughout the shift  Outcome: Progressing  Goal: Turns in bed with improved pain control throughout the shift  Outcome: Progressing     Problem: Pain - Adult  Goal: Verbalizes/displays adequate comfort level or baseline comfort level  Outcome: Progressing     Problem: Discharge Planning  Goal: Discharge to home or other facility with appropriate resources  Outcome: Progressing     Problem: Chronic Conditions and Co-morbidities  Goal: Patient's chronic conditions and co-morbidity symptoms are monitored and maintained or improved  Outcome: Progressing   The patient's goals for the shift include      The clinical goals for the shift include pt will take at least one bolus feed throughout the night and toleraate with no side effects
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
## 2023-11-03 ENCOUNTER — APPOINTMENT (OUTPATIENT)
Dept: HEMATOLOGY/ONCOLOGY | Facility: HOSPITAL | Age: 73
End: 2023-11-03
Payer: MEDICARE

## 2023-11-03 LAB
LAB AP ASR DISCLAIMER: NORMAL
LABORATORY COMMENT REPORT: NORMAL
PATH REPORT.FINAL DX SPEC: NORMAL
PATH REPORT.GROSS SPEC: NORMAL
PATH REPORT.TOTAL CANCER: NORMAL

## 2023-11-06 ENCOUNTER — NURSING HOME VISIT (OUTPATIENT)
Dept: POST ACUTE CARE | Facility: EXTERNAL LOCATION | Age: 73
End: 2023-11-06
Payer: MEDICARE

## 2023-11-06 ENCOUNTER — SOCIAL WORK (OUTPATIENT)
Dept: HEMATOLOGY/ONCOLOGY | Facility: HOSPITAL | Age: 73
End: 2023-11-06
Payer: MEDICARE

## 2023-11-06 DIAGNOSIS — I73.9 PAD (PERIPHERAL ARTERY DISEASE) (CMS-HCC): ICD-10-CM

## 2023-11-06 DIAGNOSIS — K21.9 GASTROESOPHAGEAL REFLUX DISEASE, UNSPECIFIED WHETHER ESOPHAGITIS PRESENT: ICD-10-CM

## 2023-11-06 DIAGNOSIS — T45.1X5A ANEMIA ASSOCIATED WITH CHEMOTHERAPY: ICD-10-CM

## 2023-11-06 DIAGNOSIS — C10.9 OROPHARYNGEAL CANCER (MULTI): ICD-10-CM

## 2023-11-06 DIAGNOSIS — I10 BENIGN ESSENTIAL HYPERTENSION: ICD-10-CM

## 2023-11-06 DIAGNOSIS — R14.0 GASSINESS: ICD-10-CM

## 2023-11-06 DIAGNOSIS — J44.9 CHRONIC OBSTRUCTIVE PULMONARY DISEASE, UNSPECIFIED COPD TYPE (MULTI): ICD-10-CM

## 2023-11-06 DIAGNOSIS — R13.10 ODYNOPHAGIA: ICD-10-CM

## 2023-11-06 DIAGNOSIS — E83.42 HYPOMAGNESEMIA: ICD-10-CM

## 2023-11-06 DIAGNOSIS — T30.0 RADIATION BURN: ICD-10-CM

## 2023-11-06 DIAGNOSIS — N13.9 OBSTRUCTIVE UROPATHY: ICD-10-CM

## 2023-11-06 DIAGNOSIS — N18.31 CHRONIC RENAL IMPAIRMENT, STAGE 3A (MULTI): ICD-10-CM

## 2023-11-06 DIAGNOSIS — R53.1 WEAKNESS: ICD-10-CM

## 2023-11-06 DIAGNOSIS — R62.7 FTT (FAILURE TO THRIVE) IN ADULT: Primary | ICD-10-CM

## 2023-11-06 DIAGNOSIS — D64.81 ANEMIA ASSOCIATED WITH CHEMOTHERAPY: ICD-10-CM

## 2023-11-06 DIAGNOSIS — I25.10 ATHEROSCLEROSIS OF NATIVE CORONARY ARTERY OF NATIVE HEART WITHOUT ANGINA PECTORIS: ICD-10-CM

## 2023-11-06 DIAGNOSIS — D70.1 CHEMOTHERAPY-INDUCED NEUTROPENIA (CMS-HCC): ICD-10-CM

## 2023-11-06 DIAGNOSIS — T45.1X5A CHEMOTHERAPY-INDUCED NEUTROPENIA (CMS-HCC): ICD-10-CM

## 2023-11-06 PROCEDURE — 99310 SBSQ NF CARE HIGH MDM 45: CPT | Performed by: FAMILY MEDICINE
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
## 2023-11-06 NOTE — PROGRESS NOTES
YAMILETH Patten CNP requested covering SW confirm patient's transportation is arranged for his upcoming  oncology appointment on 11/9 at 11am. Per chart review, pt currently resides at [facility]. LISW contacted the facility ([phone])  and spoke with Magdalene. LISW provided details of patient's upcoming appointment. Magdalene confirms [facility] will arrange transportation. Medical team updated.     LISW will continue to follow as needed.

## 2023-11-07 PROBLEM — R13.10 ODYNOPHAGIA: Status: ACTIVE | Noted: 2023-11-07

## 2023-11-07 PROBLEM — Z78.9 DEFICIT IN ACTIVITIES OF DAILY LIVING (ADL): Status: RESOLVED | Noted: 2023-10-06 | Resolved: 2023-11-07

## 2023-11-07 PROBLEM — R60.9 EDEMA: Status: RESOLVED | Noted: 2023-04-09 | Resolved: 2023-11-07

## 2023-11-07 PROBLEM — D70.1 CHEMOTHERAPY-INDUCED NEUTROPENIA (CMS-HCC): Status: ACTIVE | Noted: 2023-11-07

## 2023-11-07 PROBLEM — R62.7 FTT (FAILURE TO THRIVE) IN ADULT: Status: ACTIVE | Noted: 2023-11-07

## 2023-11-07 PROBLEM — R53.1 WEAKNESS: Status: ACTIVE | Noted: 2023-11-07

## 2023-11-07 PROBLEM — E83.42 HYPOMAGNESEMIA: Status: ACTIVE | Noted: 2023-11-07

## 2023-11-07 PROBLEM — N13.9 OBSTRUCTIVE UROPATHY: Status: ACTIVE | Noted: 2023-11-07

## 2023-11-07 PROBLEM — K21.9 GERD (GASTROESOPHAGEAL REFLUX DISEASE): Status: ACTIVE | Noted: 2023-11-07

## 2023-11-07 PROBLEM — T30.0 RADIATION BURN: Status: ACTIVE | Noted: 2023-11-07

## 2023-11-07 PROBLEM — T45.1X5A CHEMOTHERAPY-INDUCED NEUTROPENIA (CMS-HCC): Status: ACTIVE | Noted: 2023-11-07

## 2023-11-07 PROBLEM — R14.0 GASSINESS: Status: ACTIVE | Noted: 2023-11-07
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
## 2023-11-07 NOTE — ASSESSMENT & PLAN NOTE
Clark with cl yellow urine.  Failed voiding trial in hospital.  Will follow, to see when can try voiding trial again.  Hx prostate Ca s/p XRT (2018)

## 2023-11-09 ENCOUNTER — LAB (OUTPATIENT)
Dept: LAB | Facility: HOSPITAL | Age: 73
End: 2023-11-09
Payer: MEDICARE

## 2023-11-09 ENCOUNTER — HOSPITAL ENCOUNTER (OUTPATIENT)
Dept: RADIATION ONCOLOGY | Facility: HOSPITAL | Age: 73
Setting detail: RADIATION/ONCOLOGY SERIES
Discharge: HOME | End: 2023-11-09
Payer: MEDICARE

## 2023-11-09 ENCOUNTER — OFFICE VISIT (OUTPATIENT)
Dept: HEMATOLOGY/ONCOLOGY | Facility: HOSPITAL | Age: 73
End: 2023-11-09
Payer: MEDICARE

## 2023-11-09 VITALS
BODY MASS INDEX: 18.13 KG/M2 | DIASTOLIC BLOOD PRESSURE: 47 MMHG | WEIGHT: 126.32 LBS | HEART RATE: 118 BPM | TEMPERATURE: 97.9 F | OXYGEN SATURATION: 100 % | RESPIRATION RATE: 18 BRPM | SYSTOLIC BLOOD PRESSURE: 105 MMHG

## 2023-11-09 VITALS
TEMPERATURE: 97.7 F | SYSTOLIC BLOOD PRESSURE: 110 MMHG | RESPIRATION RATE: 18 BRPM | WEIGHT: 126.98 LBS | HEIGHT: 66 IN | OXYGEN SATURATION: 100 % | HEART RATE: 109 BPM | DIASTOLIC BLOOD PRESSURE: 61 MMHG | BODY MASS INDEX: 20.41 KG/M2

## 2023-11-09 DIAGNOSIS — C10.9 OROPHARYNGEAL CANCER (MULTI): ICD-10-CM

## 2023-11-09 DIAGNOSIS — C10.9 SQUAMOUS CELL CARCINOMA OF OROPHARYNX (MULTI): Primary | ICD-10-CM

## 2023-11-09 DIAGNOSIS — L58.9 RADIATION DERMATITIS: ICD-10-CM

## 2023-11-09 DIAGNOSIS — E87.1 HYPONATREMIA: ICD-10-CM

## 2023-11-09 DIAGNOSIS — R13.10 ODYNOPHAGIA: ICD-10-CM

## 2023-11-09 DIAGNOSIS — E46 PROTEIN-CALORIE MALNUTRITION, UNSPECIFIED SEVERITY (MULTI): ICD-10-CM

## 2023-11-09 DIAGNOSIS — D64.9 ANEMIA, UNSPECIFIED TYPE: ICD-10-CM

## 2023-11-09 DIAGNOSIS — R22.0 MASS OF SOFT PALATE: Primary | ICD-10-CM

## 2023-11-09 LAB
ALBUMIN SERPL BCP-MCNC: 3.3 G/DL (ref 3.4–5)
ALP SERPL-CCNC: 77 U/L (ref 33–136)
ALT SERPL W P-5'-P-CCNC: 18 U/L (ref 10–52)
ANION GAP SERPL CALC-SCNC: 14 MMOL/L (ref 10–20)
AST SERPL W P-5'-P-CCNC: 16 U/L (ref 9–39)
BASOPHILS # BLD AUTO: 0.02 X10*3/UL (ref 0–0.1)
BASOPHILS NFR BLD AUTO: 0.5 %
BILIRUB SERPL-MCNC: 0.3 MG/DL (ref 0–1.2)
BUN SERPL-MCNC: 16 MG/DL (ref 6–23)
CALCIUM SERPL-MCNC: 8.8 MG/DL (ref 8.6–10.3)
CHLORIDE SERPL-SCNC: 98 MMOL/L (ref 98–107)
CO2 SERPL-SCNC: 27 MMOL/L (ref 21–32)
CREAT SERPL-MCNC: 1.04 MG/DL (ref 0.5–1.3)
EOSINOPHIL # BLD AUTO: 0.11 X10*3/UL (ref 0–0.4)
EOSINOPHIL NFR BLD AUTO: 3 %
ERYTHROCYTE [DISTWIDTH] IN BLOOD BY AUTOMATED COUNT: 17.2 % (ref 11.5–14.5)
GFR SERPL CREATININE-BSD FRML MDRD: 77 ML/MIN/1.73M*2
GLUCOSE SERPL-MCNC: 127 MG/DL (ref 74–99)
HCT VFR BLD AUTO: 25.4 % (ref 41–52)
HGB BLD-MCNC: 8.3 G/DL (ref 13.5–17.5)
IMM GRANULOCYTES # BLD AUTO: 0.03 X10*3/UL (ref 0–0.5)
IMM GRANULOCYTES NFR BLD AUTO: 0.8 % (ref 0–0.9)
LYMPHOCYTES # BLD AUTO: 0.37 X10*3/UL (ref 0.8–3)
LYMPHOCYTES NFR BLD AUTO: 10 %
MAGNESIUM SERPL-MCNC: 2.07 MG/DL (ref 1.6–2.4)
MCH RBC QN AUTO: 29.5 PG (ref 26–34)
MCHC RBC AUTO-ENTMCNC: 32.7 G/DL (ref 32–36)
MCV RBC AUTO: 90 FL (ref 80–100)
MONOCYTES # BLD AUTO: 0.42 X10*3/UL (ref 0.05–0.8)
MONOCYTES NFR BLD AUTO: 11.4 %
NEUTROPHILS # BLD AUTO: 2.74 X10*3/UL (ref 1.6–5.5)
NEUTROPHILS NFR BLD AUTO: 74.3 %
NRBC BLD-RTO: 0 /100 WBCS (ref 0–0)
PLATELET # BLD AUTO: 308 X10*3/UL (ref 150–450)
POTASSIUM SERPL-SCNC: 3.8 MMOL/L (ref 3.5–5.3)
PROT SERPL-MCNC: 6.1 G/DL (ref 6.4–8.2)
RBC # BLD AUTO: 2.81 X10*6/UL (ref 4.5–5.9)
SODIUM SERPL-SCNC: 135 MMOL/L (ref 136–145)
WBC # BLD AUTO: 3.7 X10*3/UL (ref 4.4–11.3)

## 2023-11-09 PROCEDURE — 99215 OFFICE O/P EST HI 40 MIN: CPT

## 2023-11-09 PROCEDURE — 1126F AMNT PAIN NOTED NONE PRSNT: CPT | Performed by: STUDENT IN AN ORGANIZED HEALTH CARE EDUCATION/TRAINING PROGRAM

## 2023-11-09 PROCEDURE — 83735 ASSAY OF MAGNESIUM: CPT

## 2023-11-09 PROCEDURE — 3074F SYST BP LT 130 MM HG: CPT | Performed by: STUDENT IN AN ORGANIZED HEALTH CARE EDUCATION/TRAINING PROGRAM

## 2023-11-09 PROCEDURE — 1159F MED LIST DOCD IN RCRD: CPT | Performed by: STUDENT IN AN ORGANIZED HEALTH CARE EDUCATION/TRAINING PROGRAM

## 2023-11-09 PROCEDURE — 85025 COMPLETE CBC W/AUTO DIFF WBC: CPT

## 2023-11-09 PROCEDURE — 80048 BASIC METABOLIC PNL TOTAL CA: CPT

## 2023-11-09 PROCEDURE — 1160F RVW MEDS BY RX/DR IN RCRD: CPT | Performed by: STUDENT IN AN ORGANIZED HEALTH CARE EDUCATION/TRAINING PROGRAM

## 2023-11-09 PROCEDURE — 1036F TOBACCO NON-USER: CPT | Performed by: STUDENT IN AN ORGANIZED HEALTH CARE EDUCATION/TRAINING PROGRAM

## 2023-11-09 PROCEDURE — 36415 COLL VENOUS BLD VENIPUNCTURE: CPT

## 2023-11-09 PROCEDURE — 84075 ASSAY ALKALINE PHOSPHATASE: CPT

## 2023-11-09 PROCEDURE — 3078F DIAST BP <80 MM HG: CPT | Performed by: STUDENT IN AN ORGANIZED HEALTH CARE EDUCATION/TRAINING PROGRAM

## 2023-11-09 PROCEDURE — 1111F DSCHRG MED/CURRENT MED MERGE: CPT | Performed by: STUDENT IN AN ORGANIZED HEALTH CARE EDUCATION/TRAINING PROGRAM

## 2023-11-09 PROCEDURE — 99215 OFFICE O/P EST HI 40 MIN: CPT | Performed by: STUDENT IN AN ORGANIZED HEALTH CARE EDUCATION/TRAINING PROGRAM

## 2023-11-09 ASSESSMENT — ENCOUNTER SYMPTOMS
CHILLS: 0
WHEEZING: 0
ARTHRALGIAS: 0
LIGHT-HEADEDNESS: 0
FATIGUE: 1
DIARRHEA: 0
SORE THROAT: 0
NAUSEA: 0
ADENOPATHY: 0
ABDOMINAL PAIN: 0
CHEST TIGHTNESS: 0
SHORTNESS OF BREATH: 0
FEVER: 0
OCCASIONAL FEELINGS OF UNSTEADINESS: 0
DIFFICULTY URINATING: 1
NUMBNESS: 0
UNEXPECTED WEIGHT CHANGE: 0
APPETITE CHANGE: 0
ABDOMINAL PAIN: 0
COUGH: 0
COUGH: 0
EXTREMITY WEAKNESS: 1
DYSURIA: 0
CONSTIPATION: 0
CHILLS: 0
DIZZINESS: 0
DEPRESSION: 0
LEG SWELLING: 0
VOMITING: 0
BACK PAIN: 1
SORE THROAT: 0
HEADACHES: 0
HEMATURIA: 0
FEVER: 0
NERVOUS/ANXIOUS: 0
SPEECH DIFFICULTY: 0
VOMITING: 0
SHORTNESS OF BREATH: 0
TROUBLE SWALLOWING: 0
LOSS OF SENSATION IN FEET: 0
DIZZINESS: 0
CONFUSION: 0
HEADACHES: 0
DIARRHEA: 0
CONSTIPATION: 0
TROUBLE SWALLOWING: 0
ARTHRALGIAS: 1
PALPITATIONS: 0
NAUSEA: 0

## 2023-11-09 ASSESSMENT — PATIENT HEALTH QUESTIONNAIRE - PHQ9
SUM OF ALL RESPONSES TO PHQ9 QUESTIONS 1 AND 2: 0
2. FEELING DOWN, DEPRESSED OR HOPELESS: NOT AT ALL
1. LITTLE INTEREST OR PLEASURE IN DOING THINGS: NOT AT ALL

## 2023-11-09 ASSESSMENT — COLUMBIA-SUICIDE SEVERITY RATING SCALE - C-SSRS
2. HAVE YOU ACTUALLY HAD ANY THOUGHTS OF KILLING YOURSELF?: NO
6. HAVE YOU EVER DONE ANYTHING, STARTED TO DO ANYTHING, OR PREPARED TO DO ANYTHING TO END YOUR LIFE?: NO

## 2023-11-09 ASSESSMENT — PAIN SCALES - GENERAL
PAINLEVEL_OUTOF10: 0-NO PAIN
PAINLEVEL: 0-NO PAIN

## 2023-11-09 NOTE — PROGRESS NOTES
Radiation Oncology Follow-Up    Patient Name:  Phong Wong  MRN:  [id number]  :  1950    Referring Provider: Vianca Steen MD  Primary Care Provider: Harshil Weiss MD  Care Team: Patient Care Team:  Harshil Weiss MD as PCP - General (Internal Medicine)  Harshil Weiss MD as PCP - [employer] PCP  Vianca Steen MD as Radiation Oncologist (Radiation Oncology)  Lyssa Patten PA-C as Physician Assistant (Hematology and Oncology)  Kyunghee Burkitt, DO as Medical Oncologist (Hematology and Oncology)  Juan Jose Fletcher MD as Surgeon (Otolaryngology)    Date of Service: 2023     SUBJECTIVE  History of Present Illness:   Phong Wong is a 73 y.o. 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  The patient underwent chemoradiation ending on 10/10/12.  His recovery was complicated by a hospital admission on 10/18/23 for lethargy and weakness.  Patient was hypotensive and hypoxic with elevated lactate on admission, concerning for opioid overdose vs infection.   Patient received empiric antibiotics with sepsis workup being negative.  EGD with biopsy, performed on 10/20/23, showed diffuse radiation esophagitis and actively inflamed esophageal squamous mucosa with erosion and reactive changes.  Inflamed cardiac-type mucosa, negative for intestinal metaplasia or dysplasia.  During his hospitalization a Clark was placed for urinary retention and a PEG for malnutrition.  Patient was subsequently discharged to [facility] for skilled rehab.      Today the patient is in clinic, accompanied by his wife, for his first Radiation Oncology survival visit.  Patient states that he's is doing much better since his hospitalization.  He receives PT/OT on a daily basis at [facility] and feels that his strength is improving.  Is now getting almost 100% of his nutrition PO and only flushes his PEG.  Since he is edentulous, he focuses on eating softer foods.   Denies coughing or choking when eating.  Denies odynophagia, mucositis, Trismus or dysgeusia.  Endorses some mild xerostomia and manages with hydration.  Endorses being able to sustain weight with his current diet.  His weight today is 57.6kg which is a 6.6% increase since 10/21/23.   His right neck is healing well with some sloughing of skin.  His wife applies silvadene on a daily basis.  Denies chills, fever, dyspnea or chest pain.  Continues to endorse some mild fatigue.  Denies headaches, or focal sensory/motor changes.  Denies abdominal pain, nausea, vomiting, diarrhea or constipation.     Treatment Rendered:   Radiation Treatments       Active   No active radiation treatments to show.     Historical   Soft palate + B neck (Started on 8/16/2023)   Most recent fraction: 200 cGy given on 10/4/2023   Total given: 7,000 cGy / 7,000 cGy  (35 of 35 fractions)   Elapsed Days: 49   Technique: IMRT                     Review of Systems:   Review of Systems   Constitutional:  Positive for fatigue. Negative for appetite change, chills, fever and unexpected weight change.   HENT:   Negative for hearing loss, lump/mass, mouth sores, nosebleeds, sore throat, tinnitus and trouble swallowing.    Respiratory:  Negative for chest tightness, cough, shortness of breath and wheezing.    Cardiovascular:  Negative for chest pain and palpitations.   Gastrointestinal:  Negative for abdominal pain, constipation, diarrhea, nausea and vomiting.   Genitourinary:  Positive for difficulty urinating. Negative for dysuria, hematuria, pelvic pain and penile discharge.         Has Clark in place.    Musculoskeletal:  Positive for arthralgias and back pain.   Neurological:  Positive for extremity weakness. Negative for dizziness, headaches and speech difficulty.        Generalized weakness.    Hematological:  Negative for adenopathy.   Psychiatric/Behavioral:  Negative for confusion and depression. The patient is not nervous/anxious.      The  "patient's current pain level was not assessed.  They report currently having a pain of 0 out of 10.  They feel their pain is under control without the use of pain medications.    Performance Status:   The Karnofsky performance scale today is 80, Normal activity with effort; some signs or symptoms of disease (ECOG equivalent 1).        OBJECTIVE  Vital Signs:  /61 (BP Location: Right arm, Patient Position: Sitting, BP Cuff Size: Adult)   Pulse 109   Temp 36.5 °C (97.7 °F) (Skin)   Resp 18   Ht 1.676 m (5' 6\")   Wt 57.6 kg (126 lb 15.8 oz)   SpO2 100%   BMI 20.50 kg/m²    Physical Exam:  Physical Exam  Constitutional:       General: He is not in acute distress.     Appearance: Normal appearance. He is normal weight. He is not ill-appearing, toxic-appearing or diaphoretic.   HENT:      Head: Normocephalic and atraumatic. No abrasion or masses.      Jaw: No swelling or pain on movement.      Salivary Glands: Right salivary gland is not diffusely enlarged. Left salivary gland is not diffusely enlarged.        Comments: Radiation dermatitis.      Nose: Nose normal. No congestion or rhinorrhea.      Mouth/Throat:      Lips: Pink.      Mouth: Mucous membranes are dry. No injury or oral lesions.      Dentition: Normal dentition. Does not have dentures. No gingival swelling, dental abscesses or gum lesions.      Tongue: No lesions. Tongue does not deviate from midline.      Palate: No mass and lesions.      Tonsils: No tonsillar exudate or tonsillar abscesses.      Comments: Edentulous.   Eyes:      General: Lids are normal. Gaze aligned appropriately.      Extraocular Movements: Extraocular movements intact.      Pupils: Pupils are equal, round, and reactive to light.   Neck:      Thyroid: No thyroid mass or thyroid tenderness.   Cardiovascular:      Rate and Rhythm: Normal rate and regular rhythm.      Pulses: Normal pulses.      Heart sounds: Normal heart sounds. No murmur heard.  Pulmonary:      Effort: " Pulmonary effort is normal. No tachypnea or respiratory distress.      Breath sounds: Normal breath sounds. No wheezing or rhonchi.   Abdominal:      General: Abdomen is flat. Bowel sounds are normal. There is no distension.      Palpations: Abdomen is soft. There is no mass.      Tenderness: There is no abdominal tenderness. There is no guarding or rebound.   Musculoskeletal:         General: No swelling, tenderness, deformity or signs of injury. Normal range of motion.      Cervical back: Full passive range of motion without pain, normal range of motion and neck supple. No rigidity or tenderness. No pain with movement. Normal range of motion.      Right lower leg: No edema.      Left lower leg: No edema.   Lymphadenopathy:      Head:      Right side of head: No submental, submandibular, tonsillar, preauricular, posterior auricular or occipital adenopathy.      Left side of head: No submental, submandibular, tonsillar, preauricular, posterior auricular or occipital adenopathy.      Cervical:      Right cervical: No superficial, deep or posterior cervical adenopathy.     Left cervical: No superficial, deep or posterior cervical adenopathy.   Skin:     General: Skin is warm and dry.      Capillary Refill: Capillary refill takes less than 2 seconds.      Coloration: Skin is not jaundiced.      Findings: No erythema, lesion or rash.   Neurological:      General: No focal deficit present.      Mental Status: He is alert and oriented to person, place, and time.      Cranial Nerves: No cranial nerve deficit.      Sensory: No sensory deficit.      Motor: No weakness.      Coordination: Coordination normal.      Gait: Gait normal.   Psychiatric:         Attention and Perception: Attention normal.         Mood and Affect: Mood normal.         Behavior: Behavior normal. Behavior is cooperative.         ASSESSMENT:  Phong Wong is a 73 y.o. male who is s/p SCC of the oropharynx (soft Palate, BOT, left palatine tonsil and b/l  cervical LN).  The patient underwent chemoradiation ending on 10/10/12.  Today we discussed the Radiation Oncology Survival Plan and the importance of frequent FU to manage the long-term effects of radiation and to surveil for recurrent disease.  Supporting documentation was given to the patient for review.  All questions/concerns were addressed to the satisfaction of the patient.     PLAN:    --FU with Mak Berry CNP in 3 months.   --CT neck and PET-CT in 3 months.   --Continue to follow with Dr. Fletcher for ENT management.  CNP to help establish next visit.   --Continue to follow with Dr. Burkitt and Eva Patten PA-C for medical oncology management.   --Continue following with Amy Lejeune, RD for nutritional management.   --Continue to apply Silvadene to the radiation dermatitis on your right neck.   --Begin seeing your dentist at [facility] once discharged from [facility].    Please reach out with any questions or concerns.     NCCN Guidelines were applicable to guide this patients treatment plan.  LIDIA Christie-LIZ

## 2023-11-09 NOTE — PROGRESS NOTES
Patient ID: Phong Wong is a 73 y.o. male.  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 Juan Jose Fletcher   MedOn: Dr. Kyunghee Burkitt, X. Katherine Feng, PA-C   RadOnc: Dr. Vianca Steen     Prior Therapy:   8/16 - 10/4/23: Recieved concurrent chemoradiation with 7 weekly Carboplatin (2mg/AUC)/Paclitaxel (45mg/m2)  and 70gy RT in 35fx     Site of Disease:  Soft palate, BOT, Left palatine tonsil  B/L Cervical LN     Oncologic Issues:   Odynophagia  Fatigue     ONCOLOGIC HISTORY  - Pt had 2 months hx of throat discomfort with lump in right neck  - 6/13/23: CT neck showed a mass 2.8 x 2.5 x 2.9 cm in the right lower cervical neck soft tissues. Two suspicious nodes were observed, one at level 3 on the right and another at level 2 on the left.  - 6/28/23: seen by Dr. Fletcher. 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 Based on the findings, Dr. Fletcher did not recommend surgery due to the location  of the primary tumor and the presence of yvette disease  - 6/30/23: PET/CT showed intense FDG avidity within the soft palate, extending to the base of the tongue and left palatine tonsil. Multiple FDG avid left cervical level II nodes were observed, along with an FDG avid mass in the region of the right cervical  level II/III, infiltrating the right SCM muscle and encasing and invading the right jugular vein. FDG avidity was also detected in the region of the left fossa Rosenmuller and left medial pterygoid muscle.  - 8/16 - 10/4/23: Recieved concurrent chemoradiation with 7 weekly Carboplatin (2mg/AUC)/Paclitaxel (45mg/m2)  and 70gy RT in 35fx    Admissions:  10/5 - 10/10/23: Admitted for extreme weakness, HECTOR, pancytopenia including anemia requiring blood transfusion. D/C'ed to acute rehab        Past Medical History:   Past Medical History:  07/27/2017:  Personal history of diseases of the blood and blood-  forming organs and certain disorders involving the immune mechanism      Comment:  History of thrombocytosis   HTN, HLD, COPD, prostate cancer in 2017 (s/p radiation)  Surgical History:    Past Surgical History:   Procedure Laterality Date    CT ABDOMEN PELVIS ANGIOGRAM W AND/OR WO IV CONTRAST  9/3/2014    CT ABDOMEN PELVIS ANGIOGRAM W AND/OR WO IV CONTRAST 9/3/2014 [facility]    IR ANGIOGRAM AORTA ABDOMEN  2014    IR ANGIOGRAM AORTA ABDOMEN 2014 [facility]   Aortoiliofemoral vascular bypass in   Social History:    Social History     Socioeconomic History    Marital status:      Spouse name: Not on file    Number of children: Not on file    Years of education: Not on file    Highest education level: Not on file   Occupational History    Not on file   Tobacco Use    Smoking status: Former     Packs/day: 1.00     Years: 40.00     Additional pack years: 0.00     Total pack years: 40.00     Types: Cigarettes     Quit date:      Years since quittin.8    Smokeless tobacco: Never   Substance and Sexual Activity    Alcohol use: Yes     Alcohol/week: 12.0 standard drinks of alcohol     Types: 12 Cans of beer per week    Drug use: Never    Sexual activity: Not on file   Other Topics Concern    Not on file   Social History Narrative    Not on file     Social Determinants of Health     Financial Resource Strain: Low Risk  (10/20/2023)    Overall Financial Resource Strain (CARDIA)     Difficulty of Paying Living Expenses: Not very hard   Food Insecurity: No Food Insecurity (10/5/2023)    Hunger Vital Sign     Worried About Running Out of Food in the Last Year: Never true     Ran Out of Food in the Last Year: Never true   Transportation Needs: No Transportation Needs (10/20/2023)    PRAPARE - Transportation     Lack of Transportation (Medical): No     Lack of Transportation (Non-Medical): No   Physical Activity: Inactive  (10/5/2023)    Exercise Vital Sign     Days of Exercise per Week: 0 days     Minutes of Exercise per Session: 0 min   Stress: Stress Concern Present (10/5/2023)    [employer] - Occupational Stress Questionnaire     Feeling of Stress : To some extent   Social Connections: Moderately Integrated (10/5/2023)    Social Connection and Isolation Panel [NHANES]     Frequency of Communication with Friends and Family: More than three times a week     Frequency of Social Gatherings with Friends and Family: More than three times a week     Attends [employer]ism Services: Never     Active Member of Clubs or Organizations: Yes     Attends Club or Organization Meetings: Never     Marital Status:    Intimate Partner Violence: Not At Risk (10/5/2023)    Humiliation, Afraid, Rape, and Kick questionnaire     Fear of Current or Ex-Partner: No     Emotionally Abused: No     Physically Abused: No     Sexually Abused: No   Housing Stability: Low Risk  (10/20/2023)    Housing Stability Vital Sign     Unable to Pay for Housing in the Last Year: No     Number of Places Lived in the Last Year: 1     Unstable Housing in the Last Year: No      Family History:    Family History   Problem Relation Name Age of Onset    Aortic aneurysm Father          abdominal     Family Oncology History:    Cancer-related family history is not on file.      Subjective   Chief Complaint: Squamous Cell Carcinoma of oropharynx    HPI  Interval History  Phong Wong is a 73 y.o. male with recent diagnosis of locally advanced oral cavity cancer, completed concurrent chemoradiation with 7 weekly Carboplatin+Paclitaxel on 10/4/23.  He was admitted 10/5 - 10/10/23 for extreme weakness, HECTOR, pancytopenia including anemia requiring blood transfusion. D/C'ed to acute rehab. He was admitted  again 10/18 - 11/2/23 for lethargy, weakness, failure to thrive, anemia.     Patient presents for 1 month post treatment follow up and post hospital  follow up, reports the following:    He is feeling much improved since his hospital discharge last week. He has been working with PT at his rehab and is regaining strength. He's feeling much more alert.   He's odynophagia is resolved. He's tolerating soft PO diet. Due to full dental extractions he's not able to chew. He's hoping to have dentures fitted soon.   The skin of his neck is also healing well, only a small area of scabbing remains. SNF is using Silvadene.     ROS  Review of Systems   Constitutional:  Negative for chills and fever. Unexpected weight change: weight loss.  HENT:   Negative for lump/mass, mouth sores, nosebleeds, sore throat and trouble swallowing.    Respiratory:  Negative for cough and shortness of breath.    Cardiovascular:  Negative for chest pain and leg swelling.   Gastrointestinal:  Negative for abdominal pain, constipation, diarrhea, nausea and vomiting.   Musculoskeletal:  Negative for arthralgias.   Skin:  Negative for rash. Wound: skin of left neck with burning pain.       Scabbing over left neck from RT   Neurological:  Negative for dizziness, headaches, light-headedness and numbness.       Allergies  Allergies   Allergen Reactions    Codeine Nausea Only        Medications  Current Outpatient Medications   Medication Instructions    acetaminophen (TYLENOL) 650 mg, oral, Every 6 hours PRN    aspirin 81 mg EC tablet 1 tablet, oral, Daily    budesonide (Pulmicort) 1 mg/2 mL nebulizer solution Take 2 mL (1 mg) by nebulization 2 times a day. Rinse mouth after use    doxazosin (CARDURA) 4 mg, oral, Daily    fluticasone-umeclidin-vilanter (Trelegy Ellipta) 100-62.5-25 mcg blister with device INHALE 1 PUFF EVERY DAY    ipratropium-albuteroL (Duo-Neb) 0.5-2.5 mg/3 mL nebulizer solution Take 3 mL by nebulization 3 times a day as needed for wheezing or shortness of breath.    lido-diphen-Maalox 1:1:1 Magic Mouthwash 10 mL, Swish & Spit, Every 6 hours PRN    loperamide (IMODIUM) 2 mg, oral, 2  times daily PRN    losartan (COZAAR) 50 mg, oral, Daily    magnesium oxide (MAG-OX) 400 mg, g-tube, 2 times daily    ondansetron (ZOFRAN) 8 mg, oral, Every 8 hours PRN    pantoprazole (PROTONIX) 40 mg, oral, Daily, Do not crush, chew, or split.    phenoL (Chloraseptic) 1.4 % aerosol,spray 1 spray, Mouth/Throat, Every 2 hour PRN    silver sulfADIAZINE (Silvadene) 1 % cream Topical, Daily    simethicone (MYLICON) 40 mg, oral, 4 times daily PRN        Objective   VS:  BP (!) 105/47 (BP Location: Right arm, Patient Position: Sitting)   Pulse (!) 118   Temp 36.6 °C (97.9 °F) (Temporal)   Resp 18   Wt 57.3 kg (126 lb 5.2 oz)   SpO2 100%   BMI 18.13 kg/m²   Weight   Wt Readings from Last 7 Encounters:   11/09/23 57.6 kg (126 lb 15.8 oz)   11/09/23 57.3 kg (126 lb 5.2 oz)   10/21/23 54 kg (119 lb 0.8 oz)   10/17/23 54.1 kg (119 lb 3.2 oz)   10/10/23 54 kg (119 lb 0.8 oz)   10/04/23 59.4 kg (130 lb 15.3 oz)   10/02/23 61.5 kg (135 lb 9.3 oz)         Physical Exam  Constitutional:       Appearance: Normal appearance. He is underweight.   HENT:      Head: Normocephalic and atraumatic.      Right Ear: External ear normal. No tenderness.      Left Ear: External ear normal. No tenderness.      Nose: Nose normal.      Mouth/Throat:      Mouth: No injury or oral lesions.      Tongue: No lesions.      Pharynx: Oropharynx is clear. No posterior oropharyngeal erythema.      Comments: Thick mucous present  Eyes:      Extraocular Movements: Extraocular movements intact.      Conjunctiva/sclera: Conjunctivae normal.      Pupils: Pupils are equal, round, and reactive to light.   Neck:      Thyroid: No thyroid mass.   Cardiovascular:      Rate and Rhythm: Regular rhythm. Tachycardia present.   Pulmonary:      Effort: Pulmonary effort is normal. No respiratory distress.      Breath sounds: Normal breath sounds.   Abdominal:      General: Bowel sounds are normal. There is no distension or abdominal bruit.      Palpations: Abdomen is  soft. There is no mass.      Tenderness: There is no abdominal tenderness.   Genitourinary:     Comments: Clark in place, clear pale yellow urine  Musculoskeletal:         General: Normal range of motion.      Cervical back: Normal range of motion and neck supple.      Right lower leg: No edema.      Left lower leg: No edema.   Lymphadenopathy:      Cervical: No cervical adenopathy.      Upper Body:      Right upper body: No axillary adenopathy.      Left upper body: No axillary adenopathy.   Skin:     General: Skin is warm and dry.      Findings: Lesion: radiation dermatitis in bilateral neck.      Comments: Resolution of desquamation of skin of bilateral neck. +Scab in left lateral neck       Neurological:      General: No focal deficit present.      Mental Status: He is alert and oriented to person, place, and time.      Gait: Gait is intact.   Psychiatric:         Mood and Affect: Mood and affect normal.           Diagnostic Results   No labs obtained at time of this clinic visit as a rapid response was called while patient was at outpatient lab.   Results from last 7 days   Lab Units 11/09/23  1107   WBC AUTO x10*3/uL 3.7*   HEMOGLOBIN g/dL 8.3*   HEMATOCRIT % 25.4*   PLATELETS AUTO x10*3/uL 308   NEUTROS ABS x10*3/uL 2.74   LYMPHS ABS AUTO x10*3/uL 0.37*   MONOS ABS AUTO x10*3/uL 0.42   EOS ABS AUTO x10*3/uL 0.11   NEUTROS PCT AUTO % 74.3   LYMPHS PCT AUTO % 10.0   MONOS PCT AUTO % 11.4   EOS PCT AUTO % 3.0      Results from last 7 days   Lab Units 11/09/23  1107   GLUCOSE mg/dL 127*   SODIUM mmol/L 135*   POTASSIUM mmol/L 3.8   CHLORIDE mmol/L 98   CO2 mmol/L 27   BUN mg/dL 16   CREATININE mg/dL 1.04   EGFR mL/min/1.73m*2 77   CALCIUM mg/dL 8.8   MAGNESIUM mg/dL 2.07   ALBUMIN g/dL 3.3*   PROTEIN TOTAL g/dL 6.1*   BILIRUBIN TOTAL mg/dL 0.3   ALK PHOS U/L 77   ALT U/L 18   AST U/L 16                      Assessment/Plan    ASSESSMENT  Phong Wong is a 73 y.o. male with recent diagnosis of locally advanced oral  cavity cancer. Completed concurrent chemoradiation with 7 weekly Carboplatin (2mg/AUC)/Paclitaxel (45mg/m2) on 10/4/2. Admitted 10/5 - 10/10 for extreme weakness, HECTOR, pancytopenia including anemia requiring blood transfusion. D/C'ed to acute rehab. Admitted  again 10/18 - 11/2/23 for lethargy, weakness, failure to thrive, anemia.        # Locally advanced oral cavity cancer, T3N2M0  - 6/30/23: PET/CT showed intense FDG avidity within the soft palate, extending to the base of the tongue and left palatine tonsil. Multiple uptake in left cervical level II nodes, right cervical level  II/III, infiltrating the right SCM muscle and encasing and invading the right jugular vein. FDG avidity was also detected in the region of the left fossa Rosenmuller and left medial pterygoid muscle.  - 7/13/23: pt is doing well, no pain, discussed with patient about carboplatin+ paclitaxel as his chemotherapy along with radiation due to his GFR 30s.  Chemo related side effects were reviewed with patient, consent obtained.   - Patient required dental extractions and as a result start date was delayed from 7/26 to 8/15/23  - 8/30: tolerating chemo well, does have some fatigue but no n/v. No peripheral neuropathy   - 9/6: Continue to tolerate chemo well, no n/v, is feeling more tired. Eating well, tolerating soft foods and supplementing with Boost VHC 2x per day. Denies peripheral neuropathy  - 9/13: continue to tolerate chemo well w/o n/v. Energy is lower but stable. Eating soft foods and Boost VHC x 2 per day  - 9/20: Tolerating chemo well, minimal odynophagia, does have some weight loss.  - 10/4: Completed last dose of Carbo/Taxol last week. Recall patient did not have a [facility] visit as both providers are out of office. He had las RT today and noted to have significantly increased fatigue, weakness, weight loss in the last week resulting from decreased PO intake.   - 11/9/23: Feeling much improved since d/c from hospital on 11/2.  Regaining strength and working with PT/OT at acute rehab. Odynophagia resolved, eating soft foods, has weight gain. Serum protein improved. Electrolytes wnl, mild hyponatremia, no c/f refeeding syndrome.      # Hypotension/Dehydration  - 10/18: Labile BP in clinic, SBP ranging from 60s to 110s. Poor skin turgor.  Low PO in the last week at [facility], however has been given Losartan 100mg QD and Spironolactone 25mg QD, likely worsening hypotension  - 11/9: Mildly hypotensive today but asymptomatic. On med review of patient's med list from [facility], patient is only on Losartan 50mg QAM. Reports his BID VS at [facility] show SBP around 120s. Will continue to monitor BP. If BP starts to drop again will adjust Losartan dose.     # Hypoxia  - 10/19/23: O2 fluctuating from 70s to 90s, has poor waveform on pulse ox.  Patient with shallow breathing, and falling asleep every 5 seconds. This was due to opioid overdose  - 11/9/23: O2 100% on RA. He is on Trelegy QD for COPD    # Anemia  - Hgb has been dropping since starting chemo, however today his hgb is <7 and will require a blood transfusion. This is likely due to chemotherapy as there are no s/s of bleeds.   - Received 2U PRBC during 10/18 - 11/2 admission. Anemia likely due to chemotherapy    # Odynophagia: resolved  - 9/6: Starting to some pain with swallowing this week. Now taking Oxycodone 5mg before meals and has good control  - 9/13: Odynophagia well controlled with Oxycodone 5mg prior to meals. Using glutamine rinses. Does have some weight loss.    - 9/20: Mild odynophagia well controlled with oxycodone 5mg as needed.   - 10/4: Pt became to sedated with Oxycodone 10mg last week so this was held and his throat pain is currently controlled with Tylenol   - 10/18: Reports odynophagia largely resolvead     # Neutropenia: improving  - 9/6: . Proceed to C4 today, will monitor prior to next cycle. May need to hold C4  - Return precaution for neutropenic fevers provided for patient  and his significant other, they voiced understanding   - 9/13 ANC 1290.  - 9/20 . Ok for C6 Carbo/Taxol. Return precaution provided for neutropenic fevers again.   - 10/4 . Afebrile. Return precaution for neutropenic fever provided again.      # Hypokalemia  - 9/20: K 3.3.  Likely due to chemo  - Replete with KCl 20mg ER PO x 1 in infusion  - 9/27: K 3.8  - 10/4: K 2.9, Replete with KCL 40mg IV in infusion  - 11/9: K 3.8    # Hypomagnesemia  - 9/20: Mg 1.48. Likely due to chemo  - Replete with Mag 2g in infusion.   - 10/4: Mg 1.94    # Weight Loss  - 10/18: Continue to have very poor PO intake. Pt with temporal wasting and sunken cheeks. Dietician consulted on nutrition and weight loss.   - 11/9: Much improved PO intake as odynophagia is resolved. Gaining weight.     # Weakness: resolving  - Multifactorial, due to chemo, anemia, dehydration, decrease PO intake. See Above.    # Left neck skin lesion: resolving  - 10/4/23: Pt with burning pain if left skin of neck. Did not have ointment with them today in infusion.Obtained aquaphor with meplex dressing and instructed patient to apply to neck in Infusion today  - 11/9/23: Skin of neck largely healed. Continue Silvadene    # Opioid overdose: resolved  - 10/18: Patient's SNF has been erroneously given much too high of Morphine PO dose for PRN pain management. He's been dosed with 100mg PO Morphine at a time with most recent dose at 1AM this morning.   - With his poor baseline kidney function, likely causing low metabolism of morphine, causing his extreme drowsiness.    - Unfortunately not able to administer readily in Narcan in outpatient setting. Code Blue was called    PLAN  -- RTC 3 weeks with [facility], on 11/29, labs cbc, cmp, mag  -- 3 month restaging PET/CT and CT Neck on 1/10/24    Acute Rehab  [facility]   P: [phone]

## 2023-11-13 ENCOUNTER — NURSING HOME VISIT (OUTPATIENT)
Dept: POST ACUTE CARE | Facility: EXTERNAL LOCATION | Age: 73
End: 2023-11-13
Payer: MEDICARE

## 2023-11-13 DIAGNOSIS — T30.0 RADIATION BURN: ICD-10-CM

## 2023-11-13 DIAGNOSIS — K21.9 GASTROESOPHAGEAL REFLUX DISEASE, UNSPECIFIED WHETHER ESOPHAGITIS PRESENT: ICD-10-CM

## 2023-11-13 DIAGNOSIS — C10.9 OROPHARYNGEAL CANCER (MULTI): ICD-10-CM

## 2023-11-13 DIAGNOSIS — T45.1X5A CHEMOTHERAPY-INDUCED NEUTROPENIA (CMS-HCC): ICD-10-CM

## 2023-11-13 DIAGNOSIS — R62.7 FTT (FAILURE TO THRIVE) IN ADULT: Primary | ICD-10-CM

## 2023-11-13 DIAGNOSIS — D64.9 ANEMIA, UNSPECIFIED TYPE: ICD-10-CM

## 2023-11-13 DIAGNOSIS — D64.81 ANEMIA ASSOCIATED WITH CHEMOTHERAPY: ICD-10-CM

## 2023-11-13 DIAGNOSIS — N13.9 OBSTRUCTIVE UROPATHY: ICD-10-CM

## 2023-11-13 DIAGNOSIS — R53.1 WEAKNESS: ICD-10-CM

## 2023-11-13 DIAGNOSIS — N18.31 CHRONIC RENAL IMPAIRMENT, STAGE 3A (MULTI): ICD-10-CM

## 2023-11-13 DIAGNOSIS — R14.0 GASSINESS: ICD-10-CM

## 2023-11-13 DIAGNOSIS — I10 BENIGN ESSENTIAL HYPERTENSION: ICD-10-CM

## 2023-11-13 DIAGNOSIS — J44.9 CHRONIC OBSTRUCTIVE PULMONARY DISEASE, UNSPECIFIED COPD TYPE (MULTI): ICD-10-CM

## 2023-11-13 DIAGNOSIS — I25.10 ATHEROSCLEROSIS OF NATIVE CORONARY ARTERY OF NATIVE HEART WITHOUT ANGINA PECTORIS: ICD-10-CM

## 2023-11-13 DIAGNOSIS — E83.42 HYPOMAGNESEMIA: ICD-10-CM

## 2023-11-13 DIAGNOSIS — T45.1X5A ANEMIA ASSOCIATED WITH CHEMOTHERAPY: ICD-10-CM

## 2023-11-13 DIAGNOSIS — R13.10 ODYNOPHAGIA: ICD-10-CM

## 2023-11-13 DIAGNOSIS — D70.1 CHEMOTHERAPY-INDUCED NEUTROPENIA (CMS-HCC): ICD-10-CM

## 2023-11-13 DIAGNOSIS — I73.9 PAD (PERIPHERAL ARTERY DISEASE) (CMS-HCC): ICD-10-CM

## 2023-11-13 PROCEDURE — 99316 NF DSCHRG MGMT 30 MIN+: CPT | Performed by: FAMILY MEDICINE
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
## 2023-11-14 ENCOUNTER — DOCUMENTATION (OUTPATIENT)
Dept: PRIMARY CARE | Facility: CLINIC | Age: 73
End: 2023-11-14
Payer: MEDICARE

## 2023-11-14 ENCOUNTER — PATIENT OUTREACH (OUTPATIENT)
Dept: PRIMARY CARE | Facility: CLINIC | Age: 73
End: 2023-11-14
Payer: MEDICARE

## 2023-11-14 NOTE — PROGRESS NOTES
Discharge Facility:[facility]  Discharge Diagnosis:Hypovolemic shock  Admission Date:10/20/23  Discharge Date: 11/2/23    PCP Appointment Date:11/27/23  Specialist Appointment Date: N/A  Hospital Encounter and Summary: Linked   See discharge assessment below for further details  Engagement  Call Start Time: 1056 (11/14/2023 10:56 AM)    Medications  Medications reviewed with patient/caregiver?: Yes (11/14/2023 10:56 AM)  Is the patient having any side effects they believe may be caused by any medication additions or changes?: No (11/14/2023 10:56 AM)  Does the patient have all medications ordered at discharge?: Yes (11/14/2023 10:56 AM)  Prescription Comments: see med list (11/14/2023 10:56 AM)  Is the patient taking all medications as directed (includes completed medication regime)?: Yes (11/14/2023 10:56 AM)  Medication Comments: see med list (11/14/2023 10:56 AM)    Appointments  Does the patient have a primary care provider?: Yes (11/14/2023 10:56 AM)  Care Management Interventions: Verified appointment date/time/provider ([facility]UV 11/27/23) (11/14/2023 10:56 AM)  Has the patient kept scheduled appointments due by today?: Yes (11/14/2023 10:56 AM)  Care Management Interventions: Advised patient to keep appointment; Educated on importance of keeping appointment (11/14/2023 10:56 AM)    Patient Teaching  Does the patient have access to their discharge instructions?: Yes (11/14/2023 10:56 AM)  Care Management Interventions: Reviewed instructions with patient (11/14/2023 10:56 AM)  What is the patient's perception of their health status since discharge?: Improving (11/14/2023 10:56 AM)  Is the patient/caregiver able to teach back the hierarchy of who to call/visit for symptoms/problems? PCP, Specialist, Home Health nurse, Urgent Care, ED, 911: Yes (11/14/2023 10:56 AM)
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
## 2023-11-15 ENCOUNTER — TELEPHONE (OUTPATIENT)
Dept: PRIMARY CARE | Facility: CLINIC | Age: 73
End: 2023-11-15
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
## 2023-11-20 ASSESSMENT — ENCOUNTER SYMPTOMS
CONSTIPATION: 0
TROUBLE SWALLOWING: 0
HEADACHES: 0
CHILLS: 0
ARTHRALGIAS: 0
DIZZINESS: 0
FEVER: 0
DIARRHEA: 0
LEG SWELLING: 0
ABDOMINAL PAIN: 0
LIGHT-HEADEDNESS: 0
SORE THROAT: 0
NAUSEA: 0
VOMITING: 0
COUGH: 0
NUMBNESS: 0
SHORTNESS OF BREATH: 0

## 2023-11-20 NOTE — PROGRESS NOTES
Patient ID: Phong Wong is a 73 y.o. male.  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 Juan Jose Fletcher   MedOn: Dr. Kyunghee Burkitt, X. Katherine Feng, PA-C   RadOnc: Dr. Vianca Steen     Prior Therapy:   8/16 - 10/4/23: Recieved concurrent chemoradiation with 7 weekly Carboplatin (2mg/AUC)/Paclitaxel (45mg/m2)  and 70gy RT in 35fx     Site of Disease:  Soft palate, BOT, Left palatine tonsil  B/L Cervical LN     Oncologic Issues:   Odynophagia  Fatigue     ONCOLOGIC HISTORY  - Pt had 2 months hx of throat discomfort with lump in right neck  - 6/13/23: CT neck showed a mass 2.8 x 2.5 x 2.9 cm in the right lower cervical neck soft tissues. Two suspicious nodes were observed, one at level 3 on the right and another at level 2 on the left.  - 6/28/23: seen by Dr. Fletcher. 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 Based on the findings, Dr. Fletcher did not recommend surgery due to the location  of the primary tumor and the presence of yvette disease  - 6/30/23: PET/CT showed intense FDG avidity within the soft palate, extending to the base of the tongue and left palatine tonsil. Multiple FDG avid left cervical level II nodes were observed, along with an FDG avid mass in the region of the right cervical  level II/III, infiltrating the right SCM muscle and encasing and invading the right jugular vein. FDG avidity was also detected in the region of the left fossa Rosenmuller and left medial pterygoid muscle.  - 8/16 - 10/4/23: Recieved concurrent chemoradiation with 7 weekly Carboplatin (2mg/AUC)/Paclitaxel (45mg/m2)  and 70gy RT in 35fx    Admissions:  10/5 - 10/10/23: Admitted for extreme weakness, HECTOR, pancytopenia including anemia requiring blood transfusion. D/C'ed to acute rehab        Past Medical History:   Past Medical History:  07/27/2017:  Personal history of diseases of the blood and blood-  forming organs and certain disorders involving the immune mechanism      Comment:  History of thrombocytosis   HTN, HLD, COPD, prostate cancer in 2017 (s/p radiation)  Surgical History:    Past Surgical History:   Procedure Laterality Date    CT ABDOMEN PELVIS ANGIOGRAM W AND/OR WO IV CONTRAST  9/3/2014    CT ABDOMEN PELVIS ANGIOGRAM W AND/OR WO IV CONTRAST 9/3/2014 [facility]    IR ANGIOGRAM AORTA ABDOMEN  2014    IR ANGIOGRAM AORTA ABDOMEN 2014 [facility]   Aortoiliofemoral vascular bypass in   Social History:    Social History     Socioeconomic History    Marital status:      Spouse name: None    Number of children: 1    Years of education: None    Highest education level: None   Occupational History    None   Tobacco Use    Smoking status: Former     Packs/day: 1.00     Years: 40.00     Additional pack years: 0.00     Total pack years: 40.00     Types: Cigarettes     Quit date:      Years since quittin.9     Passive exposure: Past    Smokeless tobacco: Never   Substance and Sexual Activity    Alcohol use: Yes     Alcohol/week: 12.0 standard drinks of alcohol     Types: 12 Cans of beer per week    Drug use: Never    Sexual activity: None   Other Topics Concern    None   Social History Narrative    None     Social Determinants of Health     Financial Resource Strain: Low Risk  (10/20/2023)    Overall Financial Resource Strain (CARDIA)     Difficulty of Paying Living Expenses: Not very hard   Food Insecurity: No Food Insecurity (10/5/2023)    Hunger Vital Sign     Worried About Running Out of Food in the Last Year: Never true     Ran Out of Food in the Last Year: Never true   Transportation Needs: No Transportation Needs (10/20/2023)    PRAPARE - Transportation     Lack of Transportation (Medical): No     Lack of Transportation (Non-Medical): No   Physical Activity: Inactive (10/5/2023)    Exercise Vital Sign      Days of Exercise per Week: 0 days     Minutes of Exercise per Session: 0 min   Stress: Stress Concern Present (10/5/2023)    [place] [employer] - Occupational Stress Questionnaire     Feeling of Stress : To some extent   Social Connections: Moderately Integrated (10/5/2023)    Social Connection and Isolation Panel [NHANES]     Frequency of Communication with Friends and Family: More than three times a week     Frequency of Social Gatherings with Friends and Family: More than three times a week     Attends Jainism Services: Never     Active Member of Clubs or Organizations: Yes     Attends Club or Organization Meetings: Never     Marital Status:    Intimate Partner Violence: Not At Risk (10/5/2023)    Humiliation, Afraid, Rape, and Kick questionnaire     Fear of Current or Ex-Partner: No     Emotionally Abused: No     Physically Abused: No     Sexually Abused: No   Housing Stability: Low Risk  (10/20/2023)    Housing Stability Vital Sign     Unable to Pay for Housing in the Last Year: No     Number of Places Lived in the Last Year: 1     Unstable Housing in the Last Year: No      Family History:    Family History   Problem Relation Name Age of Onset    Aortic aneurysm Father          abdominal     Family Oncology History:    Cancer-related family history is not on file.      Subjective   Chief Complaint: Squamous Cell Carcinoma of oropharynx    HPI  Interval History  Phong Wong is a 73 y.o. male with recent diagnosis of locally advanced oral cavity cancer, completed concurrent chemoradiation with 7 weekly Carboplatin+Paclitaxel on 10/4/23.  He was admitted 10/5 - 10/10/23 for extreme weakness, HECTOR, pancytopenia including anemia requiring blood transfusion. D/C'ed to acute rehab. He was admitted again 10/18 - 11/2/23 for lethargy, weakness, failure to thrive, anemia.     Patient presents for 2 month post treatment follow up, reports the following:    He's feeling very well, he has been  d/c'ed from acute rehab 2 weeks ago.   He is ambulating well at home, though still has some trouble with steps as his legs are still weak.   He's eating and drinking without issue. Due to edentulous status he's only eating soft foods. Appetite is very good.   Denies dry mouth, taste is normal.   He has mild swelling in the neck but no pain. Skin of neck is healing nicely from RT.   He still has a Clark that was placed during his admission due to urinary retention. He has   NPV with Urology in a few weeks. He reports no urethral pain.   He's following with PCP, saw Dr. Weiss on 11/27/23. Will have dentist appt 12/4/23. He hopes to have dentures fitted.     ROS  Review of Systems   Constitutional:  Negative for chills and fever. Unexpected weight change: weight loss.  HENT:   Negative for lump/mass, mouth sores, nosebleeds, sore throat and trouble swallowing.    Respiratory:  Negative for cough and shortness of breath.    Cardiovascular:  Negative for chest pain and leg swelling.   Gastrointestinal:  Negative for abdominal pain, constipation, diarrhea, nausea and vomiting.   Musculoskeletal:  Negative for arthralgias and neck pain.   Skin:  Negative for rash. Wound: skin of left neck with burning pain.       Scabbing over left neck from RT   Neurological:  Negative for dizziness, headaches, light-headedness and numbness.       Allergies  Allergies   Allergen Reactions    Codeine Nausea Only        Medications  Current Outpatient Medications   Medication Instructions    aspirin 81 mg EC tablet 1 tablet, oral, Daily    budesonide (Pulmicort) 1 mg/2 mL nebulizer solution Take 2 mL (1 mg) by nebulization 2 times a day. Rinse mouth after use    doxazosin (CARDURA) 4 mg, oral, Daily    fluticasone-umeclidin-vilanter (Trelegy Ellipta) 100-62.5-25 mcg blister with device INHALE 1 PUFF EVERY DAY    ipratropium-albuteroL (Duo-Neb) 0.5-2.5 mg/3 mL nebulizer solution Take 3 mL by nebulization 3 times a day as needed for wheezing  "or shortness of breath.    loperamide (IMODIUM) 2 mg, oral, 2 times daily PRN    losartan (COZAAR) 50 mg, oral, Daily    pantoprazole (PROTONIX) 40 mg, oral, Daily, Do not crush, chew, or split.    silver sulfADIAZINE (Silvadene) 1 % cream Topical, Daily    simethicone (MYLICON) 40 mg, oral, 4 times daily PRN        Objective   VS:  /55   Pulse (!) 112   Temp 36.3 °C (97.3 °F) (Core)   Resp 20   Ht (S) 1.679 m (5' 6.1\")   Wt 61.2 kg (134 lb 14.7 oz)   SpO2 95%   BMI 21.71 kg/m²   Weight   Wt Readings from Last 7 Encounters:   11/29/23 61.2 kg (134 lb 14.7 oz)   11/09/23 57.6 kg (126 lb 15.8 oz)   11/09/23 57.3 kg (126 lb 5.2 oz)   10/21/23 54 kg (119 lb 0.8 oz)   10/17/23 54.1 kg (119 lb 3.2 oz)   10/10/23 54 kg (119 lb 0.8 oz)   10/04/23 59.4 kg (130 lb 15.3 oz)         Physical Exam  Constitutional:       Appearance: Normal appearance. He is underweight.   HENT:      Head: Normocephalic and atraumatic.      Right Ear: External ear normal. No tenderness.      Left Ear: External ear normal. No tenderness.      Nose: Nose normal.      Mouth/Throat:      Mouth: No injury or oral lesions.      Tongue: No lesions.      Pharynx: Oropharynx is clear. No posterior oropharyngeal erythema.      Comments: Edentulous, no oral mucositis  Eyes:      Extraocular Movements: Extraocular movements intact.      Conjunctiva/sclera: Conjunctivae normal.      Pupils: Pupils are equal, round, and reactive to light.   Neck:      Thyroid: No thyroid mass.      Comments: Mild swelling in submental and neck region. No pain  Cardiovascular:      Rate and Rhythm: Regular rhythm. Tachycardia present.   Pulmonary:      Effort: Pulmonary effort is normal. No respiratory distress.      Breath sounds: Normal breath sounds.   Abdominal:      General: Bowel sounds are normal. There is no distension or abdominal bruit.      Palpations: Abdomen is soft. There is no mass.      Tenderness: There is no abdominal tenderness.   Genitourinary:   "   Comments: Clark in place, clear pale yellow urine  Musculoskeletal:         General: Normal range of motion.      Cervical back: Normal range of motion and neck supple.      Right lower leg: No edema.      Left lower leg: No edema.   Lymphadenopathy:      Cervical: No cervical adenopathy.      Upper Body:      Right upper body: No axillary adenopathy.      Left upper body: No axillary adenopathy.   Skin:     General: Skin is warm and dry.      Findings: Lesion: radiation dermatitis in bilateral neck.      Comments: Resolution of desquamation of skin of bilateral neck. +Scab in left lateral neck       Neurological:      General: No focal deficit present.      Mental Status: He is alert and oriented to person, place, and time.      Gait: Gait is intact.   Psychiatric:         Mood and Affect: Mood and affect normal.         Diagnostic Results   The below labs were reviewed.  Results from last 7 days   Lab Units 11/29/23  1029   WBC AUTO x10*3/uL 3.8*   HEMOGLOBIN g/dL 8.8*   HEMATOCRIT % 26.9*   PLATELETS AUTO x10*3/uL 206   NEUTROS ABS x10*3/uL 2.86   LYMPHS ABS AUTO x10*3/uL 0.41*   MONOS ABS AUTO x10*3/uL 0.38   EOS ABS AUTO x10*3/uL 0.11   NEUTROS PCT AUTO % 75.6   LYMPHS PCT AUTO % 10.8   MONOS PCT AUTO % 10.1   EOS PCT AUTO % 2.9      Results from last 7 days   Lab Units 11/29/23  1029   GLUCOSE mg/dL 93   SODIUM mmol/L 139   POTASSIUM mmol/L 3.9   CHLORIDE mmol/L 102   CO2 mmol/L 28   BUN mg/dL 13   CREATININE mg/dL 1.19   EGFR mL/min/1.73m*2 65   CALCIUM mg/dL 9.3   MAGNESIUM mg/dL 1.59*   ALBUMIN g/dL 3.7   PROTEIN TOTAL g/dL 6.3*   BILIRUBIN TOTAL mg/dL 0.3   ALK PHOS U/L 59   ALT U/L 8*   AST U/L 9                      Assessment/Plan    ASSESSMENT  Phong Wong is a 73 y.o. male with recent diagnosis of locally advanced oral cavity cancer. Completed concurrent chemoradiation with 7 weekly Carboplatin (2mg/AUC)/Paclitaxel (45mg/m2) on 10/4/2. Admitted 10/5 - 10/10 for extreme weakness, HETCOR, pancytopenia  including anemia requiring blood transfusion. D/C'ed to acute rehab. Admitted  again 10/18 - 11/2/23 for lethargy, weakness, failure to thrive, anemia.     # Locally advanced oral cavity cancer, T3N2M0  - 6/30/23: PET/CT showed intense FDG avidity within the soft palate, extending to the base of the tongue and left palatine tonsil. Multiple uptake in left cervical level II nodes, right cervical level  II/III, infiltrating the right SCM muscle and encasing and invading the right jugular vein. FDG avidity was also detected in the region of the left fossa Rosenmuller and left medial pterygoid muscle.  - 7/13/23: pt is doing well, no pain, discussed with patient about carboplatin+ paclitaxel as his chemotherapy along with radiation due to his GFR 30s.  Chemo related side effects were reviewed with patient, consent obtained.   - Patient required dental extractions and as a result start date was delayed from 7/26 to 8/15/23  - 8/30: tolerating chemo well, does have some fatigue but no n/v. No peripheral neuropathy   - 9/6: Continue to tolerate chemo well, no n/v, is feeling more tired. Eating well, tolerating soft foods and supplementing with Boost VHC 2x per day. Denies peripheral neuropathy  - 9/13: continue to tolerate chemo well w/o n/v. Energy is lower but stable. Eating soft foods and Boost VHC x 2 per day  - 9/20: Tolerating chemo well, minimal odynophagia, does have some weight loss.  - 10/4: Completed last dose of Carbo/Taxol last week. [facility] of [facility]. He had las RT today and noted to have significantly increased fatigue, weakness, weight loss in the last week resulting from decreased PO intake.   - 11/9/23: Feeling much improved since d/c from hospital on 11/2. Regaining strength and working with PT/OT at acute rehab. Odynophagia resolved, eating soft foods, has weight gain. Serum protein improved. Electrolytes wnl, mild hyponatremia, no c/f  refeeding syndrome.    - 11/29/23: continue to feel very well, d/c'ed from rehab. Acute SEs of chemoRT are resolved. He's eating/drinking well with weight gain. Not using PEG currently. Has lymphedema from RT in the neck/submental region. Can consider lymphedema therapy after 3 month restaging PET showing XENA.     # Hypotension/Dehydration  - 10/18: Labile BP in clinic, SBP ranging from 60s to 110s. Poor skin turgor.  Low PO in the last week at [facility], however has been given Losartan 100mg QD and Spironolactone 25mg QD, likely worsening hypotension  - 11/9: Mildly hypotensive today but asymptomatic. On med review of patient's med list from [facility], patient is only on Losartan 50mg QAM. Reports his BID VS at [facility] show SBP around 120s. Will continue to monitor BP. If BP starts to drop again will adjust Losartan dose.   - 11/29/23: BP wnl. Tachycardic due to effort. Returned to normal after rest.     # Hypoxia  - 10/19/23: O2 fluctuating from 70s to 90s, has poor waveform on pulse ox.  Patient with shallow breathing, and falling asleep every 5 seconds. This was due to opioid overdose  - 11/9/23: O2 100% on RA. He is on Trelegy QD for COPD    # Anemia: improving  - Hgb has been dropping since starting chemo, however today his hgb is <7 and will require a blood transfusion. This is likely due to chemotherapy as there are no s/s of bleeds.   - Received 2U PRBC during 10/18 - 11/2 admission. Anemia likely due to chemotherapy  - 11/29/23: Hgb 8.8. Improved from 3 weeks ago. Expect Hbg will continue to trend up as patient recovers. Can consider iron studies.     # Neutropenia: resolved  - 9/6: . Proceed to C4 today, will monitor prior to next cycle. May need to hold C4  - Return precaution for neutropenic fevers provided for patient and his significant other, they voiced understanding   - 9/13 ANC 1290.  - 9/20 . Ok for C6 Carbo/Taxol. Return precaution provided for neutropenic fevers again.   - 10/4 . Afebrile.  Return precaution for neutropenic fever provided again.   - 11/29 ANC 2860.      # Hypokalemia: resolved  - 9/20: K 3.3.  Likely due to chemo  - Replete with KCl 20mg ER PO x 1 in infusion  - 9/27: K 3.8  - 10/4: K 2.9, Replete with KCL 40mg IV in infusion  - 11/9: K 3.8, 11/29 K 3.9    # Hypomagnesemia  - 9/20: Mg 1.48. Likely due to chemo  - Replete with Mag 2g in infusion.   - 10/4: Mg 1.94  - 11/29: Mg 1.59. Patient to continue MagOx 400mg BID for a few days then return to MagOx 400mg daily.     # Weight Loss: resolved  - 10/18: Continue to have very poor PO intake. Pt with temporal wasting and sunken cheeks. Dietician consulted on nutrition and weight loss.   - 11/9: Much improved PO intake as odynophagia is resolved. Gaining weight.   - 11/29: Has 3.6 kg weight gain in 3 weeks.     # Left neck skin lesion: resolving  - 10/4/23: Pt with burning pain if left skin of neck. Did not have ointment with them today in infusion.Obtained aquaphor with meplex dressing and instructed patient to apply to neck in Infusion today  - Skin of neck largely healed. Continue Silvadene    # Opioid overdose: resolved  - 10/18: Patient's SNF has been erroneously given much too high of Morphine PO dose for PRN pain management. He's been dosed with 100mg PO Morphine at a time with most recent dose at 1AM this morning.   - With his poor baseline kidney function, likely causing low metabolism of morphine, causing his extreme drowsiness.    - Unfortunately not able to administer readily in Narcan in outpatient setting. Code Blue was called    PLAN  -- 1/10/24 restaging PET/CT and CT Neck, FUV with RadOnc same day  -- RTC 6 weeks with MedOnc and ENT, on 1/12/24, labs cbc, cmp, mag  -- Increase MagOx 400mg to twice a day for 5 days. Then return to once a day  -- Call in the mean time with questions.

## 2023-11-27 ENCOUNTER — OFFICE VISIT (OUTPATIENT)
Dept: PRIMARY CARE | Facility: CLINIC | Age: 73
End: 2023-11-27
Payer: MEDICARE

## 2023-11-27 VITALS — SYSTOLIC BLOOD PRESSURE: 118 MMHG | HEART RATE: 98 BPM | DIASTOLIC BLOOD PRESSURE: 64 MMHG

## 2023-11-27 DIAGNOSIS — E78.5 HYPERLIPIDEMIA, UNSPECIFIED HYPERLIPIDEMIA TYPE: ICD-10-CM

## 2023-11-27 DIAGNOSIS — I73.9 PAD (PERIPHERAL ARTERY DISEASE) (CMS-HCC): ICD-10-CM

## 2023-11-27 DIAGNOSIS — T45.1X5A ANEMIA ASSOCIATED WITH CHEMOTHERAPY: ICD-10-CM

## 2023-11-27 DIAGNOSIS — E46 PROTEIN-CALORIE MALNUTRITION, UNSPECIFIED SEVERITY (MULTI): ICD-10-CM

## 2023-11-27 DIAGNOSIS — I10 BENIGN ESSENTIAL HYPERTENSION: ICD-10-CM

## 2023-11-27 DIAGNOSIS — C61 PROSTATE CANCER (MULTI): ICD-10-CM

## 2023-11-27 DIAGNOSIS — I25.10 ATHEROSCLEROSIS OF NATIVE CORONARY ARTERY OF NATIVE HEART WITHOUT ANGINA PECTORIS: ICD-10-CM

## 2023-11-27 DIAGNOSIS — Z12.11 SCREENING FOR COLON CANCER: ICD-10-CM

## 2023-11-27 DIAGNOSIS — D64.81 ANEMIA ASSOCIATED WITH CHEMOTHERAPY: ICD-10-CM

## 2023-11-27 DIAGNOSIS — K21.9 GASTROESOPHAGEAL REFLUX DISEASE, UNSPECIFIED WHETHER ESOPHAGITIS PRESENT: ICD-10-CM

## 2023-11-27 DIAGNOSIS — N18.31 CHRONIC RENAL IMPAIRMENT, STAGE 3A (MULTI): ICD-10-CM

## 2023-11-27 DIAGNOSIS — N40.0 BENIGN PROSTATIC HYPERPLASIA, UNSPECIFIED WHETHER LOWER URINARY TRACT SYMPTOMS PRESENT: Primary | ICD-10-CM

## 2023-11-27 DIAGNOSIS — I87.2 CHRONIC VENOUS INSUFFICIENCY: ICD-10-CM

## 2023-11-27 DIAGNOSIS — C10.9 OROPHARYNGEAL CANCER (MULTI): ICD-10-CM

## 2023-11-27 DIAGNOSIS — I77.9 AORTO-ILIAC DISEASE (CMS-HCC): ICD-10-CM

## 2023-11-27 PROCEDURE — 1111F DSCHRG MED/CURRENT MED MERGE: CPT | Performed by: INTERNAL MEDICINE

## 2023-11-27 PROCEDURE — 99214 OFFICE O/P EST MOD 30 MIN: CPT | Performed by: INTERNAL MEDICINE

## 2023-11-27 PROCEDURE — 1126F AMNT PAIN NOTED NONE PRSNT: CPT | Performed by: INTERNAL MEDICINE

## 2023-11-27 PROCEDURE — 1159F MED LIST DOCD IN RCRD: CPT | Performed by: INTERNAL MEDICINE

## 2023-11-27 PROCEDURE — 1036F TOBACCO NON-USER: CPT | Performed by: INTERNAL MEDICINE

## 2023-11-27 PROCEDURE — 1160F RVW MEDS BY RX/DR IN RCRD: CPT | Performed by: INTERNAL MEDICINE

## 2023-11-27 PROCEDURE — 3078F DIAST BP <80 MM HG: CPT | Performed by: INTERNAL MEDICINE

## 2023-11-27 PROCEDURE — 3074F SYST BP LT 130 MM HG: CPT | Performed by: INTERNAL MEDICINE

## 2023-11-27 RX ORDER — DOXAZOSIN 4 MG/1
4 TABLET ORAL DAILY
Qty: 90 TABLET | Refills: 1 | Status: SHIPPED | OUTPATIENT
Start: 2023-11-27 | End: 2023-12-21 | Stop reason: SDUPTHER

## 2023-11-27 ASSESSMENT — ENCOUNTER SYMPTOMS
VOMITING: 0
SPEECH DIFFICULTY: 0
ABDOMINAL DISTENTION: 0
COUGH: 0
LIGHT-HEADEDNESS: 0
BRUISES/BLEEDS EASILY: 0
WHEEZING: 0
RHINORRHEA: 0
PHOTOPHOBIA: 0
RECTAL PAIN: 0
PALPITATIONS: 0
DIFFICULTY URINATING: 0
SEIZURES: 0
CHOKING: 0
HEMATURIA: 0
JOINT SWELLING: 0
DYSURIA: 0
FATIGUE: 0
FACIAL SWELLING: 0
FACIAL ASYMMETRY: 0
TROUBLE SWALLOWING: 0
NECK STIFFNESS: 0
SORE THROAT: 0
SINUS PRESSURE: 0
CHILLS: 0
HEADACHES: 0
COLOR CHANGE: 0
CHEST TIGHTNESS: 0
NAUSEA: 0
STRIDOR: 0
NECK PAIN: 0
SHORTNESS OF BREATH: 0
ARTHRALGIAS: 0
FREQUENCY: 0
EYE DISCHARGE: 0
NUMBNESS: 0
CONSTIPATION: 0
EYE ITCHING: 0
ADENOPATHY: 0
EYE REDNESS: 0
ABDOMINAL PAIN: 0
MYALGIAS: 0
BACK PAIN: 0
WOUND: 0
ACTIVITY CHANGE: 0
POLYPHAGIA: 0
DIZZINESS: 0
POLYDIPSIA: 0
VOICE CHANGE: 0
SLEEP DISTURBANCE: 0
DIARRHEA: 0
UNEXPECTED WEIGHT CHANGE: 1
APPETITE CHANGE: 0
TREMORS: 0
SINUS PAIN: 0
DIAPHORESIS: 0
WEAKNESS: 0
EYE PAIN: 0
FLANK PAIN: 0
BLOOD IN STOOL: 0
ANAL BLEEDING: 0

## 2023-11-27 ASSESSMENT — PAIN SCALES - GENERAL: PAINLEVEL: 0-NO PAIN

## 2023-11-27 NOTE — PROGRESS NOTES
Subjective   Patient ID: Phong Wong is a 73 y.o. male who presents for Follow-up.    Patient presents for follow-up.  She was hospitalized [facility] with?  Morphine overdose, hypotension, hypoxia and elevated lactate level.  He underwent an EGD which revealed esophagitis and?  Sherwood's esophagus.  He was anemic and was transfused 2 units of blood.  He also had a Clark inserted due to urinary retention.  He had a PEG tube placed.  He has done well since his discharge from rehab.  His appetite is improving.  He is not using his PEG tube.  He continues to complain of some weakness.  He continues to have a Clark in place.  He denies any headaches, no dizziness, no chest pain or shortness of breath.  He denies abdominal pain no nausea vomiting or diarrhea.  He reports no pain.         Review of Systems   Constitutional:  Positive for unexpected weight change. Negative for activity change, appetite change, chills, diaphoresis and fatigue.   HENT:  Negative for congestion, dental problem, drooling, ear discharge, ear pain, facial swelling, hearing loss, mouth sores, nosebleeds, postnasal drip, rhinorrhea, sinus pressure, sinus pain, sneezing, sore throat, tinnitus, trouble swallowing and voice change.    Eyes:  Negative for photophobia, pain, discharge, redness, itching and visual disturbance.   Respiratory:  Negative for cough, choking, chest tightness, shortness of breath, wheezing and stridor.    Cardiovascular:  Negative for chest pain, palpitations and leg swelling.   Gastrointestinal:  Negative for abdominal distention, abdominal pain, anal bleeding, blood in stool, constipation, diarrhea, nausea, rectal pain and vomiting.   Endocrine: Negative for cold intolerance, heat intolerance, polydipsia, polyphagia and polyuria.   Genitourinary:  Negative for decreased urine volume, difficulty urinating, dysuria, enuresis, flank pain, frequency, genital sores, hematuria and urgency.   Musculoskeletal:  Negative  for arthralgias, back pain, gait problem, joint swelling, myalgias, neck pain and neck stiffness.   Skin:  Negative for color change, pallor, rash and wound.   Neurological:  Negative for dizziness, tremors, seizures, syncope, facial asymmetry, speech difficulty, weakness, light-headedness, numbness and headaches.   Hematological:  Negative for adenopathy. Does not bruise/bleed easily.   Psychiatric/Behavioral:  Negative for sleep disturbance.        Objective   /64   Pulse 98     Physical Exam  Constitutional:       Appearance: Normal appearance.   Cardiovascular:      Rate and Rhythm: Normal rate and regular rhythm.      Heart sounds: No murmur heard.     No gallop.   Pulmonary:      Effort: No respiratory distress.      Breath sounds: No wheezing or rales.   Abdominal:      General: There is no distension.      Palpations: There is no mass.      Tenderness: There is no abdominal tenderness. There is no guarding.   Musculoskeletal:      Right lower leg: No edema.      Left lower leg: No edema.   Neurological:      Mental Status: He is alert.         Assessment/Plan   Diagnoses and all orders for this visit:  Benign prostatic hyperplasia, unspecified whether lower urinary tract symptoms present-he will schedule appointment with urology to have Clark removed.  -     doxazosin (Cardura) 4 mg tablet; Take 1 tablet (4 mg) by mouth once daily.  Anemia associated with chemotherapy-repeat CBC on Wednesday  Prostate cancer (CMS/[facility])-PSA has been done  -     doxazosin (Cardura) 4 mg tablet; Take 1 tablet (4 mg) by mouth once daily.  Hyperlipidemia, unspecified hyperlipidemia type  PAD (peripheral artery disease) (CMS/[facility])-we will modify risk factor  Benign essential hypertension-stable on present medications  Chronic venous insufficiency-states symptoms  Atherosclerosis of native coronary artery of native heart without angina pectoris  Aorto-iliac disease (CMS/[facility])-we will discuss about starting  statin  Protein-calorie malnutrition, unspecified severity (CMS/HCC)-appetite is improved  Gastroesophageal reflux disease, unspecified whether esophagitis present  Chronic renal impairment, stage 3a (CMS/HCC)-creatinine has been stable  Oropharyngeal cancer (CMS/HCC)-he will follow-up with oncology.  Health maintenance-refuses immunizations.  Will resend Cologuard.

## 2023-11-27 NOTE — PATIENT INSTRUCTIONS
Please take medication as prescribed.  Follow-up in 1 month.  Obtain blood work.  Schedule appointment with urology and GI.

## 2023-11-29 ENCOUNTER — LAB (OUTPATIENT)
Dept: LAB | Facility: HOSPITAL | Age: 73
End: 2023-11-29
Payer: MEDICARE

## 2023-11-29 ENCOUNTER — NUTRITION (OUTPATIENT)
Dept: HEMATOLOGY/ONCOLOGY | Facility: HOSPITAL | Age: 73
End: 2023-11-29
Payer: MEDICARE

## 2023-11-29 ENCOUNTER — OFFICE VISIT (OUTPATIENT)
Dept: HEMATOLOGY/ONCOLOGY | Facility: HOSPITAL | Age: 73
End: 2023-11-29
Payer: MEDICARE

## 2023-11-29 VITALS
TEMPERATURE: 97.3 F | SYSTOLIC BLOOD PRESSURE: 123 MMHG | BODY MASS INDEX: 21.68 KG/M2 | HEIGHT: 66 IN | HEART RATE: 112 BPM | WEIGHT: 134.92 LBS | OXYGEN SATURATION: 95 % | RESPIRATION RATE: 20 BRPM | DIASTOLIC BLOOD PRESSURE: 55 MMHG

## 2023-11-29 DIAGNOSIS — C10.9 OROPHARYNGEAL CANCER (MULTI): Primary | ICD-10-CM

## 2023-11-29 DIAGNOSIS — C10.9 OROPHARYNGEAL CANCER (MULTI): ICD-10-CM

## 2023-11-29 DIAGNOSIS — E83.42 HYPOMAGNESEMIA: ICD-10-CM

## 2023-11-29 DIAGNOSIS — I89.0 LYMPHEDEMA DUE TO RADIATION: ICD-10-CM

## 2023-11-29 DIAGNOSIS — C10.9 SQUAMOUS CELL CARCINOMA OF OROPHARYNX (MULTI): ICD-10-CM

## 2023-11-29 DIAGNOSIS — E46 PROTEIN-CALORIE MALNUTRITION, UNSPECIFIED SEVERITY (MULTI): ICD-10-CM

## 2023-11-29 LAB
ALBUMIN SERPL BCP-MCNC: 3.7 G/DL (ref 3.4–5)
ALP SERPL-CCNC: 59 U/L (ref 33–136)
ALT SERPL W P-5'-P-CCNC: 8 U/L (ref 10–52)
ANION GAP SERPL CALC-SCNC: 13 MMOL/L (ref 10–20)
AST SERPL W P-5'-P-CCNC: 9 U/L (ref 9–39)
BASOPHILS # BLD AUTO: 0.01 X10*3/UL (ref 0–0.1)
BASOPHILS NFR BLD AUTO: 0.3 %
BILIRUB SERPL-MCNC: 0.3 MG/DL (ref 0–1.2)
BUN SERPL-MCNC: 13 MG/DL (ref 6–23)
CALCIUM SERPL-MCNC: 9.3 MG/DL (ref 8.6–10.3)
CHLORIDE SERPL-SCNC: 102 MMOL/L (ref 98–107)
CO2 SERPL-SCNC: 28 MMOL/L (ref 21–32)
CREAT SERPL-MCNC: 1.19 MG/DL (ref 0.5–1.3)
EOSINOPHIL # BLD AUTO: 0.11 X10*3/UL (ref 0–0.4)
EOSINOPHIL NFR BLD AUTO: 2.9 %
ERYTHROCYTE [DISTWIDTH] IN BLOOD BY AUTOMATED COUNT: 17.8 % (ref 11.5–14.5)
GFR SERPL CREATININE-BSD FRML MDRD: 65 ML/MIN/1.73M*2
GLUCOSE SERPL-MCNC: 93 MG/DL (ref 74–99)
HCT VFR BLD AUTO: 26.9 % (ref 41–52)
HGB BLD-MCNC: 8.8 G/DL (ref 13.5–17.5)
IMM GRANULOCYTES # BLD AUTO: 0.01 X10*3/UL (ref 0–0.5)
IMM GRANULOCYTES NFR BLD AUTO: 0.3 % (ref 0–0.9)
LYMPHOCYTES # BLD AUTO: 0.41 X10*3/UL (ref 0.8–3)
LYMPHOCYTES NFR BLD AUTO: 10.8 %
MAGNESIUM SERPL-MCNC: 1.59 MG/DL (ref 1.6–2.4)
MCH RBC QN AUTO: 30.8 PG (ref 26–34)
MCHC RBC AUTO-ENTMCNC: 32.7 G/DL (ref 32–36)
MCV RBC AUTO: 94 FL (ref 80–100)
MONOCYTES # BLD AUTO: 0.38 X10*3/UL (ref 0.05–0.8)
MONOCYTES NFR BLD AUTO: 10.1 %
NEUTROPHILS # BLD AUTO: 2.86 X10*3/UL (ref 1.6–5.5)
NEUTROPHILS NFR BLD AUTO: 75.6 %
NRBC BLD-RTO: 0 /100 WBCS (ref 0–0)
PLATELET # BLD AUTO: 206 X10*3/UL (ref 150–450)
POTASSIUM SERPL-SCNC: 3.9 MMOL/L (ref 3.5–5.3)
PROT SERPL-MCNC: 6.3 G/DL (ref 6.4–8.2)
RBC # BLD AUTO: 2.86 X10*6/UL (ref 4.5–5.9)
SODIUM SERPL-SCNC: 139 MMOL/L (ref 136–145)
WBC # BLD AUTO: 3.8 X10*3/UL (ref 4.4–11.3)

## 2023-11-29 PROCEDURE — 1126F AMNT PAIN NOTED NONE PRSNT: CPT | Performed by: STUDENT IN AN ORGANIZED HEALTH CARE EDUCATION/TRAINING PROGRAM

## 2023-11-29 PROCEDURE — 99215 OFFICE O/P EST HI 40 MIN: CPT | Performed by: STUDENT IN AN ORGANIZED HEALTH CARE EDUCATION/TRAINING PROGRAM

## 2023-11-29 PROCEDURE — 80053 COMPREHEN METABOLIC PANEL: CPT

## 2023-11-29 PROCEDURE — 1159F MED LIST DOCD IN RCRD: CPT | Performed by: STUDENT IN AN ORGANIZED HEALTH CARE EDUCATION/TRAINING PROGRAM

## 2023-11-29 PROCEDURE — 1036F TOBACCO NON-USER: CPT | Performed by: STUDENT IN AN ORGANIZED HEALTH CARE EDUCATION/TRAINING PROGRAM

## 2023-11-29 PROCEDURE — 85025 COMPLETE CBC W/AUTO DIFF WBC: CPT

## 2023-11-29 PROCEDURE — 83735 ASSAY OF MAGNESIUM: CPT

## 2023-11-29 PROCEDURE — 3078F DIAST BP <80 MM HG: CPT | Performed by: STUDENT IN AN ORGANIZED HEALTH CARE EDUCATION/TRAINING PROGRAM

## 2023-11-29 PROCEDURE — 3074F SYST BP LT 130 MM HG: CPT | Performed by: STUDENT IN AN ORGANIZED HEALTH CARE EDUCATION/TRAINING PROGRAM

## 2023-11-29 PROCEDURE — 36415 COLL VENOUS BLD VENIPUNCTURE: CPT

## 2023-11-29 PROCEDURE — 1160F RVW MEDS BY RX/DR IN RCRD: CPT | Performed by: STUDENT IN AN ORGANIZED HEALTH CARE EDUCATION/TRAINING PROGRAM

## 2023-11-29 PROCEDURE — 1111F DSCHRG MED/CURRENT MED MERGE: CPT | Performed by: STUDENT IN AN ORGANIZED HEALTH CARE EDUCATION/TRAINING PROGRAM

## 2023-11-29 RX ORDER — CALCIUM CARBONATE 300MG(750)
400 TABLET,CHEWABLE ORAL DAILY
COMMUNITY

## 2023-11-29 ASSESSMENT — ENCOUNTER SYMPTOMS
LOSS OF SENSATION IN FEET: 0
OCCASIONAL FEELINGS OF UNSTEADINESS: 0
NECK PAIN: 0
DEPRESSION: 0

## 2023-11-29 ASSESSMENT — PAIN SCALES - GENERAL: PAINLEVEL: 0-NO PAIN

## 2023-11-29 ASSESSMENT — PATIENT HEALTH QUESTIONNAIRE - PHQ9
1. LITTLE INTEREST OR PLEASURE IN DOING THINGS: NOT AT ALL
SUM OF ALL RESPONSES TO PHQ9 QUESTIONS 1 AND 2: 0
2. FEELING DOWN, DEPRESSED OR HOPELESS: NOT AT ALL

## 2023-11-29 NOTE — PROGRESS NOTES
NUTRITION Follow-up NOTE  Reason for Visit:  Phong Wong is a 73 y.o. male who presents for follow up with NP in hem onc.      Lorna was inpatient 10-20 to 11-2-2023  EGD w/ PEG tube placement on 10/20/23 with Dr Azeem Bob on 10- by GI    Was called to see patient  Having difficulty flushing tube  Has an enfit ending and does not have enfit syringes  He had been taking the enfit y-port out and flushing through tubing.  Is hoping PEG will be removed in January    He is only flushing tube  He is eating well  3 meals per day  Regular diet    Weight gain noted    Lab Results   Component Value Date/Time    GLUCOSE 93 11/29/2023 1029     11/29/2023 1029    K 3.9 11/29/2023 1029     11/29/2023 1029    CO2 28 11/29/2023 1029    ANIONGAP 13 11/29/2023 1029    BUN 13 11/29/2023 1029    CREATININE 1.19 11/29/2023 1029    EGFR 65 11/29/2023 1029    CALCIUM 9.3 11/29/2023 1029    ALBUMIN 3.7 11/29/2023 1029    ALKPHOS 59 11/29/2023 1029    PROT 6.3 (L) 11/29/2023 1029    AST 9 11/29/2023 1029    BILITOT 0.3 11/29/2023 1029    ALT 8 (L) 11/29/2023 1029     Lab Results   Component Value Date/Time    VITD25 53 05/31/2023 1210       Nutrition Assessment     Anthropometrics:     Ht; 167.9  BMI 21.7  IBW: 64.5  % IBW:  94.9%      Wt Readings from Last 10 Encounters:   11/29/23 61.2 kg (134 lb 14.7 oz)   11/09/23 57.6 kg (126 lb 15.8 oz)   11/09/23 57.3 kg (126 lb 5.2 oz)   10/21/23 54 kg (119 lb 0.8 oz)   10/17/23 54.1 kg (119 lb 3.2 oz)   10/10/23 54 kg (119 lb 0.8 oz)   10/04/23 59.4 kg (130 lb 15.3 oz)   10/02/23 61.5 kg (135 lb 9.3 oz)   08/25/23 66.7 kg (147 lb)   05/25/23 71.7 kg (158 lb)        Food And Nutrient Intake:         Noted above   Full diet history not warranted with weight gain   Patient looks much better  Is still in wheel chair                                                         Nutrition Focused Physical Exam:     Not done       Energy Needs     Not assessed at this visit please refer  to in-patient note       Nutrition Diagnosis           Nutrition Interventions/Recommendations      Flush tube with enfir syringes (60 ml ) at least 1 time per day  3 en fit syringes provided       There are no Patient Instructions on file for this visit.    Nutrition Monitoring and Evaluation        Business card provided  Encouraged them to call with question and concerns     Time Spent  Prep time on day of patient encounter: 5 minutes  Time spent directly with patient, family or caregiver: 10 minutes  Additional Time Spent on Patient Care Activities: 5 minutes  Documentation Time: 10 minutes  Other Time Spent: 0 minutes  Total: 30 minutes

## 2023-11-29 NOTE — Clinical Note
November 29, 2023       No Recipients    Patient: Phong Wong   YOB: 1950   Date of Visit: 11/29/2023       Dear Dr. Chan Recipients:    Thank you for referring Phong Wong to me for evaluation. Below are my notes for this consultation.  If you have questions, please do not hesitate to call me. I look forward to following your patient along with you.       Sincerely,     Lyssa Patten PA-C      CC:   No Recipients  ______________________________________________________________________________________    Patient ID: Phong Wong is a 73 y.o. male.  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 Juan Jose Fletcher   MedOnc: Dr. Kyunghee Burkitt, X. Katherine Feng, PA-C   RadOnc: Dr. Vianca Steen     Prior Therapy:   8/16 - 10/4/23: Recieved concurrent chemoradiation with 7 weekly Carboplatin (2mg/AUC)/Paclitaxel (45mg/m2)  and 70gy RT in 35fx     Site of Disease:  Soft palate, BOT, Left palatine tonsil  B/L Cervical LN     Oncologic Issues:   Odynophagia  Fatigue     ONCOLOGIC HISTORY  - Pt had 2 months hx of throat discomfort with lump in right neck  - 6/13/23: CT neck showed a mass 2.8 x 2.5 x 2.9 cm in the right lower cervical neck soft tissues. Two suspicious nodes were observed, one at level 3 on the right and another at level 2 on the left.  - 6/28/23: seen by Dr. Fletcher. 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 Based on the findings, Dr. Fletcher did not recommend surgery due to the location  of the primary tumor and the presence of yvette disease  - 6/30/23: PET/CT showed intense FDG avidity within the soft palate, extending to the base of the tongue and left palatine tonsil. Multiple FDG avid left cervical level II nodes were observed, along with an FDG avid mass in the region of the right cervical  level II/III, infiltrating the  right SCM muscle and encasing and invading the right jugular vein. FDG avidity was also detected in the region of the left fossa Rosenmuller and left medial pterygoid muscle.  -  - 10/4/23: Recieved concurrent chemoradiation with 7 weekly Carboplatin (2mg/AUC)/Paclitaxel (45mg/m2)  and 70gy RT in 35fx    Admissions:  10/5 - 10/10/23: Admitted for extreme weakness, HECTOR, pancytopenia including anemia requiring blood transfusion. D/C'ed to acute rehab        Past Medical History:   Past Medical History:  2017: Personal history of diseases of the blood and blood-  forming organs and certain disorders involving the immune mechanism      Comment:  History of thrombocytosis   HTN, HLD, COPD, prostate cancer in 2017 (s/p radiation)  Surgical History:    Past Surgical History:   Procedure Laterality Date    CT ABDOMEN PELVIS ANGIOGRAM W AND/OR WO IV CONTRAST  9/3/2014    CT ABDOMEN PELVIS ANGIOGRAM W AND/OR WO IV CONTRAST 9/3/2014 [facility]    IR ANGIOGRAM AORTA ABDOMEN  2014    IR ANGIOGRAM AORTA ABDOMEN 2014 [facility]   Aortoiliofemoral vascular bypass in   Social History:    Social History     Socioeconomic History    Marital status:      Spouse name: None    Number of children: 1    Years of education: None    Highest education level: None   Occupational History    None   Tobacco Use    Smoking status: Former     Packs/day: 1.00     Years: 40.00     Additional pack years: 0.00     Total pack years: 40.00     Types: Cigarettes     Quit date:      Years since quittin.9     Passive exposure: Past    Smokeless tobacco: Never   Substance and Sexual Activity    Alcohol use: Yes     Alcohol/week: 12.0 standard drinks of alcohol     Types: 12 Cans of beer per week    Drug use: Never    Sexual activity: None   Other Topics Concern    None   Social History Narrative    None     Social Determinants of Health     Financial Resource Strain: Low Risk  (10/20/2023)    Overall  Financial Resource Strain (CARDIA)     Difficulty of Paying Living Expenses: Not very hard   Food Insecurity: No Food Insecurity (10/5/2023)    Hunger Vital Sign     Worried About Running Out of Food in the Last Year: Never true     Ran Out of Food in the Last Year: Never true   Transportation Needs: No Transportation Needs (10/20/2023)    PRAPARE - Transportation     Lack of Transportation (Medical): No     Lack of Transportation (Non-Medical): No   Physical Activity: Inactive (10/5/2023)    Exercise Vital Sign     Days of Exercise per Week: 0 days     Minutes of Exercise per Session: 0 min   Stress: Stress Concern Present (10/5/2023)    [employer] - Occupational Stress Questionnaire     Feeling of Stress : To some extent   Social Connections: Moderately Integrated (10/5/2023)    Social Connection and Isolation Panel [NHANES]     Frequency of Communication with Friends and Family: More than three times a week     Frequency of Social Gatherings with Friends and Family: More than three times a week     Attends Christian Services: Never     Active Member of Clubs or Organizations: Yes     Attends Club or Organization Meetings: Never     Marital Status:    Intimate Partner Violence: Not At Risk (10/5/2023)    Humiliation, Afraid, Rape, and Kick questionnaire     Fear of Current or Ex-Partner: No     Emotionally Abused: No     Physically Abused: No     Sexually Abused: No   Housing Stability: Low Risk  (10/20/2023)    Housing Stability Vital Sign     Unable to Pay for Housing in the Last Year: No     Number of Places Lived in the Last Year: 1     Unstable Housing in the Last Year: No      Family History:    Family History   Problem Relation Name Age of Onset    Aortic aneurysm Father          abdominal     Family Oncology History:    Cancer-related family history is not on file.      Subjective   Chief Complaint: Squamous Cell Carcinoma of oropharynx    HPI  Interval History  Phong ROTHMAN  Marvin is a 73 y.o. male with recent diagnosis of locally advanced oral cavity cancer, completed concurrent chemoradiation with 7 weekly Carboplatin+Paclitaxel on 10/4/23.  He was admitted 10/5 - 10/10/23 for extreme weakness, HECTOR, pancytopenia including anemia requiring blood transfusion. D/C'ed to acute rehab. He was admitted again 10/18 - 11/2/23 for lethargy, weakness, failure to thrive, anemia.     Patient presents for 2 month post treatment follow up, reports the following:    He's feeling very well, he has been d/c'ed from acute rehab 2 weeks ago.   He is ambulating well at home, though still has some trouble with steps as his legs are still weak.   He's eating and drinking without issue. Due to edentulous status he's only eating soft foods. Appetite is very good.   Denies dry mouth, taste is normal.   He has mild swelling in the neck but no pain. Skin of neck is healing nicely from RT.   He still has a Clark that was placed during his admission due to urinary retention. He has   NPV with Urology in a few weeks. He reports no urethral pain.   He's following with PCP, saw Dr. Weiss on 11/27/23. Will have dentist appt 12/4/23. He hopes to have dentures fitted.     ROS  Review of Systems   Constitutional:  Negative for chills and fever. Unexpected weight change: weight loss.  HENT:   Negative for lump/mass, mouth sores, nosebleeds, sore throat and trouble swallowing.    Respiratory:  Negative for cough and shortness of breath.    Cardiovascular:  Negative for chest pain and leg swelling.   Gastrointestinal:  Negative for abdominal pain, constipation, diarrhea, nausea and vomiting.   Musculoskeletal:  Negative for arthralgias and neck pain.   Skin:  Negative for rash. Wound: skin of left neck with burning pain.       Scabbing over left neck from RT   Neurological:  Negative for dizziness, headaches, light-headedness and numbness.       Allergies  Allergies   Allergen Reactions    Codeine Nausea Only     "    Medications  Current Outpatient Medications   Medication Instructions    aspirin 81 mg EC tablet 1 tablet, oral, Daily    budesonide (Pulmicort) 1 mg/2 mL nebulizer solution Take 2 mL (1 mg) by nebulization 2 times a day. Rinse mouth after use    doxazosin (CARDURA) 4 mg, oral, Daily    fluticasone-umeclidin-vilanter (Trelegy Ellipta) 100-62.5-25 mcg blister with device INHALE 1 PUFF EVERY DAY    ipratropium-albuteroL (Duo-Neb) 0.5-2.5 mg/3 mL nebulizer solution Take 3 mL by nebulization 3 times a day as needed for wheezing or shortness of breath.    loperamide (IMODIUM) 2 mg, oral, 2 times daily PRN    losartan (COZAAR) 50 mg, oral, Daily    pantoprazole (PROTONIX) 40 mg, oral, Daily, Do not crush, chew, or split.    silver sulfADIAZINE (Silvadene) 1 % cream Topical, Daily    simethicone (MYLICON) 40 mg, oral, 4 times daily PRN        Objective   VS:  /55   Pulse (!) 112   Temp 36.3 °C (97.3 °F) (Core)   Resp 20   Ht (S) 1.679 m (5' 6.1\")   Wt 61.2 kg (134 lb 14.7 oz)   SpO2 95%   BMI 21.71 kg/m²   Weight   Wt Readings from Last 7 Encounters:   11/29/23 61.2 kg (134 lb 14.7 oz)   11/09/23 57.6 kg (126 lb 15.8 oz)   11/09/23 57.3 kg (126 lb 5.2 oz)   10/21/23 54 kg (119 lb 0.8 oz)   10/17/23 54.1 kg (119 lb 3.2 oz)   10/10/23 54 kg (119 lb 0.8 oz)   10/04/23 59.4 kg (130 lb 15.3 oz)         Physical Exam  Constitutional:       Appearance: Normal appearance. He is underweight.   HENT:      Head: Normocephalic and atraumatic.      Right Ear: External ear normal. No tenderness.      Left Ear: External ear normal. No tenderness.      Nose: Nose normal.      Mouth/Throat:      Mouth: No injury or oral lesions.      Tongue: No lesions.      Pharynx: Oropharynx is clear. No posterior oropharyngeal erythema.      Comments: Edentulous, no oral mucositis  Eyes:      Extraocular Movements: Extraocular movements intact.      Conjunctiva/sclera: Conjunctivae normal.      Pupils: Pupils are equal, round, and " reactive to light.   Neck:      Thyroid: No thyroid mass.      Comments: Mild swelling in submental and neck region. No pain  Cardiovascular:      Rate and Rhythm: Regular rhythm. Tachycardia present.   Pulmonary:      Effort: Pulmonary effort is normal. No respiratory distress.      Breath sounds: Normal breath sounds.   Abdominal:      General: Bowel sounds are normal. There is no distension or abdominal bruit.      Palpations: Abdomen is soft. There is no mass.      Tenderness: There is no abdominal tenderness.   Genitourinary:     Comments: Clark in place, clear pale yellow urine  Musculoskeletal:         General: Normal range of motion.      Cervical back: Normal range of motion and neck supple.      Right lower leg: No edema.      Left lower leg: No edema.   Lymphadenopathy:      Cervical: No cervical adenopathy.      Upper Body:      Right upper body: No axillary adenopathy.      Left upper body: No axillary adenopathy.   Skin:     General: Skin is warm and dry.      Findings: Lesion: radiation dermatitis in bilateral neck.      Comments: Resolution of desquamation of skin of bilateral neck. +Scab in left lateral neck       Neurological:      General: No focal deficit present.      Mental Status: He is alert and oriented to person, place, and time.      Gait: Gait is intact.   Psychiatric:         Mood and Affect: Mood and affect normal.         Diagnostic Results   The below labs were reviewed.  Results from last 7 days   Lab Units 11/29/23  1029   WBC AUTO x10*3/uL 3.8*   HEMOGLOBIN g/dL 8.8*   HEMATOCRIT % 26.9*   PLATELETS AUTO x10*3/uL 206   NEUTROS ABS x10*3/uL 2.86   LYMPHS ABS AUTO x10*3/uL 0.41*   MONOS ABS AUTO x10*3/uL 0.38   EOS ABS AUTO x10*3/uL 0.11   NEUTROS PCT AUTO % 75.6   LYMPHS PCT AUTO % 10.8   MONOS PCT AUTO % 10.1   EOS PCT AUTO % 2.9      Results from last 7 days   Lab Units 11/29/23  1029   GLUCOSE mg/dL 93   SODIUM mmol/L 139   POTASSIUM mmol/L 3.9   CHLORIDE mmol/L 102   CO2 mmol/L  28   BUN mg/dL 13   CREATININE mg/dL 1.19   EGFR mL/min/1.73m*2 65   CALCIUM mg/dL 9.3   MAGNESIUM mg/dL 1.59*   ALBUMIN g/dL 3.7   PROTEIN TOTAL g/dL 6.3*   BILIRUBIN TOTAL mg/dL 0.3   ALK PHOS U/L 59   ALT U/L 8*   AST U/L 9                      Assessment/Plan    ASSESSMENT  Phong Wong is a 73 y.o. male with recent diagnosis of locally advanced oral cavity cancer. Completed concurrent chemoradiation with 7 weekly Carboplatin (2mg/AUC)/Paclitaxel (45mg/m2) on 10/4/2. Admitted 10/5 - 10/10 for extreme weakness, HECTOR, pancytopenia including anemia requiring blood transfusion. D/C'ed to acute rehab. Admitted  again 10/18 - 11/2/23 for lethargy, weakness, failure to thrive, anemia.     # Locally advanced oral cavity cancer, T3N2M0  - 6/30/23: PET/CT showed intense FDG avidity within the soft palate, extending to the base of the tongue and left palatine tonsil. Multiple uptake in left cervical level II nodes, right cervical level  II/III, infiltrating the right SCM muscle and encasing and invading the right jugular vein. FDG avidity was also detected in the region of the left fossa Rosenmuller and left medial pterygoid muscle.  - 7/13/23: pt is doing well, no pain, discussed with patient about carboplatin+ paclitaxel as his chemotherapy along with radiation due to his GFR 30s.  Chemo related side effects were reviewed with patient, consent obtained.   - Patient required dental extractions and as a result start date was delayed from 7/26 to 8/15/23  - 8/30: tolerating chemo well, does have some fatigue but no n/v. No peripheral neuropathy   - 9/6: Continue to tolerate chemo well, no n/v, is feeling more tired. Eating well, tolerating soft foods and supplementing with Boost VHC 2x per day. Denies peripheral neuropathy  - 9/13: continue to tolerate chemo well w/o n/v. Energy is lower but stable. Eating soft foods and Boost VHC x 2 per day  - 9/20: Tolerating chemo well, minimal odynophagia, does have some weight loss.  -  10/4: Completed last dose of Carbo/Taxol last week. Recall patient did not have a medonc visit as both providers are out of office. He had las RT today and noted to have significantly increased fatigue, weakness, weight loss in the last week resulting from decreased PO intake.   - 11/9/23: Feeling much improved since d/c from hospital on 11/2. Regaining strength and working with PT/OT at acute rehab. Odynophagia resolved, eating soft foods, has weight gain. Serum protein improved. Electrolytes wnl, mild hyponatremia, no c/f refeeding syndrome.    - 11/29/23: continue to feel very well, d/c'ed from rehab. Acute SEs of chemoRT are resolved. He's eating/drinking well with weight gain. Not using PEG currently. Has lymphedema from RT in the neck/submental region. Can consider lymphedema therapy after 3 month restaging PET showing XENA.     # Hypotension/Dehydration  - 10/18: Labile BP in clinic, SBP ranging from 60s to 110s. Poor skin turgor.  Low PO in the last week at [facility], however has been given Losartan 100mg QD and Spironolactone 25mg QD, likely worsening hypotension  - 11/9: Mildly hypotensive today but asymptomatic. On med review of patient's med list from [facility], patient is only on Losartan 50mg QAM. Reports his BID VS at [facility] show SBP around 120s. Will continue to monitor BP. If BP starts to drop again will adjust Losartan dose.   - 11/29/23: BP wnl. Tachycardic due to effort. Returned to normal after rest.     # Hypoxia  - 10/19/23: O2 fluctuating from 70s to 90s, has poor waveform on pulse ox.  Patient with shallow breathing, and falling asleep every 5 seconds. This was due to opioid overdose  - 11/9/23: O2 100% on RA. He is on Trelegy QD for COPD    # Anemia: improving  - Hgb has been dropping since starting chemo, however today his hgb is <7 and will require a blood transfusion. This is likely due to chemotherapy as there are no s/s of bleeds.   - Received 2U PRBC during 10/18 - 11/2 admission. Anemia likely due  to chemotherapy  - 11/29/23: Hgb 8.8. Improved from 3 weeks ago. Expect Hbg will continue to trend up as patient recovers. Can consider iron studies.     # Neutropenia: resolved  - 9/6: . Proceed to C4 today, will monitor prior to next cycle. May need to hold C4  - Return precaution for neutropenic fevers provided for patient and his significant other, they voiced understanding   - 9/13 ANC 1290.  - 9/20 . Ok for C6 Carbo/Taxol. Return precaution provided for neutropenic fevers again.   - 10/4 . Afebrile. Return precaution for neutropenic fever provided again.   - 11/29 ANC 2860.      # Hypokalemia: resolved  - 9/20: K 3.3.  Likely due to chemo  - Replete with KCl 20mg ER PO x 1 in infusion  - 9/27: K 3.8  - 10/4: K 2.9, Replete with KCL 40mg IV in infusion  - 11/9: K 3.8, 11/29 K 3.9    # Hypomagnesemia  - 9/20: Mg 1.48. Likely due to chemo  - Replete with Mag 2g in infusion.   - 10/4: Mg 1.94  - 11/29: Mg 1.59. Patient to continue MagOx 400mg BID for a few days then return to MagOx 400mg daily.     # Weight Loss: resolved  - 10/18: Continue to have very poor PO intake. Pt with temporal wasting and sunken cheeks. Dietician consulted on nutrition and weight loss.   - 11/9: Much improved PO intake as odynophagia is resolved. Gaining weight.   - 11/29: Has 3.6 kg weight gain in 3 weeks.     # Left neck skin lesion: resolving  - 10/4/23: Pt with burning pain if left skin of neck. Did not have ointment with them today in infusion.Obtained aquaphor with meplex dressing and instructed patient to apply to neck in Infusion today  - Skin of neck largely healed. Continue Silvadene    # Opioid overdose: resolved  - 10/18: Patient's SNF has been erroneously given much too high of Morphine PO dose for PRN pain management. He's been dosed with 100mg PO Morphine at a time with most recent dose at 1AM this morning.   - With his poor baseline kidney function, likely causing low metabolism of morphine, causing  his extreme drowsiness.    - Unfortunately not able to administer readily in Narcan in outpatient setting. Code Blue was called    PLAN  -- 1/10/24 restaging PET/CT and CT Neck, FUV with RadOnc same day  -- RTC 6 weeks with MedOnc and ENT, on 1/12/24, labs cbc, cmp, mag  -- Increase MagOx 400mg to twice a day for 5 days. Then return to once a day  -- Call in the mean time with questions.

## 2023-12-13 LAB — HOLD SPECIMEN: NORMAL

## 2023-12-21 ENCOUNTER — OFFICE VISIT (OUTPATIENT)
Dept: UROLOGY | Facility: CLINIC | Age: 73
End: 2023-12-21
Payer: MEDICARE

## 2023-12-21 VITALS — TEMPERATURE: 98.6 F | HEIGHT: 66 IN | WEIGHT: 134 LBS | BODY MASS INDEX: 21.53 KG/M2

## 2023-12-21 DIAGNOSIS — N40.0 BENIGN PROSTATIC HYPERPLASIA, UNSPECIFIED WHETHER LOWER URINARY TRACT SYMPTOMS PRESENT: ICD-10-CM

## 2023-12-21 DIAGNOSIS — R33.9 URINARY RETENTION: ICD-10-CM

## 2023-12-21 DIAGNOSIS — C61 PROSTATE CANCER (MULTI): ICD-10-CM

## 2023-12-21 PROCEDURE — 1160F RVW MEDS BY RX/DR IN RCRD: CPT | Performed by: NURSE PRACTITIONER

## 2023-12-21 PROCEDURE — 1036F TOBACCO NON-USER: CPT | Performed by: NURSE PRACTITIONER

## 2023-12-21 PROCEDURE — 1159F MED LIST DOCD IN RCRD: CPT | Performed by: NURSE PRACTITIONER

## 2023-12-21 PROCEDURE — 99203 OFFICE O/P NEW LOW 30 MIN: CPT | Performed by: NURSE PRACTITIONER

## 2023-12-21 PROCEDURE — 1126F AMNT PAIN NOTED NONE PRSNT: CPT | Performed by: NURSE PRACTITIONER

## 2023-12-21 RX ORDER — DOXAZOSIN 4 MG/1
4 TABLET ORAL DAILY
Qty: 90 TABLET | Refills: 1 | Status: SHIPPED | OUTPATIENT
Start: 2023-12-21 | End: 2024-03-29 | Stop reason: SDUPTHER

## 2023-12-21 NOTE — PROGRESS NOTES
[facility] [place]  Outpatient Clinic Note      Patient: Phong Wong  Age/Sex: 73 y.o., male  MRN: [id number]      History of Present Illness  73-year-old gentleman with PMH of oropharyngeal cancer s/p chemo/XRT, prostate cancer s/p XRT in 2018, pancytopenia, HTN, CAD, PAD, and COPD presents for management of urinary retention. He was recently hospitalized from 10/18/2023-11/02/2023 for lethargy and weakness after [facility] called from outpatient heme/onc appointment in the setting of several weeks of poor oral intake and erroneously high morphine use at SNF. Throughout admission was found to have urinary retention and indwelling catheter was placed. He failed in-house TOV. He was started on Doxazosin 4mg daily but reports he is no longer taking this. Prior to hospitalization, denies any bothersome urinary symptoms. No dysuria, gross hematuria, flank pain, fevers or chills.     Past Medical & Surgical History  Past Medical History:   Diagnosis Date    Personal history of diseases of the blood and blood-forming organs and certain disorders involving the immune mechanism 07/27/2017    History of thrombocytosis          Labs  Lab Results   Component Value Date    PSA 0.21 05/31/2023    PSA 0.52 05/14/2022    PSA 1.16 05/22/2019     Medications:  Current Outpatient Medications on File Prior to Visit   Medication Sig Dispense Refill    aspirin 81 mg EC tablet Take 1 tablet (81 mg) by mouth once daily.      doxazosin (Cardura) 4 mg tablet Take 1 tablet (4 mg) by mouth once daily. 90 tablet 1    fluticasone-umeclidin-vilanter (Trelegy Ellipta) 100-62.5-25 mcg blister with device INHALE 1 PUFF EVERY DAY 3 each 3    ipratropium-albuteroL (Duo-Neb) 0.5-2.5 mg/3 mL nebulizer solution Take 3 mL by nebulization 3 times a day as needed for wheezing or shortness of breath.      losartan (Cozaar) 50 mg tablet Take 1 tablet (50 mg) by mouth once daily. 30 tablet 0    magnesium oxide (Mag-Ox) 400 mg tablet Take 1 tablet (400 mg) by  mouth once daily.      silver sulfADIAZINE (Silvadene) 1 % cream Apply topically once daily. Do not start before October 11, 2023. (Patient not taking: Reported on 11/29/2023)       No current facility-administered medications on file prior to visit.      Physical Exam                                                                                                                      General: Well developed, well nourished, alert and cooperative, appears in no acute distress  Eyes: Non-injected conjunctiva, sclera clear, no proptosis  Cardiac: Extremities are warm and well perfused. No edema, cyanosis or pallor.   Lungs: Breathing is easy, non-labored. Speaking in clear and complete sentences. Normal diaphragmatic movement.  MSK: Ambulatory with steady gait, unassisted  Neuro: alert and oriented to person, place and time  Psych: Demonstrates good judgement and reason, without hallucinations, abnormal affect or abnormal behaviors.  Skin: no obvious lesions, no rashes  : urine is clear, yellow draining into catheter bag    Review of Systems  Review of Systems   All other systems reviewed and are negative.     Imaging  None Relevant    Assessment & Plan  73-year-old gentleman with PMH of oropharyngeal cancer s/p chemo/XRT, prostate cancer s/p XRT in 2018, pancytopenia, HTN, CAD, PAD, and COPD presents for management of urinary retention. He was recently hospitalized from 10/18/2023-11/02/2023 for lethargy and weakness after [facility] BLUE called from outpatient heme/onc appointment in the setting of several weeks of poor oral intake and erroneously high morphine use at SNF. Throughout admission was found to have urinary retention and indwelling catheter was placed. He failed in-house TOV. He was started on Doxazosin 4mg daily but reports he is no longer taking this. Prior to hospitalization, denies any bothersome urinary symptoms. No dysuria, gross hematuria, flank pain, fevers or chills.     PSA WNL.     We discussed TOV  today. We proceeded with TOV. Patient with early sensation of urgency, about 120cc instilled. After developing a strong urge with 200cc instilled, catheter removed. Patient unable to spontaneously void.    We discussed management of urinary retention to include indwelling catheter or CIC. We discussed risks of unmanaged urinary retention including irreversible kidney injury and increased risk of UTI. Patient understands these risks and declines replacing catheter or CIC. He will be discharged without catheter.    He must go to ER for catheter placement should he not be able to spontaneously void in the next 4-6 hours, or if he develops suprapubic pressure or symptoms of incomplete emptying.    Recommend restarting Doxazosin 4mg daily. Patient's wife reports blood pressures are improved since hospitalization. Side effects of the medication were discussed with the patient including dizziness, drowsiness, weakness, nausea, diarrhea, headache, retrograde ejaculation, back pain, and runny nose. He was advised to take this medication in the evenings to prevent episodes of orthostatic hypotension, though will be mindful of changing position quickly. He will stop the medication should he develop any bothersome or intolerable side effects. Patient understands risks and wishes to proceed.    Follow-up 1 week for PVR and symptom check, or sooner if needed.    Prabha MURILLO CNP  Office Phone: [phone]

## 2024-01-03 ENCOUNTER — TELEPHONE (OUTPATIENT)
Dept: HEMATOLOGY/ONCOLOGY | Facility: HOSPITAL | Age: 74
End: 2024-01-03
Payer: MEDICARE

## 2024-01-03 DIAGNOSIS — C10.9 OROPHARYNGEAL CANCER (MULTI): Primary | ICD-10-CM

## 2024-01-03 NOTE — TELEPHONE ENCOUNTER
Called and spoke with patient and partner.     Patient confirmed he does not have a port, but does have a PEG tube (planning to be removed on 1/12/24 with Dr. Fletcher).     Patient aware to go to the outpatient [facility] lab on 1/10/24 before his scans at 10am. Patient then will have scans and see Mak Berry after.     Patient aware of appointment with Eva Patten PA-C on Friday, 1/12/24 at 8AM and will see Dr. Fletcher afterward.     No additional needs at this time. Patient instructed to reach out with any questions. Voiced understanding.

## 2024-01-04 ENCOUNTER — APPOINTMENT (OUTPATIENT)
Dept: UROLOGY | Facility: CLINIC | Age: 74
End: 2024-01-04
Payer: MEDICARE

## 2024-01-04 NOTE — PROGRESS NOTES
[facility] [place]  Outpatient Clinic Note      Patient: Phong Wong  Age/Sex: 73 y.o., male  MRN: [id number]      History of Present Illness  73-year-old gentleman presents for PVR check. He has a PMH of oropharyngeal cancer s/p chemo/XRT, prostate cancer s/p XRT in 2018, pancytopenia, HTN, CAD, PAD, and COPD. He developed urinary retention after hospitalization. He was seen in clinic on 12/21/2023 and he was unable to spontaneously void after TOV.     Prior to hospitalization, denies any bothersome urinary symptoms. No dysuria, gross hematuria, flank pain, fevers or chills.      Past Medical & Surgical History  Past Medical History:   Diagnosis Date    Personal history of diseases of the blood and blood-forming organs and certain disorders involving the immune mechanism 07/27/2017    History of thrombocytosis      Past Surgical History:   Procedure Laterality Date    CT ABDOMEN PELVIS ANGIOGRAM W AND/OR WO IV CONTRAST  9/3/2014    CT ABDOMEN PELVIS ANGIOGRAM W AND/OR WO IV CONTRAST 9/3/2014 [facility]    IR ANGIOGRAM AORTA ABDOMEN  11/6/2014    IR ANGIOGRAM AORTA ABDOMEN 11/6/2014 [facility]          Labs      Medications:  Current Outpatient Medications on File Prior to Visit   Medication Sig Dispense Refill    aspirin 81 mg EC tablet Take 1 tablet (81 mg) by mouth once daily.      doxazosin (Cardura) 4 mg tablet Take 1 tablet (4 mg) by mouth once daily. Take 1 tablet by mouth daily in the evening 90 tablet 1    fluticasone-umeclidin-vilanter (Trelegy Ellipta) 100-62.5-25 mcg blister with device INHALE 1 PUFF EVERY DAY 3 each 3    ipratropium-albuteroL (Duo-Neb) 0.5-2.5 mg/3 mL nebulizer solution Take 3 mL by nebulization 3 times a day as needed for wheezing or shortness of breath.      losartan (Cozaar) 50 mg tablet Take 1 tablet (50 mg) by mouth once daily. 30 tablet 0    magnesium oxide (Mag-Ox) 400 mg tablet Take 1 tablet (400 mg) by mouth once daily.      silver sulfADIAZINE (Silvadene) 1 %  cream Apply topically once daily. Do not start before October 11, 2023. (Patient not taking: Reported on 11/29/2023)       No current facility-administered medications on file prior to visit.          Physical Exam                                                                                                                      General: Well developed, well nourished, alert and cooperative, appears in no acute distress  Eyes: Non-injected conjunctiva, sclera clear, no proptosis  Cardiac: Extremities are warm and well perfused. No edema, cyanosis or pallor.   Lungs: Breathing is easy, non-labored. Speaking in clear and complete sentences. Normal diaphragmatic movement.  MSK: Ambulatory with steady gait, unassisted  Neuro: alert and oriented to person, place and time  Psych: Demonstrates good judgement and reason, without hallucinations, abnormal affect or abnormal behaviors.  Skin: no obvious lesions, no rashes      Review of Systems  Review of Systems       Imaging  ***  ***      Assessment & Plan  73 y.o. male with a history of ***, now s/p *** on ***    ***        Prabha MURILLO, CNP  Office Phone: [phone]

## 2024-01-09 ENCOUNTER — TELEPHONE (OUTPATIENT)
Dept: RADIATION ONCOLOGY | Facility: HOSPITAL | Age: 74
End: 2024-01-09
Payer: MEDICARE

## 2024-01-09 NOTE — PROGRESS NOTES
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  The treatment was completed on October 1.  He had a PET scan that shows some residual uptake in the left tonsillar area as well as the right neck.  It is very difficult to examine that area in the office because of patient intolerance.  We did discuss the possibility of bringing him to the operating room to complete the examination.  At this point we will wait to see what happens with the lung biopsy.    Resolution of the dysphagia.  The patient wanted to have his PEG tube out and despite some resistance on my part of the PEG was eventually removed.    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     History of Present Illness    This patient was seen in June 2023 at the request of his family physician. For 6 weeks or so he had some discomfort in his throat. He describes it as in the center of his throat. He also noticed a lump in the midportion of his right neck. This led to a CT scan of his neck which was done in June 2023. I personally reviewed that scan. It does show 2 suspicious nodes 1 in the level 3 on the right and one in level 2 on the left. He also has some irregularity around the soft palate.  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  I have not seen him since that first visit in June 2023.  He completed his chemoradiation therapy on October 1, 2023.  He does not have any residual symptoms.  His swallowing is much improved and he would like to have his PEG tube removed.  He had a pet scan done in January 2024.  I personally reviewed that scan.  He does have some residual uptake in the left oropharynx as well as the right neck.  This is much less than previously.  More so he does have a positive lung nodule.  He will be scheduled for a  biopsy in the near future.    Physical Exam    Examination of the oral cavity and oropharynx does not show any obvious lesion but the examination of the oropharynx is somewhat difficult because of patient's intolerance.  He does have some scarring around his soft palate.  There is good mandibular excursion. Palpation of the parotid, neck, and thyroid field is negative for any significant adenopathies.     A flexible laryngoscopy was carried out. Under topical Xylocaine and Charanjit-Synephrine the scope was introduced through the nostril. The nasopharynx, base of tongue, hypopharynx, and larynx are visualized. The vocal cords are normally mobile. There is no pooling of secretions in the piriform sinuses.  He does have evidence of dryness.     The PEG site was then examined.  The tube was removed.  A dressing was applied over the area.

## 2024-01-10 ENCOUNTER — APPOINTMENT (OUTPATIENT)
Dept: HEMATOLOGY/ONCOLOGY | Facility: HOSPITAL | Age: 74
End: 2024-01-10
Payer: MEDICARE

## 2024-01-10 ENCOUNTER — HOSPITAL ENCOUNTER (OUTPATIENT)
Dept: RADIATION ONCOLOGY | Facility: HOSPITAL | Age: 74
Setting detail: RADIATION/ONCOLOGY SERIES
Discharge: HOME | End: 2024-01-10
Payer: MEDICARE

## 2024-01-10 ENCOUNTER — HOSPITAL ENCOUNTER (OUTPATIENT)
Dept: RADIOLOGY | Facility: HOSPITAL | Age: 74
Discharge: HOME | End: 2024-01-10
Payer: MEDICARE

## 2024-01-10 ENCOUNTER — LAB (OUTPATIENT)
Dept: LAB | Facility: HOSPITAL | Age: 74
End: 2024-01-10
Payer: MEDICARE

## 2024-01-10 VITALS
OXYGEN SATURATION: 98 % | HEIGHT: 66 IN | DIASTOLIC BLOOD PRESSURE: 77 MMHG | SYSTOLIC BLOOD PRESSURE: 136 MMHG | RESPIRATION RATE: 18 BRPM | BODY MASS INDEX: 21.78 KG/M2 | WEIGHT: 135.5 LBS | TEMPERATURE: 98.2 F | HEART RATE: 100 BPM

## 2024-01-10 DIAGNOSIS — C10.9 OROPHARYNGEAL CANCER (MULTI): ICD-10-CM

## 2024-01-10 DIAGNOSIS — R22.0 MASS OF SOFT PALATE: ICD-10-CM

## 2024-01-10 DIAGNOSIS — I89.0 LYMPHEDEMA DUE TO RADIATION: Primary | ICD-10-CM

## 2024-01-10 LAB
ALBUMIN SERPL BCP-MCNC: 3.6 G/DL (ref 3.4–5)
ALP SERPL-CCNC: 68 U/L (ref 33–136)
ALT SERPL W P-5'-P-CCNC: 9 U/L (ref 10–52)
ANION GAP SERPL CALC-SCNC: 13 MMOL/L (ref 10–20)
AST SERPL W P-5'-P-CCNC: 10 U/L (ref 9–39)
BASOPHILS # BLD AUTO: 0.03 X10*3/UL (ref 0–0.1)
BASOPHILS NFR BLD AUTO: 0.5 %
BILIRUB SERPL-MCNC: 0.4 MG/DL (ref 0–1.2)
BUN SERPL-MCNC: 19 MG/DL (ref 6–23)
CALCIUM SERPL-MCNC: 9.2 MG/DL (ref 8.6–10.3)
CHLORIDE SERPL-SCNC: 103 MMOL/L (ref 98–107)
CO2 SERPL-SCNC: 26 MMOL/L (ref 21–32)
CREAT SERPL-MCNC: 1.42 MG/DL (ref 0.5–1.3)
EGFRCR SERPLBLD CKD-EPI 2021: 53 ML/MIN/1.73M*2
EOSINOPHIL # BLD AUTO: 0.19 X10*3/UL (ref 0–0.4)
EOSINOPHIL NFR BLD AUTO: 3.4 %
ERYTHROCYTE [DISTWIDTH] IN BLOOD BY AUTOMATED COUNT: 14.1 % (ref 11.5–14.5)
GLUCOSE BLD MANUAL STRIP-MCNC: 104 MG/DL (ref 74–99)
GLUCOSE SERPL-MCNC: 91 MG/DL (ref 74–99)
HCT VFR BLD AUTO: 27.2 % (ref 41–52)
HGB BLD-MCNC: 9 G/DL (ref 13.5–17.5)
IMM GRANULOCYTES # BLD AUTO: 0.09 X10*3/UL (ref 0–0.5)
IMM GRANULOCYTES NFR BLD AUTO: 1.6 % (ref 0–0.9)
LYMPHOCYTES # BLD AUTO: 0.44 X10*3/UL (ref 0.8–3)
LYMPHOCYTES NFR BLD AUTO: 7.8 %
MAGNESIUM SERPL-MCNC: 1.86 MG/DL (ref 1.6–2.4)
MCH RBC QN AUTO: 29.4 PG (ref 26–34)
MCHC RBC AUTO-ENTMCNC: 33.1 G/DL (ref 32–36)
MCV RBC AUTO: 89 FL (ref 80–100)
MONOCYTES # BLD AUTO: 0.75 X10*3/UL (ref 0.05–0.8)
MONOCYTES NFR BLD AUTO: 13.3 %
NEUTROPHILS # BLD AUTO: 4.14 X10*3/UL (ref 1.6–5.5)
NEUTROPHILS NFR BLD AUTO: 73.4 %
NRBC BLD-RTO: 0 /100 WBCS (ref 0–0)
PLATELET # BLD AUTO: 266 X10*3/UL (ref 150–450)
POTASSIUM SERPL-SCNC: 4.2 MMOL/L (ref 3.5–5.3)
PROT SERPL-MCNC: 6.3 G/DL (ref 6.4–8.2)
RBC # BLD AUTO: 3.06 X10*6/UL (ref 4.5–5.9)
SODIUM SERPL-SCNC: 138 MMOL/L (ref 136–145)
TSH SERPL-ACNC: 1.82 MIU/L (ref 0.44–3.98)
WBC # BLD AUTO: 5.6 X10*3/UL (ref 4.4–11.3)

## 2024-01-10 PROCEDURE — 70491 CT SOFT TISSUE NECK W/DYE: CPT | Performed by: RADIOLOGY

## 2024-01-10 PROCEDURE — 2550000001 HC RX 255 CONTRASTS

## 2024-01-10 PROCEDURE — 36415 COLL VENOUS BLD VENIPUNCTURE: CPT

## 2024-01-10 PROCEDURE — 3430000001 HC RX 343 DIAGNOSTIC RADIOPHARMACEUTICALS: Mod: MUE

## 2024-01-10 PROCEDURE — 78815 PET IMAGE W/CT SKULL-THIGH: CPT | Mod: PS

## 2024-01-10 PROCEDURE — 70491 CT SOFT TISSUE NECK W/DYE: CPT

## 2024-01-10 PROCEDURE — 84443 ASSAY THYROID STIM HORMONE: CPT

## 2024-01-10 PROCEDURE — 83735 ASSAY OF MAGNESIUM: CPT

## 2024-01-10 PROCEDURE — 80053 COMPREHEN METABOLIC PANEL: CPT

## 2024-01-10 PROCEDURE — 99214 OFFICE O/P EST MOD 30 MIN: CPT

## 2024-01-10 PROCEDURE — A9552 F18 FDG: HCPCS | Mod: MUE

## 2024-01-10 PROCEDURE — 82947 ASSAY GLUCOSE BLOOD QUANT: CPT | Mod: 59

## 2024-01-10 PROCEDURE — 85025 COMPLETE CBC W/AUTO DIFF WBC: CPT

## 2024-01-10 RX ORDER — FLUDEOXYGLUCOSE F 18 200 MCI/ML
11.81 INJECTION, SOLUTION INTRAVENOUS
Status: COMPLETED | OUTPATIENT
Start: 2024-01-10 | End: 2024-01-10

## 2024-01-10 RX ADMIN — FLUDEOXYGLUCOSE F 18 11.81 MILLICURIE: 200 INJECTION, SOLUTION INTRAVENOUS at 09:30

## 2024-01-10 RX ADMIN — IOHEXOL 65 ML: 350 INJECTION, SOLUTION INTRAVENOUS at 10:02

## 2024-01-10 ASSESSMENT — COLUMBIA-SUICIDE SEVERITY RATING SCALE - C-SSRS
1. IN THE PAST MONTH, HAVE YOU WISHED YOU WERE DEAD OR WISHED YOU COULD GO TO SLEEP AND NOT WAKE UP?: NO
2. HAVE YOU ACTUALLY HAD ANY THOUGHTS OF KILLING YOURSELF?: NO
6. HAVE YOU EVER DONE ANYTHING, STARTED TO DO ANYTHING, OR PREPARED TO DO ANYTHING TO END YOUR LIFE?: NO

## 2024-01-10 ASSESSMENT — ENCOUNTER SYMPTOMS
HEADACHES: 0
ABDOMINAL PAIN: 0
TROUBLE SWALLOWING: 0
ARTHRALGIAS: 1
NAUSEA: 0
BACK PAIN: 1
VOMITING: 0
DIFFICULTY URINATING: 0
SPEECH DIFFICULTY: 0
EXTREMITY WEAKNESS: 1
CONSTIPATION: 0
CONFUSION: 0
CHEST TIGHTNESS: 0
PALPITATIONS: 0
APPETITE CHANGE: 0
ADENOPATHY: 0
FEVER: 0
FATIGUE: 0
DEPRESSION: 0
NERVOUS/ANXIOUS: 0
CHILLS: 0
UNEXPECTED WEIGHT CHANGE: 0
OCCASIONAL FEELINGS OF UNSTEADINESS: 0
SHORTNESS OF BREATH: 0
SORE THROAT: 0
WHEEZING: 0
DIARRHEA: 0
LOSS OF SENSATION IN FEET: 0
DYSURIA: 0
DIZZINESS: 0
COUGH: 0
HEMATURIA: 0

## 2024-01-10 ASSESSMENT — PATIENT HEALTH QUESTIONNAIRE - PHQ9
1. LITTLE INTEREST OR PLEASURE IN DOING THINGS: NOT AT ALL
2. FEELING DOWN, DEPRESSED OR HOPELESS: NOT AT ALL
SUM OF ALL RESPONSES TO PHQ9 QUESTIONS 1 AND 2: 0

## 2024-01-10 ASSESSMENT — PAIN SCALES - GENERAL: PAINLEVEL: 0-NO PAIN

## 2024-01-10 NOTE — PROGRESS NOTES
Radiation Oncology Follow-Up    Patient Name:  Phong Wong  MRN:  [id number]  :  1950    Referring Provider: Aris Berry, *  Primary Care Provider: Harshil Weiss MD  Care Team: Patient Care Team:  Harshil Weiss MD as PCP - General (Internal Medicine)  Harshil Weiss MD as PCP - [employer] PCP  Vianca Steen MD as Radiation Oncologist (Radiation Oncology)  Lyssa Patten PA-C as Physician Assistant (Hematology and Oncology)  Kyunghee Burkitt, DO as Medical Oncologist (Hematology and Oncology)  Juan Jose Fletcher MD as Surgeon (Otolaryngology)    Date of Service: 1/10/2024     SUBJECTIVE  History of Present Illness:   Phong Wong is a 73 y.o. 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  The patient underwent chemoradiation ending on 10/10/12.  His recovery was complicated by a hospital admission on 10/18/23 for lethargy and weakness.  Patient was hypotensive and hypoxic with elevated lactate on admission, concerning for opioid overdose vs infection.   Patient received empiric antibiotics with sepsis workup being negative.  EGD with biopsy, performed on 10/20/23, showed diffuse radiation esophagitis and actively inflamed esophageal squamous mucosa with erosion and reactive changes.  Inflamed cardiac-type mucosa, negative for intestinal metaplasia or dysplasia.  During his hospitalization a Clark was placed for urinary retention and a PEG for malnutrition.  Patient was subsequently discharged to [facility] for skilled rehab.      Today the patient is in clinic, accompanied by his wife, for a routine Radiation Oncology survival visit.  Patient states that he's is doing very well today and is without many complaints.  Endorses good PO intake.   He focuses on softer foods since he's edentulous.  He has dentures but state that they don't fit so he's needing modifications through his dentist.  Denies coughing or choking when eating.   Denies dysphagia, odynophagia, mucositis, trismus or new lumps/bumps/discolorations around his oral cavity.   Endorses being able to sustain his weight with his current diet.  He does have a PEG in place but only flushes it daily.  His right neck has healed well with no signs of infection. Denies chills, fever, dyspnea or chest pain.  Continues to endorse some mild fatigue.  Denies headaches, or focal sensory/motor changes.  Denies abdominal pain, nausea, vomiting, diarrhea or constipation.   Patient is scheduled to see Dr. Fletcher on 1/12/24.      An MRI of the neck completed today shows decreased size of the previously demonstrated right-sided yvette neck mass.  This was shared with the patient and his wife.  A PET completed today has yet to be reviewed by Radiology.  I told the patient that I'd review with Dr. Steen reach back out to him on 1/11/24 to discuss.   Treatment Rendered:   Radiation Treatments       Active   No active radiation treatments to show.     Completed   Soft palate + B neck (Started on 8/16/2023)   Most recent fraction: 200 cGy given on 10/4/2023   Total given: 7,000 cGy / 7,000 cGy  (35 of 35 fractions)   Elapsed Days: 49   Technique: IMRT                     Review of Systems:   Review of Systems   Constitutional:  Negative for appetite change, chills, fatigue, fever and unexpected weight change.   HENT:   Negative for hearing loss, lump/mass, mouth sores, nosebleeds, sore throat, tinnitus and trouble swallowing.    Respiratory:  Negative for chest tightness, cough, shortness of breath and wheezing.    Cardiovascular:  Negative for chest pain and palpitations.   Gastrointestinal:  Negative for abdominal pain, constipation, diarrhea, nausea and vomiting.   Genitourinary:  Negative for difficulty urinating, dysuria, hematuria, pelvic pain and penile discharge.         Has Clark in place.    Musculoskeletal:  Positive for arthralgias and back pain.   Neurological:  Positive for extremity  "weakness. Negative for dizziness, headaches and speech difficulty.        Generalized weakness.    Hematological:  Negative for adenopathy.   Psychiatric/Behavioral:  Negative for confusion and depression. The patient is not nervous/anxious.      The patient's current pain level was not assessed.  They report currently having a pain of 0 out of 10.  They feel their pain is under control without the use of pain medications.    Performance Status:   The Karnofsky performance scale today is 80, Normal activity with effort; some signs or symptoms of disease (ECOG equivalent 1).        OBJECTIVE  Vital Signs:  /77   Pulse 100   Temp 36.8 °C (98.2 °F) (Temporal)   Resp 18   Ht 1.676 m (5' 6\")   Wt 61.5 kg (135 lb 8 oz)   SpO2 98%   BMI 21.87 kg/m²    Physical Exam:  Physical Exam  Constitutional:       General: He is not in acute distress.     Appearance: Normal appearance. He is normal weight. He is not ill-appearing, toxic-appearing or diaphoretic.   HENT:      Head: Normocephalic and atraumatic. No abrasion or masses.      Jaw: No trismus, swelling or pain on movement.      Salivary Glands: Right salivary gland is not diffusely enlarged. Left salivary gland is not diffusely enlarged.      Comments:  Patient with mild lymphedema.      Nose: Nose normal. No congestion or rhinorrhea.      Mouth/Throat:      Lips: Pink.      Mouth: Mucous membranes are dry. No injury or oral lesions.      Dentition: Normal dentition. Does not have dentures. No gingival swelling, dental abscesses or gum lesions.      Tongue: No lesions. Tongue does not deviate from midline.      Palate: No mass and lesions.      Tonsils: No tonsillar exudate or tonsillar abscesses.      Comments: Edentulous.   Eyes:      General: Lids are normal. Gaze aligned appropriately.      Extraocular Movements: Extraocular movements intact.      Pupils: Pupils are equal, round, and reactive to light.   Neck:      Thyroid: No thyroid mass or thyroid " tenderness.   Cardiovascular:      Rate and Rhythm: Normal rate and regular rhythm.      Pulses: Normal pulses.      Heart sounds: Normal heart sounds. No murmur heard.  Pulmonary:      Effort: Pulmonary effort is normal. No tachypnea or respiratory distress.      Breath sounds: Normal breath sounds. No wheezing or rhonchi.   Abdominal:      General: Abdomen is flat. Bowel sounds are normal. There is no distension.      Palpations: Abdomen is soft. There is no mass.      Tenderness: There is no abdominal tenderness. There is no guarding or rebound.   Musculoskeletal:         General: No swelling, tenderness, deformity or signs of injury. Normal range of motion.      Cervical back: Full passive range of motion without pain, normal range of motion and neck supple. No rigidity or tenderness. No pain with movement. Normal range of motion.      Right lower leg: No edema.      Left lower leg: No edema.   Lymphadenopathy:      Head:      Right side of head: No submental, submandibular, tonsillar, preauricular, posterior auricular or occipital adenopathy.      Left side of head: No submental, submandibular, tonsillar, preauricular, posterior auricular or occipital adenopathy.      Cervical:      Right cervical: No superficial, deep or posterior cervical adenopathy.     Left cervical: No superficial, deep or posterior cervical adenopathy.   Skin:     General: Skin is warm and dry.      Coloration: Skin is not jaundiced.      Findings: No erythema, lesion or rash.   Neurological:      General: No focal deficit present.      Mental Status: He is alert and oriented to person, place, and time.      Motor: Weakness (Generalized weakness.) present.      Coordination: Coordination normal.      Gait: Gait normal.   Psychiatric:         Attention and Perception: Attention normal.         Mood and Affect: Mood normal.         Behavior: Behavior normal. Behavior is cooperative.         ASSESSMENT:  Phong Wong is a 73 y.o. male who is  s/p SCC of the oropharynx (soft Palate, BOT, left palatine tonsil and b/l cervical LN).  The patient underwent chemoradiation ending on 10/10/12.  Today we discussed the Radiation Oncology Survival Plan and the importance of frequent FU to manage the long-term effects of radiation and to surveil for recurrent disease.  Supporting documentation was given to the patient for review.  All questions/concerns were addressed to the satisfaction of the patient.     PLAN:    --FU with Mak Berry CNP in 3 months.   [facility]   --Continue to follow with Dr. Fletcher for ENT management.  CNP to help establish next visit.   --Continue to follow with Dr. Burkitt and Eva Patten PA-C for medical oncology management.   --Continue following with Amy Lejeune, RD for nutritional management.    --Patient to FU with dentist for revisions to his dentures.     Please reach out with any questions or concerns.     NCCN Guidelines were applicable to guide this patients treatment plan.  LIDIA Christie-CNP

## 2024-01-11 ENCOUNTER — TELEPHONE (OUTPATIENT)
Dept: RADIATION ONCOLOGY | Facility: HOSPITAL | Age: 74
End: 2024-01-11
Payer: MEDICARE

## 2024-01-11 DIAGNOSIS — C10.9 OROPHARYNGEAL CANCER (MULTI): Primary | ICD-10-CM

## 2024-01-11 ASSESSMENT — ENCOUNTER SYMPTOMS
FEVER: 0
ABDOMINAL PAIN: 0
SHORTNESS OF BREATH: 0
CHILLS: 0
DIARRHEA: 0
HEADACHES: 0
TROUBLE SWALLOWING: 0
ARTHRALGIAS: 0
NAUSEA: 0
NUMBNESS: 0
VOMITING: 0
NECK PAIN: 0
SORE THROAT: 0
LEG SWELLING: 0
CONSTIPATION: 0
COUGH: 0
LIGHT-HEADEDNESS: 0
DIZZINESS: 0

## 2024-01-11 NOTE — PROGRESS NOTES
Patient ID: Phong Wong is a 73 y.o. male.  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 Juan Jose Fletcher   MedOn: Dr. Kyunghee Burkitt, X. Katherine Feng, PA-C   RadOnc: Dr. Vianca Steen     Prior Therapy:   8/16 - 10/4/23: Recieved concurrent chemoradiation with 7 weekly Carboplatin (2mg/AUC)/Paclitaxel (45mg/m2)  and 70gy RT in 35fx     Site of Disease:  Soft palate, BOT, Left palatine tonsil  B/L Cervical LN  ?lung     Oncologic Issues:   Odynophagia  Fatigue     ONCOLOGIC HISTORY  - Pt had 2 months hx of throat discomfort with lump in right neck  - 6/13/23: CT neck showed a mass 2.8 x 2.5 x 2.9 cm in the right lower cervical neck soft tissues. Two suspicious nodes were observed, one at level 3 on the right and another at level 2 on the left.  - 6/28/23: seen by Dr. Fletcher. 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 Based on the findings, Dr. Fletcher did not recommend surgery due to the location  of the primary tumor and the presence of yvette disease  - 6/30/23: PET/CT showed intense FDG avidity within the soft palate, extending to the base of the tongue and left palatine tonsil. Multiple FDG avid left cervical level II nodes were observed, along with an FDG avid mass in the region of the right cervical  level II/III, infiltrating the right SCM muscle and encasing and invading the right jugular vein. FDG avidity was also detected in the region of the left fossa Rosenmuller and left medial pterygoid muscle.  - 8/16 - 10/4/23: Recieved concurrent chemoradiation with 7 weekly Carboplatin (2mg/AUC)/Paclitaxel (45mg/m2)  and 70gy RT in 35fx    Admissions:  10/5 - 10/10/23: Admitted for extreme weakness, HECTOR, pancytopenia including anemia requiring blood transfusion. D/C'ed to acute rehab        Past Medical History:   Past Medical  History:  2017: Personal history of diseases of the blood and blood-  forming organs and certain disorders involving the immune mechanism      Comment:  History of thrombocytosis   HTN, HLD, COPD, prostate cancer in 2017 (s/p radiation)  Surgical History:    Past Surgical History:   Procedure Laterality Date    CT ABDOMEN PELVIS ANGIOGRAM W AND/OR WO IV CONTRAST  9/3/2014    CT ABDOMEN PELVIS ANGIOGRAM W AND/OR WO IV CONTRAST 9/3/2014 [facility]    IR ANGIOGRAM AORTA ABDOMEN  2014    IR ANGIOGRAM AORTA ABDOMEN 2014 [facility]   Aortoiliofemoral vascular bypass in   Social History:    Social History     Socioeconomic History    Marital status:      Spouse name: Not on file    Number of children: 1    Years of education: Not on file    Highest education level: Not on file   Occupational History    Not on file   Tobacco Use    Smoking status: Former     Packs/day: 1.00     Years: 40.00     Additional pack years: 0.00     Total pack years: 40.00     Types: Cigarettes     Quit date:      Years since quittin.0     Passive exposure: Past    Smokeless tobacco: Never   Substance and Sexual Activity    Alcohol use: Yes     Alcohol/week: 12.0 standard drinks of alcohol     Types: 12 Cans of beer per week    Drug use: Never    Sexual activity: Not on file   Other Topics Concern    Not on file   Social History Narrative    Not on file     Social Determinants of Health     Financial Resource Strain: Low Risk  (10/20/2023)    Overall Financial Resource Strain (CARDIA)     Difficulty of Paying Living Expenses: Not very hard   Food Insecurity: No Food Insecurity (10/5/2023)    Hunger Vital Sign     Worried About Running Out of Food in the Last Year: Never true     Ran Out of Food in the Last Year: Never true   Transportation Needs: No Transportation Needs (10/20/2023)    PRAPARE - Transportation     Lack of Transportation (Medical): No     Lack of Transportation (Non-Medical): No    Physical Activity: Inactive (10/5/2023)    Exercise Vital Sign     Days of Exercise per Week: 0 days     Minutes of Exercise per Session: 0 min   Stress: Stress Concern Present (10/5/2023)    [employer] - Occupational Stress Questionnaire     Feeling of Stress : To some extent   Social Connections: Moderately Integrated (10/5/2023)    Social Connection and Isolation Panel [NHANES]     Frequency of Communication with Friends and Family: More than three times a week     Frequency of Social Gatherings with Friends and Family: More than three times a week     Attends Confucianism Services: Never     Active Member of Clubs or Organizations: Yes     Attends Club or Organization Meetings: Never     Marital Status:    Intimate Partner Violence: Not At Risk (10/5/2023)    Humiliation, Afraid, Rape, and Kick questionnaire     Fear of Current or Ex-Partner: No     Emotionally Abused: No     Physically Abused: No     Sexually Abused: No   Housing Stability: Low Risk  (10/20/2023)    Housing Stability Vital Sign     Unable to Pay for Housing in the Last Year: No     Number of Places Lived in the Last Year: 1     Unstable Housing in the Last Year: No      Family History:    Family History   Problem Relation Name Age of Onset    Aortic aneurysm Father          abdominal     Family Oncology History:    Cancer-related family history is not on file.      Subjective   Chief Complaint: Squamous Cell Carcinoma of oropharynx    HPI  Interval History  Phong Wong is a 73 y.o. male with recent diagnosis of locally advanced oral cavity cancer, completed concurrent chemoradiation with 7 weekly Carboplatin+Paclitaxel on 10/4/23.  He was admitted 10/5 - 10/10/23 for extreme weakness, HECTOR, pancytopenia including anemia requiring blood transfusion. D/C'ed to acute rehab. He was admitted again 10/18 - 11/2/23 for lethargy, weakness, failure to thrive, anemia.     Patient presents for 3 month post treatment  follow up, reports the following:    He's feeling very well.   He is ambulating well at home, he has rebuilt strength in this limbs.   Appetite is very good. He denies dysphagia. Due to edentulous status he's only eating soft foods.  He had dentures made but needs lower ones to be refitted.  Denies dry mouth, has saliva production, taste is normal.   He has mild swelling in the neck (lymphedema) but no pain. Skin of neck is healing nicely from RT.   Met with Urology for urinary retention. Indewlling catheter  or CIC was discussed but patient declined. He was restarted on Doxazosin 4mg QD. He denies any urinary symptoms currently, no urgency, frequency, hesitancy, dysuria.  He's following with PCP, saw Dr. Weiss on 11/27/23.     ROS  Review of Systems   Constitutional:  Negative for chills and fever. Unexpected weight change: weight loss.  HENT:   Negative for lump/mass, mouth sores, nosebleeds, sore throat, tinnitus and trouble swallowing.    Respiratory:  Negative for cough and shortness of breath.    Cardiovascular:  Negative for chest pain and leg swelling.   Gastrointestinal:  Negative for abdominal pain, constipation, diarrhea, nausea and vomiting.   Genitourinary:  Negative for difficulty urinating, dysuria and frequency.    Musculoskeletal:  Negative for arthralgias and neck pain.   Skin:  Negative for rash. Wound: skin of left neck with burning pain.  Neurological:  Negative for dizziness, headaches, light-headedness and numbness.       Allergies  Allergies   Allergen Reactions    Codeine Nausea Only        Medications  Current Outpatient Medications   Medication Instructions    amLODIPine-benazepriL (Lotrel) 5-20 mg capsule [id number] Medication amlodipine 5 mg-benazepril 20 mg capsule amlodipine 5 mg-benazepril 20 mg capsule 5-20 mg 10/2/2020 Active (Outside)    aspirin 81 mg EC tablet 1 tablet, oral, Daily    doxazosin (CARDURA) 4 mg, oral, Daily, Take 1 tablet by mouth daily in the evening     fluticasone-umeclidin-vilanter (Trelegy Ellipta) 100-62.5-25 mcg blister with device INHALE 1 PUFF EVERY DAY    ipratropium-albuteroL (Duo-Neb) 0.5-2.5 mg/3 mL nebulizer solution Take 3 mL by nebulization 3 times a day as needed for wheezing or shortness of breath.    losartan (COZAAR) 50 mg, oral, Daily    magnesium oxide (MAG-OX) 400 mg, oral, Daily    ofloxacin (Ocuflox) 0.3 % ophthalmic solution Instill 1 drop Left Eye 4 times a day    silver sulfADIAZINE (Silvadene) 1 % cream Topical, Daily        Objective   VS:  BP (!) 112/44   Pulse 82   Resp 17   SpO2 100%   Weight   Wt Readings from Last 7 Encounters:   01/12/24 62.5 kg (137 lb 12.8 oz)   01/10/24 61.5 kg (135 lb 8 oz)   12/21/23 60.8 kg (134 lb)   11/29/23 61.2 kg (134 lb 14.7 oz)   11/09/23 57.6 kg (126 lb 15.8 oz)   11/09/23 57.3 kg (126 lb 5.2 oz)   10/21/23 54 kg (119 lb 0.8 oz)         Physical Exam  Constitutional:       Appearance: Normal appearance. He is underweight.   HENT:      Head: Normocephalic and atraumatic.      Right Ear: External ear normal. No tenderness.      Left Ear: External ear normal. No tenderness.      Nose: Nose normal.      Mouth/Throat:      Mouth: No injury or oral lesions.      Tongue: No lesions.      Pharynx: Oropharynx is clear. No posterior oropharyngeal erythema.      Comments: Edentulous, no oral mucositis  Eyes:      Extraocular Movements: Extraocular movements intact.      Conjunctiva/sclera: Conjunctivae normal.      Pupils: Pupils are equal, round, and reactive to light.   Neck:      Thyroid: No thyroid mass.      Comments: Mild swelling in submental and neck region. No pain on palpation  Cardiovascular:      Rate and Rhythm: Regular rhythm. Tachycardia present.   Pulmonary:      Effort: Pulmonary effort is normal. No respiratory distress.      Breath sounds: Normal breath sounds.   Abdominal:      General: Bowel sounds are normal. There is no distension or abdominal bruit.      Palpations: Abdomen is soft.  There is no mass.      Tenderness: There is no abdominal tenderness.   Genitourinary:     Comments: Clark in place, clear pale yellow urine  Musculoskeletal:         General: Normal range of motion.      Cervical back: Normal range of motion and neck supple.      Right lower leg: No edema.      Left lower leg: No edema.   Lymphadenopathy:      Cervical: No cervical adenopathy.      Upper Body:      Right upper body: No axillary adenopathy.      Left upper body: No axillary adenopathy.   Skin:     General: Skin is warm and dry.      Findings: Lesion: radiation dermatitis in bilateral neck.      Comments: Resolution of desquamation of skin of bilateral neck. +Scab in left lateral neck       Neurological:      General: No focal deficit present.      Mental Status: He is alert and oriented to person, place, and time.      Gait: Gait is intact.   Psychiatric:         Mood and Affect: Mood and affect normal.         Diagnostic Results   The below labs were reviewed.  Results from last 7 days   Lab Units 01/10/24  0908   WBC AUTO x10*3/uL 5.6   HEMOGLOBIN g/dL 9.0*   HEMATOCRIT % 27.2*   PLATELETS AUTO x10*3/uL 266   NEUTROS ABS x10*3/uL 4.14   LYMPHS ABS AUTO x10*3/uL 0.44*   MONOS ABS AUTO x10*3/uL 0.75   EOS ABS AUTO x10*3/uL 0.19   NEUTROS PCT AUTO % 73.4   LYMPHS PCT AUTO % 7.8   MONOS PCT AUTO % 13.3   EOS PCT AUTO % 3.4      Results from last 7 days   Lab Units 01/10/24  0908   GLUCOSE mg/dL 91   SODIUM mmol/L 138   POTASSIUM mmol/L 4.2   CHLORIDE mmol/L 103   CO2 mmol/L 26   BUN mg/dL 19   CREATININE mg/dL 1.42*   EGFR mL/min/1.73m*2 53*   CALCIUM mg/dL 9.2   MAGNESIUM mg/dL 1.86   ALBUMIN g/dL 3.6   PROTEIN TOTAL g/dL 6.3*   BILIRUBIN TOTAL mg/dL 0.4   ALK PHOS U/L 68   ALT U/L 9*   AST U/L 10       Results from last 7 days   Lab Units 01/10/24  0908   TSH mIU/L 1.82              Pathology        Imaging  1/10/2024 PET/CT  IMPRESSION:  1. Significant interval improvement in FDG avidity in the soft palate and left  palatine tonsil, remaining FDG avidity along the left palatine tonsil consistent with persistent residual viable disease.  2. Nearly complete metabolic resolution of cervical yvette metastases with remaining mild FDG avid right cervical level 3 node seen on current study, likely representing residual yvette metastasis.  3. Newly developed FDG-avid pulmonary nodule in the left lower lobe as well as minimal FDG avid subcentimeter pulmonary nodules in bilateral upper lobes, concerning for lung metastasis.    1/10/2024 CT Neck w/ IV Contrast  IMPRESSION:  *Decreased size of the previously demonstrated right-sided yvette neck mass *No new adenopathy  *Post treatment changes in the hypopharynx as described    Assessment/Plan    ASSESSMENT  Phong Wong is a 73 y.o. male with recent diagnosis of locally advanced oral cavity cancer. Completed concurrent chemoradiation with 7 weekly Carboplatin (2mg/AUC)/Paclitaxel (45mg/m2) on 10/4/2. Admitted 10/5 - 10/10 for extreme weakness, HECTOR, pancytopenia including anemia requiring blood transfusion. D/C'ed to acute rehab. Admitted  again 10/18 - 11/2/23 for lethargy, weakness, failure to thrive, anemia.     # Locally advanced oral cavity cancer, T3N2M0  - 6/30/23: PET/CT showed intense FDG avidity within the soft palate, extending to the base of the tongue and left palatine tonsil. Multiple uptake in left cervical level II nodes, right cervical level  II/III, infiltrating the right SCM muscle and encasing and invading the right jugular vein. FDG avidity was also detected in the region of the left fossa Rosenmuller and left medial pterygoid muscle.  - 7/13/23: pt is doing well, no pain, discussed with patient about carboplatin+ paclitaxel as his chemotherapy along with radiation due to his GFR 30s.  Chemo related side effects were reviewed with patient, consent obtained.   - Patient required dental extractions and as a result start date was delayed from 7/26 to 8/15/23  - 8/30:  tolerating chemo well, does have some fatigue but no n/v. No peripheral neuropathy   - 9/6: Continue to tolerate chemo well, no n/v, is feeling more tired. Eating well, tolerating soft foods and supplementing with Boost VHC 2x per day. Denies peripheral neuropathy  - 9/13: continue to tolerate chemo well w/o n/v. Energy is lower but stable. Eating soft foods and Boost VHC x 2 per day  - 9/20: Tolerating chemo well, minimal odynophagia, does have some weight loss.  - 10/4: Completed last dose of Carbo/Taxol last week. Recall patient did not have a [facility] visit as both providers are out of office. He had las RT today and noted to have significantly increased fatigue, weakness, weight loss in the last week resulting from decreased PO intake.   - 11/9/23: Feeling much improved since d/c from hospital on 11/2. Regaining strength and working with PT/OT at acute rehab. Odynophagia resolved, eating soft foods, has weight gain. Serum protein improved. Electrolytes wnl, mild hyponatremia, no c/f refeeding syndrome.    - 11/29/23: continue to feel very well, d/c'ed from rehab. Acute SEs of chemoRT are resolved. He's eating/drinking well with weight gain. Not using PEG currently. Has lymphedema from RT in the neck/submental region. Can consider lymphedema therapy after 3 month restaging PET showing XENA.   - 1/12/24: Patient is feeling well, tolerating soft PO intake. PEG removed today.   Reviewed 3 month restaging PET/CT with patient. Scans noted significant interval improvement in prior sites of disease though with residual FDG avidity in soft palate and left palatine tonsil, c/f residual disease vs post treatment changes. Also noted was a new FDG avid LLL pulm nodule with max SUV 5.7, c/f pulmonary metastasis. Will order STAT lung biopsy on this.   Pt scoped by Dr. Fletcher today, unfortunately was difficult to examine in office due to patient intolerance. Per Dr. Fletcher, may bring to OR to complete the examination depending  on lung biopsy results. Scans will be reviewed at 1/19/24 HNT[facility]. Explained in detail to patient and his SO scan results and next steps. They voiced understanding, all questions answered.     # Hypotension/Dehydration  - 10/18: Labile BP in clinic, SBP ranging from 60s to 110s. Poor skin turgor.  Low PO in the last week at [facility], however has been given Losartan 100mg QD and Spironolactone 25mg QD, likely worsening hypotension  - 11/9: Mildly hypotensive today but asymptomatic. On med review of patient's med list from [facility], patient is only on Losartan 50mg QAM. Reports his BID VS at [facility] show SBP around 120s. Will continue to monitor BP. If BP starts to drop again will adjust Losartan dose.   - 11/29/23: BP wnl. Tachycardic due to effort. Returned to normal after rest.   - 1/12/24: BP at baseline. Note low DBP at baseline.     # Hypoxia  - 10/19/23: O2 fluctuating from 70s to 90s, has poor waveform on pulse ox.  Patient with shallow breathing, and falling asleep every 5 seconds. This was due to opioid overdose  - 11/9/23: O2 100% on RA. He is on Trelegy QD for COPD    # Anemia: improving  - Hgb has been dropping since starting chemo, however today his hgb is <7 and will require a blood transfusion. This is likely due to chemotherapy as there are no s/s of bleeds.   - Received 2U PRBC during 10/18 - 11/2 admission. Anemia likely due to chemotherapy  - 11/29/23: Hgb 8.8. Improved from 3 weeks ago. Expect Hbg will continue to trend up as patient recovers. Can consider iron studies.     # Neutropenia: resolved  - 9/6: . Proceed to C4 today, will monitor prior to next cycle. May need to hold C4  - Return precaution for neutropenic fevers provided for patient and his significant other, they voiced understanding   - 9/13 ANC 1290.  - 9/20 . Ok for C6 Carbo/Taxol. Return precaution provided for neutropenic fevers again.   - 10/4 . Afebrile. Return precaution for neutropenic fever provided again.   - 11/29 ANC  2860.      # Hypokalemia: resolved  - 9/20: K 3.3.  Likely due to chemo  - Replete with KCl 20mg ER PO x 1 in infusion  - 9/27: K 3.8  - 10/4: K 2.9, Replete with KCL 40mg IV in infusion  - 11/9: K 3.8, 11/29 K 3.9    # Hypomagnesemia  - 9/20: Mg 1.48. Likely due to chemo  - Replete with Mag 2g in infusion.   - 10/4: Mg 1.94  - 11/29: Mg 1.59. Patient to continue MagOx 400mg BID for a few days then return to MagOx 400mg daily.     # Weight Loss: resolved  - 10/18: Continue to have very poor PO intake. Pt with temporal wasting and sunken cheeks. Dietician consulted on nutrition and weight loss.   - 11/9: Much improved PO intake as odynophagia is resolved. Gaining weight.   - 11/29: Has 3.6 kg weight gain in 3 weeks.   - 1/12/24: Has 3 lb weight gain from last visit. Weight is stabilizing. Note PEG was removed by Dr. Fletcher today.     # Left neck skin lesion: resolved  - 10/4/23: Pt with burning pain if left skin of neck. Did not have ointment with them today in infusion.Obtained aquaphor with meplex dressing and instructed patient to apply to neck in Infusion today  - Skin of neck well healed.    # Opioid overdose: resolved  - 10/18: Patient's SNF has been erroneously given much too high of Morphine PO dose for PRN pain management. He's been dosed with 100mg PO Morphine at a time with most recent dose at 1AM this morning.   - With his poor baseline kidney function, likely causing low metabolism of morphine, causing his extreme drowsiness.    - Unfortunately not able to administer readily in Narcan in outpatient setting. Code Blue was called    PLAN  -- STAT CT guided Left Lower Lobe lung nodule biopsy, scheduled for 1/17/24  -- Follow up with patient after HNTB discussion on 1/19/24  -- RTC on 2/7 with MedOnc, labs cbc, cmp, mag  -- Call in the mean time with questions

## 2024-01-11 NOTE — TELEPHONE ENCOUNTER
Called the patient to discuss his restaging CT neck and PET from 1/10/24.  Per review by Dr. Steen, there are still areas of concern in the left retropharynx and a right level IV lymph node, however, they could be treatment related/physiological.  We discuss that Dr. Fletcher would be reviewing the imaging prior to his 1/12/24 appointment and performing a laryngoscopy to better characterize.  I also told the patient that his imaging would be reviewed at the [facility] as well.  All questions/concerns were addressed to the satisfaction of the patient.

## 2024-01-12 ENCOUNTER — LAB (OUTPATIENT)
Dept: LAB | Facility: HOSPITAL | Age: 74
End: 2024-01-12
Payer: MEDICARE

## 2024-01-12 ENCOUNTER — OFFICE VISIT (OUTPATIENT)
Dept: OTOLARYNGOLOGY | Facility: HOSPITAL | Age: 74
End: 2024-01-12
Payer: MEDICARE

## 2024-01-12 ENCOUNTER — OFFICE VISIT (OUTPATIENT)
Dept: HEMATOLOGY/ONCOLOGY | Facility: HOSPITAL | Age: 74
End: 2024-01-12
Payer: MEDICARE

## 2024-01-12 VITALS
SYSTOLIC BLOOD PRESSURE: 112 MMHG | RESPIRATION RATE: 17 BRPM | HEART RATE: 82 BPM | DIASTOLIC BLOOD PRESSURE: 44 MMHG | OXYGEN SATURATION: 100 %

## 2024-01-12 VITALS — TEMPERATURE: 97.5 F | WEIGHT: 137.8 LBS | HEIGHT: 66 IN | BODY MASS INDEX: 22.14 KG/M2

## 2024-01-12 DIAGNOSIS — Z08 ENCOUNTER FOR FOLLOW-UP SURVEILLANCE OF HEAD AND NECK CANCER: ICD-10-CM

## 2024-01-12 DIAGNOSIS — R13.12 OROPHARYNGEAL DYSPHAGIA: Primary | ICD-10-CM

## 2024-01-12 DIAGNOSIS — C10.9 OROPHARYNGEAL CANCER (MULTI): Primary | ICD-10-CM

## 2024-01-12 DIAGNOSIS — R91.1 LUNG NODULE: ICD-10-CM

## 2024-01-12 DIAGNOSIS — C10.9 OROPHARYNGEAL CANCER (MULTI): ICD-10-CM

## 2024-01-12 DIAGNOSIS — Z85.89 ENCOUNTER FOR FOLLOW-UP SURVEILLANCE OF HEAD AND NECK CANCER: ICD-10-CM

## 2024-01-12 LAB
ALBUMIN SERPL BCP-MCNC: 3.8 G/DL (ref 3.4–5)
ALP SERPL-CCNC: 68 U/L (ref 33–136)
ALT SERPL W P-5'-P-CCNC: 8 U/L (ref 10–52)
ANION GAP SERPL CALC-SCNC: 13 MMOL/L (ref 10–20)
AST SERPL W P-5'-P-CCNC: 9 U/L (ref 9–39)
BASOPHILS # BLD AUTO: 0.02 X10*3/UL (ref 0–0.1)
BASOPHILS NFR BLD AUTO: 0.4 %
BILIRUB SERPL-MCNC: 0.4 MG/DL (ref 0–1.2)
BUN SERPL-MCNC: 23 MG/DL (ref 6–23)
CALCIUM SERPL-MCNC: 9.3 MG/DL (ref 8.6–10.3)
CHLORIDE SERPL-SCNC: 101 MMOL/L (ref 98–107)
CO2 SERPL-SCNC: 28 MMOL/L (ref 21–32)
CREAT SERPL-MCNC: 1.55 MG/DL (ref 0.5–1.3)
EGFRCR SERPLBLD CKD-EPI 2021: 48 ML/MIN/1.73M*2
EOSINOPHIL # BLD AUTO: 0.13 X10*3/UL (ref 0–0.4)
EOSINOPHIL NFR BLD AUTO: 2.4 %
ERYTHROCYTE [DISTWIDTH] IN BLOOD BY AUTOMATED COUNT: 14.3 % (ref 11.5–14.5)
GLUCOSE SERPL-MCNC: 99 MG/DL (ref 74–99)
HCT VFR BLD AUTO: 28 % (ref 41–52)
HGB BLD-MCNC: 9.2 G/DL (ref 13.5–17.5)
IMM GRANULOCYTES # BLD AUTO: 0.06 X10*3/UL (ref 0–0.5)
IMM GRANULOCYTES NFR BLD AUTO: 1.1 % (ref 0–0.9)
INR PPP: 1.1 (ref 0.9–1.1)
LYMPHOCYTES # BLD AUTO: 0.38 X10*3/UL (ref 0.8–3)
LYMPHOCYTES NFR BLD AUTO: 7 %
MAGNESIUM SERPL-MCNC: 1.94 MG/DL (ref 1.6–2.4)
MCH RBC QN AUTO: 29.5 PG (ref 26–34)
MCHC RBC AUTO-ENTMCNC: 32.9 G/DL (ref 32–36)
MCV RBC AUTO: 90 FL (ref 80–100)
MONOCYTES # BLD AUTO: 0.64 X10*3/UL (ref 0.05–0.8)
MONOCYTES NFR BLD AUTO: 11.7 %
NEUTROPHILS # BLD AUTO: 4.22 X10*3/UL (ref 1.6–5.5)
NEUTROPHILS NFR BLD AUTO: 77.4 %
NRBC BLD-RTO: 0 /100 WBCS (ref 0–0)
PLATELET # BLD AUTO: 257 X10*3/UL (ref 150–450)
POTASSIUM SERPL-SCNC: 4.8 MMOL/L (ref 3.5–5.3)
PROT SERPL-MCNC: 6.6 G/DL (ref 6.4–8.2)
PROTHROMBIN TIME: 11.9 SECONDS (ref 9.8–12.8)
RBC # BLD AUTO: 3.12 X10*6/UL (ref 4.5–5.9)
SODIUM SERPL-SCNC: 137 MMOL/L (ref 136–145)
WBC # BLD AUTO: 5.5 X10*3/UL (ref 4.4–11.3)

## 2024-01-12 PROCEDURE — 1126F AMNT PAIN NOTED NONE PRSNT: CPT | Performed by: STUDENT IN AN ORGANIZED HEALTH CARE EDUCATION/TRAINING PROGRAM

## 2024-01-12 PROCEDURE — 99215 OFFICE O/P EST HI 40 MIN: CPT | Performed by: STUDENT IN AN ORGANIZED HEALTH CARE EDUCATION/TRAINING PROGRAM

## 2024-01-12 PROCEDURE — 1036F TOBACCO NON-USER: CPT | Performed by: OTOLARYNGOLOGY

## 2024-01-12 PROCEDURE — 99417 PROLNG OP E/M EACH 15 MIN: CPT | Performed by: STUDENT IN AN ORGANIZED HEALTH CARE EDUCATION/TRAINING PROGRAM

## 2024-01-12 PROCEDURE — 3078F DIAST BP <80 MM HG: CPT | Performed by: STUDENT IN AN ORGANIZED HEALTH CARE EDUCATION/TRAINING PROGRAM

## 2024-01-12 PROCEDURE — 1159F MED LIST DOCD IN RCRD: CPT | Performed by: STUDENT IN AN ORGANIZED HEALTH CARE EDUCATION/TRAINING PROGRAM

## 2024-01-12 PROCEDURE — 85610 PROTHROMBIN TIME: CPT

## 2024-01-12 PROCEDURE — 1160F RVW MEDS BY RX/DR IN RCRD: CPT | Performed by: OTOLARYNGOLOGY

## 2024-01-12 PROCEDURE — 3074F SYST BP LT 130 MM HG: CPT | Performed by: STUDENT IN AN ORGANIZED HEALTH CARE EDUCATION/TRAINING PROGRAM

## 2024-01-12 PROCEDURE — 31575 DIAGNOSTIC LARYNGOSCOPY: CPT | Performed by: OTOLARYNGOLOGY

## 2024-01-12 PROCEDURE — 1159F MED LIST DOCD IN RCRD: CPT | Performed by: OTOLARYNGOLOGY

## 2024-01-12 PROCEDURE — 85025 COMPLETE CBC W/AUTO DIFF WBC: CPT

## 2024-01-12 PROCEDURE — 1126F AMNT PAIN NOTED NONE PRSNT: CPT | Performed by: OTOLARYNGOLOGY

## 2024-01-12 PROCEDURE — 99214 OFFICE O/P EST MOD 30 MIN: CPT | Performed by: OTOLARYNGOLOGY

## 2024-01-12 PROCEDURE — 83735 ASSAY OF MAGNESIUM: CPT

## 2024-01-12 PROCEDURE — 80053 COMPREHEN METABOLIC PANEL: CPT

## 2024-01-12 PROCEDURE — 1036F TOBACCO NON-USER: CPT | Performed by: STUDENT IN AN ORGANIZED HEALTH CARE EDUCATION/TRAINING PROGRAM

## 2024-01-12 PROCEDURE — 36415 COLL VENOUS BLD VENIPUNCTURE: CPT

## 2024-01-12 RX ORDER — AMLODIPINE AND BENAZEPRIL HYDROCHLORIDE 5; 20 MG/1; MG/1
CAPSULE ORAL
COMMUNITY
Start: 2020-10-01

## 2024-01-12 RX ORDER — OFLOXACIN 3 MG/ML
SOLUTION/ DROPS OPHTHALMIC
COMMUNITY
Start: 2023-12-12

## 2024-01-12 ASSESSMENT — ENCOUNTER SYMPTOMS
DIFFICULTY URINATING: 0
FREQUENCY: 0
DYSURIA: 0

## 2024-01-12 ASSESSMENT — PATIENT HEALTH QUESTIONNAIRE - PHQ9
1. LITTLE INTEREST OR PLEASURE IN DOING THINGS: NOT AT ALL
SUM OF ALL RESPONSES TO PHQ9 QUESTIONS 1 & 2: 0
2. FEELING DOWN, DEPRESSED OR HOPELESS: NOT AT ALL

## 2024-01-12 ASSESSMENT — PAIN SCALES - GENERAL: PAINLEVEL: 0-NO PAIN

## 2024-01-19 ENCOUNTER — TUMOR BOARD CONFERENCE (OUTPATIENT)
Dept: HEMATOLOGY/ONCOLOGY | Facility: HOSPITAL | Age: 74
End: 2024-01-19
Payer: MEDICARE

## 2024-02-06 ENCOUNTER — TELEPHONE (OUTPATIENT)
Dept: HEMATOLOGY/ONCOLOGY | Facility: HOSPITAL | Age: 74
End: 2024-02-06
Payer: MEDICARE

## 2024-02-06 DIAGNOSIS — R91.1 PULMONARY NODULE: Primary | ICD-10-CM

## 2024-02-06 DIAGNOSIS — C78.00 SECONDARY SQUAMOUS CELL CARCINOMA OF LUNG, UNSPECIFIED LATERALITY (MULTI): ICD-10-CM

## 2024-02-06 DIAGNOSIS — C10.9 OROPHARYNGEAL CANCER (MULTI): ICD-10-CM

## 2024-02-06 NOTE — TELEPHONE ENCOUNTER
Called patient to discuss plan of care. Initially it was discussed to cancel tomorrow's appointment with Dr. Burkitt and move it to after his CT scans; however, patient does report new sensation on the left side of his tongue that started about 2 days ago per patient.     Patient reports that the pain is 8-10/10 and that it feels like he kassie his tongue. There is no discoloration or lump visible to the partner. Given that these symptoms are new it was discussed with ANAND Patten while on the phone with the patient to keep the appointment with Dr. Burkitt tomorrow, 2/7/24. Patient agreeable to see scheduled appointment and will plan to get lab work done prior.     No additional questions at this time.

## 2024-02-07 ENCOUNTER — OFFICE VISIT (OUTPATIENT)
Dept: HEMATOLOGY/ONCOLOGY | Facility: HOSPITAL | Age: 74
End: 2024-02-07
Payer: MEDICARE

## 2024-02-07 ENCOUNTER — LAB (OUTPATIENT)
Dept: LAB | Facility: HOSPITAL | Age: 74
End: 2024-02-07
Payer: MEDICARE

## 2024-02-07 VITALS
BODY MASS INDEX: 20.85 KG/M2 | TEMPERATURE: 98.1 F | WEIGHT: 129.19 LBS | HEART RATE: 85 BPM | RESPIRATION RATE: 20 BRPM | SYSTOLIC BLOOD PRESSURE: 152 MMHG | OXYGEN SATURATION: 98 % | DIASTOLIC BLOOD PRESSURE: 69 MMHG

## 2024-02-07 DIAGNOSIS — Z85.89 ENCOUNTER FOR FOLLOW-UP SURVEILLANCE OF HEAD AND NECK CANCER: ICD-10-CM

## 2024-02-07 DIAGNOSIS — Z08 ENCOUNTER FOR FOLLOW-UP SURVEILLANCE OF HEAD AND NECK CANCER: ICD-10-CM

## 2024-02-07 DIAGNOSIS — K14.8 TONGUE LESION: ICD-10-CM

## 2024-02-07 DIAGNOSIS — K12.30 MUCOSITIS ORAL: ICD-10-CM

## 2024-02-07 DIAGNOSIS — C10.9 OROPHARYNGEAL CANCER (MULTI): ICD-10-CM

## 2024-02-07 DIAGNOSIS — C78.02: ICD-10-CM

## 2024-02-07 DIAGNOSIS — R91.1 LUNG NODULE: ICD-10-CM

## 2024-02-07 DIAGNOSIS — C10.9 OROPHARYNGEAL CANCER (MULTI): Primary | ICD-10-CM

## 2024-02-07 LAB
ALBUMIN SERPL BCP-MCNC: 4 G/DL (ref 3.4–5)
ALP SERPL-CCNC: 58 U/L (ref 33–136)
ALT SERPL W P-5'-P-CCNC: 8 U/L (ref 10–52)
ANION GAP SERPL CALC-SCNC: 13 MMOL/L (ref 10–20)
AST SERPL W P-5'-P-CCNC: 12 U/L (ref 9–39)
BASOPHILS # BLD AUTO: 0.03 X10*3/UL (ref 0–0.1)
BASOPHILS NFR BLD AUTO: 0.6 %
BILIRUB SERPL-MCNC: 0.4 MG/DL (ref 0–1.2)
BUN SERPL-MCNC: 25 MG/DL (ref 6–23)
CALCIUM SERPL-MCNC: 9.4 MG/DL (ref 8.6–10.3)
CHLORIDE SERPL-SCNC: 100 MMOL/L (ref 98–107)
CO2 SERPL-SCNC: 29 MMOL/L (ref 21–32)
CREAT SERPL-MCNC: 1.6 MG/DL (ref 0.5–1.3)
EGFRCR SERPLBLD CKD-EPI 2021: 46 ML/MIN/1.73M*2
EOSINOPHIL # BLD AUTO: 0.21 X10*3/UL (ref 0–0.4)
EOSINOPHIL NFR BLD AUTO: 4.2 %
ERYTHROCYTE [DISTWIDTH] IN BLOOD BY AUTOMATED COUNT: 14.5 % (ref 11.5–14.5)
GLUCOSE SERPL-MCNC: 96 MG/DL (ref 74–99)
HCT VFR BLD AUTO: 32.5 % (ref 41–52)
HGB BLD-MCNC: 10.7 G/DL (ref 13.5–17.5)
IMM GRANULOCYTES # BLD AUTO: 0.03 X10*3/UL (ref 0–0.5)
IMM GRANULOCYTES NFR BLD AUTO: 0.6 % (ref 0–0.9)
LYMPHOCYTES # BLD AUTO: 0.49 X10*3/UL (ref 0.8–3)
LYMPHOCYTES NFR BLD AUTO: 9.8 %
MCH RBC QN AUTO: 28.2 PG (ref 26–34)
MCHC RBC AUTO-ENTMCNC: 32.9 G/DL (ref 32–36)
MCV RBC AUTO: 86 FL (ref 80–100)
MONOCYTES # BLD AUTO: 0.58 X10*3/UL (ref 0.05–0.8)
MONOCYTES NFR BLD AUTO: 11.6 %
NEUTROPHILS # BLD AUTO: 3.68 X10*3/UL (ref 1.6–5.5)
NEUTROPHILS NFR BLD AUTO: 73.2 %
NRBC BLD-RTO: 0 /100 WBCS (ref 0–0)
PLATELET # BLD AUTO: 240 X10*3/UL (ref 150–450)
POTASSIUM SERPL-SCNC: 3.7 MMOL/L (ref 3.5–5.3)
PROT SERPL-MCNC: 6.6 G/DL (ref 6.4–8.2)
RBC # BLD AUTO: 3.8 X10*6/UL (ref 4.5–5.9)
SODIUM SERPL-SCNC: 138 MMOL/L (ref 136–145)
WBC # BLD AUTO: 5 X10*3/UL (ref 4.4–11.3)

## 2024-02-07 PROCEDURE — 3077F SYST BP >= 140 MM HG: CPT | Performed by: STUDENT IN AN ORGANIZED HEALTH CARE EDUCATION/TRAINING PROGRAM

## 2024-02-07 PROCEDURE — 85025 COMPLETE CBC W/AUTO DIFF WBC: CPT

## 2024-02-07 PROCEDURE — 80053 COMPREHEN METABOLIC PANEL: CPT

## 2024-02-07 PROCEDURE — 36415 COLL VENOUS BLD VENIPUNCTURE: CPT

## 2024-02-07 PROCEDURE — 99215 OFFICE O/P EST HI 40 MIN: CPT | Performed by: STUDENT IN AN ORGANIZED HEALTH CARE EDUCATION/TRAINING PROGRAM

## 2024-02-07 PROCEDURE — 3078F DIAST BP <80 MM HG: CPT | Performed by: STUDENT IN AN ORGANIZED HEALTH CARE EDUCATION/TRAINING PROGRAM

## 2024-02-07 PROCEDURE — 1036F TOBACCO NON-USER: CPT | Performed by: STUDENT IN AN ORGANIZED HEALTH CARE EDUCATION/TRAINING PROGRAM

## 2024-02-07 PROCEDURE — 1126F AMNT PAIN NOTED NONE PRSNT: CPT | Performed by: STUDENT IN AN ORGANIZED HEALTH CARE EDUCATION/TRAINING PROGRAM

## 2024-02-07 PROCEDURE — 1160F RVW MEDS BY RX/DR IN RCRD: CPT | Performed by: STUDENT IN AN ORGANIZED HEALTH CARE EDUCATION/TRAINING PROGRAM

## 2024-02-07 PROCEDURE — 1159F MED LIST DOCD IN RCRD: CPT | Performed by: STUDENT IN AN ORGANIZED HEALTH CARE EDUCATION/TRAINING PROGRAM

## 2024-02-07 ASSESSMENT — ENCOUNTER SYMPTOMS
NAUSEA: 0
CHILLS: 0
COUGH: 0
DIFFICULTY URINATING: 0
SORE THROAT: 0
VOMITING: 0
DIARRHEA: 0
DYSURIA: 0
DIZZINESS: 0
NECK PAIN: 0
ARTHRALGIAS: 0
HEADACHES: 0
ABDOMINAL PAIN: 0
NUMBNESS: 0
FREQUENCY: 0
SHORTNESS OF BREATH: 0
CONSTIPATION: 0
LIGHT-HEADEDNESS: 0
LEG SWELLING: 0
TROUBLE SWALLOWING: 0
FEVER: 0

## 2024-02-07 ASSESSMENT — PAIN SCALES - GENERAL: PAINLEVEL: 0-NO PAIN

## 2024-02-07 NOTE — PROGRESS NOTES
Patient ID: Phong Wong is a 73 y.o. male.  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 Juan Jose Fletcher   MedOn: Dr. Kyunghee Burkitt, X. Katherine Feng, PA-C   RadOnc: Dr. Vianca Steen     Prior Therapy:   8/16 - 10/4/23: Recieved concurrent chemoradiation with 7 weekly Carboplatin (2mg/AUC)/Paclitaxel (45mg/m2)  and 70gy RT in 35fx     Sites of Disease:  Soft palate, BOT, Left palatine tonsil  B/L Cervical LN  Lung     Oncologic Issues:   Odynophagia  Fatigue     ONCOLOGIC HISTORY  - Pt had 2 months hx of throat discomfort with lump in right neck  - 6/13/23: CT neck showed a mass 2.8 x 2.5 x 2.9 cm in the right lower cervical neck soft tissues. Two suspicious nodes were observed, one at level 3 on the right and another at level 2 on the left.  - 6/28/23: seen by Dr. Fletcher. 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 Based on the findings, Dr. Fletcher did not recommend surgery due to the location  of the primary tumor and the presence of yvette disease  - 6/30/23: PET/CT showed intense FDG avidity within the soft palate, extending to the base of the tongue and left palatine tonsil. Multiple FDG avid left cervical level II nodes were observed, along with an FDG avid mass in the region of the right cervical  level II/III, infiltrating the right SCM muscle and encasing and invading the right jugular vein. FDG avidity was also detected in the region of the left fossa Rosenmuller and left medial pterygoid muscle.  - 8/16 - 10/4/23: Recieved concurrent chemoradiation with 7 weekly Carboplatin (2mg/AUC)/Paclitaxel (45mg/m2)  and 70gy RT in 35fx    Admissions:  10/5 - 10/10/23: Admitted for extreme weakness, HECTOR, pancytopenia including anemia requiring blood transfusion. D/C'ed to acute rehab        Past Medical History:   Past Medical  History:  2017: Personal history of diseases of the blood and blood-  forming organs and certain disorders involving the immune mechanism      Comment:  History of thrombocytosis   HTN, HLD, COPD, prostate cancer in 2017 (s/p radiation)  Surgical History:    Past Surgical History:   Procedure Laterality Date    CT ABDOMEN PELVIS ANGIOGRAM W AND/OR WO IV CONTRAST  9/3/2014    CT ABDOMEN PELVIS ANGIOGRAM W AND/OR WO IV CONTRAST 9/3/2014 [facility]    IR ANGIOGRAM AORTA ABDOMEN  2014    IR ANGIOGRAM AORTA ABDOMEN 2014 [facility]   Aortoiliofemoral vascular bypass in   Social History:    Social History     Socioeconomic History    Marital status:      Spouse name: None    Number of children: 1    Years of education: None    Highest education level: None   Occupational History    None   Tobacco Use    Smoking status: Former     Packs/day: 1.00     Years: 40.00     Additional pack years: 0.00     Total pack years: 40.00     Types: Cigarettes     Quit date:      Years since quittin.1     Passive exposure: Past    Smokeless tobacco: Never   Substance and Sexual Activity    Alcohol use: Yes     Alcohol/week: 12.0 standard drinks of alcohol     Types: 12 Cans of beer per week    Drug use: Never    Sexual activity: None   Other Topics Concern    None   Social History Narrative    None     Social Determinants of Health     Financial Resource Strain: Low Risk  (10/20/2023)    Overall Financial Resource Strain (CARDIA)     Difficulty of Paying Living Expenses: Not very hard   Food Insecurity: No Food Insecurity (10/5/2023)    Hunger Vital Sign     Worried About Running Out of Food in the Last Year: Never true     Ran Out of Food in the Last Year: Never true   Transportation Needs: No Transportation Needs (10/20/2023)    PRAPARE - Transportation     Lack of Transportation (Medical): No     Lack of Transportation (Non-Medical): No   Physical Activity: Inactive (10/5/2023)     Exercise Vital Sign     Days of Exercise per Week: 0 days     Minutes of Exercise per Session: 0 min   Stress: Stress Concern Present (10/5/2023)    [employer] [place] [employer] - Occupational Stress Questionnaire     Feeling of Stress : To some extent   Social Connections: Moderately Integrated (10/5/2023)    Social Connection and Isolation Panel [NHANES]     Frequency of Communication with Friends and Family: More than three times a week     Frequency of Social Gatherings with Friends and Family: More than three times a week     Attends Christian Services: Never     Active Member of Clubs or Organizations: Yes     Attends Club or Organization Meetings: Never     Marital Status:    Intimate Partner Violence: Not At Risk (10/5/2023)    Humiliation, Afraid, Rape, and Kick questionnaire     Fear of Current or Ex-Partner: No     Emotionally Abused: No     Physically Abused: No     Sexually Abused: No   Housing Stability: Low Risk  (10/20/2023)    Housing Stability Vital Sign     Unable to Pay for Housing in the Last Year: No     Number of Places Lived in the Last Year: 1     Unstable Housing in the Last Year: No      Family History:    Family History   Problem Relation Name Age of Onset    Aortic aneurysm Father          abdominal     Family Oncology History:    Cancer-related family history is not on file.      Subjective   Chief Complaint: Squamous Cell Carcinoma of oropharynx    HPI  Interval History  Phong Wong is a 73 y.o. male with recent diagnosis of locally advanced oral cavity cancer, completed concurrent chemoradiation with 7 weekly Carboplatin+Paclitaxel on 10/4/23.  He was admitted 10/5 - 10/10/23 for extreme weakness, HECTOR, pancytopenia including anemia requiring blood transfusion. D/C'ed to acute rehab. He was admitted again 10/18 - 11/2/23 for lethargy, weakness, failure to thrive, anemia.     Patient presents for 4 month follow up, reports the following:    He noticed pain on the left  lateral side of the tongue since last week. Rating pain at 6-8/10. Reports there's pain with PO intake. There appear to be a sore on the left side of the tongue. He was taking Tylenol 1000mg BID with not much change in pain. Will start oxycodone.  Otherwise, h's feeling very well. Ambulating well at home, has more strength in this limbs.   Appetite is very good. He denies dysphagia. Due to edentulous status he's only eating soft foods.  He had dentures made but needs lower ones to be refitted.  Denies dry mouth, has saliva production, taste is normal.   He has mild swelling in the neck (lymphedema) but no pain.   Met with Urology for urinary retention. Indewlling catheter  or CIC was discussed but patient declined. He was restarted on Doxazosin 4mg QD. He denies any urinary symptoms currently, no urgency, frequency, hesitancy, dysuria.  He's following with PCP, saw Dr. Weiss on 11/27/23.     ROS  Review of Systems   Constitutional:  Negative for chills and fever. Unexpected weight change: weight loss.  HENT:   Positive for mouth sores. Negative for lump/mass, nosebleeds, sore throat, tinnitus and trouble swallowing.    Respiratory:  Negative for cough and shortness of breath.    Cardiovascular:  Negative for chest pain and leg swelling.   Gastrointestinal:  Negative for abdominal pain, constipation, diarrhea, nausea and vomiting.   Genitourinary:  Negative for difficulty urinating, dysuria and frequency.    Musculoskeletal:  Negative for arthralgias and neck pain.   Skin:  Negative for rash and wound.   Neurological:  Negative for dizziness, headaches, light-headedness and numbness.       Allergies  Allergies   Allergen Reactions    Codeine Nausea Only        Medications  Current Outpatient Medications   Medication Instructions    amLODIPine-benazepriL (Lotrel) 5-20 mg capsule 772012 Medication amlodipine 5 mg-benazepril 20 mg capsule amlodipine 5 mg-benazepril 20 mg capsule 5-20 mg 10/2/2020 Active (Outside)     aspirin 81 mg EC tablet 1 tablet, oral, Daily    BMX ORAL SUSPENSION (1:1:1) 5 mL, Swish & Spit, 3 times daily    doxazosin (CARDURA) 4 mg, oral, Daily, Take 1 tablet by mouth daily in the evening    fluticasone-umeclidin-vilanter (Trelegy Ellipta) 100-62.5-25 mcg blister with device INHALE 1 PUFF EVERY DAY    ipratropium-albuteroL (Duo-Neb) 0.5-2.5 mg/3 mL nebulizer solution Take 3 mL by nebulization 3 times a day as needed for wheezing or shortness of breath.    losartan (COZAAR) 50 mg, oral, Daily    magnesium oxide (MAG-OX) 400 mg, oral, Daily    ofloxacin (Ocuflox) 0.3 % ophthalmic solution Instill 1 drop Left Eye 4 times a day    oxyCODONE (OXAYDO) 5 mg, oral, Every 6 hours PRN        Objective   VS:  /69   Pulse 85   Temp 36.7 °C (98.1 °F) (Core)   Resp 20   Wt 58.6 kg (129 lb 3 oz)   SpO2 98%   BMI 20.85 kg/m²   Weight   Wt Readings from Last 7 Encounters:   02/07/24 58.6 kg (129 lb 3 oz)   01/17/24 61.2 kg (135 lb)   01/12/24 62.5 kg (137 lb 12.8 oz)   01/10/24 61.5 kg (135 lb 8 oz)   12/21/23 60.8 kg (134 lb)   11/29/23 61.2 kg (134 lb 14.7 oz)   11/09/23 57.6 kg (126 lb 15.8 oz)         Physical Exam  Constitutional:       Appearance: Normal appearance. He is underweight.   HENT:      Head: Normocephalic and atraumatic.      Right Ear: External ear normal. No tenderness.      Left Ear: External ear normal. No tenderness.      Nose: Nose normal.      Mouth/Throat:      Mouth: No injury or oral lesions.      Tongue: Lesions present.      Pharynx: Oropharynx is clear. No posterior oropharyngeal erythema.      Comments: Edentulous  Left lateral tongue with flat 1cm, yellow colored lesion.   Eyes:      Extraocular Movements: Extraocular movements intact.      Conjunctiva/sclera: Conjunctivae normal.      Pupils: Pupils are equal, round, and reactive to light.   Neck:      Thyroid: No thyroid mass.      Comments: Mild swelling in submental and neck region. No pain on palpation  Cardiovascular:       Rate and Rhythm: Normal rate and regular rhythm.   Pulmonary:      Effort: Pulmonary effort is normal. No respiratory distress.      Breath sounds: Normal breath sounds.   Abdominal:      General: Bowel sounds are normal. There is no distension or abdominal bruit.      Palpations: Abdomen is soft. There is no mass.      Tenderness: There is no abdominal tenderness.   Genitourinary:     Comments: Clark in place, clear pale yellow urine  Musculoskeletal:         General: Normal range of motion.      Cervical back: Normal range of motion and neck supple.      Right lower leg: No edema.      Left lower leg: No edema.   Lymphadenopathy:      Cervical: No cervical adenopathy.      Upper Body:      Right upper body: No axillary adenopathy.      Left upper body: No axillary adenopathy.   Skin:     General: Skin is warm and dry.      Findings: No lesion (radiation dermatitis in bilateral neck), rash or wound.   Neurological:      General: No focal deficit present.      Mental Status: He is alert and oriented to person, place, and time.      Gait: Gait is intact.   Psychiatric:         Mood and Affect: Mood and affect normal.         Diagnostic Results   The below labs were reviewed.  Results from last 7 days   Lab Units 02/07/24  1224   WBC AUTO x10*3/uL 5.0   HEMOGLOBIN g/dL 10.7*   HEMATOCRIT % 32.5*   PLATELETS AUTO x10*3/uL 240   NEUTROS ABS x10*3/uL 3.68   LYMPHS ABS AUTO x10*3/uL 0.49*   MONOS ABS AUTO x10*3/uL 0.58   EOS ABS AUTO x10*3/uL 0.21   NEUTROS PCT AUTO % 73.2   LYMPHS PCT AUTO % 9.8   MONOS PCT AUTO % 11.6   EOS PCT AUTO % 4.2      Results from last 7 days   Lab Units 02/07/24  1224   GLUCOSE mg/dL 96   SODIUM mmol/L 138   POTASSIUM mmol/L 3.7   CHLORIDE mmol/L 100   CO2 mmol/L 29   BUN mg/dL 25*   CREATININE mg/dL 1.60*   EGFR mL/min/1.73m*2 46*   CALCIUM mg/dL 9.4   ALBUMIN g/dL 4.0   PROTEIN TOTAL g/dL 6.6   BILIRUBIN TOTAL mg/dL 0.4   ALK PHOS U/L 58   ALT U/L 8*   AST U/L 12                       Pathology      Imaging  1/10/2024 PET/CT  IMPRESSION:  1. Significant interval improvement in FDG avidity in the soft palate and left palatine tonsil, remaining FDG avidity along the left palatine tonsil consistent with persistent residual viable disease.  2. Nearly complete metabolic resolution of cervical yvette metastases with remaining mild FDG avid right cervical level 3 node seen on current study, likely representing residual yvette metastasis.  3. Newly developed FDG-avid pulmonary nodule in the left lower lobe as well as minimal FDG avid subcentimeter pulmonary nodules in bilateral upper lobes, concerning for lung metastasis.    1/10/2024 CT Neck w/ IV Contrast  IMPRESSION:  *Decreased size of the previously demonstrated right-sided yvette neck mass *No new adenopathy  *Post treatment changes in the hypopharynx as described    Assessment/Plan    ASSESSMENT  Phong Wong is a 73 y.o. male with recent diagnosis of locally advanced oral cavity cancer. Completed concurrent chemoradiation with 7 weekly Carboplatin (2mg/AUC)/Paclitaxel (45mg/m2) on 10/4/2. Admitted 10/5 - 10/10 for extreme weakness, HECTOR, pancytopenia including anemia requiring blood transfusion. D/C'ed to acute rehab. Admitted  again 10/18 - 11/2/23 for lethargy, weakness, failure to thrive, anemia.     # Locally advanced oral cavity cancer, T3N2M0  - 6/30/23: PET/CT showed intense FDG avidity within the soft palate, extending to the base of the tongue and left palatine tonsil. Multiple uptake in left cervical level II nodes, right cervical level  II/III, infiltrating the right SCM muscle and encasing and invading the right jugular vein. FDG avidity was also detected in the region of the left fossa Rosenmuller and left medial pterygoid muscle.  - 7/13/23: pt is doing well, no pain, discussed with patient about carboplatin+ paclitaxel as his chemotherapy along with radiation due to his GFR 30s.  Chemo related side effects were reviewed with patient,  consent obtained.   - Patient required dental extractions and as a result start date was delayed from 7/26 to 8/15/23  - 8/30: tolerating chemo well, does have some fatigue but no n/v. No peripheral neuropathy   - 9/6: Continue to tolerate chemo well, no n/v, is feeling more tired. Eating well, tolerating soft foods and supplementing with Boost VHC 2x per day. Denies peripheral neuropathy  - 9/13: continue to tolerate chemo well w/o n/v. Energy is lower but stable. Eating soft foods and Boost VHC x 2 per day  - 9/20: Tolerating chemo well, minimal odynophagia, does have some weight loss.  - 10/4: Completed last dose of Carbo/Taxol last week. Recall patient did not have a [facility] visit as both providers are out of office. He had las RT today and noted to have significantly increased fatigue, weakness, weight loss in the last week resulting from decreased PO intake.   - 11/9/23: Feeling much improved since d/c from hospital on 11/2. Regaining strength and working with PT/OT at acute rehab. Odynophagia resolved, eating soft foods, has weight gain. Serum protein improved. Electrolytes wnl, mild hyponatremia, no c/f refeeding syndrome.    - 11/29/23: continue to feel very well, d/c'ed from rehab. Acute SEs of chemoRT are resolved. He's eating/drinking well with weight gain. Not using PEG currently. Has lymphedema from RT in the neck/submental region. Can consider lymphedema therapy after 3 month restaging PET showing XENA.   - 1/12/24: Patient is feeling well, tolerating soft PO intake. PEG removed today.   Reviewed 3 month restaging PET/CT with patient. Scans noted significant interval improvement in prior sites of disease though with residual FDG avidity in soft palate and left palatine tonsil, c/f residual disease vs post treatment changes. Also noted was a new FDG avid LLL pulm nodule with max SUV 5.7, c/f pulmonary metastasis. Will order STAT lung biopsy on this.   Pt scoped by Dr. Fletcher today, unfortunately was  difficult to examine in office due to patient intolerance. Per Dr. Fletcher, may bring to OR to complete the examination depending on lung biopsy results. Scans will be reviewed at 1/19/24 [facility]. Explained in detail to patient and his SO scan results and next steps. They voiced understanding, all questions answered.   - 2/9/24: Patient presents with new painful left lateral tongue lesion, hindering PO intake. Will start Oxycodone 5mg Q6 hr PRN for pain control. Pt will see Dr. Fletcher next week for eval/possible biopsy. Patient will have repeat CT Chest for FDG avid LLL pulm nodule as prior biopsy attempt was successful due to small nodule size and difficult location near diaphragm.    # Hypotension/Dehydration  - 10/18: Labile BP in clinic, SBP ranging from 60s to 110s. Poor skin turgor.  Low PO in the last week at [facility], however has been given Losartan 100mg QD and Spironolactone 25mg QD, likely worsening hypotension  - 11/9: Mildly hypotensive today but asymptomatic. On med review of patient's med list from [facility], patient is only on Losartan 50mg QAM. Reports his BID VS at [facility] show SBP around 120s. Will continue to monitor BP. If BP starts to drop again will adjust Losartan dose.   - 11/29/23: BP wnl. Tachycardic due to effort. Returned to normal after rest.   - 1/12/24: BP at baseline. Note low DBP at baseline.     # Hypoxia  - 10/19/23: O2 fluctuating from 70s to 90s, has poor waveform on pulse ox.  Patient with shallow breathing, and falling asleep every 5 seconds. This was due to opioid overdose  - 11/9/23: O2 100% on RA. He is on Trelegy QD for COPD    # Anemia: improving  - Hgb has been dropping since starting chemo, however today his hgb is <7 and will require a blood transfusion. This is likely due to chemotherapy as there are no s/s of bleeds.   - Received 2U PRBC during 10/18 - 11/2 admission. Anemia likely due to chemotherapy  - 11/29/23: Hgb 8.8. Improved from 3 weeks ago. Expect Hbg will continue to trend  up as patient recovers. Can consider iron studies.     # Neutropenia: resolved  - 9/6: . Proceed to C4 today, will monitor prior to next cycle. May need to hold C4  - Return precaution for neutropenic fevers provided for patient and his significant other, they voiced understanding   - 9/13 ANC 1290.  - 9/20 . Ok for C6 Carbo/Taxol. Return precaution provided for neutropenic fevers again.   - 10/4 . Afebrile. Return precaution for neutropenic fever provided again.   - 11/29 ANC 2860.      # Hypokalemia: resolved  - 9/20: K 3.3.  Likely due to chemo  - Replete with KCl 20mg ER PO x 1 in infusion  - 9/27: K 3.8  - 10/4: K 2.9, Replete with KCL 40mg IV in infusion  - 11/9: K 3.8, 11/29 K 3.9    # Hypomagnesemia  - 9/20: Mg 1.48. Likely due to chemo  - Replete with Mag 2g in infusion.   - 10/4: Mg 1.94  - 11/29: Mg 1.59. Patient to continue MagOx 400mg BID for a few days then return to MagOx 400mg daily.     # Weight Loss: resolved  - 10/18: Continue to have very poor PO intake. Pt with temporal wasting and sunken cheeks. Dietician consulted on nutrition and weight loss.   - 11/9: Much improved PO intake as odynophagia is resolved. Gaining weight.   - 11/29: Has 3.6 kg weight gain in 3 weeks.   - 1/12/24: Has 3 lb weight gain from last visit. Weight is stabilizing. Note PEG was removed by Dr. Fletcher today.     # Left neck skin lesion: resolved  - 10/4/23: Pt with burning pain if left skin of neck. Did not have ointment with them today in infusion.Obtained aquaphor with meplex dressing and instructed patient to apply to neck in Infusion today  - Skin of neck well healed.    # Opioid overdose: resolved  - 10/18: Patient's SNF has been erroneously given much too high of Morphine PO dose for PRN pain management. He's been dosed with 100mg PO Morphine at a time with most recent dose at 1AM this morning.   - With his poor baseline kidney function, likely causing low metabolism of morphine, causing his  extreme drowsiness.    - Unfortunately not able to administer readily in Narcan in outpatient setting. Code Blue was called    PLAN  -- CT Chest wo contrast on 2/9  -- 2/16/24 FUV with Dr. Fletcher on left lateral tongue lesion  -- Will attempt lung biopsy with IR again if lung nodule has increased in size  -- FUV w/ Dr Burkitt after biopsy  -- START Oxycodone 5mg Q6hr PRN for Tongue pain  -- Call in the mean time with questions

## 2024-02-09 ENCOUNTER — SOCIAL WORK (OUTPATIENT)
Dept: CASE MANAGEMENT | Facility: HOSPITAL | Age: 74
End: 2024-02-09
Payer: MEDICARE

## 2024-02-09 ENCOUNTER — HOSPITAL ENCOUNTER (OUTPATIENT)
Dept: RADIOLOGY | Facility: HOSPITAL | Age: 74
Discharge: HOME | End: 2024-02-09
Payer: MEDICARE

## 2024-02-09 DIAGNOSIS — C78.00 SECONDARY SQUAMOUS CELL CARCINOMA OF LUNG, UNSPECIFIED LATERALITY (MULTI): ICD-10-CM

## 2024-02-09 DIAGNOSIS — R91.1 PULMONARY NODULE: ICD-10-CM

## 2024-02-09 DIAGNOSIS — C10.9 OROPHARYNGEAL CANCER (MULTI): ICD-10-CM

## 2024-02-09 PROCEDURE — 71250 CT THORAX DX C-: CPT

## 2024-02-09 ASSESSMENT — ENCOUNTER SYMPTOMS: WOUND: 0

## 2024-02-09 NOTE — PROGRESS NOTES
LSW received communication from,  Eva Patten PA-C, that patient was living in a skilled nursing facility, [facility], where he was given a harmful dose of a narcotic that cause an inpatient hospital visit. Patient wanted this incident reported. LSW reached out to the [facility] and [facility] on 2/7/24 to report the incident. LSW received confirmation from both agencies that the complaint was received an investigation will be started once they speak with the patient directly.

## 2024-02-12 ENCOUNTER — TELEPHONE VISIT (OUTPATIENT)
Dept: HEMATOLOGY/ONCOLOGY | Facility: HOSPITAL | Age: 74
End: 2024-02-12
Payer: MEDICARE

## 2024-02-12 DIAGNOSIS — K14.8 TONGUE LESION: ICD-10-CM

## 2024-02-12 DIAGNOSIS — C10.9 OROPHARYNGEAL CANCER (MULTI): Primary | ICD-10-CM

## 2024-02-12 DIAGNOSIS — R91.8 LUNG NODULES: ICD-10-CM

## 2024-02-12 PROCEDURE — 99213 OFFICE O/P EST LOW 20 MIN: CPT | Performed by: STUDENT IN AN ORGANIZED HEALTH CARE EDUCATION/TRAINING PROGRAM

## 2024-02-12 NOTE — PROGRESS NOTES
Called patient today to review the below scan result from 2/9/24 follow up CT Chest wo contrast.   Compared to patient's Patient 's 1/10/24 PET/CT. His LLL 1.1 cm nodule as grown to 1.6 cm. Also noted are several subcentimeter lung nodules all c/f lung metastasis.   Discussed with patient the need for IR guided biopsy of the lung nodules to help us guide next step in his treatment. Patient voiced understanding and is agreeable to proceed with IR lung biopsy at this time.     Will reach out to IR scheduling team to coordinate with patient. Patient will have follow in clinic with Dr. Burkitt after Biopsy results.     Patient reports left sided tongue pain is now well managed with Oxycodone 5mg Q6hr PRN that was started last week. He is no longer taking Tylenol. He's able to eat and drink normally, denies trouble swallowing. Reminded patient of FUV with Dr. Fletcher this Friday to evaluate left tongue lesion.     Images  2/9/2024 CT chest wo IV contrast  Addendum Date: 2/9/2024    Interpreted By:  Richy Bolaños, ADDENDUM: There has been interval resolution of a right apical parenchymal lung nodule which may have been inflammatory/infectious in nature.       Result Date: 2/9/2024  Impression:   1. Multiple pulmonary nodules are again noted corresponding to FDG avid nodules on most recent PET-CT from 01/10/2024 and are new when compared to prior CT of the chest from 06/13/2023. The largest of which is a pleural-based left lower lobe nodule measuring up to 1.6 cm. Findings are concerning for metastatic disease.   2. Severe coronary artery calcifications, indicating the presence of coronary artery disease. If the patient has associated symptoms recommend management as per chest pain guidelines (e.g. https://doi.org/10.1161/CIR.5539501328645570). If the patient is asymptomatic consider reviewing modifiable cardiovascular risk factors and managing as per guidelines for primary prevention (e.g.  https://doi.org/10.1161/CIR.0678091481784190).   3. Bulky atherosclerotic calcification at the origin of the left renal artery likely contributing to atrophy of the left kidney as compared to the right.   5. Additional findings as above.      *Left lower lobe 1.6 cm solid pleural-based nodule (series 203 image 293).   *Left upper lobe 2 mm solid nodule (series 203, image 56)   *Left upper lobe 3 mm solid nodule (series 203 image 75).   *Left upper lobe 3 and 4 mm nodule both best seen on series 3 image 81.   *Right lower lobe 2 mm nodule (series 203, image 206).   *Stable right lower lobe 4 mm nodule (series 203, image 156).      Lyssa Patten PA-C  Head & Neck Oncology  [facility]

## 2024-02-15 ENCOUNTER — APPOINTMENT (OUTPATIENT)
Dept: UROLOGY | Facility: CLINIC | Age: 74
End: 2024-02-15
Payer: MEDICARE

## 2024-02-16 ENCOUNTER — APPOINTMENT (OUTPATIENT)
Dept: OTOLARYNGOLOGY | Facility: HOSPITAL | Age: 74
End: 2024-02-16
Payer: MEDICARE

## 2024-02-23 ENCOUNTER — HOSPITAL ENCOUNTER (OUTPATIENT)
Dept: RADIOLOGY | Facility: HOSPITAL | Age: 74
Discharge: HOME | End: 2024-02-23
Payer: MEDICARE

## 2024-02-23 VITALS
TEMPERATURE: 96.4 F | DIASTOLIC BLOOD PRESSURE: 65 MMHG | HEART RATE: 65 BPM | OXYGEN SATURATION: 98 % | SYSTOLIC BLOOD PRESSURE: 118 MMHG | RESPIRATION RATE: 16 BRPM

## 2024-02-23 DIAGNOSIS — C78.02: ICD-10-CM

## 2024-02-23 DIAGNOSIS — C10.9 OROPHARYNGEAL CANCER (MULTI): ICD-10-CM

## 2024-02-23 PROCEDURE — 88305 TISSUE EXAM BY PATHOLOGIST: CPT | Mod: TC,SUR | Performed by: STUDENT IN AN ORGANIZED HEALTH CARE EDUCATION/TRAINING PROGRAM

## 2024-02-23 PROCEDURE — 32408 CORE NDL BX LNG/MED PERQ: CPT | Performed by: RADIOLOGY

## 2024-02-23 PROCEDURE — 2720000007 HC OR 272 NO HCPCS

## 2024-02-23 PROCEDURE — 88342 IMHCHEM/IMCYTCHM 1ST ANTB: CPT | Performed by: STUDENT IN AN ORGANIZED HEALTH CARE EDUCATION/TRAINING PROGRAM

## 2024-02-23 PROCEDURE — 99152 MOD SED SAME PHYS/QHP 5/>YRS: CPT | Performed by: RADIOLOGY

## 2024-02-23 PROCEDURE — 71046 X-RAY EXAM CHEST 2 VIEWS: CPT

## 2024-02-23 PROCEDURE — 7100000010 HC PHASE TWO TIME - EACH INCREMENTAL 1 MINUTE

## 2024-02-23 PROCEDURE — 7100000009 HC PHASE TWO TIME - INITIAL BASE CHARGE

## 2024-02-23 PROCEDURE — 99152 MOD SED SAME PHYS/QHP 5/>YRS: CPT

## 2024-02-23 PROCEDURE — 2500000004 HC RX 250 GENERAL PHARMACY W/ HCPCS (ALT 636 FOR OP/ED): Performed by: RADIOLOGY

## 2024-02-23 PROCEDURE — 88305 TISSUE EXAM BY PATHOLOGIST: CPT | Performed by: STUDENT IN AN ORGANIZED HEALTH CARE EDUCATION/TRAINING PROGRAM

## 2024-02-23 PROCEDURE — 32408 CORE NDL BX LNG/MED PERQ: CPT

## 2024-02-23 PROCEDURE — 71046 X-RAY EXAM CHEST 2 VIEWS: CPT | Mod: FOREIGN READ | Performed by: RADIOLOGY

## 2024-02-23 RX ORDER — MIDAZOLAM HYDROCHLORIDE 1 MG/ML
INJECTION INTRAMUSCULAR; INTRAVENOUS
Status: COMPLETED | OUTPATIENT
Start: 2024-02-23 | End: 2024-02-23

## 2024-02-23 RX ORDER — FENTANYL CITRATE 50 UG/ML
INJECTION, SOLUTION INTRAMUSCULAR; INTRAVENOUS
Status: COMPLETED | OUTPATIENT
Start: 2024-02-23 | End: 2024-02-23

## 2024-02-23 RX ADMIN — MIDAZOLAM HYDROCHLORIDE 1 MG: 1 INJECTION, SOLUTION INTRAMUSCULAR; INTRAVENOUS at 10:50

## 2024-02-23 RX ADMIN — FENTANYL CITRATE 50 MCG: 50 INJECTION, SOLUTION INTRAMUSCULAR; INTRAVENOUS at 10:45

## 2024-02-23 RX ADMIN — MIDAZOLAM HYDROCHLORIDE 1 MG: 1 INJECTION, SOLUTION INTRAMUSCULAR; INTRAVENOUS at 10:45

## 2024-02-23 RX ADMIN — FENTANYL CITRATE 50 MCG: 50 INJECTION, SOLUTION INTRAMUSCULAR; INTRAVENOUS at 10:50

## 2024-02-23 ASSESSMENT — PAIN SCALES - GENERAL
PAINLEVEL_OUTOF10: 0 - NO PAIN

## 2024-02-23 ASSESSMENT — PAIN - FUNCTIONAL ASSESSMENT
PAIN_FUNCTIONAL_ASSESSMENT: 0-10

## 2024-02-23 NOTE — DISCHARGE INSTRUCTIONS
You received moderate sedation:  - Do not drive a car, or operate any machinery or power tools of any kind.  - Do not drink any alcoholic drinks.  - Do not take any over the counter medications that may cause drowsiness.  - Do not make any important decisions or sign any legal documents.  - You need to have a responsible adult accompany you home.  - You may resume your normal diet.  - We strongly suggest that a responsible adult be with you for the rest of the day and also during the night. This is for your protection and safety.     For questions related to your procedure:  Please call [phone] between the hours of 7:00am-5:00pm Monday through Friday.  Please call [phone] after 5:00pm and on weekends and holidays.     In the event of an emergency call 911 or go to your nearest emergency room.

## 2024-02-23 NOTE — PRE-PROCEDURE NOTE
INTERVENTIONAL RADIOLOGY PRE-PROCEDURE NOTE    Phong Wong is a 73 y.o. male with PMHx of locally advanced  oral cavity cancer who presents to the interventional radiology department for pulmonary nodule biopsy.    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    Past Medical History:   Diagnosis Date    Personal history of diseases of the blood and blood-forming organs and certain disorders involving the immune mechanism 07/27/2017    History of thrombocytosis      Past Surgical History:   Procedure Laterality Date    CT ABDOMEN PELVIS ANGIOGRAM W AND/OR WO IV CONTRAST  9/3/2014    CT ABDOMEN PELVIS ANGIOGRAM W AND/OR WO IV CONTRAST 9/3/2014 [facility]    IR ANGIOGRAM AORTA ABDOMEN  11/6/2014    IR ANGIOGRAM AORTA ABDOMEN 11/6/2014 [facility]       Relevant Labs:   Lab Results   Component Value Date    CREATININE 1.60 (H) 02/07/2024    EGFR 46 (L) 02/07/2024    INR 1.1 01/12/2024    PROTIME 11.9 01/12/2024       Planned Sedation/Anesthesia: Moderate    Directed physical examination:    Normal appearance, behavior, cognition and NAD  Lungs: No increased work of breathing      Current Outpatient Medications:     amLODIPine-benazepriL (Lotrel) 5-20 mg capsule, 117346 Medication amlodipine 5 mg-benazepril 20 mg capsule amlodipine 5 mg-benazepril 20 mg capsule 5-20 mg 10/2/2020 Active (Outside), Disp: , Rfl:     aspirin 81 mg EC tablet, Take 1 tablet (81 mg) by mouth once daily., Disp: , Rfl:     BMX ORAL SUSPENSION (1:1:1), Swish and spit 5 mL 3 times a day., Disp: 360 mL, Rfl: 2    doxazosin (Cardura) 4 mg tablet, Take 1 tablet (4 mg) by mouth once daily. Take 1 tablet by mouth daily in the evening, Disp: 90 tablet, Rfl: 1    fluticasone-umeclidin-vilanter (Trelegy Ellipta) 100-62.5-25 mcg blister with device, INHALE 1 PUFF EVERY  DAY, Disp: 3 each, Rfl: 3    ipratropium-albuteroL (Duo-Neb) 0.5-2.5 mg/3 mL nebulizer solution, Take 3 mL by nebulization 3 times a day as needed for wheezing or shortness of breath., Disp: , Rfl:     losartan (Cozaar) 50 mg tablet, Take 1 tablet (50 mg) by mouth once daily., Disp: 30 tablet, Rfl: 0    magnesium oxide (Mag-Ox) 400 mg tablet, Take 1 tablet (400 mg) by mouth once daily., Disp: , Rfl:     ofloxacin (Ocuflox) 0.3 % ophthalmic solution, Instill 1 drop Left Eye 4 times a day, Disp: , Rfl:     oxyCODONE (Oxaydo) 5 mg immediate release tablet, Take 1 tablet (5 mg) by mouth every 6 hours if needed for severe pain (7 - 10)., Disp: 30 tablet, Rfl: 0     Mallampati: III (soft and hard palate and base of uvula visible)    ASA Score: ASA 2 - Patient with mild systemic disease with no functional limitations    Benefits, risks and alternatives of procedure and planned sedation have been discussed with the patient and/or their representative. All questions answered and they agree to proceed.     Liane Campo MD  Diagnostic Radiology, PGY-4, R3      NON-Urgent on call weekends and after hours weekdays (5pm - 5am) IR pager: [phone]  Urgent & emergent on call weekends and after hours weekdays (5pm-7am) IR pager: [phone]

## 2024-02-23 NOTE — POST-PROCEDURE NOTE
Interventional Radiology Brief Postprocedure Note    Attending: Marce Arnett MD    Assistant: Liane Campo MD    Diagnosis: left lower lobe pulmonary nodule    Description of procedure:    Using CT guidance, a total of 2 passes were made into a left lower lobe pulmonary nodule using an 18 Gauge BARD needle passed through a 17 gauge coaxial system. Scanning after each pass demonstrated no bleeding or pneumothorax. See radiology report for full details.    Anesthesia:  Local, moderate sedation    Complications: None    Estimated Blood Loss: none    Medications (Filter: Administrations occurring from 1032 to 1052 on 02/23/24) As of 02/23/24 1052      fentaNYL PF (Sublimaze) injection (mcg) Total dose:  100 mcg      Date/Time Rate/Dose/Volume Action       02/23/24  1045 50 mcg Given      1050 50 mcg Given               midazolam (Versed) injection (mg) Total dose:  2 mg      Date/Time Rate/Dose/Volume Action       02/23/24  1045 1 mg Given      1050 1 mg Given                     See detailed result report with images in PACS.    The patient tolerated the procedure well without incident or complication and is in stable condition.     Liane Campo MD  Diagnostic Radiology, PGY-4, R3      NON-Urgent on call weekends and after hours weekdays (5pm - 5am) IR pager: [phone]  Urgent & emergent on call weekends and after hours weekdays (5pm-7am) IR pager: [phone]

## 2024-02-27 DIAGNOSIS — C78.00 SECONDARY SQUAMOUS CELL CARCINOMA OF LUNG, UNSPECIFIED LATERALITY (MULTI): ICD-10-CM

## 2024-02-27 DIAGNOSIS — C10.9 OROPHARYNGEAL CANCER (MULTI): Primary | ICD-10-CM

## 2024-02-27 LAB
LAB AP ASR DISCLAIMER: NORMAL
LABORATORY COMMENT REPORT: NORMAL
PATH REPORT.COMMENTS IMP SPEC: NORMAL
PATH REPORT.FINAL DX SPEC: NORMAL
PATH REPORT.GROSS SPEC: NORMAL
PATH REPORT.RELEVANT HX SPEC: NORMAL
PATH REPORT.TOTAL CANCER: NORMAL

## 2024-02-27 NOTE — PROGRESS NOTES
Called made to patient to review lung biopsy results from 2/23/24.   Final path showed Squamous Cell Carcinoma, clinically correlated with met from prior head and neck cancer.  Patient will return in 1 week for follow w/ Dr Burkitt and discuss treatment options.   Patient and SO aware of plan and appt time.     Lyssa Patten PA-C

## 2024-03-06 ENCOUNTER — LAB (OUTPATIENT)
Dept: LAB | Facility: HOSPITAL | Age: 74
End: 2024-03-06
Payer: MEDICARE

## 2024-03-06 ENCOUNTER — OFFICE VISIT (OUTPATIENT)
Dept: HEMATOLOGY/ONCOLOGY | Facility: HOSPITAL | Age: 74
End: 2024-03-06
Payer: MEDICARE

## 2024-03-06 VITALS
RESPIRATION RATE: 16 BRPM | WEIGHT: 132.2 LBS | BODY MASS INDEX: 21.34 KG/M2 | OXYGEN SATURATION: 100 % | HEART RATE: 65 BPM | SYSTOLIC BLOOD PRESSURE: 152 MMHG | TEMPERATURE: 98.1 F | DIASTOLIC BLOOD PRESSURE: 65 MMHG

## 2024-03-06 DIAGNOSIS — C10.9 OROPHARYNGEAL CANCER (MULTI): ICD-10-CM

## 2024-03-06 DIAGNOSIS — C78.00 SECONDARY SQUAMOUS CELL CARCINOMA OF LUNG, UNSPECIFIED LATERALITY (MULTI): ICD-10-CM

## 2024-03-06 LAB
ALBUMIN SERPL BCP-MCNC: 4 G/DL (ref 3.4–5)
ALP SERPL-CCNC: 55 U/L (ref 33–136)
ALT SERPL W P-5'-P-CCNC: 7 U/L (ref 10–52)
ANION GAP SERPL CALC-SCNC: 13 MMOL/L (ref 10–20)
AST SERPL W P-5'-P-CCNC: 11 U/L (ref 9–39)
BASOPHILS # BLD AUTO: 0.04 X10*3/UL (ref 0–0.1)
BASOPHILS NFR BLD AUTO: 0.9 %
BILIRUB SERPL-MCNC: 0.5 MG/DL (ref 0–1.2)
BUN SERPL-MCNC: 19 MG/DL (ref 6–23)
CALCIUM SERPL-MCNC: 9.5 MG/DL (ref 8.6–10.3)
CHLORIDE SERPL-SCNC: 99 MMOL/L (ref 98–107)
CO2 SERPL-SCNC: 27 MMOL/L (ref 21–32)
CREAT SERPL-MCNC: 1.56 MG/DL (ref 0.5–1.3)
EGFRCR SERPLBLD CKD-EPI 2021: 47 ML/MIN/1.73M*2
EOSINOPHIL # BLD AUTO: 0.24 X10*3/UL (ref 0–0.4)
EOSINOPHIL NFR BLD AUTO: 5.3 %
ERYTHROCYTE [DISTWIDTH] IN BLOOD BY AUTOMATED COUNT: 15.5 % (ref 11.5–14.5)
GLUCOSE SERPL-MCNC: 99 MG/DL (ref 74–99)
HCT VFR BLD AUTO: 34.6 % (ref 41–52)
HGB BLD-MCNC: 11.4 G/DL (ref 13.5–17.5)
IMM GRANULOCYTES # BLD AUTO: 0.02 X10*3/UL (ref 0–0.5)
IMM GRANULOCYTES NFR BLD AUTO: 0.4 % (ref 0–0.9)
LYMPHOCYTES # BLD AUTO: 0.48 X10*3/UL (ref 0.8–3)
LYMPHOCYTES NFR BLD AUTO: 10.7 %
MCH RBC QN AUTO: 28.6 PG (ref 26–34)
MCHC RBC AUTO-ENTMCNC: 32.9 G/DL (ref 32–36)
MCV RBC AUTO: 87 FL (ref 80–100)
MONOCYTES # BLD AUTO: 0.47 X10*3/UL (ref 0.05–0.8)
MONOCYTES NFR BLD AUTO: 10.4 %
NEUTROPHILS # BLD AUTO: 3.25 X10*3/UL (ref 1.6–5.5)
NEUTROPHILS NFR BLD AUTO: 72.3 %
NRBC BLD-RTO: 0 /100 WBCS (ref 0–0)
PLATELET # BLD AUTO: 244 X10*3/UL (ref 150–450)
POTASSIUM SERPL-SCNC: 4.6 MMOL/L (ref 3.5–5.3)
PROT SERPL-MCNC: 6.6 G/DL (ref 6.4–8.2)
RBC # BLD AUTO: 3.98 X10*6/UL (ref 4.5–5.9)
SODIUM SERPL-SCNC: 134 MMOL/L (ref 136–145)
WBC # BLD AUTO: 4.5 X10*3/UL (ref 4.4–11.3)

## 2024-03-06 PROCEDURE — 3078F DIAST BP <80 MM HG: CPT | Performed by: INTERNAL MEDICINE

## 2024-03-06 PROCEDURE — 1126F AMNT PAIN NOTED NONE PRSNT: CPT | Performed by: INTERNAL MEDICINE

## 2024-03-06 PROCEDURE — 3077F SYST BP >= 140 MM HG: CPT | Performed by: INTERNAL MEDICINE

## 2024-03-06 PROCEDURE — 36415 COLL VENOUS BLD VENIPUNCTURE: CPT

## 2024-03-06 PROCEDURE — 99215 OFFICE O/P EST HI 40 MIN: CPT | Performed by: INTERNAL MEDICINE

## 2024-03-06 PROCEDURE — 85025 COMPLETE CBC W/AUTO DIFF WBC: CPT

## 2024-03-06 PROCEDURE — 80053 COMPREHEN METABOLIC PANEL: CPT

## 2024-03-06 PROCEDURE — 1159F MED LIST DOCD IN RCRD: CPT | Performed by: INTERNAL MEDICINE

## 2024-03-06 PROCEDURE — 1036F TOBACCO NON-USER: CPT | Performed by: INTERNAL MEDICINE

## 2024-03-06 PROCEDURE — 1160F RVW MEDS BY RX/DR IN RCRD: CPT | Performed by: INTERNAL MEDICINE

## 2024-03-06 ASSESSMENT — ENCOUNTER SYMPTOMS
FEVER: 0
NUMBNESS: 0
TROUBLE SWALLOWING: 0
SORE THROAT: 0
SHORTNESS OF BREATH: 0
DIFFICULTY URINATING: 0
DIARRHEA: 0
NAUSEA: 0
CHILLS: 0
LIGHT-HEADEDNESS: 0
FREQUENCY: 0
WOUND: 0
VOMITING: 0
HEADACHES: 0
NECK PAIN: 0
CONSTIPATION: 0
ARTHRALGIAS: 0
ABDOMINAL PAIN: 0
LEG SWELLING: 0
DIZZINESS: 0
COUGH: 0
DYSURIA: 0

## 2024-03-06 ASSESSMENT — PAIN SCALES - GENERAL: PAINLEVEL: 0-NO PAIN

## 2024-03-06 NOTE — PROGRESS NOTES
Patient ID: Phong Wong is a 73 y.o. male.  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 Juan Jose Fletcher   MedOn: Dr. Kyunghee Burkitt, X. Katherine Feng, PA-C   RadOnc: Dr. Vianca Steen     Prior Therapy:   8/16 - 10/4/23: Recieved concurrent chemoradiation with 7 weekly Carboplatin (2mg/AUC)/Paclitaxel (45mg/m2)  and 70gy RT in 35fx     Sites of Disease:  Soft palate, BOT, Left palatine tonsil  B/L Cervical LN  Lung     Oncologic Issues:   Odynophagia  Fatigue     ONCOLOGIC HISTORY  - Pt had 2 months hx of throat discomfort with lump in right neck  - 6/13/23: CT neck showed a mass 2.8 x 2.5 x 2.9 cm in the right lower cervical neck soft tissues. Two suspicious nodes were observed, one at level 3 on the right and another at level 2 on the left.  - 6/28/23: seen by Dr. Fletcher. 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 Based on the findings, Dr. Fletcher did not recommend surgery due to the location  of the primary tumor and the presence of yvette disease  - 6/30/23: PET/CT showed intense FDG avidity within the soft palate, extending to the base of the tongue and left palatine tonsil. Multiple FDG avid left cervical level II nodes were observed, along with an FDG avid mass in the region of the right cervical  level II/III, infiltrating the right SCM muscle and encasing and invading the right jugular vein. FDG avidity was also detected in the region of the left fossa Rosenmuller and left medial pterygoid muscle.  - 8/16 - 10/4/23: Recieved concurrent chemoradiation with 7 weekly Carboplatin (2mg/AUC)/Paclitaxel (45mg/m2)  and 70gy RT in 35fx    Admissions:  10/5 - 10/10/23: Admitted for extreme weakness, HECTOR, pancytopenia including anemia requiring blood transfusion. D/C'ed to acute rehab        Past Medical History:   Past Medical  History:  2017: Personal history of diseases of the blood and blood-  forming organs and certain disorders involving the immune mechanism      Comment:  History of thrombocytosis   HTN, HLD, COPD, prostate cancer in 2017 (s/p radiation)  Surgical History:    Past Surgical History:   Procedure Laterality Date    CT ABDOMEN PELVIS ANGIOGRAM W AND/OR WO IV CONTRAST  9/3/2014    CT ABDOMEN PELVIS ANGIOGRAM W AND/OR WO IV CONTRAST 9/3/2014 [facility]    IR ANGIOGRAM AORTA ABDOMEN  2014    IR ANGIOGRAM AORTA ABDOMEN 2014 [facility]   Aortoiliofemoral vascular bypass in   Social History:    Social History     Socioeconomic History    Marital status:      Spouse name: Not on file    Number of children: 1    Years of education: Not on file    Highest education level: Not on file   Occupational History    Not on file   Tobacco Use    Smoking status: Former     Packs/day: 1.00     Years: 40.00     Additional pack years: 0.00     Total pack years: 40.00     Types: Cigarettes     Quit date:      Years since quittin.1     Passive exposure: Past    Smokeless tobacco: Never   Substance and Sexual Activity    Alcohol use: Yes     Alcohol/week: 12.0 standard drinks of alcohol     Types: 12 Cans of beer per week    Drug use: Never    Sexual activity: Not on file   Other Topics Concern    Not on file   Social History Narrative    Not on file     Social Determinants of Health     Financial Resource Strain: Low Risk  (10/20/2023)    Overall Financial Resource Strain (CARDIA)     Difficulty of Paying Living Expenses: Not very hard   Food Insecurity: No Food Insecurity (10/5/2023)    Hunger Vital Sign     Worried About Running Out of Food in the Last Year: Never true     Ran Out of Food in the Last Year: Never true   Transportation Needs: No Transportation Needs (10/20/2023)    PRAPARE - Transportation     Lack of Transportation (Medical): No     Lack of Transportation (Non-Medical): No    Physical Activity: Inactive (10/5/2023)    Exercise Vital Sign     Days of Exercise per Week: 0 days     Minutes of Exercise per Session: 0 min   Stress: Stress Concern Present (10/5/2023)    [employer] - Occupational Stress Questionnaire     Feeling of Stress : To some extent   Social Connections: Moderately Integrated (10/5/2023)    Social Connection and Isolation Panel [NHANES]     Frequency of Communication with Friends and Family: More than three times a week     Frequency of Social Gatherings with Friends and Family: More than three times a week     Attends Restoration Services: Never     Active Member of Clubs or Organizations: Yes     Attends Club or Organization Meetings: Never     Marital Status:    Intimate Partner Violence: Not At Risk (10/5/2023)    Humiliation, Afraid, Rape, and Kick questionnaire     Fear of Current or Ex-Partner: No     Emotionally Abused: No     Physically Abused: No     Sexually Abused: No   Housing Stability: Low Risk  (10/20/2023)    Housing Stability Vital Sign     Unable to Pay for Housing in the Last Year: No     Number of Places Lived in the Last Year: 1     Unstable Housing in the Last Year: No      Family History:    Family History   Problem Relation Name Age of Onset    Aortic aneurysm Father          abdominal     Family Oncology History:    Cancer-related family history is not on file.      Subjective   Chief Complaint: Squamous Cell Carcinoma of oropharynx    HPI  Interval History  Phong Wong is a 73 y.o. male with recent diagnosis of locally advanced oral cavity cancer, completed concurrent chemoradiation with 7 weekly Carboplatin+Paclitaxel on 10/4/23.  He was admitted 10/5 - 10/10/23 for extreme weakness, HECTOR, pancytopenia including anemia requiring blood transfusion. D/C'ed to acute rehab. He was admitted again 10/18 - 11/2/23 for lethargy, weakness, failure to thrive, anemia.     Patient presents today after recent lung biopsy  and to discuss result.    He denies pain in tongue, throat or neck.  Eating and drinking well.  He is feeling very well. Ambulating well at home.  Appetite is very good. He denies dysphagia.   Denies dry mouth, has saliva production, taste is normal.   He has minimum swelling in the neck (lymphedema) but no pain.   He denies any urinary symptoms currently, no urgency, frequency, hesitancy, dysuria.  He's following with PCP, Dr. Weiss.    ROS  Review of Systems   Constitutional:  Negative for chills and fever. Unexpected weight change: weight loss.  HENT:   Positive for mouth sores. Negative for lump/mass, nosebleeds, sore throat, tinnitus and trouble swallowing.    Respiratory:  Negative for cough and shortness of breath.    Cardiovascular:  Negative for chest pain and leg swelling.   Gastrointestinal:  Negative for abdominal pain, constipation, diarrhea, nausea and vomiting.   Genitourinary:  Negative for difficulty urinating, dysuria and frequency.    Musculoskeletal:  Negative for arthralgias and neck pain.   Skin:  Negative for rash and wound.   Neurological:  Negative for dizziness, headaches, light-headedness and numbness.       Allergies  Allergies   Allergen Reactions    Codeine Nausea Only        Medications  Current Outpatient Medications   Medication Instructions    amLODIPine-benazepriL (Lotrel) 5-20 mg capsule 505280 Medication amlodipine 5 mg-benazepril 20 mg capsule amlodipine 5 mg-benazepril 20 mg capsule 5-20 mg 10/2/2020 Active (Outside)    aspirin 81 mg EC tablet 1 tablet, oral, Daily    BMX ORAL SUSPENSION (1:1:1) 5 mL, Swish & Spit, 3 times daily    doxazosin (CARDURA) 4 mg, oral, Daily, Take 1 tablet by mouth daily in the evening    fluticasone-umeclidin-vilanter (Trelegy Ellipta) 100-62.5-25 mcg blister with device INHALE 1 PUFF EVERY DAY    ipratropium-albuteroL (Duo-Neb) 0.5-2.5 mg/3 mL nebulizer solution Take 3 mL by nebulization 3 times a day as needed for wheezing or shortness of breath.     losartan (COZAAR) 50 mg, oral, Daily    magnesium oxide (MAG-OX) 400 mg, oral, Daily    ofloxacin (Ocuflox) 0.3 % ophthalmic solution Instill 1 drop Left Eye 4 times a day    oxyCODONE (OXAYDO) 5 mg, oral, Every 6 hours PRN        Objective   VS:  /65 (BP Location: Right arm, Patient Position: Sitting)   Pulse 65   Temp 36.7 °C (98.1 °F) (Temporal)   Resp 16   Wt 60 kg (132 lb 3.2 oz)   SpO2 100%   BMI 21.34 kg/m²   Weight   Wt Readings from Last 7 Encounters:   03/06/24 60 kg (132 lb 3.2 oz)   02/07/24 58.6 kg (129 lb 3 oz)   01/17/24 61.2 kg (135 lb)   01/12/24 62.5 kg (137 lb 12.8 oz)   01/10/24 61.5 kg (135 lb 8 oz)   12/21/23 60.8 kg (134 lb)   11/29/23 61.2 kg (134 lb 14.7 oz)         Physical Exam  Constitutional:       Appearance: Normal appearance. He is underweight.   HENT:      Head: Normocephalic and atraumatic.      Right Ear: External ear normal. No tenderness.      Left Ear: External ear normal. No tenderness.      Nose: Nose normal.      Mouth/Throat:      Mouth: No injury or oral lesions.      Tongue: Lesions present.      Pharynx: Oropharynx is clear. No posterior oropharyngeal erythema.      Comments: Edentulous  Left lateral tongue with flat 1cm, yellow colored lesion.   Eyes:      Extraocular Movements: Extraocular movements intact.      Conjunctiva/sclera: Conjunctivae normal.      Pupils: Pupils are equal, round, and reactive to light.   Neck:      Thyroid: No thyroid mass.      Comments: Mild swelling in submental and neck region. No pain on palpation  Cardiovascular:      Rate and Rhythm: Normal rate and regular rhythm.   Pulmonary:      Effort: Pulmonary effort is normal. No respiratory distress.      Breath sounds: Normal breath sounds.   Abdominal:      General: Bowel sounds are normal. There is no distension or abdominal bruit.      Palpations: Abdomen is soft. There is no mass.      Tenderness: There is no abdominal tenderness.   Genitourinary:     Comments: Clark in  place, clear pale yellow urine  Musculoskeletal:         General: Normal range of motion.      Cervical back: Normal range of motion and neck supple.      Right lower leg: No edema.      Left lower leg: No edema.   Lymphadenopathy:      Cervical: No cervical adenopathy.      Upper Body:      Right upper body: No axillary adenopathy.      Left upper body: No axillary adenopathy.   Skin:     General: Skin is warm and dry.      Findings: No lesion (radiation dermatitis in bilateral neck), rash or wound.   Neurological:      General: No focal deficit present.      Mental Status: He is alert and oriented to person, place, and time.      Gait: Gait is intact.   Psychiatric:         Mood and Affect: Mood and affect normal.         Diagnostic Results   The below labs were reviewed.  Results from last 7 days   Lab Units 03/06/24  1226   WBC AUTO x10*3/uL 4.5   HEMOGLOBIN g/dL 11.4*   HEMATOCRIT % 34.6*   PLATELETS AUTO x10*3/uL 244   NEUTROS ABS x10*3/uL 3.25   LYMPHS ABS AUTO x10*3/uL 0.48*   MONOS ABS AUTO x10*3/uL 0.47   EOS ABS AUTO x10*3/uL 0.24   NEUTROS PCT AUTO % 72.3   LYMPHS PCT AUTO % 10.7   MONOS PCT AUTO % 10.4   EOS PCT AUTO % 5.3        Results from last 7 days   Lab Units 03/06/24  1226   GLUCOSE mg/dL 99   SODIUM mmol/L 134*   POTASSIUM mmol/L 4.6   CHLORIDE mmol/L 99   CO2 mmol/L 27   BUN mg/dL 19   CREATININE mg/dL 1.56*   EGFR mL/min/1.73m*2 47*   CALCIUM mg/dL 9.5   ALBUMIN g/dL 4.0   PROTEIN TOTAL g/dL 6.6   BILIRUBIN TOTAL mg/dL 0.5   ALK PHOS U/L 55   ALT U/L 7*   AST U/L 11                        Pathology      Imaging  1/10/2024 PET/CT  IMPRESSION:  1. Significant interval improvement in FDG avidity in the soft palate and left palatine tonsil, remaining FDG avidity along the left palatine tonsil consistent with persistent residual viable disease.  2. Nearly complete metabolic resolution of cervical yvette metastases with remaining mild FDG avid right cervical level 3 node seen on current study,  likely representing residual yvette metastasis.  3. Newly developed FDG-avid pulmonary nodule in the left lower lobe as well as minimal FDG avid subcentimeter pulmonary nodules in bilateral upper lobes, concerning for lung metastasis.    1/10/2024 CT Neck w/ IV Contrast  IMPRESSION:  *Decreased size of the previously demonstrated right-sided yvette neck mass *No new adenopathy  *Post treatment changes in the hypopharynx as described    Assessment/Plan    ASSESSMENT  Phong Wong is a 73 y.o. male with recent diagnosis of locally advanced oral cavity cancer. Completed concurrent chemoradiation with 7 weekly Carboplatin (2mg/AUC)/Paclitaxel (45mg/m2) on 10/4/2. Admitted 10/5 - 10/10 for extreme weakness, HECTOR, pancytopenia including anemia requiring blood transfusion. D/C'ed to acute rehab. Admitted  again 10/18 - 11/2/23 for lethargy, weakness, failure to thrive, anemia.     # Locally advanced oral cavity cancer, T3N2M0  - 6/30/23: PET/CT showed intense FDG avidity within the soft palate, extending to the base of the tongue and left palatine tonsil. Multiple uptake in left cervical level II nodes, right cervical level  II/III, infiltrating the right SCM muscle and encasing and invading the right jugular vein. FDG avidity was also detected in the region of the left fossa Rosenmuller and left medial pterygoid muscle.  - 7/13/23: pt is doing well, no pain, discussed with patient about carboplatin+ paclitaxel as his chemotherapy along with radiation due to his GFR 30s.  Chemo related side effects were reviewed with patient, consent obtained.   - Patient required dental extractions and as a result start date was delayed from 7/26 to 8/15/23  - 8/30: tolerating chemo well, does have some fatigue but no n/v. No peripheral neuropathy   - 9/6: Continue to tolerate chemo well, no n/v, is feeling more tired. Eating well, tolerating soft foods and supplementing with Boost VHC 2x per day. Denies peripheral neuropathy  - 9/13:  continue to tolerate chemo well w/o n/v. Energy is lower but stable. Eating soft foods and Boost VHC x 2 per day  - 9/20: Tolerating chemo well, minimal odynophagia, does have some weight loss.  - 10/4: Completed last dose of Carbo/Taxol last week. Recall patient did not have a [facility] visit as both providers are out of office. He had las RT today and noted to have significantly increased fatigue, weakness, weight loss in the last week resulting from decreased PO intake.   - 11/9/23: Feeling much improved since d/c from hospital on 11/2. Regaining strength and working with PT/OT at acute rehab. Odynophagia resolved, eating soft foods, has weight gain. Serum protein improved. Electrolytes wnl, mild hyponatremia, no c/f refeeding syndrome.    - 11/29/23: continue to feel very well, d/c'ed from rehab. Acute SEs of chemoRT are resolved. He's eating/drinking well with weight gain. Not using PEG currently. Has lymphedema from RT in the neck/submental region. Can consider lymphedema therapy after 3 month restaging PET showing XENA.   - 1/12/24: Patient is feeling well, tolerating soft PO intake. PEG removed today.   Reviewed 3 month restaging PET/CT with patient. Scans noted significant interval improvement in prior sites of disease though with residual FDG avidity in soft palate and left palatine tonsil, c/f residual disease vs post treatment changes. Also noted was a new FDG avid LLL pulm nodule with max SUV 5.7, c/f pulmonary metastasis. Will order STAT lung biopsy on this.   Pt scoped by Dr. Fletcher today, unfortunately was difficult to examine in office due to patient intolerance. Per Dr. Fletcher, may bring to OR to complete the examination depending on lung biopsy results. Scans will be reviewed at 1/19/24 [facility]. Explained in detail to patient and his SO scan results and next steps. They voiced understanding, all questions answered.   - 2/9/24: Patient presents with new painful left lateral tongue lesion, hindering PO  intake. Will start Oxycodone 5mg Q6 hr PRN for pain control. Pt will see Dr. Fletcher next week for eval/possible biopsy. Patient will have repeat CT Chest for FDG avid LLL pulm nodule as prior biopsy attempt was not successful due to small nodule size and difficult location near diaphragm.  -2/ 9/24 CT chest showed multiple subcentimeter noduesl 2-5mm, dominant lesion is 1.6cm in LLL.    -3/6/24: lung biopsy of LLL nodule was consistent with HN origin. [facility] pending including CPS.  Discussed with patient about phase 2 FLAM study (randomized to pembrolizumab or pembrolizumab+danvatirsen (Stat3 inbhitiro), he is willing to be considered.    # Anemia: improving  - Hgb has been dropping since starting chemo, however today his hgb is <7 and will require a blood transfusion. This is likely due to chemotherapy as there are no s/s of bleeds.   - Received 2U PRBC during 10/18 - 11/2 admission. Anemia likely due to chemotherapy  - 11/29/23: Hgb 8.8. Improved from 3 weeks ago. Expect Hbg will continue to trend up as patient recovers. Can consider iron studies.   - 3/6/24: Hgb 11.4    PLAN  -- pending PDL1 test  -- plan to enroll him to phase 2 FLAM study

## 2024-03-12 DIAGNOSIS — I10 BENIGN ESSENTIAL HYPERTENSION: ICD-10-CM

## 2024-03-12 RX ORDER — LOSARTAN POTASSIUM 50 MG/1
50 TABLET ORAL DAILY
Qty: 90 TABLET | Refills: 3 | Status: SHIPPED | OUTPATIENT
Start: 2024-03-12 | End: 2025-03-07

## 2024-03-14 ENCOUNTER — ALLIED HEALTH (OUTPATIENT)
Dept: INTEGRATIVE MEDICINE | Facility: CLINIC | Age: 74
End: 2024-03-14
Payer: MEDICARE

## 2024-03-14 DIAGNOSIS — Z71.3 NUTRITIONAL COUNSELING: ICD-10-CM

## 2024-03-14 DIAGNOSIS — I89.0 LYMPHEDEMA: Primary | ICD-10-CM

## 2024-03-14 PROCEDURE — 99215 OFFICE O/P EST HI 40 MIN: CPT | Performed by: PHYSICIAN ASSISTANT

## 2024-03-14 NOTE — PROGRESS NOTES
"74 y/o male with PMH CAD, PAD, HTN, HLD, vitamin D deficiency, GERD, CKD, prostate cancer, oropharyngeal cancer, COPD presents for initial consultation. Social Support- Tomasa (girlfriend), Nicole (Tomasa's daughter)     Goal of Visit: lymphatic massage     Somatic Complaints:  - Lymphedema of throat- no odynophagia, dysphagia, dyspnea, or snoring.   - \"Itching to right side of neck\"  - No other concerns at this time      Meds/Supplements:  ASA 81 mg  Doxazosin 4 mg   Losartan 50 mg  Magnesium oxide 400 mg  Trelegy   Oxycodone 5 mg PRN pain  Ibuprofen PRN  Albuterol PRN  BMX oral PRN     Sleep Hygiene: sleeping well- no issues falling asleep or staying asleep.  - Caffeine- 2 cups coffee, 1-2 cokes/day  - Water- not enough, might drink Powerade  - ETOH- 1-2 beers/day  - Late night eating- occasional     Nutrition:  +Adentulous. Not interested in making nutritional changes at this time  Breakfast- cream of wheat, coffee  Lunch- Hormel meals (1-2)  Dinner- Hormel meals (1-2)   Snacks- minimal   Drinks- as above    Assessment/Plan:  Massage therapy   Increase water  One vegetable or fruit with each meal   SEE PATIENT INSTRUCTIONS      Review of Systems:  Constitutional: afebrile  HEENT: +lymphedema   Respiratory: no shortness of breath  Cardiovascular: no chest  pain  Abdominal: no abdominal pain, no n/v/d  Musculoskeletal: no joint pain or swelling   Skin: no rash      Physical Exam:   General: alert and oriented; well-developed, well-nourished, no acute distress  HEENT: normocephalic, atraumatic, good eye contact  Respiratory: no labored breathing, no conversational dyspnea  Musculoskeletal: moving all extremities appropriately  Neurology: normal gait  Psych: pleasant affect, cooperative    "

## 2024-03-15 NOTE — PATIENT INSTRUCTIONS
Referral to lymphatic massage therapy  Hydration:  Increase water intake. Initial goal of 20 oz per day. Increase to 40 oz as tolerated.   Dilute powerade with water  Healthful fluid swaps to reduce coke and beer:  Green tea   Caffeine free herbal teas (turmeric, kavon, chamomile, hibiscus)  2 oz organic pomegranate juice diluted in water  Homemade electrolyte drink- [email or web address]   Nutrition:  Add a vegetable or fruit to every meal  Roasted pears or apples  Steamed spinach, cauliflower, or carrots

## 2024-03-25 ENCOUNTER — HOSPITAL ENCOUNTER (OUTPATIENT)
Dept: RESEARCH | Facility: HOSPITAL | Age: 74
Discharge: HOME | End: 2024-03-25
Payer: MEDICARE

## 2024-03-25 VITALS
OXYGEN SATURATION: 99 % | RESPIRATION RATE: 20 BRPM | TEMPERATURE: 98.2 F | SYSTOLIC BLOOD PRESSURE: 125 MMHG | DIASTOLIC BLOOD PRESSURE: 78 MMHG | HEART RATE: 72 BPM

## 2024-03-25 DIAGNOSIS — C10.9 OROPHARYNGEAL CANCER (MULTI): ICD-10-CM

## 2024-03-25 DIAGNOSIS — N18.31 CHRONIC RENAL IMPAIRMENT, STAGE 3A (MULTI): Primary | ICD-10-CM

## 2024-03-25 DIAGNOSIS — Z79.01 ANTICOAGULATION MANAGEMENT ENCOUNTER: ICD-10-CM

## 2024-03-25 DIAGNOSIS — C78.00 SECONDARY SQUAMOUS CELL CARCINOMA OF LUNG, UNSPECIFIED LATERALITY (MULTI): ICD-10-CM

## 2024-03-25 DIAGNOSIS — C10.9 OROPHARYNGEAL CANCER (MULTI): Primary | ICD-10-CM

## 2024-03-25 DIAGNOSIS — Z51.81 ANTICOAGULATION MANAGEMENT ENCOUNTER: ICD-10-CM

## 2024-03-25 DIAGNOSIS — Z08 ENCOUNTER FOR FOLLOW-UP EXAMINATION AFTER COMBINED TREATMENT FOR MALIGNANT NEOPLASM: ICD-10-CM

## 2024-03-25 PROCEDURE — 2500000004 HC RX 250 GENERAL PHARMACY W/ HCPCS (ALT 636 FOR OP/ED): Performed by: STUDENT IN AN ORGANIZED HEALTH CARE EDUCATION/TRAINING PROGRAM

## 2024-03-25 PROCEDURE — 96360 HYDRATION IV INFUSION INIT: CPT

## 2024-03-25 RX ORDER — HEPARIN 100 UNIT/ML
500 SYRINGE INTRAVENOUS AS NEEDED
OUTPATIENT
Start: 2024-03-25

## 2024-03-25 RX ORDER — HEPARIN SODIUM,PORCINE/PF 10 UNIT/ML
50 SYRINGE (ML) INTRAVENOUS AS NEEDED
OUTPATIENT
Start: 2024-03-25

## 2024-03-25 RX ADMIN — SODIUM CHLORIDE 1000 ML: 9 INJECTION, SOLUTION INTRAVENOUS at 12:59

## 2024-03-25 NOTE — RESEARCH NOTES
Pt. In for 1L NS bolus, 22G PIV inserted @ 1253, 1L NS bolus done from 3969-8867. IV removed @ 1400. Stuart MORGAN bedside throughout.

## 2024-03-26 DIAGNOSIS — C10.9 OROPHARYNGEAL CANCER (MULTI): ICD-10-CM

## 2024-03-26 ASSESSMENT — ENCOUNTER SYMPTOMS
FREQUENCY: 0
SHORTNESS OF BREATH: 0
LEG SWELLING: 0
DYSURIA: 0
CHILLS: 0
NECK PAIN: 0
ARTHRALGIAS: 0
FEVER: 0
VOMITING: 0
COUGH: 0
SORE THROAT: 0
DIARRHEA: 0
HEADACHES: 0
NUMBNESS: 0
NAUSEA: 0
CONSTIPATION: 0
TROUBLE SWALLOWING: 0
ABDOMINAL PAIN: 0
LIGHT-HEADEDNESS: 0
DIZZINESS: 0
DIFFICULTY URINATING: 0
WOUND: 0

## 2024-03-26 NOTE — PROGRESS NOTES
Patient ID: Phong Wong is a 73 y.o. male.  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 Juan Jose Fletcher   MedOnc: Dr. Kyunghee Burkitt, X. Katherine Feng, PA-C   RadOnc: Dr. Vianca Steen     Current Therapy  [id number]: ***    Prior Therapy:   8/16 - 10/4/23: Recieved concurrent chemoradiation with 7 weekly Carboplatin (2mg/AUC)/Paclitaxel (45mg/m2)  and 70gy RT in 35fx     Sites of Disease:  Soft palate, BOT, Left palatine tonsil  B/L Cervical LN  Lung     Oncologic Issues:   Odynophagia  Fatigue     ONCOLOGIC HISTORY  - Pt had 2 months hx of throat discomfort with lump in right neck  - 6/13/23: CT neck showed a mass 2.8 x 2.5 x 2.9 cm in the right lower cervical neck soft tissues. Two suspicious nodes were observed, one at level 3 on the right and another at level 2 on the left.  - 6/28/23: seen by Dr. Fletcher. 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 Based on the findings, Dr. Fletcher did not recommend surgery due to the location  of the primary tumor and the presence of yvette disease  - 6/30/23: PET/CT showed intense FDG avidity within the soft palate, extending to the base of the tongue and left palatine tonsil. Multiple FDG avid left cervical level II nodes were observed, along with an FDG avid mass in the region of the right cervical  level II/III, infiltrating the right SCM muscle and encasing and invading the right jugular vein. FDG avidity was also detected in the region of the left fossa Rosenmuller and left medial pterygoid muscle.  - 8/16 - 10/4/23: Recieved concurrent chemoradiation with 7 weekly Carboplatin (2mg/AUC)/Paclitaxel (45mg/m2)  and 70gy RT in 35fx    Admissions:  10/5 - 10/10/23: Admitted for extreme weakness, HECTOR, pancytopenia including anemia requiring blood transfusion. D/C'ed to acute rehab         Past Medical History:   Past Medical History:  2017: Personal history of diseases of the blood and blood-  forming organs and certain disorders involving the immune mechanism      Comment:  History of thrombocytosis   HTN, HLD, COPD, prostate cancer in 2017 (s/p radiation)  Surgical History:    Past Surgical History:   Procedure Laterality Date    CT ABDOMEN PELVIS ANGIOGRAM W AND/OR WO IV CONTRAST  9/3/2014    CT ABDOMEN PELVIS ANGIOGRAM W AND/OR WO IV CONTRAST 9/3/2014 [facility]    IR ANGIOGRAM AORTA ABDOMEN  2014    IR ANGIOGRAM AORTA ABDOMEN 2014 [facility]GAC[facility]   Aortoiliofemoral vascular bypass in   Social History:    Social History     Socioeconomic History    Marital status:      Spouse name: Not on file    Number of children: 1    Years of education: Not on file    Highest education level: Not on file   Occupational History    Not on file   Tobacco Use    Smoking status: Former     Packs/day: 1.00     Years: 40.00     Additional pack years: 0.00     Total pack years: 40.00     Types: Cigarettes     Quit date:      Years since quittin.2     Passive exposure: Past    Smokeless tobacco: Never   Substance and Sexual Activity    Alcohol use: Yes     Alcohol/week: 12.0 standard drinks of alcohol     Types: 12 Cans of beer per week    Drug use: Never    Sexual activity: Not on file   Other Topics Concern    Not on file   Social History Narrative    Not on file     Social Determinants of Health     Financial Resource Strain: Low Risk  (10/20/2023)    Overall Financial Resource Strain (CARDIA)     Difficulty of Paying Living Expenses: Not very hard   Food Insecurity: No Food Insecurity (10/5/2023)    Hunger Vital Sign     Worried About Running Out of Food in the Last Year: Never true     Ran Out of Food in the Last Year: Never true   Transportation Needs: No Transportation Needs (10/20/2023)    PRAPARE - Transportation     Lack of Transportation (Medical): No      Lack of Transportation (Non-Medical): No   Physical Activity: Inactive (10/5/2023)    Exercise Vital Sign     Days of Exercise per Week: 0 days     Minutes of Exercise per Session: 0 min   Stress: Stress Concern Present (10/5/2023)    [place] [employer] - Occupational Stress Questionnaire     Feeling of Stress : To some extent   Social Connections: Moderately Integrated (10/5/2023)    Social Connection and Isolation Panel [NHANES]     Frequency of Communication with Friends and Family: More than three times a week     Frequency of Social Gatherings with Friends and Family: More than three times a week     Attends Gnosticist Services: Never     Active Member of Clubs or Organizations: Yes     Attends Club or Organization Meetings: Never     Marital Status:    Intimate Partner Violence: Not At Risk (10/5/2023)    Humiliation, Afraid, Rape, and Kick questionnaire     Fear of Current or Ex-Partner: No     Emotionally Abused: No     Physically Abused: No     Sexually Abused: No   Housing Stability: Low Risk  (10/20/2023)    Housing Stability Vital Sign     Unable to Pay for Housing in the Last Year: No     Number of Places Lived in the Last Year: 1     Unstable Housing in the Last Year: No      Family History:    Family History   Problem Relation Name Age of Onset    Aortic aneurysm Father          abdominal     Family Oncology History:    Cancer-related family history is not on file.      Subjective   Chief Complaint: Squamous Cell Carcinoma of oropharynx    HPI  Interval History  Phong Wong is a 73 y.o. male with recent diagnosis of locally advanced oral cavity cancer, completed concurrent chemoradiation with 7 weekly Carboplatin+Paclitaxel on 10/4/23.  He was admitted 10/5 - 10/10/23 for extreme weakness, HECTOR, pancytopenia including anemia requiring blood transfusion. D/C'ed to acute rehab. He was admitted again 10/18 - 11/2/23 for lethargy, weakness, failure to thrive, anemia.      Patient presents today after recent lung biopsy and to discuss result.    He denies pain in tongue, throat or neck.  Eating and drinking well.  He is feeling very well. Ambulating well at home.  Appetite is very good. He denies dysphagia.   Denies dry mouth, has saliva production, taste is normal.   He has minimum swelling in the neck (lymphedema) but no pain.   He denies any urinary symptoms currently, no urgency, frequency, hesitancy, dysuria.  He's following with PCP, Dr. Weiss.    ROS  Review of Systems   Constitutional:  Negative for chills and fever. Unexpected weight change: weight loss.  HENT:   Positive for mouth sores. Negative for lump/mass, nosebleeds, sore throat, tinnitus and trouble swallowing.    Respiratory:  Negative for cough and shortness of breath.    Cardiovascular:  Negative for chest pain and leg swelling.   Gastrointestinal:  Negative for abdominal pain, constipation, diarrhea, nausea and vomiting.   Genitourinary:  Negative for difficulty urinating, dysuria and frequency.    Musculoskeletal:  Negative for arthralgias and neck pain.   Skin:  Negative for rash and wound.   Neurological:  Negative for dizziness, headaches, light-headedness and numbness.       Allergies  Allergies   Allergen Reactions    Codeine Nausea Only        Medications  Current Outpatient Medications   Medication Instructions    amLODIPine-benazepriL (Lotrel) 5-20 mg capsule [id number] Medication amlodipine 5 mg-benazepril 20 mg capsule amlodipine 5 mg-benazepril 20 mg capsule 5-20 mg 10/2/2020 Active (Outside)    aspirin 81 mg EC tablet 1 tablet, oral, Daily    BMX ORAL SUSPENSION (1:1:1) 5 mL, Swish & Spit, 3 times daily    doxazosin (CARDURA) 4 mg, oral, Daily, Take 1 tablet by mouth daily in the evening    fluticasone-umeclidin-vilanter (Trelegy Ellipta) 100-62.5-25 mcg blister with device INHALE 1 PUFF EVERY DAY    ipratropium-albuteroL (Duo-Neb) 0.5-2.5 mg/3 mL nebulizer solution Take 3 mL by nebulization 3 times  a day as needed for wheezing or shortness of breath.    losartan (COZAAR) 50 mg, oral, Daily    magnesium oxide (MAG-OX) 400 mg, oral, Daily    ofloxacin (Ocuflox) 0.3 % ophthalmic solution Instill 1 drop Left Eye 4 times a day    oxyCODONE (OXAYDO) 5 mg, oral, Every 6 hours PRN        Objective   VS:  There were no vitals taken for this visit.  Weight   Wt Readings from Last 7 Encounters:   03/06/24 60 kg (132 lb 3.2 oz)   02/07/24 58.6 kg (129 lb 3 oz)   01/17/24 61.2 kg (135 lb)   01/12/24 62.5 kg (137 lb 12.8 oz)   01/10/24 61.5 kg (135 lb 8 oz)   12/21/23 60.8 kg (134 lb)   11/29/23 61.2 kg (134 lb 14.7 oz)         Physical Exam  Constitutional:       Appearance: Normal appearance. He is underweight.   HENT:      Head: Normocephalic and atraumatic.      Right Ear: External ear normal. No tenderness.      Left Ear: External ear normal. No tenderness.      Nose: Nose normal.      Mouth/Throat:      Mouth: No injury or oral lesions.      Tongue: Lesions present.      Pharynx: Oropharynx is clear. No posterior oropharyngeal erythema.      Comments: Edentulous  Left lateral tongue with flat 1cm, yellow colored lesion.   Eyes:      Extraocular Movements: Extraocular movements intact.      Conjunctiva/sclera: Conjunctivae normal.      Pupils: Pupils are equal, round, and reactive to light.   Neck:      Thyroid: No thyroid mass.      Comments: Mild swelling in submental and neck region. No pain on palpation  Cardiovascular:      Rate and Rhythm: Normal rate and regular rhythm.   Pulmonary:      Effort: Pulmonary effort is normal. No respiratory distress.      Breath sounds: Normal breath sounds.   Abdominal:      General: Bowel sounds are normal. There is no distension or abdominal bruit.      Palpations: Abdomen is soft. There is no mass.      Tenderness: There is no abdominal tenderness.   Genitourinary:     Comments: Clark in place, clear pale yellow urine  Musculoskeletal:         General: Normal range of motion.       Cervical back: Normal range of motion and neck supple.      Right lower leg: No edema.      Left lower leg: No edema.   Lymphadenopathy:      Cervical: No cervical adenopathy.      Upper Body:      Right upper body: No axillary adenopathy.      Left upper body: No axillary adenopathy.   Skin:     General: Skin is warm and dry.      Findings: No lesion (radiation dermatitis in bilateral neck), rash or wound.   Neurological:      General: No focal deficit present.      Mental Status: He is alert and oriented to person, place, and time.      Gait: Gait is intact.   Psychiatric:         Mood and Affect: Mood and affect normal.         Diagnostic Results   The below labs were reviewed.                                Pathology      Imaging  1/10/2024 PET/CT  IMPRESSION:  1. Significant interval improvement in FDG avidity in the soft palate and left palatine tonsil, remaining FDG avidity along the left palatine tonsil consistent with persistent residual viable disease.  2. Nearly complete metabolic resolution of cervical yvette metastases with remaining mild FDG avid right cervical level 3 node seen on current study, likely representing residual yvette metastasis.  3. Newly developed FDG-avid pulmonary nodule in the left lower lobe as well as minimal FDG avid subcentimeter pulmonary nodules in bilateral upper lobes, concerning for lung metastasis.    1/10/2024 CT Neck w/ IV Contrast  IMPRESSION:  *Decreased size of the previously demonstrated right-sided yvette neck mass *No new adenopathy  *Post treatment changes in the hypopharynx as described    Assessment/Plan    ASSESSMENT  Phong Wong is a 73 y.o. male with recent diagnosis of locally advanced oral cavity cancer. Completed concurrent chemoradiation with 7 weekly Carboplatin (2mg/AUC)/Paclitaxel (45mg/m2) on 10/4/2. Admitted 10/5 - 10/10 for extreme weakness, HECTOR, pancytopenia including anemia requiring blood transfusion. D/C'ed to acute rehab. Admitted  again 10/18  - 11/2/23 for lethargy, weakness, failure to thrive, anemia.     # Locally advanced oral cavity cancer, T3N2M0  - 6/30/23: PET/CT showed intense FDG avidity within the soft palate, extending to the base of the tongue and left palatine tonsil. Multiple uptake in left cervical level II nodes, right cervical level  II/III, infiltrating the right SCM muscle and encasing and invading the right jugular vein. FDG avidity was also detected in the region of the left fossa Rosenmuller and left medial pterygoid muscle.  - 7/13/23: pt is doing well, no pain, discussed with patient about carboplatin+ paclitaxel as his chemotherapy along with radiation due to his GFR 30s.  Chemo related side effects were reviewed with patient, consent obtained.   - Patient required dental extractions and as a result start date was delayed from 7/26 to 8/15/23  - 8/30: tolerating chemo well, does have some fatigue but no n/v. No peripheral neuropathy   - 9/6: Continue to tolerate chemo well, no n/v, is feeling more tired. Eating well, tolerating soft foods and supplementing with Boost VHC 2x per day. Denies peripheral neuropathy  - 9/13: continue to tolerate chemo well w/o n/v. Energy is lower but stable. Eating soft foods and Boost VHC x 2 per day  - 9/20: Tolerating chemo well, minimal odynophagia, does have some weight loss.  - 10/4: Completed last dose of Carbo/Taxol last week. Recall patient did not have a [facility] visit as both providers are out of office. He had las RT today and noted to have significantly increased fatigue, weakness, weight loss in the last week resulting from decreased PO intake.   - 11/9/23: Feeling much improved since d/c from hospital on 11/2. Regaining strength and working with PT/OT at acute rehab. Odynophagia resolved, eating soft foods, has weight gain. Serum protein improved. Electrolytes wnl, mild hyponatremia, no c/f refeeding syndrome.    - 11/29/23: continue to feel very well, d/c'ed from rehab. Acute SEs of  chemoRT are resolved. He's eating/drinking well with weight gain. Not using PEG currently. Has lymphedema from RT in the neck/submental region. Can consider lymphedema therapy after 3 month restaging PET showing XENA.   - 1/12/24: Patient is feeling well, tolerating soft PO intake. PEG removed today.   Reviewed 3 month restaging PET/CT with patient. Scans noted significant interval improvement in prior sites of disease though with residual FDG avidity in soft palate and left palatine tonsil, c/f residual disease vs post treatment changes. Also noted was a new FDG avid LLL pulm nodule with max SUV 5.7, c/f pulmonary metastasis. Will order STAT lung biopsy on this.   Pt scoped by Dr. Fletcher today, unfortunately was difficult to examine in office due to patient intolerance. Per Dr. Fletcher, may bring to OR to complete the examination depending on lung biopsy results. Scans will be reviewed at 1/19/24 [facility]. Explained in detail to patient and his SO scan results and next steps. They voiced understanding, all questions answered.   - 2/9/24: Patient presents with new painful left lateral tongue lesion, hindering PO intake. Will start Oxycodone 5mg Q6 hr PRN for pain control. Pt will see Dr. Fletcher next week for eval/possible biopsy. Patient will have repeat CT Chest for FDG avid LLL pulm nodule as prior biopsy attempt was not successful due to small nodule size and difficult location near diaphragm.  -2/ 9/24 CT chest showed multiple subcentimeter noduesl 2-5mm, dominant lesion is 1.6cm in LLL.    -3/6/24: lung biopsy of LLL nodule was consistent with HN origin. [facility] pending including CPS.  Discussed with patient about phase 2 FLAM study (randomized to pembrolizumab or pembrolizumab+danvatirsen (Stat3 inbhitiro), he is willing to be considered.  - CPS 3  - 3/26/24: ***    # Anemia: improving  - Hgb has been dropping since starting chemo, however today his hgb is <7 and will require a blood transfusion. This is likely  due to chemotherapy as there are no s/s of bleeds.   - Received 2U PRBC during 10/18 - 11/2 admission. Anemia likely due to chemotherapy  - 11/29/23: Hgb 8.8. Improved from 3 weeks ago. Expect Hbg will continue to trend up as patient recovers. Can consider iron studies.   - 3/6/24: Hgb 11.4    PLAN  -- pending PDL1 test  -- plan to enroll him to phase 2 FLAM study

## 2024-03-27 ENCOUNTER — OFFICE VISIT (OUTPATIENT)
Dept: HEMATOLOGY/ONCOLOGY | Facility: HOSPITAL | Age: 74
End: 2024-03-27
Payer: MEDICARE

## 2024-03-27 ENCOUNTER — HOSPITAL ENCOUNTER (OUTPATIENT)
Dept: RADIOLOGY | Facility: HOSPITAL | Age: 74
Discharge: HOME | End: 2024-03-27
Payer: MEDICARE

## 2024-03-27 ENCOUNTER — APPOINTMENT (OUTPATIENT)
Dept: HEMATOLOGY/ONCOLOGY | Facility: HOSPITAL | Age: 74
End: 2024-03-27
Payer: MEDICARE

## 2024-03-27 ENCOUNTER — LAB (OUTPATIENT)
Dept: LAB | Facility: HOSPITAL | Age: 74
End: 2024-03-27
Payer: MEDICARE

## 2024-03-27 VITALS
RESPIRATION RATE: 18 BRPM | TEMPERATURE: 98.6 F | OXYGEN SATURATION: 100 % | DIASTOLIC BLOOD PRESSURE: 63 MMHG | SYSTOLIC BLOOD PRESSURE: 155 MMHG | HEART RATE: 96 BPM | BODY MASS INDEX: 21.87 KG/M2 | WEIGHT: 135.5 LBS

## 2024-03-27 DIAGNOSIS — C78.00 SECONDARY SQUAMOUS CELL CARCINOMA OF LUNG, UNSPECIFIED LATERALITY (MULTI): ICD-10-CM

## 2024-03-27 DIAGNOSIS — C10.9 OROPHARYNGEAL CANCER (MULTI): ICD-10-CM

## 2024-03-27 DIAGNOSIS — Z79.01 ANTICOAGULATION MANAGEMENT ENCOUNTER: ICD-10-CM

## 2024-03-27 DIAGNOSIS — Z51.81 ANTICOAGULATION MANAGEMENT ENCOUNTER: ICD-10-CM

## 2024-03-27 DIAGNOSIS — Z08 ENCOUNTER FOR FOLLOW-UP EXAMINATION AFTER COMBINED TREATMENT FOR MALIGNANT NEOPLASM: ICD-10-CM

## 2024-03-27 DIAGNOSIS — C10.9 OROPHARYNGEAL CANCER (MULTI): Primary | ICD-10-CM

## 2024-03-27 LAB
ALBUMIN SERPL BCP-MCNC: 4 G/DL (ref 3.4–5)
ALP SERPL-CCNC: 46 U/L (ref 33–136)
ALT SERPL W P-5'-P-CCNC: 7 U/L (ref 10–52)
ANION GAP SERPL CALC-SCNC: 12 MMOL/L (ref 10–20)
APTT PPP: 36 SECONDS (ref 27–38)
AST SERPL W P-5'-P-CCNC: 11 U/L (ref 9–39)
BASOPHILS # BLD AUTO: 0.03 X10*3/UL (ref 0–0.1)
BASOPHILS NFR BLD AUTO: 0.6 %
BILIRUB SERPL-MCNC: 0.4 MG/DL (ref 0–1.2)
BUN SERPL-MCNC: 17 MG/DL (ref 6–23)
CALCIUM SERPL-MCNC: 9.2 MG/DL (ref 8.6–10.3)
CHLORIDE SERPL-SCNC: 99 MMOL/L (ref 98–107)
CO2 SERPL-SCNC: 28 MMOL/L (ref 21–32)
CORTIS SERPL-MCNC: 11.8 UG/DL (ref 2.5–20)
CREAT SERPL-MCNC: 1.53 MG/DL (ref 0.5–1.3)
EGFRCR SERPLBLD CKD-EPI 2021: 48 ML/MIN/1.73M*2
EOSINOPHIL # BLD AUTO: 0.24 X10*3/UL (ref 0–0.4)
EOSINOPHIL NFR BLD AUTO: 4.9 %
ERYTHROCYTE [DISTWIDTH] IN BLOOD BY AUTOMATED COUNT: 16 % (ref 11.5–14.5)
GLUCOSE SERPL-MCNC: 101 MG/DL (ref 74–99)
HCT VFR BLD AUTO: 36.4 % (ref 41–52)
HGB BLD-MCNC: 11.9 G/DL (ref 13.5–17.5)
IMM GRANULOCYTES # BLD AUTO: 0.04 X10*3/UL (ref 0–0.5)
IMM GRANULOCYTES NFR BLD AUTO: 0.8 % (ref 0–0.9)
INR PPP: 1.1 (ref 0.9–1.1)
LYMPHOCYTES # BLD AUTO: 0.48 X10*3/UL (ref 0.8–3)
LYMPHOCYTES NFR BLD AUTO: 9.7 %
MAGNESIUM SERPL-MCNC: 1.8 MG/DL (ref 1.6–2.4)
MCH RBC QN AUTO: 28.5 PG (ref 26–34)
MCHC RBC AUTO-ENTMCNC: 32.7 G/DL (ref 32–36)
MCV RBC AUTO: 87 FL (ref 80–100)
MONOCYTES # BLD AUTO: 0.59 X10*3/UL (ref 0.05–0.8)
MONOCYTES NFR BLD AUTO: 12 %
NEUTROPHILS # BLD AUTO: 3.55 X10*3/UL (ref 1.6–5.5)
NEUTROPHILS NFR BLD AUTO: 72 %
NRBC BLD-RTO: 0 /100 WBCS (ref 0–0)
PLATELET # BLD AUTO: 250 X10*3/UL (ref 150–450)
POTASSIUM SERPL-SCNC: 4.2 MMOL/L (ref 3.5–5.3)
PROT SERPL-MCNC: 6.3 G/DL (ref 6.4–8.2)
PROTHROMBIN TIME: 12.2 SECONDS (ref 9.8–12.8)
RBC # BLD AUTO: 4.17 X10*6/UL (ref 4.5–5.9)
SODIUM SERPL-SCNC: 135 MMOL/L (ref 136–145)
T3FREE SERPL-MCNC: 3.4 PG/ML (ref 2.3–4.2)
T4 FREE SERPL-MCNC: 1.22 NG/DL (ref 0.78–1.48)
TSH SERPL-ACNC: 2.67 MIU/L (ref 0.44–3.98)
WBC # BLD AUTO: 4.9 X10*3/UL (ref 4.4–11.3)

## 2024-03-27 PROCEDURE — 84481 FREE ASSAY (FT-3): CPT

## 2024-03-27 PROCEDURE — 1159F MED LIST DOCD IN RCRD: CPT | Performed by: INTERNAL MEDICINE

## 2024-03-27 PROCEDURE — 84439 ASSAY OF FREE THYROXINE: CPT

## 2024-03-27 PROCEDURE — 99214 OFFICE O/P EST MOD 30 MIN: CPT | Performed by: INTERNAL MEDICINE

## 2024-03-27 PROCEDURE — 36415 COLL VENOUS BLD VENIPUNCTURE: CPT

## 2024-03-27 PROCEDURE — 74177 CT ABD & PELVIS W/CONTRAST: CPT | Performed by: RADIOLOGY

## 2024-03-27 PROCEDURE — 84480 ASSAY TRIIODOTHYRONINE (T3): CPT

## 2024-03-27 PROCEDURE — 85610 PROTHROMBIN TIME: CPT

## 2024-03-27 PROCEDURE — 84443 ASSAY THYROID STIM HORMONE: CPT

## 2024-03-27 PROCEDURE — 74177 CT ABD & PELVIS W/CONTRAST: CPT

## 2024-03-27 PROCEDURE — 83735 ASSAY OF MAGNESIUM: CPT

## 2024-03-27 PROCEDURE — 2550000001 HC RX 255 CONTRASTS: Performed by: INTERNAL MEDICINE

## 2024-03-27 PROCEDURE — 82533 TOTAL CORTISOL: CPT

## 2024-03-27 PROCEDURE — 1160F RVW MEDS BY RX/DR IN RCRD: CPT | Performed by: INTERNAL MEDICINE

## 2024-03-27 PROCEDURE — 85025 COMPLETE CBC W/AUTO DIFF WBC: CPT

## 2024-03-27 PROCEDURE — 71260 CT THORAX DX C+: CPT | Performed by: RADIOLOGY

## 2024-03-27 PROCEDURE — 82024 ASSAY OF ACTH: CPT

## 2024-03-27 PROCEDURE — 80053 COMPREHEN METABOLIC PANEL: CPT

## 2024-03-27 RX ADMIN — IOHEXOL 90 ML: 350 INJECTION, SOLUTION INTRAVENOUS at 12:41

## 2024-03-27 ASSESSMENT — ENCOUNTER SYMPTOMS
SORE THROAT: 0
VOMITING: 0
LOSS OF SENSATION IN FEET: 0
DIZZINESS: 0
LIGHT-HEADEDNESS: 0
TROUBLE SWALLOWING: 0
ABDOMINAL PAIN: 0
DYSURIA: 0
FREQUENCY: 0
WOUND: 0
LEG SWELLING: 0
COUGH: 0
OCCASIONAL FEELINGS OF UNSTEADINESS: 0
SHORTNESS OF BREATH: 0
ARTHRALGIAS: 0
DIFFICULTY URINATING: 0
NECK PAIN: 0
NAUSEA: 0
CONSTIPATION: 0
HEADACHES: 0
FEVER: 0
NUMBNESS: 0
DIARRHEA: 0
CHILLS: 0

## 2024-03-27 ASSESSMENT — PAIN SCALES - GENERAL: PAINLEVEL: 0-NO PAIN

## 2024-03-27 NOTE — PROGRESS NOTES
Patient ID: Phong Wong is a 73 y.o. male.  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 Juan Jose Fletcher   MedOn: Dr. Kyunghee Burkitt, X. Katherine Feng, PA-C   RadOnc: Dr. Vianca Steen     Prior Therapy:   8/16 - 10/4/23: Recieved concurrent chemoradiation with 7 weekly Carboplatin (2mg/AUC)/Paclitaxel (45mg/m2)  and 70gy RT in 35fx     Sites of Disease:  Soft palate, BOT, Left palatine tonsil  B/L Cervical LN  Lung     Oncologic Issues:   Odynophagia  Fatigue     ONCOLOGIC HISTORY  - Pt had 2 months hx of throat discomfort with lump in right neck  - 6/13/23: CT neck showed a mass 2.8 x 2.5 x 2.9 cm in the right lower cervical neck soft tissues. Two suspicious nodes were observed, one at level 3 on the right and another at level 2 on the left.  - 6/28/23: seen by Dr. Fletcher. 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 Based on the findings, Dr. Fletcher did not recommend surgery due to the location  of the primary tumor and the presence of yvette disease  - 6/30/23: PET/CT showed intense FDG avidity within the soft palate, extending to the base of the tongue and left palatine tonsil. Multiple FDG avid left cervical level II nodes were observed, along with an FDG avid mass in the region of the right cervical  level II/III, infiltrating the right SCM muscle and encasing and invading the right jugular vein. FDG avidity was also detected in the region of the left fossa Rosenmuller and left medial pterygoid muscle.  - 8/16 - 10/4/23: Recieved concurrent chemoradiation with 7 weekly Carboplatin (2mg/AUC)/Paclitaxel (45mg/m2)  and 70gy RT in 35fx    Admissions:  10/5 - 10/10/23: Admitted for extreme weakness, HECTOR, pancytopenia including anemia requiring blood transfusion. D/C'ed to acute rehab        Past Medical History:   Past Medical  History:  2017: Personal history of diseases of the blood and blood-  forming organs and certain disorders involving the immune mechanism      Comment:  History of thrombocytosis   HTN, HLD, COPD, prostate cancer in 2017 (s/p radiation)  Surgical History:    Past Surgical History:   Procedure Laterality Date    CT ABDOMEN PELVIS ANGIOGRAM W AND/OR WO IV CONTRAST  9/3/2014    CT ABDOMEN PELVIS ANGIOGRAM W AND/OR WO IV CONTRAST 9/3/2014 [facility]    IR ANGIOGRAM AORTA ABDOMEN  2014    IR ANGIOGRAM AORTA ABDOMEN 2014 [facility]   Aortoiliofemoral vascular bypass in   Social History:    Social History     Socioeconomic History    Marital status:      Spouse name: Not on file    Number of children: 1    Years of education: Not on file    Highest education level: Not on file   Occupational History    Not on file   Tobacco Use    Smoking status: Former     Packs/day: 1.00     Years: 40.00     Additional pack years: 0.00     Total pack years: 40.00     Types: Cigarettes     Quit date:      Years since quittin.2     Passive exposure: Past    Smokeless tobacco: Never   Substance and Sexual Activity    Alcohol use: Yes     Alcohol/week: 12.0 standard drinks of alcohol     Types: 12 Cans of beer per week    Drug use: Never    Sexual activity: Not on file   Other Topics Concern    Not on file   Social History Narrative    Not on file     Social Determinants of Health     Financial Resource Strain: Low Risk  (10/20/2023)    Overall Financial Resource Strain (CARDIA)     Difficulty of Paying Living Expenses: Not very hard   Food Insecurity: No Food Insecurity (10/5/2023)    Hunger Vital Sign     Worried About Running Out of Food in the Last Year: Never true     Ran Out of Food in the Last Year: Never true   Transportation Needs: No Transportation Needs (10/20/2023)    PRAPARE - Transportation     Lack of Transportation (Medical): No     Lack of Transportation (Non-Medical): No    Physical Activity: Inactive (10/5/2023)    Exercise Vital Sign     Days of Exercise per Week: 0 days     Minutes of Exercise per Session: 0 min   Stress: Stress Concern Present (10/5/2023)    [employer] - Occupational Stress Questionnaire     Feeling of Stress : To some extent   Social Connections: Moderately Integrated (10/5/2023)    Social Connection and Isolation Panel [NHANES]     Frequency of Communication with Friends and Family: More than three times a week     Frequency of Social Gatherings with Friends and Family: More than three times a week     Attends Denominational Services: Never     Active Member of Clubs or Organizations: Yes     Attends Club or Organization Meetings: Never     Marital Status:    Intimate Partner Violence: Not At Risk (10/5/2023)    Humiliation, Afraid, Rape, and Kick questionnaire     Fear of Current or Ex-Partner: No     Emotionally Abused: No     Physically Abused: No     Sexually Abused: No   Housing Stability: Low Risk  (10/20/2023)    Housing Stability Vital Sign     Unable to Pay for Housing in the Last Year: No     Number of Places Lived in the Last Year: 1     Unstable Housing in the Last Year: No      Family History:    Family History   Problem Relation Name Age of Onset    Aortic aneurysm Father          abdominal     Family Oncology History:    Cancer-related family history is not on file.      Subjective   Chief Complaint: Squamous Cell Carcinoma of oropharynx    HPI  Interval History  Phong Wong is a 73 y.o. male with recent diagnosis of locally advanced oral cavity cancer, completed concurrent chemoradiation with 7 weekly Carboplatin+Paclitaxel on 10/4/23.  He was admitted 10/5 - 10/10/23 for extreme weakness, HECTOR, pancytopenia including anemia requiring blood transfusion. D/C'ed to acute rehab. He was admitted again 10/18 - 11/2/23 for lethargy, weakness, failure to thrive, anemia.     Patient presents today to discuss SOC treatment  as he is not qualified for trial due to GFR.    He denies pain in tongue, throat or neck.  Eating and drinking well.  He is feeling very well. Ambulating well at home.  Appetite is very good. He denies dysphagia.   Denies dry mouth, has saliva production, taste is normal.   He has minimum swelling in the neck (lymphedema) but no pain.   He denies any urinary symptoms currently, no urgency, frequency, hesitancy, dysuria.  He's following with PCP, Dr. Weiss.    ROS  Review of Systems   Constitutional:  Negative for chills and fever. Unexpected weight change: weight loss.  HENT:   Positive for mouth sores. Negative for lump/mass, nosebleeds, sore throat, tinnitus and trouble swallowing.    Respiratory:  Negative for cough and shortness of breath.    Cardiovascular:  Negative for chest pain and leg swelling.   Gastrointestinal:  Negative for abdominal pain, constipation, diarrhea, nausea and vomiting.   Genitourinary:  Negative for difficulty urinating, dysuria and frequency.    Musculoskeletal:  Negative for arthralgias and neck pain.   Skin:  Negative for rash and wound.   Neurological:  Negative for dizziness, headaches, light-headedness and numbness.       Allergies  Allergies   Allergen Reactions    Codeine Nausea Only        Medications  Current Outpatient Medications   Medication Instructions    amLODIPine-benazepriL (Lotrel) 5-20 mg capsule [id number] Medication amlodipine 5 mg-benazepril 20 mg capsule amlodipine 5 mg-benazepril 20 mg capsule 5-20 mg 10/2/2020 Active (Outside)    aspirin 81 mg EC tablet 1 tablet, oral, Daily    BMX ORAL SUSPENSION (1:1:1) 5 mL, Swish & Spit, 3 times daily    doxazosin (CARDURA) 4 mg, oral, Daily, Take 1 tablet by mouth daily in the evening    fluticasone-umeclidin-vilanter (Trelegy Ellipta) 100-62.5-25 mcg blister with device INHALE 1 PUFF EVERY DAY    ipratropium-albuteroL (Duo-Neb) 0.5-2.5 mg/3 mL nebulizer solution Take 3 mL by nebulization 3 times a day as needed for wheezing  or shortness of breath.    losartan (COZAAR) 50 mg, oral, Daily    magnesium oxide (MAG-OX) 400 mg, oral, Daily    ofloxacin (Ocuflox) 0.3 % ophthalmic solution Instill 1 drop Left Eye 4 times a day    oxyCODONE (OXAYDO) 5 mg, oral, Every 6 hours PRN        Objective   VS:  There were no vitals taken for this visit.  Weight   Wt Readings from Last 7 Encounters:   03/06/24 60 kg (132 lb 3.2 oz)   02/07/24 58.6 kg (129 lb 3 oz)   01/17/24 61.2 kg (135 lb)   01/12/24 62.5 kg (137 lb 12.8 oz)   01/10/24 61.5 kg (135 lb 8 oz)   12/21/23 60.8 kg (134 lb)   11/29/23 61.2 kg (134 lb 14.7 oz)         Physical Exam  Constitutional:       Appearance: Normal appearance. He is underweight.   HENT:      Head: Normocephalic and atraumatic.      Right Ear: External ear normal. No tenderness.      Left Ear: External ear normal. No tenderness.      Nose: Nose normal.      Mouth/Throat:      Mouth: No injury or oral lesions.      Tongue: Lesions present.      Pharynx: Oropharynx is clear. No posterior oropharyngeal erythema.      Comments: Edentulous  Left lateral tongue with flat 1cm, yellow colored lesion.   Eyes:      Extraocular Movements: Extraocular movements intact.      Conjunctiva/sclera: Conjunctivae normal.      Pupils: Pupils are equal, round, and reactive to light.   Neck:      Thyroid: No thyroid mass.      Comments: Mild swelling in submental and neck region. No pain on palpation  Cardiovascular:      Rate and Rhythm: Normal rate and regular rhythm.   Pulmonary:      Effort: Pulmonary effort is normal. No respiratory distress.      Breath sounds: Normal breath sounds.   Abdominal:      General: Bowel sounds are normal. There is no distension or abdominal bruit.      Palpations: Abdomen is soft. There is no mass.      Tenderness: There is no abdominal tenderness.   Genitourinary:     Comments: Clark in place, clear pale yellow urine  Musculoskeletal:         General: Normal range of motion.      Cervical back: Normal  range of motion and neck supple.      Right lower leg: No edema.      Left lower leg: No edema.   Lymphadenopathy:      Cervical: No cervical adenopathy.      Upper Body:      Right upper body: No axillary adenopathy.      Left upper body: No axillary adenopathy.   Skin:     General: Skin is warm and dry.      Findings: No lesion (radiation dermatitis in bilateral neck), rash or wound.   Neurological:      General: No focal deficit present.      Mental Status: He is alert and oriented to person, place, and time.      Gait: Gait is intact.   Psychiatric:         Mood and Affect: Mood and affect normal.       Diagnostic Results   The below labs were reviewed.  Results from last 7 days   Lab Units 03/27/24  1132   WBC AUTO x10*3/uL 4.9   HEMOGLOBIN g/dL 11.9*   HEMATOCRIT % 36.4*   PLATELETS AUTO x10*3/uL 250   NEUTROS ABS x10*3/uL 3.55   LYMPHS ABS AUTO x10*3/uL 0.48*   MONOS ABS AUTO x10*3/uL 0.59   EOS ABS AUTO x10*3/uL 0.24   NEUTROS PCT AUTO % 72.0   LYMPHS PCT AUTO % 9.7   MONOS PCT AUTO % 12.0   EOS PCT AUTO % 4.9        Results from last 7 days   Lab Units 03/27/24  1132   GLUCOSE mg/dL 101*   SODIUM mmol/L 135*   POTASSIUM mmol/L 4.2   CHLORIDE mmol/L 99   CO2 mmol/L 28   BUN mg/dL 17   CREATININE mg/dL 1.53*   EGFR mL/min/1.73m*2 48*   CALCIUM mg/dL 9.2   MAGNESIUM mg/dL 1.80   ALBUMIN g/dL 4.0   PROTEIN TOTAL g/dL 6.3*   BILIRUBIN TOTAL mg/dL 0.4   ALK PHOS U/L 46   ALT U/L 7*   AST U/L 11                        Pathology      Imaging  1/10/2024 PET/CT  IMPRESSION:  1. Significant interval improvement in FDG avidity in the soft palate and left palatine tonsil, remaining FDG avidity along the left palatine tonsil consistent with persistent residual viable disease.  2. Nearly complete metabolic resolution of cervical yvette metastases with remaining mild FDG avid right cervical level 3 node seen on current study, likely representing residual yvette metastasis.  3. Newly developed FDG-avid pulmonary nodule in the  left lower lobe as well as minimal FDG avid subcentimeter pulmonary nodules in bilateral upper lobes, concerning for lung metastasis.    1/10/2024 CT Neck w/ IV Contrast  IMPRESSION:  *Decreased size of the previously demonstrated right-sided yvette neck mass *No new adenopathy  *Post treatment changes in the hypopharynx as described    Assessment/Plan    ASSESSMENT  Phong Wong is a 73 y.o. male with recent diagnosis of locally advanced oral cavity cancer. Completed concurrent chemoradiation with 7 weekly Carboplatin (2mg/AUC)/Paclitaxel (45mg/m2) on 10/4/2. Admitted 10/5 - 10/10 for extreme weakness, HECTOR, pancytopenia including anemia requiring blood transfusion. D/C'ed to acute rehab. Admitted  again 10/18 - 11/2/23 for lethargy, weakness, failure to thrive, anemia.     # Locally advanced oral cavity cancer, T3N2M0  - 6/30/23: PET/CT showed intense FDG avidity within the soft palate, extending to the base of the tongue and left palatine tonsil. Multiple uptake in left cervical level II nodes, right cervical level  II/III, infiltrating the right SCM muscle and encasing and invading the right jugular vein. FDG avidity was also detected in the region of the left fossa Rosenmuller and left medial pterygoid muscle.  - 7/13/23: pt is doing well, no pain, discussed with patient about carboplatin+ paclitaxel as his chemotherapy along with radiation due to his GFR 30s.  Chemo related side effects were reviewed with patient, consent obtained.   - Patient required dental extractions and as a result start date was delayed from 7/26 to 8/15/23  - 8/30: tolerating chemo well, does have some fatigue but no n/v. No peripheral neuropathy   - 9/6: Continue to tolerate chemo well, no n/v, is feeling more tired. Eating well, tolerating soft foods and supplementing with Boost VHC 2x per day. Denies peripheral neuropathy  - 9/13: continue to tolerate chemo well w/o n/v. Energy is lower but stable. Eating soft foods and Boost VHC x 2  per day  - 9/20: Tolerating chemo well, minimal odynophagia, does have some weight loss.  - 10/4: Completed last dose of Carbo/Taxol last week. Recall patient did not have a [facility] visit as both providers are out of office. He had las RT today and noted to have significantly increased fatigue, weakness, weight loss in the last week resulting from decreased PO intake.   - 11/9/23: Feeling much improved since d/c from hospital on 11/2. Regaining strength and working with PT/OT at acute rehab. Odynophagia resolved, eating soft foods, has weight gain. Serum protein improved. Electrolytes wnl, mild hyponatremia, no c/f refeeding syndrome.    - 11/29/23: continue to feel very well, d/c'ed from rehab. Acute SEs of chemoRT are resolved. He's eating/drinking well with weight gain. Not using PEG currently. Has lymphedema from RT in the neck/submental region. Can consider lymphedema therapy after 3 month restaging PET showing XENA.   - 1/12/24: Patient is feeling well, tolerating soft PO intake. PEG removed today.   Reviewed 3 month restaging PET/CT with patient. Scans noted significant interval improvement in prior sites of disease though with residual FDG avidity in soft palate and left palatine tonsil, c/f residual disease vs post treatment changes. Also noted was a new FDG avid LLL pulm nodule with max SUV 5.7, c/f pulmonary metastasis. Will order STAT lung biopsy on this.   Pt scoped by Dr. Fletcher today, unfortunately was difficult to examine in office due to patient intolerance. Per Dr. Fletcher, may bring to OR to complete the examination depending on lung biopsy results. Scans will be reviewed at 1/19/24 [facility]. Explained in detail to patient and his SO scan results and next steps. They voiced understanding, all questions answered.   - 2/9/24: Patient presents with new painful left lateral tongue lesion, hindering PO intake. Will start Oxycodone 5mg Q6 hr PRN for pain control. Pt will see Dr. Fletcher next week for  eval/possible biopsy. Patient will have repeat CT Chest for FDG avid LLL pulm nodule as prior biopsy attempt was not successful due to small nodule size and difficult location near diaphragm.  -2/ 9/24 CT chest showed multiple subcentimeter noduesl 2-5mm, dominant lesion is 1.6cm in LLL.    -3/6/24: lung biopsy of LLL nodule was consistent with HN origin. CPS 3.  Discussed with patient about phase 2 FLAM study (randomized to pembrolizumab or pembrolizumab+danvatirsen (Stat3 inbhitiro), he is willing to be considered.  -3/27/24: as he is not qualified for trial due to GFR <50, he will start pembrolizumab as SOC.    # Anemia: improving  - Hgb has been dropping since starting chemo, however today his hgb is <7 and will require a blood transfusion. This is likely due to chemotherapy as there are no s/s of bleeds.   - Received 2U PRBC during 10/18 - 11/2 admission. Anemia likely due to chemotherapy  - 11/29/23: Hgb 8.8. Improved from 3 weeks ago. Expect Hbg will continue to trend up as patient recovers. Can consider iron studies.   - 3/27/24: Hgb 11.9    PLAN  - start pembro next week on 4/4, research blood consent obtained today, will collect labs on 4/4

## 2024-03-28 LAB
ACTH PLAS-MCNC: 29 PG/ML (ref 7.2–63.3)
T3 SERPL-MCNC: 105 NG/DL (ref 60–200)

## 2024-03-29 ENCOUNTER — TELEPHONE (OUTPATIENT)
Dept: HEMATOLOGY/ONCOLOGY | Facility: HOSPITAL | Age: 74
End: 2024-03-29
Payer: MEDICARE

## 2024-03-29 DIAGNOSIS — N40.0 BENIGN PROSTATIC HYPERPLASIA, UNSPECIFIED WHETHER LOWER URINARY TRACT SYMPTOMS PRESENT: ICD-10-CM

## 2024-03-29 DIAGNOSIS — C61 PROSTATE CANCER (MULTI): ICD-10-CM

## 2024-03-29 DIAGNOSIS — C78.00 SECONDARY SQUAMOUS CELL CARCINOMA OF LUNG, UNSPECIFIED LATERALITY (MULTI): ICD-10-CM

## 2024-03-29 DIAGNOSIS — C10.9 OROPHARYNGEAL CANCER (MULTI): Primary | ICD-10-CM

## 2024-03-29 RX ORDER — DOXAZOSIN 4 MG/1
4 TABLET ORAL DAILY
Qty: 90 TABLET | Refills: 2 | Status: SHIPPED | OUTPATIENT
Start: 2024-03-29 | End: 2024-04-08 | Stop reason: SDUPTHER

## 2024-03-29 NOTE — TELEPHONE ENCOUNTER
Attempted to contact patient to remind him of his appointment and infusion next week. His schedule is as follows:     Thursday, April 4th @ 11:30- Appointment with Eva Patten   Thursday, April 4th @ 12:30- First dose Pembro     Provided office number if he had any questions or concerns.

## 2024-03-29 NOTE — PROGRESS NOTES
This RN met with patient and wife during patient visit. RN reviewed pembro with the patient, side effects, and when to contact the office. Patient's questions were answered and treatment plan was reviewed. Patient was brought to scheduling prior to leaving the clinic.     First infusion scheduled for 4/4/24.

## 2024-04-01 ENCOUNTER — APPOINTMENT (OUTPATIENT)
Dept: RESEARCH | Facility: HOSPITAL | Age: 74
End: 2024-04-01
Payer: MEDICARE

## 2024-04-02 ENCOUNTER — APPOINTMENT (OUTPATIENT)
Dept: INTEGRATIVE MEDICINE | Facility: CLINIC | Age: 74
End: 2024-04-02

## 2024-04-02 DIAGNOSIS — N40.0 BENIGN PROSTATIC HYPERPLASIA, UNSPECIFIED WHETHER LOWER URINARY TRACT SYMPTOMS PRESENT: ICD-10-CM

## 2024-04-02 DIAGNOSIS — C61 PROSTATE CANCER (MULTI): ICD-10-CM

## 2024-04-02 ASSESSMENT — ENCOUNTER SYMPTOMS
SHORTNESS OF BREATH: 0
NAUSEA: 0
DIZZINESS: 0
ABDOMINAL PAIN: 0
LEG SWELLING: 0
CONSTIPATION: 0
FEVER: 0
WOUND: 0
NECK PAIN: 0
DIARRHEA: 0
NUMBNESS: 0
COUGH: 0
LIGHT-HEADEDNESS: 0
ARTHRALGIAS: 0
CHILLS: 0
VOMITING: 0
SORE THROAT: 0
FREQUENCY: 0
HEADACHES: 0
TROUBLE SWALLOWING: 0
DIFFICULTY URINATING: 0
DYSURIA: 0

## 2024-04-02 NOTE — PROGRESS NOTES
Patient ID: Phong Wong is a 73 y.o. male.  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 Juan Jose Fletcher   MedOnc: Dr. Kyunghee Burkitt, X. Katherine Feng, PA-C   RadOnc: Dr. Vianca Steen     Current Therapy  4/4/24: Started Q3 week Pembrolizumab    Prior Therapy:   8/16 - 10/4/23: Recieved concurrent chemoradiation with 7 weekly Carboplatin (2mg/AUC)/Paclitaxel (45mg/m2)  and 70gy RT in 35fx     Sites of Disease:  Soft palate, BOT, Left palatine tonsil  B/L Cervical LN  Lung     Oncologic Issues:   Odynophagia  Fatigue     ONCOLOGIC HISTORY  - Pt had 2 months hx of throat discomfort with lump in right neck  - 6/13/23: CT neck showed a mass 2.8 x 2.5 x 2.9 cm in the right lower cervical neck soft tissues. Two suspicious nodes were observed, one at level 3 on the right and another at level 2 on the left.  - 6/28/23: seen by Dr. Fletcher. 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 Based on the findings, Dr. Fletcher did not recommend surgery due to the location  of the primary tumor and the presence of yvette disease  - 6/30/23: PET/CT showed intense FDG avidity within the soft palate, extending to the base of the tongue and left palatine tonsil. Multiple FDG avid left cervical level II nodes were observed, along with an FDG avid mass in the region of the right cervical  level II/III, infiltrating the right SCM muscle and encasing and invading the right jugular vein. FDG avidity was also detected in the region of the left fossa Rosenmuller and left medial pterygoid muscle.  - 8/16 - 10/4/23: Recieved concurrent chemoradiation with 7 weekly Carboplatin (2mg/AUC)/Paclitaxel (45mg/m2)  and 70gy RT in 35fx    Admissions:  10/5 - 10/10/23: Admitted for extreme weakness, HECTOR, pancytopenia including anemia requiring blood  transfusion. D/C'ed to acute rehab        Past Medical History:   Past Medical History:  2017: Personal history of diseases of the blood and blood-  forming organs and certain disorders involving the immune mechanism      Comment:  History of thrombocytosis   HTN, HLD, COPD, prostate cancer in 2017 (s/p radiation)  Surgical History:    Past Surgical History:   Procedure Laterality Date    CT ABDOMEN PELVIS ANGIOGRAM W AND/OR WO IV CONTRAST  9/3/2014    CT ABDOMEN PELVIS ANGIOGRAM W AND/OR WO IV CONTRAST 9/3/2014 [facility]    IR ANGIOGRAM AORTA ABDOMEN  2014    IR ANGIOGRAM AORTA ABDOMEN 2014 [facility]   Aortoiliofemoral vascular bypass in   Social History:    Social History     Socioeconomic History    Marital status:      Spouse name: Not on file    Number of children: 1    Years of education: Not on file    Highest education level: Not on file   Occupational History    Not on file   Tobacco Use    Smoking status: Former     Packs/day: 1.00     Years: 40.00     Additional pack years: 0.00     Total pack years: 40.00     Types: Cigarettes     Quit date:      Years since quittin.2     Passive exposure: Past    Smokeless tobacco: Never   Substance and Sexual Activity    Alcohol use: Yes     Alcohol/week: 12.0 standard drinks of alcohol     Types: 12 Cans of beer per week    Drug use: Never    Sexual activity: Not on file   Other Topics Concern    Not on file   Social History Narrative    Not on file     Social Determinants of Health     Financial Resource Strain: Low Risk  (10/20/2023)    Overall Financial Resource Strain (CARDIA)     Difficulty of Paying Living Expenses: Not very hard   Food Insecurity: No Food Insecurity (10/5/2023)    Hunger Vital Sign     Worried About Running Out of Food in the Last Year: Never true     Ran Out of Food in the Last Year: Never true   Transportation Needs: No Transportation Needs (10/20/2023)    PRAPARE - Transportation      Lack of Transportation (Medical): No     Lack of Transportation (Non-Medical): No   Physical Activity: Inactive (10/5/2023)    Exercise Vital Sign     Days of Exercise per Week: 0 days     Minutes of Exercise per Session: 0 min   Stress: Stress Concern Present (10/5/2023)    Citizen of the [place] of Occupational Health - Occupational Stress Questionnaire     Feeling of Stress : To some extent   Social Connections: Moderately Integrated (10/5/2023)    Social Connection and Isolation Panel [NHANES]     Frequency of Communication with Friends and Family: More than three times a week     Frequency of Social Gatherings with Friends and Family: More than three times a week     Attends Orthodox Services: Never     Active Member of Clubs or Organizations: Yes     Attends Club or Organization Meetings: Never     Marital Status:    Intimate Partner Violence: Not At Risk (10/5/2023)    Humiliation, Afraid, Rape, and Kick questionnaire     Fear of Current or Ex-Partner: No     Emotionally Abused: No     Physically Abused: No     Sexually Abused: No   Housing Stability: Low Risk  (10/20/2023)    Housing Stability Vital Sign     Unable to Pay for Housing in the Last Year: No     Number of Places Lived in the Last Year: 1     Unstable Housing in the Last Year: No      Family History:    Family History   Problem Relation Name Age of Onset    Aortic aneurysm Father          abdominal     Family Oncology History:    Cancer-related family history is not on file.      Subjective   Chief Complaint: Squamous Cell Carcinoma of oropharynx    HPI  Interval History  Phong Wong is a 73 y.o. male with h/o locally advanced oral cavity cancer, completed concurrent chemoradiation with 7 weekly Carboplatin+Paclitaxel on 10/4/23. Unfortunately found with mets to lungs on 3 months restaging scan. CPS 3. Started Q3 week Pembrolizumab on 4/4/24.    Patient presents today for C1 Pembrolizumab.     He's feeling very well. Energy is stable.  Ambulating well at home.  Denies pain in tongue, throat or neck. Right neck is mildly tender to palpation, skin of right neck a little itchy.  Appetite is good. Eating and drinking well, tolerating regular diet, denies dysphagia.  Denies dry mouth, has saliva production, taste is normal.   He has minimum swelling in the neck (lymphedema) but no pain. Will be starting lymphedema therapy in 2 weeks.  He denies any urinary symptoms currently, no urgency, frequency, hesitancy, dysuria.  He's following with PCP, Dr. Weiss.    VANITA  Review of Systems   Constitutional:  Negative for chills and fever.   HENT:   Negative for lump/mass, mouth sores, nosebleeds, sore throat, tinnitus and trouble swallowing.    Respiratory:  Negative for cough and shortness of breath.    Cardiovascular:  Negative for chest pain and leg swelling.   Gastrointestinal:  Negative for abdominal pain, constipation, diarrhea, nausea and vomiting.   Genitourinary:  Negative for difficulty urinating, dysuria and frequency.    Musculoskeletal:  Negative for arthralgias and neck pain.   Skin:  Negative for rash and wound.   Neurological:  Negative for dizziness, headaches, light-headedness and numbness.       Allergies  Allergies   Allergen Reactions    Codeine Nausea Only        Medications  Current Outpatient Medications   Medication Instructions    amLODIPine-benazepriL (Lotrel) 5-20 mg capsule [id number] Medication amlodipine 5 mg-benazepril 20 mg capsule amlodipine 5 mg-benazepril 20 mg capsule 5-20 mg 10/2/2020 Active (Outside)    aspirin 81 mg EC tablet 1 tablet, oral, Daily    BMX ORAL SUSPENSION (1:1:1) 5 mL, Swish & Spit, 3 times daily    doxazosin (CARDURA) 4 mg, oral, Daily, Take 1 tablet by mouth daily in the evening    fluticasone-umeclidin-vilanter (Trelegy Ellipta) 100-62.5-25 mcg blister with device INHALE 1 PUFF EVERY DAY    ipratropium-albuteroL (Duo-Neb) 0.5-2.5 mg/3 mL nebulizer solution Take 3 mL by nebulization 3 times a day as needed  for wheezing or shortness of breath.    losartan (COZAAR) 50 mg, oral, Daily    magnesium oxide (MAG-OX) 400 mg, oral, Daily    ofloxacin (Ocuflox) 0.3 % ophthalmic solution Instill 1 drop Left Eye 4 times a day    oxyCODONE (OXAYDO) 5 mg, oral, Every 6 hours PRN        Objective   VS:  There were no vitals taken for this visit.  Weight   Wt Readings from Last 7 Encounters:   03/27/24 61.5 kg (135 lb 8 oz)   03/06/24 60 kg (132 lb 3.2 oz)   02/07/24 58.6 kg (129 lb 3 oz)   01/17/24 61.2 kg (135 lb)   01/12/24 62.5 kg (137 lb 12.8 oz)   01/10/24 61.5 kg (135 lb 8 oz)   12/21/23 60.8 kg (134 lb)         Physical Exam  Constitutional:       Appearance: Normal appearance. He is underweight.   HENT:      Head: Normocephalic and atraumatic.      Right Ear: External ear normal. No tenderness.      Left Ear: External ear normal. No tenderness.      Nose: Nose normal.      Mouth/Throat:      Mouth: No injury or oral lesions.      Tongue: Lesions present.      Pharynx: Oropharynx is clear. No posterior oropharyngeal erythema.      Comments: Edentulous  Eyes:      Extraocular Movements: Extraocular movements intact.      Conjunctiva/sclera: Conjunctivae normal.      Pupils: Pupils are equal, round, and reactive to light.   Neck:      Thyroid: No thyroid mass.      Comments: Mild swelling in submental and neck region. No pain on palpation  Cardiovascular:      Rate and Rhythm: Normal rate and regular rhythm.   Pulmonary:      Effort: Pulmonary effort is normal. No respiratory distress.      Breath sounds: Normal breath sounds.   Abdominal:      General: Bowel sounds are normal. There is no distension or abdominal bruit.      Palpations: Abdomen is soft. There is no mass.      Tenderness: There is no abdominal tenderness.   Musculoskeletal:         General: Normal range of motion.      Cervical back: Normal range of motion and neck supple.      Right lower leg: No edema.      Left lower leg: No edema.   Lymphadenopathy:       Cervical: No cervical adenopathy.      Upper Body:      Right upper body: No axillary adenopathy.      Left upper body: No axillary adenopathy.   Skin:     General: Skin is warm and dry.      Findings: No lesion (radiation dermatitis in bilateral neck), rash or wound.   Neurological:      General: No focal deficit present.      Mental Status: He is alert and oriented to person, place, and time.      Gait: Gait is intact.   Psychiatric:         Mood and Affect: Mood and affect normal.         Diagnostic Results   The below labs were reviewed.  Results from last 7 days   Lab Units 03/27/24  1132   WBC AUTO x10*3/uL 4.9   HEMOGLOBIN g/dL 11.9*   HEMATOCRIT % 36.4*   PLATELETS AUTO x10*3/uL 250   NEUTROS ABS x10*3/uL 3.55   LYMPHS ABS AUTO x10*3/uL 0.48*   MONOS ABS AUTO x10*3/uL 0.59   EOS ABS AUTO x10*3/uL 0.24   NEUTROS PCT AUTO % 72.0   LYMPHS PCT AUTO % 9.7   MONOS PCT AUTO % 12.0   EOS PCT AUTO % 4.9        Results from last 7 days   Lab Units 03/27/24  1132   GLUCOSE mg/dL 101*   SODIUM mmol/L 135*   POTASSIUM mmol/L 4.2   CHLORIDE mmol/L 99   CO2 mmol/L 28   BUN mg/dL 17   CREATININE mg/dL 1.53*   EGFR mL/min/1.73m*2 48*   CALCIUM mg/dL 9.2   MAGNESIUM mg/dL 1.80   ALBUMIN g/dL 4.0   PROTEIN TOTAL g/dL 6.3*   BILIRUBIN TOTAL mg/dL 0.4   ALK PHOS U/L 46   ALT U/L 7*   AST U/L 11         Results from last 7 days   Lab Units 03/27/24  1132   CORTISOL ug/dL 11.8   ADRENOCORTICOTROPIC HORMONE pg/mL 29.0   TSH mIU/L 2.67   T3 FREE pg/mL 3.4   FREE T4 ng/dL 1.22                Pathology      Imaging  1/10/2024 PET/CT  IMPRESSION:  1. Significant interval improvement in FDG avidity in the soft palate and left palatine tonsil, remaining FDG avidity along the left palatine tonsil consistent with persistent residual viable disease.  2. Nearly complete metabolic resolution of cervical yvette metastases with remaining mild FDG avid right cervical level 3 node seen on current study, likely representing residual yvette  metastasis.  3. Newly developed FDG-avid pulmonary nodule in the left lower lobe as well as minimal FDG avid subcentimeter pulmonary nodules in bilateral upper lobes, concerning for lung metastasis.    1/10/2024 CT Neck w/ IV Contrast  IMPRESSION:  *Decreased size of the previously demonstrated right-sided yvette neck mass *No new adenopathy  *Post treatment changes in the hypopharynx as described    2/9/27 CT Chest w/o Contrast  IMPRESSION:  1. Multiple pulmonary nodules are again noted corresponding to FDG avid nodules on most recent PET-CT from 01/10/2024 and are new when compared to prior CT of the chest from 06/13/2023. The largest of which is a pleural-based left lower lobe nodule measuring up to 1.6 cm. Findings are concerning for metastatic disease.  2. Severe coronary artery calcifications, indicating the presence of coronary artery disease. If the patient has associated symptoms recommend management as per chest pain guidelines (e.g. https://doi.org/10.1161/CIR.2517165359958585). If the patient is asymptomatic consider reviewing modifiable cardiovascular risk factors and managing as per guidelines for primary prevention (e.g. https://doi.org/10.1161/CIR.1239439476382716).  4. Bulky atherosclerotic calcification at the origin of the left renal artery likely contributing to atrophy of the left kidney as compared to the right.   5. Additional findings as above.    3/27/2024 CT chest abdomen pelvis w IV contrast  Impression:   1. Interval increase in size of left lower lobe pulmonary nodule when compared to CT chest 02/09/2024 correlating with FDG avidity on PET-CT 01/10/2024. No sites of new metastasis in the chest.   2. The previously seen clusters of pulmonary nodules in the left upper lobe have decreased in number since CT chest 09/20/2024 correlating with improving postradiation changes or bronchiolitis.   3. Stable diffuse heterogenous appearance of the axial skeleton, similar to study. No sites of new  metastasis in the abdomen and pelvis.  4. Additional stable findings as above.       Assessment/Plan    ASSESSMENT  Phong Wong is a 73 y.o. male with recent diagnosis of locally advanced oral cavity cancer. Completed concurrent chemoradiation with 7 weekly Carboplatin (2mg/AUC)/Paclitaxel (45mg/m2) on 10/4/2. Admitted 10/5 - 10/10 for extreme weakness, HECTOR, pancytopenia including anemia requiring blood transfusion. D/C'ed to acute rehab. Admitted  again 10/18 - 11/2/23 for lethargy, weakness, failure to thrive, anemia.     # Locally advanced oral cavity cancer, T3N2M0, now with met to lungs  - 6/30/23: PET/CT showed intense FDG avidity within the soft palate, extending to the base of the tongue and left palatine tonsil. Multiple uptake in left cervical level II nodes, right cervical level  II/III, infiltrating the right SCM muscle and encasing and invading the right jugular vein. FDG avidity was also detected in the region of the left fossa Rosenmuller and left medial pterygoid muscle.  - 7/13/23: pt is doing well, no pain, discussed with patient about carboplatin+ paclitaxel as his chemotherapy along with radiation due to his GFR 30s.  Chemo related side effects were reviewed with patient, consent obtained.   - Patient required dental extractions and as a result start date was delayed from 7/26 to 8/15/23  - 8/30: tolerating chemo well, does have some fatigue but no n/v. No peripheral neuropathy   - 9/6: Continue to tolerate chemo well, no n/v, is feeling more tired. Eating well, tolerating soft foods and supplementing with Boost VHC 2x per day. Denies peripheral neuropathy  - 9/13: continue to tolerate chemo well w/o n/v. Energy is lower but stable. Eating soft foods and Boost VHC x 2 per day  - 9/20: Tolerating chemo well, minimal odynophagia, does have some weight loss.  - 10/4: Completed last dose of Carbo/Taxol last week. Recall patient did not have a [facility] visit as both providers are out of office. He had  las RT today and noted to have significantly increased fatigue, weakness, weight loss in the last week resulting from decreased PO intake.   - 11/9/23: Feeling much improved since d/c from hospital on 11/2. Regaining strength and working with PT/OT at acute rehab. Odynophagia resolved, eating soft foods, has weight gain. Serum protein improved. Electrolytes wnl, mild hyponatremia, no c/f refeeding syndrome.    - 11/29/23: continue to feel very well, d/c'ed from rehab. Acute SEs of chemoRT are resolved. He's eating/drinking well with weight gain. Not using PEG currently. Has lymphedema from RT in the neck/submental region. Can consider lymphedema therapy after 3 month restaging PET showing XENA.   - 1/12/24: Patient is feeling well, tolerating soft PO intake. PEG removed today.   Reviewed 3 month restaging PET/CT with patient. Scans noted significant interval improvement in prior sites of disease though with residual FDG avidity in soft palate and left palatine tonsil, c/f residual disease vs post treatment changes. Also noted was a new FDG avid LLL pulm nodule with max SUV 5.7, c/f pulmonary metastasis. Will order STAT lung biopsy on this.   Pt scoped by Dr. Fletcher today, unfortunately was difficult to examine in office due to patient intolerance. Per Dr. Fletcher, may bring to OR to complete the examination depending on lung biopsy results. Scans will be reviewed at 1/19/24 HN. Explained in detail to patient and his SO scan results and next steps. They voiced understanding, all questions answered.   - 2/9/24: Patient presents with new painful left lateral tongue lesion, hindering PO intake. Will start Oxycodone 5mg Q6 hr PRN for pain control. Pt will see Dr. Fletcher next week for eval/possible biopsy. Patient will have repeat CT Chest for FDG avid LLL pulm nodule as prior biopsy attempt was not successful due to small nodule size and difficult location near diaphragm.  -2/9/24 CT chest showed multiple  subcentimeter noduesl 2-5mm, dominant lesion is 1.6cm in LLL.    -3/6/24: lung biopsy of LLL nodule was consistent with HN origin. [facility]us pending including CPS.  Discussed with patient about phase 2 FLAM study (randomized to pembrolizumab or pembrolizumab+danvatirsen (Stat3 inhibitor), he is willing to be considered.   - Unfortunately due to his CKD, he's not eligible for FLAM study.  - 4/4/24: Patient continue to feel very well, no dysphagia, no throat or neck pain. No cough, breathing well.     # Anemia: improving  - Hgb has been dropping since starting chemo, however today his hgb is <7 and will require a blood transfusion. This is likely due to chemotherapy as there are no s/s of bleeds.   - Received 2U PRBC during 10/18 - 11/2 admission. Anemia likely due to chemotherapy  - 11/29/23: Hgb 8.8. Improved from 3 weeks ago. Expect Hbg will continue to trend up as patient recovers. Can consider iron studies.   - 3/6/24: Hgb 11.4    # CKD  - Patient's 2/9/24 CT chest showed Bulky atherosclerotic calcification at the origin of the left renal artery likely contributing to atrophy of the left kidney as compared to the right. Kidney atrophy was not mentioned in previous scans.   - Encouraged patient to keep up with non-caffeinated PO hydration. 1L NS given today with Pembro. Patient to follow up with PCP regarding atherosclerosis.     # Submental/Neck Lymphedema  - Patient with moderate swelling in the submental and neck region. Likely 2ry to prior radiation. Will be starting lymphedema therapy soon.     PLAN  -- Proceed to C1 Pembrolizumab today  -- 1L NS given in infusion for hydration. Encourage ample PO hydration  -- RTC 3 weeks for C2  -- Follow up with Dr. Weiss on atherosclerosis of left renal artery.   -- Start lymphedema therapy in 2 weeks

## 2024-04-03 ENCOUNTER — TELEPHONE (OUTPATIENT)
Dept: HEMATOLOGY/ONCOLOGY | Facility: HOSPITAL | Age: 74
End: 2024-04-03
Payer: MEDICARE

## 2024-04-03 NOTE — TELEPHONE ENCOUNTER
Pt called to follow up on anti-emetic prescriptions discussed at last visit.  He starts treatment tomorrow.  Preferred pharmacy is [facility] in [facility].

## 2024-04-03 NOTE — TELEPHONE ENCOUNTER
Discussed with pt that pembrolizumab does not typically cause nausea or vomiting and the team does not usually prescribe anti-emetics.  Pt verbalized understanding and will call back with any further questions or concerns.

## 2024-04-04 ENCOUNTER — INFUSION (OUTPATIENT)
Dept: HEMATOLOGY/ONCOLOGY | Facility: HOSPITAL | Age: 74
End: 2024-04-04
Payer: MEDICARE

## 2024-04-04 ENCOUNTER — OFFICE VISIT (OUTPATIENT)
Dept: HEMATOLOGY/ONCOLOGY | Facility: HOSPITAL | Age: 74
End: 2024-04-04
Payer: MEDICARE

## 2024-04-04 ENCOUNTER — LAB (OUTPATIENT)
Dept: LAB | Facility: HOSPITAL | Age: 74
End: 2024-04-04
Payer: MEDICARE

## 2024-04-04 ENCOUNTER — APPOINTMENT (OUTPATIENT)
Dept: HEMATOLOGY/ONCOLOGY | Facility: HOSPITAL | Age: 74
End: 2024-04-04
Payer: MEDICARE

## 2024-04-04 VITALS
TEMPERATURE: 97.7 F | BODY MASS INDEX: 21.81 KG/M2 | HEART RATE: 74 BPM | OXYGEN SATURATION: 100 % | WEIGHT: 135.14 LBS | DIASTOLIC BLOOD PRESSURE: 50 MMHG | SYSTOLIC BLOOD PRESSURE: 132 MMHG | RESPIRATION RATE: 20 BRPM

## 2024-04-04 DIAGNOSIS — C78.02 SQUAMOUS CELL CARCINOMA METASTATIC TO BOTH LUNGS (MULTI): Primary | ICD-10-CM

## 2024-04-04 DIAGNOSIS — C10.9 OROPHARYNGEAL CANCER (MULTI): ICD-10-CM

## 2024-04-04 DIAGNOSIS — N18.31 CHRONIC RENAL IMPAIRMENT, STAGE 3A (MULTI): ICD-10-CM

## 2024-04-04 DIAGNOSIS — I70.1 RENAL ARTERY ATHEROSCLEROSIS (CMS-HCC): ICD-10-CM

## 2024-04-04 DIAGNOSIS — Z51.12 ENCOUNTER FOR ANTINEOPLASTIC IMMUNOTHERAPY: ICD-10-CM

## 2024-04-04 DIAGNOSIS — N26.1 RENAL ATROPHY, LEFT: ICD-10-CM

## 2024-04-04 DIAGNOSIS — I89.0 LYMPHEDEMA DUE TO AND NOT CONCURRENT WITH IONIZING RADIOTHERAPY: ICD-10-CM

## 2024-04-04 DIAGNOSIS — C78.01 SQUAMOUS CELL CARCINOMA METASTATIC TO BOTH LUNGS (MULTI): Primary | ICD-10-CM

## 2024-04-04 DIAGNOSIS — C78.00 SECONDARY SQUAMOUS CELL CARCINOMA OF LUNG, UNSPECIFIED LATERALITY (MULTI): ICD-10-CM
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
## 2024-04-04 PROBLEM — T14.8XXA WOUND OF SKIN: Status: RESOLVED | Noted: 2023-04-09 | Resolved: 2024-04-04

## 2024-04-04 PROBLEM — R31.29 MICROSCOPIC HEMATURIA: Status: RESOLVED | Noted: 2023-04-09 | Resolved: 2024-04-04

## 2024-04-04 PROBLEM — E87.5 HYPERKALEMIA: Status: RESOLVED | Noted: 2023-04-09 | Resolved: 2024-04-04

## 2024-04-04 PROBLEM — M79.89 SWELLING OF LOWER EXTREMITY: Status: RESOLVED | Noted: 2023-04-09 | Resolved: 2024-04-04

## 2024-04-04 PROBLEM — R13.10 ODYNOPHAGIA: Status: RESOLVED | Noted: 2023-11-07 | Resolved: 2024-04-04

## 2024-04-04 PROBLEM — E83.42 HYPOMAGNESEMIA: Status: RESOLVED | Noted: 2023-11-07 | Resolved: 2024-04-04

## 2024-04-04 PROBLEM — R62.7 FTT (FAILURE TO THRIVE) IN ADULT: Status: RESOLVED | Noted: 2023-11-07 | Resolved: 2024-04-04

## 2024-04-04 PROBLEM — D75.839 THROMBOCYTOSIS: Status: RESOLVED | Noted: 2023-04-09 | Resolved: 2024-04-04

## 2024-04-04 PROBLEM — D61.818 PANCYTOPENIA (MULTI): Status: RESOLVED | Noted: 2023-10-05 | Resolved: 2024-04-04

## 2024-04-04 PROBLEM — R52 PAIN AFTER RADIATION THERAPY: Status: RESOLVED | Noted: 2023-10-14 | Resolved: 2024-04-04

## 2024-04-04 PROBLEM — T45.1X5A CHEMOTHERAPY-INDUCED NEUTROPENIA (CMS-HCC): Status: RESOLVED | Noted: 2023-11-07 | Resolved: 2024-04-04

## 2024-04-04 PROBLEM — C44.92: Status: ACTIVE | Noted: 2024-04-04

## 2024-04-04 PROBLEM — R22.1 NECK MASS: Status: RESOLVED | Noted: 2023-08-25 | Resolved: 2024-04-04

## 2024-04-04 PROBLEM — D70.1 CHEMOTHERAPY-INDUCED NEUTROPENIA (CMS-HCC): Status: RESOLVED | Noted: 2023-11-07 | Resolved: 2024-04-04

## 2024-04-04 PROBLEM — R53.81 MALAISE: Status: RESOLVED | Noted: 2023-04-09 | Resolved: 2024-04-04

## 2024-04-04 PROBLEM — R22.0 MASS OF SOFT PALATE: Status: RESOLVED | Noted: 2023-08-25 | Resolved: 2024-04-04

## 2024-04-04 PROBLEM — L85.3 XEROSIS CUTIS: Status: RESOLVED | Noted: 2022-02-08 | Resolved: 2024-04-04

## 2024-04-04 PROBLEM — L03.116 LEFT LEG CELLULITIS: Status: RESOLVED | Noted: 2023-04-09 | Resolved: 2024-04-04

## 2024-04-04 PROBLEM — E87.6 HYPOKALEMIA: Status: RESOLVED | Noted: 2023-10-14 | Resolved: 2024-04-04

## 2024-04-04 PROBLEM — J96.02 ACUTE RESPIRATORY FAILURE WITH HYPERCAPNIA (MULTI): Status: RESOLVED | Noted: 2023-05-25 | Resolved: 2024-04-04

## 2024-04-04 PROBLEM — T30.0 RADIATION BURN: Status: RESOLVED | Noted: 2023-11-07 | Resolved: 2024-04-04

## 2024-04-04 PROBLEM — R13.12 OROPHARYNGEAL DYSPHAGIA: Status: RESOLVED | Noted: 2024-01-12 | Resolved: 2024-04-04

## 2024-04-04 LAB
ALBUMIN SERPL BCP-MCNC: 3.9 G/DL (ref 3.4–5)
ALP SERPL-CCNC: 43 U/L (ref 33–136)
ALT SERPL W P-5'-P-CCNC: 9 U/L (ref 10–52)
ANION GAP SERPL CALC-SCNC: 13 MMOL/L (ref 10–20)
AST SERPL W P-5'-P-CCNC: 12 U/L (ref 9–39)
BASOPHILS # BLD AUTO: 0.05 X10*3/UL (ref 0–0.1)
BASOPHILS NFR BLD AUTO: 1.1 %
BILIRUB SERPL-MCNC: 0.4 MG/DL (ref 0–1.2)
BUN SERPL-MCNC: 20 MG/DL (ref 6–23)
CALCIUM SERPL-MCNC: 8.8 MG/DL (ref 8.6–10.3)
CHLORIDE SERPL-SCNC: 99 MMOL/L (ref 98–107)
CO2 SERPL-SCNC: 27 MMOL/L (ref 21–32)
CORTIS AM PEAK SERPL-MSCNC: 13 UG/DL (ref 5–20)
CREAT SERPL-MCNC: 1.58 MG/DL (ref 0.5–1.3)
EGFRCR SERPLBLD CKD-EPI 2021: 46 ML/MIN/1.73M*2
EOSINOPHIL # BLD AUTO: 0.22 X10*3/UL (ref 0–0.4)
EOSINOPHIL NFR BLD AUTO: 4.7 %
ERYTHROCYTE [DISTWIDTH] IN BLOOD BY AUTOMATED COUNT: 16.3 % (ref 11.5–14.5)
GLUCOSE SERPL-MCNC: 88 MG/DL (ref 74–99)
HBV CORE AB SER QL: NONREACTIVE
HBV SURFACE AB SER-ACNC: <3.1 MIU/ML
HBV SURFACE AG SERPL QL IA: NONREACTIVE
HCT VFR BLD AUTO: 36.7 % (ref 41–52)
HGB BLD-MCNC: 11.8 G/DL (ref 13.5–17.5)
IMM GRANULOCYTES # BLD AUTO: 0.03 X10*3/UL (ref 0–0.5)
IMM GRANULOCYTES NFR BLD AUTO: 0.6 % (ref 0–0.9)
LYMPHOCYTES # BLD AUTO: 0.54 X10*3/UL (ref 0.8–3)
LYMPHOCYTES NFR BLD AUTO: 11.5 %
MCH RBC QN AUTO: 28.4 PG (ref 26–34)
MCHC RBC AUTO-ENTMCNC: 32.2 G/DL (ref 32–36)
MCV RBC AUTO: 88 FL (ref 80–100)
MONOCYTES # BLD AUTO: 0.42 X10*3/UL (ref 0.05–0.8)
MONOCYTES NFR BLD AUTO: 8.9 %
NEUTROPHILS # BLD AUTO: 3.44 X10*3/UL (ref 1.6–5.5)
NEUTROPHILS NFR BLD AUTO: 73.2 %
NRBC BLD-RTO: 0 /100 WBCS (ref 0–0)
PLATELET # BLD AUTO: 273 X10*3/UL (ref 150–450)
POTASSIUM SERPL-SCNC: 4.7 MMOL/L (ref 3.5–5.3)
PROT SERPL-MCNC: 6.6 G/DL (ref 6.4–8.2)
RBC # BLD AUTO: 4.15 X10*6/UL (ref 4.5–5.9)
SODIUM SERPL-SCNC: 134 MMOL/L (ref 136–145)
TSH SERPL-ACNC: 2.79 MIU/L (ref 0.44–3.98)
WBC # BLD AUTO: 4.7 X10*3/UL (ref 4.4–11.3)

## 2024-04-04 PROCEDURE — 96413 CHEMO IV INFUSION 1 HR: CPT

## 2024-04-04 PROCEDURE — 2500000004 HC RX 250 GENERAL PHARMACY W/ HCPCS (ALT 636 FOR OP/ED): Performed by: STUDENT IN AN ORGANIZED HEALTH CARE EDUCATION/TRAINING PROGRAM

## 2024-04-04 PROCEDURE — 99215 OFFICE O/P EST HI 40 MIN: CPT | Performed by: STUDENT IN AN ORGANIZED HEALTH CARE EDUCATION/TRAINING PROGRAM

## 2024-04-04 PROCEDURE — 1159F MED LIST DOCD IN RCRD: CPT | Performed by: STUDENT IN AN ORGANIZED HEALTH CARE EDUCATION/TRAINING PROGRAM

## 2024-04-04 PROCEDURE — 82024 ASSAY OF ACTH: CPT

## 2024-04-04 PROCEDURE — 1126F AMNT PAIN NOTED NONE PRSNT: CPT | Performed by: STUDENT IN AN ORGANIZED HEALTH CARE EDUCATION/TRAINING PROGRAM

## 2024-04-04 PROCEDURE — 96360 HYDRATION IV INFUSION INIT: CPT | Mod: INF

## 2024-04-04 PROCEDURE — 3075F SYST BP GE 130 - 139MM HG: CPT | Performed by: STUDENT IN AN ORGANIZED HEALTH CARE EDUCATION/TRAINING PROGRAM

## 2024-04-04 PROCEDURE — 86704 HEP B CORE ANTIBODY TOTAL: CPT

## 2024-04-04 PROCEDURE — 84443 ASSAY THYROID STIM HORMONE: CPT

## 2024-04-04 PROCEDURE — 80053 COMPREHEN METABOLIC PANEL: CPT

## 2024-04-04 PROCEDURE — 96361 HYDRATE IV INFUSION ADD-ON: CPT | Mod: INF

## 2024-04-04 PROCEDURE — 82533 TOTAL CORTISOL: CPT

## 2024-04-04 PROCEDURE — 1160F RVW MEDS BY RX/DR IN RCRD: CPT | Performed by: STUDENT IN AN ORGANIZED HEALTH CARE EDUCATION/TRAINING PROGRAM

## 2024-04-04 PROCEDURE — 2500000004 HC RX 250 GENERAL PHARMACY W/ HCPCS (ALT 636 FOR OP/ED): Performed by: INTERNAL MEDICINE

## 2024-04-04 PROCEDURE — 36415 COLL VENOUS BLD VENIPUNCTURE: CPT

## 2024-04-04 PROCEDURE — 85025 COMPLETE CBC W/AUTO DIFF WBC: CPT

## 2024-04-04 PROCEDURE — 86706 HEP B SURFACE ANTIBODY: CPT

## 2024-04-04 PROCEDURE — 3078F DIAST BP <80 MM HG: CPT | Performed by: STUDENT IN AN ORGANIZED HEALTH CARE EDUCATION/TRAINING PROGRAM

## 2024-04-04 PROCEDURE — 87340 HEPATITIS B SURFACE AG IA: CPT

## 2024-04-04 PROCEDURE — 1036F TOBACCO NON-USER: CPT | Performed by: STUDENT IN AN ORGANIZED HEALTH CARE EDUCATION/TRAINING PROGRAM

## 2024-04-04 RX ORDER — ALBUTEROL SULFATE 0.83 MG/ML
3 SOLUTION RESPIRATORY (INHALATION) AS NEEDED
Status: CANCELLED | OUTPATIENT
Start: 2024-04-04

## 2024-04-04 RX ORDER — PROCHLORPERAZINE EDISYLATE 5 MG/ML
10 INJECTION INTRAMUSCULAR; INTRAVENOUS EVERY 6 HOURS PRN
Status: DISCONTINUED | OUTPATIENT
Start: 2024-04-04 | End: 2024-04-04 | Stop reason: HOSPADM

## 2024-04-04 RX ORDER — DIPHENHYDRAMINE HYDROCHLORIDE 50 MG/ML
50 INJECTION INTRAMUSCULAR; INTRAVENOUS AS NEEDED
Status: CANCELLED | OUTPATIENT
Start: 2024-04-04

## 2024-04-04 RX ORDER — PROCHLORPERAZINE MALEATE 10 MG
10 TABLET ORAL EVERY 6 HOURS PRN
Status: DISCONTINUED | OUTPATIENT
Start: 2024-04-04 | End: 2024-04-04 | Stop reason: HOSPADM

## 2024-04-04 RX ORDER — ALBUTEROL SULFATE 0.83 MG/ML
3 SOLUTION RESPIRATORY (INHALATION) AS NEEDED
Status: DISCONTINUED | OUTPATIENT
Start: 2024-04-04 | End: 2024-04-04 | Stop reason: HOSPADM

## 2024-04-04 RX ORDER — PROCHLORPERAZINE MALEATE 10 MG
10 TABLET ORAL EVERY 6 HOURS PRN
Status: CANCELLED | OUTPATIENT
Start: 2024-04-04

## 2024-04-04 RX ORDER — EPINEPHRINE 0.3 MG/.3ML
0.3 INJECTION SUBCUTANEOUS EVERY 5 MIN PRN
Status: DISCONTINUED | OUTPATIENT
Start: 2024-04-04 | End: 2024-04-04 | Stop reason: HOSPADM

## 2024-04-04 RX ORDER — EPINEPHRINE 0.3 MG/.3ML
0.3 INJECTION SUBCUTANEOUS EVERY 5 MIN PRN
Status: CANCELLED | OUTPATIENT
Start: 2024-04-04

## 2024-04-04 RX ORDER — FAMOTIDINE 10 MG/ML
20 INJECTION INTRAVENOUS ONCE AS NEEDED
Status: DISCONTINUED | OUTPATIENT
Start: 2024-04-04 | End: 2024-04-04 | Stop reason: HOSPADM

## 2024-04-04 RX ORDER — PROCHLORPERAZINE EDISYLATE 5 MG/ML
10 INJECTION INTRAMUSCULAR; INTRAVENOUS EVERY 6 HOURS PRN
Status: CANCELLED | OUTPATIENT
Start: 2024-04-04

## 2024-04-04 RX ORDER — FAMOTIDINE 10 MG/ML
20 INJECTION INTRAVENOUS ONCE AS NEEDED
Status: CANCELLED | OUTPATIENT
Start: 2024-04-04

## 2024-04-04 RX ORDER — DIPHENHYDRAMINE HYDROCHLORIDE 50 MG/ML
50 INJECTION INTRAMUSCULAR; INTRAVENOUS AS NEEDED
Status: DISCONTINUED | OUTPATIENT
Start: 2024-04-04 | End: 2024-04-04 | Stop reason: HOSPADM

## 2024-04-04 RX ADMIN — SODIUM CHLORIDE 200 MG: 9 INJECTION, SOLUTION INTRAVENOUS at 13:44

## 2024-04-04 RX ADMIN — SODIUM CHLORIDE 1000 ML: 9 INJECTION, SOLUTION INTRAVENOUS at 12:45

## 2024-04-04 ASSESSMENT — PAIN SCALES - GENERAL: PAINLEVEL: 0-NO PAIN

## 2024-04-05 ENCOUNTER — TELEPHONE (OUTPATIENT)
Dept: HEMATOLOGY/ONCOLOGY | Facility: HOSPITAL | Age: 74
End: 2024-04-05
Payer: MEDICARE

## 2024-04-05 NOTE — TELEPHONE ENCOUNTER
Called patient's friend, Emelia, who reported that patient is feeling good today and was taking a nap so that is why he didn't answer his phone. Friend was instructed to reach out if there were any concerns or if the patient needed anything. Friend voiced understanding.

## 2024-04-05 NOTE — TELEPHONE ENCOUNTER
Attempted to contact patient to see how he is feeling after his first dose of pembro yesterday. Patient didn't answer so left VM asking he call back if he had any questions or concerns. Office number provided.

## 2024-04-06 LAB — ACTH PLAS-MCNC: 41.1 PG/ML (ref 7.2–63.3)

## 2024-04-08 RX ORDER — DOXAZOSIN 4 MG/1
4 TABLET ORAL DAILY
Qty: 90 TABLET | Refills: 3 | Status: SHIPPED | OUTPATIENT
Start: 2024-04-08 | End: 2025-04-03

## 2024-04-15 ENCOUNTER — ALLIED HEALTH (OUTPATIENT)
Dept: INTEGRATIVE MEDICINE | Facility: CLINIC | Age: 74
End: 2024-04-15

## 2024-04-15 PROCEDURE — MASSG60 MASSAGE 60 MINUTES

## 2024-04-15 NOTE — PROGRESS NOTES
"Massage Therapy Visit:     Phong Wong     Condition of Client Subjective :  Patient ID: Phong Wong is a 73 y.o. male who presents for reason for visit of   Feels \"itchy\" along R side of anterior neck around radiation scars  Bilateral scars  Edema under chin     50 min. Light-medium pressure massage focused on anterior/poster neck, effleurage and ischemic pressure throughout upper traps levator scapulas scalenes omohyoids sternocleidomastoids supraspinatus  occipital attachments and splenis capitis/cervicis, MFR along anterior neck at chest         Session Information  Visit Type: New patient  Description of present complaint: Stress, Scar tissue, Chronic pain, Other (specify)    Before providing massage therapy, I discussed the provision of massage therapy services and any pertinent issues related to the patient's status with the patient's nurse. I implemented fall prevention measures including handrails, nonskid socks, and having a call light within reach. Following massage therapy, I provided a report to the patient's nurse.    Review of Systems    Objective   Pre-treatment Assessment  Arrival Mode: Ambulatory    Physical Exam    Actions Assessment/Plan :  Provider reviewed plan for the massage session, precautions and contraindications. Patient/guardian/hospital staff has given consent to treat with full understanding of what to expect during the session. Before massage therapy began, provider explained to the patient to communicate at any time if the pressure was causing discomfort past their tolerance level. Patient agreed to advise therapist.    Massage Treatment  Denies Allergy: Patient denies allergy to topical lubricant  Patient Position: Supine, Table  Positioning Assistance: Needs assistance with positioning, Pillow(s)/bolster under knees while supine  Pressure Scale: 1 - Light pressure, 2 - Mild pressure    Response:  Post-treatment Assessment  Patient Fell Asleep During Treatment: No  Patient Noted " Improvement of the Following Symptoms: Muscle tension, ROM    Evaluation:    Phong Wong tolerated today's session with no concerns or additional discomfort. We spoke about post massage water intake and future follow ups.

## 2024-04-23 DIAGNOSIS — C10.9 OROPHARYNGEAL CANCER (MULTI): Primary | ICD-10-CM

## 2024-04-25 ENCOUNTER — APPOINTMENT (OUTPATIENT)
Dept: HEMATOLOGY/ONCOLOGY | Facility: HOSPITAL | Age: 74
End: 2024-04-25
Payer: MEDICARE

## 2024-04-25 ENCOUNTER — TELEPHONE (OUTPATIENT)
Dept: HEMATOLOGY/ONCOLOGY | Facility: HOSPITAL | Age: 74
End: 2024-04-25
Payer: MEDICARE

## 2024-04-25 RX ORDER — PROCHLORPERAZINE EDISYLATE 5 MG/ML
10 INJECTION INTRAMUSCULAR; INTRAVENOUS EVERY 6 HOURS PRN
Status: CANCELLED | OUTPATIENT
Start: 2024-05-01

## 2024-04-25 RX ORDER — FAMOTIDINE 10 MG/ML
20 INJECTION INTRAVENOUS ONCE AS NEEDED
Status: CANCELLED | OUTPATIENT
Start: 2024-05-01

## 2024-04-25 RX ORDER — DIPHENHYDRAMINE HYDROCHLORIDE 50 MG/ML
50 INJECTION INTRAMUSCULAR; INTRAVENOUS AS NEEDED
Status: CANCELLED | OUTPATIENT
Start: 2024-05-01

## 2024-04-25 RX ORDER — ALBUTEROL SULFATE 0.83 MG/ML
3 SOLUTION RESPIRATORY (INHALATION) AS NEEDED
Status: CANCELLED | OUTPATIENT
Start: 2024-05-01

## 2024-04-25 RX ORDER — PROCHLORPERAZINE MALEATE 10 MG
10 TABLET ORAL EVERY 6 HOURS PRN
Status: CANCELLED | OUTPATIENT
Start: 2024-05-01

## 2024-04-25 RX ORDER — EPINEPHRINE 0.3 MG/.3ML
0.3 INJECTION SUBCUTANEOUS EVERY 5 MIN PRN
Status: CANCELLED | OUTPATIENT
Start: 2024-05-01

## 2024-04-25 NOTE — TELEPHONE ENCOUNTER
"Patient called in to cancel his appointment and infusion today and it was documented that the patient \"didn't feel well.\" This RN called patient's S.O. to discuss patient symptoms. SO shared with nurse that patient has had bloody stools over the last week. Confirmed with SO that patient is having blood not just when he wipes but actual blood around the stools in the toilet. She said that the blood is bright red at times and also darker some other times. Confirms that patient has never had hemorrhoids or episodes of blood in stool in the past. Patient is also much weaker today than he has been over the last week and was not feeling like he could make it into clinic for the visit. This RN relayed message back to the team who agreed that patient needs to be evaluated in the ACC or ED due to new symptoms.     Patient was agreeable to come to the ACC on Friday, April 25th at 10AM. Patient is scheduled and instructed on where to go.     Patient and SO instructed on when to go to the ED and told to go if patient symptoms become worse over the next few hours prior to his appointment tomorrow morning. Patient and SO expressed understanding.     "

## 2024-04-26 ENCOUNTER — OFFICE VISIT (OUTPATIENT)
Dept: HEMATOLOGY/ONCOLOGY | Facility: HOSPITAL | Age: 74
End: 2024-04-26
Payer: MEDICARE

## 2024-04-26 VITALS
RESPIRATION RATE: 18 BRPM | HEART RATE: 106 BPM | OXYGEN SATURATION: 99 % | DIASTOLIC BLOOD PRESSURE: 81 MMHG | WEIGHT: 134.48 LBS | BODY MASS INDEX: 21.71 KG/M2 | TEMPERATURE: 98.4 F | SYSTOLIC BLOOD PRESSURE: 147 MMHG

## 2024-04-26 DIAGNOSIS — E86.0 DEHYDRATION: Primary | ICD-10-CM

## 2024-04-26 LAB
ALBUMIN SERPL BCP-MCNC: 4 G/DL (ref 3.4–5)
ALP SERPL-CCNC: 53 U/L (ref 33–136)
ALT SERPL W P-5'-P-CCNC: 10 U/L (ref 10–52)
ANION GAP SERPL CALC-SCNC: 15 MMOL/L (ref 10–20)
AST SERPL W P-5'-P-CCNC: 21 U/L (ref 9–39)
BASOPHILS # BLD AUTO: 0.05 X10*3/UL (ref 0–0.1)
BASOPHILS NFR BLD AUTO: 1 %
BILIRUB SERPL-MCNC: 0.4 MG/DL (ref 0–1.2)
BUN SERPL-MCNC: 19 MG/DL (ref 6–23)
CALCIUM SERPL-MCNC: 9.4 MG/DL (ref 8.6–10.3)
CHLORIDE SERPL-SCNC: 99 MMOL/L (ref 98–107)
CO2 SERPL-SCNC: 26 MMOL/L (ref 21–32)
CREAT SERPL-MCNC: 1.57 MG/DL (ref 0.5–1.3)
EGFRCR SERPLBLD CKD-EPI 2021: 47 ML/MIN/1.73M*2
EOSINOPHIL # BLD AUTO: 0.33 X10*3/UL (ref 0–0.4)
EOSINOPHIL NFR BLD AUTO: 6.5 %
ERYTHROCYTE [DISTWIDTH] IN BLOOD BY AUTOMATED COUNT: 15.8 % (ref 11.5–14.5)
GLUCOSE SERPL-MCNC: 101 MG/DL (ref 74–99)
HCT VFR BLD AUTO: 39.4 % (ref 41–52)
HGB BLD-MCNC: 12.8 G/DL (ref 13.5–17.5)
IMM GRANULOCYTES # BLD AUTO: 0.05 X10*3/UL (ref 0–0.5)
IMM GRANULOCYTES NFR BLD AUTO: 1 % (ref 0–0.9)
LYMPHOCYTES # BLD AUTO: 0.44 X10*3/UL (ref 0.8–3)
LYMPHOCYTES NFR BLD AUTO: 8.6 %
MAGNESIUM SERPL-MCNC: 2 MG/DL (ref 1.6–2.4)
MCH RBC QN AUTO: 28.4 PG (ref 26–34)
MCHC RBC AUTO-ENTMCNC: 32.5 G/DL (ref 32–36)
MCV RBC AUTO: 87 FL (ref 80–100)
MONOCYTES # BLD AUTO: 0.59 X10*3/UL (ref 0.05–0.8)
MONOCYTES NFR BLD AUTO: 11.6 %
NEUTROPHILS # BLD AUTO: 3.64 X10*3/UL (ref 1.6–5.5)
NEUTROPHILS NFR BLD AUTO: 71.3 %
NRBC BLD-RTO: 0 /100 WBCS (ref 0–0)
PLATELET # BLD AUTO: 259 X10*3/UL (ref 150–450)
POTASSIUM SERPL-SCNC: 4.6 MMOL/L (ref 3.5–5.3)
PROT SERPL-MCNC: 6.7 G/DL (ref 6.4–8.2)
RBC # BLD AUTO: 4.51 X10*6/UL (ref 4.5–5.9)
SODIUM SERPL-SCNC: 135 MMOL/L (ref 136–145)
WBC # BLD AUTO: 5.1 X10*3/UL (ref 4.4–11.3)

## 2024-04-26 PROCEDURE — 84075 ASSAY ALKALINE PHOSPHATASE: CPT

## 2024-04-26 PROCEDURE — 99215 OFFICE O/P EST HI 40 MIN: CPT | Performed by: NURSE PRACTITIONER

## 2024-04-26 PROCEDURE — 99417 PROLNG OP E/M EACH 15 MIN: CPT | Performed by: NURSE PRACTITIONER

## 2024-04-26 PROCEDURE — 2500000004 HC RX 250 GENERAL PHARMACY W/ HCPCS (ALT 636 FOR OP/ED): Performed by: NURSE PRACTITIONER

## 2024-04-26 PROCEDURE — 96360 HYDRATION IV INFUSION INIT: CPT | Mod: INF

## 2024-04-26 PROCEDURE — 99215 OFFICE O/P EST HI 40 MIN: CPT | Mod: 25 | Performed by: NURSE PRACTITIONER

## 2024-04-26 PROCEDURE — 83735 ASSAY OF MAGNESIUM: CPT

## 2024-04-26 PROCEDURE — 1126F AMNT PAIN NOTED NONE PRSNT: CPT | Performed by: NURSE PRACTITIONER

## 2024-04-26 PROCEDURE — 3078F DIAST BP <80 MM HG: CPT | Performed by: NURSE PRACTITIONER

## 2024-04-26 PROCEDURE — 1159F MED LIST DOCD IN RCRD: CPT | Performed by: NURSE PRACTITIONER

## 2024-04-26 PROCEDURE — 1160F RVW MEDS BY RX/DR IN RCRD: CPT | Performed by: NURSE PRACTITIONER

## 2024-04-26 PROCEDURE — 85025 COMPLETE CBC W/AUTO DIFF WBC: CPT

## 2024-04-26 PROCEDURE — 3077F SYST BP >= 140 MM HG: CPT | Performed by: NURSE PRACTITIONER

## 2024-04-26 RX ADMIN — SODIUM CHLORIDE 1000 ML: 9 INJECTION, SOLUTION INTRAVENOUS at 10:20

## 2024-04-26 ASSESSMENT — ENCOUNTER SYMPTOMS
WOUND: 0
COUGH: 0
TROUBLE SWALLOWING: 0
HEADACHES: 0
ABDOMINAL PAIN: 0
DIFFICULTY URINATING: 0
CHILLS: 0
CONSTIPATION: 0
FREQUENCY: 0
SORE THROAT: 0
FEVER: 0
LEG SWELLING: 0
NECK PAIN: 0
ARTHRALGIAS: 0
VOMITING: 0
NAUSEA: 0
DYSURIA: 0
NUMBNESS: 0
SHORTNESS OF BREATH: 0
LIGHT-HEADEDNESS: 0
DIZZINESS: 0
DIARRHEA: 0

## 2024-04-26 ASSESSMENT — PAIN SCALES - GENERAL: PAINLEVEL: 0-NO PAIN

## 2024-04-26 NOTE — PROGRESS NOTES
Patient ID: Phong Wong is a 73 y.o. male.  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 Juan Jose Fletcher   MedOnc: Dr. Kyunghee Burkitt, X. Katherine Feng, PA-C   RadOnc: Dr. Vianca Steen     Current Therapy  4/4/24: Started Q3 week Pembrolizumab    Prior Therapy:   8/16 - 10/4/23: Recieved concurrent chemoradiation with 7 weekly Carboplatin (2mg/AUC)/Paclitaxel (45mg/m2)  and 70gy RT in 35fx     Sites of Disease:  Soft palate, BOT, Left palatine tonsil  B/L Cervical LN  Lung     Oncologic Issues:   Odynophagia  Fatigue     ONCOLOGIC HISTORY  - Pt had 2 months hx of throat discomfort with lump in right neck  - 6/13/23: CT neck showed a mass 2.8 x 2.5 x 2.9 cm in the right lower cervical neck soft tissues. Two suspicious nodes were observed, one at level 3 on the right and another at level 2 on the left.  - 6/28/23: seen by Dr. Fletcher. 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 Based on the findings, Dr. Fletcher did not recommend surgery due to the location  of the primary tumor and the presence of yvette disease  - 6/30/23: PET/CT showed intense FDG avidity within the soft palate, extending to the base of the tongue and left palatine tonsil. Multiple FDG avid left cervical level II nodes were observed, along with an FDG avid mass in the region of the right cervical  level II/III, infiltrating the right SCM muscle and encasing and invading the right jugular vein. FDG avidity was also detected in the region of the left fossa Rosenmuller and left medial pterygoid muscle.  - 8/16 - 10/4/23: Recieved concurrent chemoradiation with 7 weekly Carboplatin (2mg/AUC)/Paclitaxel (45mg/m2)  and 70gy RT in 35fx    Admissions:  10/5 - 10/10/23: Admitted for extreme weakness, HECTOR, pancytopenia including anemia requiring blood  transfusion. D/C'ed to acute rehab        Past Medical History:   Past Medical History:  2017: Personal history of diseases of the blood and blood-  forming organs and certain disorders involving the immune mechanism      Comment:  History of thrombocytosis   HTN, HLD, COPD, prostate cancer in 2017 (s/p radiation)  Surgical History:    Past Surgical History:   Procedure Laterality Date    CT ABDOMEN PELVIS ANGIOGRAM W AND/OR WO IV CONTRAST  9/3/2014    CT ABDOMEN PELVIS ANGIOGRAM W AND/OR WO IV CONTRAST 9/3/2014 [facility]    IR ANGIOGRAM AORTA ABDOMEN  2014    IR ANGIOGRAM AORTA ABDOMEN 2014 [facility]   Aortoiliofemoral vascular bypass in   Social History:    Social History     Socioeconomic History    Marital status:      Spouse name: None    Number of children: 1    Years of education: None    Highest education level: None   Occupational History    None   Tobacco Use    Smoking status: Former     Current packs/day: 0.00     Average packs/day: 1 pack/day for 40.0 years (40.0 ttl pk-yrs)     Types: Cigarettes     Start date:      Quit date:      Years since quittin.3     Passive exposure: Past    Smokeless tobacco: Never   Substance and Sexual Activity    Alcohol use: Yes     Alcohol/week: 12.0 standard drinks of alcohol     Types: 12 Cans of beer per week    Drug use: Never    Sexual activity: None   Other Topics Concern    None   Social History Narrative    None     Social Determinants of Health     Financial Resource Strain: Low Risk  (10/20/2023)    Overall Financial Resource Strain (CARDIA)     Difficulty of Paying Living Expenses: Not very hard   Food Insecurity: No Food Insecurity (10/5/2023)    Hunger Vital Sign     Worried About Running Out of Food in the Last Year: Never true     Ran Out of Food in the Last Year: Never true   Transportation Needs: No Transportation Needs (10/20/2023)    PRAPARE - Transportation     Lack of Transportation  (Medical): No     Lack of Transportation (Non-Medical): No   Physical Activity: Inactive (10/5/2023)    Exercise Vital Sign     Days of Exercise per Week: 0 days     Minutes of Exercise per Session: 0 min   Stress: Stress Concern Present (10/5/2023)    [place] [employer] - Occupational Stress Questionnaire     Feeling of Stress : To some extent   Social Connections: Moderately Integrated (10/5/2023)    Social Connection and Isolation Panel [NHANES]     Frequency of Communication with Friends and Family: More than three times a week     Frequency of Social Gatherings with Friends and Family: More than three times a week     Attends Sikhism Services: Never     Active Member of Clubs or Organizations: Yes     Attends Club or Organization Meetings: Never     Marital Status:    Intimate Partner Violence: Not At Risk (10/5/2023)    Humiliation, Afraid, Rape, and Kick questionnaire     Fear of Current or Ex-Partner: No     Emotionally Abused: No     Physically Abused: No     Sexually Abused: No   Housing Stability: Low Risk  (10/20/2023)    Housing Stability Vital Sign     Unable to Pay for Housing in the Last Year: No     Number of Places Lived in the Last Year: 1     Unstable Housing in the Last Year: No      Family History:    Family History   Problem Relation Name Age of Onset    Aortic aneurysm Father          abdominal     Family Oncology History:    Cancer-related family history is not on file.      Subjective   Chief Complaint: Squamous Cell Carcinoma of oropharynx    HPI  Interval History  Phong Wong is a 73 y.o. male with h/o locally advanced oral cavity cancer, completed concurrent chemoradiation with 7 weekly Carboplatin+Paclitaxel on 10/4/23. Unfortunately found with mets to lungs on 3 months restaging scan. CPS 3. Started Q3 week Pembrolizumab on 4/4/24.    Patient presents today for C2 Pembrolizumab.     His treatment was delayed a week as he was not feeling well last week and  had some bleeding hemorrhoids.   He's feeling well now. Energy is stable. Ambulating well at home.  Denies pain in tongue, throat or neck. Right neck is mildly tender to palpation, skin of right neck a little itchy.  Appetite is good. Eating and drinking well, tolerating regular diet, denies dysphagia.  Denies dry mouth, has saliva production, taste is normal.   He has minimum swelling in the neck (lymphedema) but no pain. Lymphedema therapy referral was made.   He denies any urinary symptoms currently, no urgency, frequency, hesitancy, dysuria.  He's following with PCP, Dr. Weiss. 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    ROS  Review of Systems   Constitutional:  Negative for chills and fever.   HENT:   Negative for lump/mass, mouth sores, nosebleeds, sore throat, tinnitus and trouble swallowing.    Respiratory:  Negative for cough and shortness of breath.    Cardiovascular:  Negative for chest pain and leg swelling.   Gastrointestinal:  Negative for abdominal pain, constipation, diarrhea, nausea and vomiting.   Genitourinary:  Negative for difficulty urinating, dysuria and frequency.    Musculoskeletal:  Negative for arthralgias and neck pain.   Skin:  Negative for rash and wound.   Neurological:  Negative for dizziness, headaches, light-headedness and numbness.       Allergies  Allergies   Allergen Reactions    Codeine Nausea Only        Medications  Current Outpatient Medications   Medication Instructions    amLODIPine-benazepriL (Lotrel) 5-20 mg capsule [id number] Medication amlodipine 5 mg-benazepril 20 mg capsule amlodipine 5 mg-benazepril 20 mg capsule 5-20 mg 10/2/2020 Active (Outside)    aspirin 81 mg EC tablet 1 tablet, oral, Daily    doxazosin (CARDURA) 4 mg, oral, Daily, Take 1 tablet by mouth daily in the evening    fluticasone-umeclidin-vilanter (Trelegy Ellipta) 100-62.5-25 mcg blister with device INHALE 1 PUFF EVERY DAY    ipratropium-albuteroL (Duo-Neb)  0.5-2.5 mg/3 mL nebulizer solution Take 3 mL by nebulization 3 times a day as needed for wheezing or shortness of breath.    losartan (COZAAR) 50 mg, oral, Daily    magnesium oxide (MAG-OX) 400 mg, oral, Daily    ofloxacin (Ocuflox) 0.3 % ophthalmic solution Instill 1 drop Left Eye 4 times a day        Objective   VS:  /79   Pulse 72   Temp 36.5 °C (97.7 °F) (Core)   Resp 18   Wt 61.2 kg (134 lb 14.7 oz)   SpO2 99%   BMI 21.78 kg/m²   Weight   Wt Readings from Last 7 Encounters:   05/01/24 61.3 kg (135 lb 1.6 oz)   05/01/24 61.2 kg (134 lb 14.7 oz)   04/26/24 61 kg (134 lb 7.7 oz)   04/04/24 61.3 kg (135 lb 2.3 oz)   03/27/24 61.5 kg (135 lb 8 oz)   03/06/24 60 kg (132 lb 3.2 oz)   02/07/24 58.6 kg (129 lb 3 oz)         Physical Exam  Constitutional:       Appearance: Normal appearance. He is underweight.   HENT:      Head: Normocephalic and atraumatic.      Right Ear: External ear normal. No tenderness.      Left Ear: External ear normal. No tenderness.      Nose: Nose normal.      Mouth/Throat:      Mouth: No injury or oral lesions.      Tongue: Lesions present.      Pharynx: Oropharynx is clear. No posterior oropharyngeal erythema.      Comments: Edentulous  Eyes:      Extraocular Movements: Extraocular movements intact.      Conjunctiva/sclera: Conjunctivae normal.      Pupils: Pupils are equal, round, and reactive to light.   Neck:      Thyroid: No thyroid mass.      Comments: Mild swelling in submental and neck region.   Right neck is is a bit sensitive, there is scare tissue  Cardiovascular:      Rate and Rhythm: Normal rate and regular rhythm.   Pulmonary:      Effort: Pulmonary effort is normal. No respiratory distress.      Breath sounds: Normal breath sounds.   Abdominal:      General: Bowel sounds are normal. There is no distension or abdominal bruit.      Palpations: Abdomen is soft. There is no mass.      Tenderness: There is no abdominal tenderness.   Musculoskeletal:         General:  Normal range of motion.      Cervical back: Normal range of motion and neck supple.      Right lower leg: No edema.      Left lower leg: No edema.   Lymphadenopathy:      Cervical: No cervical adenopathy.      Upper Body:      Right upper body: No axillary adenopathy.      Left upper body: No axillary adenopathy.   Skin:     General: Skin is warm and dry.      Findings: No lesion (radiation dermatitis in bilateral neck), rash or wound.   Neurological:      General: No focal deficit present.      Mental Status: He is alert and oriented to person, place, and time.      Gait: Gait is intact.   Psychiatric:         Mood and Affect: Mood and affect normal.         Diagnostic Results   The below labs were reviewed.  Results from last 7 days   Lab Units 05/01/24  1151   WBC AUTO x10*3/uL 4.5   HEMOGLOBIN g/dL 12.6*   HEMATOCRIT % 38.9*   PLATELETS AUTO x10*3/uL 269   NEUTROS ABS x10*3/uL 3.03   LYMPHS ABS AUTO x10*3/uL 0.49*   MONOS ABS AUTO x10*3/uL 0.56   EOS ABS AUTO x10*3/uL 0.34   NEUTROS PCT AUTO % 67.5   LYMPHS PCT AUTO % 10.9   MONOS PCT AUTO % 12.5   EOS PCT AUTO % 7.6      Results from last 7 days   Lab Units 05/01/24  1151   GLUCOSE mg/dL 104*   SODIUM mmol/L 136   POTASSIUM mmol/L 4.0   CHLORIDE mmol/L 100   CO2 mmol/L 28   BUN mg/dL 19   CREATININE mg/dL 1.46*   EGFR mL/min/1.73m*2 51*   CALCIUM mg/dL 9.4   ALBUMIN g/dL 3.8   PROTEIN TOTAL g/dL 6.6   BILIRUBIN TOTAL mg/dL 0.5   ALK PHOS U/L 52   ALT U/L 9*   AST U/L 14       Results from last 7 days   Lab Units 05/01/24  1151   TSH mIU/L 2.44                  Pathology      Imaging  1/10/2024 PET/CT  IMPRESSION:  1. Significant interval improvement in FDG avidity in the soft palate and left palatine tonsil, remaining FDG avidity along the left palatine tonsil consistent with persistent residual viable disease.  2. Nearly complete metabolic resolution of cervical yvette metastases with remaining mild FDG avid right cervical level 3 node seen on current study,  likely representing residual yvette metastasis.  3. Newly developed FDG-avid pulmonary nodule in the left lower lobe as well as minimal FDG avid subcentimeter pulmonary nodules in bilateral upper lobes, concerning for lung metastasis.    1/10/2024 CT Neck w/ IV Contrast  IMPRESSION:  *Decreased size of the previously demonstrated right-sided yvette neck mass *No new adenopathy  *Post treatment changes in the hypopharynx as described    2/9/27 CT Chest w/o Contrast  IMPRESSION:  1. Multiple pulmonary nodules are again noted corresponding to FDG avid nodules on most recent PET-CT from 01/10/2024 and are new when compared to prior CT of the chest from 06/13/2023. The largest of which is a pleural-based left lower lobe nodule measuring up to 1.6 cm. Findings are concerning for metastatic disease.  2. Severe coronary artery calcifications, indicating the presence of coronary artery disease. If the patient has associated symptoms recommend management as per chest pain guidelines (e.g. https://doi.org/10.1161/CIR.0620581669720518). If the patient is asymptomatic consider reviewing modifiable cardiovascular risk factors and managing as per guidelines for primary prevention (e.g. https://doi.org/10.1161/CIR.5836164665617331).  4. Bulky atherosclerotic calcification at the origin of the left renal artery likely contributing to atrophy of the left kidney as compared to the right.   5. Additional findings as above.    3/27/2024 CT chest abdomen pelvis w IV contrast  Impression:   1. Interval increase in size of left lower lobe pulmonary nodule when compared to CT chest 02/09/2024 correlating with FDG avidity on PET-CT 01/10/2024. No sites of new metastasis in the chest.   2. The previously seen clusters of pulmonary nodules in the left upper lobe have decreased in number since CT chest 09/20/2024 correlating with improving postradiation changes or bronchiolitis.   3. Stable diffuse heterogenous appearance of the axial skeleton,  similar to study. No sites of new metastasis in the abdomen and pelvis.  4. Additional stable findings as above.       Assessment/Plan    ASSESSMENT  Phong Wong is a 73 y.o. male with recent diagnosis of locally advanced oral cavity cancer. Completed concurrent chemoradiation with 7 weekly Carboplatin (2mg/AUC)/Paclitaxel (45mg/m2) on 10/4/2. Admitted 10/5 - 10/10 for extreme weakness, HECTOR, pancytopenia including anemia requiring blood transfusion. D/C'ed to acute rehab. Admitted  again 10/18 - 11/2/23 for lethargy, weakness, failure to thrive, anemia.     # Locally advanced oral cavity cancer, T3N2M0, now with met to lungs  - 6/30/23: PET/CT showed intense FDG avidity within the soft palate, extending to the base of the tongue and left palatine tonsil. Multiple uptake in left cervical level II nodes, right cervical level  II/III, infiltrating the right SCM muscle and encasing and invading the right jugular vein. FDG avidity was also detected in the region of the left fossa Rosenmuller and left medial pterygoid muscle.  - 7/13/23: pt is doing well, no pain, discussed with patient about carboplatin+ paclitaxel as his chemotherapy along with radiation due to his GFR 30s.  Chemo related side effects were reviewed with patient, consent obtained.   - Patient required dental extractions and as a result start date was delayed from 7/26 to 8/15/23  - 8/30: tolerating chemo well, does have some fatigue but no n/v. No peripheral neuropathy   - 9/6: Continue to tolerate chemo well, no n/v, is feeling more tired. Eating well, tolerating soft foods and supplementing with Boost VHC 2x per day. Denies peripheral neuropathy  - 9/13: continue to tolerate chemo well w/o n/v. Energy is lower but stable. Eating soft foods and Boost VHC x 2 per day  - 9/20: Tolerating chemo well, minimal odynophagia, does have some weight loss.  - 10/4: Completed last dose of Carbo/Taxol last week. Recall patient did not have a [facility] visit as both  providers are out of office. He had las RT today and noted to have significantly increased fatigue, weakness, weight loss in the last week resulting from decreased PO intake.   - 11/9/23: Feeling much improved since d/c from hospital on 11/2. Regaining strength and working with PT/OT at acute rehab. Odynophagia resolved, eating soft foods, has weight gain. Serum protein improved. Electrolytes wnl, mild hyponatremia, no c/f refeeding syndrome.    - 11/29/23: continue to feel very well, d/c'ed from rehab. Acute SEs of chemoRT are resolved. He's eating/drinking well with weight gain. Not using PEG currently. Has lymphedema from RT in the neck/submental region. Can consider lymphedema therapy after 3 month restaging PET showing EXNA.   - 1/12/24: Patient is feeling well, tolerating soft PO intake. PEG removed today.   Reviewed 3 month restaging PET/CT with patient. Scans noted significant interval improvement in prior sites of disease though with residual FDG avidity in soft palate and left palatine tonsil, c/f residual disease vs post treatment changes. Also noted was a new FDG avid LLL pulm nodule with max SUV 5.7, c/f pulmonary metastasis. Will order STAT lung biopsy on this.   Pt scoped by Dr. Fletcher today, unfortunately was difficult to examine in office due to patient intolerance. Per Dr. Fletcher, may bring to OR to complete the examination depending on lung biopsy results. Scans will be reviewed at 1/19/24 HN. Explained in detail to patient and his SO scan results and next steps. They voiced understanding, all questions answered.   - 2/9/24: Patient presents with new painful left lateral tongue lesion, hindering PO intake. Will start Oxycodone 5mg Q6 hr PRN for pain control. Pt will see Dr. Fletcher next week for eval/possible biopsy. Patient will have repeat CT Chest for FDG avid LLL pulm nodule as prior biopsy attempt was not successful due to small nodule size and difficult location near diaphragm.  -2/9/24  CT chest showed multiple subcentimeter noduesl 2-5mm, dominant lesion is 1.6cm in LLL.    -3/6/24: lung biopsy of LLL nodule was consistent with HN origin. [facility] pending including CPS.  Discussed with patient about phase 2 FLAM study (randomized to pembrolizumab or pembrolizumab+danvatirsen (Stat3 inhibitor), he is willing to be considered.   - Unfortunately due to his CKD, he's not eligible for FLAM study.  - 4/4/24: Patient continue to feel very well, no dysphagia, no throat or neck pain. No cough, breathing well.   - 5/1/24: C2 Pembro was delayed a week 2/2 patient not feeling well. He had struggled with hemorrhoids for a couple weeks. Otherwise tolerating treatment with no irAEs .    # Hemorrhoids  - Blood on stool most likely from internal hemorrhoid. Blood counts are stable. Bleeding has decreased Patient is taking Miralax to keep stools soft.     # Anemia: improving  - Hgb has been dropping since starting chemo, however today his hgb is <7 and will require a blood transfusion. This is likely due to chemotherapy as there are no s/s of bleeds.   - Received 2U PRBC during 10/18 - 11/2 admission. Anemia likely due to chemotherapy  - 11/29/23: Hgb 8.8. Improved from 3 weeks ago. Expect Hbg will continue to trend up as patient recovers. Can consider iron studies.   - 3/6/24: Hgb 11.4    # CKD  - 2/9/24 CT chest showed Bulky atherosclerotic calcification at the origin of the left renal artery likely contributing to atrophy of the left kidney as compared to the right. Kidney atrophy was not mentioned in previous scans.   - 5/1: Creat 1.46, GFR 51. Encouraged patient to keep up with non-caffeinated PO hydration. 1L NS given today with Pembro. Patient to follow up with PCP regarding atherosclerosis.     # Submental/Neck Lymphedema  - Patient with moderate swelling in the submental and neck region. Likely 2ry to prior radiation. Will be starting lymphedema therapy soon.     PLAN  -- Proceed to C2 Pembrolizumab  today  -- 1L NS given in infusion for hydration. Encourage ample PO hydration  -- RTC 3 weeks for C3, Reseach labs draw same day  -- Follow up with Dr. Weiss on atherosclerosis of left renal artery.   -- Start lymphedema therapy  -- Continue stool softeners, monitor further symptoms of hemorrhoids .

## 2024-04-26 NOTE — PROGRESS NOTES
Patient ID: Phong Wong is a 73 y.o. male.    The patient presents to [facility] for evaluation of blood on stool. He reports he has been having bright red blood streaked on his formed stool for the past week. He denies any n/v/d/abd pain. He denies any urinary symptoms. He does report stools are more hard but denies straining. He denies taking anything for a bowel regimen. He is eating and drinking well. He says yesterday he was tired but today he feels great. Pt denies chest pain, cough, SOB, headaches, blurry vision, falls, fever or chills.     Objective    BSA: There is no height or weight on file to calculate BSA.          2/23/2024     1:50 PM 3/6/2024    12:35 PM 3/25/2024    12:51 PM 3/27/2024     1:11 PM 4/4/2024    11:30 AM   Vitals   Systolic 118 152 125 155 132   Diastolic 65 65 78 63 50   Heart Rate 65 65 72 96 74   Temp  36.7 °C (98.1 °F) 36.8 °C (98.2 °F) 37 °C (98.6 °F) 36.5 °C (97.7 °F)   Resp 16 16 20 18 20   Weight (lb)  132.2  135.5 135.14   BMI  21.34 kg/m2  21.87 kg/m2 21.81 kg/m2   BSA (m2)  1.67 m2  1.69 m2 1.69 m2   Visit Report  Report  Report    Report Report                                     === 02/23/24 ===    XR CHEST 2 VIEWS    - Impression -  LEFT lower lobe airspace disease. Cavitary component not excluded.  Follow-up chest CT recommended for further assessment.  Signed by Nick Laureano II, MD  === 03/27/24 ===    CT CHEST ABDOMEN PELVIS W IV CONTRAST    - Impression -  Squamous cell carcinoma of the oropharynx restaging scan compared to  CT abdomen pelvis 09/03/2014, CT chest 02/09/2024, and PET-CT  01/10/2024:  1. Interval increase in size of left lower lobe pulmonary nodule when  compared to CT chest 02/09/2024 correlating with FDG avidity on  PET-CT 01/10/2024. No sites of new metastasis in the chest.  2. The previously seen clusters of pulmonary nodules in the left  upper lobe have decreased in number since CT chest 09/20/2024  correlating with improving postradiation changes or  bronchiolitis.  3. Stable diffuse heterogenous appearance of the axial skeleton,  similar to study. No sites of new metastasis in the abdomen and  pelvis.  4. Additional stable findings as above.    I personally reviewed the images/study and I agree with Dr. Rhea Chavez findings as stated. This study was interpreted at [place], [place]    MACRO:  None    Signed by: Olu Andrews 3/27/2024 4:21 PM  Dictation workstation:   [id number]  === 03/16/18 ===    MR 3D POST PROCESSING W REPORT 2ND WORKSTATION   No nuclear medicine results found for the past 12 months   No echocardiogram results found for the past 12 months         Physical Exam  Vitals reviewed.   Constitutional:       Appearance: Normal appearance.   HENT:      Head: Normocephalic and atraumatic.      Nose: Nose normal.      Mouth/Throat:      Mouth: Mucous membranes are moist.   Eyes:      Conjunctiva/sclera: Conjunctivae normal.      Pupils: Pupils are equal, round, and reactive to light.   Cardiovascular:      Rate and Rhythm: Normal rate and regular rhythm.   Pulmonary:      Effort: Pulmonary effort is normal.      Breath sounds: Normal breath sounds.   Abdominal:      General: Abdomen is flat. Bowel sounds are normal.      Palpations: Abdomen is soft.   Musculoskeletal:         General: Normal range of motion.      Cervical back: Normal range of motion.   Skin:     General: Skin is warm and dry.   Neurological:      General: No focal deficit present.      Mental Status: He is alert.   Psychiatric:         Mood and Affect: Mood normal.         Behavior: Behavior normal.         Cancer Staging   No matching staging information was found for the patient.      Oncology History Overview Note   Oncological History  - Pt had 2 months hx of throat discomfort with lump in right neck  - 6/13/23: CT neck showed a mass 2.8 x 2.5 x 2.9 cm in the right lower cervical neck soft tissues. Two suspicious nodes were  observed, one at level 3 on the right and another at level 2 on the left.  - 6/28/23: seen by Dr. Fletcher. 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 Based on the findings, Dr. Fletcher did not recommend surgery due to the location of the primary tumor and the presence of yvette disease  - 6/30/23: PET/CT showed intense FDG avidity within the soft palate, extending to the base of the tongue and left palatine tonsil. Multiple FDG avid left cervical level II nodes were observed, along with an FDG avid mass in the region of the right cervical level II/III, infiltrating the right SCM muscle and encasing and invading the right jugular vein. FDG avidity was also detected in the region of the left fossa Rosenmuller and left medial pterygoid muscle.  - 8/16/23: Started concurrent chemoradiation, planned for 7 weekly Carboplatin (2mg/AUC)/Paclitaxel (45mg/m2)  and 70gy RT in 35fx    PmHx: HTN, HLD, COPD, prostate cancer in 2017 (s/p radiation)  PsHx: aortoiliofemoral vascular bypass in 2014  FmHx: no cancer history  ScHx: , former cigarettes smoker (quit 2016), now smokes only pipe.     Oropharyngeal cancer (Multi)    Initial Diagnosis    Squamous cell carcinoma of oropharynx (CMS/HCC)     4/4/2024 -  Chemotherapy    Pembrolizumab, 21 Day Cycles            72yo M with SCC oropharynx who presents to [facility] with blood on stool.     Plan:  - orthos negative  - labs significant for mild hyponatremia and stable elevated creat  - 1L NS for dehydration  - blood on stool is most likely an internal hemorrhoid. Hgb and HDS, no concern for GIB. Pt given bowel regimen instructions and educated to keep stool soft and avoid straining. He was given warning signs of GIB.   - Dr. Burkitt aware of ACC course     Dispo:  - pt discharged home with no needs after ACC course complete  - return to clinic/ED instructions given to patient  - follow up  oncology as scheduled           Edie Shea, APRN-CNP

## 2024-04-29 ENCOUNTER — TELEPHONE (OUTPATIENT)
Dept: RADIATION ONCOLOGY | Facility: HOSPITAL | Age: 74
End: 2024-04-29
Payer: MEDICARE

## 2024-04-29 ASSESSMENT — ENCOUNTER SYMPTOMS
CONSTIPATION: 0
DYSURIA: 0
FATIGUE: 0
COUGH: 0
PALPITATIONS: 0
NERVOUS/ANXIOUS: 0
DIZZINESS: 0
CHEST TIGHTNESS: 0
SORE THROAT: 0
TROUBLE SWALLOWING: 0
UNEXPECTED WEIGHT CHANGE: 0
WHEEZING: 0
CHILLS: 0
ABDOMINAL PAIN: 0
DEPRESSION: 0
CONFUSION: 0
SHORTNESS OF BREATH: 0
SPEECH DIFFICULTY: 0
ARTHRALGIAS: 1
HEMATURIA: 0
ADENOPATHY: 0
VOMITING: 0
FEVER: 0
DIARRHEA: 0
HEADACHES: 0
DIFFICULTY URINATING: 0
APPETITE CHANGE: 0
BACK PAIN: 1
NAUSEA: 0
EXTREMITY WEAKNESS: 1

## 2024-04-29 NOTE — PROGRESS NOTES
Radiation Oncology Follow-Up    Patient Name:  Phong Wong  MRN:  [id number]  :  1950    Referring Provider: Aris Berry, *  Primary Care Provider: Harshil Weiss MD  Care Team: Patient Care Team:  Harshil Weiss MD as PCP - General (Internal Medicine)  Harshil Weiss MD as PCP - [employer] PCP  Vianca Steen MD as Radiation Oncologist (Radiation Oncology)  Lyssa Patten PA-C as Physician Assistant (Hematology and Oncology)  Kyunghee Burkitt, DO as Medical Oncologist (Hematology and Oncology)  Juan Jose Fletcher MD as Surgeon (Otolaryngology)    Date of Service: 2024     SUBJECTIVE  History of Present Illness:   Phong Wong is a 73 y.o. 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  The patient underwent chemoradiation ending on 10/10/12.  His recovery was complicated by a hospital admission on 10/18/23 for lethargy and weakness.  Patient was hypotensive and hypoxic with elevated lactate on admission, concerning for opioid overdose vs infection.   Patient received empiric antibiotics with sepsis workup being negative.  EGD with biopsy, performed on 10/20/23, showed diffuse radiation esophagitis and actively inflamed esophageal squamous mucosa with erosion and reactive changes.  Inflamed cardiac-type mucosa, negative for intestinal metaplasia or dysplasia.  During his hospitalization a Clark was placed for urinary retention and a PEG for malnutrition.  Patient was subsequently discharged to [facility] for skilled rehab.      Today the patient is in clinic, accompanied by his wife, for a routine Radiation Oncology survival visit.  Patient states that he's is doing very well today and is without many complaints.  Endorses good PO intake.   He focuses on softer foods since he's edentulous.  He has dentures but state that they don't fit so he's needing modifications through his dentist.  Denies coughing or choking when eating.   Denies dysphagia, odynophagia, mucositis, trismus or new lumps/bumps/discolorations around his oral cavity.   Endorses being able to sustain his weight with his current diet.  He does have a PEG in place but only flushes it daily.  His right neck has healed well with no signs of infection. Denies chills, fever, dyspnea or chest pain.  Continues to endorse some mild fatigue.  Denies headaches, or focal sensory/motor changes.  Denies abdominal pain, nausea, vomiting, diarrhea or constipation.   Patient is scheduled to see Dr. Fletcher on 1/12/24.      An MRI of the neck completed today shows decreased size of the previously demonstrated right-sided yvette neck mass.  This was shared with the patient and his wife.  A PET completed today has yet to be reviewed by Radiology.  I told the patient that I'd review with Dr. Steen reach back out to him on 1/11/24 to discuss.     5/1/24 Interval Visit:  Today the patient is in clinic, accompanied by his wife, for a routine Radiation Oncology FU visit.  Patient continues to be managed, by Medical Oncology, for metastatic disease to his lung.  He's currently undergoing his second cycle of Pembrolizumab today.  States that he's tolerating treatment well without side effects.  Endorses good PO intake.   He focuses on softer foods since he's edentulous.  He has dentures but state that they don't fit so he's needing modifications through his dentist.  Denies coughing or choking when eating.  Denies dysphagia, odynophagia, mucositis, trismus or new lumps/bumps/discolorations around his oral cavity.   Endorses being able to sustain his weight with his current diet. Denies chills, fever, dyspnea or chest pain.  Continues to endorse some mild fatigue.  Denies headaches, or focal sensory/motor changes.  Denies abdominal pain, nausea, vomiting, diarrhea or constipation.  Continue with [facility] for lymphedema message.  Will be reaching out to Dr. Guerrero about a new order so he's  not charged as much for each visit.      Treatment Rendered:   Radiation Treatments       Active   No active radiation treatments to show.     Completed   Soft palate + B neck (Started on 8/16/2023)   Most recent fraction: 200 cGy given on 10/4/2023   Total given: 7,000 cGy / 7,000 cGy  (35 of 35 fractions)   Elapsed Days: 49   Technique: IMRT                     Review of Systems:   Review of Systems   Constitutional:  Negative for appetite change, chills, fatigue, fever and unexpected weight change.   HENT:   Negative for hearing loss, lump/mass, mouth sores, nosebleeds, sore throat, tinnitus and trouble swallowing.    Respiratory:  Negative for chest tightness, cough, shortness of breath and wheezing.    Cardiovascular:  Negative for chest pain and palpitations.   Gastrointestinal:  Negative for abdominal pain, constipation, diarrhea, nausea and vomiting.   Genitourinary:  Negative for difficulty urinating, dysuria, hematuria, pelvic pain and penile discharge.         Has Clark in place.    Musculoskeletal:  Positive for arthralgias and back pain.   Neurological:  Positive for extremity weakness. Negative for dizziness, headaches and speech difficulty.        Generalized weakness.    Hematological:  Negative for adenopathy.   Psychiatric/Behavioral:  Negative for confusion and depression. The patient is not nervous/anxious.      The patient's current pain level was not assessed.  They report currently having a pain of 0 out of 10.  They feel their pain is under control without the use of pain medications.    Performance Status:   The Karnofsky performance scale today is 80, Normal activity with effort; some signs or symptoms of disease (ECOG equivalent 1).        OBJECTIVE  Vital Signs:  There were no vitals taken for this visit.   Physical Exam:  Physical Exam  Constitutional:       General: He is not in acute distress.     Appearance: Normal appearance. He is normal weight. He is not ill-appearing,  toxic-appearing or diaphoretic.   HENT:      Head: Normocephalic and atraumatic. No abrasion or masses.      Jaw: No trismus, swelling or pain on movement.      Salivary Glands: Right salivary gland is not diffusely enlarged. Left salivary gland is not diffusely enlarged.      Comments:  Patient with mild lymphedema.      Nose: Nose normal. No congestion or rhinorrhea.      Mouth/Throat:      Lips: Pink.      Mouth: Mucous membranes are dry. No injury or oral lesions.      Dentition: Normal dentition. Does not have dentures. No gingival swelling, dental abscesses or gum lesions.      Tongue: No lesions. Tongue does not deviate from midline.      Palate: No mass and lesions.      Tonsils: No tonsillar exudate or tonsillar abscesses.      Comments: Edentulous.   Eyes:      General: Lids are normal. Gaze aligned appropriately.      Extraocular Movements: Extraocular movements intact.      Pupils: Pupils are equal, round, and reactive to light.   Neck:      Thyroid: No thyroid mass or thyroid tenderness.   Cardiovascular:      Rate and Rhythm: Normal rate and regular rhythm.      Pulses: Normal pulses.      Heart sounds: Normal heart sounds. No murmur heard.  Pulmonary:      Effort: Pulmonary effort is normal. No tachypnea or respiratory distress.      Breath sounds: Normal breath sounds. No wheezing or rhonchi.   Abdominal:      General: Abdomen is flat. Bowel sounds are normal. There is no distension.      Palpations: Abdomen is soft. There is no mass.      Tenderness: There is no abdominal tenderness. There is no guarding or rebound.   Musculoskeletal:         General: No swelling, tenderness, deformity or signs of injury. Normal range of motion.      Cervical back: Full passive range of motion without pain, normal range of motion and neck supple. No rigidity or tenderness. No pain with movement. Normal range of motion.      Right lower leg: No edema.      Left lower leg: No edema.   Lymphadenopathy:      Head:       Right side of head: No submental, submandibular, tonsillar, preauricular, posterior auricular or occipital adenopathy.      Left side of head: No submental, submandibular, tonsillar, preauricular, posterior auricular or occipital adenopathy.      Cervical:      Right cervical: No superficial, deep or posterior cervical adenopathy.     Left cervical: No superficial, deep or posterior cervical adenopathy.   Skin:     General: Skin is warm and dry.      Coloration: Skin is not jaundiced.      Findings: No erythema, lesion or rash.   Neurological:      General: No focal deficit present.      Mental Status: He is alert and oriented to person, place, and time.      Motor: Weakness (Generalized weakness.) present.      Coordination: Coordination normal.      Gait: Gait normal.   Psychiatric:         Attention and Perception: Attention normal.         Mood and Affect: Mood normal.         Behavior: Behavior normal. Behavior is cooperative.         ASSESSMENT:  Phong Wong is a 73 y.o. 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  The patient underwent chemoradiation ending on 10/10/12.  Patient continues to be managed, by Medical Oncology, for metastatic disease to his lung.  He's currently undergoing his second cycle of Pembrolizumab today.  States that he's tolerating treatment well without side effects.   All questions/concerns were addressed to the satisfaction of the patient.     PLAN:    --FU with Mak Berry CNP on 8/13/24.   --Carotid ultrasound on 8/13/24.    --Continue to follow with Dr. Fletcher for ENT management.  CNP to help establish next visit.   --Continue to follow with Dr. Burkitt and Eva Patten PA-C for medical oncology management.   --Continue following with William Byrne RD for nutritional management.    --Patient to FU with dentist for revisions to his dentures.     Please reach out with any questions or concerns.     NCCN Guidelines were  applicable to guide this patients treatment plan.  Aris Berry, APRN-CNP

## 2024-04-30 NOTE — PROGRESS NOTES
"Phong Wong is a 73 y.o. male on day 3 of admission presenting with Hypovolemic shock (CMS/HCC).    Subjective   NAEO. Tolerated EGD and PEG tube placement yesterday.  Did not pass TOV after rainey removal yesterday.        Objective     Physical Exam  Constitutional:       General: He is not in acute distress.     Appearance: He is underweight. He is ill-appearing (chronic).   HENT:      Mouth/Throat:      Mouth: Mucous membranes are dry. Injury (excoriations on upper palate with ?yellow exudate, no active signs of bleeding) present.   Eyes:      Extraocular Movements: Extraocular movements intact.   Cardiovascular:      Rate and Rhythm: Normal rate and regular rhythm.   Pulmonary:      Effort: Pulmonary effort is normal. No respiratory distress.      Breath sounds: Normal breath sounds.      Comments: On RA  Abdominal:      General: Abdomen is flat. Bowel sounds are normal.      Palpations: Abdomen is soft.   Genitourinary:     Comments: Rainey catheter  Musculoskeletal:         General: Tenderness (bilateral calves and with foot dorsi/plantar flexion) present. No signs of injury.      Cervical back: Signs of trauma (anterior neck open burns, no discharge) present.      Right lower leg: No edema.      Left lower leg: No edema.   Skin:     General: Skin is warm and dry.      Findings: Bruising (arms) present.   Neurological:      Mental Status: He is oriented to person, place, and time.      Cranial Nerves: No cranial nerve deficit.     Last Recorded Vitals  Blood pressure 135/74, pulse 78, temperature 36.5 °C (97.7 °F), resp. rate 16, height 1.778 m (5' 10\"), weight 54 kg (119 lb 0.8 oz), SpO2 98 %.  Intake/Output last 3 Shifts:  I/O last 3 completed shifts:  In: 1832.5 (33.9 mL/kg) [Blood:350; IV Piggyback:1482.5]  Out: 1800 (33.3 mL/kg) [Urine:1800 (0.9 mL/kg/hr)]  Weight: 54 kg         Assessment/Plan   Principal Problem:    Hypovolemic shock (CMS/HCC)  Active Problems:    Pain in both lower extremities    Phong " Marvin is a 73M with PMH of oropharyngeal cancer s/p chemo/XRT, prostate cancer s/p XRT, pancytopenia, HTN, CAD, PAD, and COPD who was admitted on 10/18/23 for lethargy and weakness from outpatient heme/onc appointment ISO several weeks of poor PO intake and opioid use. Patient hypotensive and hypoxic with elevated lactate on admission, concerning for infection. Recent odynophagia after chemo/XRT, questionable infectious etiology given neutropenia and clinical picture. s/p EGD and PEG tube. Started tube feeds.      UPDATES 10/21:  - s/p EGD and PEG tube placement 10/20, starting TF per nutrition recs  -BID RFP, Phos to monitor for refeeding syndrome  - SLP recommend NPO   - Did not pass TOV, will straight cath and rescan     #Failure to thrive  #Odynophagia  #Oropharyngeal cancer T3N2M0 s/p carbo/Taxol/XRT (8/15/23-10/4/23)  :: Med-onc Dr. Bruce Garcia/Lyssa Patten PA-C, rad-onc Dr. Vianca Steen  :: CXR with no acute cardiopulmonary process  - Continue Tylenol prn, oxy 5 liquid prior to meals, STOP morphine  - Maintenance D5W NS @ 75cc/hr  - Consult GI, EGD 10/20: evidence of radiation and reflux esophagitis  - Consult nutrition for poor PO intake              - PEG tube placement 10/20, starting TF per nutrition recs              - SLP consulted; recommend NPO for know with plans for MBSS  - Consult wound care, appreciate recs     #Weakness  #Hypotension, resolved  #Hypoxia, resolved  :: Differential includes sepsis/infection, morphine overdose, poor PO intake, chemo/XRT  :: S/p LR 1.5L and NRFM 15L  - Empiric vanc/Unaysn to cover soft tissue infections 10/18-10/20  - UA neg, urine PNA panel neg, Bcx NGTD  - Maintenance D5W NS @ 75cc/hr  - PT/OT recs     #Acute urinary retention  #HECTOR, resolved  #Prostate cancer s/p XRT (2018)  :: Admission Cr 2.5 (baseline Cr 1.2)  :: Admission PVR 1205cc > Clark 1725cc tea-colored urine out  - Likely multifactorial d/t poor PO intake, acute urinary retention  - Trend Cr, hold  nephrotoxic meds  - Currently retaining, will attempt straight cath and place rainey if continues to retain     #Acute on chronic anemia  #Neutropenia  :: ANC and Hb has been downtrending throughout recent chemo/XRT   - S/p 1u PRBC this admission  - No signs of overt bleeding  - Active T&S, transfuse if Hb<7  - Hold AC at this time, SCDs only     #HTN  #CAD  #PAD  - Hold home ASA ISO acute anemia  - Hold home losartan ISO HECTOR     #COPD  - Continue home budesonide nebs, Breo Ellipta inh, duonebs prn     #Lactic acidosis, resolved  :: Admission lactate 4.7, VBG pH 7.32, pCO2 54, pO2 23  - Suspect dehydration vs infection  - Now s/p LR 1.5L with ABG pH 7.49, CO2 36, pO2 129  - Repeat AM lactate 0.9     F: D5W NS @ 75cc/hr  E: K>4, Mg>2, Phos >3  N: NPO, TF via PEG  A: PIV  DVT ppx: SCDs  CODE STATUS: full code (confirmed on admission)  NOK: Emelia (wife) [phone]      Raf Cardona MD       na

## 2024-05-01 ENCOUNTER — INFUSION (OUTPATIENT)
Dept: HEMATOLOGY/ONCOLOGY | Facility: HOSPITAL | Age: 74
End: 2024-05-01
Payer: MEDICARE

## 2024-05-01 ENCOUNTER — HOSPITAL ENCOUNTER (OUTPATIENT)
Dept: RADIATION ONCOLOGY | Facility: HOSPITAL | Age: 74
Setting detail: RADIATION/ONCOLOGY SERIES
Discharge: HOME | End: 2024-05-01
Payer: MEDICARE

## 2024-05-01 ENCOUNTER — LAB (OUTPATIENT)
Dept: LAB | Facility: HOSPITAL | Age: 74
End: 2024-05-01
Payer: MEDICARE

## 2024-05-01 ENCOUNTER — OFFICE VISIT (OUTPATIENT)
Dept: HEMATOLOGY/ONCOLOGY | Facility: HOSPITAL | Age: 74
End: 2024-05-01
Payer: MEDICARE

## 2024-05-01 VITALS
WEIGHT: 135.1 LBS | BODY MASS INDEX: 21.71 KG/M2 | DIASTOLIC BLOOD PRESSURE: 79 MMHG | RESPIRATION RATE: 18 BRPM | HEART RATE: 72 BPM | TEMPERATURE: 97.7 F | HEIGHT: 66 IN | SYSTOLIC BLOOD PRESSURE: 122 MMHG | OXYGEN SATURATION: 98 %

## 2024-05-01 VITALS
TEMPERATURE: 97.7 F | SYSTOLIC BLOOD PRESSURE: 122 MMHG | WEIGHT: 134.92 LBS | DIASTOLIC BLOOD PRESSURE: 79 MMHG | RESPIRATION RATE: 18 BRPM | OXYGEN SATURATION: 99 % | HEART RATE: 72 BPM | BODY MASS INDEX: 21.78 KG/M2

## 2024-05-01 DIAGNOSIS — C10.9 OROPHARYNGEAL CANCER (MULTI): Primary | ICD-10-CM

## 2024-05-01 DIAGNOSIS — Z51.12 ENCOUNTER FOR ANTINEOPLASTIC IMMUNOTHERAPY: ICD-10-CM

## 2024-05-01 DIAGNOSIS — C10.9 OROPHARYNGEAL CANCER (MULTI): ICD-10-CM

## 2024-05-01 DIAGNOSIS — K64.9 HEMORRHOIDS, UNSPECIFIED HEMORRHOID TYPE: ICD-10-CM

## 2024-05-01 DIAGNOSIS — I79.8 OTHER DISORDERS OF ARTERIES, ARTERIOLES AND CAPILLARIES IN DISEASES CLASSIFIED ELSEWHERE (CMS-HCC): ICD-10-CM

## 2024-05-01 DIAGNOSIS — I75.81: ICD-10-CM

## 2024-05-01 DIAGNOSIS — I89.0 LYMPHEDEMA DUE TO RADIATION: ICD-10-CM

## 2024-05-01 DIAGNOSIS — N18.31 STAGE 3A CHRONIC KIDNEY DISEASE (MULTI): ICD-10-CM

## 2024-05-01 LAB
ALBUMIN SERPL BCP-MCNC: 3.8 G/DL (ref 3.4–5)
ALP SERPL-CCNC: 52 U/L (ref 33–136)
ALT SERPL W P-5'-P-CCNC: 9 U/L (ref 10–52)
ANION GAP SERPL CALC-SCNC: 12 MMOL/L (ref 10–20)
AST SERPL W P-5'-P-CCNC: 14 U/L (ref 9–39)
BASOPHILS # BLD AUTO: 0.05 X10*3/UL (ref 0–0.1)
BASOPHILS NFR BLD AUTO: 1.1 %
BILIRUB SERPL-MCNC: 0.5 MG/DL (ref 0–1.2)
BUN SERPL-MCNC: 19 MG/DL (ref 6–23)
CALCIUM SERPL-MCNC: 9.4 MG/DL (ref 8.6–10.6)
CHLORIDE SERPL-SCNC: 100 MMOL/L (ref 98–107)
CO2 SERPL-SCNC: 28 MMOL/L (ref 21–32)
CREAT SERPL-MCNC: 1.46 MG/DL (ref 0.5–1.3)
EGFRCR SERPLBLD CKD-EPI 2021: 51 ML/MIN/1.73M*2
EOSINOPHIL # BLD AUTO: 0.34 X10*3/UL (ref 0–0.4)
EOSINOPHIL NFR BLD AUTO: 7.6 %
ERYTHROCYTE [DISTWIDTH] IN BLOOD BY AUTOMATED COUNT: 15.7 % (ref 11.5–14.5)
GLUCOSE SERPL-MCNC: 104 MG/DL (ref 74–99)
HCT VFR BLD AUTO: 38.9 % (ref 41–52)
HGB BLD-MCNC: 12.6 G/DL (ref 13.5–17.5)
IMM GRANULOCYTES # BLD AUTO: 0.02 X10*3/UL (ref 0–0.5)
IMM GRANULOCYTES NFR BLD AUTO: 0.4 % (ref 0–0.9)
LYMPHOCYTES # BLD AUTO: 0.49 X10*3/UL (ref 0.8–3)
LYMPHOCYTES NFR BLD AUTO: 10.9 %
MCH RBC QN AUTO: 27.9 PG (ref 26–34)
MCHC RBC AUTO-ENTMCNC: 32.4 G/DL (ref 32–36)
MCV RBC AUTO: 86 FL (ref 80–100)
MONOCYTES # BLD AUTO: 0.56 X10*3/UL (ref 0.05–0.8)
MONOCYTES NFR BLD AUTO: 12.5 %
NEUTROPHILS # BLD AUTO: 3.03 X10*3/UL (ref 1.6–5.5)
NEUTROPHILS NFR BLD AUTO: 67.5 %
NRBC BLD-RTO: 0 /100 WBCS (ref 0–0)
PLATELET # BLD AUTO: 269 X10*3/UL (ref 150–450)
POTASSIUM SERPL-SCNC: 4 MMOL/L (ref 3.5–5.3)
PROT SERPL-MCNC: 6.6 G/DL (ref 6.4–8.2)
RBC # BLD AUTO: 4.52 X10*6/UL (ref 4.5–5.9)
SODIUM SERPL-SCNC: 136 MMOL/L (ref 136–145)
TSH SERPL-ACNC: 2.44 MIU/L (ref 0.44–3.98)
WBC # BLD AUTO: 4.5 X10*3/UL (ref 4.4–11.3)

## 2024-05-01 PROCEDURE — 85025 COMPLETE CBC W/AUTO DIFF WBC: CPT

## 2024-05-01 PROCEDURE — 99214 OFFICE O/P EST MOD 30 MIN: CPT

## 2024-05-01 PROCEDURE — 96413 CHEMO IV INFUSION 1 HR: CPT

## 2024-05-01 PROCEDURE — 1160F RVW MEDS BY RX/DR IN RCRD: CPT | Performed by: STUDENT IN AN ORGANIZED HEALTH CARE EDUCATION/TRAINING PROGRAM

## 2024-05-01 PROCEDURE — 36415 COLL VENOUS BLD VENIPUNCTURE: CPT

## 2024-05-01 PROCEDURE — 1036F TOBACCO NON-USER: CPT | Performed by: STUDENT IN AN ORGANIZED HEALTH CARE EDUCATION/TRAINING PROGRAM

## 2024-05-01 PROCEDURE — 2500000004 HC RX 250 GENERAL PHARMACY W/ HCPCS (ALT 636 FOR OP/ED): Performed by: STUDENT IN AN ORGANIZED HEALTH CARE EDUCATION/TRAINING PROGRAM

## 2024-05-01 PROCEDURE — 1159F MED LIST DOCD IN RCRD: CPT | Performed by: STUDENT IN AN ORGANIZED HEALTH CARE EDUCATION/TRAINING PROGRAM

## 2024-05-01 PROCEDURE — 84075 ASSAY ALKALINE PHOSPHATASE: CPT

## 2024-05-01 PROCEDURE — 84443 ASSAY THYROID STIM HORMONE: CPT

## 2024-05-01 PROCEDURE — 1126F AMNT PAIN NOTED NONE PRSNT: CPT | Performed by: STUDENT IN AN ORGANIZED HEALTH CARE EDUCATION/TRAINING PROGRAM

## 2024-05-01 PROCEDURE — 99215 OFFICE O/P EST HI 40 MIN: CPT | Performed by: STUDENT IN AN ORGANIZED HEALTH CARE EDUCATION/TRAINING PROGRAM

## 2024-05-01 PROCEDURE — 96361 HYDRATE IV INFUSION ADD-ON: CPT | Mod: INF

## 2024-05-01 PROCEDURE — 2500000004 HC RX 250 GENERAL PHARMACY W/ HCPCS (ALT 636 FOR OP/ED): Performed by: INTERNAL MEDICINE

## 2024-05-01 PROCEDURE — 3074F SYST BP LT 130 MM HG: CPT | Performed by: STUDENT IN AN ORGANIZED HEALTH CARE EDUCATION/TRAINING PROGRAM

## 2024-05-01 PROCEDURE — 3078F DIAST BP <80 MM HG: CPT | Performed by: STUDENT IN AN ORGANIZED HEALTH CARE EDUCATION/TRAINING PROGRAM

## 2024-05-01 PROCEDURE — 96360 HYDRATION IV INFUSION INIT: CPT | Mod: INF

## 2024-05-01 RX ORDER — EPINEPHRINE 0.3 MG/.3ML
0.3 INJECTION SUBCUTANEOUS EVERY 5 MIN PRN
Status: DISCONTINUED | OUTPATIENT
Start: 2024-05-01 | End: 2024-05-01 | Stop reason: HOSPADM

## 2024-05-01 RX ORDER — PROCHLORPERAZINE EDISYLATE 5 MG/ML
10 INJECTION INTRAMUSCULAR; INTRAVENOUS EVERY 6 HOURS PRN
Status: DISCONTINUED | OUTPATIENT
Start: 2024-05-01 | End: 2024-05-01 | Stop reason: HOSPADM

## 2024-05-01 RX ORDER — PROCHLORPERAZINE MALEATE 10 MG
10 TABLET ORAL EVERY 6 HOURS PRN
Status: DISCONTINUED | OUTPATIENT
Start: 2024-05-01 | End: 2024-05-01 | Stop reason: HOSPADM

## 2024-05-01 RX ORDER — ALBUTEROL SULFATE 0.83 MG/ML
3 SOLUTION RESPIRATORY (INHALATION) AS NEEDED
Status: DISCONTINUED | OUTPATIENT
Start: 2024-05-01 | End: 2024-05-01 | Stop reason: HOSPADM

## 2024-05-01 RX ORDER — DIPHENHYDRAMINE HYDROCHLORIDE 50 MG/ML
50 INJECTION INTRAMUSCULAR; INTRAVENOUS AS NEEDED
Status: DISCONTINUED | OUTPATIENT
Start: 2024-05-01 | End: 2024-05-01 | Stop reason: HOSPADM

## 2024-05-01 RX ORDER — FAMOTIDINE 10 MG/ML
20 INJECTION INTRAVENOUS ONCE AS NEEDED
Status: DISCONTINUED | OUTPATIENT
Start: 2024-05-01 | End: 2024-05-01 | Stop reason: HOSPADM

## 2024-05-01 RX ADMIN — SODIUM CHLORIDE 1000 ML: 9 INJECTION, SOLUTION INTRAVENOUS at 14:25

## 2024-05-01 RX ADMIN — SODIUM CHLORIDE 200 MG: 9 INJECTION, SOLUTION INTRAVENOUS at 15:24

## 2024-05-01 ASSESSMENT — ENCOUNTER SYMPTOMS
DEPRESSION: 0
LOSS OF SENSATION IN FEET: 0
OCCASIONAL FEELINGS OF UNSTEADINESS: 0

## 2024-05-01 ASSESSMENT — PAIN SCALES - GENERAL
PAINLEVEL: 0-NO PAIN
PAINLEVEL: 0-NO PAIN

## 2024-05-01 NOTE — PROGRESS NOTES
Pt arrived to infusion from provider visit. See note for assessment. 1L IVF given d/t kidney function in addition to pembro, which was given without incident. Pt off unit via wheelchair with family member

## 2024-05-01 NOTE — PATIENT INSTRUCTIONS
Please schedule follow up with Dr. Weiss. 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 Weiss.

## 2024-05-06 ENCOUNTER — TELEPHONE (OUTPATIENT)
Dept: HEMATOLOGY/ONCOLOGY | Facility: HOSPITAL | Age: 74
End: 2024-05-06
Payer: MEDICARE

## 2024-05-06 NOTE — TELEPHONE ENCOUNTER
Patient states received a consent for testing and thinks he may have done that already. Transferred to Nani JARA RN.

## 2024-05-06 NOTE — TELEPHONE ENCOUNTER
Contacted patient regarding [employer] questions.   No answer - left VM stating that [employer] will cover the cost if his insurance does not and to call us if he has any more questions.

## 2024-05-06 NOTE — TELEPHONE ENCOUNTER
Patient returned phone call - explained to him that he does not have to sign consent form that was mailed to him and he verbalized understanding.

## 2024-05-08 ENCOUNTER — APPOINTMENT (OUTPATIENT)
Dept: HEMATOLOGY/ONCOLOGY | Facility: HOSPITAL | Age: 74
End: 2024-05-08
Payer: MEDICARE

## 2024-05-16 ENCOUNTER — APPOINTMENT (OUTPATIENT)
Dept: HEMATOLOGY/ONCOLOGY | Facility: HOSPITAL | Age: 74
End: 2024-05-16
Payer: MEDICARE

## 2024-05-23 ENCOUNTER — LAB (OUTPATIENT)
Dept: LAB | Facility: HOSPITAL | Age: 74
End: 2024-05-23
Payer: MEDICARE

## 2024-05-23 ENCOUNTER — INFUSION (OUTPATIENT)
Dept: HEMATOLOGY/ONCOLOGY | Facility: HOSPITAL | Age: 74
End: 2024-05-23
Payer: MEDICARE

## 2024-05-23 ENCOUNTER — OFFICE VISIT (OUTPATIENT)
Dept: HEMATOLOGY/ONCOLOGY | Facility: HOSPITAL | Age: 74
End: 2024-05-23
Payer: MEDICARE

## 2024-05-23 VITALS
HEART RATE: 112 BPM | DIASTOLIC BLOOD PRESSURE: 77 MMHG | TEMPERATURE: 98.8 F | OXYGEN SATURATION: 95 % | WEIGHT: 129.41 LBS | BODY MASS INDEX: 20.89 KG/M2 | SYSTOLIC BLOOD PRESSURE: 162 MMHG | RESPIRATION RATE: 18 BRPM

## 2024-05-23 DIAGNOSIS — C78.01 SQUAMOUS CELL CARCINOMA METASTATIC TO BOTH LUNGS (MULTI): ICD-10-CM

## 2024-05-23 DIAGNOSIS — Z51.12 ENCOUNTER FOR ANTINEOPLASTIC IMMUNOTHERAPY: ICD-10-CM

## 2024-05-23 DIAGNOSIS — I89.0 LYMPHEDEMA DUE TO AND NOT CONCURRENT WITH IONIZING RADIOTHERAPY: ICD-10-CM

## 2024-05-23 DIAGNOSIS — C78.02 SQUAMOUS CELL CARCINOMA METASTATIC TO BOTH LUNGS (MULTI): ICD-10-CM
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
## 2024-05-23 DIAGNOSIS — C10.9 OROPHARYNGEAL CANCER (MULTI): Primary | ICD-10-CM

## 2024-05-23 DIAGNOSIS — I70.1: ICD-10-CM

## 2024-05-23 DIAGNOSIS — C10.9 OROPHARYNGEAL CANCER (MULTI): ICD-10-CM

## 2024-05-23 DIAGNOSIS — I70.90 ATHEROSCLEROSIS: ICD-10-CM

## 2024-05-23 DIAGNOSIS — N18.31 STAGE 3A CHRONIC KIDNEY DISEASE (MULTI): ICD-10-CM

## 2024-05-23 DIAGNOSIS — C01 SQUAMOUS CELL CARCINOMA OF BASE OF TONGUE (MULTI): ICD-10-CM

## 2024-05-23 LAB
ALBUMIN SERPL BCP-MCNC: 3.6 G/DL (ref 3.4–5)
ALBUMIN SERPL BCP-MCNC: 4.1 G/DL (ref 3.4–5)
ALP SERPL-CCNC: 57 U/L (ref 33–136)
ALP SERPL-CCNC: 66 U/L (ref 33–136)
ALT SERPL W P-5'-P-CCNC: 11 U/L (ref 10–52)
ALT SERPL W P-5'-P-CCNC: 12 U/L (ref 10–52)
ANION GAP SERPL CALC-SCNC: 13 MMOL/L (ref 10–20)
ANION GAP SERPL CALC-SCNC: 15 MMOL/L (ref 10–20)
AST SERPL W P-5'-P-CCNC: 14 U/L (ref 9–39)
AST SERPL W P-5'-P-CCNC: 19 U/L (ref 9–39)
BASOPHILS # BLD AUTO: 0.07 X10*3/UL (ref 0–0.1)
BASOPHILS NFR BLD AUTO: 1.1 %
BILIRUB SERPL-MCNC: 0.4 MG/DL (ref 0–1.2)
BILIRUB SERPL-MCNC: 0.5 MG/DL (ref 0–1.2)
BUN SERPL-MCNC: 18 MG/DL (ref 6–23)
BUN SERPL-MCNC: 19 MG/DL (ref 6–23)
CALCIUM SERPL-MCNC: 8.5 MG/DL (ref 8.6–10.3)
CALCIUM SERPL-MCNC: 9.6 MG/DL (ref 8.6–10.3)
CHLORIDE SERPL-SCNC: 100 MMOL/L (ref 98–107)
CHLORIDE SERPL-SCNC: 102 MMOL/L (ref 98–107)
CO2 SERPL-SCNC: 25 MMOL/L (ref 21–32)
CO2 SERPL-SCNC: 25 MMOL/L (ref 21–32)
CORTIS AM PEAK SERPL-MSCNC: 16.1 UG/DL (ref 5–20)
CREAT SERPL-MCNC: 1.52 MG/DL (ref 0.5–1.3)
CREAT SERPL-MCNC: 1.69 MG/DL (ref 0.5–1.3)
EGFRCR SERPLBLD CKD-EPI 2021: 43 ML/MIN/1.73M*2
EGFRCR SERPLBLD CKD-EPI 2021: 49 ML/MIN/1.73M*2
EOSINOPHIL # BLD AUTO: 0.47 X10*3/UL (ref 0–0.4)
EOSINOPHIL NFR BLD AUTO: 7.3 %
ERYTHROCYTE [DISTWIDTH] IN BLOOD BY AUTOMATED COUNT: 15.2 % (ref 11.5–14.5)
GLUCOSE SERPL-MCNC: 96 MG/DL (ref 74–99)
GLUCOSE SERPL-MCNC: 98 MG/DL (ref 74–99)
HCT VFR BLD AUTO: 38.4 % (ref 41–52)
HGB BLD-MCNC: 12.8 G/DL (ref 13.5–17.5)
HOLD SPECIMEN: NORMAL
IMM GRANULOCYTES # BLD AUTO: 0.02 X10*3/UL (ref 0–0.5)
IMM GRANULOCYTES NFR BLD AUTO: 0.3 % (ref 0–0.9)
LYMPHOCYTES # BLD AUTO: 0.44 X10*3/UL (ref 0.8–3)
LYMPHOCYTES NFR BLD AUTO: 6.9 %
MCH RBC QN AUTO: 27.9 PG (ref 26–34)
MCHC RBC AUTO-ENTMCNC: 33.3 G/DL (ref 32–36)
MCV RBC AUTO: 84 FL (ref 80–100)
MONOCYTES # BLD AUTO: 0.71 X10*3/UL (ref 0.05–0.8)
MONOCYTES NFR BLD AUTO: 11.1 %
NEUTROPHILS # BLD AUTO: 4.7 X10*3/UL (ref 1.6–5.5)
NEUTROPHILS NFR BLD AUTO: 73.3 %
NRBC BLD-RTO: 0 /100 WBCS (ref 0–0)
PLATELET # BLD AUTO: 244 X10*3/UL (ref 150–450)
POTASSIUM SERPL-SCNC: 3.9 MMOL/L (ref 3.5–5.3)
POTASSIUM SERPL-SCNC: 4 MMOL/L (ref 3.5–5.3)
PROT SERPL-MCNC: 5.9 G/DL (ref 6.4–8.2)
PROT SERPL-MCNC: 6.6 G/DL (ref 6.4–8.2)
RBC # BLD AUTO: 4.59 X10*6/UL (ref 4.5–5.9)
SODIUM SERPL-SCNC: 136 MMOL/L (ref 136–145)
SODIUM SERPL-SCNC: 136 MMOL/L (ref 136–145)
TSH SERPL-ACNC: 2.35 MIU/L (ref 0.44–3.98)
WBC # BLD AUTO: 6.4 X10*3/UL (ref 4.4–11.3)

## 2024-05-23 PROCEDURE — 96360 HYDRATION IV INFUSION INIT: CPT | Mod: INF

## 2024-05-23 PROCEDURE — 80053 COMPREHEN METABOLIC PANEL: CPT

## 2024-05-23 PROCEDURE — 1160F RVW MEDS BY RX/DR IN RCRD: CPT | Performed by: STUDENT IN AN ORGANIZED HEALTH CARE EDUCATION/TRAINING PROGRAM

## 2024-05-23 PROCEDURE — 1159F MED LIST DOCD IN RCRD: CPT | Performed by: STUDENT IN AN ORGANIZED HEALTH CARE EDUCATION/TRAINING PROGRAM

## 2024-05-23 PROCEDURE — 1126F AMNT PAIN NOTED NONE PRSNT: CPT | Performed by: STUDENT IN AN ORGANIZED HEALTH CARE EDUCATION/TRAINING PROGRAM

## 2024-05-23 PROCEDURE — 84443 ASSAY THYROID STIM HORMONE: CPT | Performed by: INTERNAL MEDICINE

## 2024-05-23 PROCEDURE — 80053 COMPREHEN METABOLIC PANEL: CPT | Mod: 91,MUE | Performed by: STUDENT IN AN ORGANIZED HEALTH CARE EDUCATION/TRAINING PROGRAM

## 2024-05-23 PROCEDURE — 3078F DIAST BP <80 MM HG: CPT | Performed by: STUDENT IN AN ORGANIZED HEALTH CARE EDUCATION/TRAINING PROGRAM

## 2024-05-23 PROCEDURE — 96413 CHEMO IV INFUSION 1 HR: CPT

## 2024-05-23 PROCEDURE — 99215 OFFICE O/P EST HI 40 MIN: CPT | Performed by: STUDENT IN AN ORGANIZED HEALTH CARE EDUCATION/TRAINING PROGRAM

## 2024-05-23 PROCEDURE — 82024 ASSAY OF ACTH: CPT

## 2024-05-23 PROCEDURE — 3077F SYST BP >= 140 MM HG: CPT | Performed by: STUDENT IN AN ORGANIZED HEALTH CARE EDUCATION/TRAINING PROGRAM

## 2024-05-23 PROCEDURE — 85025 COMPLETE CBC W/AUTO DIFF WBC: CPT

## 2024-05-23 PROCEDURE — 2500000004 HC RX 250 GENERAL PHARMACY W/ HCPCS (ALT 636 FOR OP/ED): Mod: JZ | Performed by: STUDENT IN AN ORGANIZED HEALTH CARE EDUCATION/TRAINING PROGRAM

## 2024-05-23 PROCEDURE — 96361 HYDRATE IV INFUSION ADD-ON: CPT | Mod: INF

## 2024-05-23 PROCEDURE — 82533 TOTAL CORTISOL: CPT | Performed by: INTERNAL MEDICINE

## 2024-05-23 PROCEDURE — 36415 COLL VENOUS BLD VENIPUNCTURE: CPT

## 2024-05-23 RX ORDER — FAMOTIDINE 10 MG/ML
20 INJECTION INTRAVENOUS ONCE AS NEEDED
Status: CANCELLED | OUTPATIENT
Start: 2024-05-23

## 2024-05-23 RX ORDER — EPINEPHRINE 0.3 MG/.3ML
0.3 INJECTION SUBCUTANEOUS EVERY 5 MIN PRN
Status: DISCONTINUED | OUTPATIENT
Start: 2024-05-23 | End: 2024-05-23 | Stop reason: HOSPADM

## 2024-05-23 RX ORDER — DIPHENHYDRAMINE HYDROCHLORIDE 50 MG/ML
50 INJECTION INTRAMUSCULAR; INTRAVENOUS AS NEEDED
Status: DISCONTINUED | OUTPATIENT
Start: 2024-05-23 | End: 2024-05-23 | Stop reason: HOSPADM

## 2024-05-23 RX ORDER — PROCHLORPERAZINE EDISYLATE 5 MG/ML
10 INJECTION INTRAMUSCULAR; INTRAVENOUS EVERY 6 HOURS PRN
Status: CANCELLED | OUTPATIENT
Start: 2024-05-23

## 2024-05-23 RX ORDER — PROCHLORPERAZINE EDISYLATE 5 MG/ML
10 INJECTION INTRAMUSCULAR; INTRAVENOUS EVERY 6 HOURS PRN
Status: DISCONTINUED | OUTPATIENT
Start: 2024-05-23 | End: 2024-05-23 | Stop reason: HOSPADM

## 2024-05-23 RX ORDER — EPINEPHRINE 0.3 MG/.3ML
0.3 INJECTION SUBCUTANEOUS EVERY 5 MIN PRN
Status: CANCELLED | OUTPATIENT
Start: 2024-05-23

## 2024-05-23 RX ORDER — PROCHLORPERAZINE MALEATE 10 MG
10 TABLET ORAL EVERY 6 HOURS PRN
Status: DISCONTINUED | OUTPATIENT
Start: 2024-05-23 | End: 2024-05-23 | Stop reason: HOSPADM

## 2024-05-23 RX ORDER — FAMOTIDINE 10 MG/ML
20 INJECTION INTRAVENOUS ONCE AS NEEDED
Status: DISCONTINUED | OUTPATIENT
Start: 2024-05-23 | End: 2024-05-23 | Stop reason: HOSPADM

## 2024-05-23 RX ORDER — DIPHENHYDRAMINE HYDROCHLORIDE 50 MG/ML
50 INJECTION INTRAMUSCULAR; INTRAVENOUS AS NEEDED
Status: CANCELLED | OUTPATIENT
Start: 2024-05-23

## 2024-05-23 RX ORDER — PROCHLORPERAZINE MALEATE 10 MG
10 TABLET ORAL EVERY 6 HOURS PRN
Status: CANCELLED | OUTPATIENT
Start: 2024-05-23

## 2024-05-23 RX ORDER — ALBUTEROL SULFATE 0.83 MG/ML
3 SOLUTION RESPIRATORY (INHALATION) AS NEEDED
Status: CANCELLED | OUTPATIENT
Start: 2024-05-23

## 2024-05-23 RX ORDER — ALBUTEROL SULFATE 0.83 MG/ML
3 SOLUTION RESPIRATORY (INHALATION) AS NEEDED
Status: DISCONTINUED | OUTPATIENT
Start: 2024-05-23 | End: 2024-05-23 | Stop reason: HOSPADM

## 2024-05-23 RX ADMIN — SODIUM CHLORIDE 1000 ML: 9 INJECTION, SOLUTION INTRAVENOUS at 14:45

## 2024-05-23 RX ADMIN — SODIUM CHLORIDE 200 MG: 9 INJECTION, SOLUTION INTRAVENOUS at 15:21

## 2024-05-23 ASSESSMENT — ENCOUNTER SYMPTOMS
VOMITING: 0
CHILLS: 0
FREQUENCY: 0
ARTHRALGIAS: 0
CONSTIPATION: 0
LIGHT-HEADEDNESS: 0
WOUND: 0
DIARRHEA: 0
TROUBLE SWALLOWING: 0
DIFFICULTY URINATING: 0
SHORTNESS OF BREATH: 0
DYSURIA: 0
DIZZINESS: 0
NAUSEA: 0
NECK PAIN: 0
FEVER: 0
LEG SWELLING: 0
HEADACHES: 0
ABDOMINAL PAIN: 0
NUMBNESS: 0
COUGH: 0
SORE THROAT: 0

## 2024-05-23 ASSESSMENT — PAIN SCALES - GENERAL: PAINLEVEL: 0-NO PAIN

## 2024-05-23 NOTE — PROGRESS NOTES
Patient ID: Phong Wong is a 73 y.o. male.  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 Juan Jose Fletcher   MedOnc: Dr. Kyunghee Burkitt, X. Katherine Feng, PA-C   RadOnc: Dr. Vianca Steen     Current Therapy  4/4/24: Started Q3 week Pembrolizumab    Prior Therapy:   8/16 - 10/4/23: Recieved concurrent chemoradiation with 7 weekly Carboplatin (2mg/AUC)/Paclitaxel (45mg/m2)  and 70gy RT in 35fx     Sites of Disease:  Soft palate, BOT, Left palatine tonsil  B/L Cervical LN  Lung     Oncologic Issues:   Odynophagia  Fatigue     ONCOLOGIC HISTORY  - Pt had 2 months hx of throat discomfort with lump in right neck  - 6/13/23: CT neck showed a mass 2.8 x 2.5 x 2.9 cm in the right lower cervical neck soft tissues. Two suspicious nodes were observed, one at level 3 on the right and another at level 2 on the left.  - 6/28/23: seen by Dr. Fletcher. 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 Based on the findings, Dr. Fletcher did not recommend surgery due to the location  of the primary tumor and the presence of yvette disease  - 6/30/23: PET/CT showed intense FDG avidity within the soft palate, extending to the base of the tongue and left palatine tonsil. Multiple FDG avid left cervical level II nodes were observed, along with an FDG avid mass in the region of the right cervical  level II/III, infiltrating the right SCM muscle and encasing and invading the right jugular vein. FDG avidity was also detected in the region of the left fossa Rosenmuller and left medial pterygoid muscle.  - 8/16 - 10/4/23: Recieved concurrent chemoradiation with 7 weekly Carboplatin (2mg/AUC)/Paclitaxel (45mg/m2)  and 70gy RT in 35fx    Admissions:  10/5 - 10/10/23: Admitted for extreme weakness, HECTOR, pancytopenia including anemia requiring blood  transfusion. D/C'ed to acute rehab        Past Medical History:   Past Medical History:  2017: Personal history of diseases of the blood and blood-  forming organs and certain disorders involving the immune mechanism      Comment:  History of thrombocytosis   HTN, HLD, COPD, prostate cancer in 2017 (s/p radiation)  Surgical History:    Past Surgical History:   Procedure Laterality Date    CT ABDOMEN PELVIS ANGIOGRAM W AND/OR WO IV CONTRAST  9/3/2014    CT ABDOMEN PELVIS ANGIOGRAM W AND/OR WO IV CONTRAST 9/3/2014 [facility]    IR ANGIOGRAM AORTA ABDOMEN  2014    IR ANGIOGRAM AORTA ABDOMEN 2014 [facility]   Aortoiliofemoral vascular bypass in   Social History:    Social History     Socioeconomic History    Marital status:      Spouse name: None    Number of children: 1    Years of education: None    Highest education level: None   Occupational History    None   Tobacco Use    Smoking status: Former     Current packs/day: 0.00     Average packs/day: 1 pack/day for 40.0 years (40.0 ttl pk-yrs)     Types: Cigarettes     Start date:      Quit date:      Years since quittin.4     Passive exposure: Past    Smokeless tobacco: Never   Substance and Sexual Activity    Alcohol use: Yes     Alcohol/week: 12.0 standard drinks of alcohol     Types: 12 Cans of beer per week    Drug use: Never    Sexual activity: None   Other Topics Concern    None   Social History Narrative    None     Social Determinants of Health     Financial Resource Strain: Low Risk  (10/20/2023)    Overall Financial Resource Strain (CARDIA)     Difficulty of Paying Living Expenses: Not very hard   Food Insecurity: No Food Insecurity (10/5/2023)    Hunger Vital Sign     Worried About Running Out of Food in the Last Year: Never true     Ran Out of Food in the Last Year: Never true   Transportation Needs: No Transportation Needs (10/20/2023)    PRAPARE - Transportation     Lack of Transportation  (Medical): No     Lack of Transportation (Non-Medical): No   Physical Activity: Inactive (10/5/2023)    Exercise Vital Sign     Days of Exercise per Week: 0 days     Minutes of Exercise per Session: 0 min   Stress: Stress Concern Present (10/5/2023)    [employer] - Occupational Stress Questionnaire     Feeling of Stress : To some extent   Social Connections: Moderately Integrated (10/5/2023)    Social Connection and Isolation Panel [NHANES]     Frequency of Communication with Friends and Family: More than three times a week     Frequency of Social Gatherings with Friends and Family: More than three times a week     Attends Holiness Services: Never     Active Member of Clubs or Organizations: Yes     Attends Club or Organization Meetings: Never     Marital Status:    Intimate Partner Violence: Not At Risk (10/5/2023)    Humiliation, Afraid, Rape, and Kick questionnaire     Fear of Current or Ex-Partner: No     Emotionally Abused: No     Physically Abused: No     Sexually Abused: No   Housing Stability: Low Risk  (10/20/2023)    Housing Stability Vital Sign     Unable to Pay for Housing in the Last Year: No     Number of Places Lived in the Last Year: 1     Unstable Housing in the Last Year: No      Family History:    Family History   Problem Relation Name Age of Onset    Aortic aneurysm Father          abdominal     Family Oncology History:    Cancer-related family history is not on file.      Subjective   Chief Complaint: Squamous Cell Carcinoma of oropharynx    HPI  Interval History  Phong Wong is a 73 y.o. male with h/o locally advanced oral cavity cancer, completed concurrent chemoradiation with 7 weekly Carboplatin+Paclitaxel on 10/4/23. Unfortunately found with mets to lungs on 3 months restaging scan. CPS 3. Started Q3 week Pembrolizumab on 4/4/24.    Patient presents today for C3 Pembrolizumab.     He has been feeling well the last few week. Energy is good.   He still  has some bleeding with BM, has blood on the TP   Denies pain in tongue, throat or neck. Right neck is mildly tender to palpation, skin of right neck a little itchy.   Appetite is good. Eating and drinking well, tolerating regular diet, denies dysphagia.  Denies dry mouth, has saliva production, taste is normal.   He has minimum swelling in the neck (lymphedema) but no pain. Lymphedema therapy referral was made.   He denies any urinary symptoms currently, no urgency, frequency, hesitancy, dysuria.  He's following with PCP, Dr. Weiss. 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    ROS  Review of Systems   Constitutional:  Negative for chills and fever.   HENT:   Negative for lump/mass, mouth sores, nosebleeds, sore throat, tinnitus and trouble swallowing.    Respiratory:  Negative for cough and shortness of breath.    Cardiovascular:  Negative for chest pain and leg swelling.   Gastrointestinal:  Negative for abdominal pain, constipation, diarrhea, nausea and vomiting.   Genitourinary:  Negative for difficulty urinating, dysuria and frequency.    Musculoskeletal:  Negative for arthralgias and neck pain.   Skin:  Negative for rash and wound.   Neurological:  Negative for dizziness, headaches, light-headedness and numbness.       Allergies  Allergies   Allergen Reactions    Codeine Nausea Only        Medications  Current Outpatient Medications   Medication Instructions    amLODIPine-benazepriL (Lotrel) 5-20 mg capsule 570274 Medication amlodipine 5 mg-benazepril 20 mg capsule amlodipine 5 mg-benazepril 20 mg capsule 5-20 mg 10/2/2020 Active (Outside)    aspirin 81 mg EC tablet 1 tablet, oral, Daily    doxazosin (CARDURA) 4 mg, oral, Daily, Take 1 tablet by mouth daily in the evening    fluticasone-umeclidin-vilanter (Trelegy Ellipta) 100-62.5-25 mcg blister with device INHALE 1 PUFF EVERY DAY    ipratropium-albuteroL (Duo-Neb) 0.5-2.5 mg/3 mL nebulizer solution Take 3 mL by  nebulization 3 times a day as needed for wheezing or shortness of breath.    losartan (COZAAR) 50 mg, oral, Daily    magnesium oxide (MAG-OX) 400 mg, oral, Daily    ofloxacin (Ocuflox) 0.3 % ophthalmic solution Instill 1 drop Left Eye 4 times a day        Objective   VS:  /77   Pulse (!) 112   Temp 37.1 °C (98.8 °F) (Core)   Resp 18   Wt 58.7 kg (129 lb 6.6 oz)   SpO2 95%   BMI 20.89 kg/m²   Weight   Wt Readings from Last 7 Encounters:   05/23/24 58.7 kg (129 lb 6.6 oz)   05/01/24 61.3 kg (135 lb 1.6 oz)   05/01/24 61.2 kg (134 lb 14.7 oz)   04/26/24 61 kg (134 lb 7.7 oz)   04/04/24 61.3 kg (135 lb 2.3 oz)   03/27/24 61.5 kg (135 lb 8 oz)   03/06/24 60 kg (132 lb 3.2 oz)         Physical Exam  Constitutional:       Appearance: Normal appearance. He is underweight.   HENT:      Head: Normocephalic and atraumatic.      Right Ear: External ear normal. No tenderness.      Left Ear: External ear normal. No tenderness.      Nose: Nose normal.      Mouth/Throat:      Mouth: No injury or oral lesions.      Tongue: Lesions present.      Pharynx: Oropharynx is clear. No posterior oropharyngeal erythema.      Comments: Edentulous  Eyes:      Extraocular Movements: Extraocular movements intact.      Conjunctiva/sclera: Conjunctivae normal.      Pupils: Pupils are equal, round, and reactive to light.   Neck:      Thyroid: No thyroid mass.      Comments: Mild - moderate swelling in submental and neck region.   Right neck is is a bit sensitive, there is scar tissue  Cardiovascular:      Rate and Rhythm: Normal rate and regular rhythm.   Pulmonary:      Effort: Pulmonary effort is normal. No respiratory distress.      Breath sounds: Normal breath sounds.   Abdominal:      General: Bowel sounds are normal. There is no distension or abdominal bruit.      Palpations: Abdomen is soft. There is no mass.      Tenderness: There is no abdominal tenderness.   Musculoskeletal:         General: Normal range of motion.       Cervical back: Normal range of motion and neck supple.      Right lower leg: No edema.      Left lower leg: No edema.   Lymphadenopathy:      Cervical: No cervical adenopathy.      Upper Body:      Right upper body: No axillary adenopathy.      Left upper body: No axillary adenopathy.   Skin:     General: Skin is warm and dry.      Findings: No lesion (radiation dermatitis in bilateral neck), rash or wound.   Neurological:      General: No focal deficit present.      Mental Status: He is alert and oriented to person, place, and time.      Gait: Gait is intact.   Psychiatric:         Mood and Affect: Mood and affect normal.         Diagnostic Results   The below labs were reviewed.  Results from last 7 days   Lab Units 05/23/24  1302   WBC AUTO x10*3/uL 6.4   HEMOGLOBIN g/dL 12.8*   HEMATOCRIT % 38.4*   PLATELETS AUTO x10*3/uL 244   NEUTROS ABS x10*3/uL 4.70   LYMPHS ABS AUTO x10*3/uL 0.44*   MONOS ABS AUTO x10*3/uL 0.71   EOS ABS AUTO x10*3/uL 0.47*   NEUTROS PCT AUTO % 73.3   LYMPHS PCT AUTO % 6.9   MONOS PCT AUTO % 11.1   EOS PCT AUTO % 7.3      Results from last 7 days   Lab Units 05/23/24  1526 05/23/24  1302   GLUCOSE mg/dL 96 98   SODIUM mmol/L 136 136   POTASSIUM mmol/L 3.9 4.0   CHLORIDE mmol/L 102 100   CO2 mmol/L 25 25   BUN mg/dL 18 19   CREATININE mg/dL 1.52* 1.69*   EGFR mL/min/1.73m*2 49* 43*   CALCIUM mg/dL 8.5* 9.6   ALBUMIN g/dL 3.6 4.1   PROTEIN TOTAL g/dL 5.9* 6.6   BILIRUBIN TOTAL mg/dL 0.4 0.5   ALK PHOS U/L 57 66   ALT U/L 11 12   AST U/L 19 14       Results from last 7 days   Lab Units 05/23/24  1302   CORTISOL - AM ug/dL 16.1   TSH mIU/L 2.35                    Pathology      Imaging  1/10/2024 PET/CT  IMPRESSION:  1. Significant interval improvement in FDG avidity in the soft palate and left palatine tonsil, remaining FDG avidity along the left palatine tonsil consistent with persistent residual viable disease.  2. Nearly complete metabolic resolution of cervical yvette metastases with  remaining mild FDG avid right cervical level 3 node seen on current study, likely representing residual yvette metastasis.  3. Newly developed FDG-avid pulmonary nodule in the left lower lobe as well as minimal FDG avid subcentimeter pulmonary nodules in bilateral upper lobes, concerning for lung metastasis.    1/10/2024 CT Neck w/ IV Contrast  IMPRESSION:  *Decreased size of the previously demonstrated right-sided yvette neck mass *No new adenopathy  *Post treatment changes in the hypopharynx as described    2/9/27 CT Chest w/o Contrast  IMPRESSION:  1. Multiple pulmonary nodules are again noted corresponding to FDG avid nodules on most recent PET-CT from 01/10/2024 and are new when compared to prior CT of the chest from 06/13/2023. The largest of which is a pleural-based left lower lobe nodule measuring up to 1.6 cm. Findings are concerning for metastatic disease.  2. Severe coronary artery calcifications, indicating the presence of coronary artery disease. If the patient has associated symptoms recommend management as per chest pain guidelines (e.g. https://doi.org/10.1161/CIR.6228019301062558). If the patient is asymptomatic consider reviewing modifiable cardiovascular risk factors and managing as per guidelines for primary prevention (e.g. https://doi.org/10.1161/CIR.2641924833729554).  4. Bulky atherosclerotic calcification at the origin of the left renal artery likely contributing to atrophy of the left kidney as compared to the right.   5. Additional findings as above.    3/27/2024 CT chest abdomen pelvis w IV contrast  Impression:   1. Interval increase in size of left lower lobe pulmonary nodule when compared to CT chest 02/09/2024 correlating with FDG avidity on PET-CT 01/10/2024. No sites of new metastasis in the chest.   2. The previously seen clusters of pulmonary nodules in the left upper lobe have decreased in number since CT chest 09/20/2024 correlating with improving postradiation changes or  bronchiolitis.   3. Stable diffuse heterogenous appearance of the axial skeleton, similar to study. No sites of new metastasis in the abdomen and pelvis.  4. Additional stable findings as above.       Assessment/Plan    ASSESSMENT  Phong Wong is a 73 y.o. male with recent diagnosis of locally advanced oral cavity cancer. Completed concurrent chemoradiation with 7 weekly Carboplatin (2mg/AUC)/Paclitaxel (45mg/m2) on 10/4/2. Admitted 10/5 - 10/10 for extreme weakness, HECTOR, pancytopenia including anemia requiring blood transfusion. D/C'ed to acute rehab. Admitted  again 10/18 - 11/2/23 for lethargy, weakness, failure to thrive, anemia.     # Locally advanced oral cavity cancer, T3N2M0, now with met to lungs  - 6/30/23: PET/CT showed intense FDG avidity within the soft palate, extending to the base of the tongue and left palatine tonsil. Multiple uptake in left cervical level II nodes, right cervical level  II/III, infiltrating the right SCM muscle and encasing and invading the right jugular vein. FDG avidity was also detected in the region of the left fossa Rosenmuller and left medial pterygoid muscle.  - 7/13/23: pt is doing well, no pain, discussed with patient about carboplatin+ paclitaxel as his chemotherapy along with radiation due to his GFR 30s.  Chemo related side effects were reviewed with patient, consent obtained.   - Patient required dental extractions and as a result start date was delayed from 7/26 to 8/15/23  - 8/30: tolerating chemo well, does have some fatigue but no n/v. No peripheral neuropathy   - 9/6: Continue to tolerate chemo well, no n/v, is feeling more tired. Eating well, tolerating soft foods and supplementing with Boost VHC 2x per day. Denies peripheral neuropathy  - 9/13: continue to tolerate chemo well w/o n/v. Energy is lower but stable. Eating soft foods and Boost VHC x 2 per day  - 9/20: Tolerating chemo well, minimal odynophagia, does have some weight loss.  - 10/4: Completed last  dose of Carbo/Taxol last week. Recall patient did not have a [facility] visit as both providers are out of office. He had las RT today and noted to have significantly increased fatigue, weakness, weight loss in the last week resulting from decreased PO intake.   - 11/9/23: Feeling much improved since d/c from hospital on 11/2. Regaining strength and working with PT/OT at acute rehab. Odynophagia resolved, eating soft foods, has weight gain. Serum protein improved. Electrolytes wnl, mild hyponatremia, no c/f refeeding syndrome.    - 11/29/23: continue to feel very well, d/c'ed from rehab. Acute SEs of chemoRT are resolved. He's eating/drinking well with weight gain. Not using PEG currently. Has lymphedema from RT in the neck/submental region. Can consider lymphedema therapy after 3 month restaging PET showing XENA.   - 1/12/24: Patient is feeling well, tolerating soft PO intake. PEG removed today.   Reviewed 3 month restaging PET/CT with patient. Scans noted significant interval improvement in prior sites of disease though with residual FDG avidity in soft palate and left palatine tonsil, c/f residual disease vs post treatment changes. Also noted was a new FDG avid LLL pulm nodule with max SUV 5.7, c/f pulmonary metastasis. Will order STAT lung biopsy on this.   Pt scoped by Dr. Fletcher today, unfortunately was difficult to examine in office due to patient intolerance. Per Dr. Fletcher, may bring to OR to complete the examination depending on lung biopsy results. Scans will be reviewed at 1/19/24 [facility]. Explained in detail to patient and his SO scan results and next steps. They voiced understanding, all questions answered.   - 2/9/24: Patient presents with new painful left lateral tongue lesion, hindering PO intake. Will start Oxycodone 5mg Q6 hr PRN for pain control. Pt will see Dr. Fletcher next week for eval/possible biopsy. Patient will have repeat CT Chest for FDG avid LLL pulm nodule as prior biopsy attempt was not  successful due to small nodule size and difficult location near diaphragm.  -2/9/24 CT chest showed multiple subcentimeter noduesl 2-5mm, dominant lesion is 1.6cm in LLL.    -3/6/24: lung biopsy of LLL nodule was consistent with HN origin. [facility] pending including CPS.  Discussed with patient about phase 2 FLAM study (randomized to pembrolizumab or pembrolizumab+danvatirsen (Stat3 inhibitor), he is willing to be considered.   - Unfortunately due to his CKD, he's not eligible for FLAM study.  - 4/4/24: Patient continue to feel very well, no dysphagia, no throat or neck pain. No cough, breathing well.   - 5/1/24: C2 Pembro was delayed a week 2/2 patient not feeling well. He had struggled with hemorrhoids for a couple weeks. Otherwise tolerating treatment with no irAEs .  - 5/23/24: Patient doing well, no irAEs. Appetite and energy are good, tolerating solid PO intake. No odynophagia, no neck pain.    # Hemorrhoids  - Blood on stool most likely from internal hemorrhoid. Blood counts are stable. Bleeding has decreased Patient is taking Miralax to keep stools soft.     # Anemia: improving  - Hgb has been dropping since starting chemo, however today his hgb is <7 and will require a blood transfusion. This is likely due to chemotherapy as there are no s/s of bleeds.   - Received 2U PRBC during 10/18 - 11/2 admission. Anemia likely due to chemotherapy  - 11/29/23: Hgb 8.8. Improved from 3 weeks ago. Expect Hbg will continue to trend up as patient recovers. Can consider iron studies.   - Hgb trending up since 02/2024    # CKD  - 2/9/24 CT chest showed Bulky atherosclerotic calcification at the origin of the left renal artery likely contributing to atrophy of the left kidney as compared to the right. Kidney atrophy was not mentioned in previous scans.   - 5/1: Creat 1.46, GFR 51. Encouraged patient to keep up with non-caffeinated PO hydration. 1L NS given today with Pembro. Patient to follow up with PCP regarding  atherosclerosis.   - 5/23: Creat 1.69, GFR 43. Repeat after 1L NS showed Creat 1.52, GFR 49.     # Submental/Neck Lymphedema  - Patient with moderate swelling in the submental and neck region. Likely 2ry to prior radiation. Will be starting lymphedema therapy soon.     PLAN  -- Proceed to C3 Pembrolizumab today  -- 1L NS given in infusion for hydration. Encourage ample PO hydration  -- 6/5/24 NPV w/ Dr. Guerrero for submental lymphedema management  -- 6/7/24 Restaging CT N/C without contrast (due to renal dysfunction)  -- RTC 3 weeks on 6/13 for scan review and C4 Pembro. Research labs draw same day   -- Follow up with Dr. Weiss on atherosclerosis of left renal artery.   -- Continue stool softeners, monitor further symptoms of hemorrhoids .

## 2024-05-24 LAB — ACTH PLAS-MCNC: 26.3 PG/ML (ref 7.2–63.3)

## 2024-06-05 ENCOUNTER — ALLIED HEALTH (OUTPATIENT)
Dept: INTEGRATIVE MEDICINE | Facility: CLINIC | Age: 74
End: 2024-06-05
Payer: MEDICARE

## 2024-06-05 DIAGNOSIS — C10.9 OROPHARYNGEAL CANCER (MULTI): ICD-10-CM

## 2024-06-05 DIAGNOSIS — I89.0 LYMPHEDEMA: Primary | ICD-10-CM

## 2024-06-05 PROCEDURE — 99205 OFFICE O/P NEW HI 60 MIN: CPT | Performed by: HOSPITALIST

## 2024-06-05 NOTE — PROGRESS NOTES
Patient ID: Phong Wong is a 74 y.o. male.  Referring Physician: No referring provider defined for this encounter.  Primary Care Provider: Harshil Weiss MD    CANCER HISTORY:   Patient ID: Phong Wong is a 73 y.o. male.  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 Juan Jose Fletcher   MedOnc: Dr. Kyunghee Burkitt, X. Katherine Feng, PA-C   RadOnc: Dr. Vianca Steen      Current Therapy  4/4/24: Started Q3 week Pembrolizumab - repeat imaging pending     Prior Therapy:   8/16 - 10/4/23: Recieved concurrent chemoradiation with 7 weekly Carboplatin (2mg/AUC)/Paclitaxel (45mg/m2)  and 70gy RT in 35fx     Sites of Disease:  Soft palate, BOT, Left palatine tonsil  B/L Cervical LN  Lung     Oncologic Issues:   Odynophagia  Fatigue     ONCOLOGIC HISTORY  - Pt had 2 months hx of throat discomfort with lump in right neck  - 6/13/23: CT neck showed a mass 2.8 x 2.5 x 2.9 cm in the right lower cervical neck soft tissues. Two suspicious nodes were observed, one at level 3 on the right and another at level 2 on the left.  - 6/28/23: seen by Dr. Fletcher. 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 Based on the findings, Dr. Fletcher did not recommend surgery due to the location  of the primary tumor and the presence of yvette disease  - 6/30/23: PET/CT showed intense FDG avidity within the soft palate, extending to the base of the tongue and left palatine tonsil. Multiple FDG avid left cervical level II nodes were observed, along with an FDG avid mass in the region of the right cervical  level II/III, infiltrating the right SCM muscle and encasing and invading the right jugular vein. FDG avidity was also detected in the region of the left fossa Rosenmuller and left medial pterygoid muscle.  - 8/16 - 10/4/23: Recieved concurrent chemoradiation  with 7 weekly Carboplatin (2mg/AUC)/Paclitaxel (45mg/m2)  and 70gy RT in 35fx    4/24 imaging with mets to lungs     Admissions:  10/5 - 10/10/23: Admitted for extreme weakness, HECTOR, pancytopenia including anemia requiring blood transfusion. D/C'ed to acute rehab     Past Medical History:     Past Medical History   Past Medical History:  07/27/2017: Personal history of diseases of the blood and blood-  forming organs and certain disorders involving the immune mechanism      Comment:  History of thrombocytosis      HTN, HLD, COPD, prostate cancer in 2017 (s/p radiation)    Aortoiliofemoral vascular bypass in 2014    Integrative history:  Symptoms:  Itching in neck 3 mo, no diff swallowing, mild swelling   Had one massage treatment    Diet - soft food - had to pull on teeth prior to radiation    PA - limited, doesn't do much activity    ROS:  no ha, visual symptoms, hearing loss  no sob, chest pain, palp  ROS o/w non contributory, please see HPI    Objective    BSA: There is no height or weight on file to calculate BSA.  There were no vitals taken for this visit.    PHYSICAL EXAM:  NAD, awake/alert  HEENT, NCAT, OP clear, no oral lesions  CTA bilat  RRR no mgr  Abd soft/nt/nd+bs  No c/c/e/ttp  Motor/sensory intact, CN 2-12 intact     RESULTS:  Lab Results   Component Value Date    WBC 6.4 05/23/2024    HGB 12.8 (L) 05/23/2024    HCT 38.4 (L) 05/23/2024     05/23/2024     10/24/2023    CREATININE 1.52 (H) 05/23/2024    AST 19 05/23/2024       Assessment/Plan   73 yo man with h/o squamous cell oropharyngeal ca s/p chemoradiation 2023  Now with mets to lungs  Current treatment: pembrolizumab    Lifestyle:  Diet - limit sugar if possible  Protein supplementation - Orgain    Exercise - 30 min or so walking per day    Symptoms:  Neck discomfort/swelling - massage therapy - weekly x 6 weeks    Follow up in 3 months    Saulo Guerrero MD

## 2024-06-06 ENCOUNTER — APPOINTMENT (OUTPATIENT)
Dept: HEMATOLOGY/ONCOLOGY | Facility: HOSPITAL | Age: 74
End: 2024-06-06
Payer: MEDICARE

## 2024-06-07 ENCOUNTER — HOSPITAL ENCOUNTER (OUTPATIENT)
Dept: RADIOLOGY | Facility: CLINIC | Age: 74
Discharge: HOME | End: 2024-06-07
Payer: MEDICARE

## 2024-06-07 DIAGNOSIS — C01 SQUAMOUS CELL CARCINOMA OF BASE OF TONGUE (MULTI): ICD-10-CM

## 2024-06-07 DIAGNOSIS — C10.9 OROPHARYNGEAL CANCER (MULTI): ICD-10-CM

## 2024-06-07 DIAGNOSIS — C78.01 SQUAMOUS CELL CARCINOMA METASTATIC TO BOTH LUNGS (MULTI): ICD-10-CM

## 2024-06-07 DIAGNOSIS — C78.02 SQUAMOUS CELL CARCINOMA METASTATIC TO BOTH LUNGS (MULTI): ICD-10-CM

## 2024-06-07 PROCEDURE — 71250 CT THORAX DX C-: CPT

## 2024-06-07 PROCEDURE — 70490 CT SOFT TISSUE NECK W/O DYE: CPT

## 2024-06-07 PROCEDURE — 71250 CT THORAX DX C-: CPT | Performed by: STUDENT IN AN ORGANIZED HEALTH CARE EDUCATION/TRAINING PROGRAM

## 2024-06-12 DIAGNOSIS — H35.3231 EXUDATIVE AGE-RELATED MACULAR DEGENERATION, BILATERAL, WITH ACTIVE CHOROIDAL NEOVASCULARIZATION (MULTI): ICD-10-CM

## 2024-06-12 DIAGNOSIS — C78.02 SQUAMOUS CELL CARCINOMA METASTATIC TO BOTH LUNGS (MULTI): Primary | ICD-10-CM

## 2024-06-12 DIAGNOSIS — C10.9 OROPHARYNGEAL CANCER (MULTI): ICD-10-CM

## 2024-06-12 DIAGNOSIS — C78.01 SQUAMOUS CELL CARCINOMA METASTATIC TO BOTH LUNGS (MULTI): Primary | ICD-10-CM

## 2024-06-13 ENCOUNTER — OFFICE VISIT (OUTPATIENT)
Dept: HEMATOLOGY/ONCOLOGY | Facility: HOSPITAL | Age: 74
End: 2024-06-13
Payer: MEDICARE

## 2024-06-13 ENCOUNTER — LAB (OUTPATIENT)
Dept: LAB | Facility: HOSPITAL | Age: 74
End: 2024-06-13
Payer: MEDICARE

## 2024-06-13 ENCOUNTER — APPOINTMENT (OUTPATIENT)
Dept: HEMATOLOGY/ONCOLOGY | Facility: HOSPITAL | Age: 74
End: 2024-06-13
Payer: MEDICARE

## 2024-06-13 VITALS
DIASTOLIC BLOOD PRESSURE: 79 MMHG | SYSTOLIC BLOOD PRESSURE: 156 MMHG | BODY MASS INDEX: 20.67 KG/M2 | HEART RATE: 115 BPM | WEIGHT: 128.09 LBS | TEMPERATURE: 97.3 F | OXYGEN SATURATION: 95 % | RESPIRATION RATE: 20 BRPM

## 2024-06-13 DIAGNOSIS — C10.9 OROPHARYNGEAL CANCER (MULTI): Primary | ICD-10-CM

## 2024-06-13 DIAGNOSIS — I70.90 ATHEROSCLEROSIS: ICD-10-CM

## 2024-06-13 DIAGNOSIS — C10.9 OROPHARYNGEAL CANCER (MULTI): ICD-10-CM

## 2024-06-13 LAB
ALBUMIN SERPL BCP-MCNC: 4 G/DL (ref 3.4–5)
ALP SERPL-CCNC: 62 U/L (ref 33–136)
ALT SERPL W P-5'-P-CCNC: 12 U/L (ref 10–52)
ANION GAP SERPL CALC-SCNC: 14 MMOL/L (ref 10–20)
AST SERPL W P-5'-P-CCNC: 17 U/L (ref 9–39)
BASOPHILS # BLD AUTO: 0.07 X10*3/UL (ref 0–0.1)
BASOPHILS NFR BLD AUTO: 1.2 %
BILIRUB SERPL-MCNC: 0.6 MG/DL (ref 0–1.2)
BUN SERPL-MCNC: 16 MG/DL (ref 6–23)
CALCIUM SERPL-MCNC: 9.3 MG/DL (ref 8.6–10.3)
CHLORIDE SERPL-SCNC: 100 MMOL/L (ref 98–107)
CHOLEST SERPL-MCNC: 177 MG/DL (ref 0–199)
CHOLESTEROL/HDL RATIO: 4.4
CO2 SERPL-SCNC: 27 MMOL/L (ref 21–32)
CREAT SERPL-MCNC: 1.54 MG/DL (ref 0.5–1.3)
EGFRCR SERPLBLD CKD-EPI 2021: 48 ML/MIN/1.73M*2
EOSINOPHIL # BLD AUTO: 0.47 X10*3/UL (ref 0–0.4)
EOSINOPHIL NFR BLD AUTO: 7.7 %
ERYTHROCYTE [DISTWIDTH] IN BLOOD BY AUTOMATED COUNT: 15.4 % (ref 11.5–14.5)
GLUCOSE SERPL-MCNC: 108 MG/DL (ref 74–99)
HCT VFR BLD AUTO: 38.1 % (ref 41–52)
HDLC SERPL-MCNC: 40 MG/DL
HGB BLD-MCNC: 12.5 G/DL (ref 13.5–17.5)
HOLD SPECIMEN: NORMAL
IMM GRANULOCYTES # BLD AUTO: 0.04 X10*3/UL (ref 0–0.5)
IMM GRANULOCYTES NFR BLD AUTO: 0.7 % (ref 0–0.9)
LDLC SERPL CALC-MCNC: 106 MG/DL
LYMPHOCYTES # BLD AUTO: 0.5 X10*3/UL (ref 0.8–3)
LYMPHOCYTES NFR BLD AUTO: 8.2 %
MCH RBC QN AUTO: 27.7 PG (ref 26–34)
MCHC RBC AUTO-ENTMCNC: 32.8 G/DL (ref 32–36)
MCV RBC AUTO: 84 FL (ref 80–100)
MONOCYTES # BLD AUTO: 0.8 X10*3/UL (ref 0.05–0.8)
MONOCYTES NFR BLD AUTO: 13.2 %
NEUTROPHILS # BLD AUTO: 4.19 X10*3/UL (ref 1.6–5.5)
NEUTROPHILS NFR BLD AUTO: 69 %
NON HDL CHOLESTEROL: 137 MG/DL (ref 0–149)
NRBC BLD-RTO: 0 /100 WBCS (ref 0–0)
PLATELET # BLD AUTO: 235 X10*3/UL (ref 150–450)
POTASSIUM SERPL-SCNC: 4 MMOL/L (ref 3.5–5.3)
PROT SERPL-MCNC: 6.8 G/DL (ref 6.4–8.2)
RBC # BLD AUTO: 4.52 X10*6/UL (ref 4.5–5.9)
SODIUM SERPL-SCNC: 137 MMOL/L (ref 136–145)
TRIGL SERPL-MCNC: 157 MG/DL (ref 0–149)
TSH SERPL-ACNC: 3.26 MIU/L (ref 0.44–3.98)
VLDL: 31 MG/DL (ref 0–40)
WBC # BLD AUTO: 6.1 X10*3/UL (ref 4.4–11.3)

## 2024-06-13 PROCEDURE — 85025 COMPLETE CBC W/AUTO DIFF WBC: CPT

## 2024-06-13 PROCEDURE — 3077F SYST BP >= 140 MM HG: CPT | Performed by: INTERNAL MEDICINE

## 2024-06-13 PROCEDURE — 3078F DIAST BP <80 MM HG: CPT | Performed by: INTERNAL MEDICINE

## 2024-06-13 PROCEDURE — 99417 PROLNG OP E/M EACH 15 MIN: CPT | Performed by: INTERNAL MEDICINE

## 2024-06-13 PROCEDURE — 36415 COLL VENOUS BLD VENIPUNCTURE: CPT

## 2024-06-13 PROCEDURE — 99215 OFFICE O/P EST HI 40 MIN: CPT | Performed by: INTERNAL MEDICINE

## 2024-06-13 PROCEDURE — 84075 ASSAY ALKALINE PHOSPHATASE: CPT

## 2024-06-13 PROCEDURE — 84443 ASSAY THYROID STIM HORMONE: CPT

## 2024-06-13 PROCEDURE — 80061 LIPID PANEL: CPT

## 2024-06-13 PROCEDURE — 1126F AMNT PAIN NOTED NONE PRSNT: CPT | Performed by: INTERNAL MEDICINE

## 2024-06-13 RX ORDER — DEXAMETHASONE 4 MG/1
8 TABLET ORAL DAILY
Qty: 36 TABLET | Refills: 0 | Status: SHIPPED | OUTPATIENT
Start: 2024-06-13

## 2024-06-13 RX ORDER — ONDANSETRON HYDROCHLORIDE 8 MG/1
8 TABLET, FILM COATED ORAL EVERY 8 HOURS PRN
Qty: 30 TABLET | Refills: 5 | Status: SHIPPED | OUTPATIENT
Start: 2024-06-13 | End: 2024-06-20 | Stop reason: SDUPTHER

## 2024-06-13 RX ORDER — OLANZAPINE 5 MG/1
5 TABLET ORAL NIGHTLY
Qty: 30 TABLET | Refills: 3 | Status: SHIPPED | OUTPATIENT
Start: 2024-06-13

## 2024-06-13 RX ORDER — PROCHLORPERAZINE MALEATE 10 MG
10 TABLET ORAL EVERY 6 HOURS PRN
Qty: 30 TABLET | Refills: 5 | Status: SHIPPED | OUTPATIENT
Start: 2024-06-13 | End: 2024-06-20 | Stop reason: SDUPTHER

## 2024-06-13 RX ORDER — OXYCODONE HYDROCHLORIDE 5 MG/1
5 TABLET ORAL EVERY 8 HOURS PRN
Qty: 15 TABLET | Refills: 0 | Status: SHIPPED | OUTPATIENT
Start: 2024-06-13 | End: 2024-06-13 | Stop reason: WASHOUT

## 2024-06-13 RX ORDER — OXYCODONE HYDROCHLORIDE 5 MG/1
5 TABLET ORAL EVERY 8 HOURS PRN
Qty: 15 TABLET | Refills: 0 | Status: SHIPPED | OUTPATIENT
Start: 2024-06-13 | End: 2024-06-13

## 2024-06-13 ASSESSMENT — ENCOUNTER SYMPTOMS
SHORTNESS OF BREATH: 0
NAUSEA: 0
NECK PAIN: 0
DIARRHEA: 0
TROUBLE SWALLOWING: 0
LEG SWELLING: 0
DYSURIA: 0
SORE THROAT: 0
DIFFICULTY URINATING: 0
FREQUENCY: 0
CONSTIPATION: 0
NUMBNESS: 0
FEVER: 0
COUGH: 0
HEADACHES: 0
CHILLS: 0
LIGHT-HEADEDNESS: 0
ARTHRALGIAS: 0
DIZZINESS: 0
VOMITING: 0
ABDOMINAL PAIN: 0
WOUND: 0

## 2024-06-13 ASSESSMENT — PAIN SCALES - GENERAL: PAINLEVEL: 0-NO PAIN

## 2024-06-13 NOTE — PROGRESS NOTES
Patient ID: Phong Wong is a 74 y.o. male.  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 Juan Jose Fletcher   MedOnc: Dr. Kyunghee Burkitt, X. Katherine Feng, PA-C   RadOnc: Dr. Vianca Steen     Current Therapy  4/4/24: Started Q3 week Pembrolizumab    Prior Therapy:   8/16 - 10/4/23: Recieved concurrent chemoradiation with 7 weekly Carboplatin (2mg/AUC)/Paclitaxel (45mg/m2)  and 70gy RT in 35fx     Sites of Disease:  Soft palate, BOT, Left palatine tonsil  B/L Cervical LN  Lung     Oncologic Issues:   Odynophagia  Fatigue     ONCOLOGIC HISTORY  - Pt had 2 months hx of throat discomfort with lump in right neck  - 6/13/23: CT neck showed a mass 2.8 x 2.5 x 2.9 cm in the right lower cervical neck soft tissues. Two suspicious nodes were observed, one at level 3 on the right and another at level 2 on the left.  - 6/28/23: seen by Dr. Fletcher. 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 Based on the findings, Dr. Fletcher did not recommend surgery due to the location  of the primary tumor and the presence of yvette disease  - 6/30/23: PET/CT showed intense FDG avidity within the soft palate, extending to the base of the tongue and left palatine tonsil. Multiple FDG avid left cervical level II nodes were observed, along with an FDG avid mass in the region of the right cervical  level II/III, infiltrating the right SCM muscle and encasing and invading the right jugular vein. FDG avidity was also detected in the region of the left fossa Rosenmuller and left medial pterygoid muscle.  - 8/16 - 10/4/23: Recieved concurrent chemoradiation with 7 weekly Carboplatin (2mg/AUC)/Paclitaxel (45mg/m2)  and 70gy RT in 35fx    Admissions:  10/5 - 10/10/23: Admitted for extreme weakness, HECTOR, pancytopenia including anemia requiring blood  transfusion. D/C'ed to acute rehab        Past Medical History:   Past Medical History:  2017: Personal history of diseases of the blood and blood-  forming organs and certain disorders involving the immune mechanism      Comment:  History of thrombocytosis   HTN, HLD, COPD, prostate cancer in 2017 (s/p radiation)  Surgical History:    Past Surgical History:   Procedure Laterality Date    CT ABDOMEN PELVIS ANGIOGRAM W AND/OR WO IV CONTRAST  9/3/2014    CT ABDOMEN PELVIS ANGIOGRAM W AND/OR WO IV CONTRAST 9/3/2014 [facility]    IR ANGIOGRAM AORTA ABDOMEN  2014    IR ANGIOGRAM AORTA ABDOMEN 2014 [facility]   Aortoiliofemoral vascular bypass in   Social History:    Social History     Socioeconomic History    Marital status:      Spouse name: Not on file    Number of children: 1    Years of education: Not on file    Highest education level: Not on file   Occupational History    Not on file   Tobacco Use    Smoking status: Former     Current packs/day: 0.00     Average packs/day: 1 pack/day for 40.0 years (40.0 ttl pk-yrs)     Types: Cigarettes     Start date:      Quit date: 2016     Years since quittin.4     Passive exposure: Past    Smokeless tobacco: Never   Substance and Sexual Activity    Alcohol use: Yes     Alcohol/week: 12.0 standard drinks of alcohol     Types: 12 Cans of beer per week    Drug use: Never    Sexual activity: Not on file   Other Topics Concern    Not on file   Social History Narrative    Not on file     Social Determinants of Health     Financial Resource Strain: Low Risk  (10/20/2023)    Overall Financial Resource Strain (CARDIA)     Difficulty of Paying Living Expenses: Not very hard   Food Insecurity: No Food Insecurity (10/5/2023)    Hunger Vital Sign     Worried About Running Out of Food in the Last Year: Never true     Ran Out of Food in the Last Year: Never true   Transportation Needs: No Transportation Needs (10/20/2023)    PRAPARE -  Transportation     Lack of Transportation (Medical): No     Lack of Transportation (Non-Medical): No   Physical Activity: Inactive (10/5/2023)    Exercise Vital Sign     Days of Exercise per Week: 0 days     Minutes of Exercise per Session: 0 min   Stress: Stress Concern Present (10/5/2023)    [employer] - Occupational Stress Questionnaire     Feeling of Stress : To some extent   Social Connections: Moderately Integrated (10/5/2023)    Social Connection and Isolation Panel [NHANES]     Frequency of Communication with Friends and Family: More than three times a week     Frequency of Social Gatherings with Friends and Family: More than three times a week     Attends Confucianist Services: Never     Active Member of Clubs or Organizations: Yes     Attends Club or Organization Meetings: Never     Marital Status:    Intimate Partner Violence: Not At Risk (10/5/2023)    Humiliation, Afraid, Rape, and Kick questionnaire     Fear of Current or Ex-Partner: No     Emotionally Abused: No     Physically Abused: No     Sexually Abused: No   Housing Stability: Low Risk  (10/20/2023)    Housing Stability Vital Sign     Unable to Pay for Housing in the Last Year: No     Number of Places Lived in the Last Year: 1     Unstable Housing in the Last Year: No      Family History:    Family History   Problem Relation Name Age of Onset    Aortic aneurysm Father          abdominal     Family Oncology History:    Cancer-related family history is not on file.      Subjective   Chief Complaint: Squamous Cell Carcinoma of oropharynx    HPI  Interval History  Phong Wong is a 74 y.o. male with h/o locally advanced oral cavity cancer, completed concurrent chemoradiation with 7 weekly Carboplatin+Paclitaxel on 10/4/23. Unfortunately found with mets to lungs on 3 months restaging scan. CPS 3. Started Q3 week Pembrolizumab on 4/4/24.    Patient presents today for C3 Pembrolizumab.     He has been feeling well the  last few week. Energy is good.   He still has some bleeding with BM, has blood on the TP   Denies pain in tongue, throat or neck. Right neck is mildly tender to palpation, skin of right neck a little itchy.   Appetite is good. Eating and drinking well, tolerating regular diet, denies dysphagia.  Denies dry mouth, has saliva production, taste is normal.   He has minimum swelling in the neck (lymphedema) but no pain. Lymphedema therapy referral was made.   He denies any urinary symptoms currently, no urgency, frequency, hesitancy, dysuria.  He's following with PCP, Dr. Wesis. 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    ROS  Review of Systems   Constitutional:  Negative for chills and fever.   HENT:   Negative for lump/mass, mouth sores, nosebleeds, sore throat, tinnitus and trouble swallowing.    Respiratory:  Negative for cough and shortness of breath.    Cardiovascular:  Negative for chest pain and leg swelling.   Gastrointestinal:  Negative for abdominal pain, constipation, diarrhea, nausea and vomiting.   Genitourinary:  Negative for difficulty urinating, dysuria and frequency.    Musculoskeletal:  Negative for arthralgias and neck pain.   Skin:  Negative for rash and wound.   Neurological:  Negative for dizziness, headaches, light-headedness and numbness.       Allergies  Allergies   Allergen Reactions    Codeine Nausea Only        Medications  Current Outpatient Medications   Medication Instructions    amLODIPine-benazepriL (Lotrel) 5-20 mg capsule 611881 Medication amlodipine 5 mg-benazepril 20 mg capsule amlodipine 5 mg-benazepril 20 mg capsule 5-20 mg 10/2/2020 Active (Outside)    aspirin 81 mg EC tablet 1 tablet, oral, Daily    doxazosin (CARDURA) 4 mg, oral, Daily, Take 1 tablet by mouth daily in the evening    fluticasone-umeclidin-vilanter (Trelegy Ellipta) 100-62.5-25 mcg blister with device INHALE 1 PUFF EVERY DAY    ipratropium-albuteroL (Duo-Neb) 0.5-2.5  mg/3 mL nebulizer solution Take 3 mL by nebulization 3 times a day as needed for wheezing or shortness of breath.    losartan (COZAAR) 50 mg, oral, Daily    magnesium oxide (MAG-OX) 400 mg, oral, Daily    ofloxacin (Ocuflox) 0.3 % ophthalmic solution Instill 1 drop Left Eye 4 times a day        Objective   VS:  There were no vitals taken for this visit.  Weight   Wt Readings from Last 7 Encounters:   05/23/24 58.7 kg (129 lb 6.6 oz)   05/01/24 61.3 kg (135 lb 1.6 oz)   05/01/24 61.2 kg (134 lb 14.7 oz)   04/26/24 61 kg (134 lb 7.7 oz)   04/04/24 61.3 kg (135 lb 2.3 oz)   03/27/24 61.5 kg (135 lb 8 oz)   03/06/24 60 kg (132 lb 3.2 oz)         Physical Exam  Constitutional:       Appearance: Normal appearance. He is underweight.   HENT:      Head: Normocephalic and atraumatic.      Right Ear: External ear normal. No tenderness.      Left Ear: External ear normal. No tenderness.      Nose: Nose normal.      Mouth/Throat:      Mouth: No injury or oral lesions.      Tongue: Lesions present.      Pharynx: Oropharynx is clear. No posterior oropharyngeal erythema.      Comments: Edentulous  Eyes:      Extraocular Movements: Extraocular movements intact.      Conjunctiva/sclera: Conjunctivae normal.      Pupils: Pupils are equal, round, and reactive to light.   Neck:      Thyroid: No thyroid mass.      Comments: Mild - moderate swelling in submental and neck region.   Right neck is is a bit sensitive, there is scar tissue  Cardiovascular:      Rate and Rhythm: Normal rate and regular rhythm.   Pulmonary:      Effort: Pulmonary effort is normal. No respiratory distress.      Breath sounds: Normal breath sounds.   Abdominal:      General: Bowel sounds are normal. There is no distension or abdominal bruit.      Palpations: Abdomen is soft. There is no mass.      Tenderness: There is no abdominal tenderness.   Musculoskeletal:         General: Normal range of motion.      Cervical back: Normal range of motion and neck supple.       Right lower leg: No edema.      Left lower leg: No edema.   Lymphadenopathy:      Cervical: No cervical adenopathy.      Upper Body:      Right upper body: No axillary adenopathy.      Left upper body: No axillary adenopathy.   Skin:     General: Skin is warm and dry.      Findings: No lesion (radiation dermatitis in bilateral neck), rash or wound.   Neurological:      General: No focal deficit present.      Mental Status: He is alert and oriented to person, place, and time.      Gait: Gait is intact.   Psychiatric:         Mood and Affect: Mood and affect normal.         Diagnostic Results   The below labs were reviewed.                                      Pathology      Imaging  1/10/2024 PET/CT  IMPRESSION:  1. Significant interval improvement in FDG avidity in the soft palate and left palatine tonsil, remaining FDG avidity along the left palatine tonsil consistent with persistent residual viable disease.  2. Nearly complete metabolic resolution of cervical yvette metastases with remaining mild FDG avid right cervical level 3 node seen on current study, likely representing residual yvette metastasis.  3. Newly developed FDG-avid pulmonary nodule in the left lower lobe as well as minimal FDG avid subcentimeter pulmonary nodules in bilateral upper lobes, concerning for lung metastasis.    1/10/2024 CT Neck w/ IV Contrast  IMPRESSION:  *Decreased size of the previously demonstrated right-sided yvette neck mass *No new adenopathy  *Post treatment changes in the hypopharynx as described    2/9/27 CT Chest w/o Contrast  IMPRESSION:  1. Multiple pulmonary nodules are again noted corresponding to FDG avid nodules on most recent PET-CT from 01/10/2024 and are new when compared to prior CT of the chest from 06/13/2023. The largest of which is a pleural-based left lower lobe nodule measuring up to 1.6 cm. Findings are concerning for metastatic disease.  2. Severe coronary artery calcifications, indicating the presence of  coronary artery disease. If the patient has associated symptoms recommend management as per chest pain guidelines (e.g. https://doi.org/10.1161/CIR.5924046556569101). If the patient is asymptomatic consider reviewing modifiable cardiovascular risk factors and managing as per guidelines for primary prevention (e.g. https://doi.org/10.1161/CIR.2022037164504063).  4. Bulky atherosclerotic calcification at the origin of the left renal artery likely contributing to atrophy of the left kidney as compared to the right.   5. Additional findings as above.    3/27/2024 CT chest abdomen pelvis w IV contrast  Impression:   1. Interval increase in size of left lower lobe pulmonary nodule when compared to CT chest 02/09/2024 correlating with FDG avidity on PET-CT 01/10/2024. No sites of new metastasis in the chest.   2. The previously seen clusters of pulmonary nodules in the left upper lobe have decreased in number since CT chest 09/20/2024 correlating with improving postradiation changes or bronchiolitis.   3. Stable diffuse heterogenous appearance of the axial skeleton, similar to study. No sites of new metastasis in the abdomen and pelvis.  4. Additional stable findings as above.       Assessment/Plan    ASSESSMENT  Phong Wong is a 74 y.o. male with recent diagnosis of locally advanced oral cavity cancer. Completed concurrent chemoradiation with 7 weekly Carboplatin (2mg/AUC)/Paclitaxel (45mg/m2) on 10/4/2. Admitted 10/5 - 10/10 for extreme weakness, HECTOR, pancytopenia including anemia requiring blood transfusion. D/C'ed to acute rehab. Admitted  again 10/18 - 11/2/23 for lethargy, weakness, failure to thrive, anemia.     # Locally advanced oral cavity cancer, T3N2M0, now with met to lungs  - 6/30/23: PET/CT showed intense FDG avidity within the soft palate, extending to the base of the tongue and left palatine tonsil. Multiple uptake in left cervical level II nodes, right cervical level  II/III, infiltrating the right SCM  muscle and encasing and invading the right jugular vein. FDG avidity was also detected in the region of the left fossa Rosenmuller and left medial pterygoid muscle.  - 7/13/23: pt is doing well, no pain, discussed with patient about carboplatin+ paclitaxel as his chemotherapy along with radiation due to his GFR 30s.  Chemo related side effects were reviewed with patient, consent obtained.   - Patient required dental extractions and as a result start date was delayed from 7/26 to 8/15/23  - 8/30: tolerating chemo well, does have some fatigue but no n/v. No peripheral neuropathy   - 9/6: Continue to tolerate chemo well, no n/v, is feeling more tired. Eating well, tolerating soft foods and supplementing with Boost VHC 2x per day. Denies peripheral neuropathy  - 9/13: continue to tolerate chemo well w/o n/v. Energy is lower but stable. Eating soft foods and Boost VHC x 2 per day  - 9/20: Tolerating chemo well, minimal odynophagia, does have some weight loss.  - 10/4: Completed last dose of Carbo/Taxol last week. Recall [facility] as both providers are out of office. He had las RT today and noted to have significantly increased fatigue, weakness, weight loss in the last week resulting from decreased PO intake.   - 11/9/23: Feeling much improved since d/c from hospital on 11/2. Regaining strength and working with PT/OT at acute rehab. Odynophagia resolved, eating soft foods, has weight gain. Serum protein improved. Electrolytes wnl, mild hyponatremia, no c/f refeeding syndrome.    - 11/29/23: continue to feel very well, d/c'ed from rehab. Acute SEs of chemoRT are resolved. He's eating/drinking well with weight gain. Not using PEG currently. Has lymphedema from RT in the neck/submental region. Can consider lymphedema therapy after 3 month restaging PET showing XENA.   - 1/12/24: Patient is feeling well, tolerating soft PO intake. PEG removed today.   Reviewed 3 month restaging PET/CT with patient.  Scans noted significant interval improvement in prior sites of disease though with residual FDG avidity in soft palate and left palatine tonsil, c/f residual disease vs post treatment changes. Also noted was a new FDG avid LLL pulm nodule with max SUV 5.7, c/f pulmonary metastasis. Will order STAT lung biopsy on this.   Pt scoped by Dr. Fletcher today, unfortunately was difficult to examine in office due to patient intolerance. Per Dr. Fletcher, may bring to OR to complete the examination depending on lung biopsy results. Scans will be reviewed at 1/19/24 [facility]. Explained in detail to patient and his SO scan results and next steps. They voiced understanding, all questions answered.   - 2/9/24: Patient presents with new painful left lateral tongue lesion, hindering PO intake. Will start Oxycodone 5mg Q6 hr PRN for pain control. Pt will see Dr. Fletcher next week for eval/possible biopsy. Patient will have repeat CT Chest for FDG avid LLL pulm nodule as prior biopsy attempt was not successful due to small nodule size and difficult location near diaphragm.  -2/9/24 CT chest showed multiple subcentimeter noduesl 2-5mm, dominant lesion is 1.6cm in LLL.    -3/6/24: lung biopsy of LLL nodule was consistent with HN origin. [facility] pending including CPS.  Discussed with patient about phase 2 FLAM study (randomized to pembrolizumab or pembrolizumab+danvatirsen (Stat3 inhibitor), he is willing to be considered.   - Unfortunately due to his CKD, he's not eligible for FLAM study.  - 4/4/24: Patient continue to feel very well, no dysphagia, no throat or neck pain. No cough, breathing well.   - 5/1/24: C2 Pembro was delayed a week 2/2 patient not feeling well. He had struggled with hemorrhoids for a couple weeks. Otherwise tolerating treatment with no irAEs .  - 5/23/24: Patient doing well, no irAEs. Appetite and energy are good, tolerating solid PO intake. No odynophagia, no neck pain.  - 6/7/24: restaging can showed enlargement of  subcentimeter lung mets, stable left lung nodule 2cm with new 1cm nodule in RUL. Based on heightened response with carbo+taxol after progression on PD, I recommended 3 cycles of carbo taxol and restaging, if there is response, we can consider afatinib treatment more as long term maintenance therapy.  Consent obtained for chemo and research blood.    # Hemorrhoids  - Blood on stool most likely from internal hemorrhoid. Blood counts are stable. Bleeding has decreased Patient is taking Miralax to keep stools soft.     # Anemia: improving  - Hgb has been dropping since starting chemo, however today his hgb is <7 and will require a blood transfusion. This is likely due to chemotherapy as there are no s/s of bleeds.   - Received 2U PRBC during 10/18 - 11/2 admission. Anemia likely due to chemotherapy  - 11/29/23: Hgb 8.8. Improved from 3 weeks ago. Expect Hbg will continue to trend up as patient recovers. Can consider iron studies.   - Hgb trending up since 02/2024    # CKD  - 2/9/24 CT chest showed Bulky atherosclerotic calcification at the origin of the left renal artery likely contributing to atrophy of the left kidney as compared to the right. Kidney atrophy was not mentioned in previous scans.   - 5/1: Creat 1.46, GFR 51. Encouraged patient to keep up with non-caffeinated PO hydration. 1L NS given today with Pembro. Patient to follow up with PCP regarding atherosclerosis.   - 6/12: Creat 1.54, GFR 48     # Submental/Neck Lymphedema  - Patient with moderate swelling in the submental and neck region. Likely 2ry to prior radiation. Will be starting lymphedema therapy soon.     PLAN  -- C1 Carboplatin and paclitaxol on 6/20  -- Follow up with Dr. Weiss on atherosclerosis of left renal artery.   -- Continue stool softeners, monitor further symptoms of hemorrhoids .

## 2024-06-17 ENCOUNTER — APPOINTMENT (OUTPATIENT)
Dept: INTEGRATIVE MEDICINE | Facility: CLINIC | Age: 74
End: 2024-06-17
Payer: MEDICARE

## 2024-06-19 ASSESSMENT — ENCOUNTER SYMPTOMS
LIGHT-HEADEDNESS: 0
COUGH: 0
CHILLS: 0
DIARRHEA: 0
FREQUENCY: 0
DYSURIA: 0
NUMBNESS: 0
CONSTIPATION: 0
VOMITING: 0
DIFFICULTY URINATING: 0
TROUBLE SWALLOWING: 0
ABDOMINAL PAIN: 0
DIZZINESS: 0
ARTHRALGIAS: 0
SHORTNESS OF BREATH: 0
LEG SWELLING: 0
HEADACHES: 0
NAUSEA: 0
SORE THROAT: 0
WOUND: 0
FEVER: 0
NECK PAIN: 0

## 2024-06-19 NOTE — PROGRESS NOTES
Patient ID: Phong Wong is a 74 y.o. male.  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 Juan Jose Fletcher   MedOnc: Dr. Kyunghee Burkitt, X. Katherine Feng, PA-C   RadOnc: Dr. Vianca Steen     Current Therapy  4/4/24: Started Q3 week Pembrolizumab    Prior Therapy:   8/16 - 10/4/23: Recieved concurrent chemoradiation with 7 weekly Carboplatin (2mg/AUC)/Paclitaxel (45mg/m2)  and 70gy RT in 35fx     Sites of Disease:  Soft palate, BOT, Left palatine tonsil  B/L Cervical LN  Lung     Oncologic Issues:   Odynophagia  Fatigue     ONCOLOGIC HISTORY  - Pt had 2 months hx of throat discomfort with lump in right neck  - 6/13/23: CT neck showed a mass 2.8 x 2.5 x 2.9 cm in the right lower cervical neck soft tissues. Two suspicious nodes were observed, one at level 3 on the right and another at level 2 on the left.  - 6/28/23: seen by Dr. Fletcher. 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 Based on the findings, Dr. Fletcher did not recommend surgery due to the location  of the primary tumor and the presence of yvette disease  - 6/30/23: PET/CT showed intense FDG avidity within the soft palate, extending to the base of the tongue and left palatine tonsil. Multiple FDG avid left cervical level II nodes were observed, along with an FDG avid mass in the region of the right cervical  level II/III, infiltrating the right SCM muscle and encasing and invading the right jugular vein. FDG avidity was also detected in the region of the left fossa Rosenmuller and left medial pterygoid muscle.  - 8/16 - 10/4/23: Recieved concurrent chemoradiation with 7 weekly Carboplatin (2mg/AUC)/Paclitaxel (45mg/m2)  and 70gy RT in 35fx    Admissions:  10/5 - 10/10/23: Admitted for extreme weakness, HECTOR, pancytopenia including anemia requiring blood  transfusion. D/C'ed to acute rehab        Past Medical History:   Past Medical History:  2017: Personal history of diseases of the blood and blood-  forming organs and certain disorders involving the immune mechanism      Comment:  History of thrombocytosis   HTN, HLD, COPD, prostate cancer in 2017 (s/p radiation)  Surgical History:    Past Surgical History:   Procedure Laterality Date    CT ABDOMEN PELVIS ANGIOGRAM W AND/OR WO IV CONTRAST  9/3/2014    CT ABDOMEN PELVIS ANGIOGRAM W AND/OR WO IV CONTRAST 9/3/2014 [facility]    IR ANGIOGRAM AORTA ABDOMEN  2014    IR ANGIOGRAM AORTA ABDOMEN 2014 [facility]   Aortoiliofemoral vascular bypass in   Social History:    Social History     Socioeconomic History    Marital status:      Spouse name: Not on file    Number of children: 1    Years of education: Not on file    Highest education level: Not on file   Occupational History    Not on file   Tobacco Use    Smoking status: Former     Current packs/day: 0.00     Average packs/day: 1 pack/day for 40.0 years (40.0 ttl pk-yrs)     Types: Cigarettes     Start date:      Quit date: 2016     Years since quittin.4     Passive exposure: Past    Smokeless tobacco: Never   Substance and Sexual Activity    Alcohol use: Yes     Alcohol/week: 12.0 standard drinks of alcohol     Types: 12 Cans of beer per week    Drug use: Never    Sexual activity: Not on file   Other Topics Concern    Not on file   Social History Narrative    Not on file     Social Determinants of Health     Financial Resource Strain: Low Risk  (10/20/2023)    Overall Financial Resource Strain (CARDIA)     Difficulty of Paying Living Expenses: Not very hard   Food Insecurity: No Food Insecurity (10/5/2023)    Hunger Vital Sign     Worried About Running Out of Food in the Last Year: Never true     Ran Out of Food in the Last Year: Never true   Transportation Needs: No Transportation Needs (10/20/2023)    PRAPARE -  Transportation     Lack of Transportation (Medical): No     Lack of Transportation (Non-Medical): No   Physical Activity: Inactive (10/5/2023)    Exercise Vital Sign     Days of Exercise per Week: 0 days     Minutes of Exercise per Session: 0 min   Stress: Stress Concern Present (10/5/2023)    [employer] - Occupational Stress Questionnaire     Feeling of Stress : To some extent   Social Connections: Moderately Integrated (10/5/2023)    Social Connection and Isolation Panel [NHANES]     Frequency of Communication with Friends and Family: More than three times a week     Frequency of Social Gatherings with Friends and Family: More than three times a week     Attends [employer] Services: Never     Active Member of Clubs or Organizations: Yes     Attends Club or Organization Meetings: Never     Marital Status:    Intimate Partner Violence: Not At Risk (10/5/2023)    Humiliation, Afraid, Rape, and Kick questionnaire     Fear of Current or Ex-Partner: No     Emotionally Abused: No     Physically Abused: No     Sexually Abused: No   Housing Stability: Low Risk  (10/20/2023)    Housing Stability Vital Sign     Unable to Pay for Housing in the Last Year: No     Number of Places Lived in the Last Year: 1     Unstable Housing in the Last Year: No      Family History:    Family History   Problem Relation Name Age of Onset    Aortic aneurysm Father          abdominal     Family Oncology History:    Cancer-related family history is not on file.      Subjective   Chief Complaint: Squamous Cell Carcinoma of oropharynx    HPI  Phong Wong is a 74 y.o. male with h/o locally advanced oral cavity cancer, completed concurrent chemoradiation with 7 weekly Carboplatin+Paclitaxel on 10/4/23. Unfortunately found with mets to lungs on 3 months restaging scan. CPS 3. Started Q3 week Pembrolizumab on 4/4/24. Received 3 cycles of Pembro , unfortunately lung nodules progressed. Started Q3 week carbo/Taxol on  6/19/24    Interval History  Patient presents today for C1 Q3 week Carbo/Taxol.    He has been feeling well the last few week. Energy is good.   Denies pain in tongue, throat or neck. Right neck is  to palpation, skin of right neck still itchy, right neck with more swelling. He will be following w/ Dr Guerrero for this.  Appetite is good. Eating and drinking well, tolerating regular diet, does reports some coughing afterhe eats.   Denies dry mouth, has saliva production, taste is normal.   He denies any urinary symptoms currently, no urgency, frequency, hesitancy, dysuria.  He's following with PCP, Dr. Weiss. 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    ROS  Review of Systems   Constitutional:  Negative for chills and fever.   HENT:   Negative for lump/mass, mouth sores, nosebleeds, sore throat, tinnitus and trouble swallowing.    Respiratory:  Negative for cough and shortness of breath.    Cardiovascular:  Negative for chest pain and leg swelling.   Gastrointestinal:  Negative for abdominal pain, constipation, diarrhea, nausea and vomiting.   Genitourinary:  Negative for difficulty urinating, dysuria and frequency.    Musculoskeletal:  Negative for arthralgias and neck pain.   Skin:  Negative for rash and wound.   Neurological:  Negative for dizziness, headaches, light-headedness and numbness.       Allergies  Allergies   Allergen Reactions    Codeine Nausea Only        Medications  Current Outpatient Medications   Medication Instructions    amLODIPine-benazepriL (Lotrel) 5-20 mg capsule [id number] Medication amlodipine 5 mg-benazepril 20 mg capsule amlodipine 5 mg-benazepril 20 mg capsule 5-20 mg 10/2/2020 Active (Outside)    aspirin 81 mg EC tablet 1 tablet, oral, Daily    dexAMETHasone (DECADRON) 8 mg, oral, Daily, For 3 days starting the day after treatment.    doxazosin (CARDURA) 4 mg, oral, Daily, Take 1 tablet by mouth daily in the evening     fluticasone-umeclidin-vilanter (Trelegy Ellipta) 100-62.5-25 mcg blister with device INHALE 1 PUFF EVERY DAY    ipratropium-albuteroL (Duo-Neb) 0.5-2.5 mg/3 mL nebulizer solution Take 3 mL by nebulization 3 times a day as needed for wheezing or shortness of breath.    losartan (COZAAR) 50 mg, oral, Daily    magnesium oxide (MAG-OX) 400 mg, oral, Daily    ofloxacin (Ocuflox) 0.3 % ophthalmic solution Instill 1 drop Left Eye 4 times a day    OLANZapine (ZYPREXA) 5 mg, oral, Nightly    ondansetron (ZOFRAN) 8 mg, oral, Every 8 hours PRN    prochlorperazine (COMPAZINE) 10 mg, oral, Every 6 hours PRN        Objective   VS:  /80   Pulse (!) 112   Temp 36.5 °C (97.7 °F) (Core)   Resp 18   Wt 58.3 kg (128 lb 8.5 oz)   SpO2 96%   BMI 20.74 kg/m²   Weight   Wt Readings from Last 7 Encounters:   06/20/24 58.3 kg (128 lb 8.5 oz)   06/13/24 58.1 kg (128 lb 1.4 oz)   05/23/24 58.7 kg (129 lb 6.6 oz)   05/01/24 61.3 kg (135 lb 1.6 oz)   05/01/24 61.2 kg (134 lb 14.7 oz)   04/26/24 61 kg (134 lb 7.7 oz)   04/04/24 61.3 kg (135 lb 2.3 oz)         Physical Exam  Constitutional:       Appearance: Normal appearance. He is underweight.   HENT:      Head: Normocephalic and atraumatic.      Right Ear: External ear normal. No tenderness.      Left Ear: External ear normal. No tenderness.      Nose: Nose normal.      Mouth/Throat:      Mouth: No injury or oral lesions.      Tongue: Lesions present.      Pharynx: Oropharynx is clear. No posterior oropharyngeal erythema.      Comments: Edentulous  Eyes:      Extraocular Movements: Extraocular movements intact.      Conjunctiva/sclera: Conjunctivae normal.      Pupils: Pupils are equal, round, and reactive to light.   Neck:      Thyroid: No thyroid mass.      Comments: Mild - moderate swelling in submental and neck region.   Right neck is is a bit sensitive, there is scar tissue  Cardiovascular:      Rate and Rhythm: Normal rate and regular rhythm.   Pulmonary:      Effort:  Pulmonary effort is normal. No respiratory distress.      Breath sounds: Normal breath sounds.   Abdominal:      General: Bowel sounds are normal. There is no distension or abdominal bruit.      Palpations: Abdomen is soft. There is no mass.      Tenderness: There is no abdominal tenderness.   Musculoskeletal:         General: Normal range of motion.      Cervical back: Normal range of motion and neck supple.      Right lower leg: No edema.      Left lower leg: No edema.   Lymphadenopathy:      Cervical: No cervical adenopathy.      Upper Body:      Right upper body: No axillary adenopathy.      Left upper body: No axillary adenopathy.   Skin:     General: Skin is warm and dry.      Findings: No lesion (radiation dermatitis in bilateral neck), rash or wound.   Neurological:      General: No focal deficit present.      Mental Status: He is alert and oriented to person, place, and time.      Gait: Gait is intact.   Psychiatric:         Mood and Affect: Mood and affect normal.       Diagnostic Results   The below labs were reviewed.  Results from last 7 days   Lab Units 06/20/24  0822 06/13/24  1255   WBC AUTO x10*3/uL 4.8 6.1   HEMOGLOBIN g/dL 11.6* 12.5*   HEMATOCRIT % 35.1* 38.1*   PLATELETS AUTO x10*3/uL 277 235   NEUTROS ABS x10*3/uL 3.32 4.19   LYMPHS ABS AUTO x10*3/uL 0.49* 0.50*   MONOS ABS AUTO x10*3/uL 0.51 0.80   EOS ABS AUTO x10*3/uL 0.41* 0.47*   NEUTROS PCT AUTO % 69.1 69.0   LYMPHS PCT AUTO % 10.2 8.2   MONOS PCT AUTO % 10.6 13.2   EOS PCT AUTO % 8.5 7.7        Results from last 7 days   Lab Units 06/13/24  1255   GLUCOSE mg/dL 108*   SODIUM mmol/L 137   POTASSIUM mmol/L 4.0   CHLORIDE mmol/L 100   CO2 mmol/L 27   BUN mg/dL 16   CREATININE mg/dL 1.54*   EGFR mL/min/1.73m*2 48*   CALCIUM mg/dL 9.3   ALBUMIN g/dL 4.0   PROTEIN TOTAL g/dL 6.8   BILIRUBIN TOTAL mg/dL 0.6   ALK PHOS U/L 62   ALT U/L 12   AST U/L 17         Results from last 7 days   Lab Units 06/13/24  1255   TSH mIU/L 3.26                 Pathology      Imaging  1/10/2024 PET/CT  IMPRESSION:  1. Significant interval improvement in FDG avidity in the soft palate and left palatine tonsil, remaining FDG avidity along the left palatine tonsil consistent with persistent residual viable disease.  2. Nearly complete metabolic resolution of cervical yvette metastases with remaining mild FDG avid right cervical level 3 node seen on current study, likely representing residual yvette metastasis.  3. Newly developed FDG-avid pulmonary nodule in the left lower lobe as well as minimal FDG avid subcentimeter pulmonary nodules in bilateral upper lobes, concerning for lung metastasis.    1/10/2024 CT Neck w/ IV Contrast  IMPRESSION:  *Decreased size of the previously demonstrated right-sided yvette neck mass *No new adenopathy  *Post treatment changes in the hypopharynx as described    2/9/27 CT Chest w/o Contrast  IMPRESSION:  1. Multiple pulmonary nodules are again noted corresponding to FDG avid nodules on most recent PET-CT from 01/10/2024 and are new when compared to prior CT of the chest from 06/13/2023. The largest of which is a pleural-based left lower lobe nodule measuring up to 1.6 cm. Findings are concerning for metastatic disease.  2. Severe coronary artery calcifications, indicating the presence of coronary artery disease. If the patient has associated symptoms recommend management as per chest pain guidelines (e.g. https://doi.org/10.1161/CIR.3932559423240647). If the patient is asymptomatic consider reviewing modifiable cardiovascular risk factors and managing as per guidelines for primary prevention (e.g. https://doi.org/10.1161/CIR.2019812076944603).  4. Bulky atherosclerotic calcification at the origin of the left renal artery likely contributing to atrophy of the left kidney as compared to the right.   5. Additional findings as above.    3/27/2024 CT chest abdomen pelvis w IV contrast  Impression:   1. Interval increase in size of left lower lobe  pulmonary nodule when compared to CT chest 02/09/2024 correlating with FDG avidity on PET-CT 01/10/2024. No sites of new metastasis in the chest.   2. The previously seen clusters of pulmonary nodules in the left upper lobe have decreased in number since CT chest 09/20/2024 correlating with improving postradiation changes or bronchiolitis.   3. Stable diffuse heterogenous appearance of the axial skeleton, similar to study. No sites of new metastasis in the abdomen and pelvis.  4. Additional stable findings as above.     6/7/2024 CT soft tissue neck wo IV contrast  Impression:   Motion degraded examination. Assessment is also limited in the absence of intravenous contrast medium. No definite evidence of disease progression within the neck. The pharyngeal soft tissues are not appreciably changed in appearance. The right level 3 yvette component is poorly delineated/characterized in the setting of aforementioned limitations but does not appear significantly enlarged in the interim. No additional lymphadenopathy is evident within the neck soft tissues. Posttreatment change as above.   As compared with previous neck CT examination there is interval enlargement of a now 7 mm nodule within the left lung apex. Please see dedicated chest CT for complete evaluation.   Severe atherosclerotic vascular calcification.      6/8/2024 CT chest wo IV contrast  Impression:   1.  Interval increase in size of multiple pulmonary nodules in the left upper lobe and of a right middle lobe nodule, most consistent with worsening pulmonary metastatic disease.   2. New 1 cm nodular consolidative opacity in the right upper lobe with surrounding ground-glass attenuation. This may represent a new area of metastatic disease or may be infectious/inflammatory in etiology. A new 0.3 cm nodule in the left upper lobe may also be infectious/inflammatory or metastatic.   3. Multiple chronic/incidental findings are similar compared to prior CT studies as  detailed above including moderate emphysema, sequela of chronic bronchitis, and severe coronary artery calcifications.        Assessment/Plan    ASSESSMENT  Phong Wong is a 74 y.o. male with recent diagnosis of locally advanced oral cavity cancer. Completed concurrent chemoradiation with 7 weekly Carboplatin (2mg/AUC)/Paclitaxel (45mg/m2) on 10/4/2. Admitted 10/5 - 10/10 for extreme weakness, HECTOR, pancytopenia including anemia requiring blood transfusion. D/C'ed to acute rehab. Admitted  again 10/18 - 11/2/23 for lethargy, weakness, failure to thrive, anemia.     # Locally advanced oral cavity cancer, T3N2M0, now with met to lungs  - 6/30/23: PET/CT showed intense FDG avidity within the soft palate, extending to the base of the tongue and left palatine tonsil. Multiple uptake in left cervical level II nodes, right cervical level  II/III, infiltrating the right SCM muscle and encasing and invading the right jugular vein. FDG avidity was also detected in the region of the left fossa Rosenmuller and left medial pterygoid muscle.  - 7/13/23: pt is doing well, no pain, discussed with patient about carboplatin+ paclitaxel as his chemotherapy along with radiation due to his GFR 30s.  Chemo related side effects were reviewed with patient, consent obtained.   - Patient required dental extractions and as a result start date was delayed from 7/26 to 8/15/23  - 8/30: tolerating chemo well, does have some fatigue but no n/v. No peripheral neuropathy   - 9/6: Continue to tolerate chemo well, no n/v, is feeling more tired. Eating well, tolerating soft foods and supplementing with Boost VHC 2x per day. Denies peripheral neuropathy  - 9/13: continue to tolerate chemo well w/o n/v. Energy is lower but stable. Eating soft foods and Boost VHC x 2 per day  - 9/20: Tolerating chemo well, minimal odynophagia, does have some weight loss.  - 10/4: Completed last dose of Carbo/Taxol last week. Recall patient did not have a [facility] as  both providers are out of office. He had las RT today and noted to have significantly increased fatigue, weakness, weight loss in the last week resulting from decreased PO intake.   - 11/9/23: Feeling much improved since d/c from hospital on 11/2. Regaining strength and working with PT/OT at acute rehab. Odynophagia resolved, eating soft foods, has weight gain. Serum protein improved. Electrolytes wnl, mild hyponatremia, no c/f refeeding syndrome.    - 11/29/23: continue to feel very well, d/c'ed from rehab. Acute SEs of chemoRT are resolved. He's eating/drinking well with weight gain. Not using PEG currently. Has lymphedema from RT in the neck/submental region. Can consider lymphedema therapy after 3 month restaging PET showing XENA.   - 1/12/24: Patient is feeling well, tolerating soft PO intake. PEG removed today.   Reviewed 3 month restaging PET/CT with patient. Scans noted significant interval improvement in prior sites of disease though with residual FDG avidity in soft palate and left palatine tonsil, c/f residual disease vs post treatment changes. Also noted was a new FDG avid LLL pulm nodule with max SUV 5.7, c/f pulmonary metastasis. Will order STAT lung biopsy on this.   Pt scoped by Dr. Fletcher today, unfortunately was difficult to examine in office due to patient intolerance. Per Dr. Fletcher, may bring to OR to complete the examination depending on lung biopsy results. Scans will be reviewed at 1/19/24 [facility]. Explained in detail to patient and his SO scan results and next steps. They voiced understanding, all questions answered.   - 2/9/24: Patient presents with new painful left lateral tongue lesion, hindering PO intake. Will start Oxycodone 5mg Q6 hr PRN for pain control. Pt will see Dr. Fletcher next week for eval/possible biopsy. Patient will have repeat CT Chest for FDG avid LLL pulm nodule as prior biopsy attempt was not successful due to small nodule size and difficult location near  diaphragm.  -2/9/24 CT chest showed multiple subcentimeter noduesl 2-5mm, dominant lesion is 1.6cm in LLL.    -3/6/24: lung biopsy of LLL nodule was consistent with HN origin. [facility] pending including CPS.  Discussed with patient about phase 2 FLAM study (randomized to pembrolizumab or pembrolizumab+danvatirsen (Stat3 inhibitor), he is willing to be considered.   - Unfortunately due to his CKD, he's not eligible for FLAM study.  - 4/4/24: Patient continue to feel very well, no dysphagia, no throat or neck pain. No cough, breathing well.   - 5/1/24: C2 Pembro was delayed a week 2/2 patient not feeling well. He had struggled with hemorrhoids for a couple weeks. Otherwise tolerating treatment with no irAEs .  - 5/23/24: Patient doing well, no irAEs. Appetite and energy are good, tolerating solid PO intake. No odynophagia, no neck pain.  - 6/7/24: restaging can showed enlargement of subcentimeter lung mets, stable left lung nodule 2cm with new 1cm nodule in RUL. Based on heightened response with carbo+taxol after progression on PD, I recommended 3 cycles of carbo taxol and restaging, if there is response, we can consider afatinib treatment more as long term maintenance therapy.  Consent obtained for chemo and research blood.  - 6/19/24: Patient feeling well with no new complaints. Ok for C1 Q3 week Carbo/Taxol.    # Hemorrhoids  - Blood on stool most likely from internal hemorrhoid. Blood counts are stable. Bleeding has decreased Patient is taking Miralax to keep stools soft.     # Anemia: improving  - Hgb has been dropping since starting chemo, however today his hgb is <7 and will require a blood transfusion. This is likely due to chemotherapy as there are no s/s of bleeds.   - Received 2U PRBC during 10/18 - 11/2 admission. Anemia likely due to chemotherapy  - 11/29/23: Hgb 8.8. Improved from 3 weeks ago. Expect Hbg will continue to trend up as patient recovers. Can consider iron studies.   - Hgb trending up since  02/2024    # CKD  - 2/9/24 CT chest showed Bulky atherosclerotic calcification at the origin of the left renal artery likely contributing to atrophy of the left kidney as compared to the right. Kidney atrophy was not mentioned in previous scans.   - 5/1: Creat 1.46, GFR 51. Encouraged patient to keep up with non-caffeinated PO hydration. 1L NS given today with Pembro. Patient to follow up with PCP regarding atherosclerosis.   - 6/12: Creat 1.54, GFR 48.   - 6/20: Creat 1.6. GFR 45. Additional 1L NS giving w/ chemo today    # Submental/Neck Lymphedema  - Patient with moderate swelling in the submental and neck region. Likely 2ry to prior radiation. Established w/ Dr Guerrero.   - 6/19: Right neck with more swelling and tender to touch, neck is tight. Still itchy to touch.     PLAN  -- Proceed to C1 Carboplatin and paclitaxol. Adding 1L NS for hydration today  -- RTC 1 week for tox check.   -- Follow up with Dr. Weiss on atherosclerosis of left renal artery.   -- Continue stool softeners, monitor further symptoms of hemorrhoids .

## 2024-06-20 ENCOUNTER — INFUSION (OUTPATIENT)
Dept: HEMATOLOGY/ONCOLOGY | Facility: HOSPITAL | Age: 74
End: 2024-06-20
Payer: MEDICARE

## 2024-06-20 ENCOUNTER — OFFICE VISIT (OUTPATIENT)
Dept: HEMATOLOGY/ONCOLOGY | Facility: HOSPITAL | Age: 74
End: 2024-06-20
Payer: MEDICARE

## 2024-06-20 ENCOUNTER — LAB (OUTPATIENT)
Dept: LAB | Facility: HOSPITAL | Age: 74
End: 2024-06-20
Payer: MEDICARE

## 2024-06-20 VITALS
RESPIRATION RATE: 18 BRPM | SYSTOLIC BLOOD PRESSURE: 150 MMHG | DIASTOLIC BLOOD PRESSURE: 80 MMHG | WEIGHT: 128.53 LBS | HEART RATE: 112 BPM | OXYGEN SATURATION: 96 % | BODY MASS INDEX: 20.74 KG/M2 | TEMPERATURE: 97.7 F

## 2024-06-20 DIAGNOSIS — C10.9 OROPHARYNGEAL CANCER (MULTI): Primary | ICD-10-CM

## 2024-06-20 DIAGNOSIS — I89.0 LYMPHEDEMA DUE TO AND NOT CONCURRENT WITH IONIZING RADIOTHERAPY: ICD-10-CM

## 2024-06-20 DIAGNOSIS — C10.9 OROPHARYNGEAL CANCER (MULTI): ICD-10-CM

## 2024-06-20 DIAGNOSIS — Z51.11 ENCOUNTER FOR ANTINEOPLASTIC CHEMOTHERAPY: ICD-10-CM

## 2024-06-20 DIAGNOSIS — C78.02 SQUAMOUS CELL CARCINOMA METASTATIC TO BOTH LUNGS (MULTI): ICD-10-CM
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
## 2024-06-20 DIAGNOSIS — N18.31 STAGE 3A CHRONIC KIDNEY DISEASE (MULTI): ICD-10-CM

## 2024-06-20 DIAGNOSIS — C78.01 SQUAMOUS CELL CARCINOMA METASTATIC TO BOTH LUNGS (MULTI): ICD-10-CM

## 2024-06-20 LAB
ALBUMIN SERPL BCP-MCNC: 3.8 G/DL (ref 3.4–5)
ALP SERPL-CCNC: 64 U/L (ref 33–136)
ALT SERPL W P-5'-P-CCNC: 13 U/L (ref 10–52)
ANION GAP SERPL CALC-SCNC: 13 MMOL/L (ref 10–20)
AST SERPL W P-5'-P-CCNC: 14 U/L (ref 9–39)
BASOPHILS # BLD AUTO: 0.06 X10*3/UL (ref 0–0.1)
BASOPHILS NFR BLD AUTO: 1.2 %
BILIRUB SERPL-MCNC: 0.4 MG/DL (ref 0–1.2)
BUN SERPL-MCNC: 18 MG/DL (ref 6–23)
CALCIUM SERPL-MCNC: 9 MG/DL (ref 8.6–10.3)
CHLORIDE SERPL-SCNC: 100 MMOL/L (ref 98–107)
CO2 SERPL-SCNC: 27 MMOL/L (ref 21–32)
CREAT SERPL-MCNC: 1.6 MG/DL (ref 0.5–1.3)
EGFRCR SERPLBLD CKD-EPI 2021: 45 ML/MIN/1.73M*2
EOSINOPHIL # BLD AUTO: 0.41 X10*3/UL (ref 0–0.4)
EOSINOPHIL NFR BLD AUTO: 8.5 %
ERYTHROCYTE [DISTWIDTH] IN BLOOD BY AUTOMATED COUNT: 15.3 % (ref 11.5–14.5)
GLUCOSE SERPL-MCNC: 102 MG/DL (ref 74–99)
HCT VFR BLD AUTO: 35.1 % (ref 41–52)
HGB BLD-MCNC: 11.6 G/DL (ref 13.5–17.5)
HOLD SPECIMEN: NORMAL
IMM GRANULOCYTES # BLD AUTO: 0.02 X10*3/UL (ref 0–0.5)
IMM GRANULOCYTES NFR BLD AUTO: 0.4 % (ref 0–0.9)
LYMPHOCYTES # BLD AUTO: 0.49 X10*3/UL (ref 0.8–3)
LYMPHOCYTES NFR BLD AUTO: 10.2 %
MAGNESIUM SERPL-MCNC: 1.81 MG/DL (ref 1.6–2.4)
MCH RBC QN AUTO: 27.7 PG (ref 26–34)
MCHC RBC AUTO-ENTMCNC: 33 G/DL (ref 32–36)
MCV RBC AUTO: 84 FL (ref 80–100)
MONOCYTES # BLD AUTO: 0.51 X10*3/UL (ref 0.05–0.8)
MONOCYTES NFR BLD AUTO: 10.6 %
NEUTROPHILS # BLD AUTO: 3.32 X10*3/UL (ref 1.6–5.5)
NEUTROPHILS NFR BLD AUTO: 69.1 %
NRBC BLD-RTO: 0 /100 WBCS (ref 0–0)
PLATELET # BLD AUTO: 277 X10*3/UL (ref 150–450)
POTASSIUM SERPL-SCNC: 4 MMOL/L (ref 3.5–5.3)
PROT SERPL-MCNC: 6.1 G/DL (ref 6.4–8.2)
RBC # BLD AUTO: 4.19 X10*6/UL (ref 4.5–5.9)
SODIUM SERPL-SCNC: 136 MMOL/L (ref 136–145)
T4 FREE SERPL-MCNC: 1.09 NG/DL (ref 0.78–1.48)
TSH SERPL-ACNC: 4.06 MIU/L (ref 0.44–3.98)
WBC # BLD AUTO: 4.8 X10*3/UL (ref 4.4–11.3)

## 2024-06-20 PROCEDURE — 36415 COLL VENOUS BLD VENIPUNCTURE: CPT

## 2024-06-20 PROCEDURE — 83735 ASSAY OF MAGNESIUM: CPT

## 2024-06-20 PROCEDURE — 3077F SYST BP >= 140 MM HG: CPT | Performed by: STUDENT IN AN ORGANIZED HEALTH CARE EDUCATION/TRAINING PROGRAM

## 2024-06-20 PROCEDURE — 96417 CHEMO IV INFUS EACH ADDL SEQ: CPT

## 2024-06-20 PROCEDURE — 96375 TX/PRO/DX INJ NEW DRUG ADDON: CPT | Mod: INF

## 2024-06-20 PROCEDURE — 1126F AMNT PAIN NOTED NONE PRSNT: CPT | Performed by: STUDENT IN AN ORGANIZED HEALTH CARE EDUCATION/TRAINING PROGRAM

## 2024-06-20 PROCEDURE — 96367 TX/PROPH/DG ADDL SEQ IV INF: CPT

## 2024-06-20 PROCEDURE — 99215 OFFICE O/P EST HI 40 MIN: CPT | Mod: 25 | Performed by: STUDENT IN AN ORGANIZED HEALTH CARE EDUCATION/TRAINING PROGRAM

## 2024-06-20 PROCEDURE — 80053 COMPREHEN METABOLIC PANEL: CPT

## 2024-06-20 PROCEDURE — 1160F RVW MEDS BY RX/DR IN RCRD: CPT | Performed by: STUDENT IN AN ORGANIZED HEALTH CARE EDUCATION/TRAINING PROGRAM

## 2024-06-20 PROCEDURE — 85025 COMPLETE CBC W/AUTO DIFF WBC: CPT

## 2024-06-20 PROCEDURE — 3079F DIAST BP 80-89 MM HG: CPT | Performed by: STUDENT IN AN ORGANIZED HEALTH CARE EDUCATION/TRAINING PROGRAM

## 2024-06-20 PROCEDURE — 2500000004 HC RX 250 GENERAL PHARMACY W/ HCPCS (ALT 636 FOR OP/ED): Performed by: STUDENT IN AN ORGANIZED HEALTH CARE EDUCATION/TRAINING PROGRAM

## 2024-06-20 PROCEDURE — 96415 CHEMO IV INFUSION ADDL HR: CPT

## 2024-06-20 PROCEDURE — 84443 ASSAY THYROID STIM HORMONE: CPT | Mod: GA

## 2024-06-20 PROCEDURE — 99215 OFFICE O/P EST HI 40 MIN: CPT | Performed by: STUDENT IN AN ORGANIZED HEALTH CARE EDUCATION/TRAINING PROGRAM

## 2024-06-20 PROCEDURE — 96413 CHEMO IV INFUSION 1 HR: CPT

## 2024-06-20 PROCEDURE — 84439 ASSAY OF FREE THYROXINE: CPT

## 2024-06-20 PROCEDURE — 2500000001 HC RX 250 WO HCPCS SELF ADMINISTERED DRUGS (ALT 637 FOR MEDICARE OP): Performed by: INTERNAL MEDICINE

## 2024-06-20 PROCEDURE — 2500000004 HC RX 250 GENERAL PHARMACY W/ HCPCS (ALT 636 FOR OP/ED): Performed by: INTERNAL MEDICINE

## 2024-06-20 PROCEDURE — 1159F MED LIST DOCD IN RCRD: CPT | Performed by: STUDENT IN AN ORGANIZED HEALTH CARE EDUCATION/TRAINING PROGRAM

## 2024-06-20 PROCEDURE — 1036F TOBACCO NON-USER: CPT | Performed by: STUDENT IN AN ORGANIZED HEALTH CARE EDUCATION/TRAINING PROGRAM

## 2024-06-20 RX ORDER — DIPHENHYDRAMINE HYDROCHLORIDE 50 MG/ML
50 INJECTION INTRAMUSCULAR; INTRAVENOUS AS NEEDED
Status: CANCELLED | OUTPATIENT
Start: 2024-06-20

## 2024-06-20 RX ORDER — DEXAMETHASONE IN 0.9 % SOD CHL 20 MG/50ML
20 INTRAVENOUS SOLUTION, PIGGYBACK (ML) INTRAVENOUS ONCE
Status: COMPLETED | OUTPATIENT
Start: 2024-06-20 | End: 2024-06-20

## 2024-06-20 RX ORDER — ALBUTEROL SULFATE 0.83 MG/ML
3 SOLUTION RESPIRATORY (INHALATION) AS NEEDED
Status: CANCELLED | OUTPATIENT
Start: 2024-06-20

## 2024-06-20 RX ORDER — PROCHLORPERAZINE MALEATE 10 MG
10 TABLET ORAL EVERY 6 HOURS PRN
Status: DISCONTINUED | OUTPATIENT
Start: 2024-06-20 | End: 2024-06-20 | Stop reason: HOSPADM

## 2024-06-20 RX ORDER — EPINEPHRINE 0.3 MG/.3ML
0.3 INJECTION SUBCUTANEOUS EVERY 5 MIN PRN
Status: CANCELLED | OUTPATIENT
Start: 2024-06-20

## 2024-06-20 RX ORDER — FAMOTIDINE 10 MG/ML
20 INJECTION INTRAVENOUS ONCE AS NEEDED
Status: DISCONTINUED | OUTPATIENT
Start: 2024-06-20 | End: 2024-06-20 | Stop reason: HOSPADM

## 2024-06-20 RX ORDER — ONDANSETRON HYDROCHLORIDE 8 MG/1
8 TABLET, FILM COATED ORAL EVERY 8 HOURS PRN
Qty: 30 TABLET | Refills: 5 | Status: SHIPPED | OUTPATIENT
Start: 2024-06-20

## 2024-06-20 RX ORDER — PROCHLORPERAZINE EDISYLATE 5 MG/ML
10 INJECTION INTRAMUSCULAR; INTRAVENOUS EVERY 6 HOURS PRN
Status: CANCELLED | OUTPATIENT
Start: 2024-06-20

## 2024-06-20 RX ORDER — HEPARIN SODIUM,PORCINE/PF 10 UNIT/ML
50 SYRINGE (ML) INTRAVENOUS AS NEEDED
OUTPATIENT
Start: 2024-06-20

## 2024-06-20 RX ORDER — DIPHENHYDRAMINE HCL 50 MG
50 CAPSULE ORAL ONCE
Status: COMPLETED | OUTPATIENT
Start: 2024-06-20 | End: 2024-06-20

## 2024-06-20 RX ORDER — PROCHLORPERAZINE MALEATE 10 MG
10 TABLET ORAL EVERY 6 HOURS PRN
Qty: 30 TABLET | Refills: 5 | Status: SHIPPED | OUTPATIENT
Start: 2024-06-20

## 2024-06-20 RX ORDER — HEPARIN 100 UNIT/ML
500 SYRINGE INTRAVENOUS AS NEEDED
OUTPATIENT
Start: 2024-06-20

## 2024-06-20 RX ORDER — FAMOTIDINE 10 MG/ML
20 INJECTION INTRAVENOUS ONCE AS NEEDED
Status: CANCELLED | OUTPATIENT
Start: 2024-06-20

## 2024-06-20 RX ORDER — FAMOTIDINE 10 MG/ML
20 INJECTION INTRAVENOUS ONCE
Status: CANCELLED | OUTPATIENT
Start: 2024-06-20

## 2024-06-20 RX ORDER — DIPHENHYDRAMINE HYDROCHLORIDE 50 MG/ML
50 INJECTION INTRAMUSCULAR; INTRAVENOUS AS NEEDED
Status: DISCONTINUED | OUTPATIENT
Start: 2024-06-20 | End: 2024-06-20 | Stop reason: HOSPADM

## 2024-06-20 RX ORDER — PROCHLORPERAZINE MALEATE 10 MG
10 TABLET ORAL EVERY 6 HOURS PRN
Status: CANCELLED | OUTPATIENT
Start: 2024-06-20

## 2024-06-20 RX ORDER — PROCHLORPERAZINE EDISYLATE 5 MG/ML
10 INJECTION INTRAMUSCULAR; INTRAVENOUS EVERY 6 HOURS PRN
Status: DISCONTINUED | OUTPATIENT
Start: 2024-06-20 | End: 2024-06-20 | Stop reason: HOSPADM

## 2024-06-20 RX ORDER — PALONOSETRON 0.05 MG/ML
0.25 INJECTION, SOLUTION INTRAVENOUS ONCE
Status: CANCELLED | OUTPATIENT
Start: 2024-06-20

## 2024-06-20 RX ORDER — DEXAMETHASONE IN 0.9 % SOD CHL 20 MG/50ML
20 INTRAVENOUS SOLUTION, PIGGYBACK (ML) INTRAVENOUS ONCE
Status: CANCELLED | OUTPATIENT
Start: 2024-06-20

## 2024-06-20 RX ORDER — FAMOTIDINE 10 MG/ML
20 INJECTION INTRAVENOUS ONCE
Status: COMPLETED | OUTPATIENT
Start: 2024-06-20 | End: 2024-06-20

## 2024-06-20 RX ORDER — DIPHENHYDRAMINE HCL 50 MG
50 CAPSULE ORAL ONCE
Status: CANCELLED | OUTPATIENT
Start: 2024-06-20

## 2024-06-20 RX ORDER — PALONOSETRON 0.05 MG/ML
0.25 INJECTION, SOLUTION INTRAVENOUS ONCE
Status: COMPLETED | OUTPATIENT
Start: 2024-06-20 | End: 2024-06-20

## 2024-06-20 RX ORDER — EPINEPHRINE 0.3 MG/.3ML
0.3 INJECTION SUBCUTANEOUS EVERY 5 MIN PRN
Status: DISCONTINUED | OUTPATIENT
Start: 2024-06-20 | End: 2024-06-20 | Stop reason: HOSPADM

## 2024-06-20 RX ORDER — ALBUTEROL SULFATE 0.83 MG/ML
3 SOLUTION RESPIRATORY (INHALATION) AS NEEDED
Status: DISCONTINUED | OUTPATIENT
Start: 2024-06-20 | End: 2024-06-20 | Stop reason: HOSPADM

## 2024-06-20 ASSESSMENT — PAIN SCALES - GENERAL: PAINLEVEL: 0-NO PAIN

## 2024-06-20 NOTE — PROGRESS NOTES
Phong Wong is a 74 y.o. male who arrived to infusion from provider visit for       Treatment Plans       Name Type Plan Dates Plan Provider         Active    PACLitaxel / CARBOplatin, 21 Day Cycles - Head and Neck Oncology Treatment  6/13/2024 - Present Kyunghee Burkitt, DO                   See provider note for assessment. Tolerated infusion without incident, ambulated off unit independently.

## 2024-06-20 NOTE — PATIENT INSTRUCTIONS
Please hold Ondansetron (Zofran) (as needed for nausea) for two days after chemotherapy. Can take on Saturday after 12 pm. Take 1 tablet (8 mg) by mouth every 8 hours if needed for nausea or vomiting.     If nauseous, okay to take Prochlorperazine (Compazine)- Take 1 tablet (10 mg) by mouth every 6 hours if needed for nausea or vomiting.     If nausea/vomiting is severe, okay to alternate Zofran and Compazine every few hours.    Olanzapine (Zyprexa) Take 1 tablet (5 mg) by mouth once daily at bedtime.     Dexamethasone (Decadron) Take 2 tablets (8 mg) by mouth once daily. For 3 days starting the day after treatment.       Stay hydrated, drink water/gatorade/powerade. Try to eat small, frequent meals with protein. It is important to keep your strength and energy through this treatment.     Any questions/concerns, do not hesitate to call or message on [employer]

## 2024-06-27 ENCOUNTER — APPOINTMENT (OUTPATIENT)
Dept: HEMATOLOGY/ONCOLOGY | Facility: HOSPITAL | Age: 74
End: 2024-06-27
Payer: MEDICARE

## 2024-06-27 NOTE — PROGRESS NOTES
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  The treatment was completed on October 1.  He had a PET scan that shows some residual uptake in the left tonsillar area as well as the right neck.  Unfortunately he developed a lung metastasis.  The last a CT scan in June 2024 that I reviewed showed some persistent mass in the right neck as well as some increasing lung nodules.  He is on a new regimen of treatment at this point.  Given the presence of lung metastasis I will not pursue the neck mass at this point.    Minimal dysphagia.    I will see him in 2 months.    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     History of Present Illness    This patient was seen in June 2023 at the request of his family physician. For 6 weeks or so he had some discomfort in his throat. He describes it as in the center of his throat. He also noticed a lump in the midportion of his right neck. This led to a CT scan of his neck which was done in June 2023. I personally reviewed that scan. It does show 2 suspicious nodes 1 in the level 3 on the right and one in level 2 on the left. He also has some irregularity around the soft palate.  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  I have not seen him since that first visit in June 2023.  He completed his chemoradiation therapy on October 1, 2023.  He does not have any residual symptoms.  His swallowing is much improved and he would like to have his PEG tube removed.  He had a pet scan done in January 2024.  He did have some residual uptake in the left oropharynx as well as the right neck.  This is much less than previously.  He also had some positive uptake in the lungs that turned out to be positive for squamous cell carcinoma on biopsy.  He had a repeat CT scan done  in June 2024 that shows this persistent mass in the right neck and also shows increasing lung nodules.  He is now on a new regimen of therapy at this point.  He seems to be doing fairly well.    Physical Exam    Examination of the oral cavity and oropharynx is negative for anything worrisome.  He does have some scarring around his soft palate.  There is good mandibular excursion. Palpation of the parotid, neck, and thyroid field shows this right-sided mid neck induration that measures a good 3 to 4 cm and is somewhat painful on palpation.    A flexible laryngoscopy was carried out. Under topical Xylocaine and Charanjit-Synephrine the scope was introduced through the nostril. The nasopharynx, base of tongue, hypopharynx, and larynx are visualized. The vocal cords are normally mobile. There is no pooling of secretions in the piriform sinuses.  He does have evidence of dryness.

## 2024-06-28 ENCOUNTER — OFFICE VISIT (OUTPATIENT)
Dept: OTOLARYNGOLOGY | Facility: HOSPITAL | Age: 74
End: 2024-06-28
Payer: MEDICARE

## 2024-06-28 ENCOUNTER — LAB (OUTPATIENT)
Dept: LAB | Facility: HOSPITAL | Age: 74
End: 2024-06-28
Payer: MEDICARE

## 2024-06-28 ENCOUNTER — OFFICE VISIT (OUTPATIENT)
Dept: HEMATOLOGY/ONCOLOGY | Facility: HOSPITAL | Age: 74
End: 2024-06-28
Payer: MEDICARE

## 2024-06-28 VITALS
SYSTOLIC BLOOD PRESSURE: 150 MMHG | OXYGEN SATURATION: 100 % | TEMPERATURE: 97.5 F | HEART RATE: 80 BPM | RESPIRATION RATE: 17 BRPM | DIASTOLIC BLOOD PRESSURE: 60 MMHG

## 2024-06-28 VITALS — BODY MASS INDEX: 21.04 KG/M2 | HEIGHT: 66 IN | WEIGHT: 130.9 LBS | TEMPERATURE: 97.5 F

## 2024-06-28 DIAGNOSIS — I89.0 LYMPHEDEMA DUE TO RADIATION: ICD-10-CM

## 2024-06-28 DIAGNOSIS — C78.02 SQUAMOUS CELL CARCINOMA METASTATIC TO BOTH LUNGS (MULTI): ICD-10-CM

## 2024-06-28 DIAGNOSIS — K52.1 CHEMOTHERAPY INDUCED DIARRHEA: ICD-10-CM

## 2024-06-28 DIAGNOSIS — C10.9 OROPHARYNGEAL CANCER (MULTI): ICD-10-CM

## 2024-06-28 DIAGNOSIS — M54.2 NECK PAIN ON RIGHT SIDE: ICD-10-CM

## 2024-06-28 DIAGNOSIS — C78.01 SQUAMOUS CELL CARCINOMA METASTATIC TO BOTH LUNGS (MULTI): ICD-10-CM

## 2024-06-28 DIAGNOSIS — D69.59 CHEMOTHERAPY-INDUCED THROMBOCYTOPENIA: ICD-10-CM

## 2024-06-28 DIAGNOSIS — T45.1X5A CHEMOTHERAPY-INDUCED THROMBOCYTOPENIA: ICD-10-CM

## 2024-06-28 DIAGNOSIS — R13.12 OROPHARYNGEAL DYSPHAGIA: Primary | ICD-10-CM

## 2024-06-28 DIAGNOSIS — T45.1X5A CHEMOTHERAPY INDUCED DIARRHEA: ICD-10-CM

## 2024-06-28 DIAGNOSIS — C10.9 OROPHARYNGEAL CANCER (MULTI): Primary | ICD-10-CM

## 2024-06-28 LAB
ALBUMIN SERPL BCP-MCNC: 3.4 G/DL (ref 3.4–5)
ALP SERPL-CCNC: 48 U/L (ref 33–136)
ALT SERPL W P-5'-P-CCNC: 18 U/L (ref 10–52)
ANION GAP SERPL CALC-SCNC: 12 MMOL/L (ref 10–20)
AST SERPL W P-5'-P-CCNC: 13 U/L (ref 9–39)
BASOPHILS # BLD AUTO: 0.01 X10*3/UL (ref 0–0.1)
BASOPHILS NFR BLD AUTO: 0.5 %
BILIRUB SERPL-MCNC: 0.4 MG/DL (ref 0–1.2)
BUN SERPL-MCNC: 26 MG/DL (ref 6–23)
CALCIUM SERPL-MCNC: 8.2 MG/DL (ref 8.6–10.3)
CHLORIDE SERPL-SCNC: 98 MMOL/L (ref 98–107)
CO2 SERPL-SCNC: 28 MMOL/L (ref 21–32)
CREAT SERPL-MCNC: 1.2 MG/DL (ref 0.5–1.3)
DACRYOCYTES BLD QL SMEAR: NORMAL
EGFRCR SERPLBLD CKD-EPI 2021: 64 ML/MIN/1.73M*2
EOSINOPHIL # BLD AUTO: 0.27 X10*3/UL (ref 0–0.4)
EOSINOPHIL NFR BLD AUTO: 12.4 %
ERYTHROCYTE [DISTWIDTH] IN BLOOD BY AUTOMATED COUNT: 14.9 % (ref 11.5–14.5)
GLUCOSE SERPL-MCNC: 98 MG/DL (ref 74–99)
HCT VFR BLD AUTO: 32.4 % (ref 41–52)
HGB BLD-MCNC: 10.5 G/DL (ref 13.5–17.5)
IMM GRANULOCYTES # BLD AUTO: 0.01 X10*3/UL (ref 0–0.5)
IMM GRANULOCYTES NFR BLD AUTO: 0.5 % (ref 0–0.9)
LYMPHOCYTES # BLD AUTO: 0.32 X10*3/UL (ref 0.8–3)
LYMPHOCYTES NFR BLD AUTO: 14.7 %
MAGNESIUM SERPL-MCNC: 1.91 MG/DL (ref 1.6–2.4)
MCH RBC QN AUTO: 27.7 PG (ref 26–34)
MCHC RBC AUTO-ENTMCNC: 32.4 G/DL (ref 32–36)
MCV RBC AUTO: 86 FL (ref 80–100)
MONOCYTES # BLD AUTO: 0.03 X10*3/UL (ref 0.05–0.8)
MONOCYTES NFR BLD AUTO: 1.4 %
NEUTROPHILS # BLD AUTO: 1.54 X10*3/UL (ref 1.6–5.5)
NEUTROPHILS NFR BLD AUTO: 70.5 %
NEUTS VAC BLD QL SMEAR: PRESENT
NRBC BLD-RTO: 0 /100 WBCS (ref 0–0)
OVALOCYTES BLD QL SMEAR: NORMAL
PLATELET # BLD AUTO: 85 X10*3/UL (ref 150–450)
POTASSIUM SERPL-SCNC: 4.1 MMOL/L (ref 3.5–5.3)
PROT SERPL-MCNC: 5.4 G/DL (ref 6.4–8.2)
RBC # BLD AUTO: 3.79 X10*6/UL (ref 4.5–5.9)
RBC MORPH BLD: NORMAL
SODIUM SERPL-SCNC: 134 MMOL/L (ref 136–145)
WBC # BLD AUTO: 2.2 X10*3/UL (ref 4.4–11.3)

## 2024-06-28 PROCEDURE — 83735 ASSAY OF MAGNESIUM: CPT

## 2024-06-28 PROCEDURE — 99213 OFFICE O/P EST LOW 20 MIN: CPT | Mod: 25 | Performed by: OTOLARYNGOLOGY

## 2024-06-28 PROCEDURE — 1160F RVW MEDS BY RX/DR IN RCRD: CPT | Performed by: OTOLARYNGOLOGY

## 2024-06-28 PROCEDURE — 31575 DIAGNOSTIC LARYNGOSCOPY: CPT | Performed by: OTOLARYNGOLOGY

## 2024-06-28 PROCEDURE — 3078F DIAST BP <80 MM HG: CPT | Performed by: STUDENT IN AN ORGANIZED HEALTH CARE EDUCATION/TRAINING PROGRAM

## 2024-06-28 PROCEDURE — 1159F MED LIST DOCD IN RCRD: CPT | Performed by: STUDENT IN AN ORGANIZED HEALTH CARE EDUCATION/TRAINING PROGRAM

## 2024-06-28 PROCEDURE — 84075 ASSAY ALKALINE PHOSPHATASE: CPT

## 2024-06-28 PROCEDURE — 1036F TOBACCO NON-USER: CPT | Performed by: OTOLARYNGOLOGY

## 2024-06-28 PROCEDURE — 99215 OFFICE O/P EST HI 40 MIN: CPT | Performed by: STUDENT IN AN ORGANIZED HEALTH CARE EDUCATION/TRAINING PROGRAM

## 2024-06-28 PROCEDURE — 1159F MED LIST DOCD IN RCRD: CPT | Performed by: OTOLARYNGOLOGY

## 2024-06-28 PROCEDURE — 85025 COMPLETE CBC W/AUTO DIFF WBC: CPT

## 2024-06-28 PROCEDURE — 1126F AMNT PAIN NOTED NONE PRSNT: CPT | Performed by: STUDENT IN AN ORGANIZED HEALTH CARE EDUCATION/TRAINING PROGRAM

## 2024-06-28 PROCEDURE — 99213 OFFICE O/P EST LOW 20 MIN: CPT | Performed by: OTOLARYNGOLOGY

## 2024-06-28 PROCEDURE — 3077F SYST BP >= 140 MM HG: CPT | Performed by: STUDENT IN AN ORGANIZED HEALTH CARE EDUCATION/TRAINING PROGRAM

## 2024-06-28 PROCEDURE — 1160F RVW MEDS BY RX/DR IN RCRD: CPT | Performed by: STUDENT IN AN ORGANIZED HEALTH CARE EDUCATION/TRAINING PROGRAM

## 2024-06-28 PROCEDURE — 1036F TOBACCO NON-USER: CPT | Performed by: STUDENT IN AN ORGANIZED HEALTH CARE EDUCATION/TRAINING PROGRAM

## 2024-06-28 PROCEDURE — 36415 COLL VENOUS BLD VENIPUNCTURE: CPT

## 2024-06-28 ASSESSMENT — ENCOUNTER SYMPTOMS
DIARRHEA: 0
VOMITING: 0
TROUBLE SWALLOWING: 0
HEADACHES: 0
DIFFICULTY URINATING: 0
DIZZINESS: 0
FREQUENCY: 0
NUMBNESS: 0
LIGHT-HEADEDNESS: 0
CHILLS: 0
CONSTIPATION: 0
ARTHRALGIAS: 0
LEG SWELLING: 0
SORE THROAT: 0
FEVER: 0
DYSURIA: 0
WOUND: 0
SHORTNESS OF BREATH: 0
ABDOMINAL PAIN: 0
COUGH: 0
NAUSEA: 0
NECK PAIN: 0

## 2024-06-28 ASSESSMENT — PATIENT HEALTH QUESTIONNAIRE - PHQ9
2. FEELING DOWN, DEPRESSED OR HOPELESS: NOT AT ALL
1. LITTLE INTEREST OR PLEASURE IN DOING THINGS: NOT AT ALL
SUM OF ALL RESPONSES TO PHQ9 QUESTIONS 1 & 2: 0

## 2024-06-28 ASSESSMENT — PAIN SCALES - GENERAL: PAINLEVEL: 0-NO PAIN

## 2024-06-28 NOTE — PROGRESS NOTES
Patient ID: Phong Wong is a 74 y.o. male.  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 Juan Jose Fletcher   MedOnc: Dr. Kyunghee Burkitt, X. Katherine Feng, PA-C   RadOnc: Dr. Vianca Steen     Current Therapy  4/4/24: Started Q3 week Pembrolizumab    Prior Therapy:   8/16 - 10/4/23: Recieved concurrent chemoradiation with 7 weekly Carboplatin (2mg/AUC)/Paclitaxel (45mg/m2)  and 70gy RT in 35fx     Sites of Disease:  Soft palate, BOT, Left palatine tonsil  B/L Cervical LN  Lung     Oncologic Issues:   Odynophagia  Fatigue     ONCOLOGIC HISTORY  - Pt had 2 months hx of throat discomfort with lump in right neck  - 6/13/23: CT neck showed a mass 2.8 x 2.5 x 2.9 cm in the right lower cervical neck soft tissues. Two suspicious nodes were observed, one at level 3 on the right and another at level 2 on the left.  - 6/28/23: seen by Dr. Fletcher. 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 Based on the findings, Dr. Fletcher did not recommend surgery due to the location  of the primary tumor and the presence of yvette disease  - 6/30/23: PET/CT showed intense FDG avidity within the soft palate, extending to the base of the tongue and left palatine tonsil. Multiple FDG avid left cervical level II nodes were observed, along with an FDG avid mass in the region of the right cervical  level II/III, infiltrating the right SCM muscle and encasing and invading the right jugular vein. FDG avidity was also detected in the region of the left fossa Rosenmuller and left medial pterygoid muscle.  - 8/16 - 10/4/23: Recieved concurrent chemoradiation with 7 weekly Carboplatin (2mg/AUC)/Paclitaxel (45mg/m2)  and 70gy RT in 35fx    Admissions:  10/5 - 10/10/23: Admitted for extreme weakness, HECTOR, pancytopenia including anemia requiring blood  transfusion. D/C'ed to acute rehab        Past Medical History:   Past Medical History:  2017: Personal history of diseases of the blood and blood-  forming organs and certain disorders involving the immune mechanism      Comment:  History of thrombocytosis   HTN, HLD, COPD, prostate cancer in 2017 (s/p radiation)  Surgical History:    Past Surgical History:   Procedure Laterality Date    CT ABDOMEN PELVIS ANGIOGRAM W AND/OR WO IV CONTRAST  9/3/2014    CT ABDOMEN PELVIS ANGIOGRAM W AND/OR WO IV CONTRAST 9/3/2014 [facility]    IR ANGIOGRAM AORTA ABDOMEN  2014    IR ANGIOGRAM AORTA ABDOMEN 2014 [facility]   Aortoiliofemoral vascular bypass in   Social History:    Social History     Socioeconomic History    Marital status:      Spouse name: Not on file    Number of children: 1    Years of education: Not on file    Highest education level: Not on file   Occupational History    Not on file   Tobacco Use    Smoking status: Former     Current packs/day: 0.00     Average packs/day: 1 pack/day for 40.0 years (40.0 ttl pk-yrs)     Types: Cigarettes     Start date:      Quit date: 2016     Years since quittin.4     Passive exposure: Past    Smokeless tobacco: Never   Substance and Sexual Activity    Alcohol use: Yes     Alcohol/week: 12.0 standard drinks of alcohol     Types: 12 Cans of beer per week    Drug use: Never    Sexual activity: Not on file   Other Topics Concern    Not on file   Social History Narrative    Not on file     Social Determinants of Health     Financial Resource Strain: Low Risk  (10/20/2023)    Overall Financial Resource Strain (CARDIA)     Difficulty of Paying Living Expenses: Not very hard   Food Insecurity: No Food Insecurity (10/5/2023)    Hunger Vital Sign     Worried About Running Out of Food in the Last Year: Never true     Ran Out of Food in the Last Year: Never true   Transportation Needs: No Transportation Needs (10/20/2023)    PRAPARE -  Transportation     Lack of Transportation (Medical): No     Lack of Transportation (Non-Medical): No   Physical Activity: Inactive (10/5/2023)    Exercise Vital Sign     Days of Exercise per Week: 0 days     Minutes of Exercise per Session: 0 min   Stress: Stress Concern Present (10/5/2023)    [place] [employer] - Occupational Stress Questionnaire     Feeling of Stress : To some extent   Social Connections: Moderately Integrated (10/5/2023)    Social Connection and Isolation Panel [NHANES]     Frequency of Communication with Friends and Family: More than three times a week     Frequency of Social Gatherings with Friends and Family: More than three times a week     Attends Denominational Services: Never     Active Member of Clubs or Organizations: Yes     Attends Club or Organization Meetings: Never     Marital Status:    Intimate Partner Violence: Not At Risk (10/5/2023)    Humiliation, Afraid, Rape, and Kick questionnaire     Fear of Current or Ex-Partner: No     Emotionally Abused: No     Physically Abused: No     Sexually Abused: No   Housing Stability: Low Risk  (10/20/2023)    Housing Stability Vital Sign     Unable to Pay for Housing in the Last Year: No     Number of Places Lived in the Last Year: 1     Unstable Housing in the Last Year: No      Family History:    Family History   Problem Relation Name Age of Onset    Aortic aneurysm Father          abdominal     Family Oncology History:    Cancer-related family history is not on file.      Subjective   Chief Complaint: Squamous Cell Carcinoma of oropharynx    HPI  Phong Wong is a 74 y.o. male with h/o locally advanced oral cavity cancer, completed concurrent chemoradiation with 7 weekly Carboplatin+Paclitaxel on 10/4/23. Unfortunately found with mets to lungs on 3 months restaging scan. CPS 3. Started Q3 week Pembrolizumab on 4/4/24. Received 3 cycles of Pembro , unfortunately lung nodules progressed. Started Q3 week carbo/Taxol on  6/19/24    Interval History  Patient presents today for tox check following C1 Carbo/Taxol.    He reports 1 week of liquid diarrhea after chemo last week. Occurring 1-2 x per day.   Otherwise patient feels well. Energy and appetite are good. He take Olanzapine nightly. No nausea/vomiting.   Denies dry mouth, has saliva production, taste is normal.   He denies any urinary symptoms currently, no urgency, frequency, hesitancy, dysuria.    He continue to have tenderness in the right neck. He does have a swelling in the right neck. Note that patient had a CT Neck on 6/7/24 with motion degradation. There was a right level 3 yvette mass measuring 1.5cm in size on 1/10/24 and appeared the same size on 6/7/24. Patient has had persistent sensitivity in the right neck since completing chemoRT.     He's following with PCP, Dr. Weiss. 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    ROS  Review of Systems   Constitutional:  Negative for chills and fever.   HENT:   Negative for lump/mass, mouth sores, nosebleeds, sore throat, tinnitus and trouble swallowing.    Respiratory:  Negative for cough and shortness of breath.    Cardiovascular:  Negative for chest pain and leg swelling.   Gastrointestinal:  Negative for abdominal pain, constipation, diarrhea, nausea and vomiting.   Genitourinary:  Negative for difficulty urinating, dysuria and frequency.    Musculoskeletal:  Negative for arthralgias and neck pain.   Skin:  Negative for rash and wound.   Neurological:  Negative for dizziness, headaches, light-headedness and numbness.       Allergies  Allergies   Allergen Reactions    Codeine Nausea Only        Medications  Current Outpatient Medications   Medication Instructions    amLODIPine-benazepriL (Lotrel) 5-20 mg capsule [id number] Medication amlodipine 5 mg-benazepril 20 mg capsule amlodipine 5 mg-benazepril 20 mg capsule 5-20 mg 10/2/2020 Active (Outside)    aspirin 81 mg EC tablet 1 tablet,  oral, Daily    dexAMETHasone (DECADRON) 8 mg, oral, Daily, For 3 days starting the day after treatment.    doxazosin (CARDURA) 4 mg, oral, Daily, Take 1 tablet by mouth daily in the evening    fluticasone-umeclidin-vilanter (Trelegy Ellipta) 100-62.5-25 mcg blister with device INHALE 1 PUFF EVERY DAY    ipratropium-albuteroL (Duo-Neb) 0.5-2.5 mg/3 mL nebulizer solution Take 3 mL by nebulization 3 times a day as needed for wheezing or shortness of breath.    losartan (COZAAR) 50 mg, oral, Daily    magnesium oxide (MAG-OX) 400 mg, oral, Daily    ofloxacin (Ocuflox) 0.3 % ophthalmic solution Instill 1 drop Left Eye 4 times a day    OLANZapine (ZYPREXA) 5 mg, oral, Nightly    ondansetron (ZOFRAN) 8 mg, oral, Every 8 hours PRN    prochlorperazine (COMPAZINE) 10 mg, oral, Every 6 hours PRN        Objective   VS:  /60 (BP Location: Left arm, Patient Position: Sitting)   Pulse 80   Temp 36.4 °C (97.5 °F)   Resp 17   SpO2 100%   Weight   Wt Readings from Last 7 Encounters:   06/28/24 59.4 kg (130 lb 14.4 oz)   06/20/24 58.3 kg (128 lb 8.5 oz)   06/13/24 58.1 kg (128 lb 1.4 oz)   05/23/24 58.7 kg (129 lb 6.6 oz)   05/01/24 61.3 kg (135 lb 1.6 oz)   05/01/24 61.2 kg (134 lb 14.7 oz)   04/26/24 61 kg (134 lb 7.7 oz)         Physical Exam  Vitals reviewed.   Constitutional:       Appearance: Normal appearance. He is underweight.   HENT:      Head: Normocephalic and atraumatic.      Right Ear: External ear normal. No tenderness.      Left Ear: External ear normal. No tenderness.      Nose: Nose normal.      Mouth/Throat:      Mouth: No injury or oral lesions.      Tongue: Lesions present.      Pharynx: Oropharynx is clear. No posterior oropharyngeal erythema.      Comments: Edentulous  Eyes:      Extraocular Movements: Extraocular movements intact.      Conjunctiva/sclera: Conjunctivae normal.      Pupils: Pupils are equal, round, and reactive to light.   Neck:      Thyroid: No thyroid mass.      Comments: Mild to  moderate swelling in submental and neck region.   Right neck is is a bit sensitive/tender to palpation  Cardiovascular:      Rate and Rhythm: Normal rate and regular rhythm.   Pulmonary:      Effort: Pulmonary effort is normal. No respiratory distress.      Breath sounds: Normal breath sounds.   Abdominal:      General: Bowel sounds are normal. There is no distension or abdominal bruit.      Palpations: Abdomen is soft. There is no mass.      Tenderness: There is no abdominal tenderness.   Musculoskeletal:         General: Normal range of motion.      Cervical back: Normal range of motion and neck supple.      Right lower leg: No edema.      Left lower leg: No edema.   Lymphadenopathy:      Cervical: No cervical adenopathy.      Upper Body:      Right upper body: No axillary adenopathy.      Left upper body: No axillary adenopathy.   Skin:     General: Skin is warm and dry.      Findings: No lesion (radiation dermatitis in bilateral neck), rash or wound.   Neurological:      General: No focal deficit present.      Mental Status: He is alert and oriented to person, place, and time.      Gait: Gait is intact.   Psychiatric:         Mood and Affect: Mood and affect normal.         Diagnostic Results   The below labs were reviewed.  Results from last 7 days   Lab Units 06/28/24  0931   WBC AUTO x10*3/uL 2.2*   HEMOGLOBIN g/dL 10.5*   HEMATOCRIT % 32.4*   PLATELETS AUTO x10*3/uL 85*   NEUTROS ABS x10*3/uL 1.54*   LYMPHS ABS AUTO x10*3/uL 0.32*   MONOS ABS AUTO x10*3/uL 0.03*   EOS ABS AUTO x10*3/uL 0.27   NEUTROS PCT AUTO % 70.5   LYMPHS PCT AUTO % 14.7   MONOS PCT AUTO % 1.4   EOS PCT AUTO % 12.4        Results from last 7 days   Lab Units 06/28/24  0931   GLUCOSE mg/dL 98   SODIUM mmol/L 134*   POTASSIUM mmol/L 4.1   CHLORIDE mmol/L 98   CO2 mmol/L 28   BUN mg/dL 26*   CREATININE mg/dL 1.20   EGFR mL/min/1.73m*2 64   CALCIUM mg/dL 8.2*   MAGNESIUM mg/dL 1.91   ALBUMIN g/dL 3.4   PROTEIN TOTAL g/dL 5.4*   BILIRUBIN  TOTAL mg/dL 0.4   ALK PHOS U/L 48   ALT U/L 18   AST U/L 13                          Pathology      Imaging  1/10/2024 PET/CT  IMPRESSION:  1. Significant interval improvement in FDG avidity in the soft palate and left palatine tonsil, remaining FDG avidity along the left palatine tonsil consistent with persistent residual viable disease.  2. Nearly complete metabolic resolution of cervical yvette metastases with remaining mild FDG avid right cervical level 3 node seen on current study, likely representing residual yvette metastasis.  3. Newly developed FDG-avid pulmonary nodule in the left lower lobe as well as minimal FDG avid subcentimeter pulmonary nodules in bilateral upper lobes, concerning for lung metastasis.    1/10/2024 CT Neck w/ IV Contrast  IMPRESSION:  *Decreased size of the previously demonstrated right-sided yvette neck mass *No new adenopathy  *Post treatment changes in the hypopharynx as described    2/9/27 CT Chest w/o Contrast  IMPRESSION:  1. Multiple pulmonary nodules are again noted corresponding to FDG avid nodules on most recent PET-CT from 01/10/2024 and are new when compared to prior CT of the chest from 06/13/2023. The largest of which is a pleural-based left lower lobe nodule measuring up to 1.6 cm. Findings are concerning for metastatic disease.  2. Severe coronary artery calcifications, indicating the presence of coronary artery disease. If the patient has associated symptoms recommend management as per chest pain guidelines (e.g. https://doi.org/10.1161/CIR.5495353207535681). If the patient is asymptomatic consider reviewing modifiable cardiovascular risk factors and managing as per guidelines for primary prevention (e.g. https://doi.org/10.1161/CIR.8928313852298457).  4. Bulky atherosclerotic calcification at the origin of the left renal artery likely contributing to atrophy of the left kidney as compared to the right.   5. Additional findings as above.    3/27/2024 CT chest abdomen  pelvis w IV contrast  Impression:   1. Interval increase in size of left lower lobe pulmonary nodule when compared to CT chest 02/09/2024 correlating with FDG avidity on PET-CT 01/10/2024. No sites of new metastasis in the chest.   2. The previously seen clusters of pulmonary nodules in the left upper lobe have decreased in number since CT chest 09/20/2024 correlating with improving postradiation changes or bronchiolitis.   3. Stable diffuse heterogenous appearance of the axial skeleton, similar to study. No sites of new metastasis in the abdomen and pelvis.  4. Additional stable findings as above.     6/7/2024 CT soft tissue neck wo IV contrast  Impression:   Motion degraded examination. Assessment is also limited in the absence of intravenous contrast medium. No definite evidence of disease progression within the neck. The pharyngeal soft tissues are not appreciably changed in appearance. The right level 3 yvette component is poorly delineated/characterized in the setting of aforementioned limitations but does not appear significantly enlarged in the interim. No additional lymphadenopathy is evident within the neck soft tissues. Posttreatment change as above.   As compared with previous neck CT examination there is interval enlargement of a now 7 mm nodule within the left lung apex. Please see dedicated chest CT for complete evaluation.   Severe atherosclerotic vascular calcification.      6/8/2024 CT chest wo IV contrast  Impression:   1.  Interval increase in size of multiple pulmonary nodules in the left upper lobe and of a right middle lobe nodule, most consistent with worsening pulmonary metastatic disease.   2. New 1 cm nodular consolidative opacity in the right upper lobe with surrounding ground-glass attenuation. This may represent a new area of metastatic disease or may be infectious/inflammatory in etiology. A new 0.3 cm nodule in the left upper lobe may also be infectious/inflammatory or metastatic.   3.  Multiple chronic/incidental findings are similar compared to prior CT studies as detailed above including moderate emphysema, sequela of chronic bronchitis, and severe coronary artery calcifications.        Assessment/Plan    ASSESSMENT  Phong Wong is a 74 y.o. male with recent diagnosis of locally advanced oral cavity cancer. Completed concurrent chemoradiation with 7 weekly Carboplatin (2mg/AUC)/Paclitaxel (45mg/m2) on 10/4/2. Admitted 10/5 - 10/10 for extreme weakness, HECTOR, pancytopenia including anemia requiring blood transfusion. D/C'ed to acute rehab. Admitted  again 10/18 - 11/2/23 for lethargy, weakness, failure to thrive, anemia.     # Locally advanced oral cavity cancer, T3N2M0, now with met to lungs  - 6/30/23: PET/CT showed intense FDG avidity within the soft palate, extending to the base of the tongue and left palatine tonsil. Multiple uptake in left cervical level II nodes, right cervical level  II/III, infiltrating the right SCM muscle and encasing and invading the right jugular vein. FDG avidity was also detected in the region of the left fossa Rosenmuller and left medial pterygoid muscle.  - 7/13/23: pt is doing well, no pain, discussed with patient about carboplatin+ paclitaxel as his chemotherapy along with radiation due to his GFR 30s.  Chemo related side effects were reviewed with patient, consent obtained.   - Patient required dental extractions and as a result start date was delayed from 7/26 to 8/15/23  - 8/30: tolerating chemo well, does have some fatigue but no n/v. No peripheral neuropathy   - 9/6: Continue to tolerate chemo well, no n/v, is feeling more tired. Eating well, tolerating soft foods and supplementing with Boost VHC 2x per day. Denies peripheral neuropathy  - 9/13: continue to tolerate chemo well w/o n/v. Energy is lower but stable. Eating soft foods and Boost VHC x 2 per day  - 9/20: Tolerating chemo well, minimal odynophagia, does have some weight loss.  - 10/4: Completed  last dose of Carbo/Taxol last week. Recall patient did not have a [facility] visit as both providers are out of office. He had las RT today and noted to have significantly increased fatigue, weakness, weight loss in the last week resulting from decreased PO intake.   - 11/9/23: Feeling much improved since d/c from hospital on 11/2. Regaining strength and working with PT/OT at acute rehab. Odynophagia resolved, eating soft foods, has weight gain. Serum protein improved. Electrolytes wnl, mild hyponatremia, no c/f refeeding syndrome.    - 11/29/23: continue to feel very well, d/c'ed from rehab. Acute SEs of chemoRT are resolved. He's eating/drinking well with weight gain. Not using PEG currently. Has lymphedema from RT in the neck/submental region. Can consider lymphedema therapy after 3 month restaging PET showing XENA.   - 1/12/24: Patient is feeling well, tolerating soft PO intake. PEG removed today.   Reviewed 3 month restaging PET/CT with patient. Scans noted significant interval improvement in prior sites of disease though with residual FDG avidity in soft palate and left palatine tonsil, c/f residual disease vs post treatment changes. Also noted was a new FDG avid LLL pulm nodule with max SUV 5.7, c/f pulmonary metastasis. Will order STAT lung biopsy on this.   Pt scoped by Dr. Fletcher today, unfortunately was difficult to examine in office due to patient intolerance. Per Dr. Fletcher, may bring to OR to complete the examination depending on lung biopsy results. Scans will be reviewed at 1/19/24 [facility]. Explained in detail to patient and his SO scan results and next steps. They voiced understanding, all questions answered.   - 2/9/24: Patient presents with new painful left lateral tongue lesion, hindering PO intake. Will start Oxycodone 5mg Q6 hr PRN for pain control. Pt will see Dr. Fletcher next week for eval/possible biopsy. Patient will have repeat CT Chest for FDG avid LLL pulm nodule as prior biopsy attempt was  not successful due to small nodule size and difficult location near diaphragm.  -2/9/24 CT chest showed multiple subcentimeter noduesl 2-5mm, dominant lesion is 1.6cm in LLL.    -3/6/24: lung biopsy of LLL nodule was consistent with HN origin. [facility] pending including CPS.  Discussed with patient about phase 2 FLAM study (randomized to pembrolizumab or pembrolizumab+danvatirsen (Stat3 inhibitor), he is willing to be considered.   - Unfortunately due to his CKD, he's not eligible for FLAM study.  - 4/4/24: Patient continue to feel very well, no dysphagia, no throat or neck pain. No cough, breathing well.   - 5/1/24: C2 Pembro was delayed a week 2/2 patient not feeling well. He had struggled with hemorrhoids for a couple weeks. Otherwise tolerating treatment with no irAEs .  - 5/23/24: Patient doing well, no irAEs. Appetite and energy are good, tolerating solid PO intake. No odynophagia, no neck pain.  - 6/7/24: restaging can showed enlargement of subcentimeter lung mets, stable left lung nodule 2cm with new 1cm nodule in RUL. Based on heightened response with carbo+taxol after progression on PD, I recommended 3 cycles of carbo taxol and restaging, if there is response, we can consider afatinib treatment more as long term maintenance therapy.  Consent obtained for chemo and research blood.  - 6/19/24: Patient feeling well with no new complaints. Ok for C1 Q3 week Carbo/Taxol.  - 6/28/24: Patient feeling well and tolerating chemo. Did have watery diarrhea 1-2x per day for the first week after chemo but resolved on its on. No other new symptoms. Does still have tenderness in the right neck in area of prior RT. He did have a 1.5cm Level 2 LN in right neck on 6/7/24 CT Neck.     # Thrombocytopenia  - 6/28: Plt 85. No s/s of mucosal bleeds. Will continue to monitor    # Hemorrhoids  - Blood on stool most likely from internal hemorrhoid. Blood counts are stable. Bleeding has decreased Patient is taking Miralax to keep  stools soft.     # Anemia: improving  - Hgb has been dropping since starting chemo, however today his hgb is <7 and will require a blood transfusion. This is likely due to chemotherapy as there are no s/s of bleeds.   - Received 2U PRBC during 10/18 - 11/2 admission. Anemia likely due to chemotherapy  - 11/29/23: Hgb 8.8. Improved from 3 weeks ago. Expect Hbg will continue to trend up as patient recovers. Can consider iron studies.   - Hgb trending up since 02/2024    # CKD  - 2/9/24 CT chest showed Bulky atherosclerotic calcification at the origin of the left renal artery likely contributing to atrophy of the left kidney as compared to the right. Kidney atrophy was not mentioned in previous scans.   - 5/1: Creat 1.46, GFR 51. Encouraged patient to keep up with non-caffeinated PO hydration. 1L NS given today with Pembro. Patient to follow up with PCP regarding atherosclerosis.   - 6/12: Creat 1.54, GFR 48.   - 6/20: Creat 1.6. GFR 45. Additional 1L NS giving w/ chemo today  - 6/28: Creat 1.2. GFR 64. Pt reports he has increased PO hydration. Kidney function improved.     # Submental/Neck Lymphedema  - Patient with moderate swelling in the submental and neck region. Likely 2ry to prior radiation. Established w/ Dr Guerrero.   - 6/19: Right neck with more swelling and tender to touch, neck is tight. Still itchy to touch.   - 6/28: Right neck swelling persists and is tender to palpation. Will meet with Dr. Guerrero's team for massage on 7/10    PLAN  -- RTC 2 weeks for C2 Carboplatin + paclitaxel on 7/11  -- Continue ample PO hydration. Will consider adding 1L NS to every chemo day  -- Follow up with Dr. Weiss on atherosclerosis of left renal artery.   -- Continue stool softeners as needed, monitor further symptoms of hemorrhoids .   -- Call if diarrhea returns, can prescribe imodium PRN

## 2024-07-05 ASSESSMENT — ENCOUNTER SYMPTOMS
NECK PAIN: 0
SHORTNESS OF BREATH: 0
ABDOMINAL PAIN: 0
DIZZINESS: 0
DIARRHEA: 0
FREQUENCY: 0
DIFFICULTY URINATING: 0
HEADACHES: 0
FEVER: 0
WOUND: 0
DYSURIA: 0
COUGH: 0
TROUBLE SWALLOWING: 0
CHILLS: 0
CONSTIPATION: 0
LEG SWELLING: 0
SORE THROAT: 0
LIGHT-HEADEDNESS: 0
ARTHRALGIAS: 0
NAUSEA: 0
NUMBNESS: 0
VOMITING: 0

## 2024-07-05 NOTE — PROGRESS NOTES
Patient ID: Phong Wong is a 74 y.o. male.  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 Juan Jose Fletcher   MedOnc: Dr. Kyunghee Burkitt, X. Katherine Feng, PA-C   RadOnc: Dr. Vianca Steen     Current Therapy  4/4/24: Started Q3 week Pembrolizumab    Prior Therapy:   8/16 - 10/4/23: Recieved concurrent chemoradiation with 7 weekly Carboplatin (2mg/AUC)/Paclitaxel (45mg/m2)  and 70gy RT in 35fx     Sites of Disease:  Soft palate, BOT, Left palatine tonsil  B/L Cervical LN  Lung     Oncologic Issues:   Odynophagia  Fatigue     ONCOLOGIC HISTORY  - Pt had 2 months hx of throat discomfort with lump in right neck  - 6/13/23: CT neck showed a mass 2.8 x 2.5 x 2.9 cm in the right lower cervical neck soft tissues. Two suspicious nodes were observed, one at level 3 on the right and another at level 2 on the left.  - 6/28/23: seen by Dr. Fletcher. 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 Based on the findings, Dr. Fletcher did not recommend surgery due to the location  of the primary tumor and the presence of yvette disease  - 6/30/23: PET/CT showed intense FDG avidity within the soft palate, extending to the base of the tongue and left palatine tonsil. Multiple FDG avid left cervical level II nodes were observed, along with an FDG avid mass in the region of the right cervical  level II/III, infiltrating the right SCM muscle and encasing and invading the right jugular vein. FDG avidity was also detected in the region of the left fossa Rosenmuller and left medial pterygoid muscle.  - 8/16 - 10/4/23: Recieved concurrent chemoradiation with 7 weekly Carboplatin (2mg/AUC)/Paclitaxel (45mg/m2)  and 70gy RT in 35fx    Admissions:  10/5 - 10/10/23: Admitted for extreme weakness, HECTOR, pancytopenia including anemia requiring blood  transfusion. D/C'ed to acute rehab        Past Medical History:   Past Medical History:  2017: Personal history of diseases of the blood and blood-  forming organs and certain disorders involving the immune mechanism      Comment:  History of thrombocytosis   HTN, HLD, COPD, prostate cancer in 2017 (s/p radiation)  Surgical History:    Past Surgical History:   Procedure Laterality Date    CT ABDOMEN PELVIS ANGIOGRAM W AND/OR WO IV CONTRAST  9/3/2014    CT ABDOMEN PELVIS ANGIOGRAM W AND/OR WO IV CONTRAST 9/3/2014 [facility]    IR ANGIOGRAM AORTA ABDOMEN  2014    IR ANGIOGRAM AORTA ABDOMEN 2014 [facility]   Aortoiliofemoral vascular bypass in   Social History:    Social History     Socioeconomic History    Marital status:     Number of children: 1   Tobacco Use    Smoking status: Former     Current packs/day: 0.00     Average packs/day: 1 pack/day for 40.0 years (40.0 ttl pk-yrs)     Types: Cigarettes     Start date:      Quit date:      Years since quittin.5     Passive exposure: Past    Smokeless tobacco: Never   Substance and Sexual Activity    Alcohol use: Yes     Alcohol/week: 12.0 standard drinks of alcohol     Types: 12 Cans of beer per week    Drug use: Never     Social Determinants of Health     Financial Resource Strain: Low Risk  (10/20/2023)    Overall Financial Resource Strain (CARDIA)     Difficulty of Paying Living Expenses: Not very hard   Food Insecurity: No Food Insecurity (10/5/2023)    Hunger Vital Sign     Worried About Running Out of Food in the Last Year: Never true     Ran Out of Food in the Last Year: Never true   Transportation Needs: No Transportation Needs (10/20/2023)    PRAPARE - Transportation     Lack of Transportation (Medical): No     Lack of Transportation (Non-Medical): No   Physical Activity: Inactive (10/5/2023)    Exercise Vital Sign     Days of Exercise per Week: 0 days     Minutes of Exercise per Session: 0 min   Stress:  Stress Concern Present (10/5/2023)    [employer] - Occupational Stress Questionnaire     Feeling of Stress : To some extent   Social Connections: Moderately Integrated (10/5/2023)    Social Connection and Isolation Panel [NHANES]     Frequency of Communication with Friends and Family: More than three times a week     Frequency of Social Gatherings with Friends and Family: More than three times a week     Attends Denominational Services: Never     Active Member of Clubs or Organizations: Yes     Attends Club or Organization Meetings: Never     Marital Status:    Intimate Partner Violence: Not At Risk (10/5/2023)    Humiliation, Afraid, Rape, and Kick questionnaire     Fear of Current or Ex-Partner: No     Emotionally Abused: No     Physically Abused: No     Sexually Abused: No   Housing Stability: Low Risk  (10/20/2023)    Housing Stability Vital Sign     Unable to Pay for Housing in the Last Year: No     Number of Places Lived in the Last Year: 1     Unstable Housing in the Last Year: No      Family History:    Family History   Problem Relation Name Age of Onset    Aortic aneurysm Father          abdominal     Family Oncology History:    Cancer-related family history is not on file.      Subjective   Chief Complaint: Squamous Cell Carcinoma of oropharynx    HPI  Phong Wong is a 74 y.o. male with h/o locally advanced oral cavity cancer, completed concurrent chemoradiation with 7 weekly Carboplatin+Paclitaxel on 10/4/23. Unfortunately found with mets to lungs on 3 months restaging scan. CPS 3. Started Q3 week Pembrolizumab on 4/4/24. Received 3 cycles of Pembro , unfortunately lung nodules progressed. Started Q3 week carbo/Taxol on 6/19/24    Interval History  Patient presents today for  C2 Carbo/Taxol.    Patient reports he continues to feel very well.   He lost  a lot of hair after C1.   He did not have a recurrence of diarrhea in the last 2 weeks  Energy and appetite are good. He  take Olanzapine nightly. No nausea/vomiting. Has gained weight  Denies dry mouth, has saliva production, taste is normal.   He denies any urinary symptoms currently, no urgency, frequency, hesitancy, dysuria.     He continue to have tenderness in the right neck. He does have a swelling in the right neck. Note that patient had a CT Neck on 6/7/24 with motion degradation. There was a right level 3 yvette mass measuring 1.5cm in size on 1/10/24 and appeared the same size on 6/7/24. Patient has had persistent sensitivity in the right neck since completing chemoRT. Went to lymphedema massage yesterday and neck stiffness and tenderness did improve    ROS  Review of Systems   Constitutional:  Negative for chills and fever.   HENT:   Negative for lump/mass, mouth sores, nosebleeds, sore throat, tinnitus and trouble swallowing.    Respiratory:  Negative for cough and shortness of breath.    Cardiovascular:  Negative for chest pain and leg swelling.   Gastrointestinal:  Negative for abdominal pain, constipation, diarrhea, nausea and vomiting.   Genitourinary:  Negative for difficulty urinating, dysuria and frequency.    Musculoskeletal:  Negative for arthralgias and neck pain.   Skin:  Negative for rash and wound.   Neurological:  Negative for dizziness, headaches, light-headedness and numbness.       Allergies  Allergies   Allergen Reactions    Codeine Nausea Only        Medications  Current Outpatient Medications   Medication Instructions    amLODIPine-benazepriL (Lotrel) 5-20 mg capsule 155126 Medication amlodipine 5 mg-benazepril 20 mg capsule amlodipine 5 mg-benazepril 20 mg capsule 5-20 mg 10/2/2020 Active (Outside)    aspirin 81 mg EC tablet 1 tablet, oral, Daily    dexAMETHasone (DECADRON) 8 mg, oral, Daily, For 3 days starting the day after treatment.    doxazosin (CARDURA) 4 mg, oral, Daily, Take 1 tablet by mouth daily in the evening    fluticasone-umeclidin-vilanter (Trelegy Ellipta) 100-62.5-25 mcg blister with  device INHALE 1 PUFF EVERY DAY    ipratropium-albuteroL (Duo-Neb) 0.5-2.5 mg/3 mL nebulizer solution Take 3 mL by nebulization 3 times a day as needed for wheezing or shortness of breath.    losartan (COZAAR) 50 mg, oral, Daily    magnesium oxide (MAG-OX) 400 mg, oral, Daily    ofloxacin (Ocuflox) 0.3 % ophthalmic solution Instill 1 drop Left Eye 4 times a day    OLANZapine (ZYPREXA) 5 mg, oral, Nightly    ondansetron (ZOFRAN) 8 mg, oral, Every 8 hours PRN    prochlorperazine (COMPAZINE) 10 mg, oral, Every 6 hours PRN        Objective   VS:  /60 (BP Location: Left arm, Patient Position: Sitting, BP Cuff Size: Adult)   Pulse 101   Temp 36.2 °C (97.2 °F) (Temporal)   Resp 19   Wt 58.9 kg (129 lb 13.6 oz)   SpO2 96%   BMI 20.96 kg/m²   Weight   Wt Readings from Last 7 Encounters:   07/11/24 58.9 kg (129 lb 13.6 oz)   06/28/24 59.4 kg (130 lb 14.4 oz)   06/20/24 58.3 kg (128 lb 8.5 oz)   06/13/24 58.1 kg (128 lb 1.4 oz)   05/23/24 58.7 kg (129 lb 6.6 oz)   05/01/24 61.3 kg (135 lb 1.6 oz)   05/01/24 61.2 kg (134 lb 14.7 oz)         Physical Exam  Vitals reviewed.   Constitutional:       Appearance: Normal appearance. He is underweight.   HENT:      Head: Normocephalic and atraumatic.      Right Ear: External ear normal. No tenderness.      Left Ear: External ear normal. No tenderness.      Nose: Nose normal.      Mouth/Throat:      Mouth: No injury or oral lesions.      Tongue: Lesions present.      Pharynx: Oropharynx is clear. No posterior oropharyngeal erythema.      Comments: Edentulous  Eyes:      Extraocular Movements: Extraocular movements intact.      Conjunctiva/sclera: Conjunctivae normal.      Pupils: Pupils are equal, round, and reactive to light.   Neck:      Thyroid: No thyroid mass.      Comments: Mild to moderate swelling in submental and neck region.   Right neck is is a bit sensitive/tender to palpation  Cardiovascular:      Rate and Rhythm: Normal rate and regular rhythm.   Pulmonary:       Effort: Pulmonary effort is normal. No respiratory distress.      Breath sounds: Normal breath sounds.   Abdominal:      General: Bowel sounds are normal. There is no distension or abdominal bruit.      Palpations: Abdomen is soft. There is no mass.      Tenderness: There is no abdominal tenderness.   Musculoskeletal:         General: Normal range of motion.      Cervical back: Normal range of motion and neck supple.      Right lower leg: No edema.      Left lower leg: No edema.   Lymphadenopathy:      Cervical: No cervical adenopathy.      Upper Body:      Right upper body: No axillary adenopathy.      Left upper body: No axillary adenopathy.   Skin:     General: Skin is warm and dry.      Findings: No lesion (radiation dermatitis in bilateral neck), rash or wound.   Neurological:      General: No focal deficit present.      Mental Status: He is alert and oriented to person, place, and time.      Gait: Gait is intact.   Psychiatric:         Mood and Affect: Mood and affect normal.         Diagnostic Results   The below labs were reviewed.  Results from last 7 days   Lab Units 07/11/24  1009   WBC AUTO x10*3/uL 1.9*   HEMOGLOBIN g/dL 10.0*   HEMATOCRIT % 30.6*   PLATELETS AUTO x10*3/uL 237   EOS ABS MAN x10*3/uL 0.13   BANDS ABS MAN x10*3/uL 0.08   LYMPHO ABS MAN x10*3/uL 0.19*   MONO ABS MAN x10*3/uL 0.23   LYMPHO PCT MAN % 10.0   MONO PCT MAN % 12.0   EOSINO PCT MAN % 7.0          Results from last 7 days   Lab Units 07/11/24  1009   GLUCOSE mg/dL 109*   SODIUM mmol/L 138   POTASSIUM mmol/L 4.0   CHLORIDE mmol/L 102   CO2 mmol/L 27   BUN mg/dL 13   CREATININE mg/dL 1.38*   EGFR mL/min/1.73m*2 54*   CALCIUM mg/dL 9.1   MAGNESIUM mg/dL 1.71   ALBUMIN g/dL 3.5   PROTEIN TOTAL g/dL 6.0*   BILIRUBIN TOTAL mg/dL 0.3   ALK PHOS U/L 56   ALT U/L 11   AST U/L 12           Results from last 7 days   Lab Units 07/11/24  1009   TSH mIU/L 3.82                  Pathology      Imaging  1/10/2024 PET/CT  IMPRESSION:  1.  Significant interval improvement in FDG avidity in the soft palate and left palatine tonsil, remaining FDG avidity along the left palatine tonsil consistent with persistent residual viable disease.  2. Nearly complete metabolic resolution of cervical yvette metastases with remaining mild FDG avid right cervical level 3 node seen on current study, likely representing residual yvette metastasis.  3. Newly developed FDG-avid pulmonary nodule in the left lower lobe as well as minimal FDG avid subcentimeter pulmonary nodules in bilateral upper lobes, concerning for lung metastasis.    1/10/2024 CT Neck w/ IV Contrast  IMPRESSION:  *Decreased size of the previously demonstrated right-sided yvette neck mass *No new adenopathy  *Post treatment changes in the hypopharynx as described    2/9/27 CT Chest w/o Contrast  IMPRESSION:  1. Multiple pulmonary nodules are again noted corresponding to FDG avid nodules on most recent PET-CT from 01/10/2024 and are new when compared to prior CT of the chest from 06/13/2023. The largest of which is a pleural-based left lower lobe nodule measuring up to 1.6 cm. Findings are concerning for metastatic disease.  2. Severe coronary artery calcifications, indicating the presence of coronary artery disease. If the patient has associated symptoms recommend management as per chest pain guidelines (e.g. https://doi.org/10.1161/CIR.9687033105805758). If the patient is asymptomatic consider reviewing modifiable cardiovascular risk factors and managing as per guidelines for primary prevention (e.g. https://doi.org/10.1161/CIR.0965877501183180).  4. Bulky atherosclerotic calcification at the origin of the left renal artery likely contributing to atrophy of the left kidney as compared to the right.   5. Additional findings as above.    3/27/2024 CT chest abdomen pelvis w IV contrast  Impression:   1. Interval increase in size of left lower lobe pulmonary nodule when compared to CT chest 02/09/2024  correlating with FDG avidity on PET-CT 01/10/2024. No sites of new metastasis in the chest.   2. The previously seen clusters of pulmonary nodules in the left upper lobe have decreased in number since CT chest 09/20/2024 correlating with improving postradiation changes or bronchiolitis.   3. Stable diffuse heterogenous appearance of the axial skeleton, similar to study. No sites of new metastasis in the abdomen and pelvis.  4. Additional stable findings as above.     6/7/2024 CT soft tissue neck wo IV contrast  Impression:   Motion degraded examination. Assessment is also limited in the absence of intravenous contrast medium. No definite evidence of disease progression within the neck. The pharyngeal soft tissues are not appreciably changed in appearance. The right level 3 yvette component is poorly delineated/characterized in the setting of aforementioned limitations but does not appear significantly enlarged in the interim. No additional lymphadenopathy is evident within the neck soft tissues. Posttreatment change as above.   As compared with previous neck CT examination there is interval enlargement of a now 7 mm nodule within the left lung apex. Please see dedicated chest CT for complete evaluation.   Severe atherosclerotic vascular calcification.      6/8/2024 CT chest wo IV contrast  Impression:   1.  Interval increase in size of multiple pulmonary nodules in the left upper lobe and of a right middle lobe nodule, most consistent with worsening pulmonary metastatic disease.   2. New 1 cm nodular consolidative opacity in the right upper lobe with surrounding ground-glass attenuation. This may represent a new area of metastatic disease or may be infectious/inflammatory in etiology. A new 0.3 cm nodule in the left upper lobe may also be infectious/inflammatory or metastatic.   3. Multiple chronic/incidental findings are similar compared to prior CT studies as detailed above including moderate emphysema, sequela of  chronic bronchitis, and severe coronary artery calcifications.        Assessment/Plan    ASSESSMENT  Phong Wong is a 74 y.o. male with recent diagnosis of locally advanced oral cavity cancer. Completed concurrent chemoradiation with 7 weekly Carboplatin (2mg/AUC)/Paclitaxel (45mg/m2) on 10/4/2. Admitted 10/5 - 10/10 for extreme weakness, HECTOR, pancytopenia including anemia requiring blood transfusion. D/C'ed to acute rehab. Admitted  again 10/18 - 11/2/23 for lethargy, weakness, failure to thrive, anemia.     # Locally advanced oral cavity cancer, T3N2M0, now with met to lungs  - 6/30/23: PET/CT showed intense FDG avidity within the soft palate, extending to the base of the tongue and left palatine tonsil. Multiple uptake in left cervical level II nodes, right cervical level  II/III, infiltrating the right SCM muscle and encasing and invading the right jugular vein. FDG avidity was also detected in the region of the left fossa Rosenmuller and left medial pterygoid muscle.  - 7/13/23: pt is doing well, no pain, discussed with patient about carboplatin+ paclitaxel as his chemotherapy along with radiation due to his GFR 30s.  Chemo related side effects were reviewed with patient, consent obtained.   - 8/16 - 10/4/23: Recieved concurrent chemoradiation with 7 weekly Carboplatin (2mg/AUC)/Paclitaxel (45mg/m2)  and 70gy RT in 35fx     - 1/12/24: Patient is feeling well, tolerating soft PO intake. PEG removed today.   Reviewed 1/10/24 restaging PET/CT with patient. Scans noted significant interval improvement in prior sites of disease though with residual FDG avidity in soft palate and left palatine tonsil, c/f residual disease vs post treatment changes. Also noted was a new FDG avid LLL pulm nodule with max SUV 5.7, c/f pulmonary metastasis. Will order STAT lung biopsy on this.   Pt scoped by Dr. Fletcher today, unfortunately was difficult to examine in office due to patient intolerance. Per Dr. Fletcher, may bring to OR to  complete the examination depending on lung biopsy results. Scans will be reviewed at 1/19/24 [facility].  - 2/9/24: Patient presents with new painful left lateral tongue lesion, hindering PO intake. Will start Oxycodone 5mg Q6 hr PRN for pain control. Pt will see Dr. Fletcher next week for eval/possible biopsy. Patient will have repeat CT Chest for FDG avid LLL pulm nodule as prior biopsy attempt was not successful due to small nodule size and difficult location near diaphragm.  -2/9/24 CT chest showed multiple subcentimeter noduesl 2-5mm, dominant lesion is 1.6cm in LLL.    -3/6/24: Lung biopsy of LLL nodule was consistent with HN origin. CPS 3.  Discussed with patient about phase 2 FLAM study (randomized to pembrolizumab or pembrolizumab+danvatirsen (Stat3 inhibitor). Unfortunately due to his CKD, he's not eligible for FLAM study.  - 4/4/24: Started Q3 week Pembrolizumab  - 5/1/24: C2 Pembro was delayed a week 2/2 patient not feeling well. He had struggled with hemorrhoids for a couple weeks. Otherwise tolerating treatment with no irAEs .  - 5/23/24: Patient doing well, no irAEs. Appetite and energy are good, tolerating solid PO intake. No odynophagia, no neck pain.  - 6/7/24 restaging CT N/C/A/P showed enlargement of subcentimeter lung mets, stable left lung nodule 2cm with new 1cm nodule in RUL. Based on heightened response with carbo+taxol after progression on PD, I recommended 3 cycles of carbo taxol and restaging, if there is response, we can consider afatinib treatment more as long term maintenance therapy.  Consent obtained for chemo and research blood.  - 6/19/24: Patient feeling well with no new complaints. Ok for C1 Q3 week Carbo/Taxol.  - 6/28/24: Patient feeling well and tolerating chemo. Did have watery diarrhea 1-2x per day for the first week after chemo but resolved on its on. No other new symptoms. Does still have tenderness in the right neck in area of prior RT. He did have a 1.5cm Level 2 LN in right  neck on 6/7/24 CT Neck.   - 7/11/24: Patient doing well, no new symptoms, no recurrence of diarrhea. Ok for C2 carbo/taxol. Slightly neutropenic today, ANC 1320. Neutropenic precaution given to patient and his SO.    # Thrombocytopenia  - 6/28: Plt 85. No s/s of mucosal bleeds. Will continue to monitor  - 7/11: Plt 237.     # Hemorrhoids  - Blood on stool most likely from internal hemorrhoid. Blood counts are stable. Bleeding has decreased Patient is taking Miralax to keep stools soft.     # Anemia: improving  - Hgb has been dropping since starting chemo, however today his hgb is <7 and will require a blood transfusion. This is likely due to chemotherapy as there are no s/s of bleeds.   - Received 2U PRBC during 10/18 - 11/2 admission. Anemia likely due to chemotherapy  - 11/29/23: Hgb 8.8. Improved from 3 weeks ago. Expect Hbg will continue to trend up as patient recovers. Can consider iron studies.   - Hgb trending up since 02/2024    # CKD  - 2/9/24 CT chest showed Bulky atherosclerotic calcification at the origin of the left renal artery likely contributing to atrophy of the left kidney as compared to the right. Kidney atrophy was not mentioned in previous scans.   - 5/1: Creat 1.46, GFR 51. Encouraged patient to keep up with non-caffeinated PO hydration. 1L NS given today with Pembro. Patient to follow up with PCP regarding atherosclerosis.   - 6/12: Creat 1.54, GFR 48.   - 6/20: Creat 1.6. GFR 45. Additional 1L NS giving w/ chemo today  - 6/28: Creat 1.2. GFR 64. Pt reports he has increased PO hydration. Kidney function improved.   - 7/11 Creat 1.38, GFR 54. Pt reports decreased PO intake lately. Will give 1L NS for hydration    # Submental/Neck Lymphedema  - Patient with moderate swelling in the submental and neck region. Likely 2ry to prior radiation. Established w/ Dr Guerrero.   - 6/19: Right neck with more swelling and tender to touch, neck is tight. Still itchy to touch.   - 6/28: Right neck swelling persists  and is tender to palpation. Will meet with Dr. Guerrero's team for massage on 7/10  - 7/11: Right neck swelling decreased after lymphedema massage    # Poor Venous Access  - Informed by infusion RN 5 attempts were made before patient had IV access, 2 were with IV US. Discussed mediport with patient but he was hesitant. Given patient likely will not have more than 4 more cycles of Carbo/Taxol, we will hold off on mediport placement and ask that the IV team places his IV with his infusions.    PLAN  -- Proceed to C2 Carbo/Taxol  -- RTC 1 weeks for tox check on 7/18  -- Continue ample PO hydration. Adding 1L NS to every chemo day  -- Follow up with Dr. Weiss on atherosclerosis of left renal artery.   -- Continue stool softeners as needed, monitor further symptoms of hemorrhoids .   -- Call if diarrhea returns, can prescribe imodium PRN

## 2024-07-10 ENCOUNTER — APPOINTMENT (OUTPATIENT)
Dept: INTEGRATIVE MEDICINE | Facility: CLINIC | Age: 74
End: 2024-07-10
Payer: MEDICARE

## 2024-07-10 DIAGNOSIS — Z71.3 NUTRITIONAL COUNSELING: ICD-10-CM

## 2024-07-10 DIAGNOSIS — M79.10 MYALGIA: ICD-10-CM

## 2024-07-10 DIAGNOSIS — C10.9 OROPHARYNGEAL CANCER (MULTI): ICD-10-CM

## 2024-07-10 DIAGNOSIS — I89.0 LYMPHEDEMA: Primary | ICD-10-CM

## 2024-07-10 PROCEDURE — 97140 MANUAL THERAPY 1/> REGIONS: CPT | Performed by: HOSPITALIST

## 2024-07-10 ASSESSMENT — PAIN SCALES - GENERAL: PAINLEVEL_OUTOF10: 0 - NO PAIN

## 2024-07-10 NOTE — PROGRESS NOTES
Massage Therapy Visit:     Phong Wong was referred by Dr Guerrero.    Condition of Client Subjective :  Patient ID: Phong Wong is a 74 y.o. male who presents for a 50 minute Reinaldo Therapy.  Patient is being treated for oral cancer.  I focused on his neck, shoulders and face.  Patient stated that his face is swollen.  I did Reinaldo Lymph Drainage (MLD) on his face.  He came in a wheelchair.  He came with his partner Tomasa.  I gave the him self care tips.  With the patient's permission I showed Tomasa how to gently work on him.  Patient also had low back discomfort.  I had the patient sit on the side of the bed and I worked on his back.  Patient did notice an improvement.       Session Information  Visit Type: New patient  Description of present complaint: Discomfort, Muscle tension        Objective   Pre-treatment Assessment  Pain Score: 0 - No pain  Anxiety Level (0-10): 0  Stress Level (0-10): 0  Coping Level (0-10): 10  Depression Level (0-10): 0  Fatigue Level (0-10): 2  Nausea Level (0-10): 0  Wellbeing Level (0-10): 1      Actions Assessment/Plan :  Provider reviewed plan for the massage session, precautions and contraindications. Patient/guardian/hospital staff has given consent to treat with full understanding of what to expect during the session. Before massage therapy began, provider explained to the patient to communicate at any time if the pressure was causing discomfort past their tolerance level. Patient agreed to advise therapist.    Massage Treatment  Patient Position: Table  Positioning Assistance: Did not need assistance, Pillow(s)/bolster under knees while supine  Massage Technique: Lymphatic drainiage, Relaxation massage  Pressure Scale: 1 - Light pressure, 2 - Mild pressure    Response:  Post-treatment Assessment  Patient Noted Improvement of the Following Symptoms: Muscle tension, ROM  0-10 (Numeric) Pain Score: 0 - No pain  Anxiety Level (0-10): 0  Stress Level (0-10): 0  Coping Level (0-10):  10  Depression Level (0-10): 0  Fatigue Level (0-10): 0  Nausea Level (0-10): 0  Wellbeing Level (0-10): 0    Evaluation:   Patient will follow up as needed.

## 2024-07-11 ENCOUNTER — OFFICE VISIT (OUTPATIENT)
Dept: HEMATOLOGY/ONCOLOGY | Facility: HOSPITAL | Age: 74
End: 2024-07-11
Payer: MEDICARE

## 2024-07-11 ENCOUNTER — INFUSION (OUTPATIENT)
Dept: HEMATOLOGY/ONCOLOGY | Facility: HOSPITAL | Age: 74
End: 2024-07-11
Payer: MEDICARE

## 2024-07-11 ENCOUNTER — LAB (OUTPATIENT)
Dept: LAB | Facility: HOSPITAL | Age: 74
End: 2024-07-11
Payer: MEDICARE

## 2024-07-11 VITALS
RESPIRATION RATE: 19 BRPM | WEIGHT: 129.85 LBS | TEMPERATURE: 97.2 F | HEART RATE: 101 BPM | DIASTOLIC BLOOD PRESSURE: 60 MMHG | OXYGEN SATURATION: 96 % | SYSTOLIC BLOOD PRESSURE: 124 MMHG | BODY MASS INDEX: 20.96 KG/M2

## 2024-07-11 DIAGNOSIS — T45.1X5A CHEMOTHERAPY-INDUCED NEUTROPENIA (CMS-HCC): ICD-10-CM

## 2024-07-11 DIAGNOSIS — C78.02 SQUAMOUS CELL CARCINOMA METASTATIC TO BOTH LUNGS (MULTI): Primary | ICD-10-CM

## 2024-07-11 DIAGNOSIS — C10.9 OROPHARYNGEAL CANCER (MULTI): ICD-10-CM

## 2024-07-11 DIAGNOSIS — C10.9 OROPHARYNGEAL CANCER (MULTI): Primary | ICD-10-CM

## 2024-07-11 DIAGNOSIS — D70.1 CHEMOTHERAPY-INDUCED NEUTROPENIA (CMS-HCC): ICD-10-CM
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
## 2024-07-11 DIAGNOSIS — C78.01 SQUAMOUS CELL CARCINOMA METASTATIC TO BOTH LUNGS (MULTI): Primary | ICD-10-CM

## 2024-07-11 DIAGNOSIS — I87.8 POOR VENOUS ACCESS: ICD-10-CM

## 2024-07-11 DIAGNOSIS — Z51.11 ENCOUNTER FOR ANTINEOPLASTIC CHEMOTHERAPY: ICD-10-CM

## 2024-07-11 DIAGNOSIS — C78.02 SQUAMOUS CELL CARCINOMA METASTATIC TO BOTH LUNGS (MULTI): ICD-10-CM

## 2024-07-11 DIAGNOSIS — N18.31 STAGE 3A CHRONIC KIDNEY DISEASE (MULTI): ICD-10-CM

## 2024-07-11 DIAGNOSIS — D69.6 THROMBOCYTOPENIA (CMS-HCC): ICD-10-CM

## 2024-07-11 DIAGNOSIS — I89.0 LYMPHEDEMA DUE TO AND NOT CONCURRENT WITH IONIZING RADIOTHERAPY: ICD-10-CM

## 2024-07-11 DIAGNOSIS — C78.01 SQUAMOUS CELL CARCINOMA METASTATIC TO BOTH LUNGS (MULTI): ICD-10-CM

## 2024-07-11 LAB
ALBUMIN SERPL BCP-MCNC: 3.5 G/DL (ref 3.4–5)
ALP SERPL-CCNC: 56 U/L (ref 33–136)
ALT SERPL W P-5'-P-CCNC: 11 U/L (ref 10–52)
ANION GAP SERPL CALC-SCNC: 13 MMOL/L (ref 10–20)
AST SERPL W P-5'-P-CCNC: 12 U/L (ref 9–39)
BASO STIPL BLD QL SMEAR: PRESENT
BASOPHILS # BLD MANUAL: 0 X10*3/UL (ref 0–0.1)
BASOPHILS NFR BLD MANUAL: 0 %
BILIRUB SERPL-MCNC: 0.3 MG/DL (ref 0–1.2)
BUN SERPL-MCNC: 13 MG/DL (ref 6–23)
CALCIUM SERPL-MCNC: 9.1 MG/DL (ref 8.6–10.3)
CHLORIDE SERPL-SCNC: 102 MMOL/L (ref 98–107)
CO2 SERPL-SCNC: 27 MMOL/L (ref 21–32)
CREAT SERPL-MCNC: 1.38 MG/DL (ref 0.5–1.3)
DACRYOCYTES BLD QL SMEAR: ABNORMAL
EGFRCR SERPLBLD CKD-EPI 2021: 54 ML/MIN/1.73M*2
EOSINOPHIL # BLD MANUAL: 0.13 X10*3/UL (ref 0–0.4)
EOSINOPHIL NFR BLD MANUAL: 7 %
ERYTHROCYTE [DISTWIDTH] IN BLOOD BY AUTOMATED COUNT: 16.3 % (ref 11.5–14.5)
GLUCOSE SERPL-MCNC: 109 MG/DL (ref 74–99)
HCT VFR BLD AUTO: 30.6 % (ref 41–52)
HGB BLD-MCNC: 10 G/DL (ref 13.5–17.5)
HOLD SPECIMEN: NORMAL
IMM GRANULOCYTES # BLD AUTO: 0.01 X10*3/UL (ref 0–0.5)
IMM GRANULOCYTES NFR BLD AUTO: 0.5 % (ref 0–0.9)
INR PPP: 1.1 (ref 0.9–1.1)
LYMPHOCYTES # BLD MANUAL: 0.19 X10*3/UL (ref 0.8–3)
LYMPHOCYTES NFR BLD MANUAL: 10 %
MAGNESIUM SERPL-MCNC: 1.71 MG/DL (ref 1.6–2.4)
MCH RBC QN AUTO: 27.5 PG (ref 26–34)
MCHC RBC AUTO-ENTMCNC: 32.7 G/DL (ref 32–36)
MCV RBC AUTO: 84 FL (ref 80–100)
MONOCYTES # BLD MANUAL: 0.23 X10*3/UL (ref 0.05–0.8)
MONOCYTES NFR BLD MANUAL: 12 %
NEUTROPHILS # BLD MANUAL: 1.32 X10*3/UL (ref 1.6–5.5)
NEUTS BAND # BLD MANUAL: 0.08 X10*3/UL (ref 0–0.5)
NEUTS BAND NFR BLD MANUAL: 4 %
NEUTS SEG # BLD MANUAL: 1.24 X10*3/UL (ref 1.6–5)
NEUTS SEG NFR BLD MANUAL: 65 %
NRBC BLD-RTO: 0 /100 WBCS (ref 0–0)
OVALOCYTES BLD QL SMEAR: ABNORMAL
PLATELET # BLD AUTO: 237 X10*3/UL (ref 150–450)
PLATELET CLUMP BLD QL SMEAR: PRESENT
POLYCHROMASIA BLD QL SMEAR: ABNORMAL
POTASSIUM SERPL-SCNC: 4 MMOL/L (ref 3.5–5.3)
PROT SERPL-MCNC: 6 G/DL (ref 6.4–8.2)
PROTHROMBIN TIME: 12.8 SECONDS (ref 9.8–12.8)
RBC # BLD AUTO: 3.64 X10*6/UL (ref 4.5–5.9)
RBC MORPH BLD: ABNORMAL
SCHISTOCYTES BLD QL SMEAR: ABNORMAL
SODIUM SERPL-SCNC: 138 MMOL/L (ref 136–145)
TOTAL CELLS COUNTED BLD: 100
TSH SERPL-ACNC: 3.82 MIU/L (ref 0.44–3.98)
VARIANT LYMPHS # BLD MANUAL: 0.04 X10*3/UL (ref 0–0.3)
VARIANT LYMPHS NFR BLD: 2 %
WBC # BLD AUTO: 1.9 X10*3/UL (ref 4.4–11.3)

## 2024-07-11 PROCEDURE — 99215 OFFICE O/P EST HI 40 MIN: CPT | Performed by: STUDENT IN AN ORGANIZED HEALTH CARE EDUCATION/TRAINING PROGRAM

## 2024-07-11 PROCEDURE — 85007 BL SMEAR W/DIFF WBC COUNT: CPT

## 2024-07-11 PROCEDURE — 84443 ASSAY THYROID STIM HORMONE: CPT

## 2024-07-11 PROCEDURE — 2500000004 HC RX 250 GENERAL PHARMACY W/ HCPCS (ALT 636 FOR OP/ED): Performed by: STUDENT IN AN ORGANIZED HEALTH CARE EDUCATION/TRAINING PROGRAM

## 2024-07-11 PROCEDURE — 96367 TX/PROPH/DG ADDL SEQ IV INF: CPT

## 2024-07-11 PROCEDURE — 96413 CHEMO IV INFUSION 1 HR: CPT

## 2024-07-11 PROCEDURE — 96417 CHEMO IV INFUS EACH ADDL SEQ: CPT

## 2024-07-11 PROCEDURE — 1126F AMNT PAIN NOTED NONE PRSNT: CPT | Performed by: STUDENT IN AN ORGANIZED HEALTH CARE EDUCATION/TRAINING PROGRAM

## 2024-07-11 PROCEDURE — 1160F RVW MEDS BY RX/DR IN RCRD: CPT | Performed by: STUDENT IN AN ORGANIZED HEALTH CARE EDUCATION/TRAINING PROGRAM

## 2024-07-11 PROCEDURE — 2500000001 HC RX 250 WO HCPCS SELF ADMINISTERED DRUGS (ALT 637 FOR MEDICARE OP): Performed by: STUDENT IN AN ORGANIZED HEALTH CARE EDUCATION/TRAINING PROGRAM

## 2024-07-11 PROCEDURE — 96415 CHEMO IV INFUSION ADDL HR: CPT

## 2024-07-11 PROCEDURE — 96361 HYDRATE IV INFUSION ADD-ON: CPT | Mod: INF

## 2024-07-11 PROCEDURE — 1036F TOBACCO NON-USER: CPT | Performed by: STUDENT IN AN ORGANIZED HEALTH CARE EDUCATION/TRAINING PROGRAM

## 2024-07-11 PROCEDURE — 96375 TX/PRO/DX INJ NEW DRUG ADDON: CPT | Mod: INF

## 2024-07-11 PROCEDURE — 84075 ASSAY ALKALINE PHOSPHATASE: CPT

## 2024-07-11 PROCEDURE — 1159F MED LIST DOCD IN RCRD: CPT | Performed by: STUDENT IN AN ORGANIZED HEALTH CARE EDUCATION/TRAINING PROGRAM

## 2024-07-11 PROCEDURE — 3074F SYST BP LT 130 MM HG: CPT | Performed by: STUDENT IN AN ORGANIZED HEALTH CARE EDUCATION/TRAINING PROGRAM

## 2024-07-11 PROCEDURE — 85610 PROTHROMBIN TIME: CPT

## 2024-07-11 PROCEDURE — 85027 COMPLETE CBC AUTOMATED: CPT

## 2024-07-11 PROCEDURE — 3078F DIAST BP <80 MM HG: CPT | Performed by: STUDENT IN AN ORGANIZED HEALTH CARE EDUCATION/TRAINING PROGRAM

## 2024-07-11 PROCEDURE — 83735 ASSAY OF MAGNESIUM: CPT

## 2024-07-11 PROCEDURE — 36415 COLL VENOUS BLD VENIPUNCTURE: CPT

## 2024-07-11 RX ORDER — EPINEPHRINE 0.3 MG/.3ML
0.3 INJECTION SUBCUTANEOUS EVERY 5 MIN PRN
Status: CANCELLED | OUTPATIENT
Start: 2024-07-11

## 2024-07-11 RX ORDER — PROCHLORPERAZINE MALEATE 10 MG
10 TABLET ORAL EVERY 6 HOURS PRN
Status: CANCELLED | OUTPATIENT
Start: 2024-07-11

## 2024-07-11 RX ORDER — FAMOTIDINE 10 MG/ML
20 INJECTION INTRAVENOUS ONCE
Status: CANCELLED | OUTPATIENT
Start: 2024-07-11

## 2024-07-11 RX ORDER — DIPHENHYDRAMINE HCL 50 MG
50 CAPSULE ORAL ONCE
Status: CANCELLED | OUTPATIENT
Start: 2024-07-11

## 2024-07-11 RX ORDER — DIPHENHYDRAMINE HCL 50 MG
50 CAPSULE ORAL ONCE
Status: COMPLETED | OUTPATIENT
Start: 2024-07-11 | End: 2024-07-11

## 2024-07-11 RX ORDER — ALBUTEROL SULFATE 0.83 MG/ML
3 SOLUTION RESPIRATORY (INHALATION) AS NEEDED
Status: CANCELLED | OUTPATIENT
Start: 2024-07-11

## 2024-07-11 RX ORDER — HEPARIN SODIUM,PORCINE/PF 10 UNIT/ML
50 SYRINGE (ML) INTRAVENOUS AS NEEDED
OUTPATIENT
Start: 2024-07-11

## 2024-07-11 RX ORDER — FAMOTIDINE 10 MG/ML
20 INJECTION INTRAVENOUS ONCE AS NEEDED
Status: CANCELLED | OUTPATIENT
Start: 2024-07-11

## 2024-07-11 RX ORDER — PALONOSETRON 0.05 MG/ML
0.25 INJECTION, SOLUTION INTRAVENOUS ONCE
Status: CANCELLED | OUTPATIENT
Start: 2024-07-11

## 2024-07-11 RX ORDER — PROCHLORPERAZINE MALEATE 10 MG
10 TABLET ORAL EVERY 6 HOURS PRN
Status: DISCONTINUED | OUTPATIENT
Start: 2024-07-11 | End: 2024-07-11 | Stop reason: HOSPADM

## 2024-07-11 RX ORDER — DEXAMETHASONE IN 0.9 % SOD CHL 20 MG/50ML
20 INTRAVENOUS SOLUTION, PIGGYBACK (ML) INTRAVENOUS ONCE
Status: CANCELLED | OUTPATIENT
Start: 2024-07-11

## 2024-07-11 RX ORDER — FAMOTIDINE 10 MG/ML
20 INJECTION INTRAVENOUS ONCE
Status: COMPLETED | OUTPATIENT
Start: 2024-07-11 | End: 2024-07-11

## 2024-07-11 RX ORDER — DIPHENHYDRAMINE HYDROCHLORIDE 50 MG/ML
50 INJECTION INTRAMUSCULAR; INTRAVENOUS AS NEEDED
Status: CANCELLED | OUTPATIENT
Start: 2024-07-11

## 2024-07-11 RX ORDER — PROCHLORPERAZINE EDISYLATE 5 MG/ML
10 INJECTION INTRAMUSCULAR; INTRAVENOUS EVERY 6 HOURS PRN
Status: CANCELLED | OUTPATIENT
Start: 2024-07-11

## 2024-07-11 RX ORDER — HEPARIN 100 UNIT/ML
500 SYRINGE INTRAVENOUS AS NEEDED
OUTPATIENT
Start: 2024-07-11

## 2024-07-11 RX ORDER — DEXAMETHASONE IN 0.9 % SOD CHL 20 MG/50ML
20 INTRAVENOUS SOLUTION, PIGGYBACK (ML) INTRAVENOUS ONCE
Status: COMPLETED | OUTPATIENT
Start: 2024-07-11 | End: 2024-07-11

## 2024-07-11 RX ORDER — PROCHLORPERAZINE EDISYLATE 5 MG/ML
10 INJECTION INTRAMUSCULAR; INTRAVENOUS EVERY 6 HOURS PRN
Status: DISCONTINUED | OUTPATIENT
Start: 2024-07-11 | End: 2024-07-11 | Stop reason: HOSPADM

## 2024-07-11 RX ORDER — PALONOSETRON 0.05 MG/ML
0.25 INJECTION, SOLUTION INTRAVENOUS ONCE
Status: COMPLETED | OUTPATIENT
Start: 2024-07-11 | End: 2024-07-11

## 2024-07-11 ASSESSMENT — PAIN SCALES - GENERAL: PAINLEVEL: 0-NO PAIN

## 2024-07-11 NOTE — PROGRESS NOTES
Patient arrived to unit for scheduled treatment C2D1 3 Hour Taxol/Carboplatin. Seen in clinic today by NORBERTO Patten. Denies new complaints at this time. PIV placed after fourth attempt by IV team. Research tubes drawn prior to chemotherapy. Tolerated treatment without incident. PIV removed. Discharged ambulatory and in stable condition.

## 2024-07-12 DIAGNOSIS — J43.9 PULMONARY EMPHYSEMA, UNSPECIFIED EMPHYSEMA TYPE (MULTI): ICD-10-CM

## 2024-07-15 RX ORDER — FLUTICASONE FUROATE, UMECLIDINIUM BROMIDE AND VILANTEROL TRIFENATATE 100; 62.5; 25 UG/1; UG/1; UG/1
POWDER RESPIRATORY (INHALATION)
Qty: 3 EACH | Refills: 3 | Status: SHIPPED | OUTPATIENT
Start: 2024-07-15

## 2024-07-18 ENCOUNTER — OFFICE VISIT (OUTPATIENT)
Dept: HEMATOLOGY/ONCOLOGY | Facility: HOSPITAL | Age: 74
End: 2024-07-18
Payer: MEDICARE

## 2024-07-18 ENCOUNTER — LAB (OUTPATIENT)
Dept: LAB | Facility: HOSPITAL | Age: 74
End: 2024-07-18
Payer: MEDICARE

## 2024-07-18 VITALS
BODY MASS INDEX: 21.56 KG/M2 | DIASTOLIC BLOOD PRESSURE: 86 MMHG | SYSTOLIC BLOOD PRESSURE: 142 MMHG | OXYGEN SATURATION: 98 % | RESPIRATION RATE: 18 BRPM | HEART RATE: 88 BPM | TEMPERATURE: 97.7 F | WEIGHT: 133.6 LBS

## 2024-07-18 DIAGNOSIS — C10.9 OROPHARYNGEAL CANCER (MULTI): ICD-10-CM

## 2024-07-18 LAB
ALBUMIN SERPL BCP-MCNC: 3.5 G/DL (ref 3.4–5)
ALP SERPL-CCNC: 48 U/L (ref 33–136)
ALT SERPL W P-5'-P-CCNC: 12 U/L (ref 10–52)
ANION GAP SERPL CALC-SCNC: 11 MMOL/L (ref 10–20)
AST SERPL W P-5'-P-CCNC: 11 U/L (ref 9–39)
BASOPHILS # BLD AUTO: 0.02 X10*3/UL (ref 0–0.1)
BASOPHILS NFR BLD AUTO: 1.1 %
BILIRUB SERPL-MCNC: 0.3 MG/DL (ref 0–1.2)
BUN SERPL-MCNC: 22 MG/DL (ref 6–23)
CALCIUM SERPL-MCNC: 8.5 MG/DL (ref 8.6–10.3)
CHLORIDE SERPL-SCNC: 98 MMOL/L (ref 98–107)
CO2 SERPL-SCNC: 28 MMOL/L (ref 21–32)
CREAT SERPL-MCNC: 1.22 MG/DL (ref 0.5–1.3)
DACRYOCYTES BLD QL SMEAR: NORMAL
EGFRCR SERPLBLD CKD-EPI 2021: 63 ML/MIN/1.73M*2
EOSINOPHIL # BLD AUTO: 0.05 X10*3/UL (ref 0–0.4)
EOSINOPHIL NFR BLD AUTO: 2.7 %
ERYTHROCYTE [DISTWIDTH] IN BLOOD BY AUTOMATED COUNT: 16.2 % (ref 11.5–14.5)
GLUCOSE SERPL-MCNC: 93 MG/DL (ref 74–99)
HCT VFR BLD AUTO: 29.5 % (ref 41–52)
HGB BLD-MCNC: 9.5 G/DL (ref 13.5–17.5)
IMM GRANULOCYTES # BLD AUTO: 0.02 X10*3/UL (ref 0–0.5)
IMM GRANULOCYTES NFR BLD AUTO: 1.1 % (ref 0–0.9)
LYMPHOCYTES # BLD AUTO: 0.31 X10*3/UL (ref 0.8–3)
LYMPHOCYTES NFR BLD AUTO: 16.7 %
MAGNESIUM SERPL-MCNC: 1.89 MG/DL (ref 1.6–2.4)
MCH RBC QN AUTO: 27.3 PG (ref 26–34)
MCHC RBC AUTO-ENTMCNC: 32.2 G/DL (ref 32–36)
MCV RBC AUTO: 85 FL (ref 80–100)
MONOCYTES # BLD AUTO: 0.06 X10*3/UL (ref 0.05–0.8)
MONOCYTES NFR BLD AUTO: 3.2 %
NEUTROPHILS # BLD AUTO: 1.4 X10*3/UL (ref 1.6–5.5)
NEUTROPHILS NFR BLD AUTO: 75.2 %
NRBC BLD-RTO: 0 /100 WBCS (ref 0–0)
OVALOCYTES BLD QL SMEAR: NORMAL
PLATELET # BLD AUTO: 165 X10*3/UL (ref 150–450)
PLATELET CLUMP BLD QL SMEAR: PRESENT
POTASSIUM SERPL-SCNC: 4.2 MMOL/L (ref 3.5–5.3)
PROT SERPL-MCNC: 6 G/DL (ref 6.4–8.2)
RBC # BLD AUTO: 3.48 X10*6/UL (ref 4.5–5.9)
RBC MORPH BLD: NORMAL
SCHISTOCYTES BLD QL SMEAR: NORMAL
SODIUM SERPL-SCNC: 133 MMOL/L (ref 136–145)
WBC # BLD AUTO: 1.9 X10*3/UL (ref 4.4–11.3)

## 2024-07-18 PROCEDURE — 99417 PROLNG OP E/M EACH 15 MIN: CPT | Performed by: INTERNAL MEDICINE

## 2024-07-18 PROCEDURE — 99215 OFFICE O/P EST HI 40 MIN: CPT | Performed by: INTERNAL MEDICINE

## 2024-07-18 PROCEDURE — 1126F AMNT PAIN NOTED NONE PRSNT: CPT | Performed by: INTERNAL MEDICINE

## 2024-07-18 PROCEDURE — 3077F SYST BP >= 140 MM HG: CPT | Performed by: INTERNAL MEDICINE

## 2024-07-18 PROCEDURE — 3078F DIAST BP <80 MM HG: CPT | Performed by: INTERNAL MEDICINE

## 2024-07-18 PROCEDURE — 85025 COMPLETE CBC W/AUTO DIFF WBC: CPT

## 2024-07-18 PROCEDURE — 36415 COLL VENOUS BLD VENIPUNCTURE: CPT

## 2024-07-18 PROCEDURE — 83735 ASSAY OF MAGNESIUM: CPT

## 2024-07-18 PROCEDURE — 84075 ASSAY ALKALINE PHOSPHATASE: CPT

## 2024-07-18 PROCEDURE — 1036F TOBACCO NON-USER: CPT | Performed by: INTERNAL MEDICINE

## 2024-07-18 PROCEDURE — 1159F MED LIST DOCD IN RCRD: CPT | Performed by: INTERNAL MEDICINE

## 2024-07-18 ASSESSMENT — ENCOUNTER SYMPTOMS
DYSURIA: 0
DIFFICULTY URINATING: 0
ABDOMINAL PAIN: 0
NAUSEA: 0
WOUND: 0
LIGHT-HEADEDNESS: 0
FREQUENCY: 0
NUMBNESS: 0
CONSTIPATION: 0
ARTHRALGIAS: 0
FEVER: 0
LEG SWELLING: 0
SORE THROAT: 0
DIZZINESS: 0
NECK PAIN: 0
VOMITING: 0
SHORTNESS OF BREATH: 0
TROUBLE SWALLOWING: 0
COUGH: 0
HEADACHES: 0
CHILLS: 0
DIARRHEA: 0

## 2024-07-18 ASSESSMENT — PAIN SCALES - GENERAL: PAINLEVEL: 0-NO PAIN

## 2024-07-18 NOTE — PROGRESS NOTES
Patient ID: Phong Wong is a 74 y.o. male.  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 Juan Jose Fletcher   MedOnc: Dr. Kyunghee Burkitt, X. Katherine Feng, PA-C   RadOnc: Dr. Vianca Steen     Current Therapy  4/4/24: Started Q3 week Pembrolizumab    Prior Therapy:   8/16 - 10/4/23: Recieved concurrent chemoradiation with 7 weekly Carboplatin (2mg/AUC)/Paclitaxel (45mg/m2)  and 70gy RT in 35fx     Sites of Disease:  Soft palate, BOT, Left palatine tonsil  B/L Cervical LN  Lung     Oncologic Issues:   Odynophagia  Fatigue     ONCOLOGIC HISTORY  - Pt had 2 months hx of throat discomfort with lump in right neck  - 6/13/23: CT neck showed a mass 2.8 x 2.5 x 2.9 cm in the right lower cervical neck soft tissues. Two suspicious nodes were observed, one at level 3 on the right and another at level 2 on the left.  - 6/28/23: seen by Dr. Fletcher. 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 Based on the findings, Dr. Fletcher did not recommend surgery due to the location  of the primary tumor and the presence of yvette disease  - 6/30/23: PET/CT showed intense FDG avidity within the soft palate, extending to the base of the tongue and left palatine tonsil. Multiple FDG avid left cervical level II nodes were observed, along with an FDG avid mass in the region of the right cervical  level II/III, infiltrating the right SCM muscle and encasing and invading the right jugular vein. FDG avidity was also detected in the region of the left fossa Rosenmuller and left medial pterygoid muscle.  - 8/16 - 10/4/23: Recieved concurrent chemoradiation with 7 weekly Carboplatin (2mg/AUC)/Paclitaxel (45mg/m2)  and 70gy RT in 35fx    Admissions:  10/5 - 10/10/23: Admitted for extreme weakness, HECTOR, pancytopenia including anemia requiring blood  transfusion. D/C'ed to acute rehab        Past Medical History:   Past Medical History:  2017: Personal history of diseases of the blood and blood-  forming organs and certain disorders involving the immune mechanism      Comment:  History of thrombocytosis   HTN, HLD, COPD, prostate cancer in 2017 (s/p radiation)  Surgical History:    Past Surgical History:   Procedure Laterality Date    CT ABDOMEN PELVIS ANGIOGRAM W AND/OR WO IV CONTRAST  9/3/2014    CT ABDOMEN PELVIS ANGIOGRAM W AND/OR WO IV CONTRAST 9/3/2014 [facility]    IR ANGIOGRAM AORTA ABDOMEN  2014    IR ANGIOGRAM AORTA ABDOMEN 2014 [facility]   Aortoiliofemoral vascular bypass in   Social History:    Social History     Socioeconomic History    Marital status:     Number of children: 1   Tobacco Use    Smoking status: Former     Current packs/day: 0.00     Average packs/day: 1 pack/day for 40.0 years (40.0 ttl pk-yrs)     Types: Cigarettes     Start date:      Quit date:      Years since quittin.5     Passive exposure: Past    Smokeless tobacco: Never   Substance and Sexual Activity    Alcohol use: Yes     Alcohol/week: 12.0 standard drinks of alcohol     Types: 12 Cans of beer per week    Drug use: Never     Social Determinants of Health     Financial Resource Strain: Low Risk  (10/20/2023)    Overall Financial Resource Strain (CARDIA)     Difficulty of Paying Living Expenses: Not very hard   Food Insecurity: No Food Insecurity (10/5/2023)    Hunger Vital Sign     Worried About Running Out of Food in the Last Year: Never true     Ran Out of Food in the Last Year: Never true   Transportation Needs: No Transportation Needs (10/20/2023)    PRAPARE - Transportation     Lack of Transportation (Medical): No     Lack of Transportation (Non-Medical): No   Physical Activity: Inactive (10/5/2023)    Exercise Vital Sign     Days of Exercise per Week: 0 days     Minutes of Exercise per Session: 0 min   Stress:  Stress Concern Present (10/5/2023)    Citizen of [place] of Occupational Health - Occupational Stress Questionnaire     Feeling of Stress : To some extent   Social Connections: Moderately Integrated (10/5/2023)    Social Connection and Isolation Panel [NHANES]     Frequency of Communication with Friends and Family: More than three times a week     Frequency of Social Gatherings with Friends and Family: More than three times a week     Attends [employer] Services: Never     Active Member of Clubs or Organizations: Yes     Attends Club or Organization Meetings: Never     Marital Status:    Intimate Partner Violence: Not At Risk (10/5/2023)    Humiliation, Afraid, Rape, and Kick questionnaire     Fear of Current or Ex-Partner: No     Emotionally Abused: No     Physically Abused: No     Sexually Abused: No   Housing Stability: Low Risk  (10/20/2023)    Housing Stability Vital Sign     Unable to Pay for Housing in the Last Year: No     Number of Places Lived in the Last Year: 1     Unstable Housing in the Last Year: No      Family History:    Family History   Problem Relation Name Age of Onset    Aortic aneurysm Father          abdominal     Family Oncology History:    Cancer-related family history is not on file.      Subjective   Chief Complaint: Squamous Cell Carcinoma of oropharynx    HPI  Phong Wong is a 74 y.o. male with h/o locally advanced oral cavity cancer, completed concurrent chemoradiation with 7 weekly Carboplatin+Paclitaxel on 10/4/23. Unfortunately found with mets to lungs on 3 months restaging scan. CPS 3. Started Q3 week Pembrolizumab on 4/4/24. Received 3 cycles of Pembro , unfortunately lung nodules progressed. Started Q3 week carbo/Taxol on 6/19/24    Interval History  Patient presents today for  tox check after C2 Carbo/Taxol.    Patient reports having some soreness in bilateral fingers and also legs, feeling like over exerted.  These symptoms started couple days after chemo, took aleve which  helped and currently doesn't have soreness.  He lost  a lot of hair after C1.   He did not have recurrence of diarrhea since last chemo.  He gain some wt and also reports having increased appetite.  Denies dry mouth, has saliva production, taste is normal.   He denies any urinary symptoms currently, no urgency, frequency, hesitancy, dysuria.     He continue to have tenderness in the right neck. He does have a swelling in the right neck. Note that patient had a CT Neck on 6/7/24 with motion degradation. There was a right level 3 yvette mass measuring 1.5cm in size on 1/10/24 and appeared the same size on 6/7/24. Patient has had persistent sensitivity in the right neck since completing chemoRT. Went to lymphedema massage yesterday and neck stiffness and tenderness did improve    ROS  Review of Systems   Constitutional:  Negative for chills and fever.   HENT:   Negative for lump/mass, mouth sores, nosebleeds, sore throat, tinnitus and trouble swallowing.    Respiratory:  Negative for cough and shortness of breath.    Cardiovascular:  Negative for chest pain and leg swelling.   Gastrointestinal:  Negative for abdominal pain, constipation, diarrhea, nausea and vomiting.   Genitourinary:  Negative for difficulty urinating, dysuria and frequency.    Musculoskeletal:  Negative for arthralgias and neck pain.   Skin:  Negative for rash and wound.   Neurological:  Negative for dizziness, headaches, light-headedness and numbness.       Allergies  Allergies   Allergen Reactions    Codeine Nausea Only        Medications  Current Outpatient Medications   Medication Instructions    amLODIPine-benazepriL (Lotrel) 5-20 mg capsule [id number] Medication amlodipine 5 mg-benazepril 20 mg capsule amlodipine 5 mg-benazepril 20 mg capsule 5-20 mg 10/2/2020 Active (Outside)    aspirin 81 mg EC tablet 1 tablet, oral, Daily    dexAMETHasone (DECADRON) 8 mg, oral, Daily, For 3 days starting the day after treatment.    doxazosin (CARDURA) 4 mg, oral,  Daily, Take 1 tablet by mouth daily in the evening    fluticasone-umeclidin-vilanter (Trelegy Ellipta) 100-62.5-25 mcg blister with device INHALE 1 PUFF EVERY DAY    ipratropium-albuteroL (Duo-Neb) 0.5-2.5 mg/3 mL nebulizer solution Take 3 mL by nebulization 3 times a day as needed for wheezing or shortness of breath.    losartan (COZAAR) 50 mg, oral, Daily    magnesium oxide (MAG-OX) 400 mg, oral, Daily    ofloxacin (Ocuflox) 0.3 % ophthalmic solution Instill 1 drop Left Eye 4 times a day    OLANZapine (ZYPREXA) 5 mg, oral, Nightly    ondansetron (ZOFRAN) 8 mg, oral, Every 8 hours PRN    prochlorperazine (COMPAZINE) 10 mg, oral, Every 6 hours PRN        Objective   VS:  There were no vitals taken for this visit.  Weight   Wt Readings from Last 7 Encounters:   07/11/24 58.9 kg (129 lb 13.6 oz)   06/28/24 59.4 kg (130 lb 14.4 oz)   06/20/24 58.3 kg (128 lb 8.5 oz)   06/13/24 58.1 kg (128 lb 1.4 oz)   05/23/24 58.7 kg (129 lb 6.6 oz)   05/01/24 61.3 kg (135 lb 1.6 oz)   05/01/24 61.2 kg (134 lb 14.7 oz)         Physical Exam  Vitals reviewed.   Constitutional:       Appearance: Normal appearance. He is underweight.   HENT:      Head: Normocephalic and atraumatic.      Right Ear: External ear normal. No tenderness.      Left Ear: External ear normal. No tenderness.      Nose: Nose normal.      Mouth/Throat:      Mouth: No injury or oral lesions.      Tongue: Lesions present.      Pharynx: Oropharynx is clear. No posterior oropharyngeal erythema.      Comments: Edentulous  Eyes:      Extraocular Movements: Extraocular movements intact.      Conjunctiva/sclera: Conjunctivae normal.      Pupils: Pupils are equal, round, and reactive to light.   Neck:      Thyroid: No thyroid mass.      Comments: Mild to moderate swelling in submental and neck region.   Right neck is is a bit sensitive/tender to palpation  Cardiovascular:      Rate and Rhythm: Normal rate and regular rhythm.   Pulmonary:      Effort: Pulmonary effort is  normal. No respiratory distress.      Breath sounds: Normal breath sounds.   Abdominal:      General: Bowel sounds are normal. There is no distension or abdominal bruit.      Palpations: Abdomen is soft. There is no mass.      Tenderness: There is no abdominal tenderness.   Musculoskeletal:         General: Normal range of motion.      Cervical back: Normal range of motion and neck supple.      Right lower leg: No edema.      Left lower leg: No edema.   Lymphadenopathy:      Cervical: No cervical adenopathy.      Upper Body:      Right upper body: No axillary adenopathy.      Left upper body: No axillary adenopathy.   Skin:     General: Skin is warm and dry.      Findings: No lesion (radiation dermatitis in bilateral neck), rash or wound.   Neurological:      General: No focal deficit present.      Mental Status: He is alert and oriented to person, place, and time.      Gait: Gait is intact.   Psychiatric:         Mood and Affect: Mood and affect normal.         Diagnostic Results   The below labs were reviewed.  Results from last 7 days   Lab Units 07/11/24  1009   WBC AUTO x10*3/uL 1.9*   HEMOGLOBIN g/dL 10.0*   HEMATOCRIT % 30.6*   PLATELETS AUTO x10*3/uL 237   EOS ABS MAN x10*3/uL 0.13   BANDS ABS MAN x10*3/uL 0.08   LYMPHO ABS MAN x10*3/uL 0.19*   MONO ABS MAN x10*3/uL 0.23   LYMPHO PCT MAN % 10.0   MONO PCT MAN % 12.0   EOSINO PCT MAN % 7.0          Results from last 7 days   Lab Units 07/11/24  1009   GLUCOSE mg/dL 109*   SODIUM mmol/L 138   POTASSIUM mmol/L 4.0   CHLORIDE mmol/L 102   CO2 mmol/L 27   BUN mg/dL 13   CREATININE mg/dL 1.38*   EGFR mL/min/1.73m*2 54*   CALCIUM mg/dL 9.1   MAGNESIUM mg/dL 1.71   ALBUMIN g/dL 3.5   PROTEIN TOTAL g/dL 6.0*   BILIRUBIN TOTAL mg/dL 0.3   ALK PHOS U/L 56   ALT U/L 11   AST U/L 12           Results from last 7 days   Lab Units 07/11/24  1009   TSH mIU/L 3.82                  Pathology      Imaging  1/10/2024 PET/CT  IMPRESSION:  1. Significant interval improvement in  FDG avidity in the soft palate and left palatine tonsil, remaining FDG avidity along the left palatine tonsil consistent with persistent residual viable disease.  2. Nearly complete metabolic resolution of cervical yvette metastases with remaining mild FDG avid right cervical level 3 node seen on current study, likely representing residual yvette metastasis.  3. Newly developed FDG-avid pulmonary nodule in the left lower lobe as well as minimal FDG avid subcentimeter pulmonary nodules in bilateral upper lobes, concerning for lung metastasis.    1/10/2024 CT Neck w/ IV Contrast  IMPRESSION:  *Decreased size of the previously demonstrated right-sided yvette neck mass *No new adenopathy  *Post treatment changes in the hypopharynx as described    2/9/27 CT Chest w/o Contrast  IMPRESSION:  1. Multiple pulmonary nodules are again noted corresponding to FDG avid nodules on most recent PET-CT from 01/10/2024 and are new when compared to prior CT of the chest from 06/13/2023. The largest of which is a pleural-based left lower lobe nodule measuring up to 1.6 cm. Findings are concerning for metastatic disease.  2. Severe coronary artery calcifications, indicating the presence of coronary artery disease. If the patient has associated symptoms recommend management as per chest pain guidelines (e.g. https://doi.org/10.1161/CIR.4293038123631909). If the patient is asymptomatic consider reviewing modifiable cardiovascular risk factors and managing as per guidelines for primary prevention (e.g. https://doi.org/10.1161/CIR.5182045041891496).  4. Bulky atherosclerotic calcification at the origin of the left renal artery likely contributing to atrophy of the left kidney as compared to the right.   5. Additional findings as above.    3/27/2024 CT chest abdomen pelvis w IV contrast  Impression:   1. Interval increase in size of left lower lobe pulmonary nodule when compared to CT chest 02/09/2024 correlating with FDG avidity on PET-CT  01/10/2024. No sites of new metastasis in the chest.   2. The previously seen clusters of pulmonary nodules in the left upper lobe have decreased in number since CT chest 09/20/2024 correlating with improving postradiation changes or bronchiolitis.   3. Stable diffuse heterogenous appearance of the axial skeleton, similar to study. No sites of new metastasis in the abdomen and pelvis.  4. Additional stable findings as above.     6/7/2024 CT soft tissue neck wo IV contrast  Impression:   Motion degraded examination. Assessment is also limited in the absence of intravenous contrast medium. No definite evidence of disease progression within the neck. The pharyngeal soft tissues are not appreciably changed in appearance. The right level 3 yvette component is poorly delineated/characterized in the setting of aforementioned limitations but does not appear significantly enlarged in the interim. No additional lymphadenopathy is evident within the neck soft tissues. Posttreatment change as above.   As compared with previous neck CT examination there is interval enlargement of a now 7 mm nodule within the left lung apex. Please see dedicated chest CT for complete evaluation.   Severe atherosclerotic vascular calcification.      6/8/2024 CT chest wo IV contrast  Impression:   1.  Interval increase in size of multiple pulmonary nodules in the left upper lobe and of a right middle lobe nodule, most consistent with worsening pulmonary metastatic disease.   2. New 1 cm nodular consolidative opacity in the right upper lobe with surrounding ground-glass attenuation. This may represent a new area of metastatic disease or may be infectious/inflammatory in etiology. A new 0.3 cm nodule in the left upper lobe may also be infectious/inflammatory or metastatic.   3. Multiple chronic/incidental findings are similar compared to prior CT studies as detailed above including moderate emphysema, sequela of chronic bronchitis, and severe coronary  artery calcifications.        Assessment/Plan    ASSESSMENT  Phong Wong is a 74 y.o. male with recent diagnosis of locally advanced oral cavity cancer. Completed concurrent chemoradiation with 7 weekly Carboplatin (2mg/AUC)/Paclitaxel (45mg/m2) on 10/4/2. Admitted 10/5 - 10/10 for extreme weakness, HECTOR, pancytopenia including anemia requiring blood transfusion. D/C'ed to acute rehab. Admitted  again 10/18 - 11/2/23 for lethargy, weakness, failure to thrive, anemia.     # Locally advanced oral cavity cancer, T3N2M0, now with met to lungs  - 6/30/23: PET/CT showed intense FDG avidity within the soft palate, extending to the base of the tongue and left palatine tonsil. Multiple uptake in left cervical level II nodes, right cervical level  II/III, infiltrating the right SCM muscle and encasing and invading the right jugular vein. FDG avidity was also detected in the region of the left fossa Rosenmuller and left medial pterygoid muscle.  - 7/13/23: pt is doing well, no pain, discussed with patient about carboplatin+ paclitaxel as his chemotherapy along with radiation due to his GFR 30s.  Chemo related side effects were reviewed with patient, consent obtained.   - 8/16 - 10/4/23: Recieved concurrent chemoradiation with 7 weekly Carboplatin (2mg/AUC)/Paclitaxel (45mg/m2)  and 70gy RT in 35fx     - 1/12/24: Patient is feeling well, tolerating soft PO intake. PEG removed today.   Reviewed 1/10/24 restaging PET/CT with patient. Scans noted significant interval improvement in prior sites of disease though with residual FDG avidity in soft palate and left palatine tonsil, c/f residual disease vs post treatment changes. Also noted was a new FDG avid LLL pulm nodule with max SUV 5.7, c/f pulmonary metastasis. Will order STAT lung biopsy on this.   Pt scoped by Dr. Fletcher today, unfortunately was difficult to examine in office due to patient intolerance. Per Dr. Fletcher, may bring to OR to complete the examination depending on  lung biopsy results. Scans will be reviewed at 1/19/24 [facility].  - 2/9/24: Patient presents with new painful left lateral tongue lesion, hindering PO intake. Will start Oxycodone 5mg Q6 hr PRN for pain control. Pt will see Dr. Fletcher next week for eval/possible biopsy. Patient will have repeat CT Chest for FDG avid LLL pulm nodule as prior biopsy attempt was not successful due to small nodule size and difficult location near diaphragm.  -2/9/24 CT chest showed multiple subcentimeter noduesl 2-5mm, dominant lesion is 1.6cm in LLL.    -3/6/24: Lung biopsy of LLL nodule was consistent with HN origin. CPS 3.  Discussed with patient about phase 2 FLAM study (randomized to pembrolizumab or pembrolizumab+danvatirsen (Stat3 inhibitor). Unfortunately due to his CKD, he's not eligible for FLAM study.  - 4/4/24: Started Q3 week Pembrolizumab  - 5/1/24: C2 Pembro was delayed a week 2/2 patient not feeling well. He had struggled with hemorrhoids for a couple weeks. Otherwise tolerating treatment with no irAEs .  - 5/23/24: Patient doing well, no irAEs. Appetite and energy are good, tolerating solid PO intake. No odynophagia, no neck pain.  - 6/7/24 restaging CT N/C/A/P showed enlargement of subcentimeter lung mets, stable left lung nodule 2cm with new 1cm nodule in RUL. Based on heightened response with carbo+taxol after progression on PD, I recommended 3 cycles of carbo taxol and restaging, if there is response, we can consider afatinib treatment more as long term maintenance therapy.  Consent obtained for chemo and research blood.  - 6/19/24: Patient feeling well with no new complaints. Ok for C1 Q3 week Carbo/Taxol.  - 6/28/24: Patient feeling well and tolerating chemo. Did have watery diarrhea 1-2x per day for the first week after chemo but resolved on its on. No other new symptoms. Does still have tenderness in the right neck in area of prior RT. He did have a 1.5cm Level 2 LN in right neck on 6/7/24 CT Neck.   - 7/11/24:  Patient doing well, no new symptoms, no recurrence of diarrhea. Ok for C2 carbo/taxol. Slightly neutropenic today, ANC 1320. Neutropenic precaution given to patient and his SO.  - 7/18/24: ANC 1400, denies fever, chills, pt doing well, had few days of soreness in fingers and legs which are all resolved.    # Thrombocytopenia  - 6/28: Plt 85. No s/s of mucosal bleeds. Will continue to monitor  - 7/18: Plt 165    # Hemorrhoids  - Blood on stool most likely from internal hemorrhoid. Blood counts are stable. Bleeding has decreased Patient is taking Miralax to keep stools soft.     # Anemia: improving  - Hgb has been dropping since starting chemo, however today his hgb is <7 and will require a blood transfusion. This is likely due to chemotherapy as there are no s/s of bleeds.   - Received 2U PRBC during 10/18 - 11/2 admission. Anemia likely due to chemotherapy  - 11/29/23: Hgb 8.8. Improved from 3 weeks ago. Expect Hbg will continue to trend up as patient recovers. Can consider iron studies.   - Hgb trending up since 02/2024    # CKD  - 2/9/24 CT chest showed Bulky atherosclerotic calcification at the origin of the left renal artery likely contributing to atrophy of the left kidney as compared to the right. Kidney atrophy was not mentioned in previous scans.   - 5/1: Creat 1.46, GFR 51. Encouraged patient to keep up with non-caffeinated PO hydration. 1L NS given today with Pembro. Patient to follow up with PCP regarding atherosclerosis.   - 6/12: Creat 1.54, GFR 48.   - 6/20: Creat 1.6. GFR 45. Additional 1L NS giving w/ chemo today  - 6/28: Creat 1.2. GFR 64. Pt reports he has increased PO hydration. Kidney function improved.   - 7/11 Creat 1.38, GFR 54. Pt reports decreased PO intake lately. Will give 1L NS for hydration    # Submental/Neck Lymphedema  - Patient with moderate swelling in the submental and neck region. Likely 2ry to prior radiation. Established w/ Dr Guerrero.   - 6/19: Right neck with more swelling and  tender to touch, neck is tight. Still itchy to touch.   - 6/28: Right neck swelling persists and is tender to palpation. Will meet with Dr. Guerrero's team for massage on 7/10  - 7/11: Right neck swelling decreased after lymphedema massage    # Poor Venous Access  - Informed by infusion RN 5 attempts were made before patient had IV access, 2 were with IV US. Discussed mediport with patient but he was hesitant. Given patient likely will not have more than 4 more cycles of Carbo/Taxol, we will hold off on mediport placement and ask that the IV team places his IV with his infusions.    PLAN  -- RTC in 2 weeks for C3  -- Continue with research lab collection prior to each cycle  -- Continue ample PO hydration. Adding 1L NS to every chemo day  -- Follow up with Dr. Weiss on atherosclerosis of left renal artery.   -- Continue stool softeners as needed, monitor further symptoms of hemorrhoids .   -- Call if diarrhea returns, can prescribe imodium PRN

## 2024-07-31 ENCOUNTER — APPOINTMENT (OUTPATIENT)
Dept: INTEGRATIVE MEDICINE | Facility: CLINIC | Age: 74
End: 2024-07-31
Payer: MEDICARE

## 2024-07-31 DIAGNOSIS — Z71.3 NUTRITIONAL COUNSELING: ICD-10-CM

## 2024-07-31 DIAGNOSIS — M79.10 MYALGIA: Primary | ICD-10-CM

## 2024-07-31 DIAGNOSIS — C10.9 OROPHARYNGEAL CANCER (MULTI): ICD-10-CM

## 2024-07-31 DIAGNOSIS — I89.0 LYMPHEDEMA: ICD-10-CM

## 2024-07-31 PROCEDURE — 97140 MANUAL THERAPY 1/> REGIONS: CPT | Performed by: HOSPITALIST

## 2024-07-31 ASSESSMENT — PAIN SCALES - GENERAL: PAINLEVEL_OUTOF10: 0 - NO PAIN

## 2024-07-31 NOTE — PROGRESS NOTES
Massage Therapy Visit:     Phong Wong was referred by Dr. Guerrero    Condition of Client Subjective :  Patient ID: Phong Wong is a 74 y.o. male who presents for a 40 minute Reinaldo Therapy.  Patient is being treated for oral cancer.  Patient and his partner Tomasa have noticed an improvement with the swelling in his face.  I did Reinaldo Lymph Drainage  (MLD) on his face.  Patient's partner stated that she has done MLD on his face a few times. Patient had slight back discomfort.  I have the patient sitting up when I work on his back.  He did notice an improvement.       Session Information  Visit Type: Follow-up visit  Description of present complaint: Muscle tension        Objective   Pre-treatment Assessment  Arrival Mode: Wheelchair  Pain Score: 0 - No pain  Anxiety Level (0-10): 0  Stress Level (0-10): 0  Coping Level (0-10): 10  Depression Level (0-10): 0  Fatigue Level (0-10): 2  Nausea Level (0-10): 0  Wellbeing Level (0-10): 1        Actions Assessment/Plan :  Provider reviewed plan for the massage session, precautions and contraindications. Patient/guardian/hospital staff has given consent to treat with full understanding of what to expect during the session. Before massage therapy began, provider explained to the patient to communicate at any time if the pressure was causing discomfort past their tolerance level. Patient agreed to advise therapist.    Massage Treatment  Patient Position: Table  Positioning Assistance: Pillow(s)/bolster under knees while supine, Other (specify)  Massage Technique: Relaxation massage, Lymphatic drainiage  Pressure Scale: 1 - Light pressure, 2 - Mild pressure, 3 - Medium pressure    Response:  Post-treatment Assessment  Patient Noted Improvement of the Following Symptoms: Muscle tension  0-10 (Numeric) Pain Score: 0 - No pain  Anxiety Level (0-10): 0  Stress Level (0-10): 0  Coping Level (0-10): 9  Depression Level (0-10): 0  Fatigue Level (0-10): 1  Nausea Level (0-10):  0  Wellbeing Level (0-10): 1    Evaluation:    Patient will follow up as needed.

## 2024-08-01 ENCOUNTER — LAB (OUTPATIENT)
Dept: LAB | Facility: HOSPITAL | Age: 74
End: 2024-08-01
Payer: MEDICARE

## 2024-08-01 ENCOUNTER — INFUSION (OUTPATIENT)
Dept: HEMATOLOGY/ONCOLOGY | Facility: HOSPITAL | Age: 74
End: 2024-08-01
Payer: MEDICARE

## 2024-08-01 ENCOUNTER — OFFICE VISIT (OUTPATIENT)
Dept: HEMATOLOGY/ONCOLOGY | Facility: HOSPITAL | Age: 74
End: 2024-08-01
Payer: MEDICARE

## 2024-08-01 VITALS
DIASTOLIC BLOOD PRESSURE: 64 MMHG | HEART RATE: 100 BPM | BODY MASS INDEX: 21.53 KG/M2 | RESPIRATION RATE: 18 BRPM | TEMPERATURE: 97.3 F | SYSTOLIC BLOOD PRESSURE: 135 MMHG | WEIGHT: 133.38 LBS

## 2024-08-01 DIAGNOSIS — C10.9 OROPHARYNGEAL CANCER (MULTI): ICD-10-CM

## 2024-08-01 LAB
ALBUMIN SERPL BCP-MCNC: 3.9 G/DL (ref 3.4–5)
ALP SERPL-CCNC: 59 U/L (ref 33–136)
ALT SERPL W P-5'-P-CCNC: 9 U/L (ref 10–52)
ANION GAP SERPL CALC-SCNC: 13 MMOL/L (ref 10–20)
AST SERPL W P-5'-P-CCNC: 11 U/L (ref 9–39)
BASOPHILS # BLD AUTO: 0.02 X10*3/UL (ref 0–0.1)
BASOPHILS NFR BLD AUTO: 0.8 %
BILIRUB SERPL-MCNC: 0.4 MG/DL (ref 0–1.2)
BUN SERPL-MCNC: 15 MG/DL (ref 6–23)
CALCIUM SERPL-MCNC: 9 MG/DL (ref 8.6–10.3)
CHLORIDE SERPL-SCNC: 101 MMOL/L (ref 98–107)
CO2 SERPL-SCNC: 28 MMOL/L (ref 21–32)
CREAT SERPL-MCNC: 1.34 MG/DL (ref 0.5–1.3)
EGFRCR SERPLBLD CKD-EPI 2021: 56 ML/MIN/1.73M*2
EOSINOPHIL # BLD AUTO: 0.07 X10*3/UL (ref 0–0.4)
EOSINOPHIL NFR BLD AUTO: 3 %
ERYTHROCYTE [DISTWIDTH] IN BLOOD BY AUTOMATED COUNT: 17.6 % (ref 11.5–14.5)
GLUCOSE SERPL-MCNC: 112 MG/DL (ref 74–99)
HCT VFR BLD AUTO: 28.8 % (ref 41–52)
HGB BLD-MCNC: 9.7 G/DL (ref 13.5–17.5)
IMM GRANULOCYTES # BLD AUTO: 0.01 X10*3/UL (ref 0–0.5)
IMM GRANULOCYTES NFR BLD AUTO: 0.4 % (ref 0–0.9)
LYMPHOCYTES # BLD AUTO: 0.28 X10*3/UL (ref 0.8–3)
LYMPHOCYTES NFR BLD AUTO: 11.9 %
MAGNESIUM SERPL-MCNC: 1.8 MG/DL (ref 1.6–2.4)
MCH RBC QN AUTO: 28.4 PG (ref 26–34)
MCHC RBC AUTO-ENTMCNC: 33.7 G/DL (ref 32–36)
MCV RBC AUTO: 84 FL (ref 80–100)
MONOCYTES # BLD AUTO: 0.46 X10*3/UL (ref 0.05–0.8)
MONOCYTES NFR BLD AUTO: 19.5 %
NEUTROPHILS # BLD AUTO: 1.52 X10*3/UL (ref 1.6–5.5)
NEUTROPHILS NFR BLD AUTO: 64.4 %
NRBC BLD-RTO: 0 /100 WBCS (ref 0–0)
PLATELET # BLD AUTO: 174 X10*3/UL (ref 150–450)
POTASSIUM SERPL-SCNC: 3.8 MMOL/L (ref 3.5–5.3)
PROT SERPL-MCNC: 6.1 G/DL (ref 6.4–8.2)
RBC # BLD AUTO: 3.42 X10*6/UL (ref 4.5–5.9)
SODIUM SERPL-SCNC: 138 MMOL/L (ref 136–145)
TSH SERPL-ACNC: 2.94 MIU/L (ref 0.44–3.98)
WBC # BLD AUTO: 2.4 X10*3/UL (ref 4.4–11.3)

## 2024-08-01 PROCEDURE — 85025 COMPLETE CBC W/AUTO DIFF WBC: CPT

## 2024-08-01 PROCEDURE — 96375 TX/PRO/DX INJ NEW DRUG ADDON: CPT | Mod: INF

## 2024-08-01 PROCEDURE — 2500000004 HC RX 250 GENERAL PHARMACY W/ HCPCS (ALT 636 FOR OP/ED): Performed by: INTERNAL MEDICINE

## 2024-08-01 PROCEDURE — 96413 CHEMO IV INFUSION 1 HR: CPT

## 2024-08-01 PROCEDURE — 96415 CHEMO IV INFUSION ADDL HR: CPT

## 2024-08-01 PROCEDURE — 2500000001 HC RX 250 WO HCPCS SELF ADMINISTERED DRUGS (ALT 637 FOR MEDICARE OP): Performed by: INTERNAL MEDICINE

## 2024-08-01 PROCEDURE — 84075 ASSAY ALKALINE PHOSPHATASE: CPT

## 2024-08-01 PROCEDURE — 84443 ASSAY THYROID STIM HORMONE: CPT

## 2024-08-01 PROCEDURE — 99215 OFFICE O/P EST HI 40 MIN: CPT | Performed by: INTERNAL MEDICINE

## 2024-08-01 PROCEDURE — 1036F TOBACCO NON-USER: CPT | Performed by: INTERNAL MEDICINE

## 2024-08-01 PROCEDURE — 83735 ASSAY OF MAGNESIUM: CPT

## 2024-08-01 PROCEDURE — 1126F AMNT PAIN NOTED NONE PRSNT: CPT | Performed by: INTERNAL MEDICINE

## 2024-08-01 PROCEDURE — 96367 TX/PROPH/DG ADDL SEQ IV INF: CPT

## 2024-08-01 PROCEDURE — 3075F SYST BP GE 130 - 139MM HG: CPT | Performed by: INTERNAL MEDICINE

## 2024-08-01 PROCEDURE — 36415 COLL VENOUS BLD VENIPUNCTURE: CPT

## 2024-08-01 PROCEDURE — 96417 CHEMO IV INFUS EACH ADDL SEQ: CPT

## 2024-08-01 PROCEDURE — 3078F DIAST BP <80 MM HG: CPT | Performed by: INTERNAL MEDICINE

## 2024-08-01 PROCEDURE — 1159F MED LIST DOCD IN RCRD: CPT | Performed by: INTERNAL MEDICINE

## 2024-08-01 RX ORDER — PALONOSETRON 0.05 MG/ML
0.25 INJECTION, SOLUTION INTRAVENOUS ONCE
Status: CANCELLED | OUTPATIENT
Start: 2024-08-01

## 2024-08-01 RX ORDER — DIPHENHYDRAMINE HCL 50 MG
50 CAPSULE ORAL ONCE
Status: COMPLETED | OUTPATIENT
Start: 2024-08-01 | End: 2024-08-01

## 2024-08-01 RX ORDER — EPINEPHRINE 0.3 MG/.3ML
0.3 INJECTION SUBCUTANEOUS EVERY 5 MIN PRN
Status: DISCONTINUED | OUTPATIENT
Start: 2024-08-01 | End: 2024-08-01 | Stop reason: HOSPADM

## 2024-08-01 RX ORDER — ALBUTEROL SULFATE 0.83 MG/ML
3 SOLUTION RESPIRATORY (INHALATION) AS NEEDED
Status: CANCELLED | OUTPATIENT
Start: 2024-08-01

## 2024-08-01 RX ORDER — FAMOTIDINE 10 MG/ML
20 INJECTION INTRAVENOUS ONCE AS NEEDED
Status: DISCONTINUED | OUTPATIENT
Start: 2024-08-01 | End: 2024-08-01 | Stop reason: HOSPADM

## 2024-08-01 RX ORDER — PROCHLORPERAZINE EDISYLATE 5 MG/ML
10 INJECTION INTRAMUSCULAR; INTRAVENOUS EVERY 6 HOURS PRN
Status: DISCONTINUED | OUTPATIENT
Start: 2024-08-01 | End: 2024-08-01 | Stop reason: HOSPADM

## 2024-08-01 RX ORDER — EPINEPHRINE 0.3 MG/.3ML
0.3 INJECTION SUBCUTANEOUS EVERY 5 MIN PRN
Status: CANCELLED | OUTPATIENT
Start: 2024-08-01

## 2024-08-01 RX ORDER — DIPHENHYDRAMINE HCL 50 MG
50 CAPSULE ORAL ONCE
Status: CANCELLED | OUTPATIENT
Start: 2024-08-01

## 2024-08-01 RX ORDER — PROCHLORPERAZINE MALEATE 10 MG
10 TABLET ORAL EVERY 6 HOURS PRN
Status: DISCONTINUED | OUTPATIENT
Start: 2024-08-01 | End: 2024-08-01 | Stop reason: HOSPADM

## 2024-08-01 RX ORDER — DIPHENHYDRAMINE HYDROCHLORIDE 50 MG/ML
50 INJECTION INTRAMUSCULAR; INTRAVENOUS AS NEEDED
Status: DISCONTINUED | OUTPATIENT
Start: 2024-08-01 | End: 2024-08-01 | Stop reason: HOSPADM

## 2024-08-01 RX ORDER — DEXAMETHASONE IN 0.9 % SOD CHL 20 MG/50ML
20 INTRAVENOUS SOLUTION, PIGGYBACK (ML) INTRAVENOUS ONCE
Status: COMPLETED | OUTPATIENT
Start: 2024-08-01 | End: 2024-08-01

## 2024-08-01 RX ORDER — ALBUTEROL SULFATE 0.83 MG/ML
3 SOLUTION RESPIRATORY (INHALATION) AS NEEDED
Status: DISCONTINUED | OUTPATIENT
Start: 2024-08-01 | End: 2024-08-01 | Stop reason: HOSPADM

## 2024-08-01 RX ORDER — DIPHENHYDRAMINE HYDROCHLORIDE 50 MG/ML
50 INJECTION INTRAMUSCULAR; INTRAVENOUS AS NEEDED
Status: CANCELLED | OUTPATIENT
Start: 2024-08-01

## 2024-08-01 RX ORDER — PROCHLORPERAZINE EDISYLATE 5 MG/ML
10 INJECTION INTRAMUSCULAR; INTRAVENOUS EVERY 6 HOURS PRN
Status: CANCELLED | OUTPATIENT
Start: 2024-08-01

## 2024-08-01 RX ORDER — FAMOTIDINE 10 MG/ML
20 INJECTION INTRAVENOUS ONCE
Status: COMPLETED | OUTPATIENT
Start: 2024-08-01 | End: 2024-08-01

## 2024-08-01 RX ORDER — DEXAMETHASONE IN 0.9 % SOD CHL 20 MG/50ML
20 INTRAVENOUS SOLUTION, PIGGYBACK (ML) INTRAVENOUS ONCE
Status: CANCELLED | OUTPATIENT
Start: 2024-08-01

## 2024-08-01 RX ORDER — PALONOSETRON 0.05 MG/ML
0.25 INJECTION, SOLUTION INTRAVENOUS ONCE
Status: COMPLETED | OUTPATIENT
Start: 2024-08-01 | End: 2024-08-01

## 2024-08-01 RX ORDER — PROCHLORPERAZINE MALEATE 10 MG
10 TABLET ORAL EVERY 6 HOURS PRN
Status: CANCELLED | OUTPATIENT
Start: 2024-08-01

## 2024-08-01 RX ORDER — FAMOTIDINE 10 MG/ML
20 INJECTION INTRAVENOUS ONCE AS NEEDED
Status: CANCELLED | OUTPATIENT
Start: 2024-08-01

## 2024-08-01 RX ORDER — FAMOTIDINE 10 MG/ML
20 INJECTION INTRAVENOUS ONCE
Status: CANCELLED | OUTPATIENT
Start: 2024-08-01

## 2024-08-01 ASSESSMENT — ENCOUNTER SYMPTOMS
FEVER: 0
HEADACHES: 0
LEG SWELLING: 0
FREQUENCY: 0
NECK PAIN: 0
SHORTNESS OF BREATH: 0
ABDOMINAL PAIN: 0
NUMBNESS: 0
TROUBLE SWALLOWING: 0
LIGHT-HEADEDNESS: 0
DIARRHEA: 0
DYSURIA: 0
WOUND: 0
CONSTIPATION: 0
NAUSEA: 0
COUGH: 0
ARTHRALGIAS: 0
DIZZINESS: 0
SORE THROAT: 0
DIFFICULTY URINATING: 0
CHILLS: 0
VOMITING: 0

## 2024-08-01 ASSESSMENT — PAIN SCALES - GENERAL: PAINLEVEL: 0-NO PAIN

## 2024-08-01 NOTE — PROGRESS NOTES
Patient ID: Phong Wong is a 74 y.o. male.  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 Juan Jose Fletcher   MedOnc: Dr. Kyunghee Burkitt, X. Katherine Feng, PA-C   RadOnc: Dr. Vianca Steen     Current Therapy  4/4/24: Started Q3 week Pembrolizumab    Prior Therapy:   8/16 - 10/4/23: Recieved concurrent chemoradiation with 7 weekly Carboplatin (2mg/AUC)/Paclitaxel (45mg/m2)  and 70gy RT in 35fx     Sites of Disease:  Soft palate, BOT, Left palatine tonsil  B/L Cervical LN  Lung     Oncologic Issues:   Odynophagia  Fatigue     ONCOLOGIC HISTORY  - Pt had 2 months hx of throat discomfort with lump in right neck  - 6/13/23: CT neck showed a mass 2.8 x 2.5 x 2.9 cm in the right lower cervical neck soft tissues. Two suspicious nodes were observed, one at level 3 on the right and another at level 2 on the left.  - 6/28/23: seen by Dr. Fletcher. 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 Based on the findings, Dr. Fletcher did not recommend surgery due to the location  of the primary tumor and the presence of yvette disease  - 6/30/23: PET/CT showed intense FDG avidity within the soft palate, extending to the base of the tongue and left palatine tonsil. Multiple FDG avid left cervical level II nodes were observed, along with an FDG avid mass in the region of the right cervical  level II/III, infiltrating the right SCM muscle and encasing and invading the right jugular vein. FDG avidity was also detected in the region of the left fossa Rosenmuller and left medial pterygoid muscle.  - 8/16 - 10/4/23: Recieved concurrent chemoradiation with 7 weekly Carboplatin (2mg/AUC)/Paclitaxel (45mg/m2)  and 70gy RT in 35fx    Admissions:  10/5 - 10/10/23: Admitted for extreme weakness, HECTOR, pancytopenia including anemia requiring blood  transfusion. D/C'ed to acute rehab        Past Medical History:   Past Medical History:  2017: Personal history of diseases of the blood and blood-  forming organs and certain disorders involving the immune mechanism      Comment:  History of thrombocytosis   HTN, HLD, COPD, prostate cancer in 2017 (s/p radiation)  Surgical History:    Past Surgical History:   Procedure Laterality Date    CT ABDOMEN PELVIS ANGIOGRAM W AND/OR WO IV CONTRAST  9/3/2014    CT ABDOMEN PELVIS ANGIOGRAM W AND/OR WO IV CONTRAST 9/3/2014 [facility]    IR ANGIOGRAM AORTA ABDOMEN  2014    IR ANGIOGRAM AORTA ABDOMEN 2014 [facility]   Aortoiliofemoral vascular bypass in   Social History:    Social History     Socioeconomic History    Marital status:     Number of children: 1   Tobacco Use    Smoking status: Former     Current packs/day: 0.00     Average packs/day: 1 pack/day for 40.0 years (40.0 ttl pk-yrs)     Types: Cigarettes     Start date:      Quit date:      Years since quittin.5     Passive exposure: Past    Smokeless tobacco: Never   Substance and Sexual Activity    Alcohol use: Yes     Alcohol/week: 12.0 standard drinks of alcohol     Types: 12 Cans of beer per week    Drug use: Never     Social Determinants of Health     Financial Resource Strain: Low Risk  (10/20/2023)    Overall Financial Resource Strain (CARDIA)     Difficulty of Paying Living Expenses: Not very hard   Food Insecurity: No Food Insecurity (10/5/2023)    Hunger Vital Sign     Worried About Running Out of Food in the Last Year: Never true     Ran Out of Food in the Last Year: Never true   Transportation Needs: No Transportation Needs (10/20/2023)    PRAPARE - Transportation     Lack of Transportation (Medical): No     Lack of Transportation (Non-Medical): No   Physical Activity: Inactive (10/5/2023)    Exercise Vital Sign     Days of Exercise per Week: 0 days     Minutes of Exercise per Session: 0 min   Stress:  Stress Concern Present (10/5/2023)    [employer] - Occupational Stress Questionnaire     Feeling of Stress : To some extent   Social Connections: Moderately Integrated (10/5/2023)    Social Connection and Isolation Panel [NHANES]     Frequency of Communication with Friends and Family: More than three times a week     Frequency of Social Gatherings with Friends and Family: More than three times a week     Attends Cheondoism Services: Never     Active Member of Clubs or Organizations: Yes     Attends Club or Organization Meetings: Never     Marital Status:    Intimate Partner Violence: Not At Risk (10/5/2023)    Humiliation, Afraid, Rape, and Kick questionnaire     Fear of Current or Ex-Partner: No     Emotionally Abused: No     Physically Abused: No     Sexually Abused: No   Housing Stability: Low Risk  (10/20/2023)    Housing Stability Vital Sign     Unable to Pay for Housing in the Last Year: No     Number of Places Lived in the Last Year: 1     Unstable Housing in the Last Year: No      Family History:    Family History   Problem Relation Name Age of Onset    Aortic aneurysm Father          abdominal     Family Oncology History:    Cancer-related family history is not on file.      Subjective   Chief Complaint: Squamous Cell Carcinoma of oropharynx    HPI  Phong Wong is a 74 y.o. male with h/o locally advanced oral cavity cancer, completed concurrent chemoradiation with 7 weekly Carboplatin+Paclitaxel on 10/4/23. Unfortunately found with mets to lungs on 3 months restaging scan. CPS 3. Started Q3 week Pembrolizumab on 4/4/24. Received 3 cycles of Pembro , unfortunately lung nodules progressed. Started Q3 week carbo/Taxol on 6/19/24    Interval History  Patient presents today prior to C3 Carbo/Taxol.    Patient reports he hasn't been drinking enough water, but eating well and gained 3 Ibs.  He lost  a lot of hair after C1.   He did not have recurrence of diarrhea since last  chemo.  Denies dry mouth, has saliva production, taste is normal.   He denies any urinary symptoms currently, no urgency, frequency, hesitancy, dysuria.     He continue to have tenderness in the right neck. He does have a swelling in the right neck. Note that patient had a CT Neck on 6/7/24 with motion degradation. There was a right level 3 yvette mass measuring 1.5cm in size on 1/10/24 and appeared the same size on 6/7/24. Patient has had persistent sensitivity in the right neck since completing chemoRT. Went to lymphedema massage yesterday and neck stiffness and tenderness did improve    ROS  Review of Systems   Constitutional:  Negative for chills and fever.   HENT:   Negative for lump/mass, mouth sores, nosebleeds, sore throat, tinnitus and trouble swallowing.    Respiratory:  Negative for cough and shortness of breath.    Cardiovascular:  Negative for chest pain and leg swelling.   Gastrointestinal:  Negative for abdominal pain, constipation, diarrhea, nausea and vomiting.   Genitourinary:  Negative for difficulty urinating, dysuria and frequency.    Musculoskeletal:  Negative for arthralgias and neck pain.   Skin:  Negative for rash and wound.   Neurological:  Negative for dizziness, headaches, light-headedness and numbness.       Allergies  Allergies   Allergen Reactions    Codeine Nausea Only        Medications  Current Outpatient Medications   Medication Instructions    amLODIPine-benazepriL (Lotrel) 5-20 mg capsule [id number] Medication amlodipine 5 mg-benazepril 20 mg capsule amlodipine 5 mg-benazepril 20 mg capsule 5-20 mg 10/2/2020 Active (Outside)    aspirin 81 mg EC tablet 1 tablet, oral, Daily    dexAMETHasone (DECADRON) 8 mg, oral, Daily, For 3 days starting the day after treatment.    doxazosin (CARDURA) 4 mg, oral, Daily, Take 1 tablet by mouth daily in the evening    fluticasone-umeclidin-vilanter (Trelegy Ellipta) 100-62.5-25 mcg blister with device INHALE 1 PUFF EVERY DAY    ipratropium-albuteroL  (Duo-Neb) 0.5-2.5 mg/3 mL nebulizer solution Take 3 mL by nebulization 3 times a day as needed for wheezing or shortness of breath.    losartan (COZAAR) 50 mg, oral, Daily    magnesium oxide (MAG-OX) 400 mg, oral, Daily    ofloxacin (Ocuflox) 0.3 % ophthalmic solution Instill 1 drop Left Eye 4 times a day    OLANZapine (ZYPREXA) 5 mg, oral, Nightly    ondansetron (ZOFRAN) 8 mg, oral, Every 8 hours PRN    prochlorperazine (COMPAZINE) 10 mg, oral, Every 6 hours PRN        Objective   VS:  There were no vitals taken for this visit.  Weight   Wt Readings from Last 7 Encounters:   07/18/24 60.6 kg (133 lb 9.6 oz)   07/11/24 58.9 kg (129 lb 13.6 oz)   06/28/24 59.4 kg (130 lb 14.4 oz)   06/20/24 58.3 kg (128 lb 8.5 oz)   06/13/24 58.1 kg (128 lb 1.4 oz)   05/23/24 58.7 kg (129 lb 6.6 oz)   05/01/24 61.3 kg (135 lb 1.6 oz)         Physical Exam  Vitals reviewed.   Constitutional:       Appearance: Normal appearance. He is underweight.   HENT:      Head: Normocephalic and atraumatic.      Right Ear: External ear normal. No tenderness.      Left Ear: External ear normal. No tenderness.      Nose: Nose normal.      Mouth/Throat:      Mouth: No injury or oral lesions.      Tongue: Lesions present.      Pharynx: Oropharynx is clear. No posterior oropharyngeal erythema.      Comments: Edentulous  Eyes:      Extraocular Movements: Extraocular movements intact.      Conjunctiva/sclera: Conjunctivae normal.      Pupils: Pupils are equal, round, and reactive to light.   Neck:      Thyroid: No thyroid mass.      Comments: Mild to moderate swelling in submental and neck region.   Right neck is is a bit sensitive/tender to palpation  Cardiovascular:      Rate and Rhythm: Normal rate and regular rhythm.   Pulmonary:      Effort: Pulmonary effort is normal. No respiratory distress.      Breath sounds: Normal breath sounds.   Abdominal:      General: Bowel sounds are normal. There is no distension or abdominal bruit.      Palpations:  Abdomen is soft. There is no mass.      Tenderness: There is no abdominal tenderness.   Musculoskeletal:         General: Normal range of motion.      Cervical back: Normal range of motion and neck supple.      Right lower leg: No edema.      Left lower leg: No edema.   Lymphadenopathy:      Cervical: No cervical adenopathy.      Upper Body:      Right upper body: No axillary adenopathy.      Left upper body: No axillary adenopathy.   Skin:     General: Skin is warm and dry.      Findings: No lesion (radiation dermatitis in bilateral neck), rash or wound.   Neurological:      General: No focal deficit present.      Mental Status: He is alert and oriented to person, place, and time.      Gait: Gait is intact.   Psychiatric:         Mood and Affect: Mood and affect normal.         Diagnostic Results   The below labs were reviewed.                                            Pathology      Imaging  1/10/2024 PET/CT  IMPRESSION:  1. Significant interval improvement in FDG avidity in the soft palate and left palatine tonsil, remaining FDG avidity along the left palatine tonsil consistent with persistent residual viable disease.  2. Nearly complete metabolic resolution of cervical yvette metastases with remaining mild FDG avid right cervical level 3 node seen on current study, likely representing residual yvette metastasis.  3. Newly developed FDG-avid pulmonary nodule in the left lower lobe as well as minimal FDG avid subcentimeter pulmonary nodules in bilateral upper lobes, concerning for lung metastasis.    1/10/2024 CT Neck w/ IV Contrast  IMPRESSION:  *Decreased size of the previously demonstrated right-sided yvette neck mass *No new adenopathy  *Post treatment changes in the hypopharynx as described    2/9/27 CT Chest w/o Contrast  IMPRESSION:  1. Multiple pulmonary nodules are again noted corresponding to FDG avid nodules on most recent PET-CT from 01/10/2024 and are new when compared to prior CT of the chest from  06/13/2023. The largest of which is a pleural-based left lower lobe nodule measuring up to 1.6 cm. Findings are concerning for metastatic disease.  2. Severe coronary artery calcifications, indicating the presence of coronary artery disease. If the patient has associated symptoms recommend management as per chest pain guidelines (e.g. https://doi.org/10.1161/CIR.7062452758898533). If the patient is asymptomatic consider reviewing modifiable cardiovascular risk factors and managing as per guidelines for primary prevention (e.g. https://doi.org/10.1161/CIR.6354353226015687).  4. Bulky atherosclerotic calcification at the origin of the left renal artery likely contributing to atrophy of the left kidney as compared to the right.   5. Additional findings as above.    3/27/2024 CT chest abdomen pelvis w IV contrast  Impression:   1. Interval increase in size of left lower lobe pulmonary nodule when compared to CT chest 02/09/2024 correlating with FDG avidity on PET-CT 01/10/2024. No sites of new metastasis in the chest.   2. The previously seen clusters of pulmonary nodules in the left upper lobe have decreased in number since CT chest 09/20/2024 correlating with improving postradiation changes or bronchiolitis.   3. Stable diffuse heterogenous appearance of the axial skeleton, similar to study. No sites of new metastasis in the abdomen and pelvis.  4. Additional stable findings as above.     6/7/2024 CT soft tissue neck wo IV contrast  Impression:   Motion degraded examination. Assessment is also limited in the absence of intravenous contrast medium. No definite evidence of disease progression within the neck. The pharyngeal soft tissues are not appreciably changed in appearance. The right level 3 yvette component is poorly delineated/characterized in the setting of aforementioned limitations but does not appear significantly enlarged in the interim. No additional lymphadenopathy is evident within the neck soft tissues.  Posttreatment change as above.   As compared with previous neck CT examination there is interval enlargement of a now 7 mm nodule within the left lung apex. Please see dedicated chest CT for complete evaluation.   Severe atherosclerotic vascular calcification.      6/8/2024 CT chest wo IV contrast  Impression:   1.  Interval increase in size of multiple pulmonary nodules in the left upper lobe and of a right middle lobe nodule, most consistent with worsening pulmonary metastatic disease.   2. New 1 cm nodular consolidative opacity in the right upper lobe with surrounding ground-glass attenuation. This may represent a new area of metastatic disease or may be infectious/inflammatory in etiology. A new 0.3 cm nodule in the left upper lobe may also be infectious/inflammatory or metastatic.   3. Multiple chronic/incidental findings are similar compared to prior CT studies as detailed above including moderate emphysema, sequela of chronic bronchitis, and severe coronary artery calcifications.        Assessment/Plan    ASSESSMENT  Phong Wong is a 74 y.o. male with recent diagnosis of locally advanced oral cavity cancer. Completed concurrent chemoradiation with 7 weekly Carboplatin (2mg/AUC)/Paclitaxel (45mg/m2) on 10/4/2. Admitted 10/5 - 10/10 for extreme weakness, HECTOR, pancytopenia including anemia requiring blood transfusion. D/C'ed to acute rehab. Admitted  again 10/18 - 11/2/23 for lethargy, weakness, failure to thrive, anemia.     # Locally advanced oral cavity cancer, T3N2M0, now with met to lungs  - 6/30/23: PET/CT showed intense FDG avidity within the soft palate, extending to the base of the tongue and left palatine tonsil. Multiple uptake in left cervical level II nodes, right cervical level  II/III, infiltrating the right SCM muscle and encasing and invading the right jugular vein. FDG avidity was also detected in the region of the left fossa Rosenmuller and left medial pterygoid muscle.  - 7/13/23: pt is doing  well, no pain, discussed with patient about carboplatin+ paclitaxel as his chemotherapy along with radiation due to his GFR 30s.  Chemo related side effects were reviewed with patient, consent obtained.   - 8/16 - 10/4/23: Recieved concurrent chemoradiation with 7 weekly Carboplatin (2mg/AUC)/Paclitaxel (45mg/m2)  and 70gy RT in 35fx     - 1/12/24: Patient is feeling well, tolerating soft PO intake. PEG removed today.   Reviewed 1/10/24 restaging PET/CT with patient. Scans noted significant interval improvement in prior sites of disease though with residual FDG avidity in soft palate and left palatine tonsil, c/f residual disease vs post treatment changes. Also noted was a new FDG avid LLL pulm nodule with max SUV 5.7, c/f pulmonary metastasis. Will order STAT lung biopsy on this.   Pt scoped by Dr. Fletcher today, unfortunately was difficult to examine in office due to patient intolerance. Per Dr. Fletcher, may bring to OR to complete the examination depending on lung biopsy results. Scans will be reviewed at 1/19/24 [facility].  - 2/9/24: Patient presents with new painful left lateral tongue lesion, hindering PO intake. Will start Oxycodone 5mg Q6 hr PRN for pain control. Pt will see Dr. Fletcher next week for eval/possible biopsy. Patient will have repeat CT Chest for FDG avid LLL pulm nodule as prior biopsy attempt was not successful due to small nodule size and difficult location near diaphragm.  -2/9/24 CT chest showed multiple subcentimeter noduesl 2-5mm, dominant lesion is 1.6cm in LLL.    -3/6/24: Lung biopsy of LLL nodule was consistent with HN origin. CPS 3.  Discussed with patient about phase 2 FLAM study (randomized to pembrolizumab or pembrolizumab+danvatirsen (Stat3 inhibitor). Unfortunately due to his CKD, he's not eligible for FLAM study.  - 4/4/24: Started Q3 week Pembrolizumab  - 5/1/24: C2 Pembro was delayed a week 2/2 patient not feeling well. He had struggled with hemorrhoids for a couple weeks.  Otherwise tolerating treatment with no irAEs .  - 5/23/24: Patient doing well, no irAEs. Appetite and energy are good, tolerating solid PO intake. No odynophagia, no neck pain.  - 6/7/24 restaging CT N/C/A/P showed enlargement of subcentimeter lung mets, stable left lung nodule 2cm with new 1cm nodule in RUL. Based on heightened response with carbo+taxol after progression on PD, I recommended 3 cycles of carbo taxol and restaging, if there is response, we can consider afatinib treatment more as long term maintenance therapy.  Consent obtained for chemo and research blood.  - 6/19/24: Patient feeling well with no new complaints. Ok for C1 Q3 week Carbo/Taxol.  - 6/28/24: Patient feeling well and tolerating chemo. Did have watery diarrhea 1-2x per day for the first week after chemo but resolved on its on. No other new symptoms. Does still have tenderness in the right neck in area of prior RT. He did have a 1.5cm Level 2 LN in right neck on 6/7/24 CT Neck.   - 7/11/24: Patient doing well, no new symptoms, no recurrence of diarrhea. Ok for C2 carbo/taxol. Slightly neutropenic today, ANC 1320. Neutropenic precaution given to patient and his SO.  - 7/18/24: ANC 1400, denies fever, chills, pt doing well, had few days of soreness in fingers and legs which are all resolved.  -8/1/24:  pt is doing well with wt gain. ANC 1500, ok to proceed with chemo,  will monitor ANC closely next week.    # Thrombocytopenia  - 6/28: Plt 85. No s/s of mucosal bleeds. Will continue to monitor  - 7/18: Plt 165  - 8/1/24: Plat 174    # Hemorrhoids  - Blood on stool most likely from internal hemorrhoid. Blood counts are stable. Bleeding has decreased Patient is taking Miralax to keep stools soft.     # Anemia: improving  - Hgb has been dropping since starting chemo, however today his hgb is <7 and will require a blood transfusion. This is likely due to chemotherapy as there are no s/s of bleeds.   - Received 2U PRBC during 10/18 - 11/2  admission. Anemia likely due to chemotherapy  - 11/29/23: Hgb 8.8. Improved from 3 weeks ago. Expect Hbg will continue to trend up as patient recovers. Can consider iron studies.   - Hgb trending up since 02/2024    # CKD  - 2/9/24 CT chest showed Bulky atherosclerotic calcification at the origin of the left renal artery likely contributing to atrophy of the left kidney as compared to the right. Kidney atrophy was not mentioned in previous scans.   - 5/1: Creat 1.46, GFR 51. Encouraged patient to keep up with non-caffeinated PO hydration. 1L NS given today with Pembro. Patient to follow up with PCP regarding atherosclerosis.   - 6/12: Creat 1.54, GFR 48.   - 6/20: Creat 1.6. GFR 45. Additional 1L NS giving w/ chemo today  - 6/28: Creat 1.2. GFR 64. Pt reports he has increased PO hydration. Kidney function improved.   - 7/11 Creat 1.38, GFR 54. Pt reports decreased PO intake lately. Will give 1L NS for hydration  - 8/1/24: Creat 1.34, GFR 56      # Submental/Neck Lymphedema  - Patient with moderate swelling in the submental and neck region. Likely 2ry to prior radiation. Established w/ Dr Guerrero.   - 6/19: Right neck with more swelling and tender to touch, neck is tight. Still itchy to touch.   - 6/28: Right neck swelling persists and is tender to palpation. Will meet with Dr. Guerrero's team for massage on 7/10  - 7/11: Right neck swelling decreased after lymphedema massage    # Poor Venous Access  - Informed by infusion RN 5 attempts were made before patient had IV access, 2 were with IV US. Discussed mediport with patient but he was hesitant. Given patient likely will not have more than 4 more cycles of Carbo/Taxol, we will hold off on mediport placement and ask that the IV team places his IV with his infusions.    PLAN  -- ok to start C3 chemo today  -- RTC in 1 week for tox check  -- Continue with research lab collection prior to each cycle  -- Continue ample PO hydration. Adding 1L NS to every chemo day  -- Follow up  with Dr. Weiss on atherosclerosis of left renal artery.   -- Continue stool softeners as needed, monitor further symptoms of hemorrhoids .   -- Call if diarrhea returns, can prescribe imodium PRN

## 2024-08-01 NOTE — PROGRESS NOTES
Patient arrived to unit for scheduled C3D1 3 hour Taxol/Carboplatin. Seen in clinic today by Burkitt MD. Patient denies new complaints at this time. Tolerated treatment without incident. Discharged ambulatory and in stable condition.

## 2024-08-06 DIAGNOSIS — C78.02 SQUAMOUS CELL CARCINOMA METASTATIC TO BOTH LUNGS (MULTI): Primary | ICD-10-CM

## 2024-08-06 DIAGNOSIS — C78.01 SQUAMOUS CELL CARCINOMA METASTATIC TO BOTH LUNGS (MULTI): Primary | ICD-10-CM

## 2024-08-07 ENCOUNTER — APPOINTMENT (OUTPATIENT)
Dept: INTEGRATIVE MEDICINE | Facility: CLINIC | Age: 74
End: 2024-08-07
Payer: MEDICARE

## 2024-08-08 ENCOUNTER — TELEPHONE (OUTPATIENT)
Dept: RADIATION ONCOLOGY | Facility: HOSPITAL | Age: 74
End: 2024-08-08
Payer: MEDICARE

## 2024-08-08 ENCOUNTER — OFFICE VISIT (OUTPATIENT)
Dept: HEMATOLOGY/ONCOLOGY | Facility: HOSPITAL | Age: 74
End: 2024-08-08
Payer: MEDICARE

## 2024-08-08 ENCOUNTER — LAB (OUTPATIENT)
Dept: LAB | Facility: HOSPITAL | Age: 74
End: 2024-08-08
Payer: MEDICARE

## 2024-08-08 VITALS
BODY MASS INDEX: 21.99 KG/M2 | WEIGHT: 136.24 LBS | RESPIRATION RATE: 18 BRPM | DIASTOLIC BLOOD PRESSURE: 71 MMHG | SYSTOLIC BLOOD PRESSURE: 158 MMHG | OXYGEN SATURATION: 100 % | HEART RATE: 87 BPM | TEMPERATURE: 98.1 F

## 2024-08-08 DIAGNOSIS — D64.81 ANEMIA ASSOCIATED WITH CHEMOTHERAPY: ICD-10-CM

## 2024-08-08 DIAGNOSIS — C78.02 SQUAMOUS CELL CARCINOMA METASTATIC TO BOTH LUNGS (MULTI): ICD-10-CM

## 2024-08-08 DIAGNOSIS — N18.2 CKD (CHRONIC KIDNEY DISEASE) STAGE 2, GFR 60-89 ML/MIN: ICD-10-CM

## 2024-08-08 DIAGNOSIS — M25.542 ARTHRALGIA OF BOTH HANDS: ICD-10-CM
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
## 2024-08-08 DIAGNOSIS — C78.01 SQUAMOUS CELL CARCINOMA METASTATIC TO BOTH LUNGS (MULTI): ICD-10-CM

## 2024-08-08 DIAGNOSIS — C10.9 OROPHARYNGEAL CANCER (MULTI): ICD-10-CM

## 2024-08-08 DIAGNOSIS — C78.02 SQUAMOUS CELL CARCINOMA METASTATIC TO BOTH LUNGS (MULTI): Primary | ICD-10-CM

## 2024-08-08 DIAGNOSIS — T45.1X5A ANEMIA ASSOCIATED WITH CHEMOTHERAPY: ICD-10-CM

## 2024-08-08 DIAGNOSIS — M25.541 ARTHRALGIA OF BOTH HANDS: ICD-10-CM

## 2024-08-08 DIAGNOSIS — I89.0 LYMPHEDEMA DUE TO AND NOT CONCURRENT WITH IONIZING RADIOTHERAPY: ICD-10-CM

## 2024-08-08 DIAGNOSIS — C78.01 SQUAMOUS CELL CARCINOMA METASTATIC TO BOTH LUNGS (MULTI): Primary | ICD-10-CM

## 2024-08-08 LAB
ALBUMIN SERPL BCP-MCNC: 3.8 G/DL (ref 3.4–5)
ALP SERPL-CCNC: 49 U/L (ref 33–136)
ALT SERPL W P-5'-P-CCNC: 12 U/L (ref 10–52)
ANION GAP SERPL CALC-SCNC: 12 MMOL/L (ref 10–20)
AST SERPL W P-5'-P-CCNC: 12 U/L (ref 9–39)
BASOPHILS # BLD AUTO: 0.02 X10*3/UL (ref 0–0.1)
BASOPHILS NFR BLD AUTO: 1.1 %
BILIRUB SERPL-MCNC: 0.3 MG/DL (ref 0–1.2)
BUN SERPL-MCNC: 19 MG/DL (ref 6–23)
CALCIUM SERPL-MCNC: 8.9 MG/DL (ref 8.6–10.3)
CHLORIDE SERPL-SCNC: 99 MMOL/L (ref 98–107)
CO2 SERPL-SCNC: 30 MMOL/L (ref 21–32)
CREAT SERPL-MCNC: 1.12 MG/DL (ref 0.5–1.3)
DACRYOCYTES BLD QL SMEAR: NORMAL
EGFRCR SERPLBLD CKD-EPI 2021: 70 ML/MIN/1.73M*2
EOSINOPHIL # BLD AUTO: 0.09 X10*3/UL (ref 0–0.4)
EOSINOPHIL NFR BLD AUTO: 4.9 %
ERYTHROCYTE [DISTWIDTH] IN BLOOD BY AUTOMATED COUNT: 16.9 % (ref 11.5–14.5)
GLUCOSE SERPL-MCNC: 93 MG/DL (ref 74–99)
HCT VFR BLD AUTO: 28.3 % (ref 41–52)
HGB BLD-MCNC: 9.4 G/DL (ref 13.5–17.5)
HOLD SPECIMEN: NORMAL
IMM GRANULOCYTES # BLD AUTO: 0.02 X10*3/UL (ref 0–0.5)
IMM GRANULOCYTES NFR BLD AUTO: 1.1 % (ref 0–0.9)
LYMPHOCYTES # BLD AUTO: 0.26 X10*3/UL (ref 0.8–3)
LYMPHOCYTES NFR BLD AUTO: 14.2 %
MAGNESIUM SERPL-MCNC: 1.82 MG/DL (ref 1.6–2.4)
MCH RBC QN AUTO: 28.5 PG (ref 26–34)
MCHC RBC AUTO-ENTMCNC: 33.2 G/DL (ref 32–36)
MCV RBC AUTO: 86 FL (ref 80–100)
MONOCYTES # BLD AUTO: 0.04 X10*3/UL (ref 0.05–0.8)
MONOCYTES NFR BLD AUTO: 2.2 %
NEUTROPHILS # BLD AUTO: 1.4 X10*3/UL (ref 1.6–5.5)
NEUTROPHILS NFR BLD AUTO: 76.5 %
NRBC BLD-RTO: 0 /100 WBCS (ref 0–0)
OVALOCYTES BLD QL SMEAR: NORMAL
PLATELET # BLD AUTO: 178 X10*3/UL (ref 150–450)
POTASSIUM SERPL-SCNC: 4.2 MMOL/L (ref 3.5–5.3)
PROT SERPL-MCNC: 5.9 G/DL (ref 6.4–8.2)
RBC # BLD AUTO: 3.3 X10*6/UL (ref 4.5–5.9)
RBC MORPH BLD: NORMAL
SODIUM SERPL-SCNC: 137 MMOL/L (ref 136–145)
WBC # BLD AUTO: 1.8 X10*3/UL (ref 4.4–11.3)

## 2024-08-08 PROCEDURE — 85025 COMPLETE CBC W/AUTO DIFF WBC: CPT

## 2024-08-08 PROCEDURE — 36415 COLL VENOUS BLD VENIPUNCTURE: CPT

## 2024-08-08 PROCEDURE — 80053 COMPREHEN METABOLIC PANEL: CPT

## 2024-08-08 PROCEDURE — 1126F AMNT PAIN NOTED NONE PRSNT: CPT | Performed by: STUDENT IN AN ORGANIZED HEALTH CARE EDUCATION/TRAINING PROGRAM

## 2024-08-08 PROCEDURE — 3078F DIAST BP <80 MM HG: CPT | Performed by: STUDENT IN AN ORGANIZED HEALTH CARE EDUCATION/TRAINING PROGRAM

## 2024-08-08 PROCEDURE — 1159F MED LIST DOCD IN RCRD: CPT | Performed by: STUDENT IN AN ORGANIZED HEALTH CARE EDUCATION/TRAINING PROGRAM

## 2024-08-08 PROCEDURE — 99215 OFFICE O/P EST HI 40 MIN: CPT | Performed by: STUDENT IN AN ORGANIZED HEALTH CARE EDUCATION/TRAINING PROGRAM

## 2024-08-08 PROCEDURE — 3077F SYST BP >= 140 MM HG: CPT | Performed by: STUDENT IN AN ORGANIZED HEALTH CARE EDUCATION/TRAINING PROGRAM

## 2024-08-08 PROCEDURE — 83735 ASSAY OF MAGNESIUM: CPT

## 2024-08-08 ASSESSMENT — ENCOUNTER SYMPTOMS
ABDOMINAL PAIN: 0
HEADACHES: 0
DIFFICULTY URINATING: 0
FEVER: 0
SHORTNESS OF BREATH: 0
SORE THROAT: 0
WOUND: 0
DIZZINESS: 0
DYSURIA: 0
NUMBNESS: 0
TROUBLE SWALLOWING: 0
LEG SWELLING: 0
COUGH: 0
CONSTIPATION: 0
VOMITING: 0
CHILLS: 0
ARTHRALGIAS: 0
DIARRHEA: 0
LIGHT-HEADEDNESS: 0
NECK PAIN: 0
NAUSEA: 0
FREQUENCY: 0

## 2024-08-08 ASSESSMENT — PAIN SCALES - GENERAL: PAINLEVEL: 0-NO PAIN

## 2024-08-08 NOTE — PROGRESS NOTES
Patient ID: Phong Wong is a 74 y.o. male.  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 Juan Jose Fletcher   MedOnc: Dr. Kyunghee Burkitt, X. Katherine Feng, PA-C   RadOnc: Dr. Vianca Steen     Current Therapy  4/4/24: Started Q3 week Pembrolizumab    Prior Therapy:   8/16 - 10/4/23: Recieved concurrent chemoradiation with 7 weekly Carboplatin (2mg/AUC)/Paclitaxel (45mg/m2)  and 70gy RT in 35fx     Sites of Disease:  Soft palate, BOT, Left palatine tonsil  B/L Cervical LN  Lung     Oncologic Issues:   Odynophagia  Fatigue     ONCOLOGIC HISTORY  - Pt had 2 months hx of throat discomfort with lump in right neck  - 6/13/23: CT neck showed a mass 2.8 x 2.5 x 2.9 cm in the right lower cervical neck soft tissues. Two suspicious nodes were observed, one at level 3 on the right and another at level 2 on the left.  - 6/28/23: seen by Dr. Fletcher. 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 Based on the findings, Dr. Fletcher did not recommend surgery due to the location  of the primary tumor and the presence of yvette disease  - 6/30/23: PET/CT showed intense FDG avidity within the soft palate, extending to the base of the tongue and left palatine tonsil. Multiple FDG avid left cervical level II nodes were observed, along with an FDG avid mass in the region of the right cervical  level II/III, infiltrating the right SCM muscle and encasing and invading the right jugular vein. FDG avidity was also detected in the region of the left fossa Rosenmuller and left medial pterygoid muscle.  - 8/16 - 10/4/23: Recieved concurrent chemoradiation with 7 weekly Carboplatin (2mg/AUC)/Paclitaxel (45mg/m2)  and 70gy RT in 35fx    Admissions:  10/5 - 10/10/23: Admitted for extreme weakness, HECTOR, pancytopenia including anemia requiring blood  transfusion. D/C'ed to acute rehab        Past Medical History:   Past Medical History:  2017: Personal history of diseases of the blood and blood-  forming organs and certain disorders involving the immune mechanism      Comment:  History of thrombocytosis   HTN, HLD, COPD, prostate cancer in 2017 (s/p radiation)  Surgical History:    Past Surgical History:   Procedure Laterality Date    CT ABDOMEN PELVIS ANGIOGRAM W AND/OR WO IV CONTRAST  9/3/2014    CT ABDOMEN PELVIS ANGIOGRAM W AND/OR WO IV CONTRAST 9/3/2014 [facility]    IR ANGIOGRAM AORTA ABDOMEN  2014    IR ANGIOGRAM AORTA ABDOMEN 2014 [facility]   Aortoiliofemoral vascular bypass in   Social History:    Social History     Socioeconomic History    Marital status:     Number of children: 1   Tobacco Use    Smoking status: Former     Current packs/day: 0.00     Average packs/day: 1 pack/day for 40.0 years (40.0 ttl pk-yrs)     Types: Cigarettes     Start date:      Quit date:      Years since quittin.6     Passive exposure: Past    Smokeless tobacco: Never   Substance and Sexual Activity    Alcohol use: Yes     Alcohol/week: 12.0 standard drinks of alcohol     Types: 12 Cans of beer per week    Drug use: Never     Social Determinants of Health     Financial Resource Strain: Low Risk  (10/20/2023)    Overall Financial Resource Strain (CARDIA)     Difficulty of Paying Living Expenses: Not very hard   Food Insecurity: No Food Insecurity (10/5/2023)    Hunger Vital Sign     Worried About Running Out of Food in the Last Year: Never true     Ran Out of Food in the Last Year: Never true   Transportation Needs: No Transportation Needs (10/20/2023)    PRAPARE - Transportation     Lack of Transportation (Medical): No     Lack of Transportation (Non-Medical): No   Physical Activity: Inactive (10/5/2023)    Exercise Vital Sign     Days of Exercise per Week: 0 days     Minutes of Exercise per Session: 0 min   Stress:  Stress Concern Present (10/5/2023)    [employer] [place] [employer] - Occupational Stress Questionnaire     Feeling of Stress : To some extent   Social Connections: Moderately Integrated (10/5/2023)    Social Connection and Isolation Panel [NHANES]     Frequency of Communication with Friends and Family: More than three times a week     Frequency of Social Gatherings with Friends and Family: More than three times a week     Attends Baptism Services: Never     Active Member of Clubs or Organizations: Yes     Attends Club or Organization Meetings: Never     Marital Status:    Intimate Partner Violence: Not At Risk (10/5/2023)    Humiliation, Afraid, Rape, and Kick questionnaire     Fear of Current or Ex-Partner: No     Emotionally Abused: No     Physically Abused: No     Sexually Abused: No   Housing Stability: Low Risk  (10/20/2023)    Housing Stability Vital Sign     Unable to Pay for Housing in the Last Year: No     Number of Places Lived in the Last Year: 1     Unstable Housing in the Last Year: No      Family History:    Family History   Problem Relation Name Age of Onset    Aortic aneurysm Father          abdominal     Family Oncology History:    Cancer-related family history is not on file.      Subjective   Chief Complaint: Squamous Cell Carcinoma of oropharynx    HPI  Phong Wong is a 74 y.o. male with h/o locally advanced oral cavity cancer, completed concurrent chemoradiation with 7 weekly Carboplatin+Paclitaxel on 10/4/23. Unfortunately found with mets to lungs on 3 months restaging scan. CPS 3. Started Q3 week Pembrolizumab on 4/4/24. Received 3 cycles of Pembro , unfortunately lung nodules progressed. Started Q3 week carbo/Taxol on 6/19/24    Interval History  Patient presents today for toxicity check following C3 Carbo/Taxol.    Patient continue to feel very well.   Reports soreness in hands the first few days after chemo and resolves on its own.   Some constipation the week after  chemo, resolves w/o intervention.  Still has lymphedema in chin, goes to lymphedema massage, swelling improves after massages. Does home exercises sporadically.    Right neck tenderness is much improved since starting lymphedema massages.   Appetite is good, gained weight.   Denies dry mouth, has saliva production, taste is normal.   He denies any urinary symptoms currently, no urgency, frequency, hesitancy, dysuria.     ROS  Review of Systems   Constitutional:  Negative for chills and fever.   HENT:   Negative for lump/mass, mouth sores, nosebleeds, sore throat, tinnitus and trouble swallowing.    Respiratory:  Negative for cough and shortness of breath.    Cardiovascular:  Negative for chest pain and leg swelling.   Gastrointestinal:  Negative for abdominal pain, constipation, diarrhea, nausea and vomiting.   Genitourinary:  Negative for difficulty urinating, dysuria and frequency.    Musculoskeletal:  Negative for arthralgias and neck pain.   Skin:  Negative for rash and wound.   Neurological:  Negative for dizziness, headaches, light-headedness and numbness.       Allergies  Allergies   Allergen Reactions    Codeine Nausea Only        Medications  Current Outpatient Medications   Medication Instructions    amLODIPine-benazepriL (Lotrel) 5-20 mg capsule [id number] Medication amlodipine 5 mg-benazepril 20 mg capsule amlodipine 5 mg-benazepril 20 mg capsule 5-20 mg 10/2/2020 Active (Outside)    aspirin 81 mg EC tablet 1 tablet, oral, Daily    dexAMETHasone (DECADRON) 8 mg, oral, Daily, For 3 days starting the day after treatment.    doxazosin (CARDURA) 4 mg, oral, Daily, Take 1 tablet by mouth daily in the evening    fluticasone-umeclidin-vilanter (Trelegy Ellipta) 100-62.5-25 mcg blister with device INHALE 1 PUFF EVERY DAY    ipratropium-albuteroL (Duo-Neb) 0.5-2.5 mg/3 mL nebulizer solution Take 3 mL by nebulization 3 times a day as needed for wheezing or shortness of breath.    losartan (COZAAR) 50 mg, oral, Daily     magnesium oxide (MAG-OX) 400 mg, oral, Daily    ofloxacin (Ocuflox) 0.3 % ophthalmic solution Instill 1 drop Left Eye 4 times a day    OLANZapine (ZYPREXA) 5 mg, oral, Nightly    ondansetron (ZOFRAN) 8 mg, oral, Every 8 hours PRN    prochlorperazine (COMPAZINE) 10 mg, oral, Every 6 hours PRN        Objective   VS:  /71 (BP Location: Right arm, Patient Position: Sitting, BP Cuff Size: Adult)   Pulse 87   Temp 36.7 °C (98.1 °F) (Temporal)   Resp 18   Wt 61.8 kg (136 lb 3.9 oz)   SpO2 100%   BMI 21.99 kg/m²   Weight   Wt Readings from Last 7 Encounters:   08/08/24 61.8 kg (136 lb 3.9 oz)   08/01/24 60.5 kg (133 lb 6.1 oz)   07/18/24 60.6 kg (133 lb 9.6 oz)   07/11/24 58.9 kg (129 lb 13.6 oz)   06/28/24 59.4 kg (130 lb 14.4 oz)   06/20/24 58.3 kg (128 lb 8.5 oz)   06/13/24 58.1 kg (128 lb 1.4 oz)         Physical Exam  Vitals reviewed.   Constitutional:       Appearance: Normal appearance. He is underweight.   HENT:      Head: Normocephalic and atraumatic.      Right Ear: External ear normal. No tenderness.      Left Ear: External ear normal. No tenderness.      Nose: Nose normal.      Mouth/Throat:      Mouth: No injury or oral lesions.      Tongue: No lesions.      Pharynx: Oropharynx is clear. No posterior oropharyngeal erythema.      Comments: Edentulous  Eyes:      Extraocular Movements: Extraocular movements intact.      Conjunctiva/sclera: Conjunctivae normal.      Pupils: Pupils are equal, round, and reactive to light.   Neck:      Thyroid: No thyroid mass.      Comments: Mild to moderate swelling in submental and neck region.   Right neck is less sensitive/tender to palpation  Cardiovascular:      Rate and Rhythm: Normal rate and regular rhythm.   Pulmonary:      Effort: Pulmonary effort is normal. No respiratory distress.      Breath sounds: Normal breath sounds.   Abdominal:      General: Bowel sounds are normal. There is no distension or abdominal bruit.      Palpations: Abdomen is soft. There  is no mass.      Tenderness: There is no abdominal tenderness.   Musculoskeletal:         General: Normal range of motion.      Cervical back: Normal range of motion and neck supple.      Right lower leg: No edema.      Left lower leg: No edema.   Lymphadenopathy:      Cervical: No cervical adenopathy.      Upper Body:      Right upper body: No axillary adenopathy.      Left upper body: No axillary adenopathy.   Skin:     General: Skin is warm and dry.      Findings: No lesion (radiation dermatitis in bilateral neck), rash or wound.   Neurological:      General: No focal deficit present.      Mental Status: He is alert and oriented to person, place, and time.      Gait: Gait is intact.   Psychiatric:         Mood and Affect: Mood and affect normal.         Diagnostic Results   The below labs were reviewed.  Results from last 7 days   Lab Units 08/08/24  1055   WBC AUTO x10*3/uL 1.8*   HEMOGLOBIN g/dL 9.4*   HEMATOCRIT % 28.3*   PLATELETS AUTO x10*3/uL 178   NEUTROS ABS x10*3/uL 1.40*   LYMPHS ABS AUTO x10*3/uL 0.26*   MONOS ABS AUTO x10*3/uL 0.04*   EOS ABS AUTO x10*3/uL 0.09   NEUTROS PCT AUTO % 76.5   LYMPHS PCT AUTO % 14.2   MONOS PCT AUTO % 2.2   EOS PCT AUTO % 4.9      Results from last 7 days   Lab Units 08/08/24  1055   GLUCOSE mg/dL 93   SODIUM mmol/L 137   POTASSIUM mmol/L 4.2   CHLORIDE mmol/L 99   CO2 mmol/L 30   BUN mg/dL 19   CREATININE mg/dL 1.12   EGFR mL/min/1.73m*2 70   CALCIUM mg/dL 8.9   MAGNESIUM mg/dL 1.82   ALBUMIN g/dL 3.8   PROTEIN TOTAL g/dL 5.9*   BILIRUBIN TOTAL mg/dL 0.3   ALK PHOS U/L 49   ALT U/L 12   AST U/L 12                      Pathology      Imaging  1/10/2024 PET/CT  IMPRESSION:  1. Significant interval improvement in FDG avidity in the soft palate and left palatine tonsil, remaining FDG avidity along the left palatine tonsil consistent with persistent residual viable disease.  2. Nearly complete metabolic resolution of cervical yvette metastases with remaining mild FDG avid right  cervical level 3 node seen on current study, likely representing residual yvette metastasis.  3. Newly developed FDG-avid pulmonary nodule in the left lower lobe as well as minimal FDG avid subcentimeter pulmonary nodules in bilateral upper lobes, concerning for lung metastasis.    1/10/2024 CT Neck w/ IV Contrast  IMPRESSION:  *Decreased size of the previously demonstrated right-sided yvette neck mass *No new adenopathy  *Post treatment changes in the hypopharynx as described    2/9/27 CT Chest w/o Contrast  IMPRESSION:  1. Multiple pulmonary nodules are again noted corresponding to FDG avid nodules on most recent PET-CT from 01/10/2024 and are new when compared to prior CT of the chest from 06/13/2023. The largest of which is a pleural-based left lower lobe nodule measuring up to 1.6 cm. Findings are concerning for metastatic disease.  2. Severe coronary artery calcifications, indicating the presence of coronary artery disease. If the patient has associated symptoms recommend management as per chest pain guidelines (e.g. https://doi.org/10.1161/CIR.9294246418766328). If the patient is asymptomatic consider reviewing modifiable cardiovascular risk factors and managing as per guidelines for primary prevention (e.g. https://doi.org/10.1161/CIR.1102767129247206).  4. Bulky atherosclerotic calcification at the origin of the left renal artery likely contributing to atrophy of the left kidney as compared to the right.   5. Additional findings as above.    3/27/2024 CT chest abdomen pelvis w IV contrast  Impression:   1. Interval increase in size of left lower lobe pulmonary nodule when compared to CT chest 02/09/2024 correlating with FDG avidity on PET-CT 01/10/2024. No sites of new metastasis in the chest.   2. The previously seen clusters of pulmonary nodules in the left upper lobe have decreased in number since CT chest 09/20/2024 correlating with improving postradiation changes or bronchiolitis.   3. Stable diffuse  heterogenous appearance of the axial skeleton, similar to study. No sites of new metastasis in the abdomen and pelvis.  4. Additional stable findings as above.     6/7/2024 CT soft tissue neck wo IV contrast  Impression:   Motion degraded examination. Assessment is also limited in the absence of intravenous contrast medium. No definite evidence of disease progression within the neck. The pharyngeal soft tissues are not appreciably changed in appearance. The right level 3 yvette component is poorly delineated/characterized in the setting of aforementioned limitations but does not appear significantly enlarged in the interim. No additional lymphadenopathy is evident within the neck soft tissues. Posttreatment change as above.   As compared with previous neck CT examination there is interval enlargement of a now 7 mm nodule within the left lung apex. Please see dedicated chest CT for complete evaluation.   Severe atherosclerotic vascular calcification.      6/8/2024 CT chest wo IV contrast  Impression:   1.  Interval increase in size of multiple pulmonary nodules in the left upper lobe and of a right middle lobe nodule, most consistent with worsening pulmonary metastatic disease.   2. New 1 cm nodular consolidative opacity in the right upper lobe with surrounding ground-glass attenuation. This may represent a new area of metastatic disease or may be infectious/inflammatory in etiology. A new 0.3 cm nodule in the left upper lobe may also be infectious/inflammatory or metastatic.   3. Multiple chronic/incidental findings are similar compared to prior CT studies as detailed above including moderate emphysema, sequela of chronic bronchitis, and severe coronary artery calcifications.        Assessment/Plan    ASSESSMENT  Phong Wong is a 74 y.o. male with recent diagnosis of locally advanced oral cavity cancer. Completed concurrent chemoradiation with 7 weekly Carboplatin (2mg/AUC)/Paclitaxel (45mg/m2) on 10/4/2. Admitted  10/5 - 10/10 for extreme weakness, HECTOR, pancytopenia including anemia requiring blood transfusion. D/C'ed to acute rehab. Admitted  again 10/18 - 11/2/23 for lethargy, weakness, failure to thrive, anemia.     # Locally advanced oral cavity cancer, T3N2M0, now with met to lungs  - 6/30/23: PET/CT showed intense FDG avidity within the soft palate, extending to the base of the tongue and left palatine tonsil. Multiple uptake in left cervical level II nodes, right cervical level  II/III, infiltrating the right SCM muscle and encasing and invading the right jugular vein. FDG avidity was also detected in the region of the left fossa Rosenmuller and left medial pterygoid muscle.  - 7/13/23: pt is doing well, no pain, discussed with patient about carboplatin+ paclitaxel as his chemotherapy along with radiation due to his GFR 30s.  Chemo related side effects were reviewed with patient, consent obtained.   - 8/16 - 10/4/23: Recieved concurrent chemoradiation with 7 weekly Carboplatin (2mg/AUC)/Paclitaxel (45mg/m2)  and 70gy RT in 35fx     - 1/12/24: Patient is feeling well, tolerating soft PO intake. PEG removed today.   Reviewed 1/10/24 restaging PET/CT with patient. Scans noted significant interval improvement in prior sites of disease though with residual FDG avidity in soft palate and left palatine tonsil, c/f residual disease vs post treatment changes. Also noted was a new FDG avid LLL pulm nodule with max SUV 5.7, c/f pulmonary metastasis. Will order STAT lung biopsy on this.   Pt scoped by Dr. Fletcher today, unfortunately was difficult to examine in office due to patient intolerance. Per Dr. Fletcher, may bring to OR to complete the examination depending on lung biopsy results. Scans will be reviewed at 1/19/24 [facility].  - 2/9/24: Patient presents with new painful left lateral tongue lesion, hindering PO intake. Will start Oxycodone 5mg Q6 hr PRN for pain control. Pt will see Dr. Fletcher next week for eval/possible biopsy.  Patient will have repeat CT Chest for FDG avid LLL pulm nodule as prior biopsy attempt was not successful due to small nodule size and difficult location near diaphragm.  -2/9/24 CT chest showed multiple subcentimeter noduesl 2-5mm, dominant lesion is 1.6cm in LLL.    -3/6/24: Lung biopsy of LLL nodule was consistent with HN origin. CPS 3.  Discussed with patient about phase 2 FLAM study (randomized to pembrolizumab or pembrolizumab+danvatirsen (Stat3 inhibitor). Unfortunately due to his CKD, he's not eligible for FLAM study.  - 4/4/24: Started Q3 week Pembrolizumab  - 5/1/24: C2 Pembro was delayed a week 2/2 patient not feeling well. He had struggled with hemorrhoids for a couple weeks. Otherwise tolerating treatment with no irAEs .  - 5/23/24: Patient doing well, no irAEs. Appetite and energy are good, tolerating solid PO intake. No odynophagia, no neck pain.  - 6/7/24 restaging CT N/C/A/P showed enlargement of subcentimeter lung mets, stable left lung nodule 2cm with new 1cm nodule in RUL. Based on heightened response with carbo+taxol after progression on PD, I recommended 3 cycles of carbo taxol and restaging, if there is response, we can consider afatinib treatment more as long term maintenance therapy.  Consent obtained for chemo and research blood.  - 6/19/24: Patient feeling well with no new complaints. Ok for C1 Q3 week Carbo/Taxol.  - 6/28/24: Patient feeling well and tolerating chemo. Did have watery diarrhea 1-2x per day for the first week after chemo but resolved on its on. No other new symptoms. Does still have tenderness in the right neck in area of prior RT. He did have a 1.5cm Level 2 LN in right neck on 6/7/24 CT Neck.   - 7/11/24: Patient doing well, no new symptoms, no recurrence of diarrhea. Ok for C2 carbo/taxol. Slightly neutropenic today, ANC 1320. Neutropenic precaution given to patient and his SO.  - 7/18/24: ANC 1400, denies fever, chills, pt doing well, had few days of soreness in fingers  and legs which are all resolved.  -8/1/24:  pt is doing well with wt gain. ANC 1500, ok to proceed with chemo,  will monitor ANC closely next week.  - 8/8//24: Patient continue to do well, eating/drinking well, gaining weight. ANC 1400. Mild arthralgia in hands a few days after chemo that resolved w/o intervention.     # Thrombocytopenia  - 6/28: Plt 85. No s/s of mucosal bleeds. Will continue to monitor  - 7/18: Plt 165  - 8/1/24: Plat 174    # Hemorrhoids  - Blood on stool most likely from internal hemorrhoid. Blood counts are stable. Bleeding has decreased Patient is taking Miralax to keep stools soft.     # Anemia: improving  - Hgb has been dropping since starting chemo, however today his hgb is <7 and will require a blood transfusion. This is likely due to chemotherapy as there are no s/s of bleeds.   - Received 2U PRBC during 10/18 - 11/2 admission. Anemia likely due to chemotherapy  - 11/29/23: Hgb 8.8. Improved from 3 weeks ago. Expect Hbg will continue to trend up as patient recovers. Can consider iron studies.   - Hgb trending up since 02/2024    # CKD  - 2/9/24 CT chest showed Bulky atherosclerotic calcification at the origin of the left renal artery likely contributing to atrophy of the left kidney as compared to the right. Kidney atrophy was not mentioned in previous scans.   - 5/1: Creat 1.46, GFR 51. Encouraged patient to keep up with non-caffeinated PO hydration. 1L NS given today with Pembro. Patient to follow up with PCP regarding atherosclerosis.   - 6/12: Creat 1.54, GFR 48.   - 6/20: Creat 1.6. GFR 45. Additional 1L NS giving w/ chemo today  - 6/28: Creat 1.2. GFR 64. Pt reports he has increased PO hydration. Kidney function improved.   - 7/11 Creat 1.38, GFR 54. Pt reports decreased PO intake lately. Will give 1L NS for hydration  - 8/1/24: Creat 1.34, GFR 56  - 8/8/24: Creat 1.12, GFR 70. Kidney function is improving    # Submental/Neck Lymphedema  - Patient with moderate swelling in the  submental and neck region. Likely 2ry to prior radiation. Established w/ Dr Guerrero.   - 6/19: Right neck with more swelling and tender to touch, neck is tight. Still itchy to touch.   - 6/28: Right neck swelling persists and is tender to palpation. Will meet with Dr. Guerrero's team for massage on 7/10  - 7/11: Right neck swelling decreased after lymphedema massage  - 8/8: right neck sensitivity/tenderness also improving after lymphedema massage.     # Poor Venous Access  - Informed by infusion RN 5 attempts were made before patient had IV access, 2 were with IV US. Discussed mediport with patient but he was hesitant. Given patient likely will not have more than 4 more cycles of Carbo/Taxol, we will hold off on mediport placement and ask that the IV team places his IV with his infusions.    PLAN  -- CT N/C without contrast on 8/22/24  -- RTC in 1 week for C4 Carbo/Taxol on 8/29/24  -- Continue with research lab collection prior to each cycle  -- Continue ample PO hydration.    -- Follow up with Dr. Weiss on atherosclerosis of left renal artery.   -- Continue stool softeners as needed, monitor further symptoms of hemorrhoids .   -- Call if diarrhea returns, can prescribe imodium PRN

## 2024-08-08 NOTE — TELEPHONE ENCOUNTER
Called pt to remind of appointment on 08/13/24 at 1:00. Pt's phone went to voicemail left number if needs to reschedule.

## 2024-08-12 ENCOUNTER — APPOINTMENT (OUTPATIENT)
Dept: INTEGRATIVE MEDICINE | Facility: CLINIC | Age: 74
End: 2024-08-12
Payer: MEDICARE

## 2024-08-12 DIAGNOSIS — M79.10 MYALGIA: Primary | ICD-10-CM

## 2024-08-12 DIAGNOSIS — C10.9 OROPHARYNGEAL CANCER (MULTI): ICD-10-CM

## 2024-08-12 DIAGNOSIS — I89.0 LYMPHEDEMA: ICD-10-CM

## 2024-08-12 PROCEDURE — 97140 MANUAL THERAPY 1/> REGIONS: CPT | Performed by: HOSPITALIST

## 2024-08-12 ASSESSMENT — PAIN SCALES - GENERAL: PAINLEVEL_OUTOF10: 0 - NO PAIN

## 2024-08-12 NOTE — PROGRESS NOTES
Massage Therapy Visit:     Phong Wong was referred by Dr. Guerrero.    Condition of Client Subjective :  Patient ID: Phong Wong is a 74 y.o. male who presents for a 40 minute Reinaldo Therapy.  Patient is being treated for oral cancer.  His partner Tomasa was in the room during the treatment.  I only worked on his face, neck and shoulders.  He was supine.  I did Reinaldo Lymph Drainage (MLD) on his face.  This is very light work.  I checked in with him often.  He did notice an improvement with his muscle discomfort.        Session Information  Visit Type: Follow-up visit  Description of present complaint: Muscle tension, Discomfort        Objective   Pre-treatment Assessment  Arrival Mode: Wheelchair  Pain Score: 0 - No pain  Anxiety Level (0-10): 1  Stress Level (0-10): 1  Coping Level (0-10): 9  Depression Level (0-10): 1  Fatigue Level (0-10): 1  Nausea Level (0-10): 0  Wellbeing Level (0-10): 1      Actions Assessment/Plan :  Provider reviewed plan for the massage session, precautions and contraindications. Patient/guardian/hospital staff has given consent to treat with full understanding of what to expect during the session. Before massage therapy began, provider explained to the patient to communicate at any time if the pressure was causing discomfort past their tolerance level. Patient agreed to advise therapist.    Massage Treatment  Patient Position: Table  Positioning Assistance: Pillow(s)/bolster under knees while supine  Massage Technique: Therapeutic massage, Lymphatic drainiage  Pressure Scale: 1 - Light pressure, 2 - Mild pressure    Response:  Post-treatment Assessment  Patient Noted Improvement of the Following Symptoms: Muscle tension  0-10 (Numeric) Pain Score: 0 - No pain  Anxiety Level (0-10): 1  Stress Level (0-10): 0  Coping Level (0-10): 9  Depression Level (0-10): 1  Fatigue Level (0-10): 1  Nausea Level (0-10): 0  Wellbeing Level (0-10): 1    Evaluation:   Patient will follow up as needed.

## 2024-08-13 ENCOUNTER — HOSPITAL ENCOUNTER (OUTPATIENT)
Dept: VASCULAR MEDICINE | Facility: HOSPITAL | Age: 74
Discharge: HOME | End: 2024-08-13
Payer: MEDICARE

## 2024-08-13 ENCOUNTER — HOSPITAL ENCOUNTER (OUTPATIENT)
Dept: RADIATION ONCOLOGY | Facility: HOSPITAL | Age: 74
Setting detail: RADIATION/ONCOLOGY SERIES
Discharge: HOME | End: 2024-08-13
Payer: MEDICARE

## 2024-08-13 VITALS
BODY MASS INDEX: 22.24 KG/M2 | SYSTOLIC BLOOD PRESSURE: 168 MMHG | RESPIRATION RATE: 18 BRPM | DIASTOLIC BLOOD PRESSURE: 79 MMHG | TEMPERATURE: 97.5 F | OXYGEN SATURATION: 99 % | HEART RATE: 70 BPM | WEIGHT: 137.79 LBS

## 2024-08-13 DIAGNOSIS — I79.8 OTHER DISORDERS OF ARTERIES, ARTERIOLES AND CAPILLARIES IN DISEASES CLASSIFIED ELSEWHERE (CMS-HCC): ICD-10-CM

## 2024-08-13 DIAGNOSIS — C10.9 OROPHARYNGEAL CANCER (MULTI): ICD-10-CM

## 2024-08-13 DIAGNOSIS — R09.89 OTHER SPECIFIED SYMPTOMS AND SIGNS INVOLVING THE CIRCULATORY AND RESPIRATORY SYSTEMS: ICD-10-CM

## 2024-08-13 PROCEDURE — 93880 EXTRACRANIAL BILAT STUDY: CPT

## 2024-08-13 PROCEDURE — 99214 OFFICE O/P EST MOD 30 MIN: CPT

## 2024-08-13 PROCEDURE — 93880 EXTRACRANIAL BILAT STUDY: CPT | Performed by: SURGERY

## 2024-08-13 ASSESSMENT — ENCOUNTER SYMPTOMS
SORE THROAT: 0
UNEXPECTED WEIGHT CHANGE: 0
WHEEZING: 0
ADENOPATHY: 0
ABDOMINAL PAIN: 0
SHORTNESS OF BREATH: 0
CHILLS: 0
BACK PAIN: 1
APPETITE CHANGE: 0
FEVER: 0
EXTREMITY WEAKNESS: 1
CHEST TIGHTNESS: 0
DIZZINESS: 0
COUGH: 0
DIARRHEA: 0
NAUSEA: 0
FATIGUE: 0
SPEECH DIFFICULTY: 0
ARTHRALGIAS: 1
PALPITATIONS: 0
TROUBLE SWALLOWING: 0
LOSS OF SENSATION IN FEET: 0
DEPRESSION: 0
NERVOUS/ANXIOUS: 0
CONFUSION: 0
HEADACHES: 0
VOMITING: 0
DIFFICULTY URINATING: 0
CONSTIPATION: 0
OCCASIONAL FEELINGS OF UNSTEADINESS: 0
DYSURIA: 0
HEMATURIA: 0

## 2024-08-13 ASSESSMENT — COLUMBIA-SUICIDE SEVERITY RATING SCALE - C-SSRS
6. HAVE YOU EVER DONE ANYTHING, STARTED TO DO ANYTHING, OR PREPARED TO DO ANYTHING TO END YOUR LIFE?: NO
2. HAVE YOU ACTUALLY HAD ANY THOUGHTS OF KILLING YOURSELF?: NO
1. IN THE PAST MONTH, HAVE YOU WISHED YOU WERE DEAD OR WISHED YOU COULD GO TO SLEEP AND NOT WAKE UP?: NO

## 2024-08-13 ASSESSMENT — PAIN SCALES - GENERAL: PAINLEVEL: 0-NO PAIN

## 2024-08-13 NOTE — PROGRESS NOTES
Radiation Oncology Follow-Up    Patient Name:  Phong Wong  MRN:  [id number]  :  1950    Referring Provider: Aris Berry, *  Primary Care Provider: Harshil Weiss MD  Care Team: Patient Care Team:  Harshil Weiss MD as PCP - General (Internal Medicine)  Harshil Weiss MD as PCP - [employer] PCP  Vianca Steen MD as Radiation Oncologist (Radiation Oncology)  Lyssa Patten PA-C as Physician Assistant (Hematology and Oncology)  Kyunghee Burkitt, DO as Medical Oncologist (Hematology and Oncology)  Juan Jose Fletcher MD as Surgeon (Otolaryngology)  Saulo Guerrero MD as Consulting Physician (Hematology and Oncology)    Date of Service: 2024     Diagnosis: Squamous Cell Carcinoma of Oropharynx, 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    Current Therapy  24: Started Q3 week Pembrolizumab    Prior Therapy:    - 10/4/23: Recieved concurrent chemoradiation with 7 weekly Carboplatin (2mg/AUC)/Paclitaxel (45mg/m2)  and 70gy RT in 35fx    SUBJECTIVE  History of Present Illness:   Phong Wong is a 74 y.o. 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  The patient underwent chemoradiation ending on 10/4/23.  His recovery was complicated by a hospital admission on 10/18/23 for lethargy and weakness.  Patient was hypotensive and hypoxic with elevated lactate on admission, concerning for opioid overdose vs infection.   Patient received empiric antibiotics with sepsis workup being negative.  EGD with biopsy, performed on 10/20/23, showed diffuse radiation esophagitis and actively inflamed esophageal squamous mucosa with erosion and reactive changes.  Inflamed cardiac-type mucosa, negative for intestinal metaplasia or dysplasia.  During his hospitalization a Clark was placed for urinary retention and a PEG for malnutrition.  Patient was subsequently discharged to [facility] for skilled rehab.      Today the patient is in clinic, accompanied by  his wife, for a routine Radiation Oncology survival visit.  Patient states that he's is doing very well today and is without many complaints.  Endorses good PO intake.   He focuses on softer foods since he's edentulous.  He has dentures but state that they don't fit so he's needing modifications through his dentist.  Denies coughing or choking when eating.  Denies dysphagia, odynophagia, mucositis, trismus or new lumps/bumps/discolorations around his oral cavity.   Endorses being able to sustain his weight with his current diet.  He does have a PEG in place but only flushes it daily.  His right neck has healed well with no signs of infection. Denies chills, fever, dyspnea or chest pain.  Continues to endorse some mild fatigue.  Denies headaches, or focal sensory/motor changes.  Denies abdominal pain, nausea, vomiting, diarrhea or constipation.   Patient is scheduled to see Dr. Fletcher on 1/12/24.      An MRI of the neck completed today shows decreased size of the previously demonstrated right-sided yvette neck mass.  This was shared with the patient and his wife.  A PET completed today has yet to be reviewed by Radiology.  I told the patient that I'd review with Dr. Steen reach back out to him on 1/11/24 to discuss.     5/1/24 Interval Visit:  Today the patient is in clinic, accompanied by his wife, for a routine Radiation Oncology FU visit.  Patient continues to be managed, by Medical Oncology, for metastatic disease to his lung.  He's currently undergoing his second cycle of Pembrolizumab today.  States that he's tolerating treatment well without side effects.  Endorses good PO intake.   He focuses on softer foods since he's edentulous.  He has dentures but state that they don't fit so he's needing modifications through his dentist.  Denies coughing or choking when eating.  Denies dysphagia, odynophagia, mucositis, trismus or new lumps/bumps/discolorations around his oral cavity.   Endorses being able to sustain  his weight with his current diet. Denies chills, fever, dyspnea or chest pain.  Continues to endorse some mild fatigue.  Denies headaches, or focal sensory/motor changes.  Denies abdominal pain, nausea, vomiting, diarrhea or constipation.  Continue with NeuString for lymphedema message.  Will be reaching out to Dr. Guerrero about a new order so he's not charged as much for each visit.      8/13/24 Interval Visit:   Today the patient is in clinic, accompanied by his wife, for a routine Radiation Oncology FU visit.  Patient states that he's doing very well and tolerating systemic treatment with few SE.  He does endorse some fatigue and neuropathies in his hands a few days after receiving Carbo/Taxol.   Continue to eat a regular diet without coughing or choking. Denies odynophagia, mucositis, trismus or new lumps/bumps/discolorations in his mouth or around his head/neck/shoulders.   He does still report so very mild right neck tenderness that continues to improve.  His weight today is 62.5kg which is a 3.3% increase in weight.  Denies chills, fever, dyspnea or chest pain.  Denies headaches, or focal sensory/motor changes.  Denies abdominal pain, nausea, vomiting, diarrhea or constipation.  Continue with NeuString for lymphedema message.     Patient had a carotid ultrasound today which showed <50% stenosis in his b/l carotid arteries and no flow restrictions.  This was discussed with the patient.       Review of Systems:   Review of Systems   Constitutional:  Negative for appetite change, chills, fatigue, fever and unexpected weight change.   HENT:   Negative for hearing loss, lump/mass, mouth sores, nosebleeds, sore throat, tinnitus and trouble swallowing.    Respiratory:  Negative for chest tightness, cough, shortness of breath and wheezing.    Cardiovascular:  Negative for chest pain and palpitations.   Gastrointestinal:  Negative for abdominal pain, constipation, diarrhea, nausea and  vomiting.   Genitourinary:  Negative for difficulty urinating, dysuria, hematuria, pelvic pain and penile discharge.         Has Clark in place.    Musculoskeletal:  Positive for arthralgias and back pain.   Neurological:  Positive for extremity weakness. Negative for dizziness, headaches and speech difficulty.        Generalized weakness.    Hematological:  Negative for adenopathy.   Psychiatric/Behavioral:  Negative for confusion and depression. The patient is not nervous/anxious.      The patient's current pain level was not assessed.  They report currently having a pain of 0 out of 10.  They feel their pain is under control without the use of pain medications.    Performance Status:   The Karnofsky performance scale today is 80, Normal activity with effort; some signs or symptoms of disease (ECOG equivalent 1).        OBJECTIVE  Vital Signs:  There were no vitals taken for this visit.   Physical Exam:  Physical Exam  Constitutional:       General: He is not in acute distress.     Appearance: Normal appearance. He is normal weight. He is not ill-appearing, toxic-appearing or diaphoretic.   HENT:      Head: Normocephalic and atraumatic. No abrasion or masses.      Jaw: No trismus, swelling or pain on movement.      Salivary Glands: Right salivary gland is not diffusely enlarged. Left salivary gland is not diffusely enlarged.      Comments:  Patient with mild lymphedema.      Nose: Nose normal. No congestion or rhinorrhea.      Mouth/Throat:      Lips: Pink.      Mouth: Mucous membranes are dry. No injury or oral lesions.      Dentition: Normal dentition. Does not have dentures. No gingival swelling, dental abscesses or gum lesions.      Tongue: No lesions. Tongue does not deviate from midline.      Palate: No mass and lesions.      Tonsils: No tonsillar exudate or tonsillar abscesses.      Comments: Edentulous.   Eyes:      General: Lids are normal. Gaze aligned appropriately.      Extraocular Movements:  Extraocular movements intact.      Pupils: Pupils are equal, round, and reactive to light.   Neck:      Thyroid: No thyroid mass or thyroid tenderness.   Cardiovascular:      Rate and Rhythm: Normal rate and regular rhythm.      Pulses: Normal pulses.      Heart sounds: Normal heart sounds. No murmur heard.  Pulmonary:      Effort: Pulmonary effort is normal. No tachypnea or respiratory distress.      Breath sounds: Normal breath sounds. No wheezing or rhonchi.   Abdominal:      General: Abdomen is flat. Bowel sounds are normal. There is no distension.      Palpations: Abdomen is soft. There is no mass.      Tenderness: There is no abdominal tenderness. There is no guarding or rebound.   Musculoskeletal:         General: No swelling, tenderness, deformity or signs of injury. Normal range of motion.      Cervical back: Full passive range of motion without pain, normal range of motion and neck supple. No rigidity or tenderness. No pain with movement. Normal range of motion.      Right lower leg: No edema.      Left lower leg: No edema.   Lymphadenopathy:      Head:      Right side of head: No submental, submandibular, tonsillar, preauricular, posterior auricular or occipital adenopathy.      Left side of head: No submental, submandibular, tonsillar, preauricular, posterior auricular or occipital adenopathy.      Cervical:      Right cervical: No superficial, deep or posterior cervical adenopathy.     Left cervical: No superficial, deep or posterior cervical adenopathy.   Skin:     General: Skin is warm and dry.      Coloration: Skin is not jaundiced.      Findings: No erythema, lesion or rash.   Neurological:      General: No focal deficit present.      Mental Status: He is alert and oriented to person, place, and time.      Motor: Weakness (Generalized weakness.) present.      Coordination: Coordination normal.      Gait: Gait normal.   Psychiatric:         Attention and Perception: Attention normal.         Mood and  Affect: Mood normal.         Behavior: Behavior normal. Behavior is cooperative.        8/13/24 Carotid Ultrasound:   Right Carotid: Findings are consistent with less than 50% stenosis of the right proximal internal carotid artery. Right external carotid artery appears patent with no evidence of stenosis. The right vertebral artery is patent with antegrade flow. No evidence of hemodynamically significant stenosis in the right subclavian artery.  Left Carotid: Findings are consistent with less than 50% stenosis of the left proximal internal carotid artery. Left external carotid artery appears patent with no evidence of stenosis. The left vertebral artery is patent with antegrade flow. There are elevated velocities in the left subclavian artery that are suggestive of disease.     Imaging & Doppler Findings:  Right Plaque Morph: The proximal right internal carotid artery demonstrates heterogenous, smooth and calcified plaque. The mid right common carotid artery demonstrates heterogenous and smooth plaque. The distal right common carotid artery demonstrates heterogenous and smooth plaque.  Left Plaque Morph: The proximal left internal carotid artery demonstrates heterogenous and calcified plaque. The proximal left external carotid artery demonstrates heterogenous plaque. The mid left common carotid artery demonstrates heterogenous and smooth plaque. The distal left common carotid artery demonstrates heterogenous and smooth plaque. The left carotid bulb demonstrates heterogenous plaque.    ASSESSMENT:  Phong Wong is a 73 y.o. 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  The patient underwent chemoradiation ending on 10/10/12.  Patient continues to be managed, by Medical Oncology, for metastatic disease to his lung.  Currently he's being treated with Carbo/Taxol and is tolerating the combination very well.  All questions/concerns were addressed to the satisfaction  of the patient.     PLAN:      --FU with Mak Berry CNP on 12/5/24   --Continue to follow with Dr. Fletcher for ENT management.  CNP to help establish next visit.   --Continue to follow with Dr. Burkitt and Eva Patten PA-C for medical oncology management.   --Continue following with William Byrne RD for nutritional management.         Please reach out with any questions or concerns.     NCCN Guidelines were applicable to guide this patients treatment plan.  LIDIA Christie-CNP    I spent a total of 30+ minutes on the date of the service which included preparing to see the patient, face-to-face patient care, completing clinical documentation, obtaining and / or reviewing separately obtained history, counseling and educating the patient/family/caregiver, ordering medications, tests, or procedures, communicating with other healthcare providers (not separately reported), independently interpreting results, not separately reported, and communicating results to the patient/family/caregiver,  Name and date of birth verified.

## 2024-08-19 ENCOUNTER — HOSPITAL ENCOUNTER (OUTPATIENT)
Dept: RADIOLOGY | Facility: CLINIC | Age: 74
Discharge: HOME | End: 2024-08-19
Payer: MEDICARE

## 2024-08-19 DIAGNOSIS — C78.02 SQUAMOUS CELL CARCINOMA METASTATIC TO BOTH LUNGS (MULTI): ICD-10-CM

## 2024-08-19 DIAGNOSIS — C78.01 SQUAMOUS CELL CARCINOMA METASTATIC TO BOTH LUNGS (MULTI): ICD-10-CM

## 2024-08-19 DIAGNOSIS — C10.9 OROPHARYNGEAL CANCER (MULTI): ICD-10-CM

## 2024-08-19 PROCEDURE — 70490 CT SOFT TISSUE NECK W/O DYE: CPT | Performed by: RADIOLOGY

## 2024-08-19 PROCEDURE — 71250 CT THORAX DX C-: CPT

## 2024-08-19 PROCEDURE — 70490 CT SOFT TISSUE NECK W/O DYE: CPT

## 2024-08-22 ENCOUNTER — OFFICE VISIT (OUTPATIENT)
Dept: HEMATOLOGY/ONCOLOGY | Facility: HOSPITAL | Age: 74
End: 2024-08-22
Payer: MEDICARE

## 2024-08-22 ENCOUNTER — APPOINTMENT (OUTPATIENT)
Dept: HEMATOLOGY/ONCOLOGY | Facility: HOSPITAL | Age: 74
End: 2024-08-22
Payer: MEDICARE

## 2024-08-22 ENCOUNTER — LAB (OUTPATIENT)
Dept: LAB | Facility: HOSPITAL | Age: 74
End: 2024-08-22
Payer: MEDICARE

## 2024-08-22 ENCOUNTER — INFUSION (OUTPATIENT)
Dept: HEMATOLOGY/ONCOLOGY | Facility: HOSPITAL | Age: 74
End: 2024-08-22
Payer: MEDICARE

## 2024-08-22 VITALS
TEMPERATURE: 97.3 F | WEIGHT: 133.6 LBS | RESPIRATION RATE: 18 BRPM | OXYGEN SATURATION: 99 % | BODY MASS INDEX: 21.56 KG/M2 | DIASTOLIC BLOOD PRESSURE: 49 MMHG | HEART RATE: 99 BPM | SYSTOLIC BLOOD PRESSURE: 126 MMHG

## 2024-08-22 DIAGNOSIS — C10.9 OROPHARYNGEAL CANCER (MULTI): ICD-10-CM

## 2024-08-22 LAB
ALBUMIN SERPL BCP-MCNC: 3.7 G/DL (ref 3.4–5)
ALP SERPL-CCNC: 48 U/L (ref 33–136)
ALT SERPL W P-5'-P-CCNC: 9 U/L (ref 10–52)
ANION GAP SERPL CALC-SCNC: 12 MMOL/L (ref 10–20)
AST SERPL W P-5'-P-CCNC: 10 U/L (ref 9–39)
BASOPHILS # BLD AUTO: 0.01 X10*3/UL (ref 0–0.1)
BASOPHILS NFR BLD AUTO: 0.6 %
BILIRUB SERPL-MCNC: 0.3 MG/DL (ref 0–1.2)
BUN SERPL-MCNC: 16 MG/DL (ref 6–23)
CALCIUM SERPL-MCNC: 9.3 MG/DL (ref 8.6–10.3)
CHLORIDE SERPL-SCNC: 103 MMOL/L (ref 98–107)
CO2 SERPL-SCNC: 29 MMOL/L (ref 21–32)
CREAT SERPL-MCNC: 1.23 MG/DL (ref 0.5–1.3)
DACRYOCYTES BLD QL SMEAR: NORMAL
EGFRCR SERPLBLD CKD-EPI 2021: 62 ML/MIN/1.73M*2
EOSINOPHIL # BLD AUTO: 0.08 X10*3/UL (ref 0–0.4)
EOSINOPHIL NFR BLD AUTO: 4.4 %
ERYTHROCYTE [DISTWIDTH] IN BLOOD BY AUTOMATED COUNT: 18.3 % (ref 11.5–14.5)
GLUCOSE SERPL-MCNC: 120 MG/DL (ref 74–99)
HCT VFR BLD AUTO: 26.8 % (ref 41–52)
HGB BLD-MCNC: 9.2 G/DL (ref 13.5–17.5)
IMM GRANULOCYTES # BLD AUTO: 0.01 X10*3/UL (ref 0–0.5)
IMM GRANULOCYTES NFR BLD AUTO: 0.6 % (ref 0–0.9)
LYMPHOCYTES # BLD AUTO: 0.26 X10*3/UL (ref 0.8–3)
LYMPHOCYTES NFR BLD AUTO: 14.4 %
MAGNESIUM SERPL-MCNC: 1.66 MG/DL (ref 1.6–2.4)
MCH RBC QN AUTO: 29.9 PG (ref 26–34)
MCHC RBC AUTO-ENTMCNC: 34.3 G/DL (ref 32–36)
MCV RBC AUTO: 87 FL (ref 80–100)
MONOCYTES # BLD AUTO: 0.45 X10*3/UL (ref 0.05–0.8)
MONOCYTES NFR BLD AUTO: 24.9 %
NEUTROPHILS # BLD AUTO: 1 X10*3/UL (ref 1.6–5.5)
NEUTROPHILS NFR BLD AUTO: 55.1 %
NRBC BLD-RTO: 0 /100 WBCS (ref 0–0)
OVALOCYTES BLD QL SMEAR: NORMAL
PLATELET # BLD AUTO: 154 X10*3/UL (ref 150–450)
POTASSIUM SERPL-SCNC: 3.6 MMOL/L (ref 3.5–5.3)
PROT SERPL-MCNC: 5.8 G/DL (ref 6.4–8.2)
RBC # BLD AUTO: 3.08 X10*6/UL (ref 4.5–5.9)
RBC MORPH BLD: NORMAL
SODIUM SERPL-SCNC: 140 MMOL/L (ref 136–145)
TSH SERPL-ACNC: 3.6 MIU/L (ref 0.44–3.98)
WBC # BLD AUTO: 1.8 X10*3/UL (ref 4.4–11.3)

## 2024-08-22 PROCEDURE — 96413 CHEMO IV INFUSION 1 HR: CPT

## 2024-08-22 PROCEDURE — 99215 OFFICE O/P EST HI 40 MIN: CPT | Performed by: INTERNAL MEDICINE

## 2024-08-22 PROCEDURE — 96367 TX/PROPH/DG ADDL SEQ IV INF: CPT

## 2024-08-22 PROCEDURE — 3074F SYST BP LT 130 MM HG: CPT | Performed by: INTERNAL MEDICINE

## 2024-08-22 PROCEDURE — 3078F DIAST BP <80 MM HG: CPT | Performed by: INTERNAL MEDICINE

## 2024-08-22 PROCEDURE — 85025 COMPLETE CBC W/AUTO DIFF WBC: CPT

## 2024-08-22 PROCEDURE — 2500000001 HC RX 250 WO HCPCS SELF ADMINISTERED DRUGS (ALT 637 FOR MEDICARE OP): Performed by: INTERNAL MEDICINE

## 2024-08-22 PROCEDURE — 1036F TOBACCO NON-USER: CPT | Performed by: INTERNAL MEDICINE

## 2024-08-22 PROCEDURE — 83735 ASSAY OF MAGNESIUM: CPT

## 2024-08-22 PROCEDURE — 99215 OFFICE O/P EST HI 40 MIN: CPT | Mod: 25 | Performed by: INTERNAL MEDICINE

## 2024-08-22 PROCEDURE — 1126F AMNT PAIN NOTED NONE PRSNT: CPT | Performed by: INTERNAL MEDICINE

## 2024-08-22 PROCEDURE — 2500000004 HC RX 250 GENERAL PHARMACY W/ HCPCS (ALT 636 FOR OP/ED): Performed by: INTERNAL MEDICINE

## 2024-08-22 PROCEDURE — 1159F MED LIST DOCD IN RCRD: CPT | Performed by: INTERNAL MEDICINE

## 2024-08-22 PROCEDURE — 96375 TX/PRO/DX INJ NEW DRUG ADDON: CPT | Mod: INF

## 2024-08-22 PROCEDURE — 96417 CHEMO IV INFUS EACH ADDL SEQ: CPT

## 2024-08-22 PROCEDURE — 84443 ASSAY THYROID STIM HORMONE: CPT

## 2024-08-22 PROCEDURE — 96415 CHEMO IV INFUSION ADDL HR: CPT

## 2024-08-22 PROCEDURE — 80053 COMPREHEN METABOLIC PANEL: CPT

## 2024-08-22 PROCEDURE — 36415 COLL VENOUS BLD VENIPUNCTURE: CPT

## 2024-08-22 RX ORDER — FAMOTIDINE 10 MG/ML
20 INJECTION INTRAVENOUS ONCE AS NEEDED
Status: CANCELLED | OUTPATIENT
Start: 2024-08-22

## 2024-08-22 RX ORDER — ALBUTEROL SULFATE 0.83 MG/ML
3 SOLUTION RESPIRATORY (INHALATION) AS NEEDED
Status: CANCELLED | OUTPATIENT
Start: 2024-08-22

## 2024-08-22 RX ORDER — DEXAMETHASONE IN 0.9 % SOD CHL 20 MG/50ML
20 INTRAVENOUS SOLUTION, PIGGYBACK (ML) INTRAVENOUS ONCE
Status: CANCELLED | OUTPATIENT
Start: 2024-08-22

## 2024-08-22 RX ORDER — FAMOTIDINE 10 MG/ML
20 INJECTION INTRAVENOUS ONCE
Status: COMPLETED | OUTPATIENT
Start: 2024-08-22 | End: 2024-08-22

## 2024-08-22 RX ORDER — DIPHENHYDRAMINE HYDROCHLORIDE 50 MG/ML
50 INJECTION INTRAMUSCULAR; INTRAVENOUS AS NEEDED
Status: CANCELLED | OUTPATIENT
Start: 2024-08-22

## 2024-08-22 RX ORDER — DIPHENHYDRAMINE HYDROCHLORIDE 50 MG/ML
50 INJECTION INTRAMUSCULAR; INTRAVENOUS AS NEEDED
Status: DISCONTINUED | OUTPATIENT
Start: 2024-08-22 | End: 2024-08-22 | Stop reason: HOSPADM

## 2024-08-22 RX ORDER — PROCHLORPERAZINE EDISYLATE 5 MG/ML
10 INJECTION INTRAMUSCULAR; INTRAVENOUS EVERY 6 HOURS PRN
Status: CANCELLED | OUTPATIENT
Start: 2024-08-22

## 2024-08-22 RX ORDER — PROCHLORPERAZINE EDISYLATE 5 MG/ML
10 INJECTION INTRAMUSCULAR; INTRAVENOUS EVERY 6 HOURS PRN
Status: DISCONTINUED | OUTPATIENT
Start: 2024-08-22 | End: 2024-08-22 | Stop reason: HOSPADM

## 2024-08-22 RX ORDER — PALONOSETRON 0.05 MG/ML
0.25 INJECTION, SOLUTION INTRAVENOUS ONCE
Status: COMPLETED | OUTPATIENT
Start: 2024-08-22 | End: 2024-08-22

## 2024-08-22 RX ORDER — DIPHENHYDRAMINE HCL 50 MG
50 CAPSULE ORAL ONCE
Status: COMPLETED | OUTPATIENT
Start: 2024-08-22 | End: 2024-08-22

## 2024-08-22 RX ORDER — PROCHLORPERAZINE MALEATE 10 MG
10 TABLET ORAL EVERY 6 HOURS PRN
Status: CANCELLED | OUTPATIENT
Start: 2024-08-22

## 2024-08-22 RX ORDER — DEXAMETHASONE IN 0.9 % SOD CHL 20 MG/50ML
20 INTRAVENOUS SOLUTION, PIGGYBACK (ML) INTRAVENOUS ONCE
Status: COMPLETED | OUTPATIENT
Start: 2024-08-22 | End: 2024-08-22

## 2024-08-22 RX ORDER — FAMOTIDINE 10 MG/ML
20 INJECTION INTRAVENOUS ONCE AS NEEDED
Status: DISCONTINUED | OUTPATIENT
Start: 2024-08-22 | End: 2024-08-22 | Stop reason: HOSPADM

## 2024-08-22 RX ORDER — PROCHLORPERAZINE MALEATE 10 MG
10 TABLET ORAL EVERY 6 HOURS PRN
Status: DISCONTINUED | OUTPATIENT
Start: 2024-08-22 | End: 2024-08-22 | Stop reason: HOSPADM

## 2024-08-22 RX ORDER — PALONOSETRON 0.05 MG/ML
0.25 INJECTION, SOLUTION INTRAVENOUS ONCE
Status: CANCELLED | OUTPATIENT
Start: 2024-08-22

## 2024-08-22 RX ORDER — DIPHENHYDRAMINE HCL 50 MG
50 CAPSULE ORAL ONCE
Status: CANCELLED | OUTPATIENT
Start: 2024-08-22

## 2024-08-22 RX ORDER — ALBUTEROL SULFATE 0.83 MG/ML
3 SOLUTION RESPIRATORY (INHALATION) AS NEEDED
Status: DISCONTINUED | OUTPATIENT
Start: 2024-08-22 | End: 2024-08-22 | Stop reason: HOSPADM

## 2024-08-22 RX ORDER — EPINEPHRINE 0.3 MG/.3ML
0.3 INJECTION SUBCUTANEOUS EVERY 5 MIN PRN
Status: DISCONTINUED | OUTPATIENT
Start: 2024-08-22 | End: 2024-08-22 | Stop reason: HOSPADM

## 2024-08-22 RX ORDER — EPINEPHRINE 0.3 MG/.3ML
0.3 INJECTION SUBCUTANEOUS EVERY 5 MIN PRN
Status: CANCELLED | OUTPATIENT
Start: 2024-08-22

## 2024-08-22 RX ORDER — FAMOTIDINE 10 MG/ML
20 INJECTION INTRAVENOUS ONCE
Status: CANCELLED | OUTPATIENT
Start: 2024-08-22

## 2024-08-22 ASSESSMENT — ENCOUNTER SYMPTOMS
ARTHRALGIAS: 0
CONSTIPATION: 0
ABDOMINAL PAIN: 0
DIZZINESS: 0
COUGH: 0
HEADACHES: 0
NECK PAIN: 0
NUMBNESS: 0
LEG SWELLING: 0
NAUSEA: 0
TROUBLE SWALLOWING: 0
CHILLS: 0
DIARRHEA: 0
SHORTNESS OF BREATH: 0
VOMITING: 0
FREQUENCY: 0
LIGHT-HEADEDNESS: 0
FEVER: 0
SORE THROAT: 0
DYSURIA: 0
WOUND: 0
DIFFICULTY URINATING: 0

## 2024-08-22 ASSESSMENT — PAIN SCALES - GENERAL: PAINLEVEL: 0-NO PAIN

## 2024-08-22 NOTE — PROGRESS NOTES
[facility] Infusion Nursing Note  08/22/24    Phong Wong is a 74 y.o. year old male patient presenting to outpatient infusion for cycle 4 day 1 of the following regimen:    Treatment Plans       Name Type Plan Dates Plan Provider         Active    PACLitaxel / CARBOplatin, 21 Day Cycles - Head and Neck Oncology Treatment  6/13/2024 - Present Kyunghee Burkitt, DO                  Since the last visit, he reports doing well. Overall, he states that energy level is energy level is good. Appetite has been unchanged. he reports no complaints.     Line type: PIV  Line removed/maintained prior to discharge: removed     Administrations This Visit       CARBOplatin (Paraplatin) 348 mg in sodium chloride 0.9% 144.8 mL IV       Admin Date  08/22/2024 Action  New Bag Dose  348 mg Rate  289.6 mL/hr Route  intravenous Documented By  Vanesa Charlton RN              dexAMETHasone (Decadron) in dextrose 5 % 50 mL IV 20 mg       Admin Date  08/22/2024 Action  New Bag Dose  20 mg Rate  200 mL/hr Route  intravenous Documented By  Vanesa Charlton RN              diphenhydrAMINE (BENADryl) capsule 50 mg       Admin Date  08/22/2024 Action  Given Dose  50 mg Route  oral Documented By  Vanesa Charlton RN              famotidine PF (Pepcid) injection 20 mg       Admin Date  08/22/2024 Action  Given Dose  20 mg Route  intravenous Documented By  Vanesa Charlton RN              fosaprepitant (Emend) 150 mg in sodium chloride 0.9% 250 mL IV       Admin Date  08/22/2024 Action  New Bag Dose  150 mg Rate  500 mL/hr Route  intravenous Documented By  Vanesa Charlton RN              PACLitaxeL (Taxol) 330 mg in dextrose 5% 322 mL IV       Admin Date  08/22/2024 Action  New Bag Dose  330 mg Rate  107.3 mL/hr Route  intravenous Documented By  Vanesa Charlton RN              palonosetron (Aloxi) injection 250 mcg       Admin Date  08/22/2024 Action  Given Dose  250 mcg Route  intravenous Documented By  Vanesa Charlton RN              sodium chloride 0.9 % bolus 1,000 mL        Admin Date  08/22/2024 Action  New Bag Dose  1,000 mL Rate  999 mL/hr Route  intravenous Documented By  Percy Charlton RN                  Hypersensitivity reaction noted: No  Patient tolerated treatment well. Discharged home in stable condition.    Follow-up Plan: 9/12    PERCY CHARLTON RN

## 2024-08-22 NOTE — PROGRESS NOTES
Phong is here with Tomasa today for his follow up with Dr Burkitt and C4D1. He reports he is doing well with no new complaints. He denies any GI concerns and is eating and drinking adequately - he has lost 4# since last visit. Meds and allergies reviewed and updated. MD lee. Education Documentation  Healthy Lifestyle, taught by Alma Iniguez RN at 8/22/2024  8:22 AM.  Learner: Family, Patient  Readiness: Acceptance  Method: Explanation  Response: Verbalizes Understanding    Tips for Daily Living, taught by Alma Iniguez RN at 8/22/2024  8:22 AM.  Learner: Family, Patient  Readiness: Acceptance  Method: Explanation  Response: Verbalizes Understanding    Nutrition/Diet, taught by Alma Iniguez RN at 8/22/2024  8:22 AM.  Learner: Family, Patient  Readiness: Acceptance  Method: Explanation  Response: Verbalizes Understanding    Nutrition, taught by Alma Iniguez RN at 8/22/2024  8:22 AM.  Learner: Family, Patient  Readiness: Acceptance  Method: Explanation  Response: Verbalizes Understanding    General Medication Information, taught by Alma Iniguez RN at 8/22/2024  8:22 AM.  Learner: Family, Patient  Readiness: Acceptance  Method: Explanation  Response: Verbalizes Understanding    Supportive Medications, taught by Alma Iniguez RN at 8/22/2024  8:22 AM.  Learner: Family, Patient  Readiness: Acceptance  Method: Explanation  Response: Verbalizes Understanding    Treatment Plan and Schedule, taught by Alma Iniguez RN at 8/22/2024  8:22 AM.  Learner: Family, Patient  Readiness: Acceptance  Method: Explanation  Response: Verbalizes Understanding    Education Comments  No comments found.

## 2024-08-22 NOTE — PROGRESS NOTES
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  The treatment was completed on October 1.  He had a PET scan that shows some residual uptake in the left tonsillar area as well as the right neck.  Unfortunately he developed a lung metastasis.  The last a CT scan in August 2024 that shows no changes in the neck mass.  The final report on the chest CT is not available but there is only 1 small nodule.  He continues to be treated with immunotherapy.  Minimal dysphagia.    I will see him in 3 months.    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     History of Present Illness    This patient was seen in June 2023 at the request of his family physician. For 6 weeks or so he had some discomfort in his throat. He describes it as in the center of his throat. He also noticed a lump in the midportion of his right neck. This led to a CT scan of his neck which was done in June 2023. I personally reviewed that scan. It does show 2 suspicious nodes 1 in the level 3 on the right and one in level 2 on the left. He also has some irregularity around the soft palate.  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  I have not seen him since that first visit in June 2023.  He completed his chemoradiation therapy on October 1, 2023.  He had a pet scan done in January 2024.  He did have some residual uptake in the left oropharynx as well as the right neck.  This is much less than previously.  He also had some positive uptake in the lungs that turned out to be positive for squamous cell carcinoma on biopsy.  He had a repeat CT scan done in August 2024 that shows this persistent mass in the right neck which has not changed.  I do not have a final report on the chest CT but it only shows this 1 mass in the left lobe that  measures about 1 cm.  He continues to get immunotherapy.    Physical Exam    Examination of the oral cavity and oropharynx is negative for anything worrisome.  He does have some scarring around his soft palate.  There is good mandibular excursion. Palpation of the parotid, neck, and thyroid field shows this right-sided mid neck induration that measures a good 3 to 4 cm.    A flexible laryngoscopy was carried out. Under topical Xylocaine and Charanjit-Synephrine the scope was introduced through the nostril. The nasopharynx, base of tongue, hypopharynx, and larynx are visualized. The vocal cords are normally mobile. There is no pooling of secretions in the piriform sinuses.  He does have evidence of dryness.

## 2024-08-22 NOTE — PROGRESS NOTES
Patient ID: Phong Wong is a 74 y.o. male.  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 Juan Jose Fletcher   MedOnc: Dr. Kyunghee Burkitt, X. Katherine Feng, PA-C   RadOnc: Dr. Vianca Steen     Current Therapy  4/4/24: Started Q3 week Pembrolizumab    Prior Therapy:   8/16 - 10/4/23: Recieved concurrent chemoradiation with 7 weekly Carboplatin (2mg/AUC)/Paclitaxel (45mg/m2)  and 70gy RT in 35fx     Sites of Disease:  Soft palate, BOT, Left palatine tonsil  B/L Cervical LN  Lung     Oncologic Issues:   Odynophagia  Fatigue     ONCOLOGIC HISTORY  - Pt had 2 months hx of throat discomfort with lump in right neck  - 6/13/23: CT neck showed a mass 2.8 x 2.5 x 2.9 cm in the right lower cervical neck soft tissues. Two suspicious nodes were observed, one at level 3 on the right and another at level 2 on the left.  - 6/28/23: seen by Dr. Fletcher. 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 Based on the findings, Dr. Fletcher did not recommend surgery due to the location  of the primary tumor and the presence of yvette disease  - 6/30/23: PET/CT showed intense FDG avidity within the soft palate, extending to the base of the tongue and left palatine tonsil. Multiple FDG avid left cervical level II nodes were observed, along with an FDG avid mass in the region of the right cervical  level II/III, infiltrating the right SCM muscle and encasing and invading the right jugular vein. FDG avidity was also detected in the region of the left fossa Rosenmuller and left medial pterygoid muscle.  - 8/16 - 10/4/23: Recieved concurrent chemoradiation with 7 weekly Carboplatin (2mg/AUC)/Paclitaxel (45mg/m2)  and 70gy RT in 35fx    Admissions:  10/5 - 10/10/23: Admitted for extreme weakness, HECTOR, pancytopenia including anemia requiring blood  transfusion. D/C'ed to acute rehab        Past Medical History:   Past Medical History:  2017: Personal history of diseases of the blood and blood-  forming organs and certain disorders involving the immune mechanism      Comment:  History of thrombocytosis   HTN, HLD, COPD, prostate cancer in 2017 (s/p radiation)  Surgical History:    Past Surgical History:   Procedure Laterality Date    CT ABDOMEN PELVIS ANGIOGRAM W AND/OR WO IV CONTRAST  9/3/2014    CT ABDOMEN PELVIS ANGIOGRAM W AND/OR WO IV CONTRAST 9/3/2014 [facility]    IR ANGIOGRAM AORTA ABDOMEN  2014    IR ANGIOGRAM AORTA ABDOMEN 2014 [facility]   Aortoiliofemoral vascular bypass in   Social History:    Social History     Socioeconomic History    Marital status:     Number of children: 1   Tobacco Use    Smoking status: Former     Current packs/day: 0.00     Average packs/day: 1 pack/day for 40.0 years (40.0 ttl pk-yrs)     Types: Cigarettes     Start date:      Quit date:      Years since quittin.6     Passive exposure: Past    Smokeless tobacco: Never   Substance and Sexual Activity    Alcohol use: Yes     Alcohol/week: 12.0 standard drinks of alcohol     Types: 12 Cans of beer per week    Drug use: Never     Social Determinants of Health     Financial Resource Strain: Low Risk  (10/20/2023)    Overall Financial Resource Strain (CARDIA)     Difficulty of Paying Living Expenses: Not very hard   Food Insecurity: No Food Insecurity (10/5/2023)    Hunger Vital Sign     Worried About Running Out of Food in the Last Year: Never true     Ran Out of Food in the Last Year: Never true   Transportation Needs: No Transportation Needs (10/20/2023)    PRAPARE - Transportation     Lack of Transportation (Medical): No     Lack of Transportation (Non-Medical): No   Physical Activity: Inactive (10/5/2023)    Exercise Vital Sign     Days of Exercise per Week: 0 days     Minutes of Exercise per Session: 0 min   Stress:  Stress Concern Present (10/5/2023)    [employer] - Occupational Stress Questionnaire     Feeling of Stress : To some extent   Social Connections: Moderately Integrated (10/5/2023)    Social Connection and Isolation Panel [NHANES]     Frequency of Communication with Friends and Family: More than three times a week     Frequency of Social Gatherings with Friends and Family: More than three times a week     Attends Yazdanism Services: Never     Active Member of Clubs or Organizations: Yes     Attends Club or Organization Meetings: Never     Marital Status:    Intimate Partner Violence: Not At Risk (10/5/2023)    Humiliation, Afraid, Rape, and Kick questionnaire     Fear of Current or Ex-Partner: No     Emotionally Abused: No     Physically Abused: No     Sexually Abused: No   Housing Stability: Low Risk  (10/20/2023)    Housing Stability Vital Sign     Unable to Pay for Housing in the Last Year: No     Number of Places Lived in the Last Year: 1     Unstable Housing in the Last Year: No      Family History:    Family History   Problem Relation Name Age of Onset    Aortic aneurysm Father          abdominal     Family Oncology History:    Cancer-related family history is not on file.      Subjective   Chief Complaint: Squamous Cell Carcinoma of oropharynx    HPI  Phong Wong is a 74 y.o. male with h/o locally advanced oral cavity cancer, completed concurrent chemoradiation with 7 weekly Carboplatin+Paclitaxel on 10/4/23. Unfortunately found with mets to lungs on 3 months restaging scan. CPS 3. Started Q3 week Pembrolizumab on 4/4/24. Received 3 cycles of Pembro , unfortunately lung nodules progressed. Started Q3 week carbo/Taxol on 6/19/24    Interval History  Patient presents today for toxicity check following C3 Carbo/Taxol.    Patient continue to feel very well.   Reports soreness in hands the first few days after chemo and resolves on its own.   Some constipation the week after  chemo, resolves w/o intervention.  Still has lymphedema in chin, goes to lymphedema massage, swelling improves after massages. Does home exercises sporadically.    Right neck tenderness is much improved since starting lymphedema massages.   Appetite is good, gained weight.   Denies dry mouth, has saliva production, taste is normal.   He denies any urinary symptoms currently, no urgency, frequency, hesitancy, dysuria.     ROS  Review of Systems   Constitutional:  Negative for chills and fever.   HENT:   Negative for lump/mass, mouth sores, nosebleeds, sore throat, tinnitus and trouble swallowing.    Respiratory:  Negative for cough and shortness of breath.    Cardiovascular:  Negative for chest pain and leg swelling.   Gastrointestinal:  Negative for abdominal pain, constipation, diarrhea, nausea and vomiting.   Genitourinary:  Negative for difficulty urinating, dysuria and frequency.    Musculoskeletal:  Negative for arthralgias and neck pain.   Skin:  Negative for rash and wound.   Neurological:  Negative for dizziness, headaches, light-headedness and numbness.       Allergies  Allergies   Allergen Reactions    Codeine Nausea Only        Medications  Current Outpatient Medications   Medication Instructions    amLODIPine-benazepriL (Lotrel) 5-20 mg capsule [id number] Medication amlodipine 5 mg-benazepril 20 mg capsule amlodipine 5 mg-benazepril 20 mg capsule 5-20 mg 10/2/2020 Active (Outside)    aspirin 81 mg EC tablet 1 tablet, oral, Daily    dexAMETHasone (DECADRON) 8 mg, oral, Daily, For 3 days starting the day after treatment.    doxazosin (CARDURA) 4 mg, oral, Daily, Take 1 tablet by mouth daily in the evening    fluticasone-umeclidin-vilanter (Trelegy Ellipta) 100-62.5-25 mcg blister with device INHALE 1 PUFF EVERY DAY    ipratropium-albuteroL (Duo-Neb) 0.5-2.5 mg/3 mL nebulizer solution Take 3 mL by nebulization 3 times a day as needed for wheezing or shortness of breath.    losartan (COZAAR) 50 mg, oral, Daily     magnesium oxide (MAG-OX) 400 mg, oral, Daily    ofloxacin (Ocuflox) 0.3 % ophthalmic solution Instill 1 drop Left Eye 4 times a day    OLANZapine (ZYPREXA) 5 mg, oral, Nightly    ondansetron (ZOFRAN) 8 mg, oral, Every 8 hours PRN    prochlorperazine (COMPAZINE) 10 mg, oral, Every 6 hours PRN        Objective   VS:  BP (!) 126/49   Pulse 99   Temp 36.3 °C (97.3 °F) (Core)   Resp 18   Wt 60.6 kg (133 lb 9.6 oz)   SpO2 99%   BMI 21.56 kg/m²   Weight   Wt Readings from Last 7 Encounters:   08/22/24 60.6 kg (133 lb 9.6 oz)   08/13/24 62.5 kg (137 lb 12.6 oz)   08/08/24 61.8 kg (136 lb 3.9 oz)   08/01/24 60.5 kg (133 lb 6.1 oz)   07/18/24 60.6 kg (133 lb 9.6 oz)   07/11/24 58.9 kg (129 lb 13.6 oz)   06/28/24 59.4 kg (130 lb 14.4 oz)         Physical Exam  Vitals reviewed.   Constitutional:       Appearance: Normal appearance. He is underweight.   HENT:      Head: Normocephalic and atraumatic.      Right Ear: External ear normal. No tenderness.      Left Ear: External ear normal. No tenderness.      Nose: Nose normal.      Mouth/Throat:      Mouth: No injury or oral lesions.      Tongue: No lesions.      Pharynx: Oropharynx is clear. No posterior oropharyngeal erythema.      Comments: Edentulous  Eyes:      Extraocular Movements: Extraocular movements intact.      Conjunctiva/sclera: Conjunctivae normal.      Pupils: Pupils are equal, round, and reactive to light.   Neck:      Thyroid: No thyroid mass.      Comments: Mild to moderate swelling in submental and neck region.   Right neck is less sensitive/tender to palpation  Cardiovascular:      Rate and Rhythm: Normal rate and regular rhythm.   Pulmonary:      Effort: Pulmonary effort is normal. No respiratory distress.      Breath sounds: Normal breath sounds.   Abdominal:      General: Bowel sounds are normal. There is no distension or abdominal bruit.      Palpations: Abdomen is soft. There is no mass.      Tenderness: There is no abdominal tenderness.    Musculoskeletal:         General: Normal range of motion.      Cervical back: Normal range of motion and neck supple.      Right lower leg: No edema.      Left lower leg: No edema.   Lymphadenopathy:      Cervical: No cervical adenopathy.      Upper Body:      Right upper body: No axillary adenopathy.      Left upper body: No axillary adenopathy.   Skin:     General: Skin is warm and dry.      Findings: No lesion (radiation dermatitis in bilateral neck), rash or wound.   Neurological:      General: No focal deficit present.      Mental Status: He is alert and oriented to person, place, and time.      Gait: Gait is intact.   Psychiatric:         Mood and Affect: Mood and affect normal.       Diagnostic Results   The below labs were reviewed.  Results from last 7 days   Lab Units 08/22/24  0803   WBC AUTO x10*3/uL 1.8*   HEMOGLOBIN g/dL 9.2*   HEMATOCRIT % 26.8*   PLATELETS AUTO x10*3/uL 154                             Pathology      Imaging  1/10/2024 PET/CT  IMPRESSION:  1. Significant interval improvement in FDG avidity in the soft palate and left palatine tonsil, remaining FDG avidity along the left palatine tonsil consistent with persistent residual viable disease.  2. Nearly complete metabolic resolution of cervical yvette metastases with remaining mild FDG avid right cervical level 3 node seen on current study, likely representing residual yvette metastasis.  3. Newly developed FDG-avid pulmonary nodule in the left lower lobe as well as minimal FDG avid subcentimeter pulmonary nodules in bilateral upper lobes, concerning for lung metastasis.    1/10/2024 CT Neck w/ IV Contrast  IMPRESSION:  *Decreased size of the previously demonstrated right-sided yvette neck mass *No new adenopathy  *Post treatment changes in the hypopharynx as described    2/9/27 CT Chest w/o Contrast  IMPRESSION:  1. Multiple pulmonary nodules are again noted corresponding to FDG avid nodules on most recent PET-CT from 01/10/2024 and are new  when compared to prior CT of the chest from 06/13/2023. The largest of which is a pleural-based left lower lobe nodule measuring up to 1.6 cm. Findings are concerning for metastatic disease.  2. Severe coronary artery calcifications, indicating the presence of coronary artery disease. If the patient has associated symptoms recommend management as per chest pain guidelines (e.g. https://doi.org/10.1161/CIR.9716346806301191). If the patient is asymptomatic consider reviewing modifiable cardiovascular risk factors and managing as per guidelines for primary prevention (e.g. https://doi.org/10.1161/CIR.8491999945881652).  4. Bulky atherosclerotic calcification at the origin of the left renal artery likely contributing to atrophy of the left kidney as compared to the right.   5. Additional findings as above.    3/27/2024 CT chest abdomen pelvis w IV contrast  Impression:   1. Interval increase in size of left lower lobe pulmonary nodule when compared to CT chest 02/09/2024 correlating with FDG avidity on PET-CT 01/10/2024. No sites of new metastasis in the chest.   2. The previously seen clusters of pulmonary nodules in the left upper lobe have decreased in number since CT chest 09/20/2024 correlating with improving postradiation changes or bronchiolitis.   3. Stable diffuse heterogenous appearance of the axial skeleton, similar to study. No sites of new metastasis in the abdomen and pelvis.  4. Additional stable findings as above.     6/7/2024 CT soft tissue neck wo IV contrast  Impression:   Motion degraded examination. Assessment is also limited in the absence of intravenous contrast medium. No definite evidence of disease progression within the neck. The pharyngeal soft tissues are not appreciably changed in appearance. The right level 3 yvette component is poorly delineated/characterized in the setting of aforementioned limitations but does not appear significantly enlarged in the interim. No additional  lymphadenopathy is evident within the neck soft tissues. Posttreatment change as above.   As compared with previous neck CT examination there is interval enlargement of a now 7 mm nodule within the left lung apex. Please see dedicated chest CT for complete evaluation.   Severe atherosclerotic vascular calcification.      6/8/2024 CT chest wo IV contrast  Impression:   1.  Interval increase in size of multiple pulmonary nodules in the left upper lobe and of a right middle lobe nodule, most consistent with worsening pulmonary metastatic disease.   2. New 1 cm nodular consolidative opacity in the right upper lobe with surrounding ground-glass attenuation. This may represent a new area of metastatic disease or may be infectious/inflammatory in etiology. A new 0.3 cm nodule in the left upper lobe may also be infectious/inflammatory or metastatic.   3. Multiple chronic/incidental findings are similar compared to prior CT studies as detailed above including moderate emphysema, sequela of chronic bronchitis, and severe coronary artery calcifications.        Assessment/Plan    ASSESSMENT  Phong Wong is a 74 y.o. male with recent diagnosis of locally advanced oral cavity cancer. Completed concurrent chemoradiation with 7 weekly Carboplatin (2mg/AUC)/Paclitaxel (45mg/m2) on 10/4/2. Admitted 10/5 - 10/10 for extreme weakness, HECTOR, pancytopenia including anemia requiring blood transfusion. D/C'ed to acute rehab. Admitted  again 10/18 - 11/2/23 for lethargy, weakness, failure to thrive, anemia.     # Locally advanced oral cavity cancer, T3N2M0, now with met to lungs  - 6/30/23: PET/CT showed intense FDG avidity within the soft palate, extending to the base of the tongue and left palatine tonsil. Multiple uptake in left cervical level II nodes, right cervical level  II/III, infiltrating the right SCM muscle and encasing and invading the right jugular vein. FDG avidity was also detected in the region of the left fossa Rosenmuller  and left medial pterygoid muscle.  - 7/13/23: pt is doing well, no pain, discussed with patient about carboplatin+ paclitaxel as his chemotherapy along with radiation due to his GFR 30s.  Chemo related side effects were reviewed with patient, consent obtained.   - 8/16 - 10/4/23: Recieved concurrent chemoradiation with 7 weekly Carboplatin (2mg/AUC)/Paclitaxel (45mg/m2)  and 70gy RT in 35fx     - 1/12/24: Patient is feeling well, tolerating soft PO intake. PEG removed today.   Reviewed 1/10/24 restaging PET/CT with patient. Scans noted significant interval improvement in prior sites of disease though with residual FDG avidity in soft palate and left palatine tonsil, c/f residual disease vs post treatment changes. Also noted was a new FDG avid LLL pulm nodule with max SUV 5.7, c/f pulmonary metastasis. Will order STAT lung biopsy on this.   Pt scoped by Dr. Fletcher today, unfortunately was difficult to examine in office due to patient intolerance. Per Dr. Fletcher, may bring to OR to complete the examination depending on lung biopsy results. Scans will be reviewed at 1/19/24 [facility].  - 2/9/24: Patient presents with new painful left lateral tongue lesion, hindering PO intake. Will start Oxycodone 5mg Q6 hr PRN for pain control. Pt will see Dr. Fletcher next week for eval/possible biopsy. Patient will have repeat CT Chest for FDG avid LLL pulm nodule as prior biopsy attempt was not successful due to small nodule size and difficult location near diaphragm.  -2/9/24 CT chest showed multiple subcentimeter noduesl 2-5mm, dominant lesion is 1.6cm in LLL.    -3/6/24: Lung biopsy of LLL nodule was consistent with HN origin. CPS 3.  Discussed with patient about phase 2 FLAM study (randomized to pembrolizumab or pembrolizumab+danvatirsen (Stat3 inhibitor). Unfortunately due to his CKD, he's not eligible for FLAM study.  - 4/4/24: Started Q3 week Pembrolizumab  - 5/1/24: C2 Pembro was delayed a week 2/2 patient not feeling well. He  had struggled with hemorrhoids for a couple weeks. Otherwise tolerating treatment with no irAEs .  - 5/23/24: Patient doing well, no irAEs. Appetite and energy are good, tolerating solid PO intake. No odynophagia, no neck pain.  - 6/7/24 restaging CT N/C/A/P showed enlargement of subcentimeter lung mets, stable left lung nodule 2cm with new 1cm nodule in RUL. Based on heightened response with carbo+taxol after progression on PD, I recommended 3 cycles of carbo taxol and restaging, if there is response, we can consider afatinib treatment more as long term maintenance therapy.  Consent obtained for chemo and research blood.  - 6/19/24: Patient feeling well with no new complaints. Ok for C1 Q3 week Carbo/Taxol.  - 6/28/24: Patient feeling well and tolerating chemo. Did have watery diarrhea 1-2x per day for the first week after chemo but resolved on its on. No other new symptoms. Does still have tenderness in the right neck in area of prior RT. He did have a 1.5cm Level 2 LN in right neck on 6/7/24 CT Neck.   - 7/11/24: Patient doing well, no new symptoms, no recurrence of diarrhea. Ok for C2 carbo/taxol. Slightly neutropenic today, ANC 1320. Neutropenic precaution given to patient and his SO.  - 7/18/24: ANC 1400, denies fever, chills, pt doing well, had few days of soreness in fingers and legs which are all resolved.  -8/1/24:  pt is doing well with wt gain. ANC 1500, ok to proceed with chemo,  will monitor ANC closely next week.  -8/19/24: restaging scan showed OH in lung mets, final report of CT chest is not resulted, will follow up.  - 8/22//24: Patient continue to do well, eating/drinking well, gaining weight.     # Thrombocytopenia  - 6/28: Plt 85. No s/s of mucosal bleeds. Will continue to monitor  - 7/18: Plt 165  - 8/22/24: Plat 154    # Hemorrhoids  - Blood on stool most likely from internal hemorrhoid. Blood counts are stable. Bleeding has decreased Patient is taking Miralax to keep stools soft.     #  Anemia: improving  - Hgb has been dropping since starting chemo, however today his hgb is <7 and will require a blood transfusion. This is likely due to chemotherapy as there are no s/s of bleeds.   - Received 2U PRBC during 10/18 - 11/2 admission. Anemia likely due to chemotherapy  - 11/29/23: Hgb 8.8. Improved from 3 weeks ago. Expect Hbg will continue to trend up as patient recovers. Can consider iron studies.   - Hgb trending up since 02/2024    # CKD  - 2/9/24 CT chest showed Bulky atherosclerotic calcification at the origin of the left renal artery likely contributing to atrophy of the left kidney as compared to the right. Kidney atrophy was not mentioned in previous scans.   - 5/1: Creat 1.46, GFR 51. Encouraged patient to keep up with non-caffeinated PO hydration. 1L NS given today with Pembro. Patient to follow up with PCP regarding atherosclerosis.   - 6/12: Creat 1.54, GFR 48.   - 6/20: Creat 1.6. GFR 45. Additional 1L NS giving w/ chemo today  - 6/28: Creat 1.2. GFR 64. Pt reports he has increased PO hydration. Kidney function improved.   - 7/11 Creat 1.38, GFR 54. Pt reports decreased PO intake lately. Will give 1L NS for hydration  - 8/1/24: Creat 1.34, GFR 56  - 8/21/24: Creat 1.23, GFR 62.     # Submental/Neck Lymphedema  - Patient with moderate swelling in the submental and neck region. Likely 2ry to prior radiation. Established w/ Dr Guerrero.   - 6/19: Right neck with more swelling and tender to touch, neck is tight. Still itchy to touch.   - 6/28: Right neck swelling persists and is tender to palpation. Will meet with Dr. Guerrero's team for massage on 7/10  - 7/11: Right neck swelling decreased after lymphedema massage  - 8/8: right neck sensitivity/tenderness also improving after lymphedema massage.     # Poor Venous Access  - Informed by infusion RN 5 attempts were made before patient had IV access, 2 were with IV US. Discussed mediport with patient but he was hesitant. Given patient likely will not  have more than 4 more cycles of Carbo/Taxol, we will hold off on mediport placement and ask that the IV team places his IV with his infusions.    PLAN  -  ok to proceed with C4 Carbo/Taxol on 8/29/24  -- Continue with research lab collection prior to each cycle  -- Continue ample PO hydration.    -- Follow up with Dr. Weiss on atherosclerosis of left renal artery.   -- Continue stool softeners as needed, monitor further symptoms of hemorrhoids .   -- Call if diarrhea returns, can prescribe imodium PRN  -- Pt is aware of that I am leaving  at the end of Oct, I am planning to get to another restaging and change treatment to afatinib if he continues to have response.  Once treatment is changed, then I will transition his care to Dr. Parks.

## 2024-08-23 ENCOUNTER — OFFICE VISIT (OUTPATIENT)
Dept: OTOLARYNGOLOGY | Facility: HOSPITAL | Age: 74
End: 2024-08-23
Payer: MEDICARE

## 2024-08-23 DIAGNOSIS — C10.9 OROPHARYNGEAL CANCER (MULTI): ICD-10-CM

## 2024-08-23 PROCEDURE — 31575 DIAGNOSTIC LARYNGOSCOPY: CPT | Performed by: OTOLARYNGOLOGY

## 2024-08-23 PROCEDURE — 99213 OFFICE O/P EST LOW 20 MIN: CPT | Performed by: OTOLARYNGOLOGY

## 2024-08-23 PROCEDURE — 1160F RVW MEDS BY RX/DR IN RCRD: CPT | Performed by: OTOLARYNGOLOGY

## 2024-08-23 PROCEDURE — 1159F MED LIST DOCD IN RCRD: CPT | Performed by: OTOLARYNGOLOGY

## 2024-08-23 PROCEDURE — 1036F TOBACCO NON-USER: CPT | Performed by: OTOLARYNGOLOGY

## 2024-08-23 ASSESSMENT — PATIENT HEALTH QUESTIONNAIRE - PHQ9
2. FEELING DOWN, DEPRESSED OR HOPELESS: NOT AT ALL
SUM OF ALL RESPONSES TO PHQ9 QUESTIONS 1 & 2: 0
1. LITTLE INTEREST OR PLEASURE IN DOING THINGS: NOT AT ALL

## 2024-08-27 ASSESSMENT — ENCOUNTER SYMPTOMS
DYSURIA: 0
SHORTNESS OF BREATH: 0
HEADACHES: 0
WOUND: 0
COUGH: 0
NECK PAIN: 0
LEG SWELLING: 0
LIGHT-HEADEDNESS: 0
CHILLS: 0
DIFFICULTY URINATING: 0
FEVER: 0
CONSTIPATION: 0
SORE THROAT: 0
ARTHRALGIAS: 0
DIZZINESS: 0
ABDOMINAL PAIN: 0
NAUSEA: 0
NUMBNESS: 0
TROUBLE SWALLOWING: 0
VOMITING: 0
FREQUENCY: 0

## 2024-08-27 NOTE — PROGRESS NOTES
Patient ID: Phong Wong is a 74 y.o. male.  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 Juan Jose Fletcher   MedOnc: Dr. Kyunghee Burkitt, X. Katherine Feng, PA-C   RadOnc: Dr. Vianca Steen     Current Therapy  4/4/24: Started Q3 week Pembrolizumab    Prior Therapy:   8/16 - 10/4/23: Recieved concurrent chemoradiation with 7 weekly Carboplatin (2mg/AUC)/Paclitaxel (45mg/m2)  and 70gy RT in 35fx     Sites of Disease:  Soft palate, BOT, Left palatine tonsil  B/L Cervical LN  Lung     Oncologic Issues:   Odynophagia  Fatigue     ONCOLOGIC HISTORY  - Pt had 2 months hx of throat discomfort with lump in right neck  - 6/13/23: CT neck showed a mass 2.8 x 2.5 x 2.9 cm in the right lower cervical neck soft tissues. Two suspicious nodes were observed, one at level 3 on the right and another at level 2 on the left.  - 6/28/23: seen by Dr. Fletcher. 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 Based on the findings, Dr. Fletcher did not recommend surgery due to the location  of the primary tumor and the presence of yvette disease  - 6/30/23: PET/CT showed intense FDG avidity within the soft palate, extending to the base of the tongue and left palatine tonsil. Multiple FDG avid left cervical level II nodes were observed, along with an FDG avid mass in the region of the right cervical  level II/III, infiltrating the right SCM muscle and encasing and invading the right jugular vein. FDG avidity was also detected in the region of the left fossa Rosenmuller and left medial pterygoid muscle.  - 8/16 - 10/4/23: Recieved concurrent chemoradiation with 7 weekly Carboplatin (2mg/AUC)/Paclitaxel (45mg/m2)  and 70gy RT in 35fx    Admissions:  10/5 - 10/10/23: Admitted for extreme weakness, HECTOR, pancytopenia including anemia requiring blood  transfusion. D/C'ed to acute rehab        Past Medical History:   Past Medical History:  2017: Personal history of diseases of the blood and blood-  forming organs and certain disorders involving the immune mechanism      Comment:  History of thrombocytosis   HTN, HLD, COPD, prostate cancer in 2017 (s/p radiation)  Surgical History:    Past Surgical History:   Procedure Laterality Date    CT ABDOMEN PELVIS ANGIOGRAM W AND/OR WO IV CONTRAST  9/3/2014    CT ABDOMEN PELVIS ANGIOGRAM W AND/OR WO IV CONTRAST 9/3/2014 [facility]    IR ANGIOGRAM AORTA ABDOMEN  2014    IR ANGIOGRAM AORTA ABDOMEN 2014 [facility]   Aortoiliofemoral vascular bypass in   Social History:    Social History     Socioeconomic History    Marital status:     Number of children: 1   Tobacco Use    Smoking status: Former     Current packs/day: 0.00     Average packs/day: 1 pack/day for 40.0 years (40.0 ttl pk-yrs)     Types: Cigarettes     Start date:      Quit date:      Years since quittin.6     Passive exposure: Past    Smokeless tobacco: Never   Substance and Sexual Activity    Alcohol use: Yes     Alcohol/week: 12.0 standard drinks of alcohol     Types: 12 Cans of beer per week    Drug use: Never     Social Determinants of Health     Financial Resource Strain: Low Risk  (10/20/2023)    Overall Financial Resource Strain (CARDIA)     Difficulty of Paying Living Expenses: Not very hard   Food Insecurity: No Food Insecurity (10/5/2023)    Hunger Vital Sign     Worried About Running Out of Food in the Last Year: Never true     Ran Out of Food in the Last Year: Never true   Transportation Needs: No Transportation Needs (10/20/2023)    PRAPARE - Transportation     Lack of Transportation (Medical): No     Lack of Transportation (Non-Medical): No   Physical Activity: Inactive (10/5/2023)    Exercise Vital Sign     Days of Exercise per Week: 0 days     Minutes of Exercise per Session: 0 min   Stress:  Stress Concern Present (10/5/2023)    [place] [employer] - Occupational Stress Questionnaire     Feeling of Stress : To some extent   Social Connections: Moderately Integrated (10/5/2023)    Social Connection and Isolation Panel [NHANES]     Frequency of Communication with Friends and Family: More than three times a week     Frequency of Social Gatherings with Friends and Family: More than three times a week     Attends [employer] Services: Never     Active Member of Clubs or Organizations: Yes     Attends Club or Organization Meetings: Never     Marital Status:    Intimate Partner Violence: Not At Risk (10/5/2023)    Humiliation, Afraid, Rape, and Kick questionnaire     Fear of Current or Ex-Partner: No     Emotionally Abused: No     Physically Abused: No     Sexually Abused: No   Housing Stability: Low Risk  (10/20/2023)    Housing Stability Vital Sign     Unable to Pay for Housing in the Last Year: No     Number of Places Lived in the Last Year: 1     Unstable Housing in the Last Year: No      Family History:    Family History   Problem Relation Name Age of Onset    Aortic aneurysm Father          abdominal     Family Oncology History:    Cancer-related family history is not on file.      Subjective   Chief Complaint: Squamous Cell Carcinoma of oropharynx    HPI  Phong Wong is a 74 y.o. male with h/o locally advanced oral cavity cancer, completed concurrent chemoradiation with 7 weekly Carboplatin+Paclitaxel on 10/4/23. Unfortunately found with mets to lungs on 3 months restaging scan. CPS 3. Started Q3 week Pembrolizumab on 4/4/24. Received 3 cycles of Pembro , unfortunately lung nodules progressed. Started Q3 week carbo/Taxol on 6/19/24    Interval History  Patient presents today for tox check following C4 Carbo/Taxol.    He reports increased fatigue, diarrhea 2-3x per day since treatment last week. He lost 2 lbs.  Reports diarrhea has been inproving with decreasing volume. He did  not take any medications for diarrhea.   Endorse aching in hands after every infusion, improves with time. No limitation to ADLs.    Still has lymphedema in chin, goes to lymphedema massage, swelling improves after massages. Does home exercises sporadically.    Right neck tenderness is much improved since starting lymphedema massages.    Denies dry mouth, has saliva production, taste is normal.      ROS  Review of Systems   Constitutional:  Positive for appetite change and fatigue. Negative for chills and fever.   HENT:   Negative for lump/mass, mouth sores, nosebleeds, sore throat, tinnitus and trouble swallowing.    Respiratory:  Negative for cough and shortness of breath.    Cardiovascular:  Negative for chest pain and leg swelling.   Gastrointestinal:  Positive for diarrhea. Negative for abdominal pain, constipation, nausea and vomiting.   Genitourinary:  Negative for difficulty urinating, dysuria and frequency.    Musculoskeletal:  Negative for arthralgias and neck pain.   Skin:  Negative for rash and wound.   Neurological:  Negative for dizziness, headaches, light-headedness and numbness.       Allergies  Allergies   Allergen Reactions    Codeine Nausea Only        Medications  Current Outpatient Medications   Medication Instructions    amLODIPine-benazepriL (Lotrel) 5-20 mg capsule [id number] Medication amlodipine 5 mg-benazepril 20 mg capsule amlodipine 5 mg-benazepril 20 mg capsule 5-20 mg 10/2/2020 Active (Outside)    aspirin 81 mg EC tablet 1 tablet, oral, Daily    doxazosin (CARDURA) 4 mg, oral, Daily, Take 1 tablet by mouth daily in the evening    fluticasone-umeclidin-vilanter (Trelegy Ellipta) 100-62.5-25 mcg blister with device INHALE 1 PUFF EVERY DAY    ipratropium-albuteroL (Duo-Neb) 0.5-2.5 mg/3 mL nebulizer solution Take 3 mL by nebulization 3 times a day as needed for wheezing or shortness of breath.    losartan (COZAAR) 50 mg, oral, Daily    magnesium oxide (MAG-OX) 400 mg, oral, Daily     ondansetron (ZOFRAN) 8 mg, oral, Every 8 hours PRN    prochlorperazine (COMPAZINE) 10 mg, oral, Every 6 hours PRN        Objective   VS:  /61 (BP Location: Right arm, Patient Position: Sitting, BP Cuff Size: Adult)   Pulse 82   Temp 35.3 °C (95.5 °F) (Temporal)   Resp 18   Wt 59.6 kg (131 lb 6.3 oz)   SpO2 98%   BMI 21.21 kg/m²   Weight   Wt Readings from Last 7 Encounters:   08/29/24 59.6 kg (131 lb 6.3 oz)   08/22/24 60.6 kg (133 lb 9.6 oz)   08/13/24 62.5 kg (137 lb 12.6 oz)   08/08/24 61.8 kg (136 lb 3.9 oz)   08/01/24 60.5 kg (133 lb 6.1 oz)   07/18/24 60.6 kg (133 lb 9.6 oz)   07/11/24 58.9 kg (129 lb 13.6 oz)         Physical Exam  Vitals reviewed.   Constitutional:       Appearance: Normal appearance. He is underweight.   HENT:      Head: Normocephalic and atraumatic.      Right Ear: External ear normal. No tenderness.      Left Ear: External ear normal. No tenderness.      Nose: Nose normal.      Mouth/Throat:      Mouth: No injury or oral lesions.      Tongue: No lesions.      Pharynx: Oropharynx is clear. No posterior oropharyngeal erythema.      Comments: Edentulous  Eyes:      Extraocular Movements: Extraocular movements intact.      Conjunctiva/sclera: Conjunctivae normal.      Pupils: Pupils are equal, round, and reactive to light.   Neck:      Thyroid: No thyroid mass.      Comments: Mild to moderate swelling in submental and neck region.   Right neck is less sensitive/tender to palpation  Cardiovascular:      Rate and Rhythm: Normal rate and regular rhythm.   Pulmonary:      Effort: Pulmonary effort is normal. No respiratory distress.      Breath sounds: Normal breath sounds.   Abdominal:      General: Bowel sounds are normal. There is no distension or abdominal bruit.      Palpations: Abdomen is soft. There is no mass.      Tenderness: There is no abdominal tenderness.   Musculoskeletal:         General: Normal range of motion.      Cervical back: Normal range of motion and neck supple.       Right lower leg: No edema.      Left lower leg: No edema.   Lymphadenopathy:      Cervical: No cervical adenopathy.      Upper Body:      Right upper body: No axillary adenopathy.      Left upper body: No axillary adenopathy.   Skin:     General: Skin is warm and dry.      Findings: No lesion (radiation dermatitis in bilateral neck), rash or wound.   Neurological:      General: No focal deficit present.      Mental Status: He is alert and oriented to person, place, and time.      Gait: Gait is intact.   Psychiatric:         Mood and Affect: Mood and affect normal.       Diagnostic Results   The below labs were reviewed.  Results from last 7 days   Lab Units 08/29/24  1054   WBC AUTO x10*3/uL 2.4*   HEMOGLOBIN g/dL 9.4*   HEMATOCRIT % 28.1*   PLATELETS AUTO x10*3/uL 155   NEUTROS ABS x10*3/uL 1.99   LYMPHS ABS AUTO x10*3/uL 0.30*   MONOS ABS AUTO x10*3/uL 0.08   EOS ABS AUTO x10*3/uL 0.03   NEUTROS PCT AUTO % 81.6   LYMPHS PCT AUTO % 12.3   MONOS PCT AUTO % 3.3   EOS PCT AUTO % 1.2      Results from last 7 days   Lab Units 08/29/24  1054   GLUCOSE mg/dL 84   SODIUM mmol/L 137   POTASSIUM mmol/L 3.7   CHLORIDE mmol/L 98   CO2 mmol/L 30   BUN mg/dL 22   CREATININE mg/dL 1.14   EGFR mL/min/1.73m*2 68   CALCIUM mg/dL 8.6   MAGNESIUM mg/dL 1.82   ALBUMIN g/dL 3.7   PROTEIN TOTAL g/dL 5.9*   BILIRUBIN TOTAL mg/dL 0.3   ALK PHOS U/L 48   ALT U/L 11   AST U/L 11                    Pathology      Imaging  8/19/2024 CT soft tissue neck wo IV contrast  Impression:   No significant interval change in the soft tissue mass with focal calcification within the right level 3 yvette station. No additional cervical lymphadenopathy by size criteria.   Please see CT chest performed the same day for lung findings.         8/27/2024 CT chest wo IV contrast  Impression:   1. Improving bilateral pulmonary metastases, the largest of which is a 14 mm pleural-based left lower lobe nodule.   2. Emphysema.   3. Very extensive vascular  calcification includes severe quadruple vessel coronary artery calcification and bilateral renovascular calcification that is marked on the left with left renal atrophy.   4. Diffusely dense bones as discussed above.      Assessment/Plan    ASSESSMENT  Phong Wong is a 74 y.o. male with recent diagnosis of locally advanced oral cavity cancer. Completed concurrent chemoradiation with 7 weekly Carboplatin (2mg/AUC)/Paclitaxel (45mg/m2) on 10/4/2. Post treatment restaging PET/CT noted new FDG avid LLL pulm nodule, biopsy confirmed SCC. Progressed after 3 cycles of Pembrolizumab, started Q3 week Carbo/Taxol on 6/19/24.    # Locally advanced oral cavity cancer, T3N2M0, now with met to lungs  - 6/30/23: PET/CT showed intense FDG avidity within the soft palate, extending to the base of the tongue and left palatine tonsil. Multiple uptake in left cervical level II nodes, right cervical level  II/III, infiltrating the right SCM muscle and encasing and invading the right jugular vein. FDG avidity was also detected in the region of the left fossa Rosenmuller and left medial pterygoid muscle.  - 7/13/23: pt is doing well, no pain, discussed with patient about carboplatin+ paclitaxel as his chemotherapy along with radiation due to his GFR 30s.  Chemo related side effects were reviewed with patient, consent obtained.   - 8/16 - 10/4/23: Recieved concurrent chemoradiation with 7 weekly Carboplatin (2mg/AUC)/Paclitaxel (45mg/m2)  and 70gy RT in 35fx     - 1/10/24 restaging PET/CT with patient. Scans noted significant interval improvement in prior sites of disease though with residual FDG avidity in soft palate and left palatine tonsil, c/f residual disease vs post treatment changes. Also noted was a new FDG avid LLL pulm nodule with max SUV 5.7, c/f pulmonary metastasis.   - Patient will have repeat CT Chest for FDG avid LLL pulm nodule as prior biopsy attempt was not successful due to small nodule size and difficult location near  diaphragm.  -2/9/24 CT chest showed multiple subcentimeter noduesl 2-5mm, dominant lesion is 1.6cm in LLL.    -3/6/24: Lung biopsy of LLL nodule was consistent with HN origin. CPS 3.  Discussed with patient about phase 2 FLAM study (randomized to pembrolizumab or pembrolizumab+danvatirsen (Stat3 inhibitor). Unfortunately due to his CKD, he's not eligible for FLAM study.  - 4/4/24: Started Q3 week Pembrolizumab  - 5/1/24: C2 Pembro was delayed a week 2/2 patient not feeling well. He had struggled with hemorrhoids for a couple weeks. Otherwise tolerating treatment with no irAEs .  - 5/23/24: Patient doing well, no irAEs. Appetite and energy are good, tolerating solid PO intake. No odynophagia, no neck pain.  - 6/7/24 restaging CT N/C/A/P showed enlargement of subcentimeter lung mets, stable left lung nodule 2cm with new 1cm nodule in RUL. Based on heightened response with carbo+taxol after progression on PD, I recommended 3 cycles of carbo taxol and restaging, if there is response, we can consider afatinib treatment more as long term maintenance therapy.  Consent obtained for chemo and research blood.  - 6/19/24: Patient feeling well with no new complaints. Ok for C1 Q3 week Carbo/Taxol.  - 6/28/24: Patient feeling well and tolerating chemo. Did have watery diarrhea 1-2x per day for the first week after chemo but resolved on its on. No other new symptoms. Does still have tenderness in the right neck in area of prior RT. He did have a 1.5cm Level 2 LN in right neck on 6/7/24 CT Neck.   - 7/11/24: Patient doing well, no new symptoms, no recurrence of diarrhea. Ok for C2 carbo/taxol. Slightly neutropenic today, ANC 1320. Neutropenic precaution given to patient and his SO.  - 7/18/24: ANC 1400, denies fever, chills, pt doing well, had few days of soreness in fingers and legs which are all resolved.  -8/1/24:  pt is doing well with wt gain. ANC 1500, ok to proceed with chemo,  will monitor ANC closely next week.  -  8/19/24: restaging scan improving b/l pulmonary mets, largest pleural based LLL nodule is now 14mm.   Pt is aware of that Dr. Burkitt is leaving  at the end of Oct, planning to get to another restaging and change treatment to afatinib if he continues to have response.  Once treatment is changed, will transition his care to Dr. Parks.  - 8/29/24: Patient had diarrhea following C4 Carbo/Taxol. Will prescribe Imodium. Arthralgia in b/l hands are grade 1 and resolves w/o intervention. Had grade 1 fatigue.     # Thrombocytopenia  - 6/28: Plt 85. No s/s of mucosal bleeds. Will continue to monitor  - 7/18: Plt 165  - 8/22/24: Plt 154    # Anemia: improving  - Hgb has been dropping since starting chemo, however today his hgb is <7 and will require a blood transfusion. This is likely due to chemotherapy as there are no s/s of bleeds.   - Received 2U PRBC during 10/18 - 11/2 admission. Anemia likely due to chemotherapy  - 11/29/23: Hgb 8.8. Improved from 3 weeks ago. Expect Hbg will continue to trend up as patient recovers. Can consider iron studies.   - Hgb trending up since 02/2024    # CKD  - 2/9/24 CT chest showed Bulky atherosclerotic calcification at the origin of the left renal artery likely contributing to atrophy of the left kidney as compared to the right. Kidney atrophy was not mentioned in previous scans.   - 5/1: Creat 1.46, GFR 51. Encouraged patient to keep up with non-caffeinated PO hydration. 1L NS given today with Pembro. Patient to follow up with PCP regarding atherosclerosis.   - 6/12: Creat 1.54, GFR 48.   - 6/20: Creat 1.6. GFR 45. Additional 1L NS giving w/ chemo today  - 6/28: Creat 1.2. GFR 64. Pt reports he has increased PO hydration. Kidney function improved.   - 7/11 Creat 1.38, GFR 54. Pt reports decreased PO intake lately. Will give 1L NS for hydration  - 8/1/24: Creat 1.34, GFR 56  - 8/21/24: Creat 1.23, GFR 62.   - 8/30/24: Creat 1.14, GFR 68.     # Submental/Neck Lymphedema  - Patient with  moderate swelling in the submental and neck region. Likely 2ry to prior radiation. Established w/ Dr Guerrero.   - 6/19: Right neck with more swelling and tender to touch, neck is tight. Still itchy to touch.   - 6/28: Right neck swelling persists and is tender to palpation. Will meet with Dr. Guerrero's team for massage on 7/10  - 7/11: Right neck swelling decreased after lymphedema massage  - 8/8: right neck sensitivity/tenderness also improving after lymphedema massage.     # Poor Venous Access  - Informed by infusion RN 5 attempts were made before patient had IV access, 2 were with IV US. Discussed mediport with patient but he was hesitant. Given patient likely will not have more than 4 more cycles of Carbo/Taxol, we will hold off on mediport placement and ask that the IV team places his IV with his infusions.    PLAN  -- RTC 2 weeks for C5 Carbo/Taxol  -- START Imodium 2mg after each loose stool PRN for chemo induced diarrehea   -- Continue ample PO hydration.    -- Patient fo follow up with Dr. Weiss on atherosclerosis of left renal artery and coroanary calcifications  -- Stool softeners as needed, monitor further symptoms of hemorrhoids .

## 2024-08-28 DIAGNOSIS — C10.9 OROPHARYNGEAL CANCER (MULTI): Primary | ICD-10-CM

## 2024-08-29 ENCOUNTER — LAB (OUTPATIENT)
Dept: LAB | Facility: HOSPITAL | Age: 74
End: 2024-08-29
Payer: MEDICARE

## 2024-08-29 ENCOUNTER — OFFICE VISIT (OUTPATIENT)
Dept: HEMATOLOGY/ONCOLOGY | Facility: HOSPITAL | Age: 74
End: 2024-08-29
Payer: MEDICARE

## 2024-08-29 VITALS
WEIGHT: 131.39 LBS | HEART RATE: 82 BPM | RESPIRATION RATE: 18 BRPM | OXYGEN SATURATION: 98 % | DIASTOLIC BLOOD PRESSURE: 61 MMHG | TEMPERATURE: 95.5 F | BODY MASS INDEX: 21.21 KG/M2 | SYSTOLIC BLOOD PRESSURE: 149 MMHG

## 2024-08-29 DIAGNOSIS — T45.1X5A CHEMOTHERAPY-INDUCED FATIGUE: ICD-10-CM

## 2024-08-29 DIAGNOSIS — T45.1X5A CHEMOTHERAPY INDUCED DIARRHEA: ICD-10-CM

## 2024-08-29 DIAGNOSIS — C10.9 OROPHARYNGEAL CANCER (MULTI): Primary | ICD-10-CM

## 2024-08-29 DIAGNOSIS — G89.18 JOINT PAIN FOLLOWING CHEMOTHERAPY: ICD-10-CM

## 2024-08-29 DIAGNOSIS — C78.01 SQUAMOUS CELL CARCINOMA METASTATIC TO BOTH LUNGS (MULTI): ICD-10-CM

## 2024-08-29 DIAGNOSIS — K52.1 CHEMOTHERAPY INDUCED DIARRHEA: ICD-10-CM

## 2024-08-29 DIAGNOSIS — R53.83 CHEMOTHERAPY-INDUCED FATIGUE: ICD-10-CM

## 2024-08-29 DIAGNOSIS — C78.02 SQUAMOUS CELL CARCINOMA METASTATIC TO BOTH LUNGS (MULTI): ICD-10-CM

## 2024-08-29 DIAGNOSIS — M25.50 JOINT PAIN FOLLOWING CHEMOTHERAPY: ICD-10-CM

## 2024-08-29 DIAGNOSIS — C10.9 OROPHARYNGEAL CANCER (MULTI): ICD-10-CM

## 2024-08-29 LAB
ALBUMIN SERPL BCP-MCNC: 3.7 G/DL (ref 3.4–5)
ALP SERPL-CCNC: 48 U/L (ref 33–136)
ALT SERPL W P-5'-P-CCNC: 11 U/L (ref 10–52)
ANION GAP SERPL CALC-SCNC: 13 MMOL/L (ref 10–20)
AST SERPL W P-5'-P-CCNC: 11 U/L (ref 9–39)
BASOPHILS # BLD AUTO: 0.01 X10*3/UL (ref 0–0.1)
BASOPHILS NFR BLD AUTO: 0.4 %
BILIRUB SERPL-MCNC: 0.3 MG/DL (ref 0–1.2)
BUN SERPL-MCNC: 22 MG/DL (ref 6–23)
CALCIUM SERPL-MCNC: 8.6 MG/DL (ref 8.6–10.3)
CHLORIDE SERPL-SCNC: 98 MMOL/L (ref 98–107)
CO2 SERPL-SCNC: 30 MMOL/L (ref 21–32)
CREAT SERPL-MCNC: 1.14 MG/DL (ref 0.5–1.3)
EGFRCR SERPLBLD CKD-EPI 2021: 68 ML/MIN/1.73M*2
EOSINOPHIL # BLD AUTO: 0.03 X10*3/UL (ref 0–0.4)
EOSINOPHIL NFR BLD AUTO: 1.2 %
ERYTHROCYTE [DISTWIDTH] IN BLOOD BY AUTOMATED COUNT: 16.8 % (ref 11.5–14.5)
GLUCOSE SERPL-MCNC: 84 MG/DL (ref 74–99)
HCT VFR BLD AUTO: 28.1 % (ref 41–52)
HGB BLD-MCNC: 9.4 G/DL (ref 13.5–17.5)
IMM GRANULOCYTES # BLD AUTO: 0.03 X10*3/UL (ref 0–0.5)
IMM GRANULOCYTES NFR BLD AUTO: 1.2 % (ref 0–0.9)
LYMPHOCYTES # BLD AUTO: 0.3 X10*3/UL (ref 0.8–3)
LYMPHOCYTES NFR BLD AUTO: 12.3 %
MAGNESIUM SERPL-MCNC: 1.82 MG/DL (ref 1.6–2.4)
MCH RBC QN AUTO: 29.5 PG (ref 26–34)
MCHC RBC AUTO-ENTMCNC: 33.5 G/DL (ref 32–36)
MCV RBC AUTO: 88 FL (ref 80–100)
MONOCYTES # BLD AUTO: 0.08 X10*3/UL (ref 0.05–0.8)
MONOCYTES NFR BLD AUTO: 3.3 %
NEUTROPHILS # BLD AUTO: 1.99 X10*3/UL (ref 1.6–5.5)
NEUTROPHILS NFR BLD AUTO: 81.6 %
NRBC BLD-RTO: 0 /100 WBCS (ref 0–0)
PLATELET # BLD AUTO: 155 X10*3/UL (ref 150–450)
POTASSIUM SERPL-SCNC: 3.7 MMOL/L (ref 3.5–5.3)
PROT SERPL-MCNC: 5.9 G/DL (ref 6.4–8.2)
RBC # BLD AUTO: 3.19 X10*6/UL (ref 4.5–5.9)
SODIUM SERPL-SCNC: 137 MMOL/L (ref 136–145)
WBC # BLD AUTO: 2.4 X10*3/UL (ref 4.4–11.3)

## 2024-08-29 PROCEDURE — 83735 ASSAY OF MAGNESIUM: CPT

## 2024-08-29 PROCEDURE — 1126F AMNT PAIN NOTED NONE PRSNT: CPT | Performed by: STUDENT IN AN ORGANIZED HEALTH CARE EDUCATION/TRAINING PROGRAM

## 2024-08-29 PROCEDURE — 36415 COLL VENOUS BLD VENIPUNCTURE: CPT

## 2024-08-29 PROCEDURE — 3078F DIAST BP <80 MM HG: CPT | Performed by: STUDENT IN AN ORGANIZED HEALTH CARE EDUCATION/TRAINING PROGRAM

## 2024-08-29 PROCEDURE — 84075 ASSAY ALKALINE PHOSPHATASE: CPT

## 2024-08-29 PROCEDURE — 99215 OFFICE O/P EST HI 40 MIN: CPT | Performed by: STUDENT IN AN ORGANIZED HEALTH CARE EDUCATION/TRAINING PROGRAM

## 2024-08-29 PROCEDURE — 1160F RVW MEDS BY RX/DR IN RCRD: CPT | Performed by: STUDENT IN AN ORGANIZED HEALTH CARE EDUCATION/TRAINING PROGRAM

## 2024-08-29 PROCEDURE — 1159F MED LIST DOCD IN RCRD: CPT | Performed by: STUDENT IN AN ORGANIZED HEALTH CARE EDUCATION/TRAINING PROGRAM

## 2024-08-29 PROCEDURE — 1036F TOBACCO NON-USER: CPT | Performed by: STUDENT IN AN ORGANIZED HEALTH CARE EDUCATION/TRAINING PROGRAM

## 2024-08-29 PROCEDURE — 85025 COMPLETE CBC W/AUTO DIFF WBC: CPT

## 2024-08-29 PROCEDURE — 3077F SYST BP >= 140 MM HG: CPT | Performed by: STUDENT IN AN ORGANIZED HEALTH CARE EDUCATION/TRAINING PROGRAM

## 2024-08-29 RX ORDER — LOPERAMIDE HYDROCHLORIDE 2 MG/1
2 CAPSULE ORAL 4 TIMES DAILY PRN
Qty: 30 CAPSULE | Refills: 2 | Status: SHIPPED | OUTPATIENT
Start: 2024-08-29

## 2024-08-29 ASSESSMENT — PAIN SCALES - GENERAL: PAINLEVEL: 0-NO PAIN

## 2024-08-29 NOTE — PATIENT INSTRUCTIONS
For diarrhea:  Imodium:  Take 2 tablets after first loose stool, followed by 1 tablet with each subsequent loose stool (maximum dose: 4 tablets/24 hours)

## 2024-08-30 PROBLEM — R14.0 GASSINESS: Status: RESOLVED | Noted: 2023-11-07 | Resolved: 2024-08-30

## 2024-08-30 PROBLEM — S49.90XA SHOULDER INJURY: Status: RESOLVED | Noted: 2023-04-09 | Resolved: 2024-08-30

## 2024-08-30 PROBLEM — L98.491 NON-PRESSURE CHRONIC ULCER OF SKIN OF OTHER SITES LIMITED TO BREAKDOWN OF SKIN (MULTI): Status: RESOLVED | Noted: 2022-02-08 | Resolved: 2024-08-30

## 2024-08-30 PROBLEM — Z74.09 IMPAIRED MOBILITY AND ADLS: Status: RESOLVED | Noted: 2023-10-06 | Resolved: 2024-08-30

## 2024-08-30 PROBLEM — Z78.9 IMPAIRED MOBILITY AND ADLS: Status: RESOLVED | Noted: 2023-10-06 | Resolved: 2024-08-30

## 2024-08-30 PROBLEM — K52.1 CHEMOTHERAPY INDUCED DIARRHEA: Status: ACTIVE | Noted: 2024-08-30

## 2024-08-30 PROBLEM — R53.1 WEAKNESS: Status: RESOLVED | Noted: 2023-11-07 | Resolved: 2024-08-30

## 2024-08-30 PROBLEM — K55.069: Status: RESOLVED | Noted: 2023-08-25 | Resolved: 2024-08-30

## 2024-08-30 PROBLEM — L30.8 OTHER SPECIFIED DERMATITIS: Status: RESOLVED | Noted: 2022-02-08 | Resolved: 2024-08-30

## 2024-08-30 PROBLEM — S81.802A LEG WOUND, LEFT, INITIAL ENCOUNTER: Status: RESOLVED | Noted: 2023-04-09 | Resolved: 2024-08-30

## 2024-08-30 PROBLEM — R06.02 SOB (SHORTNESS OF BREATH) ON EXERTION: Status: RESOLVED | Noted: 2023-04-09 | Resolved: 2024-08-30

## 2024-08-30 PROBLEM — M25.50 JOINT PAIN FOLLOWING CHEMOTHERAPY: Status: ACTIVE | Noted: 2024-08-30

## 2024-08-30 PROBLEM — N13.9 OBSTRUCTIVE UROPATHY: Status: RESOLVED | Noted: 2023-11-07 | Resolved: 2024-08-30

## 2024-08-30 PROBLEM — E46 PROTEIN-CALORIE MALNUTRITION, UNSPECIFIED SEVERITY (MULTI): Status: RESOLVED | Noted: 2023-10-14 | Resolved: 2024-08-30

## 2024-08-30 PROBLEM — G89.18 JOINT PAIN FOLLOWING CHEMOTHERAPY: Status: ACTIVE | Noted: 2024-08-30

## 2024-08-30 PROBLEM — K21.9 GERD (GASTROESOPHAGEAL REFLUX DISEASE): Status: RESOLVED | Noted: 2023-11-07 | Resolved: 2024-08-30

## 2024-08-30 PROBLEM — T45.1X5A CHEMOTHERAPY INDUCED DIARRHEA: Status: ACTIVE | Noted: 2024-08-30

## 2024-08-30 ASSESSMENT — ENCOUNTER SYMPTOMS
APPETITE CHANGE: 1
DIARRHEA: 1
FATIGUE: 1

## 2024-09-09 ENCOUNTER — APPOINTMENT (OUTPATIENT)
Dept: INTEGRATIVE MEDICINE | Facility: CLINIC | Age: 74
End: 2024-09-09
Payer: MEDICARE

## 2024-09-09 DIAGNOSIS — C78.01 SQUAMOUS CELL CARCINOMA METASTATIC TO BOTH LUNGS: Primary | ICD-10-CM

## 2024-09-09 DIAGNOSIS — Z71.3 NUTRITIONAL COUNSELING: Primary | ICD-10-CM

## 2024-09-09 DIAGNOSIS — M79.10 MYALGIA: ICD-10-CM

## 2024-09-09 DIAGNOSIS — C78.02 SQUAMOUS CELL CARCINOMA METASTATIC TO BOTH LUNGS: Primary | ICD-10-CM

## 2024-09-09 DIAGNOSIS — I89.0 LYMPHEDEMA: ICD-10-CM

## 2024-09-09 DIAGNOSIS — C10.9 OROPHARYNGEAL CANCER (MULTI): ICD-10-CM

## 2024-09-09 PROCEDURE — 97140 MANUAL THERAPY 1/> REGIONS: CPT | Performed by: HOSPITALIST

## 2024-09-09 ASSESSMENT — PAIN SCALES - GENERAL: PAINLEVEL_OUTOF10: 0 - NO PAIN

## 2024-09-09 NOTE — PROGRESS NOTES
Massage Therapy Visit:     Phong Wong was referred by Dr. Guerrero.    Condition of Client Subjective :  Patient ID: Phong Wong is a 74 y.o. male who presents for a 40 minute Reinaldo Therapy.  Patient is being treated for oral cancer.  I did Reinaldo Lymph Drainage (MLD) on his face,  The left side of his neck is sensitive.  Patient stated that he had back discomfort.  I worked on his back with him sitting up.  His partner Tmoasa was in the room during his treatment.  Patient did notice an improvement with his muscle discomfort.       Session Information  Visit Type: Follow-up visit  Description of present complaint: Discomfort      Objective   Pre-treatment Assessment  Arrival Mode: Wheelchair  Pain Score: 0 - No pain  Anxiety Level (0-10): 0  Stress Level (0-10): 0  Coping Level (0-10): 10  Depression Level (0-10): 0  Fatigue Level (0-10): 0  Nausea Level (0-10): 0  Wellbeing Level (0-10): 1        Actions Assessment/Plan :  Provider reviewed plan for the massage session, precautions and contraindications. Patient/guardian/hospital staff has given consent to treat with full understanding of what to expect during the session. Before massage therapy began, provider explained to the patient to communicate at any time if the pressure was causing discomfort past their tolerance level. Patient agreed to advise therapist.    Massage Treatment  Patient Position: Table  Positioning Assistance: Other (specify)  Massage Technique: Relaxation massage  Pressure Scale: 1 - Light pressure, 2 - Mild pressure, 3 - Medium pressure    Response:  Post-treatment Assessment  Patient Noted Improvement of the Following Symptoms: Muscle tension  0-10 (Numeric) Pain Score: 0 - No pain  Anxiety Level (0-10): 0  Stress Level (0-10): 0  Coping Level (0-10): 10  Depression Level (0-10): 0  Fatigue Level (0-10): 0  Nausea Level (0-10): 0  Wellbeing Level (0-10): 0    Evaluation:   Patient will follow up as needed.

## 2024-09-11 ENCOUNTER — APPOINTMENT (OUTPATIENT)
Dept: INTEGRATIVE MEDICINE | Facility: CLINIC | Age: 74
End: 2024-09-11
Payer: MEDICARE

## 2024-09-11 DIAGNOSIS — M79.10 MYALGIA: ICD-10-CM

## 2024-09-11 DIAGNOSIS — C10.9 OROPHARYNGEAL CANCER (MULTI): Primary | ICD-10-CM

## 2024-09-11 DIAGNOSIS — Z71.3 NUTRITIONAL COUNSELING: ICD-10-CM

## 2024-09-11 DIAGNOSIS — I89.0 LYMPHEDEMA: ICD-10-CM

## 2024-09-11 PROCEDURE — 99214 OFFICE O/P EST MOD 30 MIN: CPT | Performed by: HOSPITALIST

## 2024-09-11 NOTE — PROGRESS NOTES
Patient ID: Phong Wong is a 74 y.o. male.  Referring Physician: No referring provider defined for this encounter.  Primary Care Provider: Harshil Weiss MD     CANCER HISTORY:   Patient ID: Phong Wong is a 73 y.o. male.  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 Juan Jose Fletcher   MedOnc: Dr. Kyunghee Burkitt, X. Katherine Feng, PA-C   RadOnc: Dr. Vianca Steen      Current Therapy  4/4/24: Started Q3 week Pembrolizumab   Now 6/24 carbo/taxol     Prior Therapy:   8/16 - 10/4/23: Recieved concurrent chemoradiation with 7 weekly Carboplatin (2mg/AUC)/Paclitaxel (45mg/m2)  and 70gy RT in 35fx     Sites of Disease:  Soft palate, BOT, Left palatine tonsil  B/L Cervical LN  Lung     Oncologic Issues:   Odynophagia  Fatigue     ONCOLOGIC HISTORY  - Pt had 2 months hx of throat discomfort with lump in right neck  - 6/13/23: CT neck showed a mass 2.8 x 2.5 x 2.9 cm in the right lower cervical neck soft tissues. Two suspicious nodes were observed, one at level 3 on the right and another at level 2 on the left.  - 6/28/23: seen by Dr. Fletcher. 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 Based on the findings, Dr. Fletcher did not recommend surgery due to the location  of the primary tumor and the presence of yvette disease  - 6/30/23: PET/CT showed intense FDG avidity within the soft palate, extending to the base of the tongue and left palatine tonsil. Multiple FDG avid left cervical level II nodes were observed, along with an FDG avid mass in the region of the right cervical  level II/III, infiltrating the right SCM muscle and encasing and invading the right jugular vein. FDG avidity was also detected in the region of the left fossa Rosenmuller and left medial pterygoid muscle.  - 8/16 - 10/4/23: Recieved concurrent chemoradiation  with 7 weekly Carboplatin (2mg/AUC)/Paclitaxel (45mg/m2)  and 70gy RT in 35fx     4/24 imaging with mets to lungs    Progressed after 3 cycles of Pembrolizumab, started Q3 week Carbo/Taxol on 6/19/24.      Admissions:  10/5 - 10/10/23: Admitted for extreme weakness, HECTOR, pancytopenia including anemia requiring blood transfusion. D/C'ed to acute rehab     Past Medical History:      Past Medical History   Past Medical History:  07/27/2017: Personal history of diseases of the blood and blood-  forming organs and certain disorders involving the immune mechanism      Comment:  History of thrombocytosis      HTN, HLD, COPD, prostate cancer in 2017 (s/p radiation)     Aortoiliofemoral vascular bypass in 2014     Integrative history:  Symptoms:  Itching in neck 3 mo, no diff swallowing, mild swelling              Had one massage treatment    Few days after infusion, gets fatigue, sleeps a lot     Diet - soft food - had to pull on teeth prior to radiation     PA - limited, doesn't do much activity  Doesn't walk much after chemotherapy     ROS:  no ha, visual symptoms, hearing loss  no sob, chest pain, palp  ROS o/w non contributory, please see HPI        Objective  BSA: There is no height or weight on file to calculate BSA.  There were no vitals taken for this visit.     PHYSICAL EXAM:  NAD, awake/alert  HEENT, NCAT, OP clear, no oral lesions  CTA bilat  RRR no mgr  Abd soft/nt/nd+bs  No c/c/e/ttp  Motor/sensory intact, CN 2-12 intact      RESULTS:       Assessment/Plan  75 yo man with h/o squamous cell oropharyngeal ca s/p chemoradiation 2023  Now with mets to lungs  Current treatment: pembrolizumab     Lifestyle:  Diet - limit sugar if possible  Protein supplementation - Orgain     Exercise - 30 min or so walking per day     Symptoms:  Neck discomfort/swelling - massage therapy - weekly - helping with swelling    Fatigue:  Acupuncture - for fatigue weekly x 6 weeks  American Ginseng 2 grams/day  Taking naps - 30 min at a  time  Walk 10 min/day at least     Follow up in 6 months     Saulo Guerrero MD

## 2024-09-12 ENCOUNTER — OFFICE VISIT (OUTPATIENT)
Dept: HEMATOLOGY/ONCOLOGY | Facility: HOSPITAL | Age: 74
End: 2024-09-12
Payer: MEDICARE

## 2024-09-12 ENCOUNTER — LAB (OUTPATIENT)
Dept: LAB | Facility: HOSPITAL | Age: 74
End: 2024-09-12
Payer: MEDICARE

## 2024-09-12 ENCOUNTER — INFUSION (OUTPATIENT)
Dept: HEMATOLOGY/ONCOLOGY | Facility: HOSPITAL | Age: 74
End: 2024-09-12
Payer: MEDICARE

## 2024-09-12 VITALS
WEIGHT: 129.41 LBS | BODY MASS INDEX: 20.89 KG/M2 | HEART RATE: 101 BPM | DIASTOLIC BLOOD PRESSURE: 64 MMHG | TEMPERATURE: 97.3 F | OXYGEN SATURATION: 99 % | RESPIRATION RATE: 18 BRPM | SYSTOLIC BLOOD PRESSURE: 139 MMHG

## 2024-09-12 DIAGNOSIS — C10.9 OROPHARYNGEAL CANCER (MULTI): ICD-10-CM

## 2024-09-12 DIAGNOSIS — C78.02 SQUAMOUS CELL CARCINOMA METASTATIC TO BOTH LUNGS: ICD-10-CM

## 2024-09-12 DIAGNOSIS — C10.9 OROPHARYNGEAL CANCER (MULTI): Primary | ICD-10-CM

## 2024-09-12 DIAGNOSIS — C78.01 SQUAMOUS CELL CARCINOMA METASTATIC TO BOTH LUNGS: ICD-10-CM

## 2024-09-12 LAB
ALBUMIN SERPL BCP-MCNC: 4 G/DL (ref 3.4–5)
ALP SERPL-CCNC: 57 U/L (ref 33–136)
ALT SERPL W P-5'-P-CCNC: 10 U/L (ref 10–52)
ANION GAP SERPL CALC-SCNC: 13 MMOL/L (ref 10–20)
AST SERPL W P-5'-P-CCNC: 12 U/L (ref 9–39)
BASOPHILS # BLD AUTO: 0.01 X10*3/UL (ref 0–0.1)
BASOPHILS NFR BLD AUTO: 0.3 %
BILIRUB SERPL-MCNC: 0.3 MG/DL (ref 0–1.2)
BUN SERPL-MCNC: 13 MG/DL (ref 6–23)
CALCIUM SERPL-MCNC: 9.6 MG/DL (ref 8.6–10.3)
CHLORIDE SERPL-SCNC: 102 MMOL/L (ref 98–107)
CO2 SERPL-SCNC: 30 MMOL/L (ref 21–32)
CREAT SERPL-MCNC: 1.3 MG/DL (ref 0.5–1.3)
EGFRCR SERPLBLD CKD-EPI 2021: 58 ML/MIN/1.73M*2
EOSINOPHIL # BLD AUTO: 0.07 X10*3/UL (ref 0–0.4)
EOSINOPHIL NFR BLD AUTO: 2.2 %
ERYTHROCYTE [DISTWIDTH] IN BLOOD BY AUTOMATED COUNT: 17.3 % (ref 11.5–14.5)
GLUCOSE SERPL-MCNC: 92 MG/DL (ref 74–99)
HCT VFR BLD AUTO: 31.4 % (ref 41–52)
HGB BLD-MCNC: 10.5 G/DL (ref 13.5–17.5)
HOLD SPECIMEN: NORMAL
IMM GRANULOCYTES # BLD AUTO: 0.01 X10*3/UL (ref 0–0.5)
IMM GRANULOCYTES NFR BLD AUTO: 0.3 % (ref 0–0.9)
LYMPHOCYTES # BLD AUTO: 0.39 X10*3/UL (ref 0.8–3)
LYMPHOCYTES NFR BLD AUTO: 12.2 %
MAGNESIUM SERPL-MCNC: 1.72 MG/DL (ref 1.6–2.4)
MCH RBC QN AUTO: 29.6 PG (ref 26–34)
MCHC RBC AUTO-ENTMCNC: 33.4 G/DL (ref 32–36)
MCV RBC AUTO: 89 FL (ref 80–100)
MONOCYTES # BLD AUTO: 0.65 X10*3/UL (ref 0.05–0.8)
MONOCYTES NFR BLD AUTO: 20.3 %
NEUTROPHILS # BLD AUTO: 2.07 X10*3/UL (ref 1.6–5.5)
NEUTROPHILS NFR BLD AUTO: 64.7 %
NRBC BLD-RTO: 0 /100 WBCS (ref 0–0)
PLATELET # BLD AUTO: 213 X10*3/UL (ref 150–450)
POTASSIUM SERPL-SCNC: 3.8 MMOL/L (ref 3.5–5.3)
PROT SERPL-MCNC: 6.4 G/DL (ref 6.4–8.2)
RBC # BLD AUTO: 3.55 X10*6/UL (ref 4.5–5.9)
SODIUM SERPL-SCNC: 141 MMOL/L (ref 136–145)
TSH SERPL-ACNC: 3.58 MIU/L (ref 0.44–3.98)
WBC # BLD AUTO: 3.2 X10*3/UL (ref 4.4–11.3)

## 2024-09-12 PROCEDURE — 96415 CHEMO IV INFUSION ADDL HR: CPT

## 2024-09-12 PROCEDURE — 2500000004 HC RX 250 GENERAL PHARMACY W/ HCPCS (ALT 636 FOR OP/ED): Performed by: INTERNAL MEDICINE

## 2024-09-12 PROCEDURE — 96367 TX/PROPH/DG ADDL SEQ IV INF: CPT

## 2024-09-12 PROCEDURE — 80053 COMPREHEN METABOLIC PANEL: CPT

## 2024-09-12 PROCEDURE — 1126F AMNT PAIN NOTED NONE PRSNT: CPT | Performed by: INTERNAL MEDICINE

## 2024-09-12 PROCEDURE — 1159F MED LIST DOCD IN RCRD: CPT | Performed by: INTERNAL MEDICINE

## 2024-09-12 PROCEDURE — 3075F SYST BP GE 130 - 139MM HG: CPT | Performed by: INTERNAL MEDICINE

## 2024-09-12 PROCEDURE — 3078F DIAST BP <80 MM HG: CPT | Performed by: INTERNAL MEDICINE

## 2024-09-12 PROCEDURE — 1036F TOBACCO NON-USER: CPT | Performed by: INTERNAL MEDICINE

## 2024-09-12 PROCEDURE — 85025 COMPLETE CBC W/AUTO DIFF WBC: CPT

## 2024-09-12 PROCEDURE — 2500000001 HC RX 250 WO HCPCS SELF ADMINISTERED DRUGS (ALT 637 FOR MEDICARE OP): Performed by: INTERNAL MEDICINE

## 2024-09-12 PROCEDURE — 96413 CHEMO IV INFUSION 1 HR: CPT

## 2024-09-12 PROCEDURE — 84443 ASSAY THYROID STIM HORMONE: CPT

## 2024-09-12 PROCEDURE — 99215 OFFICE O/P EST HI 40 MIN: CPT | Performed by: INTERNAL MEDICINE

## 2024-09-12 PROCEDURE — 96417 CHEMO IV INFUS EACH ADDL SEQ: CPT

## 2024-09-12 PROCEDURE — 83735 ASSAY OF MAGNESIUM: CPT

## 2024-09-12 PROCEDURE — 96375 TX/PRO/DX INJ NEW DRUG ADDON: CPT | Mod: INF

## 2024-09-12 PROCEDURE — 36415 COLL VENOUS BLD VENIPUNCTURE: CPT

## 2024-09-12 RX ORDER — ALBUTEROL SULFATE 0.83 MG/ML
3 SOLUTION RESPIRATORY (INHALATION) AS NEEDED
Status: DISCONTINUED | OUTPATIENT
Start: 2024-09-12 | End: 2024-09-12 | Stop reason: HOSPADM

## 2024-09-12 RX ORDER — EPINEPHRINE 0.3 MG/.3ML
0.3 INJECTION SUBCUTANEOUS EVERY 5 MIN PRN
Status: DISCONTINUED | OUTPATIENT
Start: 2024-09-12 | End: 2024-09-12 | Stop reason: HOSPADM

## 2024-09-12 RX ORDER — PALONOSETRON 0.05 MG/ML
0.25 INJECTION, SOLUTION INTRAVENOUS ONCE
Status: CANCELLED | OUTPATIENT
Start: 2024-09-12

## 2024-09-12 RX ORDER — PROCHLORPERAZINE EDISYLATE 5 MG/ML
10 INJECTION INTRAMUSCULAR; INTRAVENOUS EVERY 6 HOURS PRN
Status: CANCELLED | OUTPATIENT
Start: 2024-09-12

## 2024-09-12 RX ORDER — DEXAMETHASONE IN 0.9 % SOD CHL 20 MG/50ML
20 INTRAVENOUS SOLUTION, PIGGYBACK (ML) INTRAVENOUS ONCE
Status: COMPLETED | OUTPATIENT
Start: 2024-09-12 | End: 2024-09-12

## 2024-09-12 RX ORDER — FAMOTIDINE 10 MG/ML
20 INJECTION INTRAVENOUS ONCE AS NEEDED
Status: DISCONTINUED | OUTPATIENT
Start: 2024-09-12 | End: 2024-09-12 | Stop reason: HOSPADM

## 2024-09-12 RX ORDER — DEXAMETHASONE IN 0.9 % SOD CHL 20 MG/50ML
20 INTRAVENOUS SOLUTION, PIGGYBACK (ML) INTRAVENOUS ONCE
Status: CANCELLED | OUTPATIENT
Start: 2024-09-12

## 2024-09-12 RX ORDER — PROCHLORPERAZINE MALEATE 10 MG
10 TABLET ORAL EVERY 6 HOURS PRN
Status: CANCELLED | OUTPATIENT
Start: 2024-09-12

## 2024-09-12 RX ORDER — EPINEPHRINE 0.3 MG/.3ML
0.3 INJECTION SUBCUTANEOUS EVERY 5 MIN PRN
Status: CANCELLED | OUTPATIENT
Start: 2024-09-12

## 2024-09-12 RX ORDER — PROCHLORPERAZINE MALEATE 10 MG
10 TABLET ORAL EVERY 6 HOURS PRN
Status: DISCONTINUED | OUTPATIENT
Start: 2024-09-12 | End: 2024-09-12 | Stop reason: HOSPADM

## 2024-09-12 RX ORDER — FAMOTIDINE 10 MG/ML
20 INJECTION INTRAVENOUS ONCE
Status: CANCELLED | OUTPATIENT
Start: 2024-09-12

## 2024-09-12 RX ORDER — DIPHENHYDRAMINE HYDROCHLORIDE 50 MG/ML
50 INJECTION INTRAMUSCULAR; INTRAVENOUS AS NEEDED
Status: DISCONTINUED | OUTPATIENT
Start: 2024-09-12 | End: 2024-09-12 | Stop reason: HOSPADM

## 2024-09-12 RX ORDER — FAMOTIDINE 10 MG/ML
20 INJECTION INTRAVENOUS ONCE
Status: COMPLETED | OUTPATIENT
Start: 2024-09-12 | End: 2024-09-12

## 2024-09-12 RX ORDER — DIPHENHYDRAMINE HYDROCHLORIDE 50 MG/ML
50 INJECTION INTRAMUSCULAR; INTRAVENOUS AS NEEDED
Status: CANCELLED | OUTPATIENT
Start: 2024-09-12

## 2024-09-12 RX ORDER — PALONOSETRON 0.05 MG/ML
0.25 INJECTION, SOLUTION INTRAVENOUS ONCE
Status: COMPLETED | OUTPATIENT
Start: 2024-09-12 | End: 2024-09-12

## 2024-09-12 RX ORDER — DIPHENHYDRAMINE HCL 50 MG
50 CAPSULE ORAL ONCE
Status: COMPLETED | OUTPATIENT
Start: 2024-09-12 | End: 2024-09-12

## 2024-09-12 RX ORDER — DIPHENHYDRAMINE HCL 50 MG
50 CAPSULE ORAL ONCE
Status: CANCELLED | OUTPATIENT
Start: 2024-09-12

## 2024-09-12 RX ORDER — FAMOTIDINE 10 MG/ML
20 INJECTION INTRAVENOUS ONCE AS NEEDED
Status: CANCELLED | OUTPATIENT
Start: 2024-09-12

## 2024-09-12 RX ORDER — PROCHLORPERAZINE EDISYLATE 5 MG/ML
10 INJECTION INTRAMUSCULAR; INTRAVENOUS EVERY 6 HOURS PRN
Status: DISCONTINUED | OUTPATIENT
Start: 2024-09-12 | End: 2024-09-12 | Stop reason: HOSPADM

## 2024-09-12 RX ORDER — ALBUTEROL SULFATE 0.83 MG/ML
3 SOLUTION RESPIRATORY (INHALATION) AS NEEDED
Status: CANCELLED | OUTPATIENT
Start: 2024-09-12

## 2024-09-12 ASSESSMENT — ENCOUNTER SYMPTOMS
NECK PAIN: 0
COUGH: 0
APPETITE CHANGE: 1
ABDOMINAL PAIN: 0
LEG SWELLING: 0
NUMBNESS: 0
ARTHRALGIAS: 0
DYSURIA: 0
FATIGUE: 1
CHILLS: 0
FEVER: 0
TROUBLE SWALLOWING: 0
LIGHT-HEADEDNESS: 0
WOUND: 0
CONSTIPATION: 0
DIZZINESS: 0
VOMITING: 0
DIARRHEA: 1
NAUSEA: 0
SHORTNESS OF BREATH: 0
HEADACHES: 0
SORE THROAT: 0
DIFFICULTY URINATING: 0
FREQUENCY: 0

## 2024-09-12 ASSESSMENT — PAIN SCALES - GENERAL: PAINLEVEL: 0-NO PAIN

## 2024-09-12 NOTE — PROGRESS NOTES
Patient ID: Phong Wong is a 74 y.o. male.  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 Juan Jose Fletcher   MedOnc: Dr. Kyunghee Burkitt, X. Katherine Feng, PA-C   RadOnc: Dr. Vianca Steen     Current Therapy  4/4/24: Started Q3 week Pembrolizumab    Prior Therapy:   8/16 - 10/4/23: Recieved concurrent chemoradiation with 7 weekly Carboplatin (2mg/AUC)/Paclitaxel (45mg/m2)  and 70gy RT in 35fx     Sites of Disease:  Soft palate, BOT, Left palatine tonsil  B/L Cervical LN  Lung     Oncologic Issues:   Odynophagia  Fatigue     ONCOLOGIC HISTORY  - Pt had 2 months hx of throat discomfort with lump in right neck  - 6/13/23: CT neck showed a mass 2.8 x 2.5 x 2.9 cm in the right lower cervical neck soft tissues. Two suspicious nodes were observed, one at level 3 on the right and another at level 2 on the left.  - 6/28/23: seen by Dr. Fletcher. 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 Based on the findings, Dr. Fletcher did not recommend surgery due to the location  of the primary tumor and the presence of yvette disease  - 6/30/23: PET/CT showed intense FDG avidity within the soft palate, extending to the base of the tongue and left palatine tonsil. Multiple FDG avid left cervical level II nodes were observed, along with an FDG avid mass in the region of the right cervical  level II/III, infiltrating the right SCM muscle and encasing and invading the right jugular vein. FDG avidity was also detected in the region of the left fossa Rosenmuller and left medial pterygoid muscle.  - 8/16 - 10/4/23: Recieved concurrent chemoradiation with 7 weekly Carboplatin (2mg/AUC)/Paclitaxel (45mg/m2)  and 70gy RT in 35fx    Admissions:  10/5 - 10/10/23: Admitted for extreme weakness, HECTOR, pancytopenia including anemia requiring blood  transfusion. D/C'ed to acute rehab        Past Medical History:   Past Medical History:  2017: Personal history of diseases of the blood and blood-  forming organs and certain disorders involving the immune mechanism      Comment:  History of thrombocytosis   HTN, HLD, COPD, prostate cancer in 2017 (s/p radiation)  Surgical History:    Past Surgical History:   Procedure Laterality Date    CT ABDOMEN PELVIS ANGIOGRAM W AND/OR WO IV CONTRAST  9/3/2014    CT ABDOMEN PELVIS ANGIOGRAM W AND/OR WO IV CONTRAST 9/3/2014 [facility]    IR ANGIOGRAM AORTA ABDOMEN  2014    IR ANGIOGRAM AORTA ABDOMEN 2014 [facility]   Aortoiliofemoral vascular bypass in   Social History:    Social History     Socioeconomic History    Marital status:     Number of children: 1   Tobacco Use    Smoking status: Former     Current packs/day: 0.00     Average packs/day: 1 pack/day for 40.0 years (40.0 ttl pk-yrs)     Types: Cigarettes     Start date:      Quit date:      Years since quittin.7     Passive exposure: Past    Smokeless tobacco: Never   Substance and Sexual Activity    Alcohol use: Yes     Alcohol/week: 12.0 standard drinks of alcohol     Types: 12 Cans of beer per week    Drug use: Never     Social Determinants of Health     Financial Resource Strain: Low Risk  (10/20/2023)    Overall Financial Resource Strain (CARDIA)     Difficulty of Paying Living Expenses: Not very hard   Food Insecurity: No Food Insecurity (10/5/2023)    Hunger Vital Sign     Worried About Running Out of Food in the Last Year: Never true     Ran Out of Food in the Last Year: Never true   Transportation Needs: No Transportation Needs (10/20/2023)    PRAPARE - Transportation     Lack of Transportation (Medical): No     Lack of Transportation (Non-Medical): No   Physical Activity: Inactive (10/5/2023)    Exercise Vital Sign     Days of Exercise per Week: 0 days     Minutes of Exercise per Session: 0 min   Stress:  Stress Concern Present (10/5/2023)    [employer] [place] [employer] - Occupational Stress Questionnaire     Feeling of Stress : To some extent   Social Connections: Moderately Integrated (10/5/2023)    Social Connection and Isolation Panel [NHANES]     Frequency of Communication with Friends and Family: More than three times a week     Frequency of Social Gatherings with Friends and Family: More than three times a week     Attends Christian Services: Never     Active Member of Clubs or Organizations: Yes     Attends Club or Organization Meetings: Never     Marital Status:    Intimate Partner Violence: Not At Risk (10/5/2023)    Humiliation, Afraid, Rape, and Kick questionnaire     Fear of Current or Ex-Partner: No     Emotionally Abused: No     Physically Abused: No     Sexually Abused: No   Housing Stability: Low Risk  (10/20/2023)    Housing Stability Vital Sign     Unable to Pay for Housing in the Last Year: No     Number of Places Lived in the Last Year: 1     Unstable Housing in the Last Year: No      Family History:    Family History   Problem Relation Name Age of Onset    Aortic aneurysm Father          abdominal     Family Oncology History:    Cancer-related family history is not on file.      Subjective   Chief Complaint: Squamous Cell Carcinoma of oropharynx    HPI  Phong Wong is a 74 y.o. male with h/o locally advanced oral cavity cancer, completed concurrent chemoradiation with 7 weekly Carboplatin+Paclitaxel on 10/4/23. Unfortunately found with mets to lungs on 3 months restaging scan. CPS 3. Started Q3 week Pembrolizumab on 4/4/24. Received 3 cycles of Pembro , unfortunately lung nodules progressed. Started Q3 week carbo/Taxol on 6/19/24    Interval History  Patient presents today for C5 Carbo/Taxol.    He reports increasing fatigue and decreased appetite.  He is having alternating between solid  BM and diarrhea.  Last diarrhea yesterday, just one episode.  Still has lymphedema in  chin, goes to lymphedema massage, swelling improves after massages. Does home exercises sporadically.    Right neck tenderness is much improved since starting lymphedema massages.    Denies dry mouth, has saliva production, taste is normal.      ROS  Review of Systems   Constitutional:  Positive for appetite change and fatigue. Negative for chills and fever.   HENT:   Negative for lump/mass, mouth sores, nosebleeds, sore throat, tinnitus and trouble swallowing.    Respiratory:  Negative for cough and shortness of breath.    Cardiovascular:  Negative for chest pain and leg swelling.   Gastrointestinal:  Positive for diarrhea. Negative for abdominal pain, constipation, nausea and vomiting.   Genitourinary:  Negative for difficulty urinating, dysuria and frequency.    Musculoskeletal:  Negative for arthralgias and neck pain.   Skin:  Negative for rash and wound.   Neurological:  Negative for dizziness, headaches, light-headedness and numbness.       Allergies  Allergies   Allergen Reactions    Codeine Nausea Only        Medications  Current Outpatient Medications   Medication Instructions    amLODIPine-benazepriL (Lotrel) 5-20 mg capsule 255232 Medication amlodipine 5 mg-benazepril 20 mg capsule amlodipine 5 mg-benazepril 20 mg capsule 5-20 mg 10/2/2020 Active (Outside)    aspirin 81 mg EC tablet 1 tablet, oral, Daily    doxazosin (CARDURA) 4 mg, oral, Daily, Take 1 tablet by mouth daily in the evening    fluticasone-umeclidin-vilanter (Trelegy Ellipta) 100-62.5-25 mcg blister with device INHALE 1 PUFF EVERY DAY    ipratropium-albuteroL (Duo-Neb) 0.5-2.5 mg/3 mL nebulizer solution Take 3 mL by nebulization 3 times a day as needed for wheezing or shortness of breath.    loperamide (IMODIUM) 2 mg, oral, 4 times daily PRN, Initial: 4 mg, followed by 2 mg every 2 to 4 hours or after each loose stoo    losartan (COZAAR) 50 mg, oral, Daily    magnesium oxide (MAG-OX) 400 mg, oral, Daily    ondansetron (ZOFRAN) 8 mg, oral,  Every 8 hours PRN    prochlorperazine (COMPAZINE) 10 mg, oral, Every 6 hours PRN        Objective   VS:  /64   Pulse 101   Temp 36.3 °C (97.3 °F) (Core)   Resp 18   Wt 58.7 kg (129 lb 6.6 oz)   SpO2 99%   BMI 20.89 kg/m²   Weight   Wt Readings from Last 7 Encounters:   09/12/24 58.7 kg (129 lb 6.6 oz)   08/29/24 59.6 kg (131 lb 6.3 oz)   08/22/24 60.6 kg (133 lb 9.6 oz)   08/13/24 62.5 kg (137 lb 12.6 oz)   08/08/24 61.8 kg (136 lb 3.9 oz)   08/01/24 60.5 kg (133 lb 6.1 oz)   07/18/24 60.6 kg (133 lb 9.6 oz)         Physical Exam  Vitals reviewed.   Constitutional:       Appearance: Normal appearance. He is underweight.   HENT:      Head: Normocephalic and atraumatic.      Right Ear: External ear normal. No tenderness.      Left Ear: External ear normal. No tenderness.      Nose: Nose normal.      Mouth/Throat:      Mouth: No injury or oral lesions.      Tongue: No lesions.      Pharynx: Oropharynx is clear. No posterior oropharyngeal erythema.      Comments: Edentulous  Eyes:      Extraocular Movements: Extraocular movements intact.      Conjunctiva/sclera: Conjunctivae normal.      Pupils: Pupils are equal, round, and reactive to light.   Neck:      Thyroid: No thyroid mass.      Comments: Mild to moderate swelling in submental and neck region.   Right neck is less sensitive/tender to palpation  Cardiovascular:      Rate and Rhythm: Normal rate and regular rhythm.   Pulmonary:      Effort: Pulmonary effort is normal. No respiratory distress.      Breath sounds: Normal breath sounds.   Abdominal:      General: Bowel sounds are normal. There is no distension or abdominal bruit.      Palpations: Abdomen is soft. There is no mass.      Tenderness: There is no abdominal tenderness.   Musculoskeletal:         General: Normal range of motion.      Cervical back: Normal range of motion and neck supple.      Right lower leg: No edema.      Left lower leg: No edema.   Lymphadenopathy:      Cervical: No cervical  adenopathy.      Upper Body:      Right upper body: No axillary adenopathy.      Left upper body: No axillary adenopathy.   Skin:     General: Skin is warm and dry.      Findings: No lesion (radiation dermatitis in bilateral neck), rash or wound.   Neurological:      General: No focal deficit present.      Mental Status: He is alert and oriented to person, place, and time.      Gait: Gait is intact.   Psychiatric:         Mood and Affect: Mood and affect normal.       Diagnostic Results   The below labs were reviewed.  Results from last 7 days   Lab Units 09/12/24  0932   WBC AUTO x10*3/uL 3.2*   HEMOGLOBIN g/dL 10.5*   HEMATOCRIT % 31.4*   PLATELETS AUTO x10*3/uL 213   NEUTROS ABS x10*3/uL 2.07   LYMPHS ABS AUTO x10*3/uL 0.39*   MONOS ABS AUTO x10*3/uL 0.65   EOS ABS AUTO x10*3/uL 0.07   NEUTROS PCT AUTO % 64.7   LYMPHS PCT AUTO % 12.2   MONOS PCT AUTO % 20.3   EOS PCT AUTO % 2.2      Results from last 7 days   Lab Units 09/12/24  0932   GLUCOSE mg/dL 92   SODIUM mmol/L 141   POTASSIUM mmol/L 3.8   CHLORIDE mmol/L 102   CO2 mmol/L 30   BUN mg/dL 13   CREATININE mg/dL 1.30   EGFR mL/min/1.73m*2 58*   CALCIUM mg/dL 9.6   MAGNESIUM mg/dL 1.72   ALBUMIN g/dL 4.0   PROTEIN TOTAL g/dL 6.4   BILIRUBIN TOTAL mg/dL 0.3   ALK PHOS U/L 57   ALT U/L 10   AST U/L 12                    Pathology      Imaging  8/19/2024 CT soft tissue neck wo IV contrast  Impression:   No significant interval change in the soft tissue mass with focal calcification within the right level 3 yvette station. No additional cervical lymphadenopathy by size criteria.   Please see CT chest performed the same day for lung findings.         8/27/2024 CT chest wo IV contrast  Impression:   1. Improving bilateral pulmonary metastases, the largest of which is a 14 mm pleural-based left lower lobe nodule.   2. Emphysema.   3. Very extensive vascular calcification includes severe quadruple vessel coronary artery calcification and bilateral renovascular  calcification that is marked on the left with left renal atrophy.   4. Diffusely dense bones as discussed above.      Assessment/Plan    ASSESSMENT  Phong Wong is a 74 y.o. male with recent diagnosis of locally advanced oral cavity cancer. Completed concurrent chemoradiation with 7 weekly Carboplatin (2mg/AUC)/Paclitaxel (45mg/m2) on 10/4/2. Post treatment restaging PET/CT noted new FDG avid LLL pulm nodule, biopsy confirmed SCC. Progressed after 3 cycles of Pembrolizumab, started Q3 week Carbo/Taxol on 6/19/24.    # Locally advanced oral cavity cancer, T3N2M0, now with met to lungs  - 6/30/23: PET/CT showed intense FDG avidity within the soft palate, extending to the base of the tongue and left palatine tonsil. Multiple uptake in left cervical level II nodes, right cervical level  II/III, infiltrating the right SCM muscle and encasing and invading the right jugular vein. FDG avidity was also detected in the region of the left fossa Rosenmuller and left medial pterygoid muscle.  - 7/13/23: pt is doing well, no pain, discussed with patient about carboplatin+ paclitaxel as his chemotherapy along with radiation due to his GFR 30s.  Chemo related side effects were reviewed with patient, consent obtained.   - 8/16 - 10/4/23: Recieved concurrent chemoradiation with 7 weekly Carboplatin (2mg/AUC)/Paclitaxel (45mg/m2)  and 70gy RT in 35fx     - 1/10/24 restaging PET/CT with patient. Scans noted significant interval improvement in prior sites of disease though with residual FDG avidity in soft palate and left palatine tonsil, c/f residual disease vs post treatment changes. Also noted was a new FDG avid LLL pulm nodule with max SUV 5.7, c/f pulmonary metastasis.   - Patient will have repeat CT Chest for FDG avid LLL pulm nodule as prior biopsy attempt was not successful due to small nodule size and difficult location near diaphragm.  -2/9/24 CT chest showed multiple subcentimeter noduesl 2-5mm, dominant lesion is 1.6cm in  LLL.    -3/6/24: Lung biopsy of LLL nodule was consistent with HN origin. CPS 3.  Discussed with patient about phase 2 FLAM study (randomized to pembrolizumab or pembrolizumab+danvatirsen (Stat3 inhibitor). Unfortunately due to his CKD, he's not eligible for FLAM study.  - 4/4/24: Started Q3 week Pembrolizumab  - 5/1/24: C2 Pembro was delayed a week 2/2 patient not feeling well. He had struggled with hemorrhoids for a couple weeks. Otherwise tolerating treatment with no irAEs .  - 5/23/24: Patient doing well, no irAEs. Appetite and energy are good, tolerating solid PO intake. No odynophagia, no neck pain.  - 6/7/24 restaging CT N/C/A/P showed enlargement of subcentimeter lung mets, stable left lung nodule 2cm with new 1cm nodule in RUL. Based on heightened response with carbo+taxol after progression on PD, I recommended 3 cycles of carbo taxol and restaging, if there is response, we can consider afatinib treatment more as long term maintenance therapy.  Consent obtained for chemo and research blood.  - 6/19/24: Patient feeling well with no new complaints. Ok for C1 Q3 week Carbo/Taxol.  - 6/28/24: Patient feeling well and tolerating chemo. Did have watery diarrhea 1-2x per day for the first week after chemo but resolved on its on. No other new symptoms. Does still have tenderness in the right neck in area of prior RT. He did have a 1.5cm Level 2 LN in right neck on 6/7/24 CT Neck.   - 7/11/24: Patient doing well, no new symptoms, no recurrence of diarrhea. Ok for C2 carbo/taxol. Slightly neutropenic today, ANC 1320. Neutropenic precaution given to patient and his SO.  - 7/18/24: ANC 1400, denies fever, chills, pt doing well, had few days of soreness in fingers and legs which are all resolved.  -8/1/24:  pt is doing well with wt gain. ANC 1500, ok to proceed with chemo,  will monitor ANC closely next week.  - 8/19/24: restaging scan improving b/l pulmonary mets, largest pleural based LLL nodule is now 14mm.   Pt is  aware of that Dr. Burkitt is leaving  at the end of Oct, planning to get to another restaging and change treatment to afatinib if he continues to have response.  Once treatment is changed, will transition his care to Dr. Parks.  - 8/29/24: Patient had diarrhea following C4 Carbo/Taxol. Will prescribe Imodium. Arthralgia in b/l hands are grade 1 and resolves w/o intervention. Had grade 1 fatigue.   -9/12/24: pt has increasing fatigue, will pursue C5 today, which will be last cycle, will have restaging on 9/23, then likely transition to afatinib.    # Thrombocytopenia  - 6/28: Plt 85. No s/s of mucosal bleeds. Will continue to monitor  - 7/18: Plt 165  - 8/22/24: Plt 154    # Anemia: improving  - Hgb has been dropping since starting chemo, however today his hgb is <7 and will require a blood transfusion. This is likely due to chemotherapy as there are no s/s of bleeds.   - Received 2U PRBC during 10/18 - 11/2 admission. Anemia likely due to chemotherapy  - 11/29/23: Hgb 8.8. Improved from 3 weeks ago. Expect Hbg will continue to trend up as patient recovers. Can consider iron studies.   - Hgb trending up since 02/2024    # CKD  - 2/9/24 CT chest showed Bulky atherosclerotic calcification at the origin of the left renal artery likely contributing to atrophy of the left kidney as compared to the right. Kidney atrophy was not mentioned in previous scans.   - 5/1: Creat 1.46, GFR 51. Encouraged patient to keep up with non-caffeinated PO hydration. 1L NS given today with Pembro. Patient to follow up with PCP regarding atherosclerosis.   - 6/12: Creat 1.54, GFR 48.   - 6/20: Creat 1.6. GFR 45. Additional 1L NS giving w/ chemo today  - 6/28: Creat 1.2. GFR 64. Pt reports he has increased PO hydration. Kidney function improved.   - 7/11 Creat 1.38, GFR 54. Pt reports decreased PO intake lately. Will give 1L NS for hydration  - 8/1/24: Creat 1.34, GFR 56  - 8/21/24: Creat 1.23, GFR 62.   - 8/30/24: Creat 1.14, GFR 68.     #  Submental/Neck Lymphedema  - Patient with moderate swelling in the submental and neck region. Likely 2ry to prior radiation. Established w/ Dr Guerrero.   - 6/19: Right neck with more swelling and tender to touch, neck is tight. Still itchy to touch.   - 6/28: Right neck swelling persists and is tender to palpation. Will meet with Dr. Guerrero's team for massage on 7/10  - 7/11: Right neck swelling decreased after lymphedema massage  - 8/8: right neck sensitivity/tenderness also improving after lymphedema massage.     # Poor Venous Access  - Informed by infusion RN 5 attempts were made before patient had IV access, 2 were with IV US. Discussed mediport with patient but he was hesitant. Given patient likely will not have more than 4 more cycles of Carbo/Taxol, we will hold off on mediport placement and ask that the IV team places his IV with his infusions.    PLAN  -- ok to proceed C5 carbo taxol and will give 1L NS today  -- RTC in 1 week for tox check  -- restaging PET/CT on 9/23, RTC on 9/25  -- afatinib (40mg daily) script sent to  specialty pharmacy, this will be next line of therapy after C5 carbo taxol  -- Continue ample PO hydration.    -- Patient fo follow up with Dr. Weiss on atherosclerosis of left renal artery and coroanary calcifications  -- Stool softeners as needed, monitor further symptoms of hemorrhoids .

## 2024-09-12 NOTE — PROGRESS NOTES
[facility] Infusion Nursing Note  09/12/24    Phong Wong is a 74 y.o. year old male patient presenting to outpatient infusion for cycle 5 day 1 of the following regimen:    Treatment Plans       Name Type Plan Dates Plan Provider         Active    PACLitaxel / CARBOplatin, 21 Day Cycles - Head and Neck Oncology Treatment  6/13/2024 - Present Kyunghee Burkitt, DO                  Since the last visit, he reports doing well. Overall, he states that energy level is energy level is good. Appetite has been unchanged and good. he reports diarrhea for 1 day earlier this week, took imodium and resolved.     Per Dr. Burkitt, 1 L NS total today.     Line type: PIV- IV TEAM PLACEMENT  Line removed/maintained prior to discharge: removed    Administrations This Visit       dexAMETHasone (Decadron) in dextrose 5 % 50 mL IV 20 mg       Admin Date  09/12/2024 Action  New Bag Dose  20 mg Rate  200 mL/hr Route  intravenous Documented By  Saba Ibarra RN              diphenhydrAMINE (BENADryl) capsule 50 mg       Admin Date  09/12/2024 Action  Given Dose  50 mg Route  oral Documented By  Saba Ibarra RN              famotidine PF (Pepcid) injection 20 mg       Admin Date  09/12/2024 Action  Given Dose  20 mg Route  intravenous Documented By  Saba Ibarra RN              fosaprepitant (Emend) 150 mg in sodium chloride 0.9% 250 mL IV       Admin Date  09/12/2024 Action  New Bag Dose  150 mg Rate  500 mL/hr Route  intravenous Documented By  Saba Ibarra RN              palonosetron (Aloxi) injection 250 mcg       Admin Date  09/12/2024 Action  Given Dose  250 mcg Route  intravenous Documented By  Saba Ibarra RN              sodium chloride 0.9 % bolus 1,000 mL       Admin Date  09/12/2024 Action  New Bag Dose  1,000 mL Rate  999 mL/hr Route  intravenous Documented By  Saba Ibarra RN                  Hypersensitivity reaction noted: No  Patient tolerated treatment well. Discharged home in stable  condition.    Follow-up Plan: 10/3    Saba Ibarra RN

## 2024-09-13 ENCOUNTER — SPECIALTY PHARMACY (OUTPATIENT)
Dept: PHARMACY | Facility: CLINIC | Age: 74
End: 2024-09-13

## 2024-09-13 ASSESSMENT — ENCOUNTER SYMPTOMS
APPETITE CHANGE: 1
WOUND: 0
FREQUENCY: 0
ABDOMINAL PAIN: 0
NUMBNESS: 0
TROUBLE SWALLOWING: 0
NECK PAIN: 0
FEVER: 0
SORE THROAT: 0
DYSURIA: 0
FATIGUE: 1
DIARRHEA: 1
DIFFICULTY URINATING: 0
LIGHT-HEADEDNESS: 0
SHORTNESS OF BREATH: 0
HEADACHES: 0
VOMITING: 0
CHILLS: 0
COUGH: 0
DIZZINESS: 0
LEG SWELLING: 0
NAUSEA: 0

## 2024-09-13 NOTE — PROGRESS NOTES
Patient ID: Phong Wong is a 74 y.o. male.  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 Juan Jose Fletcher   MedOnc: Dr. Kyunghee Burkitt, X. Katherine Feng, PA-C   RadOnc: Dr. Vianca Steen     Current Therapy  4/4/24: Started Q3 week Pembrolizumab    Prior Therapy:   8/16 - 10/4/23: Recieved concurrent chemoradiation with 7 weekly Carboplatin (2mg/AUC)/Paclitaxel (45mg/m2)  and 70gy RT in 35fx     Sites of Disease:  Soft palate, BOT, Left palatine tonsil  B/L Cervical LN  Lung     Oncologic Issues:   Odynophagia  Fatigue     ONCOLOGIC HISTORY  - Pt had 2 months hx of throat discomfort with lump in right neck  - 6/13/23: CT neck showed a mass 2.8 x 2.5 x 2.9 cm in the right lower cervical neck soft tissues. Two suspicious nodes were observed, one at level 3 on the right and another at level 2 on the left.  - 6/28/23: seen by Dr. Fletcher. 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 Based on the findings, Dr. Fletcher did not recommend surgery due to the location  of the primary tumor and the presence of yvette disease  - 6/30/23: PET/CT showed intense FDG avidity within the soft palate, extending to the base of the tongue and left palatine tonsil. Multiple FDG avid left cervical level II nodes were observed, along with an FDG avid mass in the region of the right cervical  level II/III, infiltrating the right SCM muscle and encasing and invading the right jugular vein. FDG avidity was also detected in the region of the left fossa Rosenmuller and left medial pterygoid muscle.  - 8/16 - 10/4/23: Recieved concurrent chemoradiation with 7 weekly Carboplatin (2mg/AUC)/Paclitaxel (45mg/m2)  and 70gy RT in 35fx    Admissions:  10/5 - 10/10/23: Admitted for extreme weakness, HECTOR, pancytopenia including anemia requiring blood  transfusion. D/C'ed to acute rehab        Past Medical History:   Past Medical History:  2017: Personal history of diseases of the blood and blood-  forming organs and certain disorders involving the immune mechanism      Comment:  History of thrombocytosis   HTN, HLD, COPD, prostate cancer in 2017 (s/p radiation)  Surgical History:    Past Surgical History:   Procedure Laterality Date    CT ABDOMEN PELVIS ANGIOGRAM W AND/OR WO IV CONTRAST  9/3/2014    CT ABDOMEN PELVIS ANGIOGRAM W AND/OR WO IV CONTRAST 9/3/2014 [facility]    IR ANGIOGRAM AORTA ABDOMEN  2014    IR ANGIOGRAM AORTA ABDOMEN 2014 [facility]   Aortoiliofemoral vascular bypass in   Social History:    Social History     Socioeconomic History    Marital status:     Number of children: 1   Tobacco Use    Smoking status: Former     Current packs/day: 0.00     Average packs/day: 1 pack/day for 40.0 years (40.0 ttl pk-yrs)     Types: Cigarettes     Start date:      Quit date:      Years since quittin.7     Passive exposure: Past    Smokeless tobacco: Never   Substance and Sexual Activity    Alcohol use: Yes     Alcohol/week: 12.0 standard drinks of alcohol     Types: 12 Cans of beer per week    Drug use: Never     Social Determinants of Health     Financial Resource Strain: Low Risk  (10/20/2023)    Overall Financial Resource Strain (CARDIA)     Difficulty of Paying Living Expenses: Not very hard   Food Insecurity: No Food Insecurity (10/5/2023)    Hunger Vital Sign     Worried About Running Out of Food in the Last Year: Never true     Ran Out of Food in the Last Year: Never true   Transportation Needs: No Transportation Needs (10/20/2023)    PRAPARE - Transportation     Lack of Transportation (Medical): No     Lack of Transportation (Non-Medical): No   Physical Activity: Inactive (10/5/2023)    Exercise Vital Sign     Days of Exercise per Week: 0 days     Minutes of Exercise per Session: 0 min   Stress:  Stress Concern Present (10/5/2023)    [employer] [place] [employer] - Occupational Stress Questionnaire     Feeling of Stress : To some extent   Social Connections: Moderately Integrated (10/5/2023)    Social Connection and Isolation Panel [NHANES]     Frequency of Communication with Friends and Family: More than three times a week     Frequency of Social Gatherings with Friends and Family: More than three times a week     Attends Oriental orthodox Services: Never     Active Member of Clubs or Organizations: Yes     Attends Club or Organization Meetings: Never     Marital Status:    Intimate Partner Violence: Not At Risk (10/5/2023)    Humiliation, Afraid, Rape, and Kick questionnaire     Fear of Current or Ex-Partner: No     Emotionally Abused: No     Physically Abused: No     Sexually Abused: No   Housing Stability: Low Risk  (10/20/2023)    Housing Stability Vital Sign     Unable to Pay for Housing in the Last Year: No     Number of Places Lived in the Last Year: 1     Unstable Housing in the Last Year: No      Family History:    Family History   Problem Relation Name Age of Onset    Aortic aneurysm Father          abdominal     Family Oncology History:    Cancer-related family history is not on file.      Subjective   Chief Complaint: Squamous Cell Carcinoma of oropharynx    HPI  Phong Wong is a 74 y.o. male with h/o locally advanced oral cavity cancer, completed concurrent chemoradiation with 7 weekly Carboplatin+Paclitaxel on 10/4/23. Unfortunately found with mets to lungs on 3 months restaging scan. CPS 3. Started Q3 week Pembrolizumab on 4/4/24. Received 3 cycles of Pembro , unfortunately lung nodules progressed. Started Q3 week carbo/Taxol on 6/19/24    Interval History  Patient presents today for tox check following C5 Carbo/Taxol.    Patient again have joint aches in the hands and knees after C5, however this is not hindering his ADLs. He took Advil which helped.   Denies peripheral neuropathy.    He reports fatigue, diarrhea 2-3x per day flucuating w/ constipation since treatment last week.   Weight is stable from last week.   Still eating well and no change with swallowing. Denies dry mouth, has saliva production, taste is normal.   Neck and submental Lymphedema are improving with massage. Right neck is much less tender.     ROS  Review of Systems   Constitutional:  Positive for appetite change and fatigue. Negative for chills and fever.   HENT:   Negative for lump/mass, mouth sores, nosebleeds, sore throat, tinnitus and trouble swallowing.    Respiratory:  Negative for cough and shortness of breath.    Cardiovascular:  Negative for chest pain and leg swelling.   Gastrointestinal:  Positive for constipation and diarrhea. Negative for abdominal pain, nausea and vomiting.   Genitourinary:  Negative for difficulty urinating, dysuria and frequency.    Musculoskeletal:  Positive for arthralgias. Negative for neck pain.   Skin:  Negative for rash and wound.   Neurological:  Negative for dizziness, headaches, light-headedness and numbness.       Allergies  Allergies   Allergen Reactions    Codeine Nausea Only        Medications  Current Outpatient Medications   Medication Instructions    amLODIPine-benazepriL (Lotrel) 5-20 mg capsule [id number] Medication amlodipine 5 mg-benazepril 20 mg capsule amlodipine 5 mg-benazepril 20 mg capsule 5-20 mg 10/2/2020 Active (Outside)    aspirin 81 mg EC tablet 1 tablet, oral, Daily    doxazosin (CARDURA) 4 mg, oral, Daily, Take 1 tablet by mouth daily in the evening    fluticasone-umeclidin-vilanter (Trelegy Ellipta) 100-62.5-25 mcg blister with device INHALE 1 PUFF EVERY DAY    Gilotrif 40 mg, oral, Daily, Take at least 1 hour before a meal or 2 hours after a meal.    ipratropium-albuteroL (Duo-Neb) 0.5-2.5 mg/3 mL nebulizer solution Take 3 mL by nebulization 3 times a day as needed for wheezing or shortness of breath.    loperamide (IMODIUM) 2 mg, oral, 4 times daily PRN,  Initial: 4 mg, followed by 2 mg every 2 to 4 hours or after each loose stoo    losartan (COZAAR) 50 mg, oral, Daily    magnesium oxide (MAG-OX) 400 mg, oral, Daily    ondansetron (ZOFRAN) 8 mg, oral, Every 8 hours PRN    prochlorperazine (COMPAZINE) 10 mg, oral, Every 6 hours PRN        Objective   VS:  /54   Pulse 86   Temp 36.5 °C (97.7 °F) (Core)   Resp 18   Wt 58.8 kg (129 lb 10.1 oz)   SpO2 99%   BMI 20.92 kg/m²   Weight   Wt Readings from Last 7 Encounters:   09/19/24 58.8 kg (129 lb 10.1 oz)   09/12/24 58.7 kg (129 lb 6.6 oz)   08/29/24 59.6 kg (131 lb 6.3 oz)   08/22/24 60.6 kg (133 lb 9.6 oz)   08/13/24 62.5 kg (137 lb 12.6 oz)   08/08/24 61.8 kg (136 lb 3.9 oz)   08/01/24 60.5 kg (133 lb 6.1 oz)         Physical Exam  Vitals reviewed.   Constitutional:       Appearance: Normal appearance. He is underweight.   HENT:      Head: Normocephalic and atraumatic.      Right Ear: External ear normal. No tenderness.      Left Ear: External ear normal. No tenderness.      Nose: Nose normal.      Mouth/Throat:      Mouth: No injury or oral lesions.      Tongue: No lesions.      Pharynx: Oropharynx is clear. No posterior oropharyngeal erythema.      Comments: Edentulous  Eyes:      Extraocular Movements: Extraocular movements intact.      Conjunctiva/sclera: Conjunctivae normal.      Pupils: Pupils are equal, round, and reactive to light.   Neck:      Thyroid: No thyroid mass.      Comments: Mild swelling in submental and neck region.   Right neck is less sensitive/tender to palpation  Cardiovascular:      Rate and Rhythm: Normal rate and regular rhythm.   Pulmonary:      Effort: Pulmonary effort is normal. No respiratory distress.      Breath sounds: Normal breath sounds.   Abdominal:      General: Bowel sounds are normal. There is no distension or abdominal bruit.      Palpations: Abdomen is soft. There is no mass.      Tenderness: There is no abdominal tenderness.   Musculoskeletal:         General:  Normal range of motion.      Cervical back: Normal range of motion and neck supple.      Right lower leg: No edema.      Left lower leg: No edema.   Lymphadenopathy:      Cervical: No cervical adenopathy.      Upper Body:      Right upper body: No axillary adenopathy.      Left upper body: No axillary adenopathy.   Skin:     General: Skin is warm and dry.      Findings: No lesion (radiation dermatitis in bilateral neck), rash or wound.   Neurological:      General: No focal deficit present.      Mental Status: He is alert and oriented to person, place, and time.      Gait: Gait is intact.   Psychiatric:         Mood and Affect: Mood and affect normal.         Diagnostic Results   The below labs were reviewed.  Results from last 7 days   Lab Units 09/19/24  1252   WBC AUTO x10*3/uL 1.9*   HEMOGLOBIN g/dL 8.5*   HEMATOCRIT % 25.0*   PLATELETS AUTO x10*3/uL 143*   EOS ABS MAN x10*3/uL 0.00   BANDS ABS MAN x10*3/uL 0.08   LYMPHO ABS MAN x10*3/uL 0.29*   MONO ABS MAN x10*3/uL 0.04*   LYMPHO PCT MAN % 15.0   MONO PCT MAN % 2.0   EOSINO PCT MAN % 0.0      Results from last 7 days   Lab Units 09/19/24  1252   GLUCOSE mg/dL 104*   SODIUM mmol/L 138   POTASSIUM mmol/L 3.8   CHLORIDE mmol/L 99   CO2 mmol/L 31   BUN mg/dL 21   CREATININE mg/dL 1.05   EGFR mL/min/1.73m*2 75   CALCIUM mg/dL 9.1   MAGNESIUM mg/dL 1.85   ALBUMIN g/dL 3.7   PROTEIN TOTAL g/dL 5.9*   BILIRUBIN TOTAL mg/dL 0.4   ALK PHOS U/L 47   ALT U/L 12   AST U/L 11                  Pathology      Imaging  8/19/2024 CT soft tissue neck wo IV contrast  Impression:   No significant interval change in the soft tissue mass with focal calcification within the right level 3 yvette station. No additional cervical lymphadenopathy by size criteria.   Please see CT chest performed the same day for lung findings.         8/27/2024 CT chest wo IV contrast  Impression:   1. Improving bilateral pulmonary metastases, the largest of which is a 14 mm pleural-based left lower lobe  nodule.   2. Emphysema.   3. Very extensive vascular calcification includes severe quadruple vessel coronary artery calcification and bilateral renovascular calcification that is marked on the left with left renal atrophy.   4. Diffusely dense bones as discussed above.      Assessment/Plan    ASSESSMENT  Phong Wong is a 74 y.o. male with recent diagnosis of locally advanced oral cavity cancer. Completed concurrent chemoradiation with 7 weekly Carboplatin (2mg/AUC)/Paclitaxel (45mg/m2) on 10/4/2. Post treatment restaging PET/CT noted new FDG avid LLL pulm nodule, biopsy confirmed SCC. Progressed after 3 cycles of Pembrolizumab, started Q3 week Carbo/Taxol on 6/19/24.    # Locally advanced oral cavity cancer, T3N2M0, now with met to lungs  - 6/30/23: PET/CT showed intense FDG avidity within the soft palate, extending to the base of the tongue and left palatine tonsil. Multiple uptake in left cervical level II nodes, right cervical level  II/III, infiltrating the right SCM muscle and encasing and invading the right jugular vein. FDG avidity was also detected in the region of the left fossa Rosenmuller and left medial pterygoid muscle.  - 7/13/23: pt is doing well, no pain, discussed with patient about carboplatin+ paclitaxel as his chemotherapy along with radiation due to his GFR 30s.  Chemo related side effects were reviewed with patient, consent obtained.   - 8/16 - 10/4/23: Recieved concurrent chemoradiation with 7 weekly Carboplatin (2mg/AUC)/Paclitaxel (45mg/m2)  and 70gy RT in 35fx     - 1/10/24 restaging PET/CT with patient. Scans noted significant interval improvement in prior sites of disease though with residual FDG avidity in soft palate and left palatine tonsil, c/f residual disease vs post treatment changes. Also noted was a new FDG avid LLL pulm nodule with max SUV 5.7, c/f pulmonary metastasis.   - Patient will have repeat CT Chest for FDG avid LLL pulm nodule as prior biopsy attempt was not successful  due to small nodule size and difficult location near diaphragm.  -2/9/24 CT chest showed multiple subcentimeter noduesl 2-5mm, dominant lesion is 1.6cm in LLL.    -3/6/24: Lung biopsy of LLL nodule was consistent with HN origin. CPS 3.  Discussed with patient about phase 2 FLAM study (randomized to pembrolizumab or pembrolizumab+danvatirsen (Stat3 inhibitor). Unfortunately due to his CKD, he's not eligible for FLAM study.  - 4/4/24: Started Q3 week Pembrolizumab  - 5/1/24: C2 Pembro was delayed a week 2/2 patient not feeling well. He had struggled with hemorrhoids for a couple weeks. Otherwise tolerating treatment with no irAEs .  - 5/23/24: Patient doing well, no irAEs. Appetite and energy are good, tolerating solid PO intake. No odynophagia, no neck pain.  - 6/7/24 restaging CT N/C/A/P showed enlargement of subcentimeter lung mets, stable left lung nodule 2cm with new 1cm nodule in RUL. Based on heightened response with carbo+taxol after progression on PD, I recommended 3 cycles of carbo taxol and restaging, if there is response, we can consider afatinib treatment more as long term maintenance therapy.  Consent obtained for chemo and research blood.  - 6/19/24: Patient feeling well with no new complaints. Ok for C1 Q3 week Carbo/Taxol.  - 6/28/24: Patient feeling well and tolerating chemo. Did have watery diarrhea 1-2x per day for the first week after chemo but resolved on its on. No other new symptoms. Does still have tenderness in the right neck in area of prior RT. He did have a 1.5cm Level 2 LN in right neck on 6/7/24 CT Neck.   - 7/11/24: Patient doing well, no new symptoms, no recurrence of diarrhea. Ok for C2 carbo/taxol. Slightly neutropenic today, ANC 1320. Neutropenic precaution given to patient and his SO.  - 7/18/24: ANC 1400, denies fever, chills, pt doing well, had few days of soreness in fingers and legs which are all resolved.  -8/1/24:  pt is doing well with wt gain. ANC 1500, ok to proceed with  chemo,  will monitor ANC closely next week.  - 8/19/24: restaging scan improving b/l pulmonary mets, largest pleural based LLL nodule is now 14mm.   Pt is aware of that Dr. Burkitt is leaving  at the end of Oct, planning to get to another restaging and change treatment to afatinib if he continues to have response.  Once treatment is changed, will transition his care to Dr. Parks.  - 8/29/24: Patient had diarrhea following C4 Carbo/Taxol. Will prescribe Imodium. Arthralgia in b/l hands are grade 1 and resolves w/o intervention. Had grade 1 fatigue.   - 9/19/24: Patient tolerated C5 with some diarrhea, fatigue and myalgia, none of which are hindering his ADLs. Pt will have restaging PET next week and f/u Dr. Burkitt and decide whether he will have 1 more cycle of Carbo/Taxol before starting of Afatinib. Afatinib will be delivered tomorrow.     # Pancytopenia  - 9/19: pancytopenic due to chemo, ANC 1500. . Hgb 8.5.   Note patient does have non-painful bleeding hemorrhoids .   - Will follow up on labs next week.    # CKD  - 2/9/24 CT chest showed Bulky atherosclerotic calcification at the origin of the left renal artery likely contributing to atrophy of the left kidney as compared to the right. Kidney atrophy was not mentioned in previous scans.   - 5/1: Creat 1.46, GFR 51. Encouraged patient to keep up with non-caffeinated PO hydration. 1L NS given today with Pembro. Patient to follow up with PCP regarding atherosclerosis.   - 6/12: Creat 1.54, GFR 48.   - 6/20: Creat 1.6. GFR 45. Additional 1L NS giving w/ chemo today  - 6/28: Creat 1.2. GFR 64. Pt reports he has increased PO hydration. Kidney function improved.   - 7/11 Creat 1.38, GFR 54. Pt reports decreased PO intake lately. Will give 1L NS for hydration  - 8/30/24: Creat 1.14, GFR 68.   - 9/19/24: Creat 1.05, GFR 75. Patient reports he cut out coffee last week and is drinking only water     # Submental/Neck Lymphedema  - Patient with moderate swelling  in the submental and neck region. Likely 2ry to prior radiation. Established w/ Dr Guerrero.   - 6/19: Right neck with more swelling and tender to touch, neck is tight. Still itchy to touch.   - 6/28: Right neck swelling persists and is tender to palpation. Will meet with Dr. Guerrero's team for massage on 7/10  - 7/11: Right neck swelling decreased after lymphedema massage  - 9/19: right neck sensitivity/tenderness improving after lymphedema massage.     PLAN  -- 9/24 PET/CT   -- 9/25 FUV w/ Dr. Burkitt for scan review  -- Continue ample PO hydration.    -- Patient fo follow up with Dr. Weiss on atherosclerosis of left renal artery and coroanary calcifications  -- Imodium 2mg after each loose stool PRN for chemo induced diarrehea   -- Stool softeners as needed, monitor further symptoms of hemorrhoids

## 2024-09-17 ENCOUNTER — SPECIALTY PHARMACY (OUTPATIENT)
Dept: PHARMACY | Facility: CLINIC | Age: 74
End: 2024-09-17

## 2024-09-17 PROCEDURE — RXMED WILLOW AMBULATORY MEDICATION CHARGE

## 2024-09-19 ENCOUNTER — PHARMACY VISIT (OUTPATIENT)
Dept: PHARMACY | Facility: CLINIC | Age: 74
End: 2024-09-19
Payer: COMMERCIAL

## 2024-09-19 ENCOUNTER — OFFICE VISIT (OUTPATIENT)
Dept: HEMATOLOGY/ONCOLOGY | Facility: HOSPITAL | Age: 74
End: 2024-09-19
Payer: MEDICARE

## 2024-09-19 ENCOUNTER — LAB (OUTPATIENT)
Dept: LAB | Facility: HOSPITAL | Age: 74
End: 2024-09-19
Payer: MEDICARE

## 2024-09-19 VITALS
TEMPERATURE: 97.7 F | BODY MASS INDEX: 20.92 KG/M2 | RESPIRATION RATE: 18 BRPM | WEIGHT: 129.63 LBS | OXYGEN SATURATION: 99 % | HEART RATE: 86 BPM | SYSTOLIC BLOOD PRESSURE: 141 MMHG | DIASTOLIC BLOOD PRESSURE: 54 MMHG

## 2024-09-19 DIAGNOSIS — C10.9 OROPHARYNGEAL CANCER (MULTI): ICD-10-CM

## 2024-09-19 DIAGNOSIS — D70.1 CHEMOTHERAPY-INDUCED NEUTROPENIA (CMS-HCC): ICD-10-CM

## 2024-09-19 DIAGNOSIS — M25.50 JOINT PAIN FOLLOWING CHEMOTHERAPY: ICD-10-CM

## 2024-09-19 DIAGNOSIS — C10.9 OROPHARYNGEAL CANCER (MULTI): Primary | ICD-10-CM

## 2024-09-19 DIAGNOSIS — N18.31 STAGE 3A CHRONIC KIDNEY DISEASE (MULTI): ICD-10-CM

## 2024-09-19 DIAGNOSIS — G89.18 JOINT PAIN FOLLOWING CHEMOTHERAPY: ICD-10-CM

## 2024-09-19 DIAGNOSIS — C78.01 SQUAMOUS CELL CARCINOMA METASTATIC TO BOTH LUNGS: ICD-10-CM

## 2024-09-19 DIAGNOSIS — K52.1 CHEMOTHERAPY INDUCED DIARRHEA: ICD-10-CM

## 2024-09-19 DIAGNOSIS — M25.542 ARTHRALGIA OF BOTH HANDS: ICD-10-CM

## 2024-09-19 DIAGNOSIS — D69.6 THROMBOCYTOPENIA (CMS-HCC): ICD-10-CM

## 2024-09-19 DIAGNOSIS — I89.0 LYMPHEDEMA DUE TO RADIATION: ICD-10-CM

## 2024-09-19 DIAGNOSIS — T45.1X5A CHEMOTHERAPY-INDUCED NEUTROPENIA (CMS-HCC): ICD-10-CM

## 2024-09-19 DIAGNOSIS — C78.02 SQUAMOUS CELL CARCINOMA METASTATIC TO BOTH LUNGS: ICD-10-CM

## 2024-09-19 DIAGNOSIS — R53.83 CHEMOTHERAPY-INDUCED FATIGUE: ICD-10-CM

## 2024-09-19 DIAGNOSIS — M25.541 ARTHRALGIA OF BOTH HANDS: ICD-10-CM

## 2024-09-19 DIAGNOSIS — T45.1X5A CHEMOTHERAPY INDUCED DIARRHEA: ICD-10-CM

## 2024-09-19 DIAGNOSIS — Z51.11 ENCOUNTER FOR ANTINEOPLASTIC CHEMOTHERAPY: ICD-10-CM

## 2024-09-19 DIAGNOSIS — T45.1X5A CHEMOTHERAPY-INDUCED FATIGUE: ICD-10-CM

## 2024-09-19 DIAGNOSIS — I89.0 LYMPHEDEMA DUE TO AND NOT CONCURRENT WITH IONIZING RADIOTHERAPY: ICD-10-CM
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
## 2024-09-19 LAB
ALBUMIN SERPL BCP-MCNC: 3.7 G/DL (ref 3.4–5)
ALP SERPL-CCNC: 47 U/L (ref 33–136)
ALT SERPL W P-5'-P-CCNC: 12 U/L (ref 10–52)
ANION GAP SERPL CALC-SCNC: 12 MMOL/L (ref 10–20)
AST SERPL W P-5'-P-CCNC: 11 U/L (ref 9–39)
BASOPHILS # BLD MANUAL: 0 X10*3/UL (ref 0–0.1)
BASOPHILS NFR BLD MANUAL: 0 %
BILIRUB SERPL-MCNC: 0.4 MG/DL (ref 0–1.2)
BUN SERPL-MCNC: 21 MG/DL (ref 6–23)
CALCIUM SERPL-MCNC: 9.1 MG/DL (ref 8.6–10.3)
CHLORIDE SERPL-SCNC: 99 MMOL/L (ref 98–107)
CO2 SERPL-SCNC: 31 MMOL/L (ref 21–32)
CREAT SERPL-MCNC: 1.05 MG/DL (ref 0.5–1.3)
EGFRCR SERPLBLD CKD-EPI 2021: 75 ML/MIN/1.73M*2
EOSINOPHIL # BLD MANUAL: 0 X10*3/UL (ref 0–0.4)
EOSINOPHIL NFR BLD MANUAL: 0 %
ERYTHROCYTE [DISTWIDTH] IN BLOOD BY AUTOMATED COUNT: 15.9 % (ref 11.5–14.5)
GLUCOSE SERPL-MCNC: 104 MG/DL (ref 74–99)
HCT VFR BLD AUTO: 25 % (ref 41–52)
HGB BLD-MCNC: 8.5 G/DL (ref 13.5–17.5)
HYPOCHROMIA BLD QL SMEAR: ABNORMAL
IMM GRANULOCYTES # BLD AUTO: 0.02 X10*3/UL (ref 0–0.5)
IMM GRANULOCYTES NFR BLD AUTO: 1.1 % (ref 0–0.9)
LYMPHOCYTES # BLD MANUAL: 0.29 X10*3/UL (ref 0.8–3)
LYMPHOCYTES NFR BLD MANUAL: 15 %
MAGNESIUM SERPL-MCNC: 1.85 MG/DL (ref 1.6–2.4)
MCH RBC QN AUTO: 29.7 PG (ref 26–34)
MCHC RBC AUTO-ENTMCNC: 34 G/DL (ref 32–36)
MCV RBC AUTO: 87 FL (ref 80–100)
MONOCYTES # BLD MANUAL: 0.04 X10*3/UL (ref 0.05–0.8)
MONOCYTES NFR BLD MANUAL: 2 %
NEUTROPHILS # BLD MANUAL: 1.58 X10*3/UL (ref 1.6–5.5)
NEUTS BAND # BLD MANUAL: 0.08 X10*3/UL (ref 0–0.5)
NEUTS BAND NFR BLD MANUAL: 4 %
NEUTS SEG # BLD MANUAL: 1.5 X10*3/UL (ref 1.6–5)
NEUTS SEG NFR BLD MANUAL: 79 %
NRBC BLD-RTO: 0 /100 WBCS (ref 0–0)
OVALOCYTES BLD QL SMEAR: ABNORMAL
PLATELET # BLD AUTO: 143 X10*3/UL (ref 150–450)
POTASSIUM SERPL-SCNC: 3.8 MMOL/L (ref 3.5–5.3)
PROT SERPL-MCNC: 5.9 G/DL (ref 6.4–8.2)
RBC # BLD AUTO: 2.86 X10*6/UL (ref 4.5–5.9)
RBC MORPH BLD: ABNORMAL
SODIUM SERPL-SCNC: 138 MMOL/L (ref 136–145)
TOTAL CELLS COUNTED BLD: 100
WBC # BLD AUTO: 1.9 X10*3/UL (ref 4.4–11.3)

## 2024-09-19 PROCEDURE — 85027 COMPLETE CBC AUTOMATED: CPT

## 2024-09-19 PROCEDURE — 99215 OFFICE O/P EST HI 40 MIN: CPT | Performed by: STUDENT IN AN ORGANIZED HEALTH CARE EDUCATION/TRAINING PROGRAM

## 2024-09-19 PROCEDURE — 83735 ASSAY OF MAGNESIUM: CPT

## 2024-09-19 PROCEDURE — 1126F AMNT PAIN NOTED NONE PRSNT: CPT | Performed by: STUDENT IN AN ORGANIZED HEALTH CARE EDUCATION/TRAINING PROGRAM

## 2024-09-19 PROCEDURE — 3078F DIAST BP <80 MM HG: CPT | Performed by: STUDENT IN AN ORGANIZED HEALTH CARE EDUCATION/TRAINING PROGRAM

## 2024-09-19 PROCEDURE — 1159F MED LIST DOCD IN RCRD: CPT | Performed by: STUDENT IN AN ORGANIZED HEALTH CARE EDUCATION/TRAINING PROGRAM

## 2024-09-19 PROCEDURE — 80053 COMPREHEN METABOLIC PANEL: CPT

## 2024-09-19 PROCEDURE — 3077F SYST BP >= 140 MM HG: CPT | Performed by: STUDENT IN AN ORGANIZED HEALTH CARE EDUCATION/TRAINING PROGRAM

## 2024-09-19 PROCEDURE — 85007 BL SMEAR W/DIFF WBC COUNT: CPT

## 2024-09-19 PROCEDURE — 1160F RVW MEDS BY RX/DR IN RCRD: CPT | Performed by: STUDENT IN AN ORGANIZED HEALTH CARE EDUCATION/TRAINING PROGRAM

## 2024-09-19 PROCEDURE — 1036F TOBACCO NON-USER: CPT | Performed by: STUDENT IN AN ORGANIZED HEALTH CARE EDUCATION/TRAINING PROGRAM

## 2024-09-19 PROCEDURE — 36415 COLL VENOUS BLD VENIPUNCTURE: CPT

## 2024-09-19 ASSESSMENT — ENCOUNTER SYMPTOMS
ARTHRALGIAS: 1
CONSTIPATION: 1

## 2024-09-19 ASSESSMENT — PAIN SCALES - GENERAL: PAINLEVEL: 0-NO PAIN

## 2024-09-20 ENCOUNTER — SPECIALTY PHARMACY (OUTPATIENT)
Dept: PHARMACY | Facility: CLINIC | Age: 74
End: 2024-09-20

## 2024-09-20 NOTE — PROGRESS NOTES
[facility] Clinical Note    Phong Wong is a 74 y.o. male, who is on the specialty pharmacy service for management of:  Oncology Core.    Phong Wong is taking: Gilotrif.    Medication Receipt Date: 9/20/24  Medication Start Date (planned or actual): 9/20/24    Phong was contacted on 9/20/2024 at 10:38 AM for a virtual pharmacy visit with encounter number [id number] from:   [facility]  [address] [place] [id number]  Dept: [phone]  Dept Fax: [phone]    Phong was offered a Telemedicine Video visit or Telephone visit.  Phong consented to a telephone visit, which was performed.    The most recent encounter visit with the referring prescriber Dr. Kyunghee Burkitt on 9/12/24 was reviewed.  Pharmacy will continue to collaborate in the care of this patient with the referring prescriber Dr. Kyunghee Burkitt.    General Assessment      Impression/Plan  IMPRESSION/PLAN:  Is patient high risk (potential patients:  pregnancy, geriatric, pediatric)? No   Is laboratory follow-up needed? No  Is a clinical intervention needed? No  Next reassessment date? 10/4/24  Additional comments: N/A    Refer to the encounter summary report for documentation details about patient counseling and education.      Medication Adherence    The importance of adherence was discussed with the patient and they were advised to take the medication as prescribed by their provider. Patient was encouraged to call their physician's office if they have a question regarding a missed dose.     QOL/Patient Satisfaction  Rate your quality of life on scale of 1-10: -- (N/A)  Rate your satisfaction with  Specialty Pharmacy on scale of 1-10: 10 - Completely satisfied      Patient was advised to contact the pharmacy if there are any changes to their medication list, including prescriptions, OTC medications, herbal products, or supplements. Patient was advised of [facility]  Specialty Pharmacy's dispensing process, refill timeline, contact information ([phone]), and patient management follow up. Patient confirmed understanding of education conducted during assessment. All patient questions and concerns were addressed to the best of my ability. Patient was encouraged to contact the specialty pharmacy with any questions or concerns.    Confirmed follow-up outreaches are properly scheduled and reviewed goals of therapy with the patient.        SURYA QIU, JemimaD

## 2024-09-23 ENCOUNTER — APPOINTMENT (OUTPATIENT)
Dept: PRIMARY CARE | Facility: CLINIC | Age: 74
End: 2024-09-23
Payer: MEDICARE

## 2024-09-23 VITALS
WEIGHT: 139.5 LBS | HEART RATE: 88 BPM | SYSTOLIC BLOOD PRESSURE: 170 MMHG | DIASTOLIC BLOOD PRESSURE: 76 MMHG | BODY MASS INDEX: 22.52 KG/M2

## 2024-09-23 DIAGNOSIS — I65.23 ASYMPTOMATIC BILATERAL CAROTID ARTERY STENOSIS: ICD-10-CM

## 2024-09-23 DIAGNOSIS — F17.211 CIGARETTE NICOTINE DEPENDENCE IN REMISSION: ICD-10-CM

## 2024-09-23 DIAGNOSIS — E78.5 HYPERLIPIDEMIA, UNSPECIFIED HYPERLIPIDEMIA TYPE: ICD-10-CM

## 2024-09-23 DIAGNOSIS — H35.3231 EXUDATIVE AGE-RELATED MACULAR DEGENERATION, BILATERAL, WITH ACTIVE CHOROIDAL NEOVASCULARIZATION: ICD-10-CM

## 2024-09-23 DIAGNOSIS — C10.9 OROPHARYNGEAL CANCER (MULTI): ICD-10-CM

## 2024-09-23 DIAGNOSIS — T40.2X1A POISONING BY OTHER OPIOIDS, ACCIDENTAL (UNINTENTIONAL), INITIAL ENCOUNTER (MULTI): ICD-10-CM

## 2024-09-23 DIAGNOSIS — D61.1: Primary | ICD-10-CM

## 2024-09-23 DIAGNOSIS — I70.1 RENAL ARTERY ATHEROSCLEROSIS (CMS-HCC): ICD-10-CM

## 2024-09-23 DIAGNOSIS — I10 BENIGN ESSENTIAL HYPERTENSION: ICD-10-CM

## 2024-09-23 DIAGNOSIS — I65.29 ARTERIOSCLEROSIS OF CAROTID ARTERY, UNSPECIFIED LATERALITY: ICD-10-CM

## 2024-09-23 DIAGNOSIS — N18.31 CHRONIC RENAL IMPAIRMENT, STAGE 3A (MULTI): ICD-10-CM

## 2024-09-23 DIAGNOSIS — J44.9 CHRONIC OBSTRUCTIVE PULMONARY DISEASE, UNSPECIFIED COPD TYPE (MULTI): ICD-10-CM

## 2024-09-23 DIAGNOSIS — L89.312 PRESSURE ULCER OF RIGHT BUTTOCK, STAGE 2 (MULTI): ICD-10-CM

## 2024-09-23 DIAGNOSIS — C61 PROSTATE CANCER (MULTI): ICD-10-CM

## 2024-09-23 DIAGNOSIS — E46 PROTEIN-CALORIE MALNUTRITION, UNSPECIFIED SEVERITY (MULTI): ICD-10-CM

## 2024-09-23 DIAGNOSIS — Z12.5 SCREENING PSA (PROSTATE SPECIFIC ANTIGEN): ICD-10-CM

## 2024-09-23 DIAGNOSIS — R97.20 PSA ELEVATION: ICD-10-CM

## 2024-09-23 PROCEDURE — 1160F RVW MEDS BY RX/DR IN RCRD: CPT | Performed by: INTERNAL MEDICINE

## 2024-09-23 PROCEDURE — G0439 PPPS, SUBSEQ VISIT: HCPCS | Performed by: INTERNAL MEDICINE

## 2024-09-23 PROCEDURE — 1036F TOBACCO NON-USER: CPT | Performed by: INTERNAL MEDICINE

## 2024-09-23 PROCEDURE — 99214 OFFICE O/P EST MOD 30 MIN: CPT | Performed by: INTERNAL MEDICINE

## 2024-09-23 PROCEDURE — 1159F MED LIST DOCD IN RCRD: CPT | Performed by: INTERNAL MEDICINE

## 2024-09-23 PROCEDURE — 1170F FXNL STATUS ASSESSED: CPT | Performed by: INTERNAL MEDICINE

## 2024-09-23 PROCEDURE — 3077F SYST BP >= 140 MM HG: CPT | Performed by: INTERNAL MEDICINE

## 2024-09-23 PROCEDURE — 1126F AMNT PAIN NOTED NONE PRSNT: CPT | Performed by: INTERNAL MEDICINE

## 2024-09-23 PROCEDURE — 3078F DIAST BP <80 MM HG: CPT | Performed by: INTERNAL MEDICINE

## 2024-09-23 RX ORDER — LOSARTAN POTASSIUM 100 MG/1
100 TABLET ORAL DAILY
Qty: 90 TABLET | Refills: 3 | Status: SHIPPED | OUTPATIENT
Start: 2024-09-23 | End: 2025-10-28

## 2024-09-23 RX ORDER — ATORVASTATIN CALCIUM 40 MG/1
40 TABLET, FILM COATED ORAL DAILY
Qty: 90 TABLET | Refills: 3 | Status: SHIPPED | OUTPATIENT
Start: 2024-09-23 | End: 2025-09-23

## 2024-09-23 ASSESSMENT — ENCOUNTER SYMPTOMS
NAUSEA: 1
ABDOMINAL PAIN: 0
EYE REDNESS: 0
CHILLS: 0
SINUS PAIN: 0
APPETITE CHANGE: 0
MYALGIAS: 0
TROUBLE SWALLOWING: 0
ADENOPATHY: 0
EYE PAIN: 0
FACIAL ASYMMETRY: 0
EYE ITCHING: 0
POLYPHAGIA: 0
HEADACHES: 0
DYSURIA: 0
STRIDOR: 0
COLOR CHANGE: 0
SINUS PRESSURE: 0
JOINT SWELLING: 0
SPEECH DIFFICULTY: 0
EYE DISCHARGE: 0
WHEEZING: 0
ACTIVITY CHANGE: 0
CHEST TIGHTNESS: 0
FLANK PAIN: 0
BACK PAIN: 0
ABDOMINAL DISTENTION: 0
VOICE CHANGE: 0
PALPITATIONS: 0
DIARRHEA: 0
RECTAL PAIN: 0
DIFFICULTY URINATING: 0
PHOTOPHOBIA: 0
DIZZINESS: 0
COUGH: 0
RHINORRHEA: 0
ARTHRALGIAS: 0
HEMATURIA: 0
CONSTIPATION: 0
SLEEP DISTURBANCE: 0
POLYDIPSIA: 0
NECK PAIN: 0
WOUND: 0
TREMORS: 0
SEIZURES: 0
FACIAL SWELLING: 0
BLOOD IN STOOL: 0
ANAL BLEEDING: 0
VOMITING: 0
NECK STIFFNESS: 0
BRUISES/BLEEDS EASILY: 0
CHOKING: 0
SORE THROAT: 0
LIGHT-HEADEDNESS: 0
NUMBNESS: 0
SHORTNESS OF BREATH: 0
FREQUENCY: 0
DIAPHORESIS: 0
WEAKNESS: 0
FATIGUE: 1

## 2024-09-23 ASSESSMENT — PATIENT HEALTH QUESTIONNAIRE - PHQ9
2. FEELING DOWN, DEPRESSED OR HOPELESS: NOT AT ALL
1. LITTLE INTEREST OR PLEASURE IN DOING THINGS: NOT AT ALL
SUM OF ALL RESPONSES TO PHQ9 QUESTIONS 1 AND 2: 0

## 2024-09-23 ASSESSMENT — PAIN SCALES - GENERAL: PAINLEVEL: 0-NO PAIN

## 2024-09-23 ASSESSMENT — ACTIVITIES OF DAILY LIVING (ADL)
BATHING: INDEPENDENT
DOING_HOUSEWORK: NEEDS ASSISTANCE
GROCERY_SHOPPING: NEEDS ASSISTANCE
TAKING_MEDICATION: INDEPENDENT
DRESSING: INDEPENDENT
MANAGING_FINANCES: INDEPENDENT

## 2024-09-23 NOTE — PATIENT INSTRUCTIONS
Please take medication as prescribed.  Follow-up in 1 month.  Discussed with oncology about restarting atorvastatin.

## 2024-09-23 NOTE — PROGRESS NOTES
Subjective   Patient ID: Phong Wong is a 74 y.o. male who presents for Medicare Annual Wellness Visit Subsequent.    Patient presents for medical wellness and follow-up.  He is tolerating his treatment well.  He reports improvement in his overall wellbeing.  He has more energy and is tolerating p.o. well abdominal pain does complain of occasional nausea and occasional diarrhea he does complain of fatigue.  He denies any headaches, no dizziness, no sinus problems, no chest pain or shortness of breath.  He reports occasional nausea and diarrhea associated with his treatments.  He reports no new musculoskeletal complaints.         Review of Systems   Constitutional:  Positive for fatigue. Negative for activity change, appetite change, chills and diaphoresis.   HENT:  Negative for congestion, dental problem, drooling, ear discharge, ear pain, facial swelling, hearing loss, mouth sores, nosebleeds, postnasal drip, rhinorrhea, sinus pressure, sinus pain, sneezing, sore throat, tinnitus, trouble swallowing and voice change.    Eyes:  Negative for photophobia, pain, discharge, redness, itching and visual disturbance.   Respiratory:  Negative for cough, choking, chest tightness, shortness of breath, wheezing and stridor.    Cardiovascular:  Negative for chest pain, palpitations and leg swelling.   Gastrointestinal:  Positive for nausea. Negative for abdominal distention, abdominal pain, anal bleeding, blood in stool, constipation, diarrhea, rectal pain and vomiting.   Endocrine: Negative for cold intolerance, heat intolerance, polydipsia, polyphagia and polyuria.   Genitourinary:  Negative for decreased urine volume, difficulty urinating, dysuria, enuresis, flank pain, frequency, genital sores, hematuria and urgency.   Musculoskeletal:  Negative for arthralgias, back pain, gait problem, joint swelling, myalgias, neck pain and neck stiffness.   Skin:  Negative for color change, pallor, rash and wound.   Neurological:   Negative for dizziness, tremors, seizures, syncope, facial asymmetry, speech difficulty, weakness, light-headedness, numbness and headaches.   Hematological:  Negative for adenopathy. Does not bruise/bleed easily.   Psychiatric/Behavioral:  Negative for sleep disturbance.        Objective   /76   Pulse 88   Wt 63.3 kg (139 lb 8 oz)   BMI 22.52 kg/m²     Physical Exam  Constitutional:       Appearance: Normal appearance.   Cardiovascular:      Rate and Rhythm: Normal rate and regular rhythm.      Heart sounds: No murmur heard.     No gallop.   Pulmonary:      Effort: No respiratory distress.      Breath sounds: No wheezing or rales.   Abdominal:      General: There is no distension.      Palpations: There is no mass.      Tenderness: There is no abdominal tenderness. There is no guarding.   Musculoskeletal:      Right lower leg: No edema.      Left lower leg: No edema.   Neurological:      Mental Status: He is alert.         Assessment/Plan   Diagnoses and all orders for this visit:  Drug-induced aplastic anemia (Multi)-follow-up with oncology  Pressure ulcer of right buttock, stage 2 (Multi)-he denies any symptoms  Protein-calorie malnutrition, unspecified severity (Multi)-encourage increased p.o. intake  Poisoning by other opioids, accidental (unintentional), initial encounter (Multi)-he denies.  Exudative age-related macular degeneration, bilateral, with active choroidal neovascularization (Multi)-follow-up with ophthalmology  Chronic obstructive pulmonary disease, unspecified COPD type (Multi)-stable symptoms with present management  Hyperlipidemia, unspecified hyperlipidemia type-he will discuss with oncology if it is okay to restart atorvastatin.  -     Lipid Panel; Future  -     atorvastatin (Lipitor) 40 mg tablet; Take 1 tablet (40 mg) by mouth once daily.  Benign essential hypertension-increase losartan to 100 mg daily.  -     losartan (Cozaar) 100 mg tablet; Take 1 tablet (100 mg) by mouth once  daily.  Arteriosclerosis of carotid artery, unspecified laterality-modify risk factors.  Schedule appoint with vascular  Asymptomatic bilateral carotid artery stenosis  Renal artery atherosclerosis (CMS-HCC)-follow-up with vascular.  Chronic renal impairment, stage 3a (Multi)-creatinine has been stable  PSA elevation  Oropharyngeal cancer (Multi)-patient with mets to the lungs.  Repeat imaging tomorrow.  Prostate cancer (Multi)-recheck PSA  Screening PSA (prostate specific antigen)  -     PSA; Future  Cigarette nicotine dependence in remission-patient congratulated on his efforts.  Health maintenance-Cologuard has been done.  Refuses all immunizations.  He will schedule eye and dental appointment.

## 2024-09-24 ENCOUNTER — HOSPITAL ENCOUNTER (OUTPATIENT)
Dept: RADIOLOGY | Facility: CLINIC | Age: 74
Discharge: HOME | End: 2024-09-24
Payer: MEDICARE

## 2024-09-24 DIAGNOSIS — C10.9 OROPHARYNGEAL CANCER (MULTI): ICD-10-CM

## 2024-09-24 PROCEDURE — A9552 F18 FDG: HCPCS | Performed by: INTERNAL MEDICINE

## 2024-09-24 PROCEDURE — 78815 PET IMAGE W/CT SKULL-THIGH: CPT

## 2024-09-24 PROCEDURE — 3430000001 HC RX 343 DIAGNOSTIC RADIOPHARMACEUTICALS: Performed by: INTERNAL MEDICINE

## 2024-09-24 PROCEDURE — 78815 PET IMAGE W/CT SKULL-THIGH: CPT | Performed by: RADIOLOGY

## 2024-09-24 RX ORDER — FLUDEOXYGLUCOSE F 18 200 MCI/ML
11.83 INJECTION, SOLUTION INTRAVENOUS
Status: COMPLETED | OUTPATIENT
Start: 2024-09-24 | End: 2024-09-24

## 2024-09-25 ENCOUNTER — LAB (OUTPATIENT)
Dept: LAB | Facility: HOSPITAL | Age: 74
End: 2024-09-25
Payer: MEDICARE

## 2024-09-25 ENCOUNTER — OFFICE VISIT (OUTPATIENT)
Dept: HEMATOLOGY/ONCOLOGY | Facility: HOSPITAL | Age: 74
End: 2024-09-25
Payer: MEDICARE

## 2024-09-25 ENCOUNTER — DOCUMENTATION (OUTPATIENT)
Dept: HEMATOLOGY/ONCOLOGY | Facility: HOSPITAL | Age: 74
End: 2024-09-25

## 2024-09-25 VITALS
RESPIRATION RATE: 18 BRPM | HEART RATE: 101 BPM | SYSTOLIC BLOOD PRESSURE: 174 MMHG | DIASTOLIC BLOOD PRESSURE: 79 MMHG | TEMPERATURE: 97.3 F | OXYGEN SATURATION: 100 %

## 2024-09-25 DIAGNOSIS — C10.9 OROPHARYNGEAL CANCER (MULTI): ICD-10-CM

## 2024-09-25 DIAGNOSIS — Z12.5 SCREENING PSA (PROSTATE SPECIFIC ANTIGEN): ICD-10-CM

## 2024-09-25 LAB
ALBUMIN SERPL BCP-MCNC: 3.9 G/DL (ref 3.4–5)
ALP SERPL-CCNC: 57 U/L (ref 33–136)
ALT SERPL W P-5'-P-CCNC: 14 U/L (ref 10–52)
ANION GAP SERPL CALC-SCNC: 13 MMOL/L (ref 10–20)
AST SERPL W P-5'-P-CCNC: 12 U/L (ref 9–39)
BASOPHILS # BLD AUTO: 0 X10*3/UL (ref 0–0.1)
BASOPHILS NFR BLD AUTO: 0 %
BILIRUB SERPL-MCNC: 0.5 MG/DL (ref 0–1.2)
BUN SERPL-MCNC: 15 MG/DL (ref 6–23)
CALCIUM SERPL-MCNC: 9.6 MG/DL (ref 8.6–10.3)
CHLORIDE SERPL-SCNC: 102 MMOL/L (ref 98–107)
CO2 SERPL-SCNC: 29 MMOL/L (ref 21–32)
CREAT SERPL-MCNC: 1.17 MG/DL (ref 0.5–1.3)
EGFRCR SERPLBLD CKD-EPI 2021: 66 ML/MIN/1.73M*2
EOSINOPHIL # BLD AUTO: 0.01 X10*3/UL (ref 0–0.4)
EOSINOPHIL NFR BLD AUTO: 1.7 %
ERYTHROCYTE [DISTWIDTH] IN BLOOD BY AUTOMATED COUNT: 16.1 % (ref 11.5–14.5)
GLUCOSE SERPL-MCNC: 94 MG/DL (ref 74–99)
HCT VFR BLD AUTO: 26.8 % (ref 41–52)
HGB BLD-MCNC: 9 G/DL (ref 13.5–17.5)
HYPOCHROMIA BLD QL SMEAR: NORMAL
IMM GRANULOCYTES # BLD AUTO: 0 X10*3/UL (ref 0–0.5)
IMM GRANULOCYTES NFR BLD AUTO: 0 % (ref 0–0.9)
LYMPHOCYTES # BLD AUTO: 0.23 X10*3/UL (ref 0.8–3)
LYMPHOCYTES NFR BLD AUTO: 39 %
MCH RBC QN AUTO: 29.6 PG (ref 26–34)
MCHC RBC AUTO-ENTMCNC: 33.6 G/DL (ref 32–36)
MCV RBC AUTO: 88 FL (ref 80–100)
MONOCYTES # BLD AUTO: 0.2 X10*3/UL (ref 0.05–0.8)
MONOCYTES NFR BLD AUTO: 33.9 %
NEUTROPHILS # BLD AUTO: 0.15 X10*3/UL (ref 1.6–5.5)
NEUTROPHILS NFR BLD AUTO: 25.4 %
NRBC BLD-RTO: 0 /100 WBCS (ref 0–0)
OVALOCYTES BLD QL SMEAR: NORMAL
PLATELET # BLD AUTO: 134 X10*3/UL (ref 150–450)
POTASSIUM SERPL-SCNC: 4.2 MMOL/L (ref 3.5–5.3)
PROT SERPL-MCNC: 6.3 G/DL (ref 6.4–8.2)
PSA SERPL-MCNC: 0.16 NG/ML
RBC # BLD AUTO: 3.04 X10*6/UL (ref 4.5–5.9)
RBC MORPH BLD: NORMAL
SCHISTOCYTES BLD QL SMEAR: NORMAL
SODIUM SERPL-SCNC: 140 MMOL/L (ref 136–145)
WBC # BLD AUTO: 0.6 X10*3/UL (ref 4.4–11.3)

## 2024-09-25 PROCEDURE — 85025 COMPLETE CBC W/AUTO DIFF WBC: CPT

## 2024-09-25 PROCEDURE — 80053 COMPREHEN METABOLIC PANEL: CPT

## 2024-09-25 PROCEDURE — 1036F TOBACCO NON-USER: CPT | Performed by: INTERNAL MEDICINE

## 2024-09-25 PROCEDURE — 99215 OFFICE O/P EST HI 40 MIN: CPT | Performed by: INTERNAL MEDICINE

## 2024-09-25 PROCEDURE — 3077F SYST BP >= 140 MM HG: CPT | Performed by: INTERNAL MEDICINE

## 2024-09-25 PROCEDURE — 3078F DIAST BP <80 MM HG: CPT | Performed by: INTERNAL MEDICINE

## 2024-09-25 PROCEDURE — 1126F AMNT PAIN NOTED NONE PRSNT: CPT | Performed by: INTERNAL MEDICINE

## 2024-09-25 PROCEDURE — 36415 COLL VENOUS BLD VENIPUNCTURE: CPT

## 2024-09-25 PROCEDURE — 84153 ASSAY OF PSA TOTAL: CPT

## 2024-09-25 ASSESSMENT — ENCOUNTER SYMPTOMS
DIZZINESS: 0
COUGH: 0
LEG SWELLING: 0
TROUBLE SWALLOWING: 0
FREQUENCY: 0
DYSURIA: 0
DIARRHEA: 1
CHILLS: 0
SORE THROAT: 0
LIGHT-HEADEDNESS: 0
ARTHRALGIAS: 1
NECK PAIN: 0
WOUND: 0
HEADACHES: 0
CONSTIPATION: 1
NAUSEA: 0
DIFFICULTY URINATING: 0
NUMBNESS: 0
FEVER: 0
ABDOMINAL PAIN: 0
SHORTNESS OF BREATH: 0
APPETITE CHANGE: 1
FATIGUE: 1
VOMITING: 0

## 2024-09-25 ASSESSMENT — PAIN SCALES - GENERAL: PAINLEVEL: 0-NO PAIN

## 2024-09-25 NOTE — PROGRESS NOTES
Patient ID: Phong Wong is a 74 y.o. male.  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 Juan Jose Fletcher   MedOnc: Dr. Kyunghee Burkitt, X. Katherine Feng, PA-C   RadOnc: Dr. Vianca Steen     Current Therapy  4/4/24: Started Q3 week Pembrolizumab    Prior Therapy:   8/16 - 10/4/23: Recieved concurrent chemoradiation with 7 weekly Carboplatin (2mg/AUC)/Paclitaxel (45mg/m2)  and 70gy RT in 35fx     Sites of Disease:  Soft palate, BOT, Left palatine tonsil  B/L Cervical LN  Lung     Oncologic Issues:   Odynophagia  Fatigue     ONCOLOGIC HISTORY  - Pt had 2 months hx of throat discomfort with lump in right neck  - 6/13/23: CT neck showed a mass 2.8 x 2.5 x 2.9 cm in the right lower cervical neck soft tissues. Two suspicious nodes were observed, one at level 3 on the right and another at level 2 on the left.  - 6/28/23: seen by Dr. Fletcher. 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 Based on the findings, Dr. Fletcher did not recommend surgery due to the location  of the primary tumor and the presence of yvette disease  - 6/30/23: PET/CT showed intense FDG avidity within the soft palate, extending to the base of the tongue and left palatine tonsil. Multiple FDG avid left cervical level II nodes were observed, along with an FDG avid mass in the region of the right cervical  level II/III, infiltrating the right SCM muscle and encasing and invading the right jugular vein. FDG avidity was also detected in the region of the left fossa Rosenmuller and left medial pterygoid muscle.  - 8/16 - 10/4/23: Recieved concurrent chemoradiation with 7 weekly Carboplatin (2mg/AUC)/Paclitaxel (45mg/m2)  and 70gy RT in 35fx    Admissions:  10/5 - 10/10/23: Admitted for extreme weakness, HECTOR, pancytopenia including anemia requiring blood  transfusion. D/C'ed to acute rehab        Past Medical History:   Past Medical History:  2017: Personal history of diseases of the blood and blood-  forming organs and certain disorders involving the immune mechanism      Comment:  History of thrombocytosis   HTN, HLD, COPD, prostate cancer in 2017 (s/p radiation)  Surgical History:    Past Surgical History:   Procedure Laterality Date    CT ABDOMEN PELVIS ANGIOGRAM W AND/OR WO IV CONTRAST  9/3/2014    CT ABDOMEN PELVIS ANGIOGRAM W AND/OR WO IV CONTRAST 9/3/2014 [facility]    IR ANGIOGRAM AORTA ABDOMEN  2014    IR ANGIOGRAM AORTA ABDOMEN 2014 [facility]   Aortoiliofemoral vascular bypass in   Social History:    Social History     Socioeconomic History    Marital status:     Number of children: 1   Tobacco Use    Smoking status: Former     Current packs/day: 0.00     Average packs/day: 1 pack/day for 40.0 years (40.0 ttl pk-yrs)     Types: Cigarettes     Start date:      Quit date:      Years since quittin.7     Passive exposure: Past    Smokeless tobacco: Never   Substance and Sexual Activity    Alcohol use: Yes     Alcohol/week: 12.0 standard drinks of alcohol     Types: 12 Cans of beer per week    Drug use: Never     Social Determinants of Health     Financial Resource Strain: Low Risk  (10/20/2023)    Overall Financial Resource Strain (CARDIA)     Difficulty of Paying Living Expenses: Not very hard   Food Insecurity: No Food Insecurity (10/5/2023)    Hunger Vital Sign     Worried About Running Out of Food in the Last Year: Never true     Ran Out of Food in the Last Year: Never true   Transportation Needs: No Transportation Needs (10/20/2023)    PRAPARE - Transportation     Lack of Transportation (Medical): No     Lack of Transportation (Non-Medical): No   Physical Activity: Inactive (10/5/2023)    Exercise Vital Sign     Days of Exercise per Week: 0 days     Minutes of Exercise per Session: 0 min   Stress:  Stress Concern Present (10/5/2023)    [place] [employer] - Occupational Stress Questionnaire     Feeling of Stress : To some extent   Social Connections: Moderately Integrated (10/5/2023)    Social Connection and Isolation Panel [NHANES]     Frequency of Communication with Friends and Family: More than three times a week     Frequency of Social Gatherings with Friends and Family: More than three times a week     Attends Faith Services: Never     Active Member of Clubs or Organizations: Yes     Attends Club or Organization Meetings: Never     Marital Status:    Intimate Partner Violence: Not At Risk (10/5/2023)    Humiliation, Afraid, Rape, and Kick questionnaire     Fear of Current or Ex-Partner: No     Emotionally Abused: No     Physically Abused: No     Sexually Abused: No   Housing Stability: Low Risk  (10/20/2023)    Housing Stability Vital Sign     Unable to Pay for Housing in the Last Year: No     Number of Places Lived in the Last Year: 1     Unstable Housing in the Last Year: No      Family History:    Family History   Problem Relation Name Age of Onset    Aortic aneurysm Father          abdominal     Family Oncology History:    Cancer-related family history is not on file.      Subjective   Chief Complaint: Squamous Cell Carcinoma of oropharynx    HPI  Phong Wong is a 74 y.o. male with h/o locally advanced oral cavity cancer, completed concurrent chemoradiation with 7 weekly Carboplatin+Paclitaxel on 10/4/23. Unfortunately found with mets to lungs on 3 months restaging scan. CPS 3. Started Q3 week Pembrolizumab on 4/4/24. Received 3 cycles of Pembro , unfortunately lung nodules progressed. Started Q3 week carbo/Taxol on 6/19/24    Interval History  Patient presents today for tox check following C5 Carbo/Taxol.    He reports worsening fatigue, ongoing diarrhea  since chemo, but this am started to have less loose stool.  Weight is stable from last week.   Still eating well  and no change with swallowing. Denies dry mouth, has saliva production, taste is normal.   Neck and submental Lymphedema are improving with massage. Right neck is much less tender.     ROS  Review of Systems   Constitutional:  Positive for appetite change and fatigue. Negative for chills and fever.   HENT:   Negative for lump/mass, mouth sores, nosebleeds, sore throat, tinnitus and trouble swallowing.    Respiratory:  Negative for cough and shortness of breath.    Cardiovascular:  Negative for chest pain and leg swelling.   Gastrointestinal:  Positive for constipation and diarrhea. Negative for abdominal pain, nausea and vomiting.   Genitourinary:  Negative for difficulty urinating, dysuria and frequency.    Musculoskeletal:  Positive for arthralgias. Negative for neck pain.   Skin:  Negative for rash and wound.   Neurological:  Negative for dizziness, headaches, light-headedness and numbness.       Allergies  Allergies   Allergen Reactions    Codeine Nausea Only        Medications  Current Outpatient Medications   Medication Instructions    aspirin 81 mg EC tablet 1 tablet, oral, Daily    atorvastatin (LIPITOR) 40 mg, oral, Daily    doxazosin (CARDURA) 4 mg, oral, Daily, Take 1 tablet by mouth daily in the evening    fluticasone-umeclidin-vilanter (Trelegy Ellipta) 100-62.5-25 mcg blister with device INHALE 1 PUFF EVERY DAY    Gilotrif 40 mg, oral, Daily, Take at least 1 hour before a meal or 2 hours after a meal.    ipratropium-albuteroL (Duo-Neb) 0.5-2.5 mg/3 mL nebulizer solution Take 3 mL by nebulization 3 times a day as needed for wheezing or shortness of breath.    loperamide (IMODIUM) 2 mg, oral, 4 times daily PRN, Initial: 4 mg, followed by 2 mg every 2 to 4 hours or after each loose stoo    losartan (COZAAR) 100 mg, oral, Daily    magnesium oxide (MAG-OX) 400 mg, oral, Daily    ondansetron (ZOFRAN) 8 mg, oral, Every 8 hours PRN    prochlorperazine (COMPAZINE) 10 mg, oral, Every 6 hours PRN        Objective    VS:  There were no vitals taken for this visit.  Weight   Wt Readings from Last 7 Encounters:   09/23/24 63.3 kg (139 lb 8 oz)   09/19/24 58.8 kg (129 lb 10.1 oz)   09/12/24 58.7 kg (129 lb 6.6 oz)   08/29/24 59.6 kg (131 lb 6.3 oz)   08/22/24 60.6 kg (133 lb 9.6 oz)   08/13/24 62.5 kg (137 lb 12.6 oz)   08/08/24 61.8 kg (136 lb 3.9 oz)         Physical Exam  Vitals reviewed.   Constitutional:       Appearance: Normal appearance. He is underweight.   HENT:      Head: Normocephalic and atraumatic.      Right Ear: External ear normal. No tenderness.      Left Ear: External ear normal. No tenderness.      Nose: Nose normal.      Mouth/Throat:      Mouth: No injury or oral lesions.      Tongue: No lesions.      Pharynx: Oropharynx is clear. No posterior oropharyngeal erythema.      Comments: Edentulous  Eyes:      Extraocular Movements: Extraocular movements intact.      Conjunctiva/sclera: Conjunctivae normal.      Pupils: Pupils are equal, round, and reactive to light.   Neck:      Thyroid: No thyroid mass.      Comments: Mild swelling in submental and neck region.   Right neck is less sensitive/tender to palpation  Cardiovascular:      Rate and Rhythm: Normal rate and regular rhythm.   Pulmonary:      Effort: Pulmonary effort is normal. No respiratory distress.      Breath sounds: Normal breath sounds.   Abdominal:      General: Bowel sounds are normal. There is no distension or abdominal bruit.      Palpations: Abdomen is soft. There is no mass.      Tenderness: There is no abdominal tenderness.   Musculoskeletal:         General: Normal range of motion.      Cervical back: Normal range of motion and neck supple.      Right lower leg: No edema.      Left lower leg: No edema.   Lymphadenopathy:      Cervical: No cervical adenopathy.      Upper Body:      Right upper body: No axillary adenopathy.      Left upper body: No axillary adenopathy.   Skin:     General: Skin is warm and dry.      Findings: No lesion  (radiation dermatitis in bilateral neck), rash or wound.   Neurological:      General: No focal deficit present.      Mental Status: He is alert and oriented to person, place, and time.      Gait: Gait is intact.   Psychiatric:         Mood and Affect: Mood and affect normal.         Diagnostic Results   The below labs were reviewed.  Results from last 7 days   Lab Units 09/19/24  1252   WBC AUTO x10*3/uL 1.9*   HEMOGLOBIN g/dL 8.5*   HEMATOCRIT % 25.0*   PLATELETS AUTO x10*3/uL 143*   EOS ABS MAN x10*3/uL 0.00   BANDS ABS MAN x10*3/uL 0.08   LYMPHO ABS MAN x10*3/uL 0.29*   MONO ABS MAN x10*3/uL 0.04*   LYMPHO PCT MAN % 15.0   MONO PCT MAN % 2.0   EOSINO PCT MAN % 0.0      Results from last 7 days   Lab Units 09/19/24  1252   GLUCOSE mg/dL 104*   SODIUM mmol/L 138   POTASSIUM mmol/L 3.8   CHLORIDE mmol/L 99   CO2 mmol/L 31   BUN mg/dL 21   CREATININE mg/dL 1.05   EGFR mL/min/1.73m*2 75   CALCIUM mg/dL 9.1   MAGNESIUM mg/dL 1.85   ALBUMIN g/dL 3.7   PROTEIN TOTAL g/dL 5.9*   BILIRUBIN TOTAL mg/dL 0.4   ALK PHOS U/L 47   ALT U/L 12   AST U/L 11                  Pathology      Imaging  8/19/2024 CT soft tissue neck wo IV contrast  Impression:   No significant interval change in the soft tissue mass with focal calcification within the right level 3 yvette station. No additional cervical lymphadenopathy by size criteria.   Please see CT chest performed the same day for lung findings.         8/27/2024 CT chest wo IV contrast  Impression:   1. Improving bilateral pulmonary metastases, the largest of which is a 14 mm pleural-based left lower lobe nodule.   2. Emphysema.   3. Very extensive vascular calcification includes severe quadruple vessel coronary artery calcification and bilateral renovascular calcification that is marked on the left with left renal atrophy.   4. Diffusely dense bones as discussed above.      Assessment/Plan    ASSESSMENT  Phong Wong is a 74 y.o. male with recent diagnosis of locally advanced oral  cavity cancer. Completed concurrent chemoradiation with 7 weekly Carboplatin (2mg/AUC)/Paclitaxel (45mg/m2) on 10/4/2. Post treatment restaging PET/CT noted new FDG avid LLL pulm nodule, biopsy confirmed SCC. Progressed after 3 cycles of Pembrolizumab, started Q3 week Carbo/Taxol on 6/19/24.    # Locally advanced oral cavity cancer, T3N2M0, now with met to lungs  - 6/30/23: PET/CT showed intense FDG avidity within the soft palate, extending to the base of the tongue and left palatine tonsil. Multiple uptake in left cervical level II nodes, right cervical level  II/III, infiltrating the right SCM muscle and encasing and invading the right jugular vein. FDG avidity was also detected in the region of the left fossa Rosenmuller and left medial pterygoid muscle.  - 7/13/23: pt is doing well, no pain, discussed with patient about carboplatin+ paclitaxel as his chemotherapy along with radiation due to his GFR 30s.  Chemo related side effects were reviewed with patient, consent obtained.   - 8/16 - 10/4/23: Recieved concurrent chemoradiation with 7 weekly Carboplatin (2mg/AUC)/Paclitaxel (45mg/m2)  and 70gy RT in 35fx     - 1/10/24 restaging PET/CT with patient. Scans noted significant interval improvement in prior sites of disease though with residual FDG avidity in soft palate and left palatine tonsil, c/f residual disease vs post treatment changes. Also noted was a new FDG avid LLL pulm nodule with max SUV 5.7, c/f pulmonary metastasis.   - Patient will have repeat CT Chest for FDG avid LLL pulm nodule as prior biopsy attempt was not successful due to small nodule size and difficult location near diaphragm.  -2/9/24 CT chest showed multiple subcentimeter noduesl 2-5mm, dominant lesion is 1.6cm in LLL.    -3/6/24: Lung biopsy of LLL nodule was consistent with HN origin. CPS 3.  Discussed with patient about phase 2 FLAM study (randomized to pembrolizumab or pembrolizumab+danvatirsen (Stat3 inhibitor). Unfortunately due to  his CKD, he's not eligible for FLAM study.  - 4/4/24: Started Q3 week Pembrolizumab  - 5/1/24: C2 Pembro was delayed a week 2/2 patient not feeling well. He had struggled with hemorrhoids for a couple weeks. Otherwise tolerating treatment with no irAEs .  - 5/23/24: Patient doing well, no irAEs. Appetite and energy are good, tolerating solid PO intake. No odynophagia, no neck pain.  - 6/7/24 restaging CT N/C/A/P showed enlargement of subcentimeter lung mets, stable left lung nodule 2cm with new 1cm nodule in RUL. Based on heightened response with carbo+taxol after progression on PD, I recommended 3 cycles of carbo taxol and restaging, if there is response, we can consider afatinib treatment more as long term maintenance therapy.  Consent obtained for chemo and research blood.  - 6/19/24: Patient feeling well with no new complaints. Ok for C1 Q3 week Carbo/Taxol.  - 6/28/24: Patient feeling well and tolerating chemo. Did have watery diarrhea 1-2x per day for the first week after chemo but resolved on its on. No other new symptoms. Does still have tenderness in the right neck in area of prior RT. He did have a 1.5cm Level 2 LN in right neck on 6/7/24 CT Neck.   - 7/11/24: Patient doing well, no new symptoms, no recurrence of diarrhea. Ok for C2 carbo/taxol. Slightly neutropenic today, ANC 1320. Neutropenic precaution given to patient and his SO.  - 7/18/24: ANC 1400, denies fever, chills, pt doing well, had few days of soreness in fingers and legs which are all resolved.  -8/1/24:  pt is doing well with wt gain. ANC 1500, ok to proceed with chemo,  will monitor ANC closely next week.  - 8/19/24: restaging scan improving b/l pulmonary mets, largest pleural based LLL nodule is now 14mm.   Pt is aware of that Dr. Burkitt is leaving  at the end of Oct, planning to get to another restaging and change treatment to afatinib if he continues to have response.  Once treatment is changed, will transition his care to   [facility].  - 8/29/24: Patient had diarrhea following C4 Carbo/Taxol. Will prescribe Imodium. Arthralgia in b/l hands are grade 1 and resolves w/o intervention. Had grade 1 fatigue.   - 9/19/24: Patient tolerated C5 with some diarrhea, fatigue and myalgia, none of which are hindering his ADLs. Pt will have restaging PET next week and f/u Dr. Burkitt and decide whether he will have 1 more cycle of Carbo/Taxol before starting of Afatinib. Afatinib will be delivered tomorrow.   -9/25/24: PET/CT showed continuing response in lung mets, known level III right cervical lymphadenopathy, but increased SUV compared to Jan PET. No other mets.  Since patients PS is declining, I prefer treating residual disease with afatinib.  Pt is good with the plan.  CT neck/chest ordered to measure exact size of right cervical LN as well as lung mets, so these can be used as a baseline prior to start afatinib. If insurance doesn't approve, will restage next time.    # Pancytopenia  - 9/19: pancytopenic due to chemo, ANC 1500. . Hgb 8.5.   Note patient does have non-painful bleeding hemorrhoids .   - 9/25/24: WBC 0.6, pending ANC, advised pt to go to ED if develops fever    # CKD  - 2/9/24 CT chest showed Bulky atherosclerotic calcification at the origin of the left renal artery likely contributing to atrophy of the left kidney as compared to the right. Kidney atrophy was not mentioned in previous scans.   - 5/1: Creat 1.46, GFR 51. Encouraged patient to keep up with non-caffeinated PO hydration. 1L NS given today with Pembro. Patient to follow up with PCP regarding atherosclerosis.   - 6/12: Creat 1.54, GFR 48.   - 6/20: Creat 1.6. GFR 45. Additional 1L NS giving w/ chemo today  - 6/28: Creat 1.2. GFR 64. Pt reports he has increased PO hydration. Kidney function improved.   - 7/11 Creat 1.38, GFR 54. Pt reports decreased PO intake lately. Will give 1L NS for hydration  - 8/30/24: Creat 1.14, GFR 68.   - 9/19/24: Creat 1.05, GFR 75.  Patient reports he cut out coffee last week and is drinking only water     # Submental/Neck Lymphedema  - Patient with moderate swelling in the submental and neck region. Likely 2ry to prior radiation. Established w/ Dr Guerrero.   - 6/19: Right neck with more swelling and tender to touch, neck is tight. Still itchy to touch.   - 6/28: Right neck swelling persists and is tender to palpation. Will meet with Dr. Guerrero's team for massage on 7/10  - 7/11: Right neck swelling decreased after lymphedema massage  - 9/19: right neck sensitivity/tenderness improving after lymphedema massage.     PLAN  --RTC in 2 weeks to see Eva Patten for re-evaluation  --If pt is doing better in 2 weeks, pt will start afatinib  --RTC in 3 weeks to see Dr. Parks, CT soft neck/chest prior   --Continue ample PO hydration.    --Patient fo follow up with Dr. Weiss on atherosclerosis of left renal artery and coroanary calcifications  --Imodium 2mg after each loose stool PRN for chemo induced diarrehea   --Stool softeners as needed, monitor further symptoms of hemorrhoids

## 2024-09-25 NOTE — PROGRESS NOTES
Notified by Vianey Menendez in [facility] that WBC 0.6. Secure Chat sent to Dr. Burkitt who is seeing the patient today in clinic.

## 2024-09-30 ENCOUNTER — TELEPHONE (OUTPATIENT)
Dept: HEMATOLOGY/ONCOLOGY | Facility: HOSPITAL | Age: 74
End: 2024-09-30
Payer: MEDICARE

## 2024-09-30 ASSESSMENT — ENCOUNTER SYMPTOMS
NAUSEA: 0
DIFFICULTY URINATING: 0
FREQUENCY: 0
WOUND: 0
NAUSEA: 0
LEG SWELLING: 0
CONSTIPATION: 1
DIZZINESS: 0
DIZZINESS: 0
NECK PAIN: 0
SORE THROAT: 0
DYSURIA: 0
SHORTNESS OF BREATH: 0
COUGH: 0
VOMITING: 0
FREQUENCY: 0
ABDOMINAL PAIN: 0
DIARRHEA: 1
LIGHT-HEADEDNESS: 0
APPETITE CHANGE: 1
FEVER: 0
VOMITING: 0
HEADACHES: 0
TROUBLE SWALLOWING: 0
FEVER: 0
CHILLS: 0
SORE THROAT: 0
TROUBLE SWALLOWING: 0
NUMBNESS: 0
DYSURIA: 0
SHORTNESS OF BREATH: 0
WOUND: 0
LEG SWELLING: 0
NUMBNESS: 0
CHILLS: 0
ABDOMINAL PAIN: 0
FATIGUE: 1
COUGH: 0
HEADACHES: 0
ARTHRALGIAS: 1
APPETITE CHANGE: 1
LIGHT-HEADEDNESS: 0
DIFFICULTY URINATING: 0
NECK PAIN: 0

## 2024-09-30 NOTE — PROGRESS NOTES
Patient ID: Phong Wong is a 74 y.o. male.  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 Juan Jose Fletcher   MedOnc: Dr. Kyunghee Burkitt, X. Katherine Feng, PA-C   RadOnc: Dr. Vianca Steen     Current Therapy  Pending    Prior Therapy:   8/16 - 10/4/23: Recieved concurrent chemoradiation with 7 weekly Carboplatin (2mg/AUC)/Paclitaxel (45mg/m2)  and 70gy RT in 35fx  4/4 - 6/13/24: Received 3 cycles of Pembrolizumab  6/20/24 - 9/12/24: Received 5 cycles of Carbo/Taxol     Sites of Disease:  Soft palate, BOT, Left palatine tonsil  B/L Cervical LN  Lung     Oncologic Issues:   Odynophagia  Fatigue     ONCOLOGIC HISTORY  - Pt had 2 months hx of throat discomfort with lump in right neck  - 6/13/23: CT neck showed a mass 2.8 x 2.5 x 2.9 cm in the right lower cervical neck soft tissues. Two suspicious nodes were observed, one at level 3 on the right and another at level 2 on the left.  - 6/28/23: seen by Dr. Fletcher. 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 Based on the findings, Dr. Fletcher did not recommend surgery due to the location  of the primary tumor and the presence of yvette disease  - 6/30/23: PET/CT showed intense FDG avidity within the soft palate, extending to the base of the tongue and left palatine tonsil. Multiple FDG avid left cervical level II nodes were observed, along with an FDG avid mass in the region of the right cervical  level II/III, infiltrating the right SCM muscle and encasing and invading the right jugular vein. FDG avidity was also detected in the region of the left fossa Rosenmuller and left medial pterygoid muscle.  - 8/16 - 10/4/23: Recieved concurrent chemoradiation with 7 weekly Carboplatin (2mg/AUC)/Paclitaxel (45mg/m2)  and 70gy RT in 35fx  - 4/4 - 6/13/24: Received 3 cycles of  Pembrolizumab  - 24 - 24: Received 5 cycles of Carbo/Taxol    Admissions:  10/5 - 10/10/23: Admitted for extreme weakness, HECTOR, pancytopenia including anemia requiring blood transfusion. D/C'ed to acute rehab     Past Medical History:   Past Medical History:  2017: Personal history of diseases of the blood and blood-  forming organs and certain disorders involving the immune mechanism      Comment:  History of thrombocytosis   HTN, HLD, COPD, prostate cancer in 2017 (s/p radiation)  Surgical History:    Past Surgical History:   Procedure Laterality Date    CT ABDOMEN PELVIS ANGIOGRAM W AND/OR WO IV CONTRAST  9/3/2014    CT ABDOMEN PELVIS ANGIOGRAM W AND/OR WO IV CONTRAST 9/3/2014 [facility]    IR ANGIOGRAM AORTA ABDOMEN  2014    IR ANGIOGRAM AORTA ABDOMEN 2014 [facility]   Aortoiliofemoral vascular bypass in   Social History:    Social History     Socioeconomic History    Marital status:     Number of children: 1   Tobacco Use    Smoking status: Former     Current packs/day: 0.00     Average packs/day: 1 pack/day for 40.0 years (40.0 ttl pk-yrs)     Types: Cigarettes     Start date:      Quit date: 2016     Years since quittin.7     Passive exposure: Past    Smokeless tobacco: Never   Substance and Sexual Activity    Alcohol use: Yes     Alcohol/week: 12.0 standard drinks of alcohol     Types: 12 Cans of beer per week    Drug use: Never     Social Determinants of Health     Financial Resource Strain: Low Risk  (10/20/2023)    Overall Financial Resource Strain (CARDIA)     Difficulty of Paying Living Expenses: Not very hard   Food Insecurity: No Food Insecurity (10/5/2023)    Hunger Vital Sign     Worried About Running Out of Food in the Last Year: Never true     Ran Out of Food in the Last Year: Never true   Transportation Needs: No Transportation Needs (10/20/2023)    PRAPARE - Transportation     Lack of Transportation (Medical): No     Lack of  Transportation (Non-Medical): No   Physical Activity: Inactive (10/5/2023)    Exercise Vital Sign     Days of Exercise per Week: 0 days     Minutes of Exercise per Session: 0 min   Stress: Stress Concern Present (10/5/2023)    [employer] [place] [employer] - Occupational Stress Questionnaire     Feeling of Stress : To some extent   Social Connections: Moderately Integrated (10/5/2023)    Social Connection and Isolation Panel [NHANES]     Frequency of Communication with Friends and Family: More than three times a week     Frequency of Social Gatherings with Friends and Family: More than three times a week     Attends [employer] Services: Never     Active Member of Clubs or Organizations: Yes     Attends Club or Organization Meetings: Never     Marital Status:    Intimate Partner Violence: Not At Risk (10/5/2023)    Humiliation, Afraid, Rape, and Kick questionnaire     Fear of Current or Ex-Partner: No     Emotionally Abused: No     Physically Abused: No     Sexually Abused: No   Housing Stability: Low Risk  (10/20/2023)    Housing Stability Vital Sign     Unable to Pay for Housing in the Last Year: No     Number of Places Lived in the Last Year: 1     Unstable Housing in the Last Year: No      Family History:    Family History   Problem Relation Name Age of Onset    Aortic aneurysm Father          abdominal     Family Oncology History:    Cancer-related family history is not on file.      Subjective   Chief Complaint: Squamous Cell Carcinoma of oropharynx    HPI  Phong Wong is a 74 y.o. male with h/o locally advanced oral cavity cancer, completed chemoRT w/ 7 weekly Carboplatin+Paclitaxel on 10/4/23. Unfortunately found with mets to lungs on 3 months restaging scan. CPS 3. S/p 3 cycles Pembrolizumab but lung nodules progressed. Completed 5 cycles of Carbo/Taxol on 9/12/24    Interval History  Patient presents today for follow up.     He reports feeling improved since last dose of Carbo/Taxol on  9/12.  Energy is improved. Arthralgia and peripheral neuropathy significantly improved. Diarrhea has resolved. Still has some bleeding from hemorrhoids.   Appetite is still low but is improving. He did have a slight weight loss.   No change with swallowing. Denies dry mouth, has saliva production, taste is normal.   Neck and submental Lymphedema are improving with massage. Right neck is much less tender.     ROS  Review of Systems   Constitutional:  Positive for appetite change. Negative for chills, fatigue and fever.   HENT:   Negative for lump/mass, mouth sores, nosebleeds, sore throat, tinnitus and trouble swallowing.    Respiratory:  Negative for cough and shortness of breath.    Cardiovascular:  Negative for chest pain and leg swelling.   Gastrointestinal:  Negative for abdominal pain, constipation, diarrhea, nausea and vomiting.   Genitourinary:  Negative for difficulty urinating, dysuria and frequency.    Musculoskeletal:  Negative for arthralgias and neck pain.   Skin:  Negative for rash and wound.   Neurological:  Negative for dizziness, headaches, light-headedness and numbness.     Allergies  Allergies   Allergen Reactions    Codeine Nausea Only      Medications  Current Outpatient Medications   Medication Instructions    aspirin 81 mg EC tablet 1 tablet, oral, Daily    atorvastatin (LIPITOR) 40 mg, oral, Daily    doxazosin (CARDURA) 4 mg, oral, Daily, Take 1 tablet by mouth daily in the evening    fluticasone-umeclidin-vilanter (Trelegy Ellipta) 100-62.5-25 mcg blister with device INHALE 1 PUFF EVERY DAY    Gilotrif 40 mg, oral, Daily, Take at least 1 hour before a meal or 2 hours after a meal.    ipratropium-albuteroL (Duo-Neb) 0.5-2.5 mg/3 mL nebulizer solution Take 3 mL by nebulization 3 times a day as needed for wheezing or shortness of breath.    loperamide (IMODIUM) 2 mg, oral, 4 times daily PRN, Initial: 4 mg, followed by 2 mg every 2 to 4 hours or after each loose stoo    losartan (COZAAR) 100 mg,  oral, Daily    magnesium oxide (MAG-OX) 400 mg, oral, Daily    ofloxacin (Ocuflox) 0.3 % ophthalmic solution 1 drop, Left Eye, Daily    ondansetron (ZOFRAN) 8 mg, oral, Every 8 hours PRN    prochlorperazine (COMPAZINE) 10 mg, oral, Every 6 hours PRN      Objective   VS:  /73 (BP Location: Right arm, Patient Position: Sitting, BP Cuff Size: Adult)   Pulse 104   Temp 36.9 °C (98.4 °F) (Temporal)   Resp 20   Wt 58.5 kg (128 lb 15.5 oz)   SpO2 100%   BMI 20.82 kg/m²   Weight   Daily Weight  10/10/24 : 58.5 kg (128 lb 15.5 oz)  09/23/24 : 63.3 kg (139 lb 8 oz)  09/19/24 : 58.8 kg (129 lb 10.1 oz)  09/12/24 : 58.7 kg (129 lb 6.6 oz)  08/29/24 : 59.6 kg (131 lb 6.3 oz)  08/22/24 : 60.6 kg (133 lb 9.6 oz)  08/13/24 : 62.5 kg (137 lb 12.6 oz)       Physical Exam  Vitals reviewed.   Constitutional:       Appearance: Normal appearance. He is underweight.   HENT:      Head: Normocephalic and atraumatic.      Right Ear: External ear normal. No tenderness.      Left Ear: External ear normal. No tenderness.      Nose: Nose normal.      Mouth/Throat:      Mouth: No injury or oral lesions.      Tongue: No lesions.      Pharynx: Oropharynx is clear. No posterior oropharyngeal erythema.      Comments: Edentulous  Eyes:      Extraocular Movements: Extraocular movements intact.      Conjunctiva/sclera: Conjunctivae normal.      Pupils: Pupils are equal, round, and reactive to light.   Neck:      Thyroid: No thyroid mass.      Comments: Mild swelling in submental and neck region.   Right neck is less sensitive/tender to palpation  Cardiovascular:      Rate and Rhythm: Normal rate and regular rhythm.   Pulmonary:      Effort: Pulmonary effort is normal. No respiratory distress.      Breath sounds: Normal breath sounds.   Abdominal:      General: Bowel sounds are normal. There is no distension or abdominal bruit.      Palpations: Abdomen is soft. There is no mass.      Tenderness: There is no abdominal tenderness.    Musculoskeletal:         General: Normal range of motion.      Cervical back: Normal range of motion and neck supple.      Right lower leg: No edema.      Left lower leg: No edema.   Lymphadenopathy:      Cervical: No cervical adenopathy.      Upper Body:      Right upper body: No axillary adenopathy.      Left upper body: No axillary adenopathy.   Skin:     General: Skin is warm and dry.      Findings: No lesion (radiation dermatitis in bilateral neck), rash or wound.   Neurological:      General: No focal deficit present.      Mental Status: He is alert and oriented to person, place, and time.      Gait: Gait is intact.   Psychiatric:         Mood and Affect: Mood and affect normal.         Diagnostic Results   The below labs were reviewed.  Results from last 7 days   Lab Units 10/10/24  1050   WBC AUTO x10*3/uL 4.9   HEMOGLOBIN g/dL 10.3*   HEMATOCRIT % 30.8*   PLATELETS AUTO x10*3/uL 313   NEUTROS ABS x10*3/uL 3.60   LYMPHS ABS AUTO x10*3/uL 0.39*   MONOS ABS AUTO x10*3/uL 0.78   EOS ABS AUTO x10*3/uL 0.05   NEUTROS PCT AUTO % 74.0   LYMPHS PCT AUTO % 8.0   MONOS PCT AUTO % 16.0   EOS PCT AUTO % 1.0      Results from last 7 days   Lab Units 10/10/24  1050   GLUCOSE mg/dL 95   SODIUM mmol/L 140   POTASSIUM mmol/L 3.7   CHLORIDE mmol/L 102   CO2 mmol/L 30   BUN mg/dL 15   CREATININE mg/dL 1.20   EGFR mL/min/1.73m*2 64   CALCIUM mg/dL 9.6   MAGNESIUM mg/dL 1.78   ALBUMIN g/dL 3.9   PROTEIN TOTAL g/dL 6.2*   BILIRUBIN TOTAL mg/dL 0.4   ALK PHOS U/L 47   ALT U/L 9*   AST U/L 11                    Pathology      Imaging  8/19/2024 CT soft tissue neck wo IV contrast  Impression:   No significant interval change in the soft tissue mass with focal calcification within the right level 3 yvette station. No additional cervical lymphadenopathy by size criteria.   Please see CT chest performed the same day for lung findings.         8/27/2024 CT chest wo IV contrast  Impression:   1. Improving bilateral pulmonary metastases,  the largest of which is a 14 mm pleural-based left lower lobe nodule.   2. Emphysema.   3. Very extensive vascular calcification includes severe quadruple vessel coronary artery calcification and bilateral renovascular calcification that is marked on the left with left renal atrophy.   4. Diffusely dense bones as discussed above.      Assessment/Plan    ASSESSMENT  Phong Wong is a 74 y.o. male with locally advanced oral cavity cancer, completed chemoRT w/ 7 weekly Carboplatin+Paclitaxel on 10/4/23. Unfortunately found with mets to lungs on 3 months restaging scan. CPS 3. S/p 3 cycles Pembrolizumab but lung nodules progressed. Completed 5 cycles of Carbo/Taxol on 9/12/24. Plan to start Afatinib when feeling improved.     # Locally advanced oral cavity cancer, T3N2, now with met to lungs  - 6/30/23 PET/CT showed intense FDG avidity within the soft palate, extending to BOT and left palatine tonsil. Multiple uptake in left cervical level II nodes, right cervical level  II/III, infiltrating the right SCM muscle and encasing and invading the right jugular vein. FDG avidity was also detected in the region of the left fossa Rosenmuller and left   - Discussed with patient about carboplatin+ paclitaxel as his chemotherapy along with radiation due to his GFR 30s.  - 8/16 - 10/4/23: Recieved concurrent chemoradiation with 7 weekly Carboplatin (2mg/AUC)/Paclitaxel (45mg/m2)  and 70gy RT in 35fx     - 1/10/24 restaging PET/CT noted significant interval improvement in prior sites of disease though with residual FDG avidity in soft palate and left palatine tonsil, c/f residual disease vs post treatment changes. Also noted was a new FDG avid LLL pulm nodule with max SUV 5.7, c/f pulmonary metastasis.   - 2/9/24 CT chest showed multiple subcentimeter noduesl 2-5mm, dominant lesion is 1.6cm in LLL.    - 3/6/24: Lung biopsy of LLL nodule was consistent with HN origin. CPS 3.  Discussed with patient about phase 2 FLAM study (randomized  to pembrolizumab or pembrolizumab+danvatirsen (Stat3 inhibitor). Unfortunately due to his CKD, he's not eligible for FLAM study.  - 4/4/24: Started Q3 week Pembrolizumab  - 6/7/24 restaging CT N/C/A/P showed enlargement of subcentimeter lung mets, stable left lung nodule 2cm with new 1cm nodule in RUL. Based on heightened response with carbo+taxol after progression on PD, I recommended 3 cycles of carbo taxol and restaging, if there is response, we can consider afatinib treatment more as long term maintenance therapy.   - 6/20/24: Cycle 1 of Q3 week Carbo/Taxol.  - 8/19/24 restaging scan showed improving b/l pulmonary mets, largest pleural based LLL nodule is now 14mm.   Pt is aware of that Dr. Burkitt is leaving  at the end of Oct, planning to get to another restaging and change treatment to afatinib if he continues to have response.  Once treatment is changed, will transition his care to Dr. Parks.   - 9/25/24: PET/CT showed continuing response in lung mets, known level III right cervical lymphadenopathy, but increased SUV compared to Miguel PET. No other mets.  Since patients PS is declining, prefer treating residual disease with afatinib. CT neck/chest ordered to measure exact size of right cervical LN as well as lung mets, so these can be used as a baseline prior to start afatinib.  - 10/9/24 CT N/C pending. Patient is otherwise feeling well. Diarrhea resolved, arthralgia and peripheral neuropathy have resolved. He continue to tolerate regular diet and denies any dysphagia. Appetite is not great so has weight loss but this is improving.     # Pancytopenia: resolving  - 9/19: pancytopenic due to chemo, ANC 1500. . Hgb 8.5.   Note patient does have non-painful bleeding hemorrhoids .   - 9/25/24: WBC 0.6, pending ANC, advised pt to go to ED if develops fever    # CKD  - 2/9/24 CT chest showed Bulky atherosclerotic calcification at the origin of the left renal artery likely contributing to atrophy of the  left kidney as compared to the right. Kidney atrophy was not mentioned in previous scans.   - 5/1/24: Creat 1.46, GFR 51. Encouraged patient to keep up with non-caffeinated PO hydration. 1L NS given today with P[place]. Patient to follow up with PCP regarding atherosclerosis.   - Baseline Creat 1.1 - 1.3, GFR 55-65. Will support with IV hydration with each treatment    # Submental/Neck Lymphedema  - Patient with moderate swelling in the submental and neck region. Likely 2ry to prior radiation. Established w/ Dr Guerrero in [facility].   - Neck pain and swelling improving w/ lymphedema massage.     PLAN  -- 10/15/24 FUV w/ Dr. Parks, discuss starting Afatinib   -- Continue ample PO hydration.    -- Patient fo follow up with Dr. Weiss on atherosclerosis of left renal artery and coroanary calcifications  -- Imodium 2mg after each loose stool PRN for chemo induced diarrehea   -- Stool softeners as needed, monitor further symptoms of hemorrhoids

## 2024-09-30 NOTE — PROGRESS NOTES
Patient ID: Phong Wong is a 74 y.o. male.  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 Juan Jose Fletcher   MedOnc: Dr. Kyunghee Burkitt, X. Katherine Feng, PA-C   RadOnc: Dr. Vianca Steen     Current Therapy      Prior Therapy:   8/16 - 10/4/23: Recieved concurrent chemoradiation with 7 weekly Carboplatin (2mg/AUC)/Paclitaxel (45mg/m2)  and 70gy RT in 35fx  4/4 - 6/13/24: Received 3 cycles of Pembrolizumab  6/20/24 - 9/12/24: Received 5 cycles of Carbo/Taxol     Sites of Disease:  Soft palate, BOT, Left palatine tonsil  B/L Cervical LN  Lung     Oncologic Issues:   Odynophagia  Fatigue     ONCOLOGIC HISTORY  - Pt had 2 months hx of throat discomfort with lump in right neck  - 6/13/23: CT neck showed a mass 2.8 x 2.5 x 2.9 cm in the right lower cervical neck soft tissues. Two suspicious nodes were observed, one at level 3 on the right and another at level 2 on the left.  - 6/28/23: seen by Dr. Fletcher. 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 Based on the findings, Dr. Fletcher did not recommend surgery due to the location  of the primary tumor and the presence of yvette disease  - 6/30/23: PET/CT showed intense FDG avidity within the soft palate, extending to the base of the tongue and left palatine tonsil. Multiple FDG avid left cervical level II nodes were observed, along with an FDG avid mass in the region of the right cervical  level II/III, infiltrating the right SCM muscle and encasing and invading the right jugular vein. FDG avidity was also detected in the region of the left fossa Rosenmuller and left medial pterygoid muscle.  - 8/16 - 10/4/23: Recieved concurrent chemoradiation with 7 weekly Carboplatin (2mg/AUC)/Paclitaxel (45mg/m2)  and 70gy RT in 35fx  - 4/4 - 6/13/24: Received 3 cycles of Pembrolizumab  -  24 - 24: Received 5 cycles of Carbo/Taxol    Admissions:  10/5 - 10/10/23: Admitted for extreme weakness, HECTOR, pancytopenia including anemia requiring blood transfusion. D/C'ed to acute rehab        Past Medical History:   Past Medical History:  2017: Personal history of diseases of the blood and blood-  forming organs and certain disorders involving the immune mechanism      Comment:  History of thrombocytosis   HTN, HLD, COPD, prostate cancer in 2017 (s/p radiation)  Surgical History:    Past Surgical History:   Procedure Laterality Date    CT ABDOMEN PELVIS ANGIOGRAM W AND/OR WO IV CONTRAST  9/3/2014    CT ABDOMEN PELVIS ANGIOGRAM W AND/OR WO IV CONTRAST 9/3/2014 [facility]    IR ANGIOGRAM AORTA ABDOMEN  2014    IR ANGIOGRAM AORTA ABDOMEN 2014 [facility]   Aortoiliofemoral vascular bypass in   Social History:    Social History     Socioeconomic History    Marital status:     Number of children: 1   Tobacco Use    Smoking status: Former     Current packs/day: 0.00     Average packs/day: 1 pack/day for 40.0 years (40.0 ttl pk-yrs)     Types: Cigarettes     Start date:      Quit date: 2016     Years since quittin.7     Passive exposure: Past    Smokeless tobacco: Never   Substance and Sexual Activity    Alcohol use: Yes     Alcohol/week: 12.0 standard drinks of alcohol     Types: 12 Cans of beer per week    Drug use: Never     Social Determinants of Health     Financial Resource Strain: Low Risk  (10/20/2023)    Overall Financial Resource Strain (CARDIA)     Difficulty of Paying Living Expenses: Not very hard   Food Insecurity: No Food Insecurity (10/5/2023)    Hunger Vital Sign     Worried About Running Out of Food in the Last Year: Never true     Ran Out of Food in the Last Year: Never true   Transportation Needs: No Transportation Needs (10/20/2023)    PRAPARE - Transportation     Lack of Transportation (Medical): No     Lack of Transportation  (Non-Medical): No   Physical Activity: Inactive (10/5/2023)    Exercise Vital Sign     Days of Exercise per Week: 0 days     Minutes of Exercise per Session: 0 min   Stress: Stress Concern Present (10/5/2023)    [employer] [place] [employer] - Occupational Stress Questionnaire     Feeling of Stress : To some extent   Social Connections: Moderately Integrated (10/5/2023)    Social Connection and Isolation Panel [NHANES]     Frequency of Communication with Friends and Family: More than three times a week     Frequency of Social Gatherings with Friends and Family: More than three times a week     Attends Judaism Services: Never     Active Member of Clubs or Organizations: Yes     Attends Club or Organization Meetings: Never     Marital Status:    Intimate Partner Violence: Not At Risk (10/5/2023)    Humiliation, Afraid, Rape, and Kick questionnaire     Fear of Current or Ex-Partner: No     Emotionally Abused: No     Physically Abused: No     Sexually Abused: No   Housing Stability: Low Risk  (10/20/2023)    Housing Stability Vital Sign     Unable to Pay for Housing in the Last Year: No     Number of Places Lived in the Last Year: 1     Unstable Housing in the Last Year: No      Family History:    Family History   Problem Relation Name Age of Onset    Aortic aneurysm Father          abdominal     Family Oncology History:    Cancer-related family history is not on file.      Subjective   Chief Complaint: Squamous Cell Carcinoma of oropharynx    HPI  Phong Wong is a 74 y.o. male with h/o locally advanced oral cavity cancer, completed chemoRT w/ 7 weekly Carboplatin+Paclitaxel on 10/4/23. Unfortunately found with mets to lungs on 3 months restaging scan. CPS 3. S/p 3 cycles Pembrolizumab but lung nodules progressed. Completed 5 cycles of Carbo/Taxol on 9/12/24    Interval History  Patient presents today for follow up.     He reports worsening fatigue, ongoing diarrhea  since chemo, but this am started  to have less loose stool.  Weight is stable from last week.   Still eating well and no change with swallowing. Denies dry mouth, has saliva production, taste is normal.   Neck and submental Lymphedema are improving with massage. Right neck is much less tender.     ROS  Review of Systems   Constitutional:  Positive for appetite change and fatigue. Negative for chills and fever.   HENT:   Negative for lump/mass, mouth sores, nosebleeds, sore throat, tinnitus and trouble swallowing.    Respiratory:  Negative for cough and shortness of breath.    Cardiovascular:  Negative for chest pain and leg swelling.   Gastrointestinal:  Positive for constipation and diarrhea. Negative for abdominal pain, nausea and vomiting.   Genitourinary:  Negative for difficulty urinating, dysuria and frequency.    Musculoskeletal:  Positive for arthralgias. Negative for neck pain.   Skin:  Negative for rash and wound.   Neurological:  Negative for dizziness, headaches, light-headedness and numbness.       Allergies  Allergies   Allergen Reactions    Codeine Nausea Only        Medications  Current Outpatient Medications   Medication Instructions    aspirin 81 mg EC tablet 1 tablet, oral, Daily    atorvastatin (LIPITOR) 40 mg, oral, Daily    doxazosin (CARDURA) 4 mg, oral, Daily, Take 1 tablet by mouth daily in the evening    fluticasone-umeclidin-vilanter (Trelegy Ellipta) 100-62.5-25 mcg blister with device INHALE 1 PUFF EVERY DAY    Gilotrif 40 mg, oral, Daily, Take at least 1 hour before a meal or 2 hours after a meal.    ipratropium-albuteroL (Duo-Neb) 0.5-2.5 mg/3 mL nebulizer solution Take 3 mL by nebulization 3 times a day as needed for wheezing or shortness of breath.    loperamide (IMODIUM) 2 mg, oral, 4 times daily PRN, Initial: 4 mg, followed by 2 mg every 2 to 4 hours or after each loose stoo    losartan (COZAAR) 100 mg, oral, Daily    magnesium oxide (MAG-OX) 400 mg, oral, Daily    ondansetron (ZOFRAN) 8 mg, oral, Every 8 hours PRN     prochlorperazine (COMPAZINE) 10 mg, oral, Every 6 hours PRN        Objective   VS:  There were no vitals taken for this visit.  Weight   Wt Readings from Last 7 Encounters:   09/23/24 63.3 kg (139 lb 8 oz)   09/19/24 58.8 kg (129 lb 10.1 oz)   09/12/24 58.7 kg (129 lb 6.6 oz)   08/29/24 59.6 kg (131 lb 6.3 oz)   08/22/24 60.6 kg (133 lb 9.6 oz)   08/13/24 62.5 kg (137 lb 12.6 oz)   08/08/24 61.8 kg (136 lb 3.9 oz)         Physical Exam  Vitals reviewed.   Constitutional:       Appearance: Normal appearance. He is underweight.   HENT:      Head: Normocephalic and atraumatic.      Right Ear: External ear normal. No tenderness.      Left Ear: External ear normal. No tenderness.      Nose: Nose normal.      Mouth/Throat:      Mouth: No injury or oral lesions.      Tongue: No lesions.      Pharynx: Oropharynx is clear. No posterior oropharyngeal erythema.      Comments: Edentulous  Eyes:      Extraocular Movements: Extraocular movements intact.      Conjunctiva/sclera: Conjunctivae normal.      Pupils: Pupils are equal, round, and reactive to light.   Neck:      Thyroid: No thyroid mass.      Comments: Mild swelling in submental and neck region.   Right neck is less sensitive/tender to palpation  Cardiovascular:      Rate and Rhythm: Normal rate and regular rhythm.   Pulmonary:      Effort: Pulmonary effort is normal. No respiratory distress.      Breath sounds: Normal breath sounds.   Abdominal:      General: Bowel sounds are normal. There is no distension or abdominal bruit.      Palpations: Abdomen is soft. There is no mass.      Tenderness: There is no abdominal tenderness.   Musculoskeletal:         General: Normal range of motion.      Cervical back: Normal range of motion and neck supple.      Right lower leg: No edema.      Left lower leg: No edema.   Lymphadenopathy:      Cervical: No cervical adenopathy.      Upper Body:      Right upper body: No axillary adenopathy.      Left upper body: No axillary  adenopathy.   Skin:     General: Skin is warm and dry.      Findings: No lesion (radiation dermatitis in bilateral neck), rash or wound.   Neurological:      General: No focal deficit present.      Mental Status: He is alert and oriented to person, place, and time.      Gait: Gait is intact.   Psychiatric:         Mood and Affect: Mood and affect normal.         Diagnostic Results   The below labs were reviewed.  Results from last 7 days   Lab Units 09/25/24  1146   WBC AUTO x10*3/uL 0.6*   HEMOGLOBIN g/dL 9.0*   HEMATOCRIT % 26.8*   PLATELETS AUTO x10*3/uL 134*   NEUTROS ABS x10*3/uL 0.15*   LYMPHS ABS AUTO x10*3/uL 0.23*   MONOS ABS AUTO x10*3/uL 0.20   EOS ABS AUTO x10*3/uL 0.01   NEUTROS PCT AUTO % 25.4   LYMPHS PCT AUTO % 39.0   MONOS PCT AUTO % 33.9   EOS PCT AUTO % 1.7      Results from last 7 days   Lab Units 09/25/24  1146   GLUCOSE mg/dL 94   SODIUM mmol/L 140   POTASSIUM mmol/L 4.2   CHLORIDE mmol/L 102   CO2 mmol/L 29   BUN mg/dL 15   CREATININE mg/dL 1.17   EGFR mL/min/1.73m*2 66   CALCIUM mg/dL 9.6   ALBUMIN g/dL 3.9   PROTEIN TOTAL g/dL 6.3*   BILIRUBIN TOTAL mg/dL 0.5   ALK PHOS U/L 57   ALT U/L 14   AST U/L 12                  Pathology      Imaging  8/19/2024 CT soft tissue neck wo IV contrast  Impression:   No significant interval change in the soft tissue mass with focal calcification within the right level 3 yvette station. No additional cervical lymphadenopathy by size criteria.   Please see CT chest performed the same day for lung findings.         8/27/2024 CT chest wo IV contrast  Impression:   1. Improving bilateral pulmonary metastases, the largest of which is a 14 mm pleural-based left lower lobe nodule.   2. Emphysema.   3. Very extensive vascular calcification includes severe quadruple vessel coronary artery calcification and bilateral renovascular calcification that is marked on the left with left renal atrophy.   4. Diffusely dense bones as discussed above.      Assessment/Plan     ASSESSMENT  Phong Wong is a 74 y.o. male with locally advanced oral cavity cancer, completed chemoRT w/ 7 weekly Carboplatin+Paclitaxel on 10/4/23. Unfortunately found with mets to lungs on 3 months restaging scan. CPS 3. S/p 3 cycles Pembrolizumab but lung nodules progressed. Completed 5 cycles of Carbo/Taxol on 9/12/24.    # Locally advanced oral cavity cancer, T3N2, now with met to lungs  - 6/30/23: PET/CT showed intense FDG avidity within the soft palate, extending to BOT and left palatine tonsil. Multiple uptake in left cervical level II nodes, right cervical level  II/III, infiltrating the right SCM muscle and encasing and invading the right jugular vein. FDG avidity was also detected in the region of the left fossa Rosenmuller and left medial pterygoid muscle.  - 7/13/23: Discussed with patient about carboplatin+ paclitaxel as his chemotherapy along with radiation due to his GFR 30s.  Chemo related side effects were reviewed with patient, consent obtained.   - 8/16 - 10/4/23: Recieved concurrent chemoradiation with 7 weekly Carboplatin (2mg/AUC)/Paclitaxel (45mg/m2)  and 70gy RT in 35fx     - 1/10/24 restaging PET/CT with patient. Scans noted significant interval improvement in prior sites of disease though with residual FDG avidity in soft palate and left palatine tonsil, c/f residual disease vs post treatment changes. Also noted was a new FDG avid LLL pulm nodule with max SUV 5.7, c/f pulmonary metastasis.   -2/9/24 CT chest showed multiple subcentimeter noduesl 2-5mm, dominant lesion is 1.6cm in LLL.    -3/6/24: Lung biopsy of LLL nodule was consistent with HN origin. CPS 3.  Discussed with patient about phase 2 FLAM study (randomized to pembrolizumab or pembrolizumab+danvatirsen (Stat3 inhibitor). Unfortunately due to his CKD, he's not eligible for FLAM study.  - 4/4/24: Started Q3 week Pembrolizumab  - 6/7/24 restaging CT N/C/A/P showed enlargement of subcentimeter lung mets, stable left lung nodule  2cm with new 1cm nodule in RUL. Based on heightened response with carbo+taxol after progression on PD, I recommended 3 cycles of carbo taxol and restaging, if there is response, we can consider afatinib treatment more as long term maintenance therapy.  Consent obtained for chemo and research blood.  - 6/20/24: Cycle 1 of Q3 week Carbo/Taxol.  - 8/19/24 restaging scan showed improving b/l pulmonary mets, largest pleural based LLL nodule is now 14mm.   Pt is aware of that Dr. Burkitt is leaving  at the end of Oct, planning to get to another restaging and change treatment to afatinib if he continues to have response.  Once treatment is changed, will transition his care to Dr. Parks.   - 9/19/24: Patient tolerated C5 with some diarrhea, fatigue and myalgia, none of which are hindering his ADLs. Pt will have restaging PET next week and f/u Dr. Burkitt and decide whether he will have 1 more cycle of Carbo/Taxol before starting of Afatinib. Afatinib will be delivered tomorrow.   - 9/25/24: PET/CT showed continuing response in lung mets, known level III right cervical lymphadenopathy, but increased SUV compared to Miguel PET. No other mets.  Since patients PS is declining, I prefer treating residual disease with afatinib.  Pt is good with the plan.  CT neck/chest ordered to measure exact size of right cervical LN as well as lung mets, so these can be used as a baseline prior to start afatinib. If insurance doesn't approve, will restage next time.    # Pancytopenia  - 9/19: pancytopenic due to chemo, ANC 1500. . Hgb 8.5.   Note patient does have non-painful bleeding hemorrhoids .   - 9/25/24: WBC 0.6, pending ANC, advised pt to go to ED if develops fever    # CKD  - 2/9/24 CT chest showed Bulky atherosclerotic calcification at the origin of the left renal artery likely contributing to atrophy of the left kidney as compared to the right. Kidney atrophy was not mentioned in previous scans.   - 5/1: Creat 1.46, GFR 51.  Encouraged patient to keep up with non-caffeinated PO hydration. 1L NS given today with Pembro. Patient to follow up with PCP regarding atherosclerosis.   - 6/12: Creat 1.54, GFR 48.   - 6/20: Creat 1.6. GFR 45. Additional 1L NS giving w/ chemo today  - 6/28: Creat 1.2. GFR 64. Pt reports he has increased PO hydration. Kidney function improved.   - 7/11 Creat 1.38, GFR 54. Pt reports decreased PO intake lately. Will give 1L NS for hydration  - 8/30/24: Creat 1.14, GFR 68.   - 9/19/24: Creat 1.05, GFR 75. Patient reports he cut out coffee last week and is drinking only water     # Submental/Neck Lymphedema  - Patient with moderate swelling in the submental and neck region. Likely 2ry to prior radiation. Established w/ Dr Guerrero.   - 6/19: Right neck with more swelling and tender to touch, neck is tight. Still itchy to touch.   - 6/28: Right neck swelling persists and is tender to palpation. Will meet with Dr. Guerrero's team for massage on 7/10  - 7/11: Right neck swelling decreased after lymphedema massage  - 9/19: right neck sensitivity/tenderness improving after lymphedema massage.     PLAN  --RTC in 2 weeks to see Eva Patten for re-evaluation  --If pt is doing better in 2 weeks, pt will start afatinib  --RTC in 3 weeks to see Dr. Parks, CT soft neck/chest prior   --Continue ample PO hydration.    --Patient fo follow up with Dr. Weiss on atherosclerosis of left renal artery and coroanary calcifications  --Imodium 2mg after each loose stool PRN for chemo induced diarrehea   --Stool softeners as needed, monitor further symptoms of hemorrhoids

## 2024-09-30 NOTE — TELEPHONE ENCOUNTER
Left VM for patient to get his scans done and to drink a lot of fluids before CT scans. Appt reminder with elicia Patten PA-C on 10/10. Sent FX Aligned message.

## 2024-10-03 ENCOUNTER — APPOINTMENT (OUTPATIENT)
Dept: HEMATOLOGY/ONCOLOGY | Facility: HOSPITAL | Age: 74
End: 2024-10-03
Payer: MEDICARE

## 2024-10-09 ENCOUNTER — HOSPITAL ENCOUNTER (OUTPATIENT)
Dept: RADIOLOGY | Facility: CLINIC | Age: 74
Discharge: HOME | End: 2024-10-09
Payer: MEDICARE

## 2024-10-09 DIAGNOSIS — C10.9 OROPHARYNGEAL CANCER (MULTI): ICD-10-CM

## 2024-10-09 PROCEDURE — 71260 CT THORAX DX C+: CPT

## 2024-10-09 PROCEDURE — 70491 CT SOFT TISSUE NECK W/DYE: CPT

## 2024-10-09 PROCEDURE — 70491 CT SOFT TISSUE NECK W/DYE: CPT | Performed by: RADIOLOGY

## 2024-10-09 PROCEDURE — 2550000001 HC RX 255 CONTRASTS: Performed by: INTERNAL MEDICINE

## 2024-10-10 ENCOUNTER — LAB (OUTPATIENT)
Dept: LAB | Facility: HOSPITAL | Age: 74
End: 2024-10-10
Payer: MEDICARE

## 2024-10-10 ENCOUNTER — OFFICE VISIT (OUTPATIENT)
Dept: HEMATOLOGY/ONCOLOGY | Facility: HOSPITAL | Age: 74
End: 2024-10-10
Payer: MEDICARE

## 2024-10-10 VITALS
BODY MASS INDEX: 20.82 KG/M2 | HEART RATE: 104 BPM | WEIGHT: 128.97 LBS | SYSTOLIC BLOOD PRESSURE: 151 MMHG | RESPIRATION RATE: 20 BRPM | DIASTOLIC BLOOD PRESSURE: 73 MMHG | OXYGEN SATURATION: 100 % | TEMPERATURE: 98.4 F

## 2024-10-10 DIAGNOSIS — N18.2 CKD (CHRONIC KIDNEY DISEASE) STAGE 2, GFR 60-89 ML/MIN: ICD-10-CM

## 2024-10-10 DIAGNOSIS — T45.1X5A CHEMOTHERAPY-INDUCED FATIGUE: ICD-10-CM

## 2024-10-10 DIAGNOSIS — K52.1 CHEMOTHERAPY INDUCED DIARRHEA: ICD-10-CM

## 2024-10-10 DIAGNOSIS — C10.9 OROPHARYNGEAL CANCER (MULTI): ICD-10-CM

## 2024-10-10 DIAGNOSIS — M25.50 JOINT PAIN FOLLOWING CHEMOTHERAPY: ICD-10-CM
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
## 2024-10-10 DIAGNOSIS — C78.02 SQUAMOUS CELL CARCINOMA METASTATIC TO BOTH LUNGS: Primary | ICD-10-CM

## 2024-10-10 DIAGNOSIS — C78.01 SQUAMOUS CELL CARCINOMA METASTATIC TO BOTH LUNGS: Primary | ICD-10-CM

## 2024-10-10 DIAGNOSIS — R53.83 CHEMOTHERAPY-INDUCED FATIGUE: ICD-10-CM

## 2024-10-10 DIAGNOSIS — I89.0 LYMPHEDEMA DUE TO AND NOT CONCURRENT WITH IONIZING RADIOTHERAPY: ICD-10-CM

## 2024-10-10 DIAGNOSIS — T45.1X5A CHEMOTHERAPY INDUCED DIARRHEA: ICD-10-CM

## 2024-10-10 DIAGNOSIS — C44.92 SQUAMOUS CELL CARCINOMA METASTATIC TO BOTH LUNGS: Primary | ICD-10-CM

## 2024-10-10 DIAGNOSIS — G89.18 JOINT PAIN FOLLOWING CHEMOTHERAPY: ICD-10-CM

## 2024-10-10 DIAGNOSIS — C01 SQUAMOUS CELL CARCINOMA OF BASE OF TONGUE (MULTI): ICD-10-CM

## 2024-10-10 LAB
ALBUMIN SERPL BCP-MCNC: 3.9 G/DL (ref 3.4–5)
ALP SERPL-CCNC: 47 U/L (ref 33–136)
ALT SERPL W P-5'-P-CCNC: 9 U/L (ref 10–52)
ANION GAP SERPL CALC-SCNC: 12 MMOL/L (ref 10–20)
AST SERPL W P-5'-P-CCNC: 11 U/L (ref 9–39)
BASOPHILS # BLD AUTO: 0.03 X10*3/UL (ref 0–0.1)
BASOPHILS NFR BLD AUTO: 0.6 %
BILIRUB SERPL-MCNC: 0.4 MG/DL (ref 0–1.2)
BUN SERPL-MCNC: 15 MG/DL (ref 6–23)
CALCIUM SERPL-MCNC: 9.6 MG/DL (ref 8.6–10.3)
CHLORIDE SERPL-SCNC: 102 MMOL/L (ref 98–107)
CO2 SERPL-SCNC: 30 MMOL/L (ref 21–32)
CREAT SERPL-MCNC: 1.2 MG/DL (ref 0.5–1.3)
EGFRCR SERPLBLD CKD-EPI 2021: 64 ML/MIN/1.73M*2
EOSINOPHIL # BLD AUTO: 0.05 X10*3/UL (ref 0–0.4)
EOSINOPHIL NFR BLD AUTO: 1 %
ERYTHROCYTE [DISTWIDTH] IN BLOOD BY AUTOMATED COUNT: 16.8 % (ref 11.5–14.5)
GLUCOSE SERPL-MCNC: 95 MG/DL (ref 74–99)
HCT VFR BLD AUTO: 30.8 % (ref 41–52)
HGB BLD-MCNC: 10.3 G/DL (ref 13.5–17.5)
IMM GRANULOCYTES # BLD AUTO: 0.02 X10*3/UL (ref 0–0.5)
IMM GRANULOCYTES NFR BLD AUTO: 0.4 % (ref 0–0.9)
LYMPHOCYTES # BLD AUTO: 0.39 X10*3/UL (ref 0.8–3)
LYMPHOCYTES NFR BLD AUTO: 8 %
MAGNESIUM SERPL-MCNC: 1.78 MG/DL (ref 1.6–2.4)
MCH RBC QN AUTO: 30.1 PG (ref 26–34)
MCHC RBC AUTO-ENTMCNC: 33.4 G/DL (ref 32–36)
MCV RBC AUTO: 90 FL (ref 80–100)
MONOCYTES # BLD AUTO: 0.78 X10*3/UL (ref 0.05–0.8)
MONOCYTES NFR BLD AUTO: 16 %
NEUTROPHILS # BLD AUTO: 3.6 X10*3/UL (ref 1.6–5.5)
NEUTROPHILS NFR BLD AUTO: 74 %
NRBC BLD-RTO: 0 /100 WBCS (ref 0–0)
PLATELET # BLD AUTO: 313 X10*3/UL (ref 150–450)
POTASSIUM SERPL-SCNC: 3.7 MMOL/L (ref 3.5–5.3)
PROT SERPL-MCNC: 6.2 G/DL (ref 6.4–8.2)
RBC # BLD AUTO: 3.42 X10*6/UL (ref 4.5–5.9)
SODIUM SERPL-SCNC: 140 MMOL/L (ref 136–145)
WBC # BLD AUTO: 4.9 X10*3/UL (ref 4.4–11.3)

## 2024-10-10 PROCEDURE — 3078F DIAST BP <80 MM HG: CPT | Performed by: STUDENT IN AN ORGANIZED HEALTH CARE EDUCATION/TRAINING PROGRAM

## 2024-10-10 PROCEDURE — 3077F SYST BP >= 140 MM HG: CPT | Performed by: STUDENT IN AN ORGANIZED HEALTH CARE EDUCATION/TRAINING PROGRAM

## 2024-10-10 PROCEDURE — 85025 COMPLETE CBC W/AUTO DIFF WBC: CPT

## 2024-10-10 PROCEDURE — 1160F RVW MEDS BY RX/DR IN RCRD: CPT | Performed by: STUDENT IN AN ORGANIZED HEALTH CARE EDUCATION/TRAINING PROGRAM

## 2024-10-10 PROCEDURE — 99215 OFFICE O/P EST HI 40 MIN: CPT | Performed by: STUDENT IN AN ORGANIZED HEALTH CARE EDUCATION/TRAINING PROGRAM

## 2024-10-10 PROCEDURE — 36415 COLL VENOUS BLD VENIPUNCTURE: CPT

## 2024-10-10 PROCEDURE — 1036F TOBACCO NON-USER: CPT | Performed by: STUDENT IN AN ORGANIZED HEALTH CARE EDUCATION/TRAINING PROGRAM

## 2024-10-10 PROCEDURE — 80053 COMPREHEN METABOLIC PANEL: CPT

## 2024-10-10 PROCEDURE — 1159F MED LIST DOCD IN RCRD: CPT | Performed by: STUDENT IN AN ORGANIZED HEALTH CARE EDUCATION/TRAINING PROGRAM

## 2024-10-10 PROCEDURE — 83735 ASSAY OF MAGNESIUM: CPT

## 2024-10-10 PROCEDURE — 1126F AMNT PAIN NOTED NONE PRSNT: CPT | Performed by: STUDENT IN AN ORGANIZED HEALTH CARE EDUCATION/TRAINING PROGRAM

## 2024-10-10 RX ORDER — OFLOXACIN 3 MG/ML
1 SOLUTION/ DROPS OPHTHALMIC DAILY
COMMUNITY
Start: 2024-09-05

## 2024-10-10 ASSESSMENT — ENCOUNTER SYMPTOMS
FATIGUE: 0
CONSTIPATION: 0
DIARRHEA: 0
ARTHRALGIAS: 0

## 2024-10-10 ASSESSMENT — PAIN SCALES - GENERAL: PAINLEVEL: 0-NO PAIN

## 2024-10-10 NOTE — PROGRESS NOTES
Mr. Wong is here with wife today for follow up. Feeling well overall, states energy levels improving. Medications and allergies reviewed, RIVER Patten aware. Education Documentation  Nausea Management, taught by Tri Joyce RN at 10/10/2024 11:08 AM.  Learner: Patient  Readiness: Acceptance  Method: Explanation  Response: Verbalizes Understanding    Fatigue, taught by Tri Joyce RN at 10/10/2024 11:08 AM.  Learner: Patient  Readiness: Acceptance  Method: Explanation  Response: Verbalizes Understanding    Diagnostic Studies, taught by Tri Joyce RN at 10/10/2024 11:08 AM.  Learner: Patient  Readiness: Acceptance  Method: Explanation  Response: Verbalizes Understanding    Comprehensive Metabolic Panel (CMP), taught by Tri Joyce RN at 10/10/2024 11:08 AM.  Learner: Patient  Readiness: Acceptance  Method: Explanation  Response: Verbalizes Understanding    Complete Blood Count with Differential (CBC w/ Diff), taught by Tri Joyce RN at 10/10/2024 11:08 AM.  Learner: Patient  Readiness: Acceptance  Method: Explanation  Response: Verbalizes Understanding    Oriented to Facility, taught by Tri Joyce RN at 10/10/2024 11:08 AM.  Learner: Patient  Readiness: Acceptance  Method: Explanation  Response: Verbalizes Understanding    General Medication Information, taught by Tri Joyce RN at 10/10/2024 11:08 AM.  Learner: Patient  Readiness: Acceptance  Method: Explanation  Response: Verbalizes Understanding    Supportive Medications, taught by Tri Joyce RN at 10/10/2024 11:08 AM.  Learner: Patient  Readiness: Acceptance  Method: Explanation  Response: Verbalizes Understanding    Treatment Plan and Schedule, taught by Tri Joyce RN at 10/10/2024 11:08 AM.  Learner: Patient  Readiness: Acceptance  Method: Explanation  Response: Verbalizes Understanding    Education Comments  No comments found.

## 2024-10-12 ENCOUNTER — SPECIALTY PHARMACY (OUTPATIENT)
Dept: PHARMACY | Facility: CLINIC | Age: 74
End: 2024-10-12

## 2024-10-14 ENCOUNTER — SPECIALTY PHARMACY (OUTPATIENT)
Dept: PHARMACY | Facility: CLINIC | Age: 74
End: 2024-10-14

## 2024-10-14 NOTE — PROGRESS NOTES
Patient ID: Phong Wong is a 74 y.o. male.  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 Juan Jose Fletcher   [facility]-C   RadOnc: Dr. Vianca Steen     Current Therapy  10/15/24: Afatinib 40 mg QD    Prior Therapy:   8/16 - 10/4/23: Recieved concurrent chemoradiation with 7 weekly Carboplatin (2mg/AUC)/Paclitaxel (45mg/m2)  and 70gy RT in 35fx  4/4 - 6/13/24: Received 3 cycles of Pembrolizumab  6/20/24 - 9/12/24: Received 5 cycles of Carbo/Taxol     Sites of Disease:  Soft palate, BOT, Left palatine tonsil  B/L Cervical LN  Lung     Oncologic Issues:   Odynophagia  Fatigue     ONCOLOGIC HISTORY  - Pt had 2 months hx of throat discomfort with lump in right neck  - 6/13/23: CT neck showed a mass 2.8 x 2.5 x 2.9 cm in the right lower cervical neck soft tissues. Two suspicious nodes were observed, one at level 3 on the right and another at level 2 on the left.  - 6/28/23: seen by Dr. Fletcher. 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 Based on the findings, Dr. Fletcher did not recommend surgery due to the location  of the primary tumor and the presence of yvette disease  - 6/30/23: PET/CT showed intense FDG avidity within the soft palate, extending to the base of the tongue and left palatine tonsil. Multiple FDG avid left cervical level II nodes were observed, along with an FDG avid mass in the region of the right cervical  level II/III, infiltrating the right SCM muscle and encasing and invading the right jugular vein. FDG avidity was also detected in the region of the left fossa Rosenmuller and left medial pterygoid muscle.  - 8/16 - 10/4/23: Recieved concurrent chemoradiation with 7 weekly Carboplatin (2mg/AUC)/Paclitaxel (45mg/m2)  and 70gy RT in 35fx  - 4/4 - 6/13/24: Received 3  cycles of Pembrolizumab  - 6/20/24 - 9/12/24: Received 5 cycles of Carbo/Taxol  - 10/15/24: Begin afatinib 40 mg QD    Admissions:  10/5 - 10/10/23: Admitted for extreme weakness, HECTOR, pancytopenia including anemia requiring blood transfusion. D/C'ed to acute rehab     SOCIAL HISTORY  Lives in [place].  to wife Emelia of 8 years. Brother in [place]. Worked as , retired in 5 years ago. Smoked 1-2 PPD x 50 years. Rare EtOH. No illicits.      FAMILY HISTORY  Brother had brain cancer ([facility])    CURRENT MEDS REVIEWED       ALLERGIES REVIEWED        SUBJECTIVE: Patient doing well today. He is at his baseline. His appetite is fair - he has lost 1 lb since end of Sept. Energy is improved.    A 13 point review of systems was performed, with significant findings documented above in subjective history.    OBJECTIVE:  Vitals:    10/15/24 1242   BP: (!) 108/49   Pulse: 96   Resp: 18   Temp: 36.6 °C (97.9 °F)   SpO2: 97%      Body surface area is 1.65 meters squared.     Wt Readings from Last 5 Encounters:   10/15/24 58.2 kg (128 lb 4.9 oz)   10/10/24 58.5 kg (128 lb 15.5 oz)   09/23/24 63.3 kg (139 lb 8 oz)   09/19/24 58.8 kg (129 lb 10.1 oz)   09/12/24 58.7 kg (129 lb 6.6 oz)       ECOGSCORE: 2- Ambulatory and  capable of all selfcare; unable to carry out work activities.  Up and about > 50% of waking hrs.  Gen: A&O, NAD  Head: Normocephalic, atraumatic  Eyes: no scleral icterus  ENT: mucous membranes moist, no oropharyngeal lesions  Resp: Lungs CTAB  Cardiac: Normal rate, regular rhythm, no murmurs appreciated  Abdomen: Soft, nondistended, nontender, +BS  Neuro: CNII-XII grossly intact  Psych: appropriate mood & affect  Skin: warm, dry, no apparent rashes    Diagnostic Results   Results:  Labs:  Lab Results   Component Value Date    WBC 4.9 10/10/2024    HGB 10.3 (L) 10/10/2024    HCT 30.8 (L) 10/10/2024    MCV 90 10/10/2024     10/10/2024      Lab Results   Component Value Date    NEUTROABS  3.60 10/10/2024        Lab Results   Component Value Date    GLUCOSE 95 10/10/2024    CALCIUM 9.6 10/10/2024     10/10/2024    K 3.7 10/10/2024    CO2 30 10/10/2024     10/10/2024    BUN 15 10/10/2024    CREATININE 1.20 10/10/2024    MG 1.78 10/10/2024     Lab Results   Component Value Date    ALT 9 (L) 10/10/2024    AST 11 10/10/2024    ALKPHOS 47 10/10/2024    BILITOT 0.4 10/10/2024    BILIDIR 0.2 05/14/2022      Lab Results   Component Value Date    ACTH 26.3 05/23/2024    CORTISOL 16.1 05/23/2024    TSH 3.58 09/12/2024    FREET4 1.09 06/20/2024     PET 09/24/2024:  IMPRESSION:  When compared to PET-CT bone skull base to mid thigh dated 01/10/2024  :  1. Interval increase in FDG activity involving level III right  cervical lymph node, consistent with known residual yvette metastasis.  2. Interval decrease in FDG activity involving left lower lobe lung  nodules when compared to prior PET-CT, likely post treatment changes.      CT Chest 10/9/24:  IMPRESSION:  Interval development of a 19 x 7 mm pleural-based patchy area on the  left.      Interval development cavitation in 12 mm left lower lobe nodule.      The other nodules unchanged.      Emphysema.      Dense arterial calcifications. Atrophic left kidney.      Signed by: Anastasiia Cordova 10/14/2024 12:46 PM  Dictation workstation:   [id number]      CT Neck 10/09/2024:    IMPRESSION:  Redemonstrated is a soft tissue mass at level 3 on the right  measuring 2.4 x 2.6 x 3.7 cm, similar to the prior exam given  differences in technique.      The right internal jugular vein is not visualized and occluded or  thrombosed. There is reconstitution within the transverse sinus  intracranially.    Assessment/Plan   Mr. Phong Wong is a 74 y.o. male with locally advanced oral cavity cancer, completed chemoRT w/ 7 weekly Carboplatin+Paclitaxel on 10/4/23. Unfortunately found with mets to lungs on 3 months restaging scan. CPS 3. S/p 3 cycles Pembrolizumab but lung  nodules progressed. Completed 5 cycles of Carbo/Taxol on 9/12/24. Plan to start Afatinib when feeling improved.     # Locally advanced oral cavity cancer, T3N2, now with met to lungs  - 6/30/23 PET/CT showed intense FDG avidity within the soft palate, extending to BOT and left palatine tonsil. Multiple uptake in left cervical level II nodes, right cervical level  II/III, infiltrating the right SCM muscle and encasing and invading the right jugular vein. FDG avidity was also detected in the region of the left fossa Rosenmuller and left   - Discussed with patient about carboplatin+ paclitaxel as his chemotherapy along with radiation due to his GFR 30s.  - 8/16 - 10/4/23: Recieved concurrent chemoradiation with 7 weekly Carboplatin (2mg/AUC)/Paclitaxel (45mg/m2)  and 70gy RT in 35fx     - 1/10/24 restaging PET/CT noted significant interval improvement in prior sites of disease though with residual FDG avidity in soft palate and left palatine tonsil, c/f residual disease vs post treatment changes. Also noted was a new FDG avid LLL pulm nodule with max SUV 5.7, c/f pulmonary metastasis.   - 2/9/24 CT chest showed multiple subcentimeter noduesl 2-5mm, dominant lesion is 1.6cm in LLL.    - 3/6/24: Lung biopsy of LLL nodule was consistent with HN origin. CPS 3.  Discussed with patient about phase 2 FLAM study (randomized to pembrolizumab or pembrolizumab+danvatirsen (Stat3 inhibitor). Unfortunately due to his CKD, he's not eligible for FLAM study.  - 4/4/24: Started Q3 week Pembrolizumab  - 6/7/24 restaging CT N/C/A/P showed enlargement of subcentimeter lung mets, stable left lung nodule 2cm with new 1cm nodule in RUL. Based on heightened response with carbo+taxol after progression on PD, I recommended 3 cycles of carbo taxol and restaging, if there is response, we can consider afatinib treatment more as long term maintenance therapy.   - 6/20/24: Cycle 1 of Q3 week Carbo/Taxol.  - 8/19/24 restaging scan showed improving  b/l pulmonary mets, largest pleural based LLL nodule is now 14mm.   Pt is aware of that Dr. Burkitt is leaving  at the end of Oct, planning to get to another restaging and change treatment to afatinib if he continues to have response.  Once treatment is changed, will transition his care to Dr. Parks.   - 9/25/24: PET/CT showed continuing response in lung mets, known level III right cervical lymphadenopathy, but increased SUV compared to Miguel PET. No other mets.  Since patients PS is declining, prefer treating residual disease with afatinib. CT neck/chest ordered to measure exact size of right cervical LN as well as lung mets, so these can be used as a baseline prior to start afatinib.  - 10/9/24 CT N/C pending. Patient is otherwise feeling well. Diarrhea resolved, arthralgia and peripheral neuropathy have resolved. He continue to tolerate regular diet and denies any dysphagia. Appetite is not great so has weight loss but this is improving.   - 10/15/24: Starting afatinib 40 mg daily. Consents signed. Will follow in 3 weeks.    # Pancytopenia: resolving  - 9/19: pancytopenic due to chemo, ANC 1500. . Hgb 8.5.   Note patient does have non-painful bleeding hemorrhoids .   - 9/25/24: WBC 0.6, pending ANC, advised pt to go to ED if develops fever    # CKD  - 2/9/24 CT chest showed Bulky atherosclerotic calcification at the origin of the left renal artery likely contributing to atrophy of the left kidney as compared to the right. Kidney atrophy was not mentioned in previous scans.   - 5/1/24: Creat 1.46, GFR 51. Encouraged patient to keep up with non-caffeinated PO hydration. 1L NS given today with Pembro. Patient to follow up with PCP regarding atherosclerosis.   - Baseline Creat 1.1 - 1.3, GFR 55-65. Will support with IV hydration with each treatment    # Submental/Neck Lymphedema  - Patient with moderate swelling in the submental and neck region. Likely 2ry to prior radiation. Established w/ Dr Guerrero in  [facility].   - Neck pain and swelling improving w/ lymphedema massage.     PLAN  -- Begin afatinib 40 mg daily.  -- Continue ample PO hydration.    -- Patient fo follow up with Dr. Weiss on atherosclerosis of left renal artery and coroanary calcifications  -- Imodium 2mg after each loose stool PRN for chemo induced diarrehea   -- Stool softeners as needed, monitor further symptoms of hemorrhoids     Bandar Parks MD  Thoracic + [facility] Medical Oncology   [address] [place] [id number]  Phone: [phone]

## 2024-10-15 ENCOUNTER — OFFICE VISIT (OUTPATIENT)
Dept: HEMATOLOGY/ONCOLOGY | Facility: HOSPITAL | Age: 74
End: 2024-10-15
Payer: MEDICARE

## 2024-10-15 VITALS
SYSTOLIC BLOOD PRESSURE: 108 MMHG | WEIGHT: 128.31 LBS | HEART RATE: 96 BPM | RESPIRATION RATE: 18 BRPM | TEMPERATURE: 97.9 F | DIASTOLIC BLOOD PRESSURE: 49 MMHG | OXYGEN SATURATION: 97 % | BODY MASS INDEX: 20.71 KG/M2

## 2024-10-15 DIAGNOSIS — C10.9 OROPHARYNGEAL CANCER (MULTI): ICD-10-CM

## 2024-10-15 PROCEDURE — 99215 OFFICE O/P EST HI 40 MIN: CPT | Performed by: STUDENT IN AN ORGANIZED HEALTH CARE EDUCATION/TRAINING PROGRAM

## 2024-10-15 PROCEDURE — 1036F TOBACCO NON-USER: CPT | Performed by: STUDENT IN AN ORGANIZED HEALTH CARE EDUCATION/TRAINING PROGRAM

## 2024-10-15 PROCEDURE — 1126F AMNT PAIN NOTED NONE PRSNT: CPT | Performed by: STUDENT IN AN ORGANIZED HEALTH CARE EDUCATION/TRAINING PROGRAM

## 2024-10-15 PROCEDURE — 3078F DIAST BP <80 MM HG: CPT | Performed by: STUDENT IN AN ORGANIZED HEALTH CARE EDUCATION/TRAINING PROGRAM

## 2024-10-15 PROCEDURE — 1159F MED LIST DOCD IN RCRD: CPT | Performed by: STUDENT IN AN ORGANIZED HEALTH CARE EDUCATION/TRAINING PROGRAM

## 2024-10-15 PROCEDURE — 3074F SYST BP LT 130 MM HG: CPT | Performed by: STUDENT IN AN ORGANIZED HEALTH CARE EDUCATION/TRAINING PROGRAM

## 2024-10-15 ASSESSMENT — PAIN SCALES - GENERAL: PAINLEVEL: 0-NO PAIN

## 2024-10-16 ENCOUNTER — SPECIALTY PHARMACY (OUTPATIENT)
Dept: PHARMACY | Facility: CLINIC | Age: 74
End: 2024-10-16

## 2024-10-16 NOTE — DISCHARGE INSTRUCTIONS
Clinic Care Coordination Contact  [facility]/Voicemail    Clinical Data: Care Coordinator Outreach    Outreach Documentation Number of Outreach Attempt   10/14/2024  11:13 AM 1   10/16/2024   1:51 PM 2       Left message on patient's voicemail with call back information and requested return call.    Plan: Care Coordinator will send care coordination introduction letter with care coordinator contact information and explanation of care coordination services via [facility]. Care Coordinator will do no further outreaches at this time.    Angela Aragon, RN Care Coordination   [facility]  Email: [email or web address]  Phone: [phone]       Please stop taking your chlorthalidone as it was likely cause of your low potassium. Follow up labs (potasium and blood count) have been ordered, please have these drawn by early next week. Follow with your oncologist.

## 2024-10-23 RX ORDER — HEPARIN SODIUM,PORCINE/PF 10 UNIT/ML
50 SYRINGE (ML) INTRAVENOUS AS NEEDED
OUTPATIENT
Start: 2024-10-23

## 2024-10-23 RX ORDER — HEPARIN 100 UNIT/ML
500 SYRINGE INTRAVENOUS AS NEEDED
OUTPATIENT
Start: 2024-10-23

## 2024-10-24 ENCOUNTER — TELEPHONE (OUTPATIENT)
Dept: ADMISSION | Facility: HOSPITAL | Age: 74
End: 2024-10-24
Payer: MEDICARE

## 2024-10-24 NOTE — TELEPHONE ENCOUNTER
Pt is requesting assistance to obtain a refill of afatinib.  He believes there is an issue with insurance but is unsure what the problem is.      Spoke to [facility] who report the drug is not authorized and provided phone number for Human: [phone].    Call placed to [facility] and per representative, afatinib is approved through 12/2025.  She was unable to provide a preferred specialty pharmacy.

## 2024-10-25 NOTE — TELEPHONE ENCOUNTER
Per SP, prior authorization has been approved until 12/31/25. A claim was submitted but we are getting a rejection from pt's insurance as pt did not select [facility] as preferred pharmacy.    Medicare part D plans are now asking pt's to select a pharmacy upon enrollment.  Pt can either call insurance plan and have them add [facility] or will need to have this filled through whatever pharmacy was selected previously.      Left message asking pt to call back to discuss.

## 2024-10-25 NOTE — TELEPHONE ENCOUNTER
Patient  calling back, reviewed information from previous message, provided patient options Pt can either call insurance plan and have them add [facility] or will need to have this filled through whatever pharmacy was selected previously. He verbalized back understanding, asked if we would do this, instructed patient he will need to be in contact with his pharmacy.

## 2024-10-28 ENCOUNTER — APPOINTMENT (OUTPATIENT)
Dept: INTEGRATIVE MEDICINE | Facility: CLINIC | Age: 74
End: 2024-10-28
Payer: MEDICARE

## 2024-10-31 DIAGNOSIS — C10.9 OROPHARYNGEAL CANCER (MULTI): ICD-10-CM

## 2024-11-04 NOTE — PROGRESS NOTES
Patient ID: Phong Wong is a 74 y.o. male.  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 Juan Jose Fletcher   [facility]   RadOnc: Dr. Vianca Steen     Current Therapy  10/15/24: Afatinib 40 mg QD    Prior Therapy:   8/16 - 10/4/23: Recieved concurrent chemoradiation with 7 weekly Carboplatin (2mg/AUC)/Paclitaxel (45mg/m2)  and 70gy RT in 35fx  4/4 - 6/13/24: Received 3 cycles of Pembrolizumab  6/20/24 - 9/12/24: Received 5 cycles of Carbo/Taxol     Sites of Disease:  Soft palate, BOT, Left palatine tonsil  B/L Cervical LN  Lung     Oncologic Issues:   Odynophagia  Fatigue     ONCOLOGIC HISTORY  - Pt had 2 months hx of throat discomfort with lump in right neck  - 6/13/23: CT neck showed a mass 2.8 x 2.5 x 2.9 cm in the right lower cervical neck soft tissues. Two suspicious nodes were observed, one at level 3 on the right and another at level 2 on the left.  - 6/28/23: seen by Dr. Fletcher. 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 Based on the findings, Dr. Fletcher did not recommend surgery due to the location  of the primary tumor and the presence of yvette disease  - 6/30/23: PET/CT showed intense FDG avidity within the soft palate, extending to the base of the tongue and left palatine tonsil. Multiple FDG avid left cervical level II nodes were observed, along with an FDG avid mass in the region of the right cervical  level II/III, infiltrating the right SCM muscle and encasing and invading the right jugular vein. FDG avidity was also detected in the region of the left fossa Rosenmuller and left medial pterygoid muscle.  - 8/16 - 10/4/23: Recieved concurrent chemoradiation with 7 weekly Carboplatin (2mg/AUC)/Paclitaxel (45mg/m2)  and 70gy RT in 35fx  - 4/4 - 6/13/24: Received 3  cycles of Pembrolizumab  - 24 - 24: Received 5 cycles of Carbo/Taxol  - 10/15/24: Begin afatinib 40 mg QD    Admissions:  10/5 - 10/10/23: Admitted for extreme weakness, HECTOR, pancytopenia including anemia requiring blood transfusion. D/C'ed to acute rehab     SOCIAL HISTORY  Lives in [place].  to wife Emelia of 8 years. Brother in [place]. Worked as , retired in 5 years ago. Smoked 1-2 PPD x 50 years. Rare EtOH. No illicits.      FAMILY HISTORY  Brother had brain cancer (Santy Bearden)    CURRENT MEDS REVIEWED     ALLERGIES REVIEWED     Subjective   Interval History  Patient with SCCa of Oropharynx metastatic to lungs, started on Afatinib 40mg QD on 10/15/24.      Patient reports having loose stools 1-2 x per day after starting Afatinib 4 weeks ago. However in the last week his diarrhea worsened to 4-6 x per day and became very watery, and also became nauseous and vomited. This timing coincided with him drinking  nutritional shake. He has not been using Loperamide consistently until yesterday.   Yesterday he started Loperamide 2mg 4x a day and did not have diarrhea yesterday or today. However still a bit nauseous.   Patient did not take Afatinib for the past week due to nausea.   He also reports taste changes for 3-4 weeks which led to poor appetite and decreased PO intake. In the last week he did not eat much solid food and has significant weight loss.     A 13 point review of systems was performed, with significant findings documented above in subjective history.    Review of Systems   Constitutional:  Positive for appetite change. Negative for chills and fever.   HENT:   Negative for hearing loss, lump/mass, mouth sores, nosebleeds, sore throat, tinnitus and trouble swallowing.    Respiratory:  Negative for cough and shortness of breath.    Cardiovascular:  Negative for chest pain and leg swelling.   Gastrointestinal:  Positive for diarrhea, nausea and vomiting. Negative for  abdominal pain and constipation.   Musculoskeletal:  Negative for arthralgias.   Skin:  Negative for rash.   Neurological:  Negative for headaches, light-headedness and numbness.       Objective   VS: /74   Pulse 105   Temp 36 °C (96.8 °F) (Core)   Resp 18   Wt 52 kg (114 lb 10.2 oz)   SpO2 99%   BMI 18.50 kg/m²    Weight:   Daily Weight  11/07/24 : 52 kg (114 lb 10.2 oz)  10/15/24 : 58.2 kg (128 lb 4.9 oz)  10/10/24 : 58.5 kg (128 lb 15.5 oz)  09/23/24 : 63.3 kg (139 lb 8 oz)  09/19/24 : 58.8 kg (129 lb 10.1 oz)  09/12/24 : 58.7 kg (129 lb 6.6 oz)  08/29/24 : 59.6 kg (131 lb 6.3 oz)    Physical Exam  Vitals reviewed.   Constitutional:       Appearance: Normal appearance. He is well-developed.   HENT:      Head: Normocephalic and atraumatic.      Right Ear: External ear normal. No tenderness.      Left Ear: External ear normal. No tenderness.      Nose: Nose normal.      Mouth/Throat:      Lips: Pink.      Mouth: Mucous membranes are moist. No injury or oral lesions.      Comments: White coating on tongue, scrapes off.   Eyes:      General: Lids are normal.      Extraocular Movements: Extraocular movements intact.      Conjunctiva/sclera: Conjunctivae normal.      Pupils: Pupils are equal, round, and reactive to light.   Neck:      Thyroid: No thyroid mass.   Cardiovascular:      Rate and Rhythm: Normal rate and regular rhythm.      Pulses: Normal pulses.      Heart sounds: No murmur heard.     No gallop.   Pulmonary:      Effort: Pulmonary effort is normal. No respiratory distress.      Breath sounds: Normal breath sounds and air entry. No stridor. No wheezing.   Abdominal:      General: Abdomen is flat. Bowel sounds are normal. There is no distension or abdominal bruit.      Palpations: Abdomen is soft. There is no mass.      Tenderness: There is no abdominal tenderness.   Musculoskeletal:         General: Normal range of motion.      Cervical back: Normal range of motion and neck supple. No signs of  trauma. Normal range of motion.      Right lower leg: No edema.      Left lower leg: No edema.   Lymphadenopathy:      Cervical: No cervical adenopathy.      Upper Body:      Right upper body: No axillary adenopathy.      Left upper body: No axillary adenopathy.   Skin:     General: Skin is warm and dry.      Comments: No new skin lesions   Neurological:      General: No focal deficit present.      Mental Status: He is alert and oriented to person, place, and time. Mental status is at baseline.      Gait: Gait is intact.   Psychiatric:         Attention and Perception: Attention normal.         Mood and Affect: Mood and affect normal.         Behavior: Behavior is cooperative.       ECOGSCORE: 2- Ambulatory and  capable of all selfcare; unable to carry out work activities.  Up and about > 50% of waking hrs.    Diagnostic Results   Labs:  Results from last 7 days   Lab Units 11/07/24  1204   WBC AUTO x10*3/uL 6.5   HEMOGLOBIN g/dL 11.5*   HEMATOCRIT % 33.4*   PLATELETS AUTO x10*3/uL 243   NEUTROS ABS x10*3/uL 4.81   LYMPHS ABS AUTO x10*3/uL 0.49*   MONOS ABS AUTO x10*3/uL 1.06*   EOS ABS AUTO x10*3/uL 0.13   NEUTROS PCT AUTO % 73.7   LYMPHS PCT AUTO % 7.5   MONOS PCT AUTO % 16.3   EOS PCT AUTO % 2.0      Results from last 7 days   Lab Units 11/07/24  1204   GLUCOSE mg/dL 111*   SODIUM mmol/L 137   POTASSIUM mmol/L 3.5   CHLORIDE mmol/L 99   CO2 mmol/L 22   BUN mg/dL 23   CREATININE mg/dL 1.58*   EGFR mL/min/1.73m*2 46*   CALCIUM mg/dL 9.4   MAGNESIUM mg/dL 1.56*   ALBUMIN g/dL 4.0   PROTEIN TOTAL g/dL 6.4   BILIRUBIN TOTAL mg/dL 0.9   ALK PHOS U/L 45   ALT U/L 10   AST U/L 11                        IMAGING  PET 09/24/2024:  IMPRESSION:  When compared to PET-CT bone skull base to mid thigh dated 01/10/2024    1. Interval increase in FDG activity involving level III right  cervical lymph node, consistent with known residual yvette metastasis.  2. Interval decrease in FDG activity involving left lower lobe lung  nodules  when compared to prior PET-CT, likely post treatment changes.      CT Chest 10/9/24:  IMPRESSION:  Interval development of a 19 x 7 mm pleural-based patchy area on the  left.     Interval development cavitation in 12 mm left lower lobe nodule.  The other nodules unchanged.  Emphysema.  Dense arterial calcifications. Atrophic left kidney.        CT Neck 10/09/2024:  IMPRESSION:  Redemonstrated is a soft tissue mass at level 3 on the right  measuring 2.4 x 2.6 x 3.7 cm, similar to the prior exam given  differences in technique.      The right internal jugular vein is not visualized and occluded or  thrombosed. There is reconstitution within the transverse sinus  intracranially.    Assessment/Plan   Mr. Phong Wong is a 74 y.o. male with locally advanced oral cavity cancer, completed chemoRT w/ 7 weekly Carboplatin+Paclitaxel on 10/4/23. Unfortunately found with mets to lungs on 3 months restaging scan. CPS 3. S/p 3 cycles Pembrolizumab but lung nodules progressed. Completed 5 cycles of Carbo/Taxol on 9/12/24. Start Afatinib on 10/15/24.     # Locally advanced oral cavity cancer, T3N2, now with met to lungs  - 6/30/23 PET/CT showed intense FDG avidity within the soft palate, extending to BOT and left palatine tonsil. Multiple uptake in left cervical level II nodes, right cervical level  II/III, infiltrating the right SCM muscle and encasing and invading the right jugular vein. FDG avidity was also detected in the region of the left fossa Rosenmuller and left   - Discussed with patient about carboplatin+ paclitaxel as his chemotherapy along with radiation due to his GFR 30s.  - 8/16 - 10/4/23: Recieved concurrent chemoradiation with 7 weekly Carboplatin (2mg/AUC)/Paclitaxel (45mg/m2)  and 70gy RT in 35fx     - 1/10/24 restaging PET/CT noted significant interval improvement in prior sites of disease though with residual FDG avidity in soft palate and left palatine tonsil, c/f residual disease vs post treatment changes.  Also noted was a new FDG avid LLL pulm nodule with max SUV 5.7, c/f pulmonary metastasis.   - 2/9/24 CT chest showed multiple subcentimeter noduesl 2-5mm, dominant lesion is 1.6cm in LLL.    - 3/6/24: Lung biopsy of LLL nodule was consistent with HN origin. CPS 3.  Discussed with patient about phase 2 FLAM study (randomized to pembrolizumab or pembrolizumab+danvatirsen (Stat3 inhibitor). Unfortunately due to his CKD, he's not eligible for FLAM study.  - 4/4/24: Started Q3 week Pembrolizumab  - 6/7/24 restaging CT N/C/A/P showed enlargement of subcentimeter lung mets, stable left lung nodule 2cm with new 1cm nodule in RUL. Based on heightened response with carbo+taxol after progression on PD, I recommended 3 cycles of carbo taxol and restaging, if there is response, we can consider afatinib treatment more as long term maintenance therapy.   - 6/20/24: Cycle 1 of Q3 week Carbo/Taxol.  - 8/19/24 restaging scan showed improving b/l pulmonary mets, largest pleural based LLL nodule is now 14mm.   Pt is aware of that Dr. Burkitt is leaving  at the end of Oct, planning to get to another restaging and change treatment to afatinib if he continues to have response.  Once treatment is changed, will transition his care to Dr. Parks.   - 9/25/24: PET/CT showed continuing response in lung mets, known level III right cervical lymphadenopathy, but increased SUV compared to Miguel PET. No other mets.  Since patients PS is declining, prefer treating residual disease with afatinib. CT neck/chest ordered to measure exact size of right cervical LN as well as lung mets, so these can be used as a baseline prior to start afatinib.  - 10/9/24 CT N/C pending. Patient is otherwise feeling well. Diarrhea resolved, arthralgia and peripheral neuropathy have resolved. He continue to tolerate regular diet and denies any dysphagia. Appetite is not great so has weight loss but this is improving.   - 10/15/24: Starting afatinib 40 mg daily. Consents  signed. Will follow in 3 weeks.  - 11/7/24: Patient with grade 1 diarreha since starting Afatinib, became grade 2 in the last week and has weakness and nausea. PO intake is also poor due to poor appetite and taste changes. Renal function decreased likely 2ry to poor PO intake. He stopped taking Afatinib a week ago due to nausea and diarrhea.     # Oral Candidiasis  - 11/7: Patient on Trelegy, also with white coating on tongue s/f thrush, which may explain his recent taste changes. Will start Nystatin x 14 days.     # CKD  - 2/9/24 CT chest showed Bulky atherosclerotic calcification at the origin of the left renal artery likely contributing to atrophy of the left kidney as compared to the right. Kidney atrophy was not mentioned in previous scans.   - 5/1/24: Creat 1.46, GFR 51. Encouraged patient to keep up with non-caffeinated PO hydration. 1L NS given today with Pembro. Patient to follow up with PCP regarding atherosclerosis.   - Baseline Creat 1.1 - 1.3, GFR 55-65. Will support with IV hydration with each treatment    # Submental/Neck Lymphedema  - Patient with moderate swelling in the submental and neck region. Likely 2ry to prior radiation. Established w/ Dr Guerrero in [facility].   - Neck pain and swelling improving w/ lymphedema massage.     PLAN  -- HOLD Afatinib until diarrhea returns to grade 1. Will discuss w/ Dr Parks regarding dose reduction.   -- Added on to ACC today for hydration/nausea management  -- START Loperamide 2mg Q4hr PNR or after each loose stools, up to 8 times a day, for diarrhea  -- START Olanzapine 5mg tab, 1 tab every night, for appetite loss and nausea. Script sent.  -- START Nystatin, 5mL swish and swallow, 4 times a day for 2 weeks, for thrush. Script sent.   -- Continue ample PO hydration, encourage patient to increase PO intake  -- RTC 1 week for virtual follow up w/ [facility] on 11/14

## 2024-11-05 ENCOUNTER — NURSE TRIAGE (OUTPATIENT)
Dept: HEMATOLOGY/ONCOLOGY | Facility: HOSPITAL | Age: 74
End: 2024-11-05
Payer: MEDICARE

## 2024-11-05 NOTE — TELEPHONE ENCOUNTER
Patient's friend is calling re he is having diarrhea since he started on Gilotrif.      He has had diarrhea since starting above.  He has had weight loss, and feeling very tired.    He has had about 6 bowel movements in the last 24 hours.   The stools are liquid and he cannot control them.    They range from yellowish green to brown in color.  No blood noted.    The odor is stronger than usual.    He is passing urine and it is yellow - they think a normal color but sometimes it is mixed.      He does feel more tired and weak.    He has been having diarrhea since he started the above med.   In the beginning it was only a couple a day and has become progressively worse.    They tried immodium and loperamide 2 mg with minimal relief.    He is not eating any solid food over the last 2 days.  He is mainly drinking water and he estimates only 16 ounces per day.  He feels he is only drinking that amount because he is nauseated and it comes right out.       He has not taken anything for nausea.  They do have compazine and zofran but did not give it.  He has vomited last night over night .  Some of it was clear and some of it was after pills and he vomited them up but was able to take them again after that.      He has barely been able to get up.      No fevers, no chest pain, no shortness of breath, he is weak, he does not have numbness, tingling hands or feet.    He is A and O per caregiver - sleepy but is with in when he is awake.      The diarrhea pills loperamide - he was not giving them per the order - she had been giving him 2 some times, and 1 at other times.  She thinks she only gave it 3 different times in the last 24 hours.      They have an appointment in 2 days with Eva Patten.      She is concerned that he is barely getting up.  She set up the toilet right next to him.  He is not walking past that at all.      Sending secure chat also.    Please advise    Message sent

## 2024-11-05 NOTE — TELEPHONE ENCOUNTER
Per Eva Patten PA:  s I think he needs to go to the ED     Call to Pauline to let her know that they would like him to go to the emergency room.    I also spoke to the patient who states he is not going to go right now.  He states he will go if it gets worse.      I again spoke to Pauline who states she will attempt to give him ordered nausea meds and diarrhea meds.  She states she will take him to the emergency room if his symptoms do not improve and he isn't able to keep fluids down.      Team made aware.  They will go to [facility] if they go.

## 2024-11-06 ENCOUNTER — TELEPHONE (OUTPATIENT)
Dept: ADMISSION | Facility: HOSPITAL | Age: 74
End: 2024-11-06
Payer: MEDICARE

## 2024-11-06 NOTE — TELEPHONE ENCOUNTER
Spouse called back in stating Phong is feeling better and is planning on seeing Eva Patten tomorrow as scheduled.   He did not go to the ED yesterday as instructed.     States she gave him his anti-emetics yesterday and anti-diarrhea medication as directed (he was not taking them as often as he should have).    No vomiting since yesterday late afternoon. No diarrhea since yesterday either.     Patient denies any fever, shortness of breath, dizziness or chest pain.        No

## 2024-11-07 ENCOUNTER — OFFICE VISIT (OUTPATIENT)
Dept: HEMATOLOGY/ONCOLOGY | Facility: HOSPITAL | Age: 74
End: 2024-11-07
Payer: MEDICARE

## 2024-11-07 ENCOUNTER — LAB (OUTPATIENT)
Dept: LAB | Facility: HOSPITAL | Age: 74
End: 2024-11-07
Payer: MEDICARE

## 2024-11-07 VITALS
HEART RATE: 105 BPM | DIASTOLIC BLOOD PRESSURE: 74 MMHG | RESPIRATION RATE: 18 BRPM | OXYGEN SATURATION: 99 % | WEIGHT: 114.64 LBS | TEMPERATURE: 96.8 F | BODY MASS INDEX: 18.5 KG/M2 | SYSTOLIC BLOOD PRESSURE: 149 MMHG

## 2024-11-07 VITALS
BODY MASS INDEX: 18.74 KG/M2 | RESPIRATION RATE: 20 BRPM | HEART RATE: 110 BPM | HEIGHT: 66 IN | WEIGHT: 116.62 LBS | SYSTOLIC BLOOD PRESSURE: 151 MMHG | OXYGEN SATURATION: 100 % | DIASTOLIC BLOOD PRESSURE: 69 MMHG | TEMPERATURE: 98.8 F

## 2024-11-07 DIAGNOSIS — C10.9 OROPHARYNGEAL CANCER (MULTI): Primary | ICD-10-CM

## 2024-11-07 DIAGNOSIS — R63.4 WEIGHT LOSS: ICD-10-CM

## 2024-11-07 DIAGNOSIS — C78.01 SQUAMOUS CELL CARCINOMA METASTATIC TO BOTH LUNGS: ICD-10-CM

## 2024-11-07 DIAGNOSIS — C10.9 OROPHARYNGEAL CANCER (MULTI): ICD-10-CM

## 2024-11-07 DIAGNOSIS — T45.1X5A CHEMOTHERAPY INDUCED DIARRHEA: ICD-10-CM

## 2024-11-07 DIAGNOSIS — R11.2 CHEMOTHERAPY INDUCED NAUSEA AND VOMITING: ICD-10-CM

## 2024-11-07 DIAGNOSIS — B37.0 THRUSH, ORAL: ICD-10-CM

## 2024-11-07 DIAGNOSIS — R63.0 APPETITE LOSS: ICD-10-CM

## 2024-11-07 DIAGNOSIS — T45.1X5A CHEMOTHERAPY INDUCED NAUSEA AND VOMITING: ICD-10-CM

## 2024-11-07 DIAGNOSIS — C44.92 SQUAMOUS CELL CARCINOMA METASTATIC TO BOTH LUNGS: ICD-10-CM

## 2024-11-07 DIAGNOSIS — E86.0 DEHYDRATION: ICD-10-CM

## 2024-11-07 DIAGNOSIS — K52.1 CHEMOTHERAPY INDUCED DIARRHEA: ICD-10-CM

## 2024-11-07 DIAGNOSIS — C78.02 SQUAMOUS CELL CARCINOMA METASTATIC TO BOTH LUNGS: ICD-10-CM

## 2024-11-07 LAB
ALBUMIN SERPL BCP-MCNC: 4 G/DL (ref 3.4–5)
ALP SERPL-CCNC: 45 U/L (ref 33–136)
ALT SERPL W P-5'-P-CCNC: 10 U/L (ref 10–52)
ANION GAP SERPL CALC-SCNC: 20 MMOL/L (ref 10–20)
AST SERPL W P-5'-P-CCNC: 11 U/L (ref 9–39)
BASOPHILS # BLD AUTO: 0.01 X10*3/UL (ref 0–0.1)
BASOPHILS NFR BLD AUTO: 0.2 %
BILIRUB SERPL-MCNC: 0.9 MG/DL (ref 0–1.2)
BUN SERPL-MCNC: 23 MG/DL (ref 6–23)
CALCIUM SERPL-MCNC: 9.4 MG/DL (ref 8.6–10.3)
CHLORIDE SERPL-SCNC: 99 MMOL/L (ref 98–107)
CO2 SERPL-SCNC: 22 MMOL/L (ref 21–32)
CREAT SERPL-MCNC: 1.58 MG/DL (ref 0.5–1.3)
EGFRCR SERPLBLD CKD-EPI 2021: 46 ML/MIN/1.73M*2
EOSINOPHIL # BLD AUTO: 0.13 X10*3/UL (ref 0–0.4)
EOSINOPHIL NFR BLD AUTO: 2 %
ERYTHROCYTE [DISTWIDTH] IN BLOOD BY AUTOMATED COUNT: 14 % (ref 11.5–14.5)
GLUCOSE SERPL-MCNC: 111 MG/DL (ref 74–99)
HCT VFR BLD AUTO: 33.4 % (ref 41–52)
HGB BLD-MCNC: 11.5 G/DL (ref 13.5–17.5)
IMM GRANULOCYTES # BLD AUTO: 0.02 X10*3/UL (ref 0–0.5)
IMM GRANULOCYTES NFR BLD AUTO: 0.3 % (ref 0–0.9)
LYMPHOCYTES # BLD AUTO: 0.49 X10*3/UL (ref 0.8–3)
LYMPHOCYTES NFR BLD AUTO: 7.5 %
MAGNESIUM SERPL-MCNC: 1.56 MG/DL (ref 1.6–2.4)
MCH RBC QN AUTO: 29.3 PG (ref 26–34)
MCHC RBC AUTO-ENTMCNC: 34.4 G/DL (ref 32–36)
MCV RBC AUTO: 85 FL (ref 80–100)
MONOCYTES # BLD AUTO: 1.06 X10*3/UL (ref 0.05–0.8)
MONOCYTES NFR BLD AUTO: 16.3 %
NEUTROPHILS # BLD AUTO: 4.81 X10*3/UL (ref 1.6–5.5)
NEUTROPHILS NFR BLD AUTO: 73.7 %
NRBC BLD-RTO: 0 /100 WBCS (ref 0–0)
PLATELET # BLD AUTO: 243 X10*3/UL (ref 150–450)
POTASSIUM SERPL-SCNC: 3.5 MMOL/L (ref 3.5–5.3)
PROT SERPL-MCNC: 6.4 G/DL (ref 6.4–8.2)
RBC # BLD AUTO: 3.92 X10*6/UL (ref 4.5–5.9)
SODIUM SERPL-SCNC: 137 MMOL/L (ref 136–145)
WBC # BLD AUTO: 6.5 X10*3/UL (ref 4.4–11.3)

## 2024-11-07 PROCEDURE — 3078F DIAST BP <80 MM HG: CPT | Performed by: STUDENT IN AN ORGANIZED HEALTH CARE EDUCATION/TRAINING PROGRAM

## 2024-11-07 PROCEDURE — 1036F TOBACCO NON-USER: CPT | Performed by: NURSE PRACTITIONER

## 2024-11-07 PROCEDURE — 84132 ASSAY OF SERUM POTASSIUM: CPT

## 2024-11-07 PROCEDURE — 3077F SYST BP >= 140 MM HG: CPT | Performed by: STUDENT IN AN ORGANIZED HEALTH CARE EDUCATION/TRAINING PROGRAM

## 2024-11-07 PROCEDURE — 36415 COLL VENOUS BLD VENIPUNCTURE: CPT

## 2024-11-07 PROCEDURE — 2500000004 HC RX 250 GENERAL PHARMACY W/ HCPCS (ALT 636 FOR OP/ED): Performed by: NURSE PRACTITIONER

## 2024-11-07 PROCEDURE — 1036F TOBACCO NON-USER: CPT | Performed by: STUDENT IN AN ORGANIZED HEALTH CARE EDUCATION/TRAINING PROGRAM

## 2024-11-07 PROCEDURE — 84075 ASSAY ALKALINE PHOSPHATASE: CPT

## 2024-11-07 PROCEDURE — 3078F DIAST BP <80 MM HG: CPT | Performed by: NURSE PRACTITIONER

## 2024-11-07 PROCEDURE — 85025 COMPLETE CBC W/AUTO DIFF WBC: CPT

## 2024-11-07 PROCEDURE — 96360 HYDRATION IV INFUSION INIT: CPT | Mod: INF

## 2024-11-07 PROCEDURE — 83735 ASSAY OF MAGNESIUM: CPT

## 2024-11-07 PROCEDURE — 2500000005 HC RX 250 GENERAL PHARMACY W/O HCPCS: Performed by: NURSE PRACTITIONER

## 2024-11-07 PROCEDURE — 99215 OFFICE O/P EST HI 40 MIN: CPT | Mod: 25 | Performed by: STUDENT IN AN ORGANIZED HEALTH CARE EDUCATION/TRAINING PROGRAM

## 2024-11-07 PROCEDURE — 3008F BODY MASS INDEX DOCD: CPT | Performed by: NURSE PRACTITIONER

## 2024-11-07 PROCEDURE — 99215 OFFICE O/P EST HI 40 MIN: CPT | Performed by: NURSE PRACTITIONER

## 2024-11-07 PROCEDURE — 3077F SYST BP >= 140 MM HG: CPT | Performed by: NURSE PRACTITIONER

## 2024-11-07 PROCEDURE — 99215 OFFICE O/P EST HI 40 MIN: CPT | Performed by: STUDENT IN AN ORGANIZED HEALTH CARE EDUCATION/TRAINING PROGRAM

## 2024-11-07 PROCEDURE — 1159F MED LIST DOCD IN RCRD: CPT | Performed by: STUDENT IN AN ORGANIZED HEALTH CARE EDUCATION/TRAINING PROGRAM

## 2024-11-07 PROCEDURE — 99417 PROLNG OP E/M EACH 15 MIN: CPT | Performed by: STUDENT IN AN ORGANIZED HEALTH CARE EDUCATION/TRAINING PROGRAM

## 2024-11-07 PROCEDURE — 1160F RVW MEDS BY RX/DR IN RCRD: CPT | Performed by: STUDENT IN AN ORGANIZED HEALTH CARE EDUCATION/TRAINING PROGRAM

## 2024-11-07 PROCEDURE — 1126F AMNT PAIN NOTED NONE PRSNT: CPT | Performed by: STUDENT IN AN ORGANIZED HEALTH CARE EDUCATION/TRAINING PROGRAM

## 2024-11-07 RX ORDER — NYSTATIN 100000 [USP'U]/ML
500000 SUSPENSION ORAL 4 TIMES DAILY
Qty: 280 ML | Refills: 0 | Status: SHIPPED | OUTPATIENT
Start: 2024-11-07 | End: 2024-11-21

## 2024-11-07 RX ORDER — LANOLIN ALCOHOL/MO/W.PET/CERES
400 CREAM (GRAM) TOPICAL DAILY
Qty: 3 TABLET | Refills: 0 | Status: SHIPPED | OUTPATIENT
Start: 2024-11-07 | End: 2025-11-07

## 2024-11-07 RX ORDER — ONDANSETRON 4 MG/1
4 TABLET, FILM COATED ORAL ONCE
Status: COMPLETED | OUTPATIENT
Start: 2024-11-07 | End: 2024-11-07

## 2024-11-07 RX ORDER — OLANZAPINE 5 MG/1
5 TABLET ORAL NIGHTLY
Qty: 30 TABLET | Refills: 2 | Status: SHIPPED | OUTPATIENT
Start: 2024-11-07

## 2024-11-07 ASSESSMENT — ENCOUNTER SYMPTOMS
HEADACHES: 0
VOMITING: 1
ARTHRALGIAS: 0
CONSTIPATION: 0
LEG SWELLING: 0
SHORTNESS OF BREATH: 0
COUGH: 0
TROUBLE SWALLOWING: 0
LIGHT-HEADEDNESS: 0
NAUSEA: 1
DIARRHEA: 1
FEVER: 0
SORE THROAT: 0
ABDOMINAL PAIN: 0
NUMBNESS: 0
CHILLS: 0
APPETITE CHANGE: 1

## 2024-11-07 ASSESSMENT — PAIN SCALES - GENERAL: PAINLEVEL_OUTOF10: 0-NO PAIN

## 2024-11-07 NOTE — PROGRESS NOTES
"Patient ID: Phong Wong is a 74 y.o. male.    The patient presents to [facility] for evaluation of     Objective    BSA: 1.57 meters squared          10/10/2024    10:56 AM 10/15/2024    12:42 PM 11/7/2024    12:15 PM 11/7/2024     1:08 PM 11/7/2024     1:10 PM   Vitals   Systolic 151 108 149 172 151   Diastolic 73 49 74 76 69   Heart Rate 104 96 105 100 110   Temp 36.9 °C (98.4 °F) 36.6 °C (97.9 °F) 36 °C (96.8 °F) 37.1 °C (98.8 °F)    Resp 20 18 18 20    Height (in)    1.679 m (5' 6.1\")    Weight (lb) 128.97 128.31 114.64 116.62    BMI 20.82 kg/m2 20.71 kg/m2 18.5 kg/m2 18.77 kg/m2    BSA (m2) 1.65 m2 1.65 m2 1.56 m2 1.57 m2    Visit Report Report Report Report    Report Report    Report Report    Report         Results from last 7 days   Lab Units 11/07/24  1204   WBC AUTO x10*3/uL 6.5   HEMOGLOBIN g/dL 11.5*   HEMATOCRIT % 33.4*   PLATELETS AUTO x10*3/uL 243   NEUTROS ABS x10*3/uL 4.81   LYMPHS ABS AUTO x10*3/uL 0.49*   MONOS ABS AUTO x10*3/uL 1.06*   EOS ABS AUTO x10*3/uL 0.13   NEUTROS PCT AUTO % 73.7   LYMPHS PCT AUTO % 7.5   MONOS PCT AUTO % 16.3   EOS PCT AUTO % 2.0        Results from last 7 days   Lab Units 11/07/24  1204   GLUCOSE mg/dL 111*   SODIUM mmol/L 137   POTASSIUM mmol/L 3.5   CHLORIDE mmol/L 99   CO2 mmol/L 22   BUN mg/dL 23   CREATININE mg/dL 1.58*   EGFR mL/min/1.73m*2 46*   CALCIUM mg/dL 9.4   MAGNESIUM mg/dL 1.56*   ALBUMIN g/dL 4.0   PROTEIN TOTAL g/dL 6.4                      === 02/23/24 ===    XR CHEST 2 VIEWS    - Impression -  LEFT lower lobe airspace disease. Cavitary component not excluded.  Follow-up chest CT recommended for further assessment.  Signed by Nick Laureano II, MD  === 10/09/24 ===    CT CHEST W IV CONTRAST    - Impression -  Interval development of a 19 x 7 mm pleural-based patchy area on the  left.    Interval development cavitation in 12 mm left lower lobe nodule.    The other nodules unchanged.    Emphysema.    Dense arterial calcifications. Atrophic left kidney.    Signed " by: Anastasiia Cordova 10/14/2024 12:46 PM  Dictation workstation:   [id number]  === 03/16/18 ===    MR 3D POST PROCESSING W REPORT 2ND WORKSTATION   No nuclear medicine results found for the past 12 months   No echocardiogram results found for the past 12 months         Physical Exam    Cancer Staging   No matching staging information was found for the patient.      Oncology History Overview Note   Oncological History  - Pt had 2 months hx of throat discomfort with lump in right neck  - 6/13/23: CT neck showed a mass 2.8 x 2.5 x 2.9 cm in the right lower cervical neck soft tissues. Two suspicious nodes were observed, one at level 3 on the right and another at level 2 on the left.  - 6/28/23: seen by Dr. Fletcher. 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 Based on the findings, Dr. Fletcher did not recommend surgery due to the location of the primary tumor and the presence of yvette disease  - 6/30/23: PET/CT showed intense FDG avidity within the soft palate, extending to the base of the tongue and left palatine tonsil. Multiple FDG avid left cervical level II nodes were observed, along with an FDG avid mass in the region of the right cervical level II/III, infiltrating the right SCM muscle and encasing and invading the right jugular vein. FDG avidity was also detected in the region of the left fossa Rosenmuller and left medial pterygoid muscle.  - 8/16/23: Started concurrent chemoradiation, planned for 7 weekly Carboplatin (2mg/AUC)/Paclitaxel (45mg/m2)  and 70gy RT in 35fx    PmHx: HTN, HLD, COPD, prostate cancer in 2017 (s/p radiation)  PsHx: aortoiliofemoral vascular bypass in 2014  FmHx: no cancer history  ScHx: , former cigarettes smoker (quit 2016), now smokes only pipe.     Oropharyngeal cancer (Multi)    Initial Diagnosis    Squamous cell carcinoma of oropharynx (CMS/HCC)     4/4/2024 - 5/23/2024 Chemotherapy     Pembrolizumab, 21 Day Cycles     6/20/2024 - 9/19/2024 Chemotherapy    PACLitaxel / CARBOplatin, 21 Day Cycles - Head and Neck            75 yo with SCC of oropharynx presenting to [facility] for IVF from his oncology visit.     Plan:  - HDS  - labs significant for CKD and hypomagnesemia  - difficulty obtaining IV access, pt did not want to wait for IV mag replacement, script sent  - 1L NS for nausea and diarrhea  - defer to oncology team (note today) for management of diarrhea, appears to be treatment related, no concern for infectious origin    Dispo:  - pt discharged home with no needs after ACC course complete  - return to clinic/ED instructions given to patient  - follow up oncology as scheduled           Edie Shea, APRN-CNP

## 2024-11-07 NOTE — PROGRESS NOTES
[facility] Acute Care Nursing Note  11/07/24    Phong Wong is a 74 y.o. year old male patient presenting to [facility] for IV hydration and nausea management.     Pt given 1 L NS and PO zofran. Magnesium 1.56, pt declined to stay for IV repetition and Edie CNP will send PO magnesium for patient to take at home.     Line type: PIV  -- PIV initially placed by IV team in L ring finger, immediately infiltrated with NS flush done by IV team. Infusion RN decision to remove first IV. 2x3 inch area of infiltration/edema surrounding IV site.   -- 2nd IV placed by second IV team member, successful.   Line removed/maintained prior to discharge: removed    Administrations This Visit       ondansetron (Zofran) tablet 4 mg       Admin Date  11/07/2024 Action  Given Dose  4 mg Route  oral Documented By  Saba Ibarra RN                    Patient tolerated treatment well. Discharged home in stable condition.    Follow-up Plan: FUV with provider on 11/14.    Saba Ibarra RN

## 2024-11-07 NOTE — PATIENT INSTRUCTIONS
Please hold off on taking Gilotrif and your blood pressure medication (Losartan) for now. I will discuss restarting these medications with you next week. You can keep taking the rest of your regular medications.     For you loss of appetite:  - Olanzapine 5mg tab, 1 tab every night    For Thrush  - Nystatin, 5mL swish and swallow, 4 times a day for 2 weeks.     For Diarrhea  - Loperamide 2mg, every 4 hours as needed. OR after each loose stool. Can take up to 8 times a day.     I will have a virtual appointment with you next Thursday 11/14 @ 10AM

## 2024-11-09 DIAGNOSIS — C61 PROSTATE CANCER (MULTI): ICD-10-CM

## 2024-11-09 DIAGNOSIS — N40.0 BENIGN PROSTATIC HYPERPLASIA, UNSPECIFIED WHETHER LOWER URINARY TRACT SYMPTOMS PRESENT: ICD-10-CM

## 2024-11-11 ENCOUNTER — APPOINTMENT (OUTPATIENT)
Dept: INTEGRATIVE MEDICINE | Facility: CLINIC | Age: 74
End: 2024-11-11
Payer: MEDICARE

## 2024-11-11 RX ORDER — DOXAZOSIN 4 MG/1
4 TABLET ORAL DAILY
Qty: 90 TABLET | Refills: 3 | Status: SHIPPED | OUTPATIENT
Start: 2024-11-11 | End: 2025-11-06

## 2024-11-11 ASSESSMENT — ENCOUNTER SYMPTOMS
CONSTIPATION: 0
DIARRHEA: 1
SHORTNESS OF BREATH: 0
HEADACHES: 0
LIGHT-HEADEDNESS: 0
SORE THROAT: 0
LEG SWELLING: 0
NUMBNESS: 0
NAUSEA: 1
TROUBLE SWALLOWING: 0
FEVER: 0
COUGH: 0
CHILLS: 0
ARTHRALGIAS: 0
ABDOMINAL PAIN: 0
APPETITE CHANGE: 1

## 2024-11-11 NOTE — PROGRESS NOTES
Patient ID: Phong Wong is a 74 y.o. male.  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 Juan Jose Fletcher   [facility]   RadOnc: Dr. Vianca Steen     Current Therapy  10/15/24: Afatinib 40 mg QD    Prior Therapy:   8/16 - 10/4/23: Recieved concurrent chemoradiation with 7 weekly Carboplatin (2mg/AUC)/Paclitaxel (45mg/m2)  and 70gy RT in 35fx  4/4 - 6/13/24: Received 3 cycles of Pembrolizumab  6/20/24 - 9/12/24: Received 5 cycles of Carbo/Taxol     Sites of Disease:  Soft palate, BOT, Left palatine tonsil  B/L Cervical LN  Lung     Oncologic Issues:   Odynophagia  Fatigue     ONCOLOGIC HISTORY  - Pt had 2 months hx of throat discomfort with lump in right neck  - 6/13/23: CT neck showed a mass 2.8 x 2.5 x 2.9 cm in the right lower cervical neck soft tissues. Two suspicious nodes were observed, one at level 3 on the right and another at level 2 on the left.  - 6/28/23: seen by Dr. Fletcher. 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 Based on the findings, Dr. Fletcher did not recommend surgery due to the location  of the primary tumor and the presence of yvette disease  - 6/30/23: PET/CT showed intense FDG avidity within the soft palate, extending to the base of the tongue and left palatine tonsil. Multiple FDG avid left cervical level II nodes were observed, along with an FDG avid mass in the region of the right cervical  level II/III, infiltrating the right SCM muscle and encasing and invading the right jugular vein. FDG avidity was also detected in the region of the left fossa Rosenmuller and left medial pterygoid muscle.  - 8/16 - 10/4/23: Recieved concurrent chemoradiation with 7 weekly Carboplatin (2mg/AUC)/Paclitaxel (45mg/m2)  and 70gy RT in 35fx  - 4/4 - 6/13/24: Received 3  cycles of Pembrolizumab  - 6/20/24 - 9/12/24: Received 5 cycles of Carbo/Taxol  - 10/15/24: Begin afatinib 40 mg QD    Admissions:  10/5 - 10/10/23: Admitted for extreme weakness, HECTOR, pancytopenia including anemia requiring blood transfusion. D/C'ed to acute rehab     SOCIAL HISTORY  Lives in [place].  to wife Emelia of 8 years. Brother in [place]. Worked as , retired in 5 years ago. Smoked 1-2 PPD x 50 years. Rare EtOH. No illicits.      FAMILY HISTORY  Brother had brain cancer ([facility] Bearden)    CURRENT MEDS REVIEWED     ALLERGIES REVIEWED     Subjective     HPI  Patient with h/o locally advanced oral cavity cancer, completed chemoRT w/ 7 weekly Carboplatin+Paclitaxel on 10/4/23. Unfortunately found with mets to lungs on 3 months restaging scan. CPS 3. S/p 3 cycles Pembrolizumab but lung nodules progressed. Completed 5 cycles of Carbo/Taxol on 9/12/24. Started on Afatinib 40mg QD on 10/15/24.      Interval History  Patient presents for follow up of diarrhea, likely 2ry to cancer therapy.    Overall he's feeling better this week.   His diarrhea has largely resolved in the last week since he started taking Loperamide.   Last took Loperamide 2 days ago, BM was soft yesterday. Has not had any nausea/vomiting and only taking Olanzapine 5mg at bedtime.   Appetite is slightly improved and started to eat more in the last week. Taste is improved, though he only used Nystatin twice and stopped due to dislike of the taste.     A 13 point review of systems was performed, with significant findings documented above in subjective history.    Review of Systems   Constitutional:  Positive for appetite change. Negative for chills and fever.   HENT:   Negative for hearing loss, lump/mass, mouth sores, nosebleeds, sore throat, tinnitus and trouble swallowing.    Respiratory:  Negative for cough and shortness of breath.    Cardiovascular:  Negative for chest pain and leg swelling.   Gastrointestinal:  Positive for  diarrhea and nausea. Negative for abdominal pain, constipation and vomiting.   Musculoskeletal:  Negative for arthralgias.   Skin:  Negative for rash.   Neurological:  Negative for headaches, light-headedness and numbness.     Objective   VS: There were no vitals taken for this visit.   Weight:   Daily Weight  11/07/24 : 52.9 kg (116 lb 10 oz)  11/07/24 : 52 kg (114 lb 10.2 oz)  10/15/24 : 58.2 kg (128 lb 4.9 oz)  10/10/24 : 58.5 kg (128 lb 15.5 oz)  09/23/24 : 63.3 kg (139 lb 8 oz)  09/19/24 : 58.8 kg (129 lb 10.1 oz)  09/12/24 : 58.7 kg (129 lb 6.6 oz)    Physical Exam  Constitutional:       Appearance: Normal appearance. He is well-developed.   HENT:      Head: Normocephalic and atraumatic.      Right Ear: External ear normal. No tenderness.      Left Ear: External ear normal. No tenderness.      Nose: Nose normal.      Mouth/Throat:      Lips: Pink.      Mouth: Mucous membranes are moist. No injury or oral lesions.      Comments: White coating on tongue, scrapes off.   Eyes:      General: Lids are normal.      Extraocular Movements: Extraocular movements intact.      Conjunctiva/sclera: Conjunctivae normal.      Pupils: Pupils are equal, round, and reactive to light.   Neck:      Thyroid: No thyroid mass.   Cardiovascular:      Pulses: Normal pulses.   Pulmonary:      Effort: Pulmonary effort is normal. No respiratory distress.      Breath sounds: Normal breath sounds and air entry.   Abdominal:      General: Abdomen is flat. Bowel sounds are normal.      Palpations: Abdomen is soft.   Musculoskeletal:         General: Normal range of motion.      Cervical back: Normal range of motion and neck supple. No signs of trauma. Normal range of motion.   Skin:     General: Skin is warm and dry.      Comments: No new skin lesions   Neurological:      General: No focal deficit present.      Mental Status: He is alert and oriented to person, place, and time. Mental status is at baseline.      Gait: Gait is intact.    Psychiatric:         Attention and Perception: Attention normal.         Mood and Affect: Mood and affect normal.         Behavior: Behavior is cooperative.       ECOGSCORE: 2- Ambulatory and  capable of all selfcare; unable to carry out work activities.  Up and about > 50% of waking hrs.    Diagnostic Results   Labs:  Results from last 7 days   Lab Units 11/07/24  1204   WBC AUTO x10*3/uL 6.5   HEMOGLOBIN g/dL 11.5*   HEMATOCRIT % 33.4*   PLATELETS AUTO x10*3/uL 243   NEUTROS ABS x10*3/uL 4.81   LYMPHS ABS AUTO x10*3/uL 0.49*   MONOS ABS AUTO x10*3/uL 1.06*   EOS ABS AUTO x10*3/uL 0.13   NEUTROS PCT AUTO % 73.7   LYMPHS PCT AUTO % 7.5   MONOS PCT AUTO % 16.3   EOS PCT AUTO % 2.0      Results from last 7 days   Lab Units 11/07/24  1204   GLUCOSE mg/dL 111*   SODIUM mmol/L 137   POTASSIUM mmol/L 3.5   CHLORIDE mmol/L 99   CO2 mmol/L 22   BUN mg/dL 23   CREATININE mg/dL 1.58*   EGFR mL/min/1.73m*2 46*   CALCIUM mg/dL 9.4   MAGNESIUM mg/dL 1.56*   ALBUMIN g/dL 4.0   PROTEIN TOTAL g/dL 6.4   BILIRUBIN TOTAL mg/dL 0.9   ALK PHOS U/L 45   ALT U/L 10   AST U/L 11                        IMAGING  PET 09/24/2024:  IMPRESSION:  When compared to PET-CT bone skull base to mid thigh dated 01/10/2024    1. Interval increase in FDG activity involving level III right  cervical lymph node, consistent with known residual yvette metastasis.  2. Interval decrease in FDG activity involving left lower lobe lung  nodules when compared to prior PET-CT, likely post treatment changes.      CT Chest 10/9/24:  IMPRESSION:  Interval development of a 19 x 7 mm pleural-based patchy area on the  left.     Interval development cavitation in 12 mm left lower lobe nodule.  The other nodules unchanged.  Emphysema.  Dense arterial calcifications. Atrophic left kidney.        CT Neck 10/09/2024:  IMPRESSION:  Redemonstrated is a soft tissue mass at level 3 on the right  measuring 2.4 x 2.6 x 3.7 cm, similar to the prior exam given  differences in  technique.      The right internal jugular vein is not visualized and occluded or  thrombosed. There is reconstitution within the transverse sinus  intracranially.    Assessment/Plan   Mr. Phong Wong is a 74 y.o. male with locally advanced oral cavity cancer, completed chemoRT w/ 7 weekly Carboplatin+Paclitaxel on 10/4/23. Unfortunately found with mets to lungs on 3 months restaging scan. CPS 3. S/p 3 cycles Pembrolizumab but lung nodules progressed. Completed 5 cycles of Carbo/Taxol on 9/12/24. Start Afatinib on 10/15/24.     # Locally advanced oral cavity cancer, T3N2, now with met to lungs  - 6/30/23 PET/CT showed intense FDG avidity within the soft palate, extending to BOT and left palatine tonsil. Multiple uptake in left cervical level II nodes, right cervical level  II/III, infiltrating the right SCM muscle and encasing and invading the right jugular vein. FDG avidity was also detected in the region of the left fossa Rosenmuller and left   - Discussed with patient about carboplatin+ paclitaxel as his chemotherapy along with radiation due to his GFR 30s.  - 8/16 - 10/4/23: Recieved concurrent chemoradiation with 7 weekly Carboplatin (2mg/AUC)/Paclitaxel (45mg/m2)  and 70gy RT in 35fx     - 1/10/24 restaging PET/CT noted significant interval improvement in prior sites of disease though with residual FDG avidity in soft palate and left palatine tonsil, c/f residual disease vs post treatment changes. Also noted was a new FDG avid LLL pulm nodule with max SUV 5.7, c/f pulmonary metastasis.   - 2/9/24 CT chest showed multiple subcentimeter noduesl 2-5mm, dominant lesion is 1.6cm in LLL.    - 3/6/24: Lung biopsy of LLL nodule was consistent with HN origin. CPS 3.  Discussed with patient about phase 2 FLAM study (randomized to pembrolizumab or pembrolizumab+danvatirsen (Stat3 inhibitor). Unfortunately due to his CKD, he's not eligible for FLAM study.  - 4/4/24: Started Q3 week Pembrolizumab  - 6/7/24 restaging CT  N/C/A/P showed enlargement of subcentimeter lung mets, stable left lung nodule 2cm with new 1cm nodule in RUL. Based on heightened response with carbo+taxol after progression on PD, I recommended 3 cycles of carbo taxol and restaging, if there is response, we can consider afatinib treatment more as long term maintenance therapy.   - 6/20/24: Cycle 1 of Q3 week Carbo/Taxol.  - 8/19/24 restaging scan showed improving b/l pulmonary mets, largest pleural based LLL nodule is now 14mm.   Pt is aware of that Dr. Burkitt is leaving  at the end of Oct, planning to get to another restaging and change treatment to afatinib if he continues to have response.  Once treatment is changed, will transition his care to Dr. Parks.   - 9/25/24: PET/CT showed continuing response in lung mets, known level III right cervical lymphadenopathy, but increased SUV compared to Miguel PET. No other mets.  Since patients PS is declining, prefer treating residual disease with afatinib. CT neck/chest ordered to measure exact size of right cervical LN as well as lung mets, so these can be used as a baseline prior to start afatinib.  - 10/9/24 CT N/C pending. Patient is otherwise feeling well. Diarrhea resolved, arthralgia and peripheral neuropathy have resolved. He continue to tolerate regular diet and denies any dysphagia. Appetite is not great so has weight loss but this is improving.   - 10/15/24: Starting afatinib 40 mg daily. Consents signed. Will follow in 3 weeks.  - 11/7/24: Patient with grade 1 diarreha since starting Afatinib, became grade 2 in the last week and has weakness and nausea. PO intake is also poor due to poor appetite and taste changes. Renal function decreased likely 2ry to poor PO intake. He stopped taking Afatinib a week ago due to nausea and diarrhea.   - 11/14/24: Patient's diarrhea is controlled on Loperamide, (grade 1) and nausea controlled on Olanzapine 5mg at bedtime. Appetite is returning and taste is improved. Will  re-challenge w/ Afatinib 40mg daily, stay on Loperamide for diarrhea, Olanzapine for nausea and appetite.     # Oral Candidiasis  - 11/7: Patient on Trelegy, also with white coating on tongue s/f thrush, which may explain his recent taste changes. Will start Nystatin x 14 days.     # CKD  - 2/9/24 CT chest showed Bulky atherosclerotic calcification at the origin of the left renal artery likely contributing to atrophy of the left kidney as compared to the right. Kidney atrophy was not mentioned in previous scans.   - 5/1/24: Creat 1.46, GFR 51. Encouraged patient to keep up with non-caffeinated PO hydration. 1L NS given today with Pembro. Patient to follow up with PCP regarding atherosclerosis.   - Baseline Creat 1.1 - 1.3, GFR 55-65. Will support with IV hydration with each treatment    # Submental/Neck Lymphedema  - Patient with moderate swelling in the submental and neck region. Likely 2ry to prior radiation. Established w/ Dr Guerrero in [facility].   - Neck pain and swelling improving w/ lymphedema massage.     PLAN  -- Restart Afatinib 40mg.    -- Continue Loperamide 2mg Q4hr PNR or after each loose stools, up to 8 times a day, for diarrhea  -- Continue Olanzapine 5mg tab, 1 tab every night, for appetite loss and nausea. Script sent.  -- Continue ample PO hydration, encourage patient to increase PO intake  -- RTC 1 week for virtual follow up w/ [facility] on 11/21

## 2024-11-13 DIAGNOSIS — C10.9 OROPHARYNGEAL CANCER (MULTI): ICD-10-CM

## 2024-11-14 ENCOUNTER — TELEMEDICINE (OUTPATIENT)
Dept: HEMATOLOGY/ONCOLOGY | Facility: HOSPITAL | Age: 74
End: 2024-11-14
Payer: MEDICARE

## 2024-11-14 DIAGNOSIS — C78.02 SQUAMOUS CELL CARCINOMA METASTATIC TO BOTH LUNGS: Primary | ICD-10-CM

## 2024-11-14 DIAGNOSIS — T45.1X5A CHEMOTHERAPY INDUCED NAUSEA AND VOMITING: ICD-10-CM

## 2024-11-14 DIAGNOSIS — T45.1X5A CHEMOTHERAPY INDUCED DIARRHEA: ICD-10-CM

## 2024-11-14 DIAGNOSIS — R63.0 APPETITE LOSS: ICD-10-CM

## 2024-11-14 DIAGNOSIS — C78.01 SQUAMOUS CELL CARCINOMA METASTATIC TO BOTH LUNGS: Primary | ICD-10-CM

## 2024-11-14 DIAGNOSIS — C01 SQUAMOUS CELL CARCINOMA OF BASE OF TONGUE (MULTI): ICD-10-CM

## 2024-11-14 DIAGNOSIS — R11.2 CHEMOTHERAPY INDUCED NAUSEA AND VOMITING: ICD-10-CM

## 2024-11-14 DIAGNOSIS — C44.92 SQUAMOUS CELL CARCINOMA METASTATIC TO BOTH LUNGS: Primary | ICD-10-CM

## 2024-11-14 DIAGNOSIS — B37.0 THRUSH, ORAL: ICD-10-CM

## 2024-11-14 DIAGNOSIS — C10.9 OROPHARYNGEAL CANCER (MULTI): ICD-10-CM

## 2024-11-14 DIAGNOSIS — K52.1 CHEMOTHERAPY INDUCED DIARRHEA: ICD-10-CM

## 2024-11-14 PROCEDURE — 1159F MED LIST DOCD IN RCRD: CPT | Performed by: STUDENT IN AN ORGANIZED HEALTH CARE EDUCATION/TRAINING PROGRAM

## 2024-11-14 PROCEDURE — 1160F RVW MEDS BY RX/DR IN RCRD: CPT | Performed by: STUDENT IN AN ORGANIZED HEALTH CARE EDUCATION/TRAINING PROGRAM

## 2024-11-14 PROCEDURE — 99215 OFFICE O/P EST HI 40 MIN: CPT | Performed by: STUDENT IN AN ORGANIZED HEALTH CARE EDUCATION/TRAINING PROGRAM

## 2024-11-14 PROCEDURE — 1036F TOBACCO NON-USER: CPT | Performed by: STUDENT IN AN ORGANIZED HEALTH CARE EDUCATION/TRAINING PROGRAM

## 2024-11-14 ASSESSMENT — ENCOUNTER SYMPTOMS: VOMITING: 0

## 2024-11-19 NOTE — PROGRESS NOTES
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   Unfortunately he developed a lung metastasis.  The last PET scan in September showed increased activity in that right sided lymph node.  On CT it is stable in size.  The patient was off his immunotherapy.  He will be restarting soon.  I will present him at our tumor board.  I was wondering if doing a neck dissection on the right side would not be the thing to do.    Minimal dysphagia.    I will see him in 2 months.    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     History of Present Illness    This patient was seen in June 2023 at the request of his family physician. For 6 weeks or so he had some discomfort in his throat. He describes it as in the center of his throat. He also noticed a lump in the midportion of his right neck. This led to a CT scan of his neck which was done in June 2023. I personally reviewed that scan. It does show 2 suspicious nodes 1 in the level 3 on the right and one in level 2 on the left. He also has some irregularity around the soft palate.  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  He completed his chemoradiation therapy on October 1, 2023.  He had a pet scan done in January 2024.  He did have some residual uptake in the left oropharynx as well as the right neck.  This is much less than previously.  He also had some positive uptake in the lungs that turned out to be positive for squamous cell carcinoma on biopsy.  He had a repeat CT scan done in October 2024 that shows this persistent mass in the right neck which has not changed.  At the same time on the PET scan which was done in September there was an increase in that the level 3 node on the left.  The CT scan of the chest does show 2 lesions that are fairly small.   He continues to get immunotherapy.    Physical Exam    Examination of the oral cavity and oropharynx is negative for anything worrisome.  He does have some scarring around his soft palate.  There is good mandibular excursion. Palpation of the parotid, neck, and thyroid field shows this right-sided lower mid neck induration that measures a good 3 to 4 cm.    A flexible laryngoscopy was carried out. Under topical Xylocaine and Charanjit-Synephrine the scope was introduced through the nostril. The nasopharynx, base of tongue, hypopharynx, and larynx are visualized. The vocal cords are normally mobile. There is no pooling of secretions in the piriform sinuses.  He does have evidence of dryness.

## 2024-11-20 ENCOUNTER — APPOINTMENT (OUTPATIENT)
Dept: OTOLARYNGOLOGY | Facility: CLINIC | Age: 74
End: 2024-11-20
Payer: MEDICARE

## 2024-11-20 VITALS — BODY MASS INDEX: 18.64 KG/M2 | HEIGHT: 66 IN | WEIGHT: 116 LBS

## 2024-11-20 DIAGNOSIS — C10.9 OROPHARYNGEAL CANCER (MULTI): ICD-10-CM

## 2024-11-20 PROCEDURE — 1159F MED LIST DOCD IN RCRD: CPT | Performed by: OTOLARYNGOLOGY

## 2024-11-20 PROCEDURE — 99213 OFFICE O/P EST LOW 20 MIN: CPT | Performed by: OTOLARYNGOLOGY

## 2024-11-20 PROCEDURE — 3008F BODY MASS INDEX DOCD: CPT | Performed by: OTOLARYNGOLOGY

## 2024-11-20 PROCEDURE — 31575 DIAGNOSTIC LARYNGOSCOPY: CPT | Performed by: OTOLARYNGOLOGY

## 2024-11-20 PROCEDURE — 1160F RVW MEDS BY RX/DR IN RCRD: CPT | Performed by: OTOLARYNGOLOGY

## 2024-11-20 PROCEDURE — 1036F TOBACCO NON-USER: CPT | Performed by: OTOLARYNGOLOGY

## 2024-11-20 ASSESSMENT — ENCOUNTER SYMPTOMS
CHILLS: 0
HEADACHES: 0
NUMBNESS: 0
SHORTNESS OF BREATH: 0
DIARRHEA: 1
ARTHRALGIAS: 0
ABDOMINAL PAIN: 0
COUGH: 0
LIGHT-HEADEDNESS: 0
SORE THROAT: 0
TROUBLE SWALLOWING: 0
VOMITING: 0
APPETITE CHANGE: 1
LEG SWELLING: 0
FEVER: 0
CONSTIPATION: 0

## 2024-11-20 NOTE — PROGRESS NOTES
Patient ID: Phong Wong is a 74 y.o. male.  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 Juan Jose Fletcher   [facility]   RadOnc: Dr. Vianca Steen     Current Therapy  10/15/24: Afatinib 40 mg QD    Prior Therapy:   8/16 - 10/4/23: Recieved concurrent chemoradiation with 7 weekly Carboplatin (2mg/AUC)/Paclitaxel (45mg/m2)  and 70gy RT in 35fx  4/4 - 6/13/24: Received 3 cycles of Pembrolizumab  6/20/24 - 9/12/24: Received 5 cycles of Carbo/Taxol     Sites of Disease:  Soft palate, BOT, Left palatine tonsil  B/L Cervical LN  Lung     Oncologic Issues:   Odynophagia  Fatigue     ONCOLOGIC HISTORY  - Pt had 2 months hx of throat discomfort with lump in right neck  - 6/13/23: CT neck showed a mass 2.8 x 2.5 x 2.9 cm in the right lower cervical neck soft tissues. Two suspicious nodes were observed, one at level 3 on the right and another at level 2 on the left.  - 6/28/23: seen by Dr. Fletcher. 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 Based on the findings, Dr. Fletcher did not recommend surgery due to the location  of the primary tumor and the presence of yvette disease  - 6/30/23: PET/CT showed intense FDG avidity within the soft palate, extending to the base of the tongue and left palatine tonsil. Multiple FDG avid left cervical level II nodes were observed, along with an FDG avid mass in the region of the right cervical  level II/III, infiltrating the right SCM muscle and encasing and invading the right jugular vein. FDG avidity was also detected in the region of the left fossa Rosenmuller and left medial pterygoid muscle.  - 8/16 - 10/4/23: Recieved concurrent chemoradiation with 7 weekly Carboplatin (2mg/AUC)/Paclitaxel (45mg/m2)  and 70gy RT in 35fx  - 4/4 - 6/13/24: Received 3  cycles of Pembrolizumab  - 6/20/24 - 9/12/24: Received 5 cycles of Carbo/Taxol  - 10/15/24: Begin afatinib 40 mg QD    Admissions:  10/5 - 10/10/23: Admitted for extreme weakness, HECTOR, pancytopenia including anemia requiring blood transfusion. D/C'ed to acute rehab     SOCIAL HISTORY  Lives in [place].  to wife Emelia of 8 years. Brother in [place]. Worked as , retired in 5 years ago. Smoked 1-2 PPD x 50 years. Rare EtOH. No illicits.      FAMILY HISTORY  Brother had brain cancer (S[facility]y Bearden)    CURRENT MEDS REVIEWED     ALLERGIES REVIEWED     Subjective     HPI  Patient with h/o locally advanced oral cavity cancer, completed chemoRT w/ 7 weekly Carboplatin+Paclitaxel on 10/4/23. Unfortunately found with mets to lungs on 3 months restaging scan. CPS 3. S/p 3 cycles Pembrolizumab but lung nodules progressed. Completed 5 cycles of Carbo/Taxol on 9/12/24. Started on Afatinib 40mg QD on 10/15/24.      Interval History  Patient presents for follow up of diarrhea, likely 2ry to cancer therapy.    Recall last week we discussed restarting Afatinib along with imodium to control diarrhea. His diarrhea has resolved this week and has had normal BM in the last 3 days without needing Imodium. His appetite is also improved.   Today patient reports he has not restarted Afatinib as he feels it  greatly affected his QOL and he does not want to keep taking imodium while on Afatinib.    He understands he has metastatic nodules in the lung that may grow if left untreated. He would like to discuss other treatment option he may have.     A 13 point review of systems was performed, with significant findings documented above in subjective history.    Review of Systems   Constitutional:  Positive for appetite change. Negative for chills and fever.   HENT:   Negative for hearing loss, lump/mass, mouth sores, nosebleeds, sore throat, tinnitus and trouble swallowing.    Respiratory:  Negative for cough and shortness of  breath.    Cardiovascular:  Negative for chest pain and leg swelling.   Gastrointestinal:  Positive for diarrhea. Negative for abdominal pain, constipation, nausea and vomiting.   Musculoskeletal:  Negative for arthralgias.   Skin:  Negative for rash.   Neurological:  Negative for headaches, light-headedness and numbness.     Objective   VS: There were no vitals taken for this visit.   Weight:   Daily Weight  11/20/24 : 52.6 kg (116 lb)  11/07/24 : 52.9 kg (116 lb 10 oz)  11/07/24 : 52 kg (114 lb 10.2 oz)  10/15/24 : 58.2 kg (128 lb 4.9 oz)  10/10/24 : 58.5 kg (128 lb 15.5 oz)  09/23/24 : 63.3 kg (139 lb 8 oz)  09/19/24 : 58.8 kg (129 lb 10.1 oz)    Physical Exam  Constitutional:       Appearance: Normal appearance. He is well-developed.   HENT:      Head: Normocephalic and atraumatic.      Right Ear: External ear normal. No tenderness.      Left Ear: External ear normal. No tenderness.      Nose: Nose normal.      Mouth/Throat:      Lips: Pink.      Mouth: Mucous membranes are moist. No injury or oral lesions.   Eyes:      General: Lids are normal.      Extraocular Movements: Extraocular movements intact.      Conjunctiva/sclera: Conjunctivae normal.      Pupils: Pupils are equal, round, and reactive to light.   Neck:      Thyroid: No thyroid mass.   Pulmonary:      Effort: Pulmonary effort is normal.   Abdominal:      General: Abdomen is flat. Bowel sounds are normal.      Palpations: Abdomen is soft.   Musculoskeletal:         General: Normal range of motion.      Cervical back: Normal range of motion and neck supple. No signs of trauma. Normal range of motion.   Skin:     General: Skin is warm and dry.      Comments: No new skin lesions   Neurological:      General: No focal deficit present.      Mental Status: He is alert and oriented to person, place, and time. Mental status is at baseline.      Gait: Gait is intact.   Psychiatric:         Attention and Perception: Attention normal.         Mood and Affect:  Mood and affect normal.         Behavior: Behavior is cooperative.     ECOGSCORE: 2- Ambulatory and  capable of all selfcare; unable to carry out work activities.  Up and about > 50% of waking hrs.    Diagnostic Results   Labs:                                  IMAGING  PET 09/24/2024:  IMPRESSION:  When compared to PET-CT bone skull base to mid thigh dated 01/10/2024    1. Interval increase in FDG activity involving level III right cervical lymph node, consistent with known residual yvette metastasis.  2. Interval decrease in FDG activity involving left lower lobe lung nodules when compared to prior PET-CT, likely post treatment changes.      CT Chest 10/9/24:  IMPRESSION:  Interval development of a 19 x 7 mm pleural-based patchy area on the left.  Interval development cavitation in 12 mm left lower lobe nodule.  The other nodules unchanged.  Emphysema.  Dense arterial calcifications. Atrophic left kidney.        CT Neck 10/09/2024:  IMPRESSION:  Redemonstrated is a soft tissue mass at level 3 on the right measuring 2.4 x 2.6 x 3.7 cm, similar to the prior exam given differences in technique.  The right internal jugular vein is not visualized and occluded or thrombosed. There is reconstitution within the transverse sinus intracranially.    Assessment/Plan   Mr. Phong Wong is a 74 y.o. male with locally advanced oral cavity cancer, completed chemoRT w/ 7 weekly Carboplatin+Paclitaxel on 10/4/23. Unfortunately found with mets to lungs on 3 months restaging scan. CPS 3. S/p 3 cycles Pembrolizumab but lung nodules progressed. Completed 5 cycles of Carbo/Taxol on 9/12/24. Start Afatinib on 10/15/24, stopped around first week of November.    # Locally advanced oral cavity cancer, T3N2, now with met to lungs  - 6/30/23 PET/CT showed intense FDG avidity within the soft palate, extending to BOT and left palatine tonsil. Multiple uptake in left cervical level II nodes, right cervical level  II/III, infiltrating the right SCM  muscle and encasing and invading the right jugular vein. FDG avidity was also detected in the region of the left fossa Rosenmuller and left   - Discussed with patient about carboplatin+ paclitaxel as his chemotherapy along with radiation due to his GFR 30s.  - 8/16 - 10/4/23: Recieved concurrent chemoradiation with 7 weekly Carboplatin (2mg/AUC)/Paclitaxel (45mg/m2)  and 70gy RT in 35fx     - 1/10/24 restaging PET/CT noted significant interval improvement in prior sites of disease though with residual FDG avidity in soft palate and left palatine tonsil, c/f residual disease vs post treatment changes. Also noted was a new FDG avid LLL pulm nodule with max SUV 5.7, c/f pulmonary metastasis.   - 2/9/24 CT chest showed multiple subcentimeter noduesl 2-5mm, dominant lesion is 1.6cm in LLL.    - 3/6/24: Lung biopsy of LLL nodule was consistent with HN origin. CPS 3.  Discussed with patient about phase 2 FLAM study (randomized to pembrolizumab or pembrolizumab+danvatirsen (Stat3 inhibitor). Unfortunately due to his CKD, he's not eligible for FLAM study.  - 4/4/24: Started Q3 week Pembrolizumab  - 6/7/24 restaging CT N/C/A/P showed enlargement of subcentimeter lung mets, stable left lung nodule 2cm with new 1cm nodule in RUL. Based on heightened response with carbo+taxol after progression on PD, I recommended 3 cycles of carbo taxol and restaging, if there is response, we can consider afatinib treatment more as long term maintenance therapy.   - 6/20/24: Cycle 1 of Q3 week Carbo/Taxol.  - 8/19/24 restaging scan showed improving b/l pulmonary mets, largest pleural based LLL nodule is now 14mm.   Pt is aware of that Dr. Burkitt is leaving  at the end of Oct, planning to get to another restaging and change treatment to afatinib if he continues to have response.  Once treatment is changed, will transition his care to Dr. Parks.   - 9/25/24: PET/CT showed continuing response in lung mets, known level III right cervical  lymphadenopathy, but increased SUV compared to Miguel PET. No other mets.  Since patients PS is declining, prefer treating residual disease with afatinib. CT neck/chest ordered to measure exact size of right cervical LN as well as lung mets, so these can be used as a baseline prior to start afatinib.  - 10/9/24 CT N/C pending. Patient is otherwise feeling well. Diarrhea resolved, arthralgia and peripheral neuropathy have resolved. He continue to tolerate regular diet and denies any dysphagia. Appetite is not great so has weight loss but this is improving.   - 10/15/24: Starting afatinib 40 mg daily. Consents signed. Will follow in 3 weeks.  - 11/7/24: Patient with grade 1 diarreha since starting Afatinib, became grade 2 in the last week and has weakness and nausea. PO intake is also poor due to poor appetite and taste changes. Renal function decreased likely 2ry to poor PO intake. He stopped taking Afatinib a week ago due to nausea and diarrhea.   - 11/14/24: Patient's diarrhea is controlled on Loperamide, (grade 1) and nausea controlled on Olanzapine 5mg at bedtime. Appetite is returning and taste is improved. Will re-challenge w/ Afatinib 40mg daily, stay on Loperamide for diarrhea, Olanzapine for nausea and appetite.   - 11/21/24: Patient's diarrhea resolved but he did not wish to restart afatinib, last taken afatinib around 11/7. Would like to discuss w/ Dr. Parks regarding other treatment options.    # CKD  - 2/9/24 CT chest showed Bulky atherosclerotic calcification at the origin of the left renal artery likely contributing to atrophy of the left kidney as compared to the right. Kidney atrophy was not mentioned in previous scans.   - 5/1/24: Creat 1.46, GFR 51. Encouraged patient to keep up with non-caffeinated PO hydration. 1L NS given today with Pembro. Patient to follow up with PCP regarding atherosclerosis.   - Baseline Creat 1.1 - 1.3, GFR 55-65. Due to recent diarrhea and decreased PO intake, his renal  function has declined.     # Submental/Neck Lymphedema  - Patient with moderate swelling in the submental and neck region. Likely 2ry to prior radiation. Established w/ Dr Guerrero in integrative [facility].   - Neck pain and swelling improving w/ lymphedema massage.     PLAN  -- RTC 1 week w/ Dr. Aguilar to discuss other treatment option   -- Continue Olanzapine 5mg tab, 1 tab every night, for appetite loss and nausea.    -- Loperamide 2mg Q4hr PNR or after each loose stools, up to 8 times a day, for diarrhea  -- Continue ample PO hydration, encourage patient to increase PO intake

## 2024-11-21 ENCOUNTER — TELEMEDICINE (OUTPATIENT)
Dept: HEMATOLOGY/ONCOLOGY | Facility: HOSPITAL | Age: 74
End: 2024-11-21
Payer: MEDICARE

## 2024-11-21 DIAGNOSIS — T45.1X5A CHEMOTHERAPY INDUCED DIARRHEA: ICD-10-CM

## 2024-11-21 DIAGNOSIS — I89.0 LYMPHEDEMA DUE TO AND NOT CONCURRENT WITH IONIZING RADIOTHERAPY: ICD-10-CM

## 2024-11-21 DIAGNOSIS — C78.01 SQUAMOUS CELL CARCINOMA METASTATIC TO BOTH LUNGS: Primary | ICD-10-CM

## 2024-11-21 DIAGNOSIS — C44.92 SQUAMOUS CELL CARCINOMA METASTATIC TO BOTH LUNGS: Primary | ICD-10-CM
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
## 2024-11-21 DIAGNOSIS — K52.1 CHEMOTHERAPY INDUCED DIARRHEA: ICD-10-CM

## 2024-11-21 DIAGNOSIS — Z51.12 ENCOUNTER FOR ANTINEOPLASTIC IMMUNOTHERAPY: ICD-10-CM

## 2024-11-21 DIAGNOSIS — R63.0 APPETITE LOSS: ICD-10-CM

## 2024-11-21 DIAGNOSIS — N18.2 CKD (CHRONIC KIDNEY DISEASE) STAGE 2, GFR 60-89 ML/MIN: ICD-10-CM

## 2024-11-21 DIAGNOSIS — C78.02 SQUAMOUS CELL CARCINOMA METASTATIC TO BOTH LUNGS: Primary | ICD-10-CM

## 2024-11-21 PROCEDURE — 1160F RVW MEDS BY RX/DR IN RCRD: CPT | Performed by: STUDENT IN AN ORGANIZED HEALTH CARE EDUCATION/TRAINING PROGRAM

## 2024-11-21 PROCEDURE — 1036F TOBACCO NON-USER: CPT | Performed by: STUDENT IN AN ORGANIZED HEALTH CARE EDUCATION/TRAINING PROGRAM

## 2024-11-21 PROCEDURE — 1159F MED LIST DOCD IN RCRD: CPT | Performed by: STUDENT IN AN ORGANIZED HEALTH CARE EDUCATION/TRAINING PROGRAM

## 2024-11-21 PROCEDURE — 99215 OFFICE O/P EST HI 40 MIN: CPT | Performed by: STUDENT IN AN ORGANIZED HEALTH CARE EDUCATION/TRAINING PROGRAM

## 2024-11-22 ENCOUNTER — TUMOR BOARD CONFERENCE (OUTPATIENT)
Dept: HEMATOLOGY/ONCOLOGY | Facility: HOSPITAL | Age: 74
End: 2024-11-22
Payer: MEDICARE

## 2024-11-22 DIAGNOSIS — C78.01 SQUAMOUS CELL CARCINOMA METASTATIC TO BOTH LUNGS: Primary | ICD-10-CM

## 2024-11-22 DIAGNOSIS — C10.9 OROPHARYNGEAL CANCER (MULTI): ICD-10-CM

## 2024-11-22 DIAGNOSIS — C44.92 SQUAMOUS CELL CARCINOMA METASTATIC TO BOTH LUNGS: Primary | ICD-10-CM

## 2024-11-22 DIAGNOSIS — C78.02 SQUAMOUS CELL CARCINOMA METASTATIC TO BOTH LUNGS: Primary | ICD-10-CM

## 2024-11-22 ASSESSMENT — ENCOUNTER SYMPTOMS: NAUSEA: 0

## 2024-11-22 NOTE — TUMOR BOARD NOTE
[facility] HEAD AND NECK TUMOR BOARD NOTE:    Phong Wong Is a 74 y.o. male who was presented by Dr. Fletcher at [facility] Head & Neck Tumor Board on 11/22/24 which included representatives from all Head & Neck disciplines (Medical oncology/Radiation oncology/Otolaryngology/Radiology/Pathology).     History and Physical in Brief:  74 y.o. male who originally presented 6/2023 for throat discomfort and a right neck mass. Ultimately diagnosed with oropharyngeal squamous cell carcinoma of the soft palate with associated LAD. He underwent CXRT which was completed 10/1/23. Post-treatment Pet in 1/2024 with residual uptake in the oropharynx and right neck. He also had some positive uptake in th lungs which resulted as squamous cell carcinoma.     Imaging:  CT Neck with Contrast (10/9/24):   FINDINGS:  Oral Cavity, Pharynx and Larynx:  Evaluation oral cavity is  unremarkable. The nasopharynx is unremarkable. There is diffuse  thickening of the supraglottic endo laryngeal soft tissues compatible  with posttreatment related change. No focal enhancement or mass  effect.      Retropharyngeal and Prevertebral Soft Tissues: Trace amount of  retropharyngeal fluid is likely posttreatment related and decreased  compared to the prior exam.      Lymph nodes: Redemonstrated is a soft tissue mass at level 3 on the  right measuring 2.4 x 2.6 x 3.7 cm transverse by cc on image 203  series 204 and image 56 series 202. Given differences in technique  this appears similar to the prior exam. There is a calcification  centrally within the mass and heterogeneous enhancement. There is  abutment of the right common carotid artery. The right internal  jugular vein is not visualized and occluded or thrombosed.      Neck vessels: The right internal jugular vein is not visualized and  occluded or thrombosed. There is reconstitution within the transverse  sinus intracranially. Otherwise, there is expected enhancement within  the  carotid sheath structures. There is atherosclerotic calcification  at the carotid bifurcation bilaterally.      Thyroid gland: The thyroid gland is unremarkable in size and  appearance.      Parotid and submandibular glands: Bilateral parotid and submandibular  glands are unremarkable in appearance.      Paranasal Sinuses and Mastoids: Visualized paranasal sinuses and  bilateral mastoids are predominantly clear.      Visualized orbital structures are unremarkable.      Left upper lung nodules are noted. Please see CT chest performed the  same day for lung findings.      Degenerative changes of the cervical spine.      IMPRESSION:  Redemonstrated is a soft tissue mass at level 3 on the right  measuring 2.4 x 2.6 x 3.7 cm, similar to the prior exam given  differences in technique.      The right internal jugular vein is not visualized and occluded or  thrombosed. There is reconstitution within the transverse sinus  intracranially.      Please see CT chest performed the same day for lung findings.    Chest CT with Contrast (10/9/24):   FINDINGS:  VESSELS: There are atherosclerotic changes of the aorta. The cava and  main pulmonary arteries are unremarkable.      HEART:  The heart is normal in size. There is coronary artery disease.      MEDIASTINUM AND CHIDI: There is no significant mediastinal or hilar  lymphadenopathy.      LUNG, PLEURA, AND LARGE AIRWAYS: There are emphysematous changes.  There is no discrete consolidation or pleural fluid.      4 mm right apical lung nodule on image 30/353. Unchanged.  13 mm ground-glass area in the right apex on image 53. unchanged.      5 mm left upper lobe lung nodule on image 62. Unchanged.  12 mm pleural-based left lower lobe nodule on image 266. The  cavitation is new since the prior exam.  19 x 7 mm pleural based patchy area at the left lung base. This was  not present previously.      CHEST WALL AND LOWER NECK: The thyroid as visualized is unremarkable.      BONES: There are  degenerative changes of the spine.      There appears to be heterogeneity of the sternum.      UPPER ABDOMEN: There are dense coronary artery calcifications. The  left kidney is atrophic.      COMPARISON TO THE PRIOR EXAM:  There is a new pleural-based density at the left base. There is  cavitation of a 12 mm nodule left lower lobe was not present  previously      IMPRESSION:  Interval development of a 19 x 7 mm pleural-based patchy area on the  left.      Interval development cavitation in 12 mm left lower lobe nodule.      The other nodules unchanged.      Emphysema.      Dense arterial calcifications. Atrophic left kidney.    PET/CT Head and Neck (9/24/24):   FINDINGS:  HEAD AND NECK:  There is focal FDG avid level III right cervical lymph node, with a  max SUV of 6.9. (Previously 5.3). FDG activity noted in the left  palatine tonsil of the prior PET-CT dated 01/10/2024 is not  appreciated on current PET-CT. Otherwise, no focal hypermetabolic  soft tissue lesion is seen in the neck.          CHEST:  Interval decrease in FDG activity involving, at least two  pleural  based nodules seen in the left lower lobe with maximum SUV of 2.0  (previously 5.7). Subcentimeter faintly FDG avid pulmonary nodule  with a maximum SUV of 1.1 in apical segment of left upper lobe  (previously 1.4). Otherwise, no new FDG avid lesions seen in  bilateral lung parenchyma. No evidence of hypermetabolic mediastinal,  hilar or axillary lymphadenopathy.      ABDOMEN AND PELVIS:  No focal hypermetabolic soft tissue lesion is present in the abdomen  and pelvis. No evidence of hypermetabolic lymphadenopathy.  Physiologic radiotracer uptake is present in the liver and spleen  with excretion into the bowel loops and the genitourinary tract. Left  kidney appears atrophic. Postsurgical changes of aortobifemoral  bypass.      MUSCULOSKELETAL:  No focal hypermetabolic lesion is seen in the axial or appendicular  to suggest osseous metastasis.  Relative photopenia is noted in the  cervical and upper thoracic spine, likely representing changes due to  prior radiation therapy.      IMPRESSION:  When compared to PET-CT bone skull base to mid thigh dated 01/10/2024  :  1. Interval increase in FDG activity involving level III right  cervical lymph node, consistent with known residual yvette metastasis.  2. Interval decrease in FDG activity involving left lower lobe lung  nodules when compared to prior PET-CT, likely post treatment changes.    Procedures to date:  N/A    Pertinent Pathology:  CONVERTED SURGICAL PATHOLOGY  Order: [id number]   Status: Final result       Visible to patient: Yes (not seen)       Dx: Localized swelling, mass and lump, head    0 Result Notes      Component 1 yr ago   Pathology Report Name MAY CESPEDES                                                                                                 Accession #: [id number]            Pathologist:                   DAUQAN VALENCIA DMD.  Date of Procedure:    6/28/2023  Date Received:          6/28/2023  Date Reported           7/6/2023  Submitting Physician:   MARYANN PRICE MD  Location:                    [facility] External #                                                                   FINAL DIAGNOSIS  SOFT PALATE, MASS, BIOPSY:    -- INVASIVE MODERATELY DIFFERENTIATED KERATINIZING SQUAMOUS CELL CARCINOMA.    at                                                                                                                                                                                                                                                                                                                                                                                                                                                                                     Electronically Signed Out By DAQUAN VALENCIA DMD./AMT  By the signature on this report, the individual or  "group listed as making the  Final Interpretation/Diagnosis certifies that they have reviewed this case.  Diagnostic interpretation performed at [facility] [address] [facility] [address]           Clinical History:  Fixative (A): FORMALIN  Clinical Diagnosis History: Mass of soft palate - (R22.0)    Specimens Submitted As:  A: SOFT PALATE MASS    Gross Description:  Received in formalin, labeled with the patient's name and hospital number and  \"soft palate biopsy\", are 2 portions of mucosal covered soft tissue measuring  0.6 x 0.2 x 0.2 cm and 0.7 x 0.4 x 0.4 cm.  The mucosal surfaces are  unremarkable.  The resection margins are inked blue.  The specimen is bisected  and submitted in toto in 1 cassette.  AE/JORGE                 [facility]  Department of Pathology  [address] [id number]     CONVERTED FINAL DIAGNOSIS SOFT PALATE, MASS, BIOPSY:    -- INVASIVE MODERATELY DIFFERENTIATED KERATINIZING SQUAMOUS CELL CARCINOMA.    at     CONVERTED CLINICAL DIAGNOSIS-HISTORY Fixative (A): FORMALIN  Clinical Diagnosis History: Mass of soft palate - (R22.0)   CONVERTED GROSS DESCRIPTION Received in formalin, labeled with the patient's name and hospital number and  \"soft palate biopsy\", are 2 portions of mucosal covered soft tissue measuring  0.6 x 0.2 x 0.2 cm and 0.7 x 0.4 x 0.4 cm.  The mucosal surfaces are  unremarkable.  The resection margins are inked blue.  The specimen is bisected  and submitted in toto in 1 cassette.  AE/JORGE           The [facility] Head and Neck Tumor Board considered available treatment options and made the following staging and recommendations:    Staging and Recommendations:    Site: left Oropharyngeal  squamous cell carcinoma of the palate  Stage: T3N2M1  Recommendation:  Could consider neck dissection though would act to potentially prolong life and is not curative in nature    Clinical Trial Status:   N/A    "   National site-specific guidelines were discussed with respect to the case.

## 2024-11-26 ENCOUNTER — LAB (OUTPATIENT)
Dept: LAB | Facility: HOSPITAL | Age: 74
End: 2024-11-26
Payer: MEDICARE

## 2024-11-26 ENCOUNTER — OFFICE VISIT (OUTPATIENT)
Dept: HEMATOLOGY/ONCOLOGY | Facility: HOSPITAL | Age: 74
End: 2024-11-26
Payer: MEDICARE

## 2024-11-26 VITALS
OXYGEN SATURATION: 100 % | HEART RATE: 87 BPM | TEMPERATURE: 97.9 F | RESPIRATION RATE: 20 BRPM | BODY MASS INDEX: 19.54 KG/M2 | WEIGHT: 121.03 LBS | DIASTOLIC BLOOD PRESSURE: 58 MMHG | SYSTOLIC BLOOD PRESSURE: 111 MMHG

## 2024-11-26 DIAGNOSIS — C10.9 OROPHARYNGEAL CANCER (MULTI): ICD-10-CM

## 2024-11-26 DIAGNOSIS — C78.02 SQUAMOUS CELL CARCINOMA METASTATIC TO BOTH LUNGS: ICD-10-CM

## 2024-11-26 DIAGNOSIS — C78.01 SQUAMOUS CELL CARCINOMA METASTATIC TO BOTH LUNGS: ICD-10-CM

## 2024-11-26 DIAGNOSIS — C44.92 SQUAMOUS CELL CARCINOMA METASTATIC TO BOTH LUNGS: ICD-10-CM

## 2024-11-26 LAB
ALBUMIN SERPL BCP-MCNC: 3.5 G/DL (ref 3.4–5)
ALP SERPL-CCNC: 47 U/L (ref 33–136)
ALT SERPL W P-5'-P-CCNC: 10 U/L (ref 10–52)
ANION GAP SERPL CALC-SCNC: 12 MMOL/L (ref 10–20)
AST SERPL W P-5'-P-CCNC: 12 U/L (ref 9–39)
BASOPHILS # BLD AUTO: 0.02 X10*3/UL (ref 0–0.1)
BASOPHILS NFR BLD AUTO: 0.4 %
BILIRUB SERPL-MCNC: 0.3 MG/DL (ref 0–1.2)
BUN SERPL-MCNC: 12 MG/DL (ref 6–23)
CALCIUM SERPL-MCNC: 9 MG/DL (ref 8.6–10.3)
CHLORIDE SERPL-SCNC: 103 MMOL/L (ref 98–107)
CO2 SERPL-SCNC: 29 MMOL/L (ref 21–32)
CREAT SERPL-MCNC: 1.3 MG/DL (ref 0.5–1.3)
EGFRCR SERPLBLD CKD-EPI 2021: 58 ML/MIN/1.73M*2
EOSINOPHIL # BLD AUTO: 0 X10*3/UL (ref 0–0.4)
EOSINOPHIL NFR BLD AUTO: 0 %
ERYTHROCYTE [DISTWIDTH] IN BLOOD BY AUTOMATED COUNT: 15.6 % (ref 11.5–14.5)
GLUCOSE SERPL-MCNC: 91 MG/DL (ref 74–99)
HCT VFR BLD AUTO: 30.3 % (ref 41–52)
HGB BLD-MCNC: 9.8 G/DL (ref 13.5–17.5)
IMM GRANULOCYTES # BLD AUTO: 0.01 X10*3/UL (ref 0–0.5)
IMM GRANULOCYTES NFR BLD AUTO: 0.2 % (ref 0–0.9)
LYMPHOCYTES # BLD AUTO: 0.65 X10*3/UL (ref 0.8–3)
LYMPHOCYTES NFR BLD AUTO: 14.3 %
MCH RBC QN AUTO: 28.7 PG (ref 26–34)
MCHC RBC AUTO-ENTMCNC: 32.3 G/DL (ref 32–36)
MCV RBC AUTO: 89 FL (ref 80–100)
MONOCYTES # BLD AUTO: 0.54 X10*3/UL (ref 0.05–0.8)
MONOCYTES NFR BLD AUTO: 11.8 %
NEUTROPHILS # BLD AUTO: 3.34 X10*3/UL (ref 1.6–5.5)
NEUTROPHILS NFR BLD AUTO: 73.3 %
NRBC BLD-RTO: 0 /100 WBCS (ref 0–0)
PLATELET # BLD AUTO: 213 X10*3/UL (ref 150–450)
POTASSIUM SERPL-SCNC: 4 MMOL/L (ref 3.5–5.3)
PROT SERPL-MCNC: 5.8 G/DL (ref 6.4–8.2)
RBC # BLD AUTO: 3.41 X10*6/UL (ref 4.5–5.9)
SODIUM SERPL-SCNC: 140 MMOL/L (ref 136–145)
WBC # BLD AUTO: 4.6 X10*3/UL (ref 4.4–11.3)

## 2024-11-26 PROCEDURE — 1126F AMNT PAIN NOTED NONE PRSNT: CPT | Performed by: STUDENT IN AN ORGANIZED HEALTH CARE EDUCATION/TRAINING PROGRAM

## 2024-11-26 PROCEDURE — 99215 OFFICE O/P EST HI 40 MIN: CPT | Performed by: STUDENT IN AN ORGANIZED HEALTH CARE EDUCATION/TRAINING PROGRAM

## 2024-11-26 PROCEDURE — 36415 COLL VENOUS BLD VENIPUNCTURE: CPT

## 2024-11-26 PROCEDURE — 3074F SYST BP LT 130 MM HG: CPT | Performed by: STUDENT IN AN ORGANIZED HEALTH CARE EDUCATION/TRAINING PROGRAM

## 2024-11-26 PROCEDURE — 3078F DIAST BP <80 MM HG: CPT | Performed by: STUDENT IN AN ORGANIZED HEALTH CARE EDUCATION/TRAINING PROGRAM

## 2024-11-26 PROCEDURE — 80053 COMPREHEN METABOLIC PANEL: CPT

## 2024-11-26 PROCEDURE — 1036F TOBACCO NON-USER: CPT | Performed by: STUDENT IN AN ORGANIZED HEALTH CARE EDUCATION/TRAINING PROGRAM

## 2024-11-26 PROCEDURE — G2211 COMPLEX E/M VISIT ADD ON: HCPCS | Performed by: STUDENT IN AN ORGANIZED HEALTH CARE EDUCATION/TRAINING PROGRAM

## 2024-11-26 PROCEDURE — 85025 COMPLETE CBC W/AUTO DIFF WBC: CPT

## 2024-11-26 PROCEDURE — 1159F MED LIST DOCD IN RCRD: CPT | Performed by: STUDENT IN AN ORGANIZED HEALTH CARE EDUCATION/TRAINING PROGRAM

## 2024-11-26 ASSESSMENT — PAIN SCALES - GENERAL: PAINLEVEL_OUTOF10: 0-NO PAIN

## 2024-11-26 NOTE — PROGRESS NOTES
Patient ID: Phong Wong is a 74 y.o. male.  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 Juan Jose Fletcher   [facility]   RadOnc: Dr. Vianca Steen     Current Therapy  10/15/24: Afatinib 40 mg QD    Prior Therapy:   8/16 - 10/4/23: Recieved concurrent chemoradiation with 7 weekly Carboplatin (2mg/AUC)/Paclitaxel (45mg/m2)  and 70gy RT in 35fx  4/4 - 6/13/24: Received 3 cycles of Pembrolizumab  6/20/24 - 9/12/24: Received 5 cycles of Carbo/Taxol     Sites of Disease:  Soft palate, BOT, Left palatine tonsil  B/L Cervical LN  Lung     Oncologic Issues:   Odynophagia  Fatigue     ONCOLOGIC HISTORY  - Pt had 2 months hx of throat discomfort with lump in right neck  - 6/13/23: CT neck showed a mass 2.8 x 2.5 x 2.9 cm in the right lower cervical neck soft tissues. Two suspicious nodes were observed, one at level 3 on the right and another at level 2 on the left.  - 6/28/23: seen by Dr. Fletcher. 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 Based on the findings, Dr. Fletcher did not recommend surgery due to the location  of the primary tumor and the presence of yvette disease  - 6/30/23: PET/CT showed intense FDG avidity within the soft palate, extending to the base of the tongue and left palatine tonsil. Multiple FDG avid left cervical level II nodes were observed, along with an FDG avid mass in the region of the right cervical  level II/III, infiltrating the right SCM muscle and encasing and invading the right jugular vein. FDG avidity was also detected in the region of the left fossa Rosenmuller and left medial pterygoid muscle.  - 8/16 - 10/4/23: Recieved concurrent chemoradiation with 7 weekly Carboplatin (2mg/AUC)/Paclitaxel (45mg/m2)  and 70gy RT in 35fx  - 4/4 - 6/13/24: Received 3  cycles of Pembrolizumab  - 6/20/24 - 9/12/24: Received 5 cycles of Carbo/Taxol  - 10/15/24: Begin afatinib 40 mg QD    Admissions:  10/5 - 10/10/23: Admitted for extreme weakness, HECTOR, pancytopenia including anemia requiring blood transfusion. D/C'ed to acute rehab     SOCIAL HISTORY  Lives in [place].  to wife Emelia of 8 years. Brother in [place]. Worked as , retired in 5 years ago. Smoked 1-2 PPD x 50 years. Rare EtOH. No illicits.      FAMILY HISTORY  Brother had brain cancer (Santy Bearden)    CURRENT MEDS REVIEWED       ALLERGIES REVIEWED        SUBJECTIVE: Patient doing well today. He is no longer having any diarrhea - has not been on afatinib. He has gained 5 lbs since last visit. He was taking loperamide once daily during afatinib.    A 13 point review of systems was performed, with significant findings documented above in subjective history.    OBJECTIVE:  Vitals:    11/26/24 1514   BP: 111/58   Pulse: 87   Resp: 20   Temp: 36.6 °C (97.9 °F)   SpO2: 100%      Body surface area is 1.6 meters squared.     Wt Readings from Last 5 Encounters:   11/26/24 54.9 kg (121 lb 0.5 oz)   11/20/24 52.6 kg (116 lb)   11/07/24 52.9 kg (116 lb 10 oz)   11/07/24 52 kg (114 lb 10.2 oz)   10/15/24 58.2 kg (128 lb 4.9 oz)     ECOGSCORE: 2- Ambulatory and capable of all selfcare; unable to carry out work activities.  Up and about > 50% of waking hrs.  Gen: A&O, NAD  Head: Normocephalic, atraumatic  Eyes: no scleral icterus  ENT: mucous membranes moist, no oropharyngeal lesions  Resp: Lungs CTAB  Cardiac: Normal rate, regular rhythm, no murmurs appreciated  Abdomen: Soft, nondistended, nontender, +BS  Neuro: CNII-XII grossly intact  Psych: appropriate mood & affect  Skin: warm, dry, no apparent rashes    Diagnostic Results   Results:  Labs:  Lab Results   Component Value Date    WBC 4.6 11/26/2024    HGB 9.8 (L) 11/26/2024    HCT 30.3 (L) 11/26/2024    MCV 89 11/26/2024     11/26/2024      Lab Results    Component Value Date    NEUTROABS 3.34 11/26/2024        Lab Results   Component Value Date    GLUCOSE 91 11/26/2024    CALCIUM 9.0 11/26/2024     11/26/2024    K 4.0 11/26/2024    CO2 29 11/26/2024     11/26/2024    BUN 12 11/26/2024    CREATININE 1.30 11/26/2024    MG 1.56 (L) 11/07/2024     Lab Results   Component Value Date    ALT 10 11/26/2024    AST 12 11/26/2024    ALKPHOS 47 11/26/2024    BILITOT 0.3 11/26/2024    BILIDIR 0.2 05/14/2022      Lab Results   Component Value Date    ACTH 26.3 05/23/2024    CORTISOL 16.1 05/23/2024    TSH 3.58 09/12/2024    FREET4 1.09 06/20/2024     PET 09/24/2024:  IMPRESSION:  When compared to PET-CT bone skull base to mid thigh dated 01/10/2024  :  1. Interval increase in FDG activity involving level III right  cervical lymph node, consistent with known residual yvette metastasis.  2. Interval decrease in FDG activity involving left lower lobe lung  nodules when compared to prior PET-CT, likely post treatment changes.      CT Chest 10/9/24:  IMPRESSION:  Interval development of a 19 x 7 mm pleural-based patchy area on the  left.      Interval development cavitation in 12 mm left lower lobe nodule.      The other nodules unchanged.      Emphysema.      Dense arterial calcifications. Atrophic left kidney.      Signed by: Anastasiia Cordova 10/14/2024 12:46 PM  Dictation workstation:   [id number]      CT Neck 10/09/2024:    IMPRESSION:  Redemonstrated is a soft tissue mass at level 3 on the right  measuring 2.4 x 2.6 x 3.7 cm, similar to the prior exam given  differences in technique.      The right internal jugular vein is not visualized and occluded or  thrombosed. There is reconstitution within the transverse sinus  intracranially.    Assessment/Plan   Mr. Phong Wong is a 74 y.o. male with locally advanced oral cavity cancer, completed chemoRT w/ 7 weekly Carboplatin+Paclitaxel on 10/4/23. Unfortunately found with mets to lungs on 3 months restaging scan. CPS 3. S/p  3 cycles Pembrolizumab but lung nodules progressed. Completed 5 cycles of Carbo/Taxol on 9/12/24.     He trialed afatinib at 40 mg daily however has been held due to grade III diarrhea. We will resume at reduced dose. Given his left lung/pleural lesion I think we should proceed without palliative neck dissection. We will restage, resume at reduced dose and follow in 3 weeks.    # Locally advanced oral cavity cancer, T3N2, now with met to lungs  - 6/30/23 PET/CT showed intense FDG avidity within the soft palate, extending to BOT and left palatine tonsil. Multiple uptake in left cervical level II nodes, right cervical level  II/III, infiltrating the right SCM muscle and encasing and invading the right jugular vein. FDG avidity was also detected in the region of the left fossa Rosenmuller and left   - Discussed with patient about carboplatin+ paclitaxel as his chemotherapy along with radiation due to his GFR 30s.  - 8/16 - 10/4/23: Recieved concurrent chemoradiation with 7 weekly Carboplatin (2mg/AUC)/Paclitaxel (45mg/m2)  and 70gy RT in 35fx     - 1/10/24 restaging PET/CT noted significant interval improvement in prior sites of disease though with residual FDG avidity in soft palate and left palatine tonsil, c/f residual disease vs post treatment changes. Also noted was a new FDG avid LLL pulm nodule with max SUV 5.7, c/f pulmonary metastasis.   - 2/9/24 CT chest showed multiple subcentimeter noduesl 2-5mm, dominant lesion is 1.6cm in LLL.    - 3/6/24: Lung biopsy of LLL nodule was consistent with HN origin. CPS 3.  Discussed with patient about phase 2 FLAM study (randomized to pembrolizumab or pembrolizumab+danvatirsen (Stat3 inhibitor). Unfortunately due to his CKD, he's not eligible for FLAM study.  - 4/4/24: Started Q3 week Pembrolizumab  - 6/7/24 restaging CT N/C/A/P showed enlargement of subcentimeter lung mets, stable left lung nodule 2cm with new 1cm nodule in RUL. Based on heightened response with carbo+taxol  after progression on PD, I recommended 3 cycles of carbo taxol and restaging, if there is response, we can consider afatinib treatment more as long term maintenance therapy.   - 6/20/24: Cycle 1 of Q3 week Carbo/Taxol.  - 8/19/24 restaging scan showed improving b/l pulmonary mets, largest pleural based LLL nodule is now 14mm.   Pt is aware of that Dr. Burkitt is leaving  at the end of Oct, planning to get to another restaging and change treatment to afatinib if he continues to have response.  Once treatment is changed, will transition his care to Dr. Parks.   - 9/25/24: PET/CT showed continuing response in lung mets, known level III right cervical lymphadenopathy, but increased SUV compared to Miguel PET. No other mets.  Since patients PS is declining, prefer treating residual disease with afatinib. CT neck/chest ordered to measure exact size of right cervical LN as well as lung mets, so these can be used as a baseline prior to start afatinib.  - 10/9/24 CT N/C pending. Patient is otherwise feeling well. Diarrhea resolved, arthralgia and peripheral neuropathy have resolved. He continue to tolerate regular diet and denies any dysphagia. Appetite is not great so has weight loss but this is improving.   - 10/15/24: Starting afatinib 40 mg daily. Consents signed. Will follow in 3 weeks.    # Grade III TKI diarrhea  -Now resolved, resuming afatinib at 30 mg daily upon reciept    # CKD  - 2/9/24 CT chest showed Bulky atherosclerotic calcification at the origin of the left renal artery likely contributing to atrophy of the left kidney as compared to the right. Kidney atrophy was not mentioned in previous scans.   - 5/1/24: Creat 1.46, GFR 51. Encouraged patient to keep up with non-caffeinated PO hydration. 1L NS given today with Pembro. Patient to follow up with PCP regarding atherosclerosis.   - Baseline Creat 1.1 - 1.3, GFR 55-65. Will support with IV hydration with each treatment    # Submental/Neck Lymphedema  -  Patient with moderate swelling in the submental and neck region. Likely 2ry to prior radiation. Established w/ Dr Guerrero in integrative [facility].   - Neck pain and swelling improving w/ lymphedema massage.     PLAN  -- Resume afatinib at 30 mg daily. Instructed on loperamide use (2 mg with each loose) stool   -- Restaging CT today, follow with Eva Patten in 3 weeks  -- Will discuss with Dr. Fletcher - palliative neck dissection unlikely to be in his best interest given lung/pleural disease  -- Patient fo follow up with Dr. Weiss on atherosclerosis of left renal artery and coroanary calcifications    Bandar Parks MD  Thoracic + [facility]   [id number] [place], [place] [address]  Phone: [phone]

## 2024-11-29 ENCOUNTER — APPOINTMENT (OUTPATIENT)
Dept: HEMATOLOGY/ONCOLOGY | Facility: HOSPITAL | Age: 74
End: 2024-11-29
Payer: MEDICARE

## 2024-12-02 ENCOUNTER — HOSPITAL ENCOUNTER (OUTPATIENT)
Dept: RADIOLOGY | Facility: CLINIC | Age: 74
End: 2024-12-02
Payer: MEDICARE

## 2024-12-02 ENCOUNTER — APPOINTMENT (OUTPATIENT)
Dept: RADIOLOGY | Facility: CLINIC | Age: 74
End: 2024-12-02
Payer: MEDICARE

## 2024-12-04 ENCOUNTER — SPECIALTY PHARMACY (OUTPATIENT)
Dept: PHARMACY | Facility: CLINIC | Age: 74
End: 2024-12-04

## 2024-12-04 PROCEDURE — RXMED WILLOW AMBULATORY MEDICATION CHARGE

## 2024-12-05 ENCOUNTER — APPOINTMENT (OUTPATIENT)
Dept: RADIATION ONCOLOGY | Facility: HOSPITAL | Age: 74
End: 2024-12-05
Payer: MEDICARE

## 2024-12-05 ENCOUNTER — PHARMACY VISIT (OUTPATIENT)
Dept: PHARMACY | Facility: CLINIC | Age: 74
End: 2024-12-05
Payer: COMMERCIAL

## 2024-12-06 ENCOUNTER — APPOINTMENT (OUTPATIENT)
Dept: HEMATOLOGY/ONCOLOGY | Facility: HOSPITAL | Age: 74
End: 2024-12-06
Payer: MEDICARE

## 2024-12-10 ENCOUNTER — HOSPITAL ENCOUNTER (OUTPATIENT)
Dept: RADIOLOGY | Facility: CLINIC | Age: 74
Discharge: HOME | End: 2024-12-10
Payer: MEDICARE

## 2024-12-10 DIAGNOSIS — C78.01 SQUAMOUS CELL CARCINOMA METASTATIC TO BOTH LUNGS: ICD-10-CM

## 2024-12-10 DIAGNOSIS — C44.92 SQUAMOUS CELL CARCINOMA METASTATIC TO BOTH LUNGS: ICD-10-CM

## 2024-12-10 DIAGNOSIS — C78.02 SQUAMOUS CELL CARCINOMA METASTATIC TO BOTH LUNGS: ICD-10-CM

## 2024-12-10 PROCEDURE — 2550000001 HC RX 255 CONTRASTS: Performed by: STUDENT IN AN ORGANIZED HEALTH CARE EDUCATION/TRAINING PROGRAM

## 2024-12-10 PROCEDURE — 71260 CT THORAX DX C+: CPT

## 2024-12-10 PROCEDURE — 70491 CT SOFT TISSUE NECK W/DYE: CPT

## 2024-12-10 PROCEDURE — 70491 CT SOFT TISSUE NECK W/DYE: CPT | Performed by: RADIOLOGY

## 2024-12-16 NOTE — PROGRESS NOTES
Patient ID: Phong Wong is a 74 y.o. male.  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 Juan Jose Fletcher   [facility]   RadOnc: Dr. Vianca Steen     Current Therapy  10/15/24: Afatinib 40 mg QD    Prior Therapy:   8/16 - 10/4/23: Recieved concurrent chemoradiation with 7 weekly Carboplatin (2mg/AUC)/Paclitaxel (45mg/m2)  and 70gy RT in 35fx  4/4 - 6/13/24: Received 3 cycles of Pembrolizumab  6/20/24 - 9/12/24: Received 5 cycles of Carbo/Taxol     Sites of Disease:  Soft palate, BOT, Left palatine tonsil  B/L Cervical LN  Lung     Oncologic Issues:   Odynophagia  Fatigue     ONCOLOGIC HISTORY  - Pt had 2 months hx of throat discomfort with lump in right neck  - 6/13/23: CT neck showed a mass 2.8 x 2.5 x 2.9 cm in the right lower cervical neck soft tissues. Two suspicious nodes were observed, one at level 3 on the right and another at level 2 on the left.  - 6/28/23: seen by Dr. Fletcher. 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 Based on the findings, Dr. Fletcher did not recommend surgery due to the location  of the primary tumor and the presence of yvette disease  - 6/30/23: PET/CT showed intense FDG avidity within the soft palate, extending to the base of the tongue and left palatine tonsil. Multiple FDG avid left cervical level II nodes were observed, along with an FDG avid mass in the region of the right cervical  level II/III, infiltrating the right SCM muscle and encasing and invading the right jugular vein. FDG avidity was also detected in the region of the left fossa Rosenmuller and left medial pterygoid muscle.  - 8/16 - 10/4/23: Recieved concurrent chemoradiation with 7 weekly Carboplatin (2mg/AUC)/Paclitaxel (45mg/m2)  and 70gy RT in 35fx  - 4/4 - 6/13/24: Received 3  cycles of Pembrolizumab  - 6/20/24 - 9/12/24: Received 5 cycles of Carbo/Taxol  - 10/15/24: Begin afatinib 40 mg QD    Admissions:  10/5 - 10/10/23: Admitted for extreme weakness, HECTOR, pancytopenia including anemia requiring blood transfusion. D/C'ed to acute rehab     SOCIAL HISTORY  Lives in [place].  to wife Emelia of 8 years. Brother in [place]. Worked as , retired in 5 years ago. Smoked 1-2 PPD x 50 years. Rare EtOH. No illicits.      FAMILY HISTORY  Brother had brain cancer (Santy Bearden)    CURRENT MEDS REVIEWED       ALLERGIES REVIEWED        SUBJECTIVE: Patient doing well today. He has not started his afatinib 30 mg as he is nervous about diarrhea. He notes his vision has worsened - he follows with an outside ophthalmologist. Otherwise well, weight is stable.     A 13 point review of systems was performed, with significant findings documented above in subjective history.    OBJECTIVE:  Vitals:    12/18/24 1310   BP: 99/50   Pulse: 53   Temp: 36.5 °C (97.7 °F)   SpO2: 100%        Body surface area is 1.59 meters squared.     Wt Readings from Last 5 Encounters:   12/18/24 54.5 kg (120 lb 2.4 oz)   11/26/24 54.9 kg (121 lb 0.5 oz)   11/20/24 52.6 kg (116 lb)   11/07/24 52.9 kg (116 lb 10 oz)   11/07/24 52 kg (114 lb 10.2 oz)     ECOGSCORE: 2- Ambulatory and capable of all selfcare; unable to carry out work activities.  Up and about > 50% of waking hrs.  Gen: A&O, NAD  Head: Normocephalic, atraumatic  Eyes: no scleral icterus  ENT: mucous membranes moist, no oropharyngeal lesions  Resp: Lungs CTAB  Cardiac: Normal rate, regular rhythm, no murmurs appreciated  Abdomen: Soft, nondistended, nontender, +BS  Neuro: CNII-XII grossly intact  Psych: appropriate mood & affect  Skin: warm, dry, no apparent rashes    Diagnostic Results   Results:  Labs:  Lab Results   Component Value Date    WBC 3.8 (L) 12/18/2024    HGB 11.0 (L) 12/18/2024    HCT 33.3 (L) 12/18/2024    MCV 85 12/18/2024      12/18/2024      Lab Results   Component Value Date    NEUTROABS 2.44 12/18/2024        Lab Results   Component Value Date    GLUCOSE 102 (H) 12/18/2024    CALCIUM 9.3 12/18/2024     12/18/2024    K 3.5 12/18/2024    CO2 30 12/18/2024    CL 98 12/18/2024    BUN 18 12/18/2024    CREATININE 1.42 (H) 12/18/2024    MG 1.56 (L) 11/07/2024     Lab Results   Component Value Date    ALT 10 11/26/2024    AST 12 11/26/2024    ALKPHOS 47 11/26/2024    BILITOT 0.3 11/26/2024    BILIDIR 0.2 05/14/2022      Lab Results   Component Value Date    ACTH 26.3 05/23/2024    CORTISOL 16.1 05/23/2024    TSH 3.58 09/12/2024    FREET4 1.09 06/20/2024     PET 09/24/2024:  IMPRESSION:  When compared to PET-CT bone skull base to mid thigh dated 01/10/2024  :  1. Interval increase in FDG activity involving level III right  cervical lymph node, consistent with known residual yvette metastasis.  2. Interval decrease in FDG activity involving left lower lobe lung  nodules when compared to prior PET-CT, likely post treatment changes.      CT Chest 10/9/24:  IMPRESSION:  Interval development of a 19 x 7 mm pleural-based patchy area on the  left.      Interval development cavitation in 12 mm left lower lobe nodule.      The other nodules unchanged.      Emphysema.      Dense arterial calcifications. Atrophic left kidney.      Signed by: Anastasiia Cordova 10/14/2024 12:46 PM  Dictation workstation:   [id number]      CT Neck 10/09/2024:    IMPRESSION:  Redemonstrated is a soft tissue mass at level 3 on the right  measuring 2.4 x 2.6 x 3.7 cm, similar to the prior exam given  differences in technique.      The right internal jugular vein is not visualized and occluded or  thrombosed. There is reconstitution within the transverse sinus  intracranially.    CT Soft Tissue Neck 12/10/24  IMPRESSION:  1. Soft tissue density mass at the level of the thyroid cartilage on  the right, immediately posterior/deep to the sternocleidomastoid,  measuring 3.1 x 2.0 x  3.7 cm. This mass appears to narrow the right  common carotid artery. Findings are consistent with calcified  inflammatory or neoplastic adenopathy and are unchanged compared to  the previous exam.  2. Low-attenuation involving the oropharyngeal soft tissues  suggestive of post therapeutic edema.  3. Heavy burden of calcified atheromatous plaques in the bilateral  carotid arteries.    CT Chest 12/10/24  IMPRESSION:  1.  Overall progression of malignancy with progression of cavitation  at the left lower subpleural nodule, enlargement of a left apical  nodule, development a 4 mm left upper lung nodule, and left lung  micronodule. There has also been slight enlargement of a right major  perifissural node.    Assessment/Plan   Mr. Phong Wong is a 74 y.o. male with locally advanced oral cavity cancer, completed chemoRT w/ 7 weekly Carboplatin+Paclitaxel on 10/4/23. Unfortunately found with mets to lungs on 3 months restaging scan. CPS 3. S/p 3 cycles Pembrolizumab but lung nodules progressed. Completed 5 cycles of Carbo/Taxol on 9/12/24.     He trialed afatinib at 40 mg daily however has been held due to grade III diarrhea. We restaged and his lung burden is very slowly growing. He is anxious to start afatinib at 30 mg but is willing to trial 20 mg. We will start him on 20 mg and follow in 4 weeks time.    # Locally advanced oral cavity cancer, T3N2, now with met to lungs  - 6/30/23 PET/CT showed intense FDG avidity within the soft palate, extending to BOT and left palatine tonsil. Multiple uptake in left cervical level II nodes, right cervical level  II/III, infiltrating the right SCM muscle and encasing and invading the right jugular vein. FDG avidity was also detected in the region of the left fossa Rosenmuller and left   - Discussed with patient about carboplatin+ paclitaxel as his chemotherapy along with radiation due to his GFR 30s.  - 8/16 - 10/4/23: Recieved concurrent chemoradiation with 7 weekly Carboplatin  (2mg/AUC)/Paclitaxel (45mg/m2)  and 70gy RT in 35fx     - 1/10/24 restaging PET/CT noted significant interval improvement in prior sites of disease though with residual FDG avidity in soft palate and left palatine tonsil, c/f residual disease vs post treatment changes. Also noted was a new FDG avid LLL pulm nodule with max SUV 5.7, c/f pulmonary metastasis.   - 2/9/24 CT chest showed multiple subcentimeter noduesl 2-5mm, dominant lesion is 1.6cm in LLL.    - 3/6/24: Lung biopsy of LLL nodule was consistent with HN origin. CPS 3.  Discussed with patient about phase 2 FLAM study (randomized to pembrolizumab or pembrolizumab+danvatirsen (Stat3 inhibitor). Unfortunately due to his CKD, he's not eligible for FLAM study.  - 4/4/24: Started Q3 week Pembrolizumab  - 6/7/24 restaging CT N/C/A/P showed enlargement of subcentimeter lung mets, stable left lung nodule 2cm with new 1cm nodule in RUL. Based on heightened response with carbo+taxol after progression on PD, I recommended 3 cycles of carbo taxol and restaging, if there is response, we can consider afatinib treatment more as long term maintenance therapy.   - 6/20/24: Cycle 1 of Q3 week Carbo/Taxol.  - 8/19/24 restaging scan showed improving b/l pulmonary mets, largest pleural based LLL nodule is now 14mm.   Pt is aware of that Dr. Burkitt is leaving  at the end of Oct, planning to get to another restaging and change treatment to afatinib if he continues to have response.  Once treatment is changed, will transition his care to Dr. Parks.   - 9/25/24: PET/CT showed continuing response in lung mets, known level III right cervical lymphadenopathy, but increased SUV compared to Miguel PET. No other mets.  Since patients PS is declining, prefer treating residual disease with afatinib. CT neck/chest ordered to measure exact size of right cervical LN as well as lung mets, so these can be used as a baseline prior to start afatinib.  - 10/9/24 CT N/C pending. Patient is  otherwise feeling well. Diarrhea resolved, arthralgia and peripheral neuropathy have resolved. He continue to tolerate regular diet and denies any dysphagia. Appetite is not great so has weight loss but this is improving.   - 10/15/24: Starting afatinib 40 mg daily. Consents signed. Will follow in 3 weeks.  -12/18/24: Scans reviewed showing very slow progression off all therapy for nearly 2 months. We will resume afatinib at 20 mg and follow in 4 weeks. Anticipate scans end of Feb    # Grade III TKI diarrhea  -Now resolved, resuming afatinib at 30 mg daily upon reciept    # CKD  - 2/9/24 CT chest showed Bulky atherosclerotic calcification at the origin of the left renal artery likely contributing to atrophy of the left kidney as compared to the right. Kidney atrophy was not mentioned in previous scans.   - 5/1/24: Creat 1.46, GFR 51. Encouraged patient to keep up with non-caffeinated PO hydration. 1L NS given today with Pembro. Patient to follow up with PCP regarding atherosclerosis.   - Baseline Creat 1.1 - 1.3, GFR 55-65. Will support with IV hydration with each treatment    # Submental/Neck Lymphedema  - Patient with moderate swelling in the submental and neck region. Likely 2ry to prior radiation. Established w/ Dr Guerrero in integrative [facility].   - Neck pain and swelling improving w/ lymphedema massage.     PLAN  -- Resume afatinib at 20 mg daily. Instructed on loperamide use (2 mg with each loose) stool   -- Will discuss with Dr. Fletcher - palliative neck dissection unlikely to be in his best interest given lung/pleural disease  -- Patient fo follow up with Dr. Weiss on athero sclerosis of left renal artery and coroanary calcifications  -- Plan for restaging in 12 weeks

## 2024-12-18 ENCOUNTER — OFFICE VISIT (OUTPATIENT)
Dept: HEMATOLOGY/ONCOLOGY | Facility: HOSPITAL | Age: 74
End: 2024-12-18
Payer: MEDICARE

## 2024-12-18 ENCOUNTER — LAB (OUTPATIENT)
Dept: LAB | Facility: HOSPITAL | Age: 74
End: 2024-12-18
Payer: MEDICARE

## 2024-12-18 VITALS
TEMPERATURE: 97.7 F | DIASTOLIC BLOOD PRESSURE: 70 MMHG | HEART RATE: 53 BPM | SYSTOLIC BLOOD PRESSURE: 101 MMHG | OXYGEN SATURATION: 100 % | BODY MASS INDEX: 19.39 KG/M2 | WEIGHT: 120.15 LBS

## 2024-12-18 DIAGNOSIS — E78.5 HYPERLIPIDEMIA, UNSPECIFIED HYPERLIPIDEMIA TYPE: ICD-10-CM

## 2024-12-18 DIAGNOSIS — C44.92 SQUAMOUS CELL CARCINOMA METASTATIC TO BOTH LUNGS: ICD-10-CM

## 2024-12-18 DIAGNOSIS — C78.02 SQUAMOUS CELL CARCINOMA METASTATIC TO BOTH LUNGS: ICD-10-CM

## 2024-12-18 DIAGNOSIS — C10.9 OROPHARYNGEAL CANCER (MULTI): ICD-10-CM

## 2024-12-18 DIAGNOSIS — C78.01 SQUAMOUS CELL CARCINOMA METASTATIC TO BOTH LUNGS: ICD-10-CM

## 2024-12-18 LAB
ALBUMIN SERPL BCP-MCNC: 3.7 G/DL (ref 3.4–5)
ALP SERPL-CCNC: 53 U/L (ref 33–136)
ALT SERPL W P-5'-P-CCNC: 19 U/L (ref 10–52)
ANION GAP SERPL CALC-SCNC: 14 MMOL/L (ref 10–20)
AST SERPL W P-5'-P-CCNC: 19 U/L (ref 9–39)
BASOPHILS # BLD AUTO: 0.01 X10*3/UL (ref 0–0.1)
BASOPHILS NFR BLD AUTO: 0.3 %
BILIRUB SERPL-MCNC: 0.3 MG/DL (ref 0–1.2)
BUN SERPL-MCNC: 18 MG/DL (ref 6–23)
CALCIUM SERPL-MCNC: 9.3 MG/DL (ref 8.6–10.3)
CHLORIDE SERPL-SCNC: 98 MMOL/L (ref 98–107)
CHOLEST SERPL-MCNC: 159 MG/DL (ref 0–199)
CHOLESTEROL/HDL RATIO: 4.3
CO2 SERPL-SCNC: 30 MMOL/L (ref 21–32)
CREAT SERPL-MCNC: 1.42 MG/DL (ref 0.5–1.3)
EGFRCR SERPLBLD CKD-EPI 2021: 53 ML/MIN/1.73M*2
EOSINOPHIL # BLD AUTO: 0.01 X10*3/UL (ref 0–0.4)
EOSINOPHIL NFR BLD AUTO: 0.3 %
ERYTHROCYTE [DISTWIDTH] IN BLOOD BY AUTOMATED COUNT: 15 % (ref 11.5–14.5)
GLUCOSE SERPL-MCNC: 102 MG/DL (ref 74–99)
HCT VFR BLD AUTO: 33.3 % (ref 41–52)
HDLC SERPL-MCNC: 36.7 MG/DL
HGB BLD-MCNC: 11 G/DL (ref 13.5–17.5)
IMM GRANULOCYTES # BLD AUTO: 0.01 X10*3/UL (ref 0–0.5)
IMM GRANULOCYTES NFR BLD AUTO: 0.3 % (ref 0–0.9)
LDLC SERPL CALC-MCNC: 91 MG/DL
LYMPHOCYTES # BLD AUTO: 0.53 X10*3/UL (ref 0.8–3)
LYMPHOCYTES NFR BLD AUTO: 14.1 %
MCH RBC QN AUTO: 28 PG (ref 26–34)
MCHC RBC AUTO-ENTMCNC: 33 G/DL (ref 32–36)
MCV RBC AUTO: 85 FL (ref 80–100)
MONOCYTES # BLD AUTO: 0.75 X10*3/UL (ref 0.05–0.8)
MONOCYTES NFR BLD AUTO: 20 %
NEUTROPHILS # BLD AUTO: 2.44 X10*3/UL (ref 1.6–5.5)
NEUTROPHILS NFR BLD AUTO: 65 %
NON HDL CHOLESTEROL: 122 MG/DL (ref 0–149)
NRBC BLD-RTO: 0 /100 WBCS (ref 0–0)
PLATELET # BLD AUTO: 205 X10*3/UL (ref 150–450)
POTASSIUM SERPL-SCNC: 3.5 MMOL/L (ref 3.5–5.3)
PROT SERPL-MCNC: 6.2 G/DL (ref 6.4–8.2)
RBC # BLD AUTO: 3.93 X10*6/UL (ref 4.5–5.9)
SODIUM SERPL-SCNC: 138 MMOL/L (ref 136–145)
TRIGL SERPL-MCNC: 158 MG/DL (ref 0–149)
VLDL: 32 MG/DL (ref 0–40)
WBC # BLD AUTO: 3.8 X10*3/UL (ref 4.4–11.3)

## 2024-12-18 PROCEDURE — 99215 OFFICE O/P EST HI 40 MIN: CPT | Performed by: STUDENT IN AN ORGANIZED HEALTH CARE EDUCATION/TRAINING PROGRAM

## 2024-12-18 PROCEDURE — 36415 COLL VENOUS BLD VENIPUNCTURE: CPT

## 2024-12-18 PROCEDURE — 1126F AMNT PAIN NOTED NONE PRSNT: CPT | Performed by: STUDENT IN AN ORGANIZED HEALTH CARE EDUCATION/TRAINING PROGRAM

## 2024-12-18 PROCEDURE — 80061 LIPID PANEL: CPT

## 2024-12-18 PROCEDURE — G2211 COMPLEX E/M VISIT ADD ON: HCPCS | Performed by: STUDENT IN AN ORGANIZED HEALTH CARE EDUCATION/TRAINING PROGRAM

## 2024-12-18 PROCEDURE — 85025 COMPLETE CBC W/AUTO DIFF WBC: CPT

## 2024-12-18 PROCEDURE — 80053 COMPREHEN METABOLIC PANEL: CPT

## 2024-12-18 PROCEDURE — RXMED WILLOW AMBULATORY MEDICATION CHARGE

## 2024-12-18 PROCEDURE — 3074F SYST BP LT 130 MM HG: CPT | Performed by: STUDENT IN AN ORGANIZED HEALTH CARE EDUCATION/TRAINING PROGRAM

## 2024-12-18 PROCEDURE — 3078F DIAST BP <80 MM HG: CPT | Performed by: STUDENT IN AN ORGANIZED HEALTH CARE EDUCATION/TRAINING PROGRAM

## 2024-12-18 ASSESSMENT — PAIN SCALES - GENERAL: PAINLEVEL_OUTOF10: 0-NO PAIN

## 2024-12-19 ENCOUNTER — TELEPHONE (OUTPATIENT)
Dept: PRIMARY CARE | Facility: CLINIC | Age: 74
End: 2024-12-19
Payer: MEDICARE

## 2024-12-19 DIAGNOSIS — E78.5 HYPERLIPIDEMIA, UNSPECIFIED HYPERLIPIDEMIA TYPE: Primary | ICD-10-CM

## 2024-12-19 RX ORDER — ATORVASTATIN CALCIUM 80 MG/1
80 TABLET, FILM COATED ORAL DAILY
Qty: 90 TABLET | Refills: 3 | Status: SHIPPED | OUTPATIENT
Start: 2024-12-19 | End: 2026-01-23

## 2024-12-21 ENCOUNTER — SPECIALTY PHARMACY (OUTPATIENT)
Dept: PHARMACY | Facility: CLINIC | Age: 74
End: 2024-12-21

## 2024-12-23 ENCOUNTER — PHARMACY VISIT (OUTPATIENT)
Dept: PHARMACY | Facility: CLINIC | Age: 74
End: 2024-12-23
Payer: COMMERCIAL

## 2025-01-07 ENCOUNTER — TELEPHONE (OUTPATIENT)
Dept: PRIMARY CARE | Facility: CLINIC | Age: 75
End: 2025-01-07
Payer: MEDICARE

## 2025-01-07 DIAGNOSIS — E78.5 HYPERLIPIDEMIA, UNSPECIFIED HYPERLIPIDEMIA TYPE: ICD-10-CM

## 2025-01-07 RX ORDER — ATORVASTATIN CALCIUM 80 MG/1
80 TABLET, FILM COATED ORAL DAILY
Qty: 90 TABLET | Refills: 3 | Status: SHIPPED | OUTPATIENT
Start: 2025-01-07 | End: 2026-02-11

## 2025-01-07 NOTE — TELEPHONE ENCOUNTER
Patient states that he never got his Atorvastatin from Dec 9th wants to know if you could send it over to the pharmacy again

## 2025-01-15 NOTE — PROGRESS NOTES
Patient ID: Phong Wong is a 74 y.o. male.  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 Juan Jose Fletcher   [facility]   RadOnc: Dr. Vianca Steen     Current Therapy  10/15/24: Afatinib 40 mg QD    Prior Therapy:   8/16 - 10/4/23: Recieved concurrent chemoradiation with 7 weekly Carboplatin (2mg/AUC)/Paclitaxel (45mg/m2)  and 70gy RT in 35fx  4/4 - 6/13/24: Received 3 cycles of Pembrolizumab  6/20/24 - 9/12/24: Received 5 cycles of Carbo/Taxol     Sites of Disease:  Soft palate, BOT, Left palatine tonsil  B/L Cervical LN  Lung     Oncologic Issues:   Odynophagia  Fatigue     ONCOLOGIC HISTORY  - Pt had 2 months hx of throat discomfort with lump in right neck  - 6/13/23: CT neck showed a mass 2.8 x 2.5 x 2.9 cm in the right lower cervical neck soft tissues. Two suspicious nodes were observed, one at level 3 on the right and another at level 2 on the left.  - 6/28/23: seen by Dr. Fletcher. 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 Based on the findings, Dr. Fletcher did not recommend surgery due to the location  of the primary tumor and the presence of yvette disease  - 6/30/23: PET/CT showed intense FDG avidity within the soft palate, extending to the base of the tongue and left palatine tonsil. Multiple FDG avid left cervical level II nodes were observed, along with an FDG avid mass in the region of the right cervical  level II/III, infiltrating the right SCM muscle and encasing and invading the right jugular vein. FDG avidity was also detected in the region of the left fossa Rosenmuller and left medial pterygoid muscle.  - 8/16 - 10/4/23: Recieved concurrent chemoradiation with 7 weekly Carboplatin (2mg/AUC)/Paclitaxel (45mg/m2)  and 70gy RT in 35fx  - 4/4 - 6/13/24: Received 3  cycles of Pembrolizumab  - 6/20/24 - 9/12/24: Received 5 cycles of Carbo/Taxol  - 10/15/24: Begin afatinib 40 mg QD    Admissions:  10/5 - 10/10/23: Admitted for extreme weakness, HECTOR, pancytopenia including anemia requiring blood transfusion. D/C'ed to acute rehab     SOCIAL HISTORY  Lives in [place].  to wife Emelia of 8 years. Brother in [place]. Worked as , retired in 5 years ago. Smoked 1-2 PPD x 50 years. Rare EtOH. No illicits.      FAMILY HISTORY  Brother had brain cancer (Santy Bearden)    CURRENT MEDS REVIEWED     ALLERGIES REVIEWED        Subjective     Patient doing well today. He has not started his afatinib 30 mg as he is nervous about diarrhea. He notes his vision has worsened - he follows with an outside ophthalmologist. Otherwise well, weight is stable.     Review of Systems - Oncology    Objective   VS:There were no vitals taken for this visit.   Wt Readings from Last 5 Encounters:   12/18/24 54.5 kg (120 lb 2.4 oz)   11/26/24 54.9 kg (121 lb 0.5 oz)   11/20/24 52.6 kg (116 lb)   11/07/24 52.9 kg (116 lb 10 oz)   11/07/24 52 kg (114 lb 10.2 oz)     ECOGSCORE: 2- Ambulatory and capable of all selfcare; unable to carry out work activities.  Up and about > 50% of waking hrs.    Physical Exam      Diagnostic Results   Labs:  Lab Results   Component Value Date    WBC 3.8 (L) 12/18/2024    HGB 11.0 (L) 12/18/2024    HCT 33.3 (L) 12/18/2024    MCV 85 12/18/2024     12/18/2024      Lab Results   Component Value Date    NEUTROABS 2.44 12/18/2024        Lab Results   Component Value Date    GLUCOSE 102 (H) 12/18/2024    CALCIUM 9.3 12/18/2024     12/18/2024    K 3.5 12/18/2024    CO2 30 12/18/2024    CL 98 12/18/2024    BUN 18 12/18/2024    CREATININE 1.42 (H) 12/18/2024    MG 1.56 (L) 11/07/2024     Lab Results   Component Value Date    ALT 19 12/18/2024    AST 19 12/18/2024    ALKPHOS 53 12/18/2024    BILITOT 0.3 12/18/2024    BILIDIR 0.2 05/14/2022      Lab Results    Component Value Date    ACTH 26.3 05/23/2024    CORTISOL 16.1 05/23/2024    TSH 3.58 09/12/2024    FREET4 1.09 06/20/2024     PET 09/24/2024:  IMPRESSION:  When compared to PET-CT bone skull base to mid thigh dated 01/10/2024  1. Interval increase in FDG activity involving level III right cervical lymph node, consistent with known residual yvette metastasis.  2. Interval decrease in FDG activity involving left lower lobe lung nodules when compared to prior PET-CT, likely post treatment changes.      CT Chest 10/9/24:  IMPRESSION:  Interval development of a 19 x 7 mm pleural-based patchy area on the left.  Interval development cavitation in 12 mm left lower lobe nodule.    The other nodules unchanged.    Emphysema.    Dense arterial calcifications. Atrophic left kidney.       CT Neck 10/09/2024:  IMPRESSION:  Redemonstrated is a soft tissue mass at level 3 on the right measuring 2.4 x 2.6 x 3.7 cm, similar to the prior exam given differences in technique.    The right internal jugular vein is not visualized and occluded or thrombosed. There is reconstitution within the transverse sinus intracranially.    CT Soft Tissue Neck 12/10/24  IMPRESSION:  1. Soft tissue density mass at the level of the thyroid cartilage on the right, immediately posterior/deep to the sternocleidomastoid, measuring 3.1 x 2.0 x 3.7 cm. This mass appears to narrow the right common carotid artery. Findings are consistent with calcified inflammatory or neoplastic adenopathy and are unchanged compared to the previous exam.  2. Low-attenuation involving the oropharyngeal soft tissues suggestive of post therapeutic edema.  3. Heavy burden of calcified atheromatous plaques in the bilateral carotid arteries.    CT Chest 12/10/24  IMPRESSION:  1.  Overall progression of malignancy with progression of cavitation at the left lower subpleural nodule, enlargement of a left apical nodule, development a 4 mm left upper lung nodule, and left lung micro  nodule. There has also been slight enlargement of a right major perifissural node.    Assessment/Plan   Mr. Phong Wong is a 74 y.o. male with locally advanced oral cavity cancer, completed chemoRT w/ 7 weekly Carboplatin+Paclitaxel on 10/4/23. Unfortunately found with mets to lungs on 3 months restaging scan. CPS 3. S/p 3 cycles Pembrolizumab but lung nodules progressed. Completed 5 cycles of Carbo/Taxol on 9/12/24. Trialed afatinib at 40 mg daily starting Oct 2024, however held after 1 week due to grade III diarrhea. We restaged and his lung burden is very slowly growing. Restarting Afatinib at 20mg daily on 12/18/24. Palliative neck dissection unlikely to be in his best interest given lung/pleural disease.    # Locally advanced oral cavity cancer, T3N2, now with met to lungs  - 6/30/23 PET/CT showed intense FDG avidity within the soft palate, extending to BOT and left palatine tonsil. Multiple uptake in left cervical level II nodes, right cervical level  II/III, infiltrating the right SCM muscle and encasing and invading the right jugular vein. FDG avidity was also detected in the region of the left fossa Rosenmuller and left   - Discussed with patient about carboplatin+ paclitaxel as his chemotherapy along with radiation due to his GFR 30s.  - 8/16 - 10/4/23: Recieved concurrent chemoradiation with 7 weekly Carboplatin (2mg/AUC)/Paclitaxel (45mg/m2)  and 70gy RT in 35fx     - 1/10/24 restaging PET/CT noted significant interval improvement in prior sites of disease though with residual FDG avidity in soft palate and left palatine tonsil, c/f residual disease vs post treatment changes. Also noted was a new FDG avid LLL pulm nodule with max SUV 5.7, c/f pulmonary metastasis.   - 2/9/24 CT chest showed multiple subcentimeter noduesl 2-5mm, dominant lesion is 1.6cm in LLL.    - 3/6/24: Lung biopsy of LLL nodule was consistent with HN origin. CPS 3.  Discussed with patient about phase 2 FLAM study (randomized to  pembrolizumab or pembrolizumab+danvatirsen (Stat3 inhibitor). Unfortunately due to his CKD, he's not eligible for FLAM study.  - 4/4/24: Started Q3 week Pembrolizumab  - 6/7/24 restaging CT N/C/A/P showed enlargement of subcentimeter lung mets, stable left lung nodule 2cm with new 1cm nodule in RUL. Based on heightened response with carbo+taxol after progression on PD, I recommended 3 cycles of carbo taxol and restaging, if there is response, we can consider afatinib treatment more as long term maintenance therapy.   - 6/20/24: Cycle 1 of Q3 week Carbo/Taxol.  - 8/19/24 restaging scan showed improving b/l pulmonary mets, largest pleural based LLL nodule is now 14mm.   - 9/25/24: PET/CT showed continuing response in lung mets, known level III right cervical lymphadenopathy, but increased SUV compared to Miguel PET. No other mets.  Since patients PS is declining, prefer treating residual disease with afatinib. CT neck/chest ordered to measure exact size of right cervical LN as well as lung mets, so these can be used as a baseline prior to start afatinib.  - 10/9/24 CT N/C pending. Patient is otherwise feeling well. Diarrhea resolved, arthralgia and peripheral neuropathy have resolved. He continue to tolerate regular diet and denies any dysphagia. Appetite is not great so has weight loss but this is improving.   - 10/15/24: Starting afatinib 40 mg daily, patient held after 1 weeks for grade 3 diarrhea.   - 12/18/24: Scans reviewed showing very slow progression off all therapy for nearly 2 months. We will resume afatinib at 20 mg and follow in 4 weeks. Anticipate scans end of Feb.   - 1/16/25: ***    # Grade III TKI diarrhea  - Now resolved, resuming afatinib at 20 mg daily upon reciept    # CKD  - 2/9/24 CT chest showed Bulky atherosclerotic calcification at the origin of the left renal artery likely contributing to atrophy of the left kidney as compared to the right. Kidney atrophy was not mentioned in previous scans.    - 5/1/24: Creat 1.46, GFR 51. Encouraged patient to keep up with non-caffeinated PO hydration. 1L NS given today with [place]. Patient to follow up with PCP regarding atherosclerosis.   - Baseline Creat 1.1 - 1.3, GFR 55-65. Will support with IV hydration with each treatment    # Submental/Neck Lymphedema  - Patient with moderate swelling in the submental and neck region. Likely 2ry to prior radiation. Established w/ Dr Guerrero in [facility].   - Neck pain and swelling improving w/ lymphedema massage.     PLAN  -- Resume afatinib at 20 mg daily. Instructed on loperamide use (2 mg with each loose) stool   -- RTC 1 month for follow up and labs, cbc, cmp, mag on 2/13/25.   -- Plan for restaging in 12 weeks before C5 on 3/12/25  -- 2/4/25 FUV w/ Dr. Fletcher  -- Patient fo follow up with Dr. Weiss on athero sclerosis of left renal artery and coroanary calcifications

## 2025-01-16 ENCOUNTER — APPOINTMENT (OUTPATIENT)
Dept: HEMATOLOGY/ONCOLOGY | Facility: HOSPITAL | Age: 75
End: 2025-01-16
Payer: MEDICARE

## 2025-01-16 DIAGNOSIS — C10.9 OROPHARYNGEAL CANCER (MULTI): Primary | ICD-10-CM

## 2025-01-16 NOTE — PROGRESS NOTES
Patient ID: Phong Wong is a 74 y.o. male.  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 Juan Jose Fletcher   [facility]   RadOnc: Dr. Vianca Steen     Current Therapy  10/15/24: Afatinib 40 mg QD    Prior Therapy:   8/16 - 10/4/23: Recieved concurrent chemoradiation with 7 weekly Carboplatin (2mg/AUC)/Paclitaxel (45mg/m2)  and 70gy RT in 35fx  4/4 - 6/13/24: Received 3 cycles of Pembrolizumab  6/20/24 - 9/12/24: Received 5 cycles of Carbo/Taxol     Sites of Disease:  Soft palate, BOT, Left palatine tonsil  B/L Cervical LN  Lung     Oncologic Issues:   Odynophagia  Fatigue     ONCOLOGIC HISTORY  - Pt had 2 months hx of throat discomfort with lump in right neck  - 6/13/23: CT neck showed a mass 2.8 x 2.5 x 2.9 cm in the right lower cervical neck soft tissues. Two suspicious nodes were observed, one at level 3 on the right and another at level 2 on the left.  - 6/28/23: seen by Dr. Fletcher. 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 Based on the findings, Dr. Fletcher did not recommend surgery due to the location  of the primary tumor and the presence of yvette disease  - 6/30/23: PET/CT showed intense FDG avidity within the soft palate, extending to the base of the tongue and left palatine tonsil. Multiple FDG avid left cervical level II nodes were observed, along with an FDG avid mass in the region of the right cervical  level II/III, infiltrating the right SCM muscle and encasing and invading the right jugular vein. FDG avidity was also detected in the region of the left fossa Rosenmuller and left medial pterygoid muscle.  - 8/16 - 10/4/23: Recieved concurrent chemoradiation with 7 weekly Carboplatin (2mg/AUC)/Paclitaxel (45mg/m2)  and 70gy RT in 35fx  - 4/4 - 6/13/24: Received 3  cycles of Pembrolizumab  - 6/20/24 - 9/12/24: Received 5 cycles of Carbo/Taxol  - 10/15/24: Begin afatinib 40 mg every day, could not tolerate due to diarrhea  - 12/18/24: Started Afatinib 20mg    Admissions:  10/5 - 10/10/23: Admitted for extreme weakness, HECTOR, pancytopenia including anemia requiring blood transfusion. D/C'ed to acute rehab     SOCIAL HISTORY  Lives in [place].  to wife Emelia of 8 years. Brother in [place]. Worked as , retired in 5 years ago. Smoked 1-2 PPD x 50 years. Rare EtOH. No illicits.      FAMILY HISTORY  Brother had brain cancer (S[facility]y Bearden)    CURRENT MEDS REVIEWED     ALLERGIES REVIEWED     Subjective     HPI  Patient with h/o locally advanced oral cavity cancer, completed chemoRT w/ 7 weekly Carboplatin+Paclitaxel on 10/4/23. Unfortunately found with mets to lungs on 3 months restaging scan. CPS 3. S/p 3 cycles Pembrolizumab but lung nodules progressed. Completed 5 cycles of Carbo/Taxol on 9/12/24. Started on Afatinib 40mg QD on 10/15/24.      Interval History  Patient started restarted Afatinib at 20mg in Dec 2024. He reports he's tolerating this dose and has not had any diarrhea last month and has not needed to take any anti-diarrheal medications.   He endorse decreased appetite and is only eating twice a day, previously eating 3 times a day.   His right neck stiffness persists. His SO is doing lymphedema massage at home with improvements, though right neck is still sensitive to palpation.     Review of Systems   Constitutional:  Positive for appetite change. Negative for chills and fever.   HENT:   Negative for hearing loss, lump/mass, mouth sores, nosebleeds, sore throat, tinnitus and trouble swallowing.    Respiratory:  Negative for cough and shortness of breath.    Cardiovascular:  Negative for chest pain and leg swelling.   Gastrointestinal:  Positive for diarrhea. Negative for abdominal pain, constipation, nausea and vomiting.   Musculoskeletal:  Negative  for arthralgias.   Skin:  Negative for rash.   Neurological:  Negative for headaches, light-headedness and numbness.     Objective   VS: /71   Pulse 103   Temp 36.3 °C (97.3 °F) (Core)   Resp 18   Wt 53.1 kg (117 lb 1 oz)   SpO2 99%   BMI 18.89 kg/m²    Weight:   Daily Weight  01/21/25 : 53.1 kg (117 lb 1 oz)  12/18/24 : 54.5 kg (120 lb 2.4 oz)  11/26/24 : 54.9 kg (121 lb 0.5 oz)  11/20/24 : 52.6 kg (116 lb)  11/07/24 : 52.9 kg (116 lb 10 oz)  11/07/24 : 52 kg (114 lb 10.2 oz)  10/15/24 : 58.2 kg (128 lb 4.9 oz)    Physical Exam  Constitutional:       Appearance: Normal appearance. He is underweight.   HENT:      Head: Normocephalic and atraumatic.      Right Ear: External ear normal. No tenderness.      Left Ear: External ear normal. No tenderness.      Nose: Nose normal.      Mouth/Throat:      Lips: Pink.      Mouth: Mucous membranes are moist. No injury or oral lesions.   Eyes:      General: Lids are normal.      Extraocular Movements: Extraocular movements intact.      Conjunctiva/sclera: Conjunctivae normal.      Pupils: Pupils are equal, round, and reactive to light.   Neck:      Thyroid: No thyroid mass.   Pulmonary:      Effort: Pulmonary effort is normal.   Abdominal:      General: Abdomen is flat. Bowel sounds are normal.      Palpations: Abdomen is soft.   Musculoskeletal:         General: Normal range of motion.      Cervical back: Normal range of motion and neck supple. No signs of trauma. Normal range of motion.   Skin:     General: Skin is warm and dry.      Comments: No new skin lesions   Neurological:      General: No focal deficit present.      Mental Status: He is alert and oriented to person, place, and time. Mental status is at baseline.      Gait: Gait is intact.   Psychiatric:         Attention and Perception: Attention normal.         Mood and Affect: Mood and affect normal.         Behavior: Behavior is cooperative.       ECOGSCORE: 2- Ambulatory and  capable of all selfcare;  unable to carry out work activities.  Up and about > 50% of waking hrs.    Diagnostic Results   Labs:  Results from last 7 days   Lab Units 01/21/25  1251   WBC AUTO x10*3/uL 6.1   HEMOGLOBIN g/dL 12.5*   HEMATOCRIT % 36.4*   PLATELETS AUTO x10*3/uL 242   NEUTROS ABS x10*3/uL 4.49   LYMPHS ABS AUTO x10*3/uL 0.61*   MONOS ABS AUTO x10*3/uL 0.70   EOS ABS AUTO x10*3/uL 0.26   NEUTROS PCT AUTO % 73.4   LYMPHS PCT AUTO % 10.0   MONOS PCT AUTO % 11.4   EOS PCT AUTO % 4.2     Results from last 7 days   Lab Units 01/21/25  1251   GLUCOSE mg/dL 84   SODIUM mmol/L 138   POTASSIUM mmol/L 3.4*   CHLORIDE mmol/L 98   CO2 mmol/L 31   BUN mg/dL 13   CREATININE mg/dL 1.35*   EGFR mL/min/1.73m*2 56*   CALCIUM mg/dL 9.2   MAGNESIUM mg/dL 1.62   ALBUMIN g/dL 3.9   PROTEIN TOTAL g/dL 6.5   BILIRUBIN TOTAL mg/dL 0.4   ALK PHOS U/L 54   ALT U/L 9*   AST U/L 11                        IMAGING  PET 09/24/2024:  IMPRESSION:  1. Interval increase in FDG activity involving level III right cervical lymph node, consistent with known residual yvette metastasis.  2. Interval decrease in FDG activity involving left lower lobe lung nodules when compared to prior PET-CT, likely post treatment changes.      CT Chest 10/9/24:  IMPRESSION:  Interval development of a 19 x 7 mm pleural-based patchy area on the left.  Interval development cavitation in 12 mm left lower lobe nodule.  The other nodules unchanged.  Emphysema.  Dense arterial calcifications. Atrophic left kidney.        CT Neck 10/09/2024:  IMPRESSION:  Redemonstrated is a soft tissue mass at level 3 on the right measuring 2.4 x 2.6 x 3.7 cm, similar to the prior exam given differences in technique.  The right internal jugular vein is not visualized and occluded or thrombosed. There is reconstitution within the transverse sinus intracranially.    12/10/24 CT SOFT TISSUE NECK W IV CONTRAST  1. Soft tissue density mass at the level of the thyroid cartilage on the right, immediately  posterior/deep to the sternocleidomastoid, measuring 3.1 x 2.0 x 3.7 cm. This mass appears to narrow the right common carotid artery. Findings are consistent with calcified inflammatory or neoplastic adenopathy and are unchanged compared to the previous exam.  2. Low-attenuation involving the oropharyngeal soft tissues suggestive of post therapeutic edema.  3. Heavy burden of calcified atheromatous plaques in the bilateral carotid arteries.    12/10/24 CT Chest  1.  Overall progression of malignancy with progression of cavitation at the left lower subpleural nodule, enlargement of a left apical nodule, development a 4 mm left upper lung nodule, and left lung micronodule. There has also been slight enlargement of a right major perifissural node.     Assessment/Plan   Mr. Phong Wong is a 74 y.o. male with locally advanced oral cavity cancer, completed chemoRT w/ 7 weekly Carboplatin+Paclitaxel on 10/4/23. Unfortunately found with mets to lungs on 3 months restaging scan. CPS 3. S/p 3 cycles Pembrolizumab but lung nodules progressed. Completed 5 cycles of Carbo/Taxol on 9/12/24. Start Afatinib on 10/15/24, stopped around first week of November.    # Locally advanced oral cavity cancer, T3N2, now with met to lungs  - 6/30/23 PET/CT showed intense FDG avidity within the soft palate, extending to BOT and left palatine tonsil. Multiple uptake in left cervical level II nodes, right cervical level  II/III, infiltrating the right SCM muscle and encasing and invading the right jugular vein. FDG avidity was also detected in the region of the left fossa Rosenmuller and left   - Discussed with patient about carboplatin+ paclitaxel as his chemotherapy along with radiation due to his GFR 30s.  - 8/16 - 10/4/23: Recieved concurrent chemoradiation with 7 weekly Carboplatin (2mg/AUC)/Paclitaxel (45mg/m2)  and 70gy RT in 35fx     - 1/10/24 restaging PET/CT noted significant interval improvement in prior sites of disease though with  residual FDG avidity in soft palate and left palatine tonsil, c/f residual disease vs post treatment changes. Also noted was a new FDG avid LLL pulm nodule with max SUV 5.7, c/f pulmonary metastasis.   - 2/9/24 CT chest showed multiple subcentimeter noduesl 2-5mm, dominant lesion is 1.6cm in LLL.    - 3/6/24: Lung biopsy of LLL nodule was consistent with HN origin. CPS 3.  Discussed with patient about phase 2 FLAM study (randomized to pembrolizumab or pembrolizumab+danvatirsen (Stat3 inhibitor). Unfortunately due to his CKD, he's not eligible for FLAM study.  - 4/4/24: Started Q3 week Pembrolizumab  - 6/7/24 restaging CT N/C/A/P showed enlargement of subcentimeter lung mets, stable left lung nodule 2cm with new 1cm nodule in RUL. Based on heightened response with carbo+taxol after progression on PD, I recommended 3 cycles of carbo taxol and restaging, if there is response, we can consider afatinib treatment more as long term maintenance therapy.   - 6/20/24: Cycle 1 of Q3 week Carbo/Taxol.  - 8/19/24 restaging scan showed improving b/l pulmonary mets, largest pleural based LLL nodule is now 14mm.   Pt is aware of that Dr. Burkitt is leaving  at the end of Oct, planning to get to another restaging and change treatment to afatinib if he continues to have response.  Once treatment is changed, will transition his care to Dr. Parks.   - 9/25/24: PET/CT showed continuing response in lung mets, known level III right cervical lymphadenopathy, but increased SUV compared to Miguel PET. No other mets.  Since patients PS is declining, prefer treating residual disease with afatinib. CT neck/chest ordered to measure exact size of right cervical LN as well as lung mets, so these can be used as a baseline prior to start afatinib.  - 10/9/24 CT N/C pending. Patient is otherwise feeling well. Diarrhea resolved, arthralgia and peripheral neuropathy have resolved. He continue to tolerate regular diet and denies any dysphagia. Appetite  is not great so has weight loss but this is improving.   - 10/15/24: Starting afatinib 40 mg daily. Consents signed. Will follow in 3 weeks.  - 11/7/24: Patient with grade 1 diarreha since starting Afatinib, became grade 2 in the last week and has weakness and nausea. PO intake is also poor due to poor appetite and taste changes. Renal function decreased likely 2ry to poor PO intake. He stopped taking Afatinib a week ago due to nausea and diarrhea.   - 11/14/24: Patient's diarrhea is controlled on Loperamide, (grade 1) and nausea controlled on Olanzapine 5mg at bedtime. Appetite is returning and taste is improved. Will re-challenge w/ Afatinib 40mg daily, stay on Loperamide for diarrhea, Olanzapine for nausea and appetite.   - 11/21/24: Patient's diarrhea resolved but he did not wish to restart afatinib, last taken afatinib around 11/7. Would like to discuss w/ Dr. Parks regarding other treatment options.  -12/18/24: Scans reviewed showing very slow progression off all therapy for nearly 2 months. We will resume afatinib at 20 mg and follow in 4 weeks. Anticipate scans end of Feb   - 1/21/25: Patient has tolerated Afatinib 20mg without diarrhea. Overall stable.     # CKD  - 2/9/24 CT chest showed Bulky atherosclerotic calcification at the origin of the left renal artery likely contributing to atrophy of the left kidney as compared to the right. Kidney atrophy was not mentioned in previous scans.   - 5/1/24: Creat 1.46, GFR 51. Encouraged patient to keep up with non-caffeinated PO hydration. 1L NS given today with Pembro. Patient to follow up with PCP regarding atherosclerosis.   - Baseline Creat 1.1 - 1.3, GFR 55-65. Due to recent diarrhea and decreased PO intake, his renal function has declined.     # Submental/Neck Lymphedema  - Patient with moderate swelling in the submental and neck region. Likely 2ry to prior radiation. Established w/ Dr Guerrero in [facility].   - Neck pain and swelling improving w/  lymphedema massage.     # Weight loss  - Weight loss 2ry to appetite loss and decreased PO intake. Adivised patient to continue Boost for supplement and increase snacking during the day. He's taking Olanzapine 5mg at bedtime, may increase this to 7.5mg    PLAN  -- Continue Afatinib 20mg every day.   -- RTC 3 weeks for follow up w/ MedOnc and ENT on 2/4/25, labs cbc, cmp, mag.   -- Continue Olanzapine 5mg tab, 1 tab every night, for appetite loss and nausea.    -- Loperamide 2mg Q4hr PNR or after each loose stools, up to 8 times a day, for diarrhea  -- Continue ample PO hydration, encourage patient to increase PO intake

## 2025-01-18 PROCEDURE — RXMED WILLOW AMBULATORY MEDICATION CHARGE

## 2025-01-20 ENCOUNTER — SPECIALTY PHARMACY (OUTPATIENT)
Dept: PHARMACY | Facility: CLINIC | Age: 75
End: 2025-01-20

## 2025-01-21 ENCOUNTER — LAB (OUTPATIENT)
Dept: LAB | Facility: HOSPITAL | Age: 75
End: 2025-01-21
Payer: MEDICARE

## 2025-01-21 ENCOUNTER — OFFICE VISIT (OUTPATIENT)
Dept: HEMATOLOGY/ONCOLOGY | Facility: HOSPITAL | Age: 75
End: 2025-01-21
Payer: MEDICARE

## 2025-01-21 VITALS
OXYGEN SATURATION: 99 % | WEIGHT: 117.06 LBS | SYSTOLIC BLOOD PRESSURE: 143 MMHG | RESPIRATION RATE: 18 BRPM | HEART RATE: 103 BPM | BODY MASS INDEX: 18.89 KG/M2 | TEMPERATURE: 97.3 F | DIASTOLIC BLOOD PRESSURE: 71 MMHG

## 2025-01-21 DIAGNOSIS — C78.01 SQUAMOUS CELL CARCINOMA METASTATIC TO BOTH LUNGS: ICD-10-CM

## 2025-01-21 DIAGNOSIS — T45.1X5A CHEMOTHERAPY INDUCED DIARRHEA: ICD-10-CM

## 2025-01-21 DIAGNOSIS — R63.0 APPETITE LOSS: ICD-10-CM

## 2025-01-21 DIAGNOSIS — C10.9 OROPHARYNGEAL CANCER (MULTI): Primary | ICD-10-CM

## 2025-01-21 DIAGNOSIS — R63.4 WEIGHT LOSS: ICD-10-CM
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
## 2025-01-21 DIAGNOSIS — C78.02 SQUAMOUS CELL CARCINOMA METASTATIC TO BOTH LUNGS: ICD-10-CM

## 2025-01-21 DIAGNOSIS — C10.9 OROPHARYNGEAL CANCER (MULTI): ICD-10-CM

## 2025-01-21 DIAGNOSIS — K52.1 CHEMOTHERAPY INDUCED DIARRHEA: ICD-10-CM

## 2025-01-21 DIAGNOSIS — I89.0 LYMPHEDEMA DUE TO AND NOT CONCURRENT WITH IONIZING RADIOTHERAPY: ICD-10-CM

## 2025-01-21 DIAGNOSIS — C44.92 SQUAMOUS CELL CARCINOMA METASTATIC TO BOTH LUNGS: ICD-10-CM

## 2025-01-21 DIAGNOSIS — Z51.12 ENCOUNTER FOR ANTINEOPLASTIC IMMUNOTHERAPY: ICD-10-CM

## 2025-01-21 DIAGNOSIS — N18.31 STAGE 3A CHRONIC KIDNEY DISEASE (MULTI): ICD-10-CM

## 2025-01-21 LAB
ALBUMIN SERPL BCP-MCNC: 3.9 G/DL (ref 3.4–5)
ALP SERPL-CCNC: 54 U/L (ref 33–136)
ALT SERPL W P-5'-P-CCNC: 9 U/L (ref 10–52)
ANION GAP SERPL CALC-SCNC: 12 MMOL/L (ref 10–20)
AST SERPL W P-5'-P-CCNC: 11 U/L (ref 9–39)
BASOPHILS # BLD AUTO: 0.03 X10*3/UL (ref 0–0.1)
BASOPHILS NFR BLD AUTO: 0.5 %
BILIRUB SERPL-MCNC: 0.4 MG/DL (ref 0–1.2)
BUN SERPL-MCNC: 13 MG/DL (ref 6–23)
CALCIUM SERPL-MCNC: 9.2 MG/DL (ref 8.6–10.3)
CHLORIDE SERPL-SCNC: 98 MMOL/L (ref 98–107)
CO2 SERPL-SCNC: 31 MMOL/L (ref 21–32)
CREAT SERPL-MCNC: 1.35 MG/DL (ref 0.5–1.3)
EGFRCR SERPLBLD CKD-EPI 2021: 56 ML/MIN/1.73M*2
EOSINOPHIL # BLD AUTO: 0.26 X10*3/UL (ref 0–0.4)
EOSINOPHIL NFR BLD AUTO: 4.2 %
ERYTHROCYTE [DISTWIDTH] IN BLOOD BY AUTOMATED COUNT: 14.3 % (ref 11.5–14.5)
GLUCOSE SERPL-MCNC: 84 MG/DL (ref 74–99)
HCT VFR BLD AUTO: 36.4 % (ref 41–52)
HGB BLD-MCNC: 12.5 G/DL (ref 13.5–17.5)
IMM GRANULOCYTES # BLD AUTO: 0.03 X10*3/UL (ref 0–0.5)
IMM GRANULOCYTES NFR BLD AUTO: 0.5 % (ref 0–0.9)
LYMPHOCYTES # BLD AUTO: 0.61 X10*3/UL (ref 0.8–3)
LYMPHOCYTES NFR BLD AUTO: 10 %
MAGNESIUM SERPL-MCNC: 1.62 MG/DL (ref 1.6–2.4)
MCH RBC QN AUTO: 28 PG (ref 26–34)
MCHC RBC AUTO-ENTMCNC: 34.3 G/DL (ref 32–36)
MCV RBC AUTO: 82 FL (ref 80–100)
MONOCYTES # BLD AUTO: 0.7 X10*3/UL (ref 0.05–0.8)
MONOCYTES NFR BLD AUTO: 11.4 %
NEUTROPHILS # BLD AUTO: 4.49 X10*3/UL (ref 1.6–5.5)
NEUTROPHILS NFR BLD AUTO: 73.4 %
NRBC BLD-RTO: 0 /100 WBCS (ref 0–0)
PLATELET # BLD AUTO: 242 X10*3/UL (ref 150–450)
POTASSIUM SERPL-SCNC: 3.4 MMOL/L (ref 3.5–5.3)
PROT SERPL-MCNC: 6.5 G/DL (ref 6.4–8.2)
RBC # BLD AUTO: 4.46 X10*6/UL (ref 4.5–5.9)
SODIUM SERPL-SCNC: 138 MMOL/L (ref 136–145)
WBC # BLD AUTO: 6.1 X10*3/UL (ref 4.4–11.3)

## 2025-01-21 PROCEDURE — 1159F MED LIST DOCD IN RCRD: CPT | Performed by: STUDENT IN AN ORGANIZED HEALTH CARE EDUCATION/TRAINING PROGRAM

## 2025-01-21 PROCEDURE — 1126F AMNT PAIN NOTED NONE PRSNT: CPT | Performed by: STUDENT IN AN ORGANIZED HEALTH CARE EDUCATION/TRAINING PROGRAM

## 2025-01-21 PROCEDURE — G2211 COMPLEX E/M VISIT ADD ON: HCPCS | Performed by: STUDENT IN AN ORGANIZED HEALTH CARE EDUCATION/TRAINING PROGRAM

## 2025-01-21 PROCEDURE — 3077F SYST BP >= 140 MM HG: CPT | Performed by: STUDENT IN AN ORGANIZED HEALTH CARE EDUCATION/TRAINING PROGRAM

## 2025-01-21 PROCEDURE — 36415 COLL VENOUS BLD VENIPUNCTURE: CPT

## 2025-01-21 PROCEDURE — 3078F DIAST BP <80 MM HG: CPT | Performed by: STUDENT IN AN ORGANIZED HEALTH CARE EDUCATION/TRAINING PROGRAM

## 2025-01-21 PROCEDURE — 99215 OFFICE O/P EST HI 40 MIN: CPT | Performed by: STUDENT IN AN ORGANIZED HEALTH CARE EDUCATION/TRAINING PROGRAM

## 2025-01-21 PROCEDURE — 83735 ASSAY OF MAGNESIUM: CPT

## 2025-01-21 PROCEDURE — 84450 TRANSFERASE (AST) (SGOT): CPT

## 2025-01-21 PROCEDURE — 85025 COMPLETE CBC W/AUTO DIFF WBC: CPT

## 2025-01-21 PROCEDURE — 1036F TOBACCO NON-USER: CPT | Performed by: STUDENT IN AN ORGANIZED HEALTH CARE EDUCATION/TRAINING PROGRAM

## 2025-01-21 PROCEDURE — 1160F RVW MEDS BY RX/DR IN RCRD: CPT | Performed by: STUDENT IN AN ORGANIZED HEALTH CARE EDUCATION/TRAINING PROGRAM

## 2025-01-21 RX ORDER — OLANZAPINE 5 MG/1
5 TABLET ORAL NIGHTLY
Qty: 30 TABLET | Refills: 3 | Status: SHIPPED | OUTPATIENT
Start: 2025-01-21

## 2025-01-21 RX ORDER — OLANZAPINE 5 MG/1
5 TABLET ORAL NIGHTLY
Qty: 30 TABLET | Refills: 3 | Status: SHIPPED | OUTPATIENT
Start: 2025-01-21 | End: 2025-01-21

## 2025-01-21 ASSESSMENT — PAIN SCALES - GENERAL: PAINLEVEL_OUTOF10: 0-NO PAIN

## 2025-01-22 ENCOUNTER — PHARMACY VISIT (OUTPATIENT)
Dept: PHARMACY | Facility: CLINIC | Age: 75
End: 2025-01-22
Payer: COMMERCIAL

## 2025-01-22 ASSESSMENT — ENCOUNTER SYMPTOMS
NAUSEA: 0
DIARRHEA: 1
ABDOMINAL PAIN: 0
CONSTIPATION: 0
LEG SWELLING: 0
LIGHT-HEADEDNESS: 0
SHORTNESS OF BREATH: 0
NUMBNESS: 0
TROUBLE SWALLOWING: 0
VOMITING: 0
ARTHRALGIAS: 0
CHILLS: 0
FEVER: 0
SORE THROAT: 0
APPETITE CHANGE: 1
HEADACHES: 0
COUGH: 0

## 2025-02-03 ASSESSMENT — ENCOUNTER SYMPTOMS
SORE THROAT: 0
APPETITE CHANGE: 1
NUMBNESS: 0
LEG SWELLING: 0
ABDOMINAL PAIN: 0
NAUSEA: 0
LIGHT-HEADEDNESS: 0
HEADACHES: 0
TROUBLE SWALLOWING: 0
SHORTNESS OF BREATH: 0
FEVER: 0
ARTHRALGIAS: 0
VOMITING: 0
DIARRHEA: 1
CONSTIPATION: 0
COUGH: 0
CHILLS: 0

## 2025-02-03 NOTE — PROGRESS NOTES
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   Unfortunately he developed a lung metastasis.  On the last scanning his neck mass is stable in size but the lung lesion may be progressing.  He continues to be on immunotherapy.    Minimal dysphagia.    I will see him in 2 months.    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     History of Present Illness    This patient was seen in June 2023 at the request of his family physician. For 6 weeks or so he had some discomfort in his throat. He describes it as in the center of his throat. He also noticed a lump in the midportion of his right neck. This led to a CT scan of his neck which was done in June 2023. I personally reviewed that scan. It does show 2 suspicious nodes 1 in the level 3 on the right and one in level 2 on the left. He also has some irregularity around the soft palate.  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  He completed his chemoradiation therapy on October 1, 2023.  He had a pet scan done in January 2024.  He did have some residual uptake in the left oropharynx as well as the right neck.  This is much less than previously.  He also had some positive uptake in the lungs that turned out to be positive for squamous cell carcinoma on biopsy.  He had a repeat CT scan done in December 2024 that shows the neck mass to be stable.  I have reviewed that scan.  The CT of the chest seems to show some progression of disease 2024.   He continues to get immunotherapy.  He really does not have any discomfort.    Physical Exam    Examination of the oral cavity and oropharynx is negative for anything worrisome.  He does have some scarring around his soft palate.  There is good mandibular excursion. Palpation of the parotid, neck, and thyroid  field shows this right-sided lower mid neck induration that measures a good 3 to 4 cm.  It is somewhat ill-defined.    A flexible laryngoscopy was carried out. Under topical Xylocaine and Charanjit-Synephrine the scope was introduced through the nostril. The nasopharynx, base of tongue, hypopharynx, and larynx are visualized. The vocal cords are normally mobile. There is no pooling of secretions in the piriform sinuses.  He does have evidence of dryness.

## 2025-02-03 NOTE — PROGRESS NOTES
Patient ID: Phong Wong is a 74 y.o. male.  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 Juan Jose Fletcher   [facility]   RadOnc: Dr. Vianca Steen     Current Therapy  10/15/24: Afatinib 40 mg QD    Prior Therapy:   8/16 - 10/4/23: Recieved concurrent chemoradiation with 7 weekly Carboplatin (2mg/AUC)/Paclitaxel (45mg/m2)  and 70gy RT in 35fx  4/4 - 6/13/24: Received 3 cycles of Pembrolizumab  6/20/24 - 9/12/24: Received 5 cycles of Carbo/Taxol     Sites of Disease:  Soft palate, BOT, Left palatine tonsil  B/L Cervical LN  Lung     Oncologic Issues:   Odynophagia  Fatigue     ONCOLOGIC HISTORY  - Pt had 2 months hx of throat discomfort with lump in right neck  - 6/13/23: CT neck showed a mass 2.8 x 2.5 x 2.9 cm in the right lower cervical neck soft tissues. Two suspicious nodes were observed, one at level 3 on the right and another at level 2 on the left.  - 6/28/23: seen by Dr. Fletcher. 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 Based on the findings, Dr. Fletcher did not recommend surgery due to the location  of the primary tumor and the presence of yvette disease  - 6/30/23: PET/CT showed intense FDG avidity within the soft palate, extending to the base of the tongue and left palatine tonsil. Multiple FDG avid left cervical level II nodes were observed, along with an FDG avid mass in the region of the right cervical  level II/III, infiltrating the right SCM muscle and encasing and invading the right jugular vein. FDG avidity was also detected in the region of the left fossa Rosenmuller and left medial pterygoid muscle.  - 8/16 - 10/4/23: Recieved concurrent chemoradiation with 7 weekly Carboplatin (2mg/AUC)/Paclitaxel (45mg/m2)  and 70gy RT in 35fx  - 4/4 - 6/13/24: Received 3  cycles of Pembrolizumab  - 6/20/24 - 9/12/24: Received 5 cycles of Carbo/Taxol  - 10/15/24: Begin afatinib 40 mg every day, could not tolerate due to diarrhea  - 12/18/24: Started Afatinib 20mg    Admissions:  10/5 - 10/10/23: Admitted for extreme weakness, HECTOR, pancytopenia including anemia requiring blood transfusion. D/C'ed to acute rehab     SOCIAL HISTORY  Lives in [place].  to wife Emelia of 8 years. Brother in [place]. Worked as , retired in 5 years ago. Smoked 1-2 PPD x 50 years. Rare EtOH. No illicits.      FAMILY HISTORY  Brother had brain cancer ([facility])    Subjective     HPI  Patient with h/o locally advanced oral cavity cancer, completed chemoRT w/ 7 weekly Carboplatin+Paclitaxel on 10/4/23. Unfortunately found with mets to lungs on 3 months restaging scan. CPS 3. S/p 3 cycles Pembrolizumab but lung nodules progressed. Completed 5 cycles of Carbo/Taxol on 9/12/24. Started on Afatinib 40mg QD on 10/15/24.      Interval History  Patient started restarted Afatinib at 20mg in Dec 2024.     Patient continue to tolerate this dose and has not had any diarrhea and has not needed to take any anti-diarrheal medications.   His appetite is improved over the last few weeks, since starting on Olanzapine nightly. He has gained a little weight.   Still eating soft foods and small pieces since he's still not using his dentures due to poor fit.   His right neck stiffness persists. His SO is doing lymphedema massage at home with improvements, though right neck is still sensitive to palpation.     Review of Systems   Constitutional:  Positive for appetite change. Negative for chills and fever.   HENT:   Negative for hearing loss, lump/mass, mouth sores, nosebleeds, sore throat, tinnitus and trouble swallowing.    Respiratory:  Negative for cough and shortness of breath.    Cardiovascular:  Negative for chest pain and leg swelling.   Gastrointestinal:  Positive for diarrhea. Negative for abdominal  pain, constipation, nausea and vomiting.   Musculoskeletal:  Negative for arthralgias.   Skin:  Negative for rash.   Neurological:  Negative for headaches, light-headedness and numbness.     Current Outpatient Medications   Medication Instructions    aspirin 81 mg EC tablet 1 tablet, Daily    atorvastatin (LIPITOR) 80 mg, oral, Daily    doxazosin (CARDURA) 4 mg, oral, Daily, Take 1 tablet by mouth daily in the evening    fluticasone-umeclidin-vilanter (Trelegy Ellipta) 100-62.5-25 mcg blister with device INHALE 1 PUFF EVERY DAY    Gilotrif 20 mg, oral, Daily, Take at least 1 hour before a meal or 2 hours after a meal.    ipratropium-albuteroL (Duo-Neb) 0.5-2.5 mg/3 mL nebulizer solution Take 3 mL by nebulization 3 times a day as needed for wheezing or shortness of breath.    loperamide (IMODIUM) 2 mg, oral, 4 times daily PRN, Initial: 4 mg, followed by 2 mg every 2 to 4 hours or after each loose stoo    losartan (COZAAR) 100 mg, oral, Daily    magnesium oxide (MAG-OX) 400 mg, oral, Daily    OLANZapine (ZYPREXA) 5 mg, oral, Nightly, 1 tab at bedtime for 4 days, starting the night of chemo    ondansetron (ZOFRAN) 8 mg, oral, Every 8 hours PRN    prochlorperazine (COMPAZINE) 10 mg, oral, Every 6 hours PRN     Allergies   Allergen Reactions    Codeine Nausea Only     Objective   VS: BP (!) 136/48   Pulse 94   Temp 36.3 °C (97.3 °F) (Core)   Resp 18   Wt 54.3 kg (119 lb 11.4 oz)   SpO2 100%   BMI 19.32 kg/m²    Weight:   Daily Weight  02/04/25 : 54.3 kg (119 lb 11.4 oz)  01/21/25 : 53.1 kg (117 lb 1 oz)  12/18/24 : 54.5 kg (120 lb 2.4 oz)  11/26/24 : 54.9 kg (121 lb 0.5 oz)  11/20/24 : 52.6 kg (116 lb)  11/07/24 : 52.9 kg (116 lb 10 oz)  11/07/24 : 52 kg (114 lb 10.2 oz)    Physical Exam  Constitutional:       Appearance: Normal appearance. He is underweight.   HENT:      Head: Normocephalic and atraumatic.      Right Ear: External ear normal. No tenderness.      Left Ear: External ear normal. No tenderness.       Nose: Nose normal.      Mouth/Throat:      Lips: Pink.      Mouth: Mucous membranes are moist. No injury or oral lesions.   Eyes:      General: Lids are normal.      Extraocular Movements: Extraocular movements intact.      Conjunctiva/sclera: Conjunctivae normal.      Pupils: Pupils are equal, round, and reactive to light.   Neck:      Thyroid: No thyroid mass.   Pulmonary:      Effort: Pulmonary effort is normal.   Abdominal:      General: Abdomen is flat. Bowel sounds are normal.      Palpations: Abdomen is soft.   Musculoskeletal:         General: Normal range of motion.      Cervical back: Normal range of motion and neck supple. No signs of trauma. Normal range of motion.   Skin:     General: Skin is warm and dry.      Comments: No new skin lesions   Neurological:      General: No focal deficit present.      Mental Status: He is alert and oriented to person, place, and time. Mental status is at baseline.      Gait: Gait is intact.   Psychiatric:         Attention and Perception: Attention normal.         Mood and Affect: Mood and affect normal.         Behavior: Behavior is cooperative.       ECOGSCORE: 2- Ambulatory and  capable of all selfcare; unable to carry out work activities.  Up and about > 50% of waking hrs.    Diagnostic Results   Labs:                                 IMAGING  PET 09/24/2024:  IMPRESSION:  1. Interval increase in FDG activity involving level III right cervical lymph node, consistent with known residual yvette metastasis.  2. Interval decrease in FDG activity involving left lower lobe lung nodules when compared to prior PET-CT, likely post treatment changes.      CT Chest 10/9/24:  IMPRESSION:  Interval development of a 19 x 7 mm pleural-based patchy area on the left.  Interval development cavitation in 12 mm left lower lobe nodule.  The other nodules unchanged.  Emphysema.  Dense arterial calcifications. Atrophic left kidney.        CT Neck 10/09/2024:  IMPRESSION:  Redemonstrated is a  soft tissue mass at level 3 on the right measuring 2.4 x 2.6 x 3.7 cm, similar to the prior exam given differences in technique.  The right internal jugular vein is not visualized and occluded or thrombosed. There is reconstitution within the transverse sinus intracranially.    12/10/24 CT SOFT TISSUE NECK W IV CONTRAST  1. Soft tissue density mass at the level of the thyroid cartilage on the right, immediately posterior/deep to the sternocleidomastoid, measuring 3.1 x 2.0 x 3.7 cm. This mass appears to narrow the right common carotid artery. Findings are consistent with calcified inflammatory or neoplastic adenopathy and are unchanged compared to the previous exam.  2. Low-attenuation involving the oropharyngeal soft tissues suggestive of post therapeutic edema.  3. Heavy burden of calcified atheromatous plaques in the bilateral carotid arteries.    12/10/24 CT Chest  1.  Overall progression of malignancy with progression of cavitation at the left lower subpleural nodule, enlargement of a left apical nodule, development a 4 mm left upper lung nodule, and left lung micronodule. There has also been slight enlargement of a right major perifissural node.     Assessment/Plan   Mr. Phong Wong is a 74 y.o. male with locally advanced oral cavity cancer, completed chemoRT w/ 7 weekly Carboplatin+Paclitaxel on 10/4/23. Unfortunately found with mets to lungs on 3 months restaging scan. CPS 3. S/p 3 cycles Pembrolizumab but lung nodules progressed. Completed 5 cycles of Carbo/Taxol on 9/12/24. Start Afatinib on 10/15/24, stopped around first week of November.    # Locally advanced oral cavity cancer, T3N2, now with met to lungs  - 6/30/23 PET/CT showed intense FDG avidity within the soft palate, extending to BOT and left palatine tonsil. Multiple uptake in left cervical level II nodes, right cervical level  II/III, infiltrating the right SCM muscle and encasing and invading the right jugular vein. FDG avidity was also  detected in the region of the left fossa Rosenmuller and left   - Discussed with patient about carboplatin+ paclitaxel as his chemotherapy along with radiation due to his GFR 30s.  - 8/16 - 10/4/23: Recieved concurrent chemoradiation with 7 weekly Carboplatin (2mg/AUC)/Paclitaxel (45mg/m2)  and 70gy RT in 35fx     - 1/10/24 restaging PET/CT noted significant interval improvement in prior sites of disease though with residual FDG avidity in soft palate and left palatine tonsil, c/f residual disease vs post treatment changes. Also noted was a new FDG avid LLL pulm nodule with max SUV 5.7, c/f pulmonary metastasis.   - 2/9/24 CT chest showed multiple subcentimeter noduesl 2-5mm, dominant lesion is 1.6cm in LLL.    - 3/6/24: Lung biopsy of LLL nodule was consistent with HN origin. CPS 3.  Discussed with patient about phase 2 FLAM study (randomized to pembrolizumab or pembrolizumab+danvatirsen (Stat3 inhibitor). Unfortunately due to his CKD, he's not eligible for FLAM study.  - 4/4/24: Started Q3 week Pembrolizumab  - 6/7/24 restaging CT N/C/A/P showed enlargement of subcentimeter lung mets, stable left lung nodule 2cm with new 1cm nodule in RUL. Based on heightened response with carbo+taxol after progression on PD, I recommended 3 cycles of carbo taxol and restaging, if there is response, we can consider afatinib treatment more as long term maintenance therapy.   - 6/20/24: Cycle 1 of Q3 week Carbo/Taxol.  - 8/19/24 restaging scan showed improving b/l pulmonary mets, largest pleural based LLL nodule is now 14mm.   Pt is aware of that Dr. Burkitt is leaving  at the end of Oct, planning to get to another restaging and change treatment to afatinib if he continues to have response.  Once treatment is changed, will transition his care to Dr. Parks.   - 9/25/24: PET/CT showed continuing response in lung mets, known level III right cervical lymphadenopathy, but increased SUV compared to Miguel PET. No other mets.  Since  patients PS is declining, prefer treating residual disease with afatinib. CT neck/chest ordered to measure exact size of right cervical LN as well as lung mets, so these can be used as a baseline prior to start afatinib.  - 10/9/24 CT N/C pending. Patient is otherwise feeling well. Diarrhea resolved, arthralgia and peripheral neuropathy have resolved. He continue to tolerate regular diet and denies any dysphagia. Appetite is not great so has weight loss but this is improving.   - 10/15/24: Starting afatinib 40 mg daily. Consents signed. Will follow in 3 weeks.  - 11/7/24: Patient with grade 1 diarreha since starting Afatinib, became grade 2 in the last week and has weakness and nausea. PO intake is also poor due to poor appetite and taste changes. Renal function decreased likely 2ry to poor PO intake. He stopped taking Afatinib a week ago due to nausea and diarrhea.   - 11/14/24: Patient's diarrhea is controlled on Loperamide, (grade 1) and nausea controlled on Olanzapine 5mg at bedtime. Appetite is returning and taste is improved. Will re-challenge w/ Afatinib 40mg daily, stay on Loperamide for diarrhea, Olanzapine for nausea and appetite.   - 11/21/24: Patient's diarrhea resolved but he did not wish to restart afatinib, last taken afatinib around 11/7. Would like to discuss w/ Dr. Parks regarding other treatment options.  -12/18/24: Scans reviewed showing very slow progression off all therapy for nearly 2 months. We will resume afatinib at 20 mg and follow in 4 weeks. Anticipate scans end of Feb   - 2/4/25: Patient has tolerated Afatinib 20mg without diarrhea. Overall stable.     # Hyperlipidemia  - Patient previously on Lipitor 40mg every day, but reports he has stopped taking this since starting chemoRT in 2023. His triglycerides are elevated in December, will likely need to restart cholesterol medication.     # CKD  - 2/9/24 CT chest showed Bulky atherosclerotic calcification at the origin of the left renal  artery likely contributing to atrophy of the left kidney as compared to the right. Kidney atrophy was not mentioned in previous scans.   - 5/1/24: Creat 1.46, GFR 51. Encouraged patient to keep up with non-caffeinated PO hydration. 1L NS given today with Pembro. Patient to follow up with PCP regarding atherosclerosis.   - Baseline Creat 1.1 - 1.3, GFR 55-65. Due to recent diarrhea and decreased PO intake, his renal function has declined.     # Submental/Neck Lymphedema  - Patient with moderate swelling in the submental and neck region. Likely 2ry to prior radiation. Established w/ Dr Guerrero in [facility].   - Neck pain and swelling improving w/ lymphedema massage.     # Weight loss  - Weight loss 2ry to appetite loss and decreased PO intake. Adivised patient to continue Boost for supplement and increase snacking during the day. He's taking Olanzapine 5mg at bedtime, may increase this to 7.5mg      PLAN  -- Continue Afatinib 20mg every day.   -- 3/13/25 CT N/c without contrast  -- RTC 1 mo on 3/18/25  for scan review and follow up w/ Dr. Parks  -- Continue Olanzapine 5mg tab, 1 tab every night, for appetite loss and nausea.    -- Loperamide 2mg Q4hr PNR or after each loose stools, up to 8 times a day, for diarrhea  -- Reached out to PCP Dr. Weiss regarding Lipitor dose.   -- Continue ample PO hydration, encourage patient to increase PO intake

## 2025-02-04 ENCOUNTER — OFFICE VISIT (OUTPATIENT)
Dept: HEMATOLOGY/ONCOLOGY | Facility: HOSPITAL | Age: 75
End: 2025-02-04
Payer: MEDICARE

## 2025-02-04 ENCOUNTER — LAB (OUTPATIENT)
Dept: LAB | Facility: HOSPITAL | Age: 75
End: 2025-02-04
Payer: MEDICARE

## 2025-02-04 ENCOUNTER — OFFICE VISIT (OUTPATIENT)
Dept: OTOLARYNGOLOGY | Facility: HOSPITAL | Age: 75
End: 2025-02-04
Payer: MEDICARE

## 2025-02-04 VITALS
OXYGEN SATURATION: 100 % | WEIGHT: 119.71 LBS | TEMPERATURE: 97.3 F | BODY MASS INDEX: 19.32 KG/M2 | HEART RATE: 94 BPM | DIASTOLIC BLOOD PRESSURE: 48 MMHG | RESPIRATION RATE: 18 BRPM | SYSTOLIC BLOOD PRESSURE: 136 MMHG

## 2025-02-04 DIAGNOSIS — K52.1 CHEMOTHERAPY INDUCED DIARRHEA: ICD-10-CM

## 2025-02-04 DIAGNOSIS — C78.01 SQUAMOUS CELL CARCINOMA METASTATIC TO BOTH LUNGS: Primary | ICD-10-CM

## 2025-02-04 DIAGNOSIS — C78.02 SQUAMOUS CELL CARCINOMA METASTATIC TO BOTH LUNGS: Primary | ICD-10-CM

## 2025-02-04 DIAGNOSIS — Z08 ENCOUNTER FOR FOLLOW-UP SURVEILLANCE OF HEAD AND NECK CANCER: ICD-10-CM

## 2025-02-04 DIAGNOSIS — T45.1X5A CHEMOTHERAPY INDUCED DIARRHEA: ICD-10-CM

## 2025-02-04 DIAGNOSIS — I89.0 LYMPHEDEMA DUE TO AND NOT CONCURRENT WITH IONIZING RADIOTHERAPY: ICD-10-CM

## 2025-02-04 DIAGNOSIS — I70.1 RENAL ARTERY ATHEROSCLEROSIS (CMS-HCC): ICD-10-CM
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
## 2025-02-04 DIAGNOSIS — C10.9 OROPHARYNGEAL CANCER (MULTI): Primary | ICD-10-CM

## 2025-02-04 DIAGNOSIS — Z85.89 ENCOUNTER FOR FOLLOW-UP SURVEILLANCE OF HEAD AND NECK CANCER: ICD-10-CM

## 2025-02-04 DIAGNOSIS — E78.5 HYPERLIPIDEMIA, UNSPECIFIED HYPERLIPIDEMIA TYPE: ICD-10-CM

## 2025-02-04 DIAGNOSIS — C10.9 OROPHARYNGEAL CANCER (MULTI): ICD-10-CM

## 2025-02-04 DIAGNOSIS — C44.92 SQUAMOUS CELL CARCINOMA METASTATIC TO BOTH LUNGS: Primary | ICD-10-CM

## 2025-02-04 DIAGNOSIS — R13.12 OROPHARYNGEAL DYSPHAGIA: ICD-10-CM

## 2025-02-04 PROCEDURE — 99213 OFFICE O/P EST LOW 20 MIN: CPT | Performed by: OTOLARYNGOLOGY

## 2025-02-04 PROCEDURE — 3075F SYST BP GE 130 - 139MM HG: CPT | Performed by: STUDENT IN AN ORGANIZED HEALTH CARE EDUCATION/TRAINING PROGRAM

## 2025-02-04 PROCEDURE — 31575 DIAGNOSTIC LARYNGOSCOPY: CPT | Performed by: OTOLARYNGOLOGY

## 2025-02-04 PROCEDURE — G2211 COMPLEX E/M VISIT ADD ON: HCPCS | Performed by: STUDENT IN AN ORGANIZED HEALTH CARE EDUCATION/TRAINING PROGRAM

## 2025-02-04 PROCEDURE — 1159F MED LIST DOCD IN RCRD: CPT | Performed by: STUDENT IN AN ORGANIZED HEALTH CARE EDUCATION/TRAINING PROGRAM

## 2025-02-04 PROCEDURE — 99215 OFFICE O/P EST HI 40 MIN: CPT | Mod: 25,27 | Performed by: STUDENT IN AN ORGANIZED HEALTH CARE EDUCATION/TRAINING PROGRAM

## 2025-02-04 PROCEDURE — 1160F RVW MEDS BY RX/DR IN RCRD: CPT | Performed by: OTOLARYNGOLOGY

## 2025-02-04 PROCEDURE — 1036F TOBACCO NON-USER: CPT | Performed by: STUDENT IN AN ORGANIZED HEALTH CARE EDUCATION/TRAINING PROGRAM

## 2025-02-04 PROCEDURE — 99213 OFFICE O/P EST LOW 20 MIN: CPT | Mod: 25 | Performed by: OTOLARYNGOLOGY

## 2025-02-04 PROCEDURE — 1160F RVW MEDS BY RX/DR IN RCRD: CPT | Performed by: STUDENT IN AN ORGANIZED HEALTH CARE EDUCATION/TRAINING PROGRAM

## 2025-02-04 PROCEDURE — 1126F AMNT PAIN NOTED NONE PRSNT: CPT | Performed by: STUDENT IN AN ORGANIZED HEALTH CARE EDUCATION/TRAINING PROGRAM

## 2025-02-04 PROCEDURE — 3078F DIAST BP <80 MM HG: CPT | Performed by: STUDENT IN AN ORGANIZED HEALTH CARE EDUCATION/TRAINING PROGRAM

## 2025-02-04 PROCEDURE — 1159F MED LIST DOCD IN RCRD: CPT | Performed by: OTOLARYNGOLOGY

## 2025-02-04 PROCEDURE — 1036F TOBACCO NON-USER: CPT | Performed by: OTOLARYNGOLOGY

## 2025-02-04 PROCEDURE — 99215 OFFICE O/P EST HI 40 MIN: CPT | Performed by: STUDENT IN AN ORGANIZED HEALTH CARE EDUCATION/TRAINING PROGRAM

## 2025-02-04 ASSESSMENT — PAIN SCALES - GENERAL: PAINLEVEL_OUTOF10: 0-NO PAIN

## 2025-02-04 NOTE — PATIENT INSTRUCTIONS
Patient To-Do List  - Please follow up with Dr. Weiss regarding your high cholesterol and your cholesterol medication. Please do this as soon as you can.  - Schedule restaging CT Neck and Chest in March 2025. Follow up with Dr. Parks after scans  - Continue to take Olanzapine 5mg every night for nausea control, appetite, and sleep  - Continue Gilotrif 20mg daily, call us if you have any side effects

## 2025-02-06 DIAGNOSIS — E78.5 HYPERLIPIDEMIA, UNSPECIFIED HYPERLIPIDEMIA TYPE: ICD-10-CM

## 2025-02-06 RX ORDER — ATORVASTATIN CALCIUM 80 MG/1
80 TABLET, FILM COATED ORAL DAILY
Qty: 90 TABLET | Refills: 3 | Status: SHIPPED | OUTPATIENT
Start: 2025-02-06 | End: 2026-03-13

## 2025-02-17 PROCEDURE — RXMED WILLOW AMBULATORY MEDICATION CHARGE

## 2025-02-19 ENCOUNTER — SPECIALTY PHARMACY (OUTPATIENT)
Dept: PHARMACY | Facility: CLINIC | Age: 75
End: 2025-02-19

## 2025-02-20 ENCOUNTER — PHARMACY VISIT (OUTPATIENT)
Dept: PHARMACY | Facility: CLINIC | Age: 75
End: 2025-02-20
Payer: COMMERCIAL

## 2025-02-27 ENCOUNTER — APPOINTMENT (OUTPATIENT)
Dept: PRIMARY CARE | Facility: CLINIC | Age: 75
End: 2025-02-27
Payer: MEDICARE

## 2025-02-27 VITALS — SYSTOLIC BLOOD PRESSURE: 118 MMHG | HEART RATE: 92 BPM | DIASTOLIC BLOOD PRESSURE: 68 MMHG

## 2025-02-27 DIAGNOSIS — C10.9 OROPHARYNGEAL CANCER (MULTI): ICD-10-CM

## 2025-02-27 DIAGNOSIS — C78.01 SQUAMOUS CELL CARCINOMA METASTATIC TO BOTH LUNGS: ICD-10-CM

## 2025-02-27 DIAGNOSIS — L89.322 PRESSURE ULCER OF LEFT BUTTOCK, STAGE 2 (MULTI): ICD-10-CM

## 2025-02-27 DIAGNOSIS — E78.5 HYPERLIPIDEMIA, UNSPECIFIED HYPERLIPIDEMIA TYPE: ICD-10-CM

## 2025-02-27 DIAGNOSIS — I65.29 ARTERIOSCLEROSIS OF CAROTID ARTERY, UNSPECIFIED LATERALITY: ICD-10-CM

## 2025-02-27 DIAGNOSIS — C78.02 SQUAMOUS CELL CARCINOMA METASTATIC TO BOTH LUNGS: ICD-10-CM

## 2025-02-27 DIAGNOSIS — J96.00 ACUTE RESPIRATORY FAILURE, UNSPECIFIED WHETHER WITH HYPOXIA OR HYPERCAPNIA: ICD-10-CM

## 2025-02-27 DIAGNOSIS — C61 PROSTATE CANCER (MULTI): ICD-10-CM

## 2025-02-27 DIAGNOSIS — J43.9 PULMONARY EMPHYSEMA, UNSPECIFIED EMPHYSEMA TYPE (MULTI): ICD-10-CM

## 2025-02-27 DIAGNOSIS — C44.92 SQUAMOUS CELL CARCINOMA METASTATIC TO BOTH LUNGS: ICD-10-CM

## 2025-02-27 DIAGNOSIS — I73.9 PAD (PERIPHERAL ARTERY DISEASE) (CMS-HCC): ICD-10-CM

## 2025-02-27 DIAGNOSIS — I10 BENIGN ESSENTIAL HYPERTENSION: ICD-10-CM

## 2025-02-27 DIAGNOSIS — H35.3231 EXUDATIVE AGE-RELATED MACULAR DEGENERATION, BILATERAL, WITH ACTIVE CHOROIDAL NEOVASCULARIZATION: ICD-10-CM

## 2025-02-27 DIAGNOSIS — I75.81: Primary | ICD-10-CM

## 2025-02-27 DIAGNOSIS — N18.31 CHRONIC RENAL IMPAIRMENT, STAGE 3A (MULTI): ICD-10-CM

## 2025-02-27 DIAGNOSIS — I70.1 RENAL ARTERY ATHEROSCLEROSIS (CMS-HCC): ICD-10-CM

## 2025-02-27 DIAGNOSIS — T40.2X1A OPIOID OVERDOSE, ACCIDENTAL OR UNINTENTIONAL, INITIAL ENCOUNTER (MULTI): ICD-10-CM

## 2025-02-27 PROCEDURE — 1124F ACP DISCUSS-NO DSCNMKR DOCD: CPT | Performed by: INTERNAL MEDICINE

## 2025-02-27 PROCEDURE — 1126F AMNT PAIN NOTED NONE PRSNT: CPT | Performed by: INTERNAL MEDICINE

## 2025-02-27 PROCEDURE — G0439 PPPS, SUBSEQ VISIT: HCPCS | Performed by: INTERNAL MEDICINE

## 2025-02-27 PROCEDURE — 3078F DIAST BP <80 MM HG: CPT | Performed by: INTERNAL MEDICINE

## 2025-02-27 PROCEDURE — 1036F TOBACCO NON-USER: CPT | Performed by: INTERNAL MEDICINE

## 2025-02-27 PROCEDURE — 3074F SYST BP LT 130 MM HG: CPT | Performed by: INTERNAL MEDICINE

## 2025-02-27 PROCEDURE — 99397 PER PM REEVAL EST PAT 65+ YR: CPT | Performed by: INTERNAL MEDICINE

## 2025-02-27 PROCEDURE — 1170F FXNL STATUS ASSESSED: CPT | Performed by: INTERNAL MEDICINE

## 2025-02-27 PROCEDURE — 1159F MED LIST DOCD IN RCRD: CPT | Performed by: INTERNAL MEDICINE

## 2025-02-27 PROCEDURE — 1160F RVW MEDS BY RX/DR IN RCRD: CPT | Performed by: INTERNAL MEDICINE

## 2025-02-27 ASSESSMENT — ACTIVITIES OF DAILY LIVING (ADL)
DRESSING: INDEPENDENT
TAKING_MEDICATION: NEEDS ASSISTANCE
BATHING: INDEPENDENT
GROCERY_SHOPPING: NEEDS ASSISTANCE
DOING_HOUSEWORK: NEEDS ASSISTANCE
MANAGING_FINANCES: INDEPENDENT

## 2025-02-27 ASSESSMENT — ENCOUNTER SYMPTOMS
FATIGUE: 1
VOMITING: 0
BRUISES/BLEEDS EASILY: 0
SHORTNESS OF BREATH: 0
COLOR CHANGE: 0
STRIDOR: 0
NUMBNESS: 0
EYE DISCHARGE: 0
CONSTIPATION: 0
LIGHT-HEADEDNESS: 0
NECK PAIN: 0
VOICE CHANGE: 0
HEMATURIA: 0
ADENOPATHY: 0
SEIZURES: 0
BLOOD IN STOOL: 0
ABDOMINAL DISTENTION: 0
DIARRHEA: 0
ARTHRALGIAS: 0
SPEECH DIFFICULTY: 0
SLEEP DISTURBANCE: 0
SINUS PRESSURE: 0
FLANK PAIN: 0
DIZZINESS: 0
HEADACHES: 0
TROUBLE SWALLOWING: 0
BACK PAIN: 0
RHINORRHEA: 0
WHEEZING: 0
COUGH: 0
ABDOMINAL PAIN: 0
JOINT SWELLING: 0
ACTIVITY CHANGE: 0
RECTAL PAIN: 0
EYE PAIN: 0
WEAKNESS: 0
FACIAL SWELLING: 0
PALPITATIONS: 0
MYALGIAS: 0
EYE REDNESS: 0
SINUS PAIN: 0
NECK STIFFNESS: 0
NAUSEA: 0
PHOTOPHOBIA: 0
DIFFICULTY URINATING: 0
DIAPHORESIS: 0
SORE THROAT: 0
TREMORS: 0
FACIAL ASYMMETRY: 0
CHEST TIGHTNESS: 0
CHOKING: 0
CHILLS: 0
POLYPHAGIA: 0
POLYDIPSIA: 0
FREQUENCY: 0
EYE ITCHING: 0
DYSURIA: 0
ANAL BLEEDING: 0
APPETITE CHANGE: 0
WOUND: 0

## 2025-02-27 ASSESSMENT — PAIN SCALES - GENERAL: PAINLEVEL_OUTOF10: 0-NO PAIN

## 2025-02-27 NOTE — PROGRESS NOTES
Subjective   Patient ID: Phong Wong is a 74 y.o. male who presents for Annual Exam and Medicare Annual Wellness Visit Subsequent.    Patient presents for wellness exam and physical exam.  He has been compliant with his medications but not diet or exercise.  He continues with a poor appetite.  He is having difficulty with his dentures as well.  He continues to complain of some fatigue.  He ambulates with a walker in the house.  He is not getting out much.  Physically he feels okay.  He denies any headaches, no dizziness, no sinus problems, no chest pain or shortness of breath.  He denies abdominal pain no nausea vomiting or diarrhea.  He reports no pain.  He has suffered from hemorrhoids.  He denies any constipation.         Review of Systems   Constitutional:  Positive for fatigue. Negative for activity change, appetite change, chills and diaphoresis.   HENT:  Negative for congestion, dental problem, drooling, ear discharge, ear pain, facial swelling, hearing loss, mouth sores, nosebleeds, postnasal drip, rhinorrhea, sinus pressure, sinus pain, sneezing, sore throat, tinnitus, trouble swallowing and voice change.    Eyes:  Negative for photophobia, pain, discharge, redness, itching and visual disturbance.   Respiratory:  Negative for cough, choking, chest tightness, shortness of breath, wheezing and stridor.    Cardiovascular:  Negative for chest pain, palpitations and leg swelling.   Gastrointestinal:  Negative for abdominal distention, abdominal pain, anal bleeding, blood in stool, constipation, diarrhea, nausea, rectal pain and vomiting.   Endocrine: Negative for cold intolerance, heat intolerance, polydipsia, polyphagia and polyuria.   Genitourinary:  Negative for decreased urine volume, difficulty urinating, dysuria, enuresis, flank pain, frequency, genital sores, hematuria and urgency.   Musculoskeletal:  Positive for gait problem. Negative for arthralgias, back pain, joint swelling, myalgias, neck pain and  neck stiffness.   Skin:  Negative for color change, pallor, rash and wound.   Neurological:  Negative for dizziness, tremors, seizures, syncope, facial asymmetry, speech difficulty, weakness, light-headedness, numbness and headaches.   Hematological:  Negative for adenopathy. Does not bruise/bleed easily.   Psychiatric/Behavioral:  Negative for sleep disturbance.        Objective   There were no vitals taken for this visit.    Physical Exam  Constitutional:       General: He is not in acute distress.     Appearance: Normal appearance. He is not ill-appearing, toxic-appearing or diaphoretic.   HENT:      Head: Normocephalic and atraumatic.      Right Ear: Tympanic membrane, ear canal and external ear normal. There is no impacted cerumen.      Left Ear: Tympanic membrane and ear canal normal. There is no impacted cerumen.      Nose: Nose normal. No congestion or rhinorrhea.      Mouth/Throat:      Mouth: Mucous membranes are moist.      Pharynx: Oropharynx is clear. No oropharyngeal exudate or posterior oropharyngeal erythema.   Eyes:      General: No scleral icterus.        Right eye: No discharge.         Left eye: No discharge.      Extraocular Movements: Extraocular movements intact.      Conjunctiva/sclera: Conjunctivae normal.      Pupils: Pupils are equal, round, and reactive to light.   Neck:      Vascular: No carotid bruit.   Cardiovascular:      Rate and Rhythm: Normal rate and regular rhythm.      Pulses: Normal pulses.      Heart sounds: Normal heart sounds. No murmur heard.     No friction rub. No gallop.   Pulmonary:      Effort: Pulmonary effort is normal. No respiratory distress.      Breath sounds: Normal breath sounds. No stridor. No wheezing, rhonchi or rales.   Chest:      Chest wall: No tenderness.   Abdominal:      General: There is no distension.      Palpations: There is no mass.      Tenderness: There is no abdominal tenderness. There is no right CVA tenderness, left CVA tenderness or  guarding.      Hernia: No hernia is present.   Musculoskeletal:         General: No swelling, tenderness, deformity or signs of injury. Normal range of motion.      Cervical back: Normal range of motion and neck supple. No rigidity or tenderness.      Right lower leg: No edema.      Left lower leg: No edema.   Lymphadenopathy:      Cervical: No cervical adenopathy.   Skin:     General: Skin is warm.      Coloration: Skin is not jaundiced or pale.      Findings: No bruising, erythema, lesion or rash.   Neurological:      General: No focal deficit present.      Mental Status: He is alert and oriented to person, place, and time.      Cranial Nerves: No cranial nerve deficit.      Sensory: No sensory deficit.      Motor: No weakness.      Coordination: Coordination normal.      Gait: Gait normal.      Deep Tendon Reflexes: Reflexes normal.   Psychiatric:         Mood and Affect: Mood normal.         Behavior: Behavior normal.         Thought Content: Thought content normal.         Judgment: Judgment normal.         Assessment/Plan   Diagnoses and all orders for this visit:  Atheroembolism of left renal artery (Multi)-will modify risk factors  Pressure ulcer of left buttock, stage 2 (Multi)-symptoms have resolved.  Exudative age-related macular degeneration, bilateral, with active choroidal neovascularization-will schedule follow-up ophthalmology appointment  Opioid overdose, accidental or unintentional, initial encounter (Multi)-he denies any narcotics use.  Pulmonary emphysema, unspecified emphysema type (Multi)-will continue with Trelegy.  Symptoms have been stable  Acute respiratory failure, unspecified whether with hypoxia or hypercapnia-symptoms are stable  Hyperlipidemia, unspecified hyperlipidemia type-check a lipid profile  -     Lipid Panel; Future  Benign essential hypertension-stable on present medication  Chronic renal impairment, stage 3a (Multi)-recheck creatinine.  Will check urine.  -     Urinalysis  with Reflex Microscopic; Future  -     Albumin-Creatinine Ratio, Urine Random; Future  -     Basic Metabolic Panel; Future  Arteriosclerosis of carotid artery, unspecified laterality-modify risk factors.  Follow-up with vascular  PAD (peripheral artery disease) (CMS-HCC)-follow-up with vascular  Renal artery atherosclerosis (CMS-HCC)-modify risk factors  Oropharyngeal cancer (Multi)-he will follow-up with oncology as scheduled.  Prostate cancer (Multi)-PSA has been done.  Schedule appointment with urology  Squamous cell carcinoma metastatic to both lungs-follow-up with oncology  Hemorrhoids-he will use Preparation H as needed.  Health maintenance-refuses all immunizations.  Cologuard in 3 months.  Will schedule eye dental and dermatology appointment  Weight loss-encourage increased p.o. intake.

## 2025-03-05 ENCOUNTER — SPECIALTY PHARMACY (OUTPATIENT)
Dept: PHARMACY | Facility: CLINIC | Age: 75
End: 2025-03-05

## 2025-03-05 NOTE — PROGRESS NOTES
[facility] Clinical Note  Patient Reassessment     Introduction  Phong Wong is a 74 y.o. male who is on the specialty pharmacy service for management of: Oncology Core.      [facility] supplied medication: gilotrif    Duration of therapy: Until drug toxicity or progression    The most recent encounter visit with the referring prescriber Dr Parks on 12/18/24 was reviewed.  Pharmacy will continue to collaborate in the care of this patient with the referring prescriber.    Discussion  Phong was contacted on 3/5/2025 at 10:07 AM for a pharmacy visit with encounter number [id number] from:   [facility]  [address] [address]  Dept: [phone]  Dept Fax: [phone]  Phong consented to a/an Telephone visit, which was performed.    Efficacy  Patient has developed new symptoms of condition: No  Patient/caregiver feels medication is affecting the disease state: n/a    Goals  Provided education on goals and possible outcomes of therapy:  Adherence with therapy  Timely completion of appropriate labs  Timely and appropriate follow up with provider  Identify and address medication interactions with presciption medications, OTC medications and supplements  Optimize or maintain quality of life  Oncology: Manage side effects (ex: nausea/vomiting, constipation, fatigue) in conjunction with care team  Patient has documented target(s) for goals of therapy: No  Patient status for goal(s): On track    Tolerance  Patient has experienced side effects from this medication: No  Changes to current therapy regimen: No    The follow-up timeline was discussed. Every person responds to and reacts to therapy differently. Patient should be assessed for efficacy and tolerability in approximately: 6 months    Adherence      The importance of adherence was discussed and patient/caregiver was advised to take the medication as prescribed by their provider. Encouraged  patient/caregiver to call physician's office or specialty pharmacy if they have a question regarding a missed dose.    General Assessment  Changes to home medications, OTCs or supplements: No  Current Outpatient Medications   Medication Sig Dispense Refill    afatinib (Gilotrif) 20 mg tablet Take 1 tablet (20 mg total) by mouth once daily.  Take at least 1 hour before a meal or 2 hours after a meal. 30 tablet 2    aspirin 81 mg EC tablet Take 1 tablet (81 mg) by mouth once daily.      atorvastatin (Lipitor) 80 mg tablet Take 1 tablet (80 mg) by mouth once daily. 90 tablet 3    doxazosin (Cardura) 4 mg tablet Take 1 tablet (4 mg) by mouth once daily. Take 1 tablet by mouth daily in the evening 90 tablet 3    fluticasone-umeclidin-vilanter (Trelegy Ellipta) 100-62.5-25 mcg blister with device INHALE 1 PUFF EVERY DAY 3 each 3    ipratropium-albuteroL (Duo-Neb) 0.5-2.5 mg/3 mL nebulizer solution Take 3 mL by nebulization 3 times a day as needed for wheezing or shortness of breath.      losartan (Cozaar) 100 mg tablet Take 1 tablet (100 mg) by mouth once daily. 90 tablet 3    magnesium oxide (Mag-Ox) 400 mg (241.3 mg magnesium) tablet Take 1 tablet (400 mg) by mouth once daily. 3 tablet 0    OLANZapine (ZyPREXA) 5 mg tablet Take 1 tablet (5 mg) by mouth once daily at bedtime. 1 tab at bedtime for 4 days, starting the night of chemo 30 tablet 3     No current facility-administered medications for this visit.     Reported new allergies: No  Reported new medical conditions: No  Additional monitoring reviewed: Oncology - CBC-diff:   Lab Results   Component Value Date    WBC 6.1 01/21/2025    RBC 4.46 (L) 01/21/2025    HGB 12.5 (L) 01/21/2025    HCT 36.4 (L) 01/21/2025    MCV 82 01/21/2025    MCHC 34.3 01/21/2025     01/21/2025    RDW 14.3 01/21/2025    NEUTOPHILPCT 73.4 01/21/2025    IGPCT 0.5 01/21/2025    LYMPHOPCT 10.0 01/21/2025    MONOPCT 11.4 01/21/2025    EOSPCT 4.2 01/21/2025    BASOPCT 0.5 01/21/2025     "NEUTROABS 4.49 01/21/2025    LYMPHSABS 0.61 (L) 01/21/2025    MONOSABS 0.70 01/21/2025    EOSABS 0.26 01/21/2025    BASOSABS 0.03 01/21/2025   , CMP:   Lab Results   Component Value Date    GLUCOSE 84 01/21/2025     01/21/2025    K 3.4 (L) 01/21/2025    CL 98 01/21/2025    CO2 31 01/21/2025    ANIONGAP 12 01/21/2025    BUN 13 01/21/2025    CREATININE 1.35 (H) 01/21/2025    GFRF CANCELED 09/13/2023    CALCIUM 9.2 01/21/2025    ALBUMIN 3.9 01/21/2025    ALKPHOS 54 01/21/2025    PROT 6.5 01/21/2025    AST 11 01/21/2025    BILITOT 0.4 01/21/2025    ALT 9 (L) 01/21/2025   , PSA:   Lab Results   Component Value Date    PSA 0.16 09/25/2024   , and Testosterone: No results found for: \"TESTOSTERONE\"  Is laboratory follow up needed? No    Advised to contact the pharmacy if there are any changes to the patient's medication list, including prescriptions, OTC medications, herbal products, or supplements.    Impression/Plan  This patient has not been identified as high risk due to Lack of high risk qualifiers.  The following action was taken: N/A.         Provided contact information ([phone]) for [facility] and reviewed dispensing process, refill timeline and patient management follow up. Confirmed understanding of education conducted during assessment. All questions and concerns were addressed and patient/caregiver was encouraged to reach out for additional questions or concerns.    Based on the patient's diagnosis, medication list, progress towards goals, adherence, tolerance, and medication list, medication remains appropriate: Therapy remains appropriate (I attest)    Odell Carrasquillo, PharmD    "

## 2025-03-13 ENCOUNTER — HOSPITAL ENCOUNTER (OUTPATIENT)
Dept: RADIOLOGY | Facility: CLINIC | Age: 75
Discharge: HOME | End: 2025-03-13
Payer: MEDICARE

## 2025-03-13 DIAGNOSIS — C78.02 SQUAMOUS CELL CARCINOMA METASTATIC TO BOTH LUNGS: ICD-10-CM

## 2025-03-13 DIAGNOSIS — C10.9 OROPHARYNGEAL CANCER (MULTI): ICD-10-CM

## 2025-03-13 DIAGNOSIS — C44.92 SQUAMOUS CELL CARCINOMA METASTATIC TO BOTH LUNGS: ICD-10-CM

## 2025-03-13 DIAGNOSIS — C78.01 SQUAMOUS CELL CARCINOMA METASTATIC TO BOTH LUNGS: ICD-10-CM

## 2025-03-13 PROCEDURE — 70490 CT SOFT TISSUE NECK W/O DYE: CPT

## 2025-03-13 PROCEDURE — 71250 CT THORAX DX C-: CPT

## 2025-03-17 ENCOUNTER — APPOINTMENT (OUTPATIENT)
Dept: INTEGRATIVE MEDICINE | Facility: CLINIC | Age: 75
End: 2025-03-17
Payer: MEDICARE

## 2025-03-17 NOTE — PROGRESS NOTES
Patient ID: Phong Wong is a 74 y.o. male.  Referring Physician: No referring provider defined for this encounter.  Primary Care Provider: Harshil Weiss MD    CANCER HISTORY:   Patient ID: Phong Wong is a 73 y.o. male.  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 Juan Jose Fletcher   MedOnc: Dr. Kyunghee Burkitt, X. Katherine Feng, PA-C   RadOnc: Dr. Vianca Steen      Current Therapy  4/4/24: Started Q3 week Pembrolizumab   Now 6/24 carbo/taxol     Prior Therapy:   8/16 - 10/4/23: Recieved concurrent chemoradiation with 7 weekly Carboplatin (2mg/AUC)/Paclitaxel (45mg/m2)  and 70gy RT in 35fx     Sites of Disease:  Soft palate, BOT, Left palatine tonsil  B/L Cervical LN  Lung     Oncologic Issues:   Odynophagia  Fatigue     ONCOLOGIC HISTORY  - Pt had 2 months hx of throat discomfort with lump in right neck  - 6/13/23: CT neck showed a mass 2.8 x 2.5 x 2.9 cm in the right lower cervical neck soft tissues. Two suspicious nodes were observed, one at level 3 on the right and another at level 2 on the left.  - 6/28/23: seen by Dr. Fletcher. 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 Based on the findings, Dr. Fletcher did not recommend surgery due to the location  of the primary tumor and the presence of yvette disease  - 6/30/23: PET/CT showed intense FDG avidity within the soft palate, extending to the base of the tongue and left palatine tonsil. Multiple FDG avid left cervical level II nodes were observed, along with an FDG avid mass in the region of the right cervical  level II/III, infiltrating the right SCM muscle and encasing and invading the right jugular vein. FDG avidity was also detected in the region of the left fossa Rosenmuller and left medial pterygoid muscle.  - 8/16 - 10/4/23: Recieved concurrent chemoradiation  with 7 weekly Carboplatin (2mg/AUC)/Paclitaxel (45mg/m2)  and 70gy RT in 35fx     4/24 imaging with mets to lungs     Progressed after 3 cycles of Pembrolizumab, started Q3 week Carbo/Taxol on 6/19/24.   12/24 afatinib - now dose reduced (started 10/24)     Admissions:  10/5 - 10/10/23: Admitted for extreme weakness, HECTOR, pancytopenia including anemia requiring blood transfusion. D/C'ed to acute rehab     Past Medical History:      Past Medical History   Past Medical History:  07/27/2017: Personal history of diseases of the blood and blood-  forming organs and certain disorders involving the immune mechanism      Comment:  History of thrombocytosis      HTN, HLD, COPD, prostate cancer in 2017 (s/p radiation)     Aortoiliofemoral vascular bypass in 2014     Integrative history:  Symptoms:  Itching in neck 3 mo, no diff swallowing, mild swelling              Had one massage treatment     Few days after infusion, gets fatigue, sleeps a lot     Diet - soft food - had to pull on teeth prior to radiation     PA - limited, doesn't do much activity  Doesn't walk much after chemotherapy  Still limited at this time - less weakness    ROS:  no ha, visual symptoms, hearing loss  no sob, chest pain, palp  ROS o/w non contributory, please see HPI    Objective    BSA: There is no height or weight on file to calculate BSA.  There were no vitals taken for this visit.    PHYSICAL EXAM:  An interactive audio telecommunication system which permits real time communications between the patient (at the originating site) and provider (at the distant site) was utilized to provide this telehealth service.    Verbal consent was requested and obtained on this date for a telehealth visit.     RESULTS:  Lab Results   Component Value Date    WBC 6.1 01/21/2025    HGB 12.5 (L) 01/21/2025    HCT 36.4 (L) 01/21/2025     01/21/2025     10/24/2023    CREATININE 1.35 (H) 01/21/2025    AST 11 01/21/2025       Assessment/Plan   73 yo man  with h/o squamous cell oropharyngeal ca s/p chemoradiation 2023  Now with mets to lungs  Current treatment: pembrolizumab     Lifestyle:  Diet - limit sugar if possible  Protein supplementation - Orgain     Exercise - 30 min or so walking per day     Symptoms:  Neck discomfort/swelling - massage therapy - weekly - helping with swelling   Partner helping and doing at home     Fatigue:  Acupuncture - for fatigue weekly x 6 weeks - not really interested  American Ginseng 2 grams/day  Taking naps - 30 min at a time  Walk 10 min/day at least     Follow up as needed     Saulo Guerrero MD

## 2025-03-18 ENCOUNTER — APPOINTMENT (OUTPATIENT)
Dept: HEMATOLOGY/ONCOLOGY | Facility: HOSPITAL | Age: 75
End: 2025-03-18
Payer: MEDICARE

## 2025-03-18 ENCOUNTER — SPECIALTY PHARMACY (OUTPATIENT)
Dept: PHARMACY | Facility: CLINIC | Age: 75
End: 2025-03-18

## 2025-03-18 PROCEDURE — RXMED WILLOW AMBULATORY MEDICATION CHARGE

## 2025-03-19 ENCOUNTER — APPOINTMENT (OUTPATIENT)
Dept: INTEGRATIVE MEDICINE | Facility: CLINIC | Age: 75
End: 2025-03-19
Payer: MEDICARE

## 2025-03-20 ENCOUNTER — SPECIALTY PHARMACY (OUTPATIENT)
Dept: PHARMACY | Facility: CLINIC | Age: 75
End: 2025-03-20

## 2025-03-21 ENCOUNTER — PHARMACY VISIT (OUTPATIENT)
Dept: PHARMACY | Facility: CLINIC | Age: 75
End: 2025-03-21
Payer: COMMERCIAL

## 2025-03-24 ASSESSMENT — ENCOUNTER SYMPTOMS
CONSTIPATION: 0
LEG SWELLING: 0
VOMITING: 0
DIARRHEA: 1
ABDOMINAL PAIN: 0
TROUBLE SWALLOWING: 0
CHILLS: 0
SHORTNESS OF BREATH: 0
SORE THROAT: 0
ARTHRALGIAS: 0
FEVER: 0
APPETITE CHANGE: 1
NAUSEA: 0
LIGHT-HEADEDNESS: 0
COUGH: 0
HEADACHES: 0
NUMBNESS: 0

## 2025-03-24 NOTE — PROGRESS NOTES
Patient ID: Phong Wong is a 74 y.o. male.  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 Juan Jose Fletcher   MedOnc: DIANA Garcia PA-C   RadOnc: Dr. Vianca Steen     Current Therapy  10/15/24: Afatinib 40 mg every day. Dose reduced to 20 mg, now increased to 30 mg on 3/25 given PD    Prior Therapy:   8/16 - 10/4/23: Recieved concurrent chemoradiation with 7 weekly Carboplatin (2mg/AUC)/Paclitaxel (45mg/m2)  and 70gy RT in 35fx  4/4 - 6/13/24: Received 3 cycles of Pembrolizumab  6/20/24 - 9/12/24: Received 5 cycles of Carbo/Taxol     Sites of Disease:  Soft palate, BOT, Left palatine tonsil  B/L Cervical LN  Lung     Oncologic Issues:   Odynophagia  Fatigue     ONCOLOGIC HISTORY  - Pt had 2 months hx of throat discomfort with lump in right neck  - 6/13/23: CT neck showed a mass 2.8 x 2.5 x 2.9 cm in the right lower cervical neck soft tissues. Two suspicious nodes were observed, one at level 3 on the right and another at level 2 on the left.  - 6/28/23: seen by Dr. Fletcher. 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 Based on the findings, Dr. Fletcher did not recommend surgery due to the location  of the primary tumor and the presence of yvette disease  - 6/30/23: PET/CT showed intense FDG avidity within the soft palate, extending to the base of the tongue and left palatine tonsil. Multiple FDG avid left cervical level II nodes were observed, along with an FDG avid mass in the region of the right cervical  level II/III, infiltrating the right SCM muscle and encasing and invading the right jugular vein. FDG avidity was also detected in the region of the left fossa Rosenmuller and left medial pterygoid muscle.  - 8/16 - 10/4/23: Recieved concurrent chemoradiation with 7 weekly Carboplatin  (2mg/AUC)/Paclitaxel (45mg/m2)  and 70gy RT in 35fx  - 4/4 - 6/13/24: Received 3 cycles of Pembrolizumab  - 6/20/24 - 9/12/24: Received 5 cycles of Carbo/Taxol  - 10/15/24: Begin afatinib 40 mg every day, could not tolerate due to diarrhea  - 12/18/24: Started Afatinib 20mg    Admissions:  10/5 - 10/10/23: Admitted for extreme weakness, HECTOR, pancytopenia including anemia requiring blood transfusion. D/C'ed to acute rehab     SOCIAL HISTORY  Lives in [place].  to wife Emelia of 8 years. Brother in [place]. Worked as , retired in 5 years ago. Smoked 1-2 PPD x 50 years. Rare EtOH. No illicits.      FAMILY HISTORY  Brother had brain cancer ([place] Monisha)    Subjective     HPI  Patient with h/o locally advanced oral cavity cancer, completed chemoRT w/ 7 weekly Carboplatin+Paclitaxel on 10/4/23. Unfortunately found with mets to lungs on 3 months restaging scan. CPS 3. S/p 3 cycles Pembrolizumab but lung nodules progressed. Completed 5 cycles of Carbo/Taxol on 9/12/24. Started on Afatinib 40mg QD on 10/15/24.      Interval History  Patient started restarted Afatinib at 20mg in Dec 2024. He presents today with worsening BL lung lesions.    Physically he is doing ok. He is out of bed, but in various chairs much of the day. His diarrhea has totally resolved. He has lost weight but denies dysguesia.      Review of Systems   Constitutional:  Positive for appetite change. Negative for chills and fever.   HENT:   Negative for hearing loss, lump/mass, mouth sores, nosebleeds, sore throat, tinnitus and trouble swallowing.    Respiratory:  Negative for cough and shortness of breath.    Cardiovascular:  Negative for chest pain and leg swelling.   Gastrointestinal:  Positive for diarrhea. Negative for abdominal pain, constipation, nausea and vomiting.   Musculoskeletal:  Negative for arthralgias.   Skin:  Negative for rash.   Neurological:  Negative for headaches, light-headedness and numbness.     Current  Outpatient Medications   Medication Instructions    aspirin 81 mg EC tablet 1 tablet, Daily    atorvastatin (LIPITOR) 80 mg, oral, Daily    doxazosin (CARDURA) 4 mg, oral, Daily, Take 1 tablet by mouth daily in the evening    fluticasone-umeclidin-vilanter (Trelegy Ellipta) 100-62.5-25 mcg blister with device INHALE 1 PUFF EVERY DAY    Gilotrif 20 mg, oral, Daily, Take at least 1 hour before a meal or 2 hours after a meal.    ipratropium-albuteroL (Duo-Neb) 0.5-2.5 mg/3 mL nebulizer solution Take 3 mL by nebulization 3 times a day as needed for wheezing or shortness of breath.    losartan (COZAAR) 100 mg, oral, Daily    magnesium oxide (MAG-OX) 400 mg, oral, Daily    OLANZapine (ZYPREXA) 5 mg, oral, Nightly, 1 tab at bedtime for 4 days, starting the night of chemo     Allergies   Allergen Reactions    Codeine Nausea Only     Objective   VS: /80 (BP Location: Right arm, Patient Position: Sitting, BP Cuff Size: Adult)   Pulse 105   Temp 36.6 °C (97.9 °F) (Skin)   Resp 18   Wt 52.9 kg (116 lb 9.6 oz)   SpO2 99%   BMI 18.82 kg/m²    Weight:   Daily Weight  03/25/25 : 52.9 kg (116 lb 9.6 oz)  02/04/25 : 54.3 kg (119 lb 11.4 oz)  01/21/25 : 53.1 kg (117 lb 1 oz)  12/18/24 : 54.5 kg (120 lb 2.4 oz)  11/26/24 : 54.9 kg (121 lb 0.5 oz)  11/20/24 : 52.6 kg (116 lb)  11/07/24 : 52.9 kg (116 lb 10 oz)    Physical Exam  Constitutional:       Appearance: Normal appearance. He is underweight.   HENT:      Head: Normocephalic and atraumatic.      Right Ear: External ear normal. No tenderness.      Left Ear: External ear normal. No tenderness.      Nose: Nose normal.      Mouth/Throat:      Lips: Pink.      Mouth: Mucous membranes are moist. No injury or oral lesions.   Eyes:      General: Lids are normal.      Extraocular Movements: Extraocular movements intact.      Conjunctiva/sclera: Conjunctivae normal.      Pupils: Pupils are equal, round, and reactive to light.   Neck:      Thyroid: No thyroid mass.   Pulmonary:       Effort: Pulmonary effort is normal.   Abdominal:      General: Abdomen is flat. Bowel sounds are normal.      Palpations: Abdomen is soft.   Musculoskeletal:         General: Normal range of motion.      Cervical back: Normal range of motion and neck supple. No signs of trauma. Normal range of motion.   Skin:     General: Skin is warm and dry.      Comments: No new skin lesions   Neurological:      General: No focal deficit present.      Mental Status: He is alert and oriented to person, place, and time. Mental status is at baseline.      Gait: Gait is intact.   Psychiatric:         Attention and Perception: Attention normal.         Mood and Affect: Mood and affect normal.         Behavior: Behavior is cooperative.     ECOGSCORE: 2- Ambulatory and  capable of all selfcare; unable to carry out work activities.  Up and about > 50% of waking hrs.    Diagnostic Results   Labs:                                 IMAGING  CT Chest 3/13/25  IMPRESSION:  1.  Interval worsening of pulmonary metastatic tumor burden as  evidenced by increasing size of multiple scattered pulmonary nodules  within the bilateral lungs as described above.  2. Moderate emphysema.  3. Severe coronary artery calcifications.      CT Neck 3/13/25  IMPRESSION:  Right level 2/level 3 soft tissue mass measures 3.3 x 2.0 cm,  previously 3.1 x 2.0 cm. Slight differences in measurement may be  secondary to differences in technique.      Posttreatment related changes, similar to the prior exam.      Please see CT neck performed the same day for lung findings.       Assessment/Plan   Mr. Phong Wong is a 74 y.o. male with locally advanced oral cavity cancer, completed chemoRT w/ 7 weekly Carboplatin+Paclitaxel on 10/4/23. Unfortunately found with mets to lungs on 3 months restaging scan. CPS 3. S/p 3 cycles Pembrolizumab but lung nodules progressed. Completed 5 cycles of Carbo/Taxol on 9/12/24. Start Afatinib on 10/15/24, stopped around first week of  November.    # Locally advanced oral cavity cancer, T3N2, now with met to lungs  - 6/30/23 PET/CT showed intense FDG avidity within the soft palate, extending to BOT and left palatine tonsil. Multiple uptake in left cervical level II nodes, right cervical level  II/III, infiltrating the right SCM muscle and encasing and invading the right jugular vein. FDG avidity was also detected in the region of the left fossa Rosenmuller and left   - Discussed with patient about carboplatin+ paclitaxel as his chemotherapy along with radiation due to his GFR 30s.  - 8/16 - 10/4/23: Recieved concurrent chemoradiation with 7 weekly Carboplatin (2mg/AUC)/Paclitaxel (45mg/m2)  and 70gy RT in 35fx     - 1/10/24 restaging PET/CT noted significant interval improvement in prior sites of disease though with residual FDG avidity in soft palate and left palatine tonsil, c/f residual disease vs post treatment changes. Also noted was a new FDG avid LLL pulm nodule with max SUV 5.7, c/f pulmonary metastasis.   - 2/9/24 CT chest showed multiple subcentimeter noduesl 2-5mm, dominant lesion is 1.6cm in LLL.    - 3/6/24: Lung biopsy of LLL nodule was consistent with HN origin. CPS 3.  Discussed with patient about phase 2 FLAM study (randomized to pembrolizumab or pembrolizumab+danvatirsen (Stat3 inhibitor). Unfortunately due to his CKD, he's not eligible for FLAM study.  - 4/4/24: Started Q3 week Pembrolizumab  - 6/7/24 restaging CT N/C/A/P showed enlargement of subcentimeter lung mets, stable left lung nodule 2cm with new 1cm nodule in RUL. Based on heightened response with carbo+taxol after progression on PD, I recommended 3 cycles of carbo taxol and restaging, if there is response, we can consider afatinib treatment more as long term maintenance therapy.   - 6/20/24: Cycle 1 of Q3 week Carbo/Taxol.  - 8/19/24 restaging scan showed improving b/l pulmonary mets, largest pleural based LLL nodule is now 14mm.   Pt is aware of that Dr. Burkitt is  leaving  at the end of Oct, planning to get to another restaging and change treatment to afatinib if he continues to have response.  Once treatment is changed, will transition his care to Dr. Parks.   - 9/25/24: PET/CT showed continuing response in lung mets, known level III right cervical lymphadenopathy, but increased SUV compared to Jan PET. No other mets.  Since patients PS is declining, prefer treating residual disease with afatinib. CT neck/chest ordered to measure exact size of right cervical LN as well as lung mets, so these can be used as a baseline prior to start afatinib.  - 10/9/24 CT N/C pending. Patient is otherwise feeling well. Diarrhea resolved, arthralgia and peripheral neuropathy have resolved. He continue to tolerate regular diet and denies any dysphagia. Appetite is not great so has weight loss but this is improving.   - 10/15/24: Starting afatinib 40 mg daily. Consents signed. Will follow in 3 weeks.  - 11/7/24: Patient with grade 1 diarreha since starting Afatinib, became grade 2 in the last week and has weakness and nausea. PO intake is also poor due to poor appetite and taste changes. Renal function decreased likely 2ry to poor PO intake. He stopped taking Afatinib a week ago due to nausea and diarrhea.   - 11/14/24: Patient's diarrhea is controlled on Loperamide, (grade 1) and nausea controlled on Olanzapine 5mg at bedtime. Appetite is returning and taste is improved. Will re-challenge w/ Afatinib 40mg daily, stay on Loperamide for diarrhea, Olanzapine for nausea and appetite.   - 11/21/24: Patient's diarrhea resolved but he did not wish to restart afatinib, last taken afatinib around 11/7. Would like to discuss w/ Dr. Parks regarding other treatment options.  -12/18/24: Scans reviewed showing very slow progression off all therapy for nearly 2 months. We will resume afatinib at 20 mg and follow in 4 weeks. Anticipate scans end of Feb   - 2/4/25: Patient has tolerated Afatinib 20mg  without diarrhea. Overall stable.   -3/25/25: Scans reviewed showing continued worsening of BL lung lesions. We will increase afatinib to 30 mg daily, provided loperamide recommendations. We will follow in 4 weeks for toxicity check and 8 weeks for scan review    # Hyperlipidemia  - Patient previously on Lipitor 40mg every day, but reports he has stopped taking this since starting chemoRT in 2023. His triglycerides are elevated in December, will likely need to restart cholesterol medication.     # CKD  - 2/9/24 CT chest showed Bulky atherosclerotic calcification at the origin of the left renal artery likely contributing to atrophy of the left kidney as compared to the right. Kidney atrophy was not mentioned in previous scans.   - 5/1/24: Creat 1.46, GFR 51. Encouraged patient to keep up with non-caffeinated PO hydration. 1L NS given today with Pembro. Patient to follow up with PCP regarding atherosclerosis.   - Baseline Creat 1.1 - 1.3, GFR 55-65. Due to recent diarrhea and decreased PO intake, his renal function has declined.     # Submental/Neck Lymphedema  - Patient with moderate swelling in the submental and neck region. Likely 2ry to prior radiation. Established w/ Dr Guerrero in integrative health.   - Neck pain and swelling improving w/ lymphedema massage.     # Weight loss  - Weight loss 2ry to appetite loss and decreased PO intake. Adivised patient to continue Boost for supplement and increase snacking during the day. He's taking Olanzapine 5mg at bedtime, may increase this to 7.5mg      PLAN  -- RTC 1 mo for follow up, scans in 2 months   -- Continue Olanzapine 5mg tab, 1 tab every night, for appetite loss and nausea.    -- Loperamide 2mg Q4hr PNR or after each loose stools, up to 8 times a day, for diarrhea  -- Reached out to PCP Dr. Weiss regarding Lipitor dose.   -- Continue ample PO hydration, encourage patient to increase PO intake    Bandar Parks MD  Thoracic + [facility] Medical Oncology   [id number] [address]  [facility] [id number]  Phone: [phone]

## 2025-03-25 ENCOUNTER — OFFICE VISIT (OUTPATIENT)
Dept: HEMATOLOGY/ONCOLOGY | Facility: HOSPITAL | Age: 75
End: 2025-03-25
Payer: MEDICARE

## 2025-03-25 VITALS
HEART RATE: 105 BPM | SYSTOLIC BLOOD PRESSURE: 170 MMHG | RESPIRATION RATE: 18 BRPM | BODY MASS INDEX: 18.82 KG/M2 | DIASTOLIC BLOOD PRESSURE: 70 MMHG | TEMPERATURE: 97.9 F | OXYGEN SATURATION: 99 % | WEIGHT: 116.6 LBS

## 2025-03-25 DIAGNOSIS — C10.9 OROPHARYNGEAL CANCER (MULTI): ICD-10-CM

## 2025-03-25 PROCEDURE — 3077F SYST BP >= 140 MM HG: CPT | Performed by: STUDENT IN AN ORGANIZED HEALTH CARE EDUCATION/TRAINING PROGRAM

## 2025-03-25 PROCEDURE — 3078F DIAST BP <80 MM HG: CPT | Performed by: STUDENT IN AN ORGANIZED HEALTH CARE EDUCATION/TRAINING PROGRAM

## 2025-03-25 PROCEDURE — 99215 OFFICE O/P EST HI 40 MIN: CPT | Performed by: STUDENT IN AN ORGANIZED HEALTH CARE EDUCATION/TRAINING PROGRAM

## 2025-03-25 PROCEDURE — 1126F AMNT PAIN NOTED NONE PRSNT: CPT | Performed by: STUDENT IN AN ORGANIZED HEALTH CARE EDUCATION/TRAINING PROGRAM

## 2025-03-25 PROCEDURE — G2211 COMPLEX E/M VISIT ADD ON: HCPCS | Performed by: STUDENT IN AN ORGANIZED HEALTH CARE EDUCATION/TRAINING PROGRAM

## 2025-03-25 ASSESSMENT — PAIN SCALES - GENERAL: PAINLEVEL_OUTOF10: 0-NO PAIN

## 2025-03-26 NOTE — PROGRESS NOTES
Medical assistant stated patient had a high BP, this nurse retook BP and documented it in flowsheet. Patient denied any symptoms. Pt stated he felt fine. Pt wife stated pt doesn't take his BP medications.

## 2025-03-31 ASSESSMENT — ENCOUNTER SYMPTOMS
VOMITING: 0
FEVER: 0
TROUBLE SWALLOWING: 0
COUGH: 0
NAUSEA: 0
NUMBNESS: 0
ARTHRALGIAS: 0
SHORTNESS OF BREATH: 0
CHILLS: 0
APPETITE CHANGE: 1
CONSTIPATION: 0
ABDOMINAL PAIN: 0
LEG SWELLING: 0
SORE THROAT: 0
LIGHT-HEADEDNESS: 0
HEADACHES: 0

## 2025-03-31 NOTE — PROGRESS NOTES
Patient ID: Phong Wong is a 74 y.o. male.  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 Juan Jose Fletcher   MedOnc: DIANA Garcia PA-C   RadOnc: Dr. Vianca Steen     Current Therapy  10/15/24: Afatinib 40 mg every day. Dose reduced to 20 mg, now increased to 30 mg on 3/25 given PD    Prior Therapy:   8/16 - 10/4/23: Recieved concurrent chemoradiation with 7 weekly Carboplatin (2mg/AUC)/Paclitaxel (45mg/m2)  and 70gy RT in 35fx  4/4 - 6/13/24: Received 3 cycles of Pembrolizumab  6/20/24 - 9/12/24: Received 5 cycles of Carbo/Taxol     Sites of Disease:  Soft palate, BOT, Left palatine tonsil  B/L Cervical LN  Lung     Oncologic Issues:   Odynophagia  Fatigue     ONCOLOGIC HISTORY  - Pt had 2 months hx of throat discomfort with lump in right neck  - 6/13/23: CT neck showed a mass 2.8 x 2.5 x 2.9 cm in the right lower cervical neck soft tissues. Two suspicious nodes were observed, one at level 3 on the right and another at level 2 on the left.  - 6/28/23: seen by Dr. Fletcher. 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 Based on the findings, Dr. Fletcher did not recommend surgery due to the location  of the primary tumor and the presence of yvette disease  - 6/30/23: PET/CT showed intense FDG avidity within the soft palate, extending to the base of the tongue and left palatine tonsil. Multiple FDG avid left cervical level II nodes were observed, along with an FDG avid mass in the region of the right cervical  level II/III, infiltrating the right SCM muscle and encasing and invading the right jugular vein. FDG avidity was also detected in the region of the left fossa Rosenmuller and left medial pterygoid muscle.  - 8/16 - 10/4/23: Recieved concurrent chemoradiation with 7 weekly Carboplatin  (2mg/AUC)/Paclitaxel (45mg/m2)  and 70gy RT in 35fx  - 4/4 - 6/13/24: Received 3 cycles of Pembrolizumab  - 6/20/24 - 9/12/24: Received 5 cycles of Carbo/Taxol  - 10/15/24: Begin afatinib 40 mg every day, could not tolerate due to diarrhea  - 12/18/24: Started Afatinib 20mg    Admissions:  10/5 - 10/10/23: Admitted for extreme weakness, HECTOR, pancytopenia including anemia requiring blood transfusion. D/C'ed to acute rehab     SOCIAL HISTORY  Lives in [place].  to wife Emelia of 8 years. Brother in [place]. Worked as , retired in 5 years ago. Smoked 1-2 PPD x 50 years. Rare EtOH. No illicits.      FAMILY HISTORY  Brother had brain cancer ([place])    Subjective   Interval History  Patient presents today virtually via video for follow up.     Patient started restarted Afatinib at 20mg in Dec 2024. Restaging scans in Mar 2025 showed worsened BL lung lesions. Dose increased to 30mg every day last month and he has been tolerating this dose well with no diarrhea and has not required any anti-diarrheals.   His energy has been stable, however doesn't move around much. He denies any trouble getting around the house. No new pain, no neck pain, denies dysphagia. He's tolerating a regular solid diet however his appetite is low and he is only supplementing with 1 regular Boost a day. He's taking Olanzapine 5mg nightly but feels this is not helping his appetite.     Review of Systems   Constitutional:  Positive for appetite change. Negative for chills and fever.   HENT:   Negative for hearing loss, lump/mass, mouth sores, nosebleeds, sore throat, tinnitus and trouble swallowing.    Respiratory:  Negative for cough and shortness of breath.    Cardiovascular:  Negative for chest pain and leg swelling.   Gastrointestinal:  Negative for abdominal pain, constipation, diarrhea, nausea and vomiting.   Musculoskeletal:  Negative for arthralgias.   Skin:  Negative for rash.   Neurological:  Negative for headaches,  light-headedness and numbness.     Current Outpatient Medications   Medication Instructions    aspirin 81 mg EC tablet 1 tablet, Daily    atorvastatin (LIPITOR) 80 mg, oral, Daily    doxazosin (CARDURA) 4 mg, oral, Daily, Take 1 tablet by mouth daily in the evening    fluticasone-umeclidin-vilanter (Trelegy Ellipta) 100-62.5-25 mcg blister with device INHALE 1 PUFF EVERY DAY    Gilotrif 20 mg, oral, Daily, Take at least 1 hour before a meal or 2 hours after a meal.    ipratropium-albuteroL (Duo-Neb) 0.5-2.5 mg/3 mL nebulizer solution Take 3 mL by nebulization 3 times a day as needed for wheezing or shortness of breath.    losartan (COZAAR) 100 mg, oral, Daily    magnesium oxide (MAG-OX) 400 mg, oral, Daily    OLANZapine (ZYPREXA) 5 mg, oral, Nightly, 1 tab at bedtime for 4 days, starting the night of chemo     Allergies   Allergen Reactions    Codeine Nausea Only     Objective   VS: There were no vitals taken for this visit.   Weight:   Daily Weight  03/25/25 : 52.9 kg (116 lb 9.6 oz)  02/04/25 : 54.3 kg (119 lb 11.4 oz)  01/21/25 : 53.1 kg (117 lb 1 oz)  12/18/24 : 54.5 kg (120 lb 2.4 oz)  11/26/24 : 54.9 kg (121 lb 0.5 oz)  11/20/24 : 52.6 kg (116 lb)  11/07/24 : 52.9 kg (116 lb 10 oz)    Physical Exam  Constitutional:       Appearance: Normal appearance. He is underweight.   HENT:      Head: Normocephalic and atraumatic.      Right Ear: External ear normal.      Left Ear: External ear normal.      Nose: Nose normal.      Mouth/Throat:      Lips: Pink.      Mouth: Mucous membranes are moist. No injury or oral lesions.   Eyes:      General: Lids are normal.      Extraocular Movements: Extraocular movements intact.      Conjunctiva/sclera: Conjunctivae normal.      Pupils: Pupils are equal, round, and reactive to light.   Neck:      Thyroid: No thyroid mass.   Pulmonary:      Effort: Pulmonary effort is normal.   Abdominal:      General: Abdomen is flat. Bowel sounds are normal.      Palpations: Abdomen is soft.    Musculoskeletal:         General: Normal range of motion.      Cervical back: Normal range of motion and neck supple. No signs of trauma. Normal range of motion.   Skin:     General: Skin is warm and dry.      Comments: No new skin lesions   Neurological:      General: No focal deficit present.      Mental Status: He is alert and oriented to person, place, and time. Mental status is at baseline.      Gait: Gait is intact.   Psychiatric:         Attention and Perception: Attention normal.         Mood and Affect: Mood and affect normal.         Behavior: Behavior is cooperative.       ECOGSCORE: 2- Ambulatory and  capable of all selfcare; unable to carry out work activities.  Up and about > 50% of waking hrs.    Diagnostic Results   Labs:                                 IMAGING  CT Chest 3/13/25  IMPRESSION:  1.  Interval worsening of pulmonary metastatic tumor burden as  evidenced by increasing size of multiple scattered pulmonary nodules  within the bilateral lungs as described above.  2. Moderate emphysema.  3. Severe coronary artery calcifications.      CT Neck 3/13/25  IMPRESSION:  Right level 2/level 3 soft tissue mass measures 3.3 x 2.0 cm,  previously 3.1 x 2.0 cm. Slight differences in measurement may be  secondary to differences in technique.      Posttreatment related changes, similar to the prior exam.      Please see CT neck performed the same day for lung findings.       Assessment/Plan   Mr. Phong Wong is a 74 y.o. male with locally advanced oral cavity cancer, completed chemoRT w/ 7 weekly Carboplatin+Paclitaxel on 10/4/23. Unfortunately found with mets to lungs on 3 months restaging scan. CPS 3. S/p 3 cycles Pembrolizumab but lung nodules progressed. Completed 5 cycles of Carbo/Taxol on 9/12/24. Start Afatinib on 10/15/24, stopped around first week of November.    # Locally advanced oral cavity cancer, T3N2, now with met to lungs  - 6/30/23 PET/CT showed intense FDG avidity within the soft palate,  extending to BOT and left palatine tonsil. Multiple uptake in left cervical level II nodes, right cervical level  II/III, infiltrating the right SCM muscle and encasing and invading the right jugular vein. FDG avidity was also detected in the region of the left fossa Rosenmuller and left   - Discussed with patient about carboplatin+ paclitaxel as his chemotherapy along with radiation due to his GFR 30s.  - 8/16 - 10/4/23: Recieved concurrent chemoradiation with 7 weekly Carboplatin (2mg/AUC)/Paclitaxel (45mg/m2)  and 70gy RT in 35fx     - 1/10/24 restaging PET/CT noted significant interval improvement in prior sites of disease though with residual FDG avidity in soft palate and left palatine tonsil, c/f residual disease vs post treatment changes. Also noted was a new FDG avid LLL pulm nodule with max SUV 5.7, c/f pulmonary metastasis.   - 2/9/24 CT chest showed multiple subcentimeter noduesl 2-5mm, dominant lesion is 1.6cm in LLL.    - 3/6/24: Lung biopsy of LLL nodule was consistent with HN origin. CPS 3.  Discussed with patient about phase 2 FLAM study (randomized to pembrolizumab or pembrolizumab+danvatirsen (Stat3 inhibitor). Unfortunately due to his CKD, he's not eligible for FLAM study.  - 4/4/24: Started Q3 week Pembrolizumab  - 6/7/24 restaging CT N/C/A/P showed enlargement of subcentimeter lung mets, stable left lung nodule 2cm with new 1cm nodule in RUL. Based on heightened response with carbo+taxol after progression on PD, I recommended 3 cycles of carbo taxol and restaging, if there is response, we can consider afatinib treatment more as long term maintenance therapy.   - 6/20/24: Cycle 1 of Q3 week Carbo/Taxol.  - 8/19/24 restaging scan showed improving b/l pulmonary mets, largest pleural based LLL nodule is now 14mm.   Pt is aware of that Dr. Burkitt is leaving  at the end of Oct, planning to get to another restaging and change treatment to afatinib if he continues to have response.  Once treatment  is changed, will transition his care to Dr. Parks.   - 9/25/24: PET/CT showed continuing response in lung mets, known level III right cervical lymphadenopathy, but increased SUV compared to Jan PET. No other mets.  Since patients PS is declining, prefer treating residual disease with afatinib. CT neck/chest ordered to measure exact size of right cervical LN as well as lung mets, so these can be used as a baseline prior to start afatinib.  - 10/9/24 CT N/C pending. Patient is otherwise feeling well. Diarrhea resolved, arthralgia and peripheral neuropathy have resolved. He continue to tolerate regular diet and denies any dysphagia. Appetite is not great so has weight loss but this is improving.   - 10/15/24 - 11/7/24. Starting afatinib 40 mg daily. D/c'ed due to diarrhea.   -12/10/24 CT N/C wo reviewed showing very slow progression off all therapy for nearly 2 months. Will resume afatinib at 20 mg  - 2/4/25: Patient has tolerated Afatinib 20mg without diarrhea. Overall stable.   - 3/13/25 CT N/C wo reviewed showing continued worsening of BL lung lesions. Increase afatinib to 30 mg daily, provided loperamide recommendations. We will follow in 4 weeks for toxicity check and 8 weeks for scan review  - 4/23/25: Patient tolerating Afatinib 30mg  daily very well with NO diarrhea and did not need anti-diarrheals. He's otherwise at his baseline    # Hyperlipidemia  - Patient previously on Lipitor 40mg every day, but reports he has stopped taking this since starting chemoRT in 2023. His triglycerides are elevated in December, will likely need to restart cholesterol medication.     # CKD  - 2/9/24 CT chest showed Bulky atherosclerotic calcification at the origin of the left renal artery likely contributing to atrophy of the left kidney as compared to the right. Kidney atrophy was not mentioned in previous scans.   - 5/1/24: Creat 1.46, GFR 51. Encouraged patient to keep up with non-caffeinated PO hydration. 1L NS given today  with Pembro. Patient to follow up with PCP regarding atherosclerosis.   - Baseline Creat 1.1 - 1.3, GFR 55-65. Due to recent diarrhea and decreased PO intake, his renal function has declined.     # Submental/Neck Lymphedema  - Patient with moderate swelling in the submental and neck region. Likely 2ry to prior radiation. Established w/ Dr Guerrero in [facility].   - Neck pain and swelling improving w/ lymphedema massage.     # Weight loss  - Weight loss 2ry to appetite loss and decreased PO intake. Adivised patient to continue Boost for supplement and increase snacking during the day. He's taking Olanzapine 5mg at bedtime but this is not helping his appetite, will increase this to 7.5mg    PLAN  -- Continue Afatinib 30mg daily  -- 5/23 CT N/C wo contrast  -- 5/27 FUV w/ Dr. Parks and scan review  -- Increase Olanzapine to 7.5mg nightly for appetite loss and nausea.    -- Loperamide 2mg Q4hr PRN or after each loose stools, up to 8 times a day, for diarrhea  -- Reached out to PCP Dr. Weiss regarding Lipitor dose.   -- Continue ample PO hydration, encourage patient to increase PO intake

## 2025-04-15 ENCOUNTER — APPOINTMENT (OUTPATIENT)
Dept: OTOLARYNGOLOGY | Facility: HOSPITAL | Age: 75
End: 2025-04-15
Payer: MEDICARE

## 2025-04-16 ENCOUNTER — TELEPHONE (OUTPATIENT)
Dept: ADMISSION | Facility: HOSPITAL | Age: 75
End: 2025-04-16
Payer: MEDICARE

## 2025-04-16 ENCOUNTER — SPECIALTY PHARMACY (OUTPATIENT)
Dept: PHARMACY | Facility: CLINIC | Age: 75
End: 2025-04-16

## 2025-04-16 DIAGNOSIS — C78.01 SQUAMOUS CELL CARCINOMA METASTATIC TO BOTH LUNGS: ICD-10-CM

## 2025-04-16 DIAGNOSIS — C78.02 SQUAMOUS CELL CARCINOMA METASTATIC TO BOTH LUNGS: ICD-10-CM

## 2025-04-16 DIAGNOSIS — C44.92 SQUAMOUS CELL CARCINOMA METASTATIC TO BOTH LUNGS: ICD-10-CM

## 2025-04-16 NOTE — TELEPHONE ENCOUNTER
Refill Request  Afatinib (Gilotrif) patient states dose should be increased to 30mg daily    Preferred Pharmacy   [facility]

## 2025-04-17 PROCEDURE — RXMED WILLOW AMBULATORY MEDICATION CHARGE

## 2025-04-21 ENCOUNTER — PHARMACY VISIT (OUTPATIENT)
Dept: PHARMACY | Facility: CLINIC | Age: 75
End: 2025-04-21
Payer: COMMERCIAL

## 2025-04-23 ENCOUNTER — TELEMEDICINE (OUTPATIENT)
Dept: HEMATOLOGY/ONCOLOGY | Facility: HOSPITAL | Age: 75
End: 2025-04-23
Payer: MEDICARE

## 2025-04-23 DIAGNOSIS — Z08 ENCOUNTER FOR FOLLOW-UP SURVEILLANCE OF HEAD AND NECK CANCER: ICD-10-CM

## 2025-04-23 DIAGNOSIS — C78.01 SQUAMOUS CELL CARCINOMA METASTATIC TO BOTH LUNGS: ICD-10-CM

## 2025-04-23 DIAGNOSIS — R63.0 APPETITE LOSS: ICD-10-CM

## 2025-04-23 DIAGNOSIS — Z85.89 ENCOUNTER FOR FOLLOW-UP SURVEILLANCE OF HEAD AND NECK CANCER: ICD-10-CM

## 2025-04-23 DIAGNOSIS — C44.92 SQUAMOUS CELL CARCINOMA METASTATIC TO BOTH LUNGS: ICD-10-CM

## 2025-04-23 DIAGNOSIS — C10.9 OROPHARYNGEAL CANCER (MULTI): Primary | ICD-10-CM

## 2025-04-23 DIAGNOSIS — K52.1 CHEMOTHERAPY INDUCED DIARRHEA: ICD-10-CM

## 2025-04-23 DIAGNOSIS — C78.02 SQUAMOUS CELL CARCINOMA METASTATIC TO BOTH LUNGS: ICD-10-CM

## 2025-04-23 DIAGNOSIS — Z51.11 ENCOUNTER FOR ANTINEOPLASTIC CHEMOTHERAPY: ICD-10-CM

## 2025-04-23 DIAGNOSIS — T45.1X5A CHEMOTHERAPY INDUCED DIARRHEA: ICD-10-CM

## 2025-04-23 DIAGNOSIS — R63.4 WEIGHT LOSS: ICD-10-CM

## 2025-04-23 PROCEDURE — G2211 COMPLEX E/M VISIT ADD ON: HCPCS | Performed by: STUDENT IN AN ORGANIZED HEALTH CARE EDUCATION/TRAINING PROGRAM

## 2025-04-23 PROCEDURE — 1036F TOBACCO NON-USER: CPT | Performed by: STUDENT IN AN ORGANIZED HEALTH CARE EDUCATION/TRAINING PROGRAM

## 2025-04-23 PROCEDURE — 1159F MED LIST DOCD IN RCRD: CPT | Performed by: STUDENT IN AN ORGANIZED HEALTH CARE EDUCATION/TRAINING PROGRAM

## 2025-04-23 PROCEDURE — 1160F RVW MEDS BY RX/DR IN RCRD: CPT | Performed by: STUDENT IN AN ORGANIZED HEALTH CARE EDUCATION/TRAINING PROGRAM

## 2025-04-23 PROCEDURE — 99215 OFFICE O/P EST HI 40 MIN: CPT | Performed by: STUDENT IN AN ORGANIZED HEALTH CARE EDUCATION/TRAINING PROGRAM

## 2025-04-23 RX ORDER — OLANZAPINE 7.5 MG/1
7.5 TABLET, FILM COATED ORAL NIGHTLY
Qty: 30 TABLET | Refills: 6 | Status: SHIPPED | OUTPATIENT
Start: 2025-04-23

## 2025-04-23 ASSESSMENT — ENCOUNTER SYMPTOMS: DIARRHEA: 0

## 2025-05-08 ENCOUNTER — SPECIALTY PHARMACY (OUTPATIENT)
Dept: PHARMACY | Facility: CLINIC | Age: 75
End: 2025-05-08

## 2025-05-13 PROCEDURE — RXMED WILLOW AMBULATORY MEDICATION CHARGE

## 2025-05-14 ENCOUNTER — PHARMACY VISIT (OUTPATIENT)
Dept: PHARMACY | Facility: CLINIC | Age: 75
End: 2025-05-14
Payer: COMMERCIAL

## 2025-05-23 ENCOUNTER — HOSPITAL ENCOUNTER (OUTPATIENT)
Dept: RADIOLOGY | Facility: CLINIC | Age: 75
Discharge: HOME | End: 2025-05-23
Payer: MEDICARE

## 2025-05-23 DIAGNOSIS — C10.9 OROPHARYNGEAL CANCER (MULTI): ICD-10-CM

## 2025-05-23 PROCEDURE — 71250 CT THORAX DX C-: CPT

## 2025-05-23 PROCEDURE — 70490 CT SOFT TISSUE NECK W/O DYE: CPT

## 2025-05-27 ENCOUNTER — LAB (OUTPATIENT)
Dept: LAB | Facility: HOSPITAL | Age: 75
End: 2025-05-27
Payer: MEDICARE

## 2025-05-27 ENCOUNTER — OFFICE VISIT (OUTPATIENT)
Dept: HEMATOLOGY/ONCOLOGY | Facility: HOSPITAL | Age: 75
End: 2025-05-27
Payer: MEDICARE

## 2025-05-27 VITALS
SYSTOLIC BLOOD PRESSURE: 146 MMHG | OXYGEN SATURATION: 99 % | TEMPERATURE: 97.3 F | HEART RATE: 70 BPM | BODY MASS INDEX: 18.4 KG/M2 | WEIGHT: 113.98 LBS | DIASTOLIC BLOOD PRESSURE: 63 MMHG | RESPIRATION RATE: 18 BRPM

## 2025-05-27 DIAGNOSIS — C44.92 SQUAMOUS CELL CARCINOMA METASTATIC TO BOTH LUNGS: ICD-10-CM

## 2025-05-27 DIAGNOSIS — Z85.89 ENCOUNTER FOR FOLLOW-UP SURVEILLANCE OF HEAD AND NECK CANCER: ICD-10-CM

## 2025-05-27 DIAGNOSIS — C10.9 OROPHARYNGEAL CANCER (MULTI): ICD-10-CM

## 2025-05-27 DIAGNOSIS — C78.01 SQUAMOUS CELL CARCINOMA METASTATIC TO BOTH LUNGS: ICD-10-CM

## 2025-05-27 DIAGNOSIS — C78.02 SQUAMOUS CELL CARCINOMA METASTATIC TO BOTH LUNGS: ICD-10-CM

## 2025-05-27 DIAGNOSIS — Z08 ENCOUNTER FOR FOLLOW-UP SURVEILLANCE OF HEAD AND NECK CANCER: ICD-10-CM

## 2025-05-27 LAB
ALBUMIN SERPL BCP-MCNC: 4.1 G/DL (ref 3.4–5)
ALP SERPL-CCNC: 47 U/L (ref 33–136)
ALT SERPL W P-5'-P-CCNC: 18 U/L (ref 10–52)
ANION GAP SERPL CALC-SCNC: 15 MMOL/L (ref 10–20)
APPEARANCE UR: CLEAR
AST SERPL W P-5'-P-CCNC: 18 U/L (ref 9–39)
BASOPHILS # BLD AUTO: 0.03 X10*3/UL (ref 0–0.1)
BASOPHILS NFR BLD AUTO: 0.3 %
BILIRUB SERPL-MCNC: 0.4 MG/DL (ref 0–1.2)
BILIRUB UR STRIP.AUTO-MCNC: NEGATIVE MG/DL
BUN SERPL-MCNC: 37 MG/DL (ref 6–23)
CALCIUM SERPL-MCNC: 9.7 MG/DL (ref 8.6–10.3)
CHLORIDE SERPL-SCNC: 100 MMOL/L (ref 98–107)
CO2 SERPL-SCNC: 29 MMOL/L (ref 21–32)
COLOR UR: YELLOW
CREAT SERPL-MCNC: 1.13 MG/DL (ref 0.5–1.3)
CREAT UR-MCNC: 86.4 MG/DL (ref 20–370)
EGFRCR SERPLBLD CKD-EPI 2021: 69 ML/MIN/1.73M*2
EOSINOPHIL # BLD AUTO: 0.39 X10*3/UL (ref 0–0.4)
EOSINOPHIL NFR BLD AUTO: 3.7 %
ERYTHROCYTE [DISTWIDTH] IN BLOOD BY AUTOMATED COUNT: 15.8 % (ref 11.5–14.5)
GLUCOSE SERPL-MCNC: 96 MG/DL (ref 74–99)
GLUCOSE UR STRIP.AUTO-MCNC: NORMAL MG/DL
HCT VFR BLD AUTO: 38.2 % (ref 41–52)
HGB BLD-MCNC: 12.4 G/DL (ref 13.5–17.5)
IMM GRANULOCYTES # BLD AUTO: 0.06 X10*3/UL (ref 0–0.5)
IMM GRANULOCYTES NFR BLD AUTO: 0.6 % (ref 0–0.9)
KETONES UR STRIP.AUTO-MCNC: NEGATIVE MG/DL
LEUKOCYTE ESTERASE UR QL STRIP.AUTO: NEGATIVE
LYMPHOCYTES # BLD AUTO: 0.66 X10*3/UL (ref 0.8–3)
LYMPHOCYTES NFR BLD AUTO: 6.2 %
MAGNESIUM SERPL-MCNC: 1.74 MG/DL (ref 1.6–2.4)
MCH RBC QN AUTO: 28.1 PG (ref 26–34)
MCHC RBC AUTO-ENTMCNC: 32.5 G/DL (ref 32–36)
MCV RBC AUTO: 87 FL (ref 80–100)
MICROALBUMIN UR-MCNC: 113.1 MG/L
MICROALBUMIN/CREAT UR: 130.9 UG/MG CREAT
MONOCYTES # BLD AUTO: 1.05 X10*3/UL (ref 0.05–0.8)
MONOCYTES NFR BLD AUTO: 9.9 %
NEUTROPHILS # BLD AUTO: 8.38 X10*3/UL (ref 1.6–5.5)
NEUTROPHILS NFR BLD AUTO: 79.3 %
NITRITE UR QL STRIP.AUTO: NEGATIVE
NRBC BLD-RTO: 0 /100 WBCS (ref 0–0)
PH UR STRIP.AUTO: 6 [PH]
PLATELET # BLD AUTO: 329 X10*3/UL (ref 150–450)
POTASSIUM SERPL-SCNC: 4.4 MMOL/L (ref 3.5–5.3)
PROT SERPL-MCNC: 6.6 G/DL (ref 6.4–8.2)
PROT UR STRIP.AUTO-MCNC: NORMAL MG/DL
RBC # BLD AUTO: 4.41 X10*6/UL (ref 4.5–5.9)
RBC # UR STRIP.AUTO: NEGATIVE MG/DL
RBC #/AREA URNS AUTO: NORMAL /HPF
SODIUM SERPL-SCNC: 140 MMOL/L (ref 136–145)
SP GR UR STRIP.AUTO: 1.02
TSH SERPL-ACNC: 2.96 MIU/L (ref 0.44–3.98)
UROBILINOGEN UR STRIP.AUTO-MCNC: NORMAL MG/DL
WBC # BLD AUTO: 10.6 X10*3/UL (ref 4.4–11.3)
WBC #/AREA URNS AUTO: NORMAL /HPF

## 2025-05-27 PROCEDURE — 36415 COLL VENOUS BLD VENIPUNCTURE: CPT

## 2025-05-27 PROCEDURE — 3077F SYST BP >= 140 MM HG: CPT | Performed by: STUDENT IN AN ORGANIZED HEALTH CARE EDUCATION/TRAINING PROGRAM

## 2025-05-27 PROCEDURE — 99215 OFFICE O/P EST HI 40 MIN: CPT | Performed by: STUDENT IN AN ORGANIZED HEALTH CARE EDUCATION/TRAINING PROGRAM

## 2025-05-27 PROCEDURE — 82043 UR ALBUMIN QUANTITATIVE: CPT | Performed by: INTERNAL MEDICINE

## 2025-05-27 PROCEDURE — 83735 ASSAY OF MAGNESIUM: CPT

## 2025-05-27 PROCEDURE — 85025 COMPLETE CBC W/AUTO DIFF WBC: CPT

## 2025-05-27 PROCEDURE — 84443 ASSAY THYROID STIM HORMONE: CPT

## 2025-05-27 PROCEDURE — 3078F DIAST BP <80 MM HG: CPT | Performed by: STUDENT IN AN ORGANIZED HEALTH CARE EDUCATION/TRAINING PROGRAM

## 2025-05-27 PROCEDURE — 80053 COMPREHEN METABOLIC PANEL: CPT

## 2025-05-27 PROCEDURE — 81001 URINALYSIS AUTO W/SCOPE: CPT | Performed by: INTERNAL MEDICINE

## 2025-05-27 PROCEDURE — G2211 COMPLEX E/M VISIT ADD ON: HCPCS | Performed by: STUDENT IN AN ORGANIZED HEALTH CARE EDUCATION/TRAINING PROGRAM

## 2025-05-27 PROCEDURE — 1036F TOBACCO NON-USER: CPT | Performed by: STUDENT IN AN ORGANIZED HEALTH CARE EDUCATION/TRAINING PROGRAM

## 2025-05-27 ASSESSMENT — ENCOUNTER SYMPTOMS
NAUSEA: 0
HEADACHES: 0
ABDOMINAL PAIN: 0
SHORTNESS OF BREATH: 0
FEVER: 0
APPETITE CHANGE: 1
ARTHRALGIAS: 0
LIGHT-HEADEDNESS: 0
CHILLS: 0
COUGH: 0
CONSTIPATION: 0
NUMBNESS: 0
DIARRHEA: 0
LEG SWELLING: 0
SORE THROAT: 0
VOMITING: 0
TROUBLE SWALLOWING: 0

## 2025-05-27 NOTE — PROGRESS NOTES
Patient ID: Phong Wong is a 74 y.o. male.       Providers:  ENT: Dr. Juan Jose Fletcher   [facility] Dr[facility] Bandar Parks, [place]. Eva Patten PA-C   [facility]: Dr. Vianca Steen     Current Therapy  10/15/24: Afatinib 40 mg every day. Dose reduced to 20 mg, now increased to 30 mg on 3/25 given PD    Prior Therapy:   8/16 - 10/4/23: Recieved concurrent chemoradiation with 7 weekly Carboplatin (2mg/AUC)/Paclitaxel (45mg/m2)  and 70gy RT in 35fx  4/4 - 6/13/24: Received 3 cycles of Pembrolizumab  6/20/24 - 9/12/24: Received 5 cycles of Carbo/Taxol     Sites of Disease:  Soft palate, BOT, Left palatine tonsil  B/L Cervical LN  Lung     Oncologic Issues:   Odynophagia  Fatigue     ONCOLOGIC HISTORY  - Pt had 2 months hx of throat discomfort with lump in right neck  - 6/13/23: CT neck showed a mass 2.8 x 2.5 x 2.9 cm in the right lower cervical neck soft tissues. Two suspicious nodes were observed, one at level 3 on the right and another at level 2 on the left.  - 6/28/23: seen by Dr. Fletcher. 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 Based on the findings, Dr. Fletcher did not recommend surgery due to the location  of the primary tumor and the presence of yvette disease  - 6/30/23: PET/CT showed intense FDG avidity within the soft palate, extending to the base of the tongue and left palatine tonsil. Multiple FDG avid left cervical level II nodes were observed, along with an FDG avid mass in the region of the right cervical  level II/III, infiltrating the right SCM muscle and encasing and invading the right jugular vein. FDG avidity was also detected in the region of the left fossa Rosenmuller and left medial pterygoid muscle.  - 8/16 - 10/4/23: Recieved concurrent chemoradiation with 7 weekly Carboplatin (2mg/AUC)/Paclitaxel (45mg/m2)  and 70gy RT in 35fx  - 4/4 - 6/13/24: Received 3 cycles of Pembrolizumab  - 6/20/24 - 9/12/24:  Received 5 cycles of Carbo/Taxol  - 10/15/24: Begin afatinib 40 mg every day, could not tolerate due to diarrhea  - 12/18/24: Started Afatinib 20mg    Admissions:  10/5 - 10/10/23: Admitted for extreme weakness, HECTOR, pancytopenia including anemia requiring blood transfusion. D/C'ed to acute rehab     SOCIAL HISTORY  Lives in [place].  to wife Emelia of 8 years. Brother in [place]. Worked as , retired in 5 years ago. Smoked 1-2 PPD x 50 years. Rare EtOH. No illicits.      FAMILY HISTORY  Brother had brain cancer ([place])    Subjective   Interval History  Patient presents today virtually via video for follow up.     Patient started restarted Afatinib at 20mg in Dec 2024. Restaging scans in Mar 2025 showed worsened BL lung lesions. Dose increased to 30mg every day in Feb and he has been tolerating this dose well with no diarrhea and has not required any anti-diarrheals.     He has lost 3 pound since last visit. He is using Boost daily. He is active around the house.    Review of Systems   Constitutional:  Positive for appetite change. Negative for chills and fever.   HENT:   Negative for hearing loss, lump/mass, mouth sores, nosebleeds, sore throat, tinnitus and trouble swallowing.    Respiratory:  Negative for cough and shortness of breath.    Cardiovascular:  Negative for chest pain and leg swelling.   Gastrointestinal:  Negative for abdominal pain, constipation, diarrhea, nausea and vomiting.   Musculoskeletal:  Negative for arthralgias.   Skin:  Negative for rash.   Neurological:  Negative for headaches, light-headedness and numbness.     Current Outpatient Medications   Medication Instructions    aspirin 81 mg EC tablet 1 tablet, Daily    atorvastatin (LIPITOR) 80 mg, oral, Daily    doxazosin (CARDURA) 4 mg, oral, Daily, Take 1 tablet by mouth daily in the evening    fluticasone-umeclidin-vilanter (Trelegy Ellipta) 100-62.5-25 mcg blister with device INHALE 1 PUFF EVERY DAY    Gilotrif 30  mg, oral, Daily, Take at least 1 hour before a meal or 2 hours after a meal.    ipratropium-albuteroL (Duo-Neb) 0.5-2.5 mg/3 mL nebulizer solution Take 3 mL by nebulization 3 times a day as needed for wheezing or shortness of breath.    losartan (COZAAR) 100 mg, oral, Daily    magnesium oxide (MAG-OX) 400 mg, oral, Daily    OLANZapine (ZYPREXA) 7.5 mg, oral, Nightly, 1 tab at bedtime for 4 days, starting the night of chemo     Allergies   Allergen Reactions    Codeine Nausea Only     Objective   VS: /63   Pulse 70   Temp 36.3 °C (97.3 °F) (Core)   Resp 18   Wt 51.7 kg (113 lb 15.7 oz)   SpO2 99%   BMI 18.40 kg/m²    Weight:   Daily Weight  05/27/25 : 51.7 kg (113 lb 15.7 oz)  03/25/25 : 52.9 kg (116 lb 9.6 oz)  02/04/25 : 54.3 kg (119 lb 11.4 oz)  01/21/25 : 53.1 kg (117 lb 1 oz)  12/18/24 : 54.5 kg (120 lb 2.4 oz)  11/26/24 : 54.9 kg (121 lb 0.5 oz)  11/20/24 : 52.6 kg (116 lb)    Physical Exam  Constitutional:       Appearance: Normal appearance. He is underweight.   HENT:      Head: Normocephalic and atraumatic.      Right Ear: External ear normal.      Left Ear: External ear normal.      Nose: Nose normal.      Mouth/Throat:      Lips: Pink.      Mouth: Mucous membranes are moist. No injury or oral lesions.   Eyes:      General: Lids are normal.      Extraocular Movements: Extraocular movements intact.      Conjunctiva/sclera: Conjunctivae normal.      Pupils: Pupils are equal, round, and reactive to light.   Neck:      Thyroid: No thyroid mass.   Pulmonary:      Effort: Pulmonary effort is normal.   Abdominal:      General: Abdomen is flat. Bowel sounds are normal.      Palpations: Abdomen is soft.   Musculoskeletal:         General: Normal range of motion.      Cervical back: Normal range of motion and neck supple. No signs of trauma. Normal range of motion.   Skin:     General: Skin is warm and dry.      Comments: No new skin lesions   Neurological:      General: No focal deficit present.       Mental Status: He is alert and oriented to person, place, and time. Mental status is at baseline.      Gait: Gait is intact.   Psychiatric:         Attention and Perception: Attention normal.         Mood and Affect: Mood and affect normal.         Behavior: Behavior is cooperative.     ECOGSCORE: 2- Ambulatory and  capable of all selfcare; unable to carry out work activities.  Up and about > 50% of waking hrs.    Diagnostic Results   Labs:  Results from last 7 days   Lab Units 05/27/25  1353   WBC AUTO x10*3/uL 10.6   HEMOGLOBIN g/dL 12.4*   HEMATOCRIT % 38.2*   PLATELETS AUTO x10*3/uL 329   NEUTROS ABS x10*3/uL 8.38*   LYMPHS ABS AUTO x10*3/uL 0.66*   MONOS ABS AUTO x10*3/uL 1.05*   EOS ABS AUTO x10*3/uL 0.39   NEUTROS PCT AUTO % 79.3   LYMPHS PCT AUTO % 6.2   MONOS PCT AUTO % 9.9   EOS PCT AUTO % 3.7                                IMAGING  CT Chest 3/13/25  IMPRESSION:  1.  Interval worsening of pulmonary metastatic tumor burden as  evidenced by increasing size of multiple scattered pulmonary nodules  within the bilateral lungs as described above.  2. Moderate emphysema.  3. Severe coronary artery calcifications.      CT Neck 3/13/25  IMPRESSION:  Right level 2/level 3 soft tissue mass measures 3.3 x 2.0 cm,  previously 3.1 x 2.0 cm. Slight differences in measurement may be  secondary to differences in technique.      Posttreatment related changes, similar to the prior exam.      Please see CT neck performed the same day for lung findings.      CT Neck 05/23/2025  IMPRESSION:  The right-sided neck mass appears slightly increased in size from  prior.      Slight increase in size of the 2 spiculated masses previously seen in  the left lung upper lobe.      No newly enlarged cervical chain lymph nodes.      CT Chest w/o 05/23/2025  IMPRESSION:  1. Interval increase in size of nodules in the lingula now measuring  14 mm compared to 8 mm on the prior and in the right lobe now  measuring 9 mm compared to 7 mm.  Additional increase in size of left  upper lobe lung nodule. Multiple additional nodules predominantly the  upper lobes are similar in size and number to the prior.  2. Emphysematous changes  3. Severe atherosclerotic calcifications present.        Assessment/Plan   Mr. Phong Wong is a 74 y.o. male with locally advanced oral cavity cancer, completed chemoRT w/ 7 weekly Carboplatin+Paclitaxel on 10/4/23. Unfortunately found with mets to lungs on 3 months restaging scan. CPS 3. S/p 3 cycles Pembrolizumab but lung nodules progressed. Completed 5 cycles of Carbo/Taxol on 9/12/24. Start Afatinib on 10/15/24, stopped around first week of November.    # Locally advanced oral cavity cancer, T3N2, now with met to lungs  - 6/30/23 PET/CT showed intense FDG avidity within the soft palate, extending to BOT and left palatine tonsil. Multiple uptake in left cervical level II nodes, right cervical level  II/III, infiltrating the right SCM muscle and encasing and invading the right jugular vein. FDG avidity was also detected in the region of the left fossa Rosenmuller and left   - Discussed with patient about carboplatin+ paclitaxel as his chemotherapy along with radiation due to his GFR 30s.  - 8/16 - 10/4/23: Recieved concurrent chemoradiation with 7 weekly Carboplatin (2mg/AUC)/Paclitaxel (45mg/m2)  and 70gy RT in 35fx     - 1/10/24 restaging PET/CT noted significant interval improvement in prior sites of disease though with residual FDG avidity in soft palate and left palatine tonsil, c/f residual disease vs post treatment changes. Also noted was a new FDG avid LLL pulm nodule with max SUV 5.7, c/f pulmonary metastasis.   - 2/9/24 CT chest showed multiple subcentimeter noduesl 2-5mm, dominant lesion is 1.6cm in LLL.    - 3/6/24: Lung biopsy of LLL nodule was consistent with HN origin. CPS 3.  Discussed with patient about phase 2 FLAM study (randomized to pembrolizumab or pembrolizumab+danvatirsen (Stat3 inhibitor).  Unfortunately due to his CKD, he's not eligible for FLAM study.  - 4/4/24: Started Q3 week Pembrolizumab  - 6/7/24 restaging CT N/C/A/P showed enlargement of subcentimeter lung mets, stable left lung nodule 2cm with new 1cm nodule in RUL. Based on heightened response with carbo+taxol after progression on PD, I recommended 3 cycles of carbo taxol and restaging, if there is response, we can consider afatinib treatment more as long term maintenance therapy.   - 6/20/24: Cycle 1 of Q3 week Carbo/Taxol.  - 8/19/24 restaging scan showed improving b/l pulmonary mets, largest pleural based LLL nodule is now 14mm.   Pt is aware of that Dr. Burkitt is leaving  at the end of Oct, planning to get to another restaging and change treatment to afatinib if he continues to have response.  Once treatment is changed, will transition his care to Dr. Parks.   - 9/25/24: PET/CT showed continuing response in lung mets, known level III right cervical lymphadenopathy, but increased SUV compared to Miguel PET. No other mets.  Since patients PS is declining, prefer treating residual disease with afatinib. CT neck/chest ordered to measure exact size of right cervical LN as well as lung mets, so these can be used as a baseline prior to start afatinib.  - 10/9/24 CT N/C pending. Patient is otherwise feeling well. Diarrhea resolved, arthralgia and peripheral neuropathy have resolved. He continue to tolerate regular diet and denies any dysphagia. Appetite is not great so has weight loss but this is improving.   - 10/15/24 - 11/7/24. Starting afatinib 40 mg daily. D/c'ed due to diarrhea.   -12/10/24 CT N/C wo reviewed showing very slow progression off all therapy for nearly 2 months. Will resume afatinib at 20 mg  - 2/4/25: Patient has tolerated Afatinib 20mg without diarrhea. Overall stable.   - 3/13/25 CT N/C wo reviewed showing continued worsening of BL lung lesions. Increase afatinib to 30 mg daily, provided loperamide recommendations. We will  follow in 4 weeks for toxicity check and 8 weeks for scan review  - 4/23/25: Patient tolerating Afatinib 30mg  daily very well with NO diarrhea and did not need anti-diarrheals. He's otherwise at his baseline  -05/27/25: Scans reviewed showing slow progression ~1-2 mm in each lesion. He is completely asymptomatic from this. We discussed next steps - he could either continue afatanib at 30 mg daily as I believe he is slowing progression. Alternatively he could pursue best supportive care/hospice. He is interested in continuing, we will follow in 6 weeks and obtain scans in 3 months.    # Hyperlipidemia  - Patient previously on Lipitor 40mg every day, but reports he has stopped taking this since starting chemoRT in 2023. His triglycerides are elevated in December, will likely need to restart cholesterol medication.     # CKD  - 2/9/24 CT chest showed Bulky atherosclerotic calcification at the origin of the left renal artery likely contributing to atrophy of the left kidney as compared to the right. Kidney atrophy was not mentioned in previous scans.   - 5/1/24: Creat 1.46, GFR 51. Encouraged patient to keep up with non-caffeinated PO hydration. 1L NS given today with Pembro. Patient to follow up with PCP regarding atherosclerosis.   - Baseline Creat 1.1 - 1.3, GFR 55-65. Due to recent diarrhea and decreased PO intake, his renal function has declined.     # Submental/Neck Lymphedema  - Patient with moderate swelling in the submental and neck region. Likely 2ry to prior radiation. Established w/ Dr Guerrero in [facility].   - Neck pain and swelling improving w/ lymphedema massage.     # Weight loss  - Weight loss 2ry to appetite loss and decreased PO intake. Adivised patient to continue Boost for supplement and increase snacking during the day. He's taking Olanzapine 5mg at bedtime but this is not helping his appetite, will increase this to 7.5mg    PLAN  -- Continue Afatinib 30mg daily  -- Continue Olanzapine to  7.5mg nightly for appetite loss and nausea.    -- Loperamide 2mg Q4hr PRN or after each loose stools, up to 8 times a day, for diarrhea  -- Reached out to PCP Dr. Weiss regarding Lipitor dose.   -- Continue ample PO hydration, encourage patient to increase PO intake    Bandar Parks MD  Thoracic + [facility]   [address] [place], [place] [id number]  Phone: [phone]

## 2025-05-29 ENCOUNTER — APPOINTMENT (OUTPATIENT)
Dept: PRIMARY CARE | Facility: CLINIC | Age: 75
End: 2025-05-29
Payer: MEDICARE

## 2025-05-29 VITALS
BODY MASS INDEX: 18.4 KG/M2 | SYSTOLIC BLOOD PRESSURE: 136 MMHG | TEMPERATURE: 98 F | HEART RATE: 80 BPM | DIASTOLIC BLOOD PRESSURE: 76 MMHG | WEIGHT: 114 LBS

## 2025-05-29 DIAGNOSIS — J44.9 CHRONIC OBSTRUCTIVE PULMONARY DISEASE, UNSPECIFIED COPD TYPE (MULTI): ICD-10-CM

## 2025-05-29 DIAGNOSIS — C61 MALIGNANT NEOPLASM OF PROSTATE (MULTI): ICD-10-CM

## 2025-05-29 DIAGNOSIS — C14.0 MALIGNANT NEOPLASM OF PHARYNX, UNSPECIFIED (MULTI): ICD-10-CM

## 2025-05-29 DIAGNOSIS — L89.322 PRESSURE ULCER OF LEFT BUTTOCK, STAGE 2 (MULTI): Primary | ICD-10-CM

## 2025-05-29 DIAGNOSIS — N18.31 STAGE 3A CHRONIC KIDNEY DISEASE (MULTI): ICD-10-CM

## 2025-05-29 DIAGNOSIS — L98.491 NON-PRESSURE CHRONIC ULCER OF SKIN OF OTHER SITES LIMITED TO BREAKDOWN OF SKIN: ICD-10-CM

## 2025-05-29 DIAGNOSIS — J96.00 ACUTE RESPIRATORY FAILURE, UNSPECIFIED WHETHER WITH HYPOXIA OR HYPERCAPNIA: ICD-10-CM

## 2025-05-29 DIAGNOSIS — H35.3231 EXUDATIVE AGE-RELATED MACULAR DEGENERATION, BILATERAL, WITH ACTIVE CHOROIDAL NEOVASCULARIZATION: ICD-10-CM

## 2025-05-29 DIAGNOSIS — C05.1 MALIGNANT NEOPLASM OF SOFT PALATE (MULTI): ICD-10-CM

## 2025-05-29 DIAGNOSIS — L97.222 NON-PRESSURE CHRONIC ULCER OF LEFT CALF WITH FAT LAYER EXPOSED (MULTI): ICD-10-CM

## 2025-05-29 DIAGNOSIS — L97.821 NON-PRESSURE CHRONIC ULCER OF OTHER PART OF LEFT LOWER LEG LIMITED TO BREAKDOWN OF SKIN: ICD-10-CM

## 2025-05-29 DIAGNOSIS — D61.818 PANCYTOPENIA: ICD-10-CM

## 2025-05-29 DIAGNOSIS — J43.2 CENTRILOBULAR EMPHYSEMA (MULTI): ICD-10-CM

## 2025-05-29 DIAGNOSIS — C06.9 MALIGNANT NEOPLASM OF MOUTH, UNSPECIFIED: ICD-10-CM

## 2025-05-29 DIAGNOSIS — H35.3290 EXUDATIVE AGE-RELATED MACULAR DEGENERATION, UNSPECIFIED LATERALITY, UNSPECIFIED STAGE (MULTI): ICD-10-CM

## 2025-05-29 DIAGNOSIS — D47.3 ESSENTIAL (HEMORRHAGIC) THROMBOCYTHEMIA: ICD-10-CM

## 2025-05-29 DIAGNOSIS — I75.81: ICD-10-CM

## 2025-05-29 DIAGNOSIS — J96.02 ACUTE HYPERCAPNIC RESPIRATORY FAILURE: ICD-10-CM

## 2025-05-29 PROCEDURE — 1159F MED LIST DOCD IN RCRD: CPT | Performed by: INTERNAL MEDICINE

## 2025-05-29 PROCEDURE — 1160F RVW MEDS BY RX/DR IN RCRD: CPT | Performed by: INTERNAL MEDICINE

## 2025-05-29 PROCEDURE — G2211 COMPLEX E/M VISIT ADD ON: HCPCS | Performed by: INTERNAL MEDICINE

## 2025-05-29 PROCEDURE — 1036F TOBACCO NON-USER: CPT | Performed by: INTERNAL MEDICINE

## 2025-05-29 PROCEDURE — 1123F ACP DISCUSS/DSCN MKR DOCD: CPT | Performed by: INTERNAL MEDICINE

## 2025-05-29 PROCEDURE — 3075F SYST BP GE 130 - 139MM HG: CPT | Performed by: INTERNAL MEDICINE

## 2025-05-29 PROCEDURE — 99214 OFFICE O/P EST MOD 30 MIN: CPT | Performed by: INTERNAL MEDICINE

## 2025-05-29 PROCEDURE — 1158F ADVNC CARE PLAN TLK DOCD: CPT | Performed by: INTERNAL MEDICINE

## 2025-05-29 PROCEDURE — 3078F DIAST BP <80 MM HG: CPT | Performed by: INTERNAL MEDICINE

## 2025-05-29 ASSESSMENT — ENCOUNTER SYMPTOMS
ARTHRALGIAS: 0
CONSTIPATION: 0
VOMITING: 0
RHINORRHEA: 0
RECTAL PAIN: 0
JOINT SWELLING: 0
BACK PAIN: 0
DIARRHEA: 0
SORE THROAT: 0
SPEECH DIFFICULTY: 0
WEAKNESS: 0
WHEEZING: 0
NAUSEA: 0
EYE REDNESS: 0
FACIAL SWELLING: 0
ABDOMINAL PAIN: 0
NECK STIFFNESS: 0
NECK PAIN: 0
COLOR CHANGE: 0
MYALGIAS: 0
POLYDIPSIA: 0
TREMORS: 0
DYSURIA: 0
FREQUENCY: 0
DIFFICULTY URINATING: 0
HEMATURIA: 0
TROUBLE SWALLOWING: 0
FACIAL ASYMMETRY: 0
CHILLS: 0
FLANK PAIN: 0
EYE ITCHING: 0
CHEST TIGHTNESS: 0
APPETITE CHANGE: 0
ABDOMINAL DISTENTION: 0
CHOKING: 0
ADENOPATHY: 0
EYE PAIN: 0
ANAL BLEEDING: 0
SLEEP DISTURBANCE: 0
SHORTNESS OF BREATH: 0
PHOTOPHOBIA: 0
DIAPHORESIS: 0
VOICE CHANGE: 0
POLYPHAGIA: 0
BRUISES/BLEEDS EASILY: 0
SEIZURES: 0
DIZZINESS: 0
EYE DISCHARGE: 0
ACTIVITY CHANGE: 0
STRIDOR: 0
LIGHT-HEADEDNESS: 0
FATIGUE: 0
SINUS PRESSURE: 0
SINUS PAIN: 0
HEADACHES: 0
BLOOD IN STOOL: 0
NUMBNESS: 0
COUGH: 0
PALPITATIONS: 0
WOUND: 0

## 2025-05-29 NOTE — PROGRESS NOTES
Subjective   Patient ID: Phong Wong is a 75 y.o. male who presents for Follow-up.  History of Present Illness  The patient presents for evaluation of hypertension, hypercholesterolemia, throat cancer, weight loss, peripheral artery disease, and blisters on the buttocks.    He reports a general sense of well-being with no recent changes in his medication regimen. He has not experienced any falls, depressive symptoms, headaches, dizziness, sinus issues, chest pain, or shortness of breath. He also reports no gastrointestinal symptoms such as abdominal pain, nausea, vomiting, diarrhea, or constipation. His mobility is limited, and he has a decreased appetite but no dysphagia. He reports no urinary issues or leg swelling. He maintains adequate hydration and underwent a CT scan on the previous Friday. He is scheduled for a dental appointment on 06/18/2025. He was supposed to see an ophthalmologist about a month ago but had to cancel the appointment due to not feeling well that day. He is currently on amlodipine, benazepril, doxazosin, losartan, magnesium, Trelegy, and aspirin.    He has been experiencing significant weight loss and blistering on his buttocks, which occasionally drain. He has previously consulted a wound clinic for this issue. He has attempted to alleviate the discomfort by sitting on a pillow, but this has not provided significant relief.    His oncologist has advised that his current medication appears to be effective, and no changes are necessary. He reports no side effects from his medications. He was previously on a 40 mg dose, which caused severe diarrhea, but since reducing the dose to 30 mg, he has not experienced any issues.    SOCIAL HISTORY  He does not smoke.      PMHx, FHx, Social Hx, Surg Hx personally reviewed at this appointment. No pertinent findings and/or changes from prior (if applicable).    ROS: Unless specified above, pt denies wt gain/loss f/c HA LoC CP SOB NVDC. See HPI above, and  scanned sheet (if applicable). All other systems are reviewed and are without complaint.   Review of Systems   Constitutional:  Negative for activity change, appetite change, chills, diaphoresis and fatigue.   HENT:  Negative for congestion, dental problem, drooling, ear discharge, ear pain, facial swelling, hearing loss, mouth sores, nosebleeds, postnasal drip, rhinorrhea, sinus pressure, sinus pain, sneezing, sore throat, tinnitus, trouble swallowing and voice change.    Eyes:  Negative for photophobia, pain, discharge, redness, itching and visual disturbance.   Respiratory:  Negative for cough, choking, chest tightness, shortness of breath, wheezing and stridor.    Cardiovascular:  Negative for chest pain, palpitations and leg swelling.   Gastrointestinal:  Negative for abdominal distention, abdominal pain, anal bleeding, blood in stool, constipation, diarrhea, nausea, rectal pain and vomiting.   Endocrine: Negative for cold intolerance, heat intolerance, polydipsia, polyphagia and polyuria.   Genitourinary:  Negative for decreased urine volume, difficulty urinating, dysuria, enuresis, flank pain, frequency, genital sores, hematuria and urgency.   Musculoskeletal:  Positive for gait problem. Negative for arthralgias, back pain, joint swelling, myalgias, neck pain and neck stiffness.   Skin:  Negative for color change, pallor, rash and wound.   Neurological:  Negative for dizziness, tremors, seizures, syncope, facial asymmetry, speech difficulty, weakness, light-headedness, numbness and headaches.   Hematological:  Negative for adenopathy. Does not bruise/bleed easily.   Psychiatric/Behavioral:  Negative for sleep disturbance.       Objective     /76   Pulse 80   Temp 36.7 °C (98 °F) (Temporal)   Wt 51.7 kg (114 lb)   BMI 18.40 kg/m²      Physical Exam  General Appearance: Normal appearance.  Pulmonary: Clear to auscultation, no wheezing, rales or rhonchi.  Cardiovascular: Regular rate and rhythm, no  murmurs, rubs, or gallops.  Abdominal: There is no distension. Palpations: There is no mass. Tenderness: There is no abdominal tenderness. There is no guarding.  Skin: Blisters noted on the buttocks.  Neurological: Alert.  Erythema of small area of ulceration on sacrum.  Minimal drainage.  Physical Exam     Lab Results   Component Value Date    WBC 10.6 05/27/2025    HGB 12.4 (L) 05/27/2025    HCT 38.2 (L) 05/27/2025     05/27/2025    CHOL 159 12/18/2024    TRIG 158 (H) 12/18/2024    HDL 36.7 12/18/2024    ALT 18 05/27/2025    AST 18 05/27/2025     05/27/2025    K 4.4 05/27/2025     05/27/2025    CREATININE 1.13 05/27/2025    BUN 37 (H) 05/27/2025    CO2 29 05/27/2025    TSH 2.96 05/27/2025    PSA 0.16 09/25/2024    INR 1.1 07/11/2024    HGBA1C 5.1 08/25/2023     par     Current Outpatient Medications   Medication Instructions    aspirin 81 mg EC tablet 1 tablet, Daily    atorvastatin (LIPITOR) 80 mg, oral, Daily    doxazosin (CARDURA) 4 mg, oral, Daily, Take 1 tablet by mouth daily in the evening    fluticasone-umeclidin-vilanter (Trelegy Ellipta) 100-62.5-25 mcg blister with device INHALE 1 PUFF EVERY DAY    Gilotrif 30 mg, oral, Daily, Take at least 1 hour before a meal or 2 hours after a meal.    ipratropium-albuteroL (Duo-Neb) 0.5-2.5 mg/3 mL nebulizer solution Take 3 mL by nebulization 3 times a day as needed for wheezing or shortness of breath.    losartan (COZAAR) 100 mg, oral, Daily    magnesium oxide (MAG-OX) 400 mg, oral, Daily    OLANZapine (ZYPREXA) 7.5 mg, oral, Nightly, 1 tab at bedtime for 4 days, starting the night of chemo        CT soft tissue neck wo IV contrast  Narrative: Interpreted By:  Tom Woodard,   STUDY:  CT SOFT TISSUE NECK WO IV CONTRAST;  5/23/2025 1:10 pm      INDICATION:  Signs/Symptoms:HNSCC.      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      ACCESSION NUMBER(S):  [id number]      ORDERING CLINICIAN:  LOS  MIRSKY      TECHNIQUE:  Axial CT images of the neck were obtained.  The images were  reformatted in angled axial, coronal and sagittal planes.      FINDINGS:  Again demonstrated is confluence of soft tissue density structures in  the right mid lateral neck. Some of what is included in this area are  the sternocleidomastoid muscle, and the carotid sheath structures.  Cross-sectional measurement along the more superior aspect of the  region measures 33 x 13 mm greatest axial dimensions currently and  was previously 27 x 11 mm. More inferiorly measurements are 40 x 19  mm currently versus 39 x 19 mm previously.      Emphysematous changes in the upper lungs. There are 2 spiculated left  lung upper lobe masses, the larger currently measuring 11.8 mm  greatest axial dimensions previously 10.0 mm in the smaller currently  measuring 8.5 mm and previously 7.3 mm.      Induration of the fat and fascial planes along the anterior neck is  similar to prior and likely a treatment effect.      No newly enlarged cervical chain lymph nodes noted.      No destructive osseous lesions or acute osseous abnormalities.          Impression: The right-sided neck mass appears slightly increased in size from  prior.      Slight increase in size of the 2 spiculated masses previously seen in  the left lung upper lobe.      No newly enlarged cervical chain lymph nodes.      MACRO:  None      Signed by: Tom Woodard 5/26/2025 7:46 PM  Dictation workstation:   [id number]           Assessment & Plan  1. Hypertension.  - Blood pressure is stable with the current medication regimen.  - Currently taking amlodipine, benazepril, doxazosin, and losartan.  - No reported side effects from the medications.  - Continue with the current medication regimen.    2. Hypercholesterolemia.  - Advised to continue taking atorvastatin as prescribed.  - No reported issues with the medication.  - Continue with atorvastatin as prescribed.    3. Throat cancer.  -  Current medication regimen appears effective without side effects.  - Continue with the current management plan and follow up with the oncologist.  Will continue with present medications.  Repeat imaging in a few months.    4. Weight loss.  - Patient reports a lack of appetite but no pain or difficulty swallowing.  - Advised to supplement diet with Ensure to help manage weight loss.  - Monitor weight and nutritional intake.  - Continue with dietary supplements as advised.    5. Peripheral artery disease.  - No new symptoms reported.  - Continue with the current management plan.  - Monitor for any changes in symptoms.    6. Blisters on the buttocks.  - Blisters noted with occasional drainage; bandage applied to one area.  - Referral to a wound clinic will be placed for further evaluation and treatment.  - Advised to use a donut cushion to alleviate pressure on the affected area.  - Follow up with the wound clinic as scheduled.  PAD-modify risk factors.  Follow-up with vascular  COPD-stable on present medications.  Renal sufficiency-creatinine is improved..  Follow-up  The patient will follow up in 6 weeks.          Harshil Weiss MD       This medical note was created with the assistance of artificial intelligence (AI) for documentation purposes. The content has been reviewed and confirmed by the healthcare provider for accuracy and completeness. Patient consented to the use of audio recording and use of AI during their visit.

## 2025-05-29 NOTE — PATIENT INSTRUCTIONS
Please take medication as prescribed.  Follow-up in 1 month.  Will schedule an appointment with the wound clinic.

## 2025-06-04 ENCOUNTER — APPOINTMENT (OUTPATIENT)
Dept: WOUND CARE | Facility: CLINIC | Age: 75
End: 2025-06-04
Payer: MEDICARE

## 2025-06-06 PROCEDURE — RXMED WILLOW AMBULATORY MEDICATION CHARGE

## 2025-06-11 ENCOUNTER — APPOINTMENT (OUTPATIENT)
Dept: WOUND CARE | Facility: CLINIC | Age: 75
End: 2025-06-11
Payer: MEDICARE

## 2025-06-12 ENCOUNTER — SPECIALTY PHARMACY (OUTPATIENT)
Dept: PHARMACY | Facility: CLINIC | Age: 75
End: 2025-06-12

## 2025-06-12 NOTE — PROGRESS NOTES
[facility] Clinical Note  Patient Reassessment     Introduction  Phong Wong is a 75 y.o. male who is on the specialty pharmacy service for management of: Oncology Core.      [facility] supplied medication: Gilotrif 30mg by mouth once daily         Duration of therapy: Until drug toxicity or progression    The most recent encounter visit with the referring prescriber Bandar Parks MD on 5/27/2025 was reviewed.  Pharmacy will continue to collaborate in the care of this patient with the referring prescriber.    Discussion  Phong was contacted on 6/12/2025 at 11:53 AM for a pharmacy visit with encounter number [id number] from:   [facility]  [address] [place] [id number]  Dept: [phone]  Dept Fax: [phone]  Phong consented to a/an Telephone visit, which was performed.    Efficacy  Patient has developed new symptoms of condition: No  Patient/caregiver feels medication is affecting the disease state: Patient states that Gilotrif is working well. He reports no adverse effects after dose change.     Goals  Provided education on goals and possible outcomes of therapy:  Adherence with therapy  Timely completion of appropriate labs  Timely and appropriate follow up with provider  Identify and address medication interactions with presciption medications, OTC medications and supplements  Optimize or maintain quality of life  Oncology: Prolong life/No disease progression  Manage side effects (ex: nausea/vomiting, constipation, fatigue) in conjunction with care team  Patient has documented target(s) for goals of therapy: Yes        Tolerance  Patient has experienced side effects from this medication: No  Changes to current therapy regimen: No    The follow-up timeline was discussed. Every person responds to and reacts to therapy differently. Patient should be assessed for efficacy and tolerability in approximately: 3 months            Adherence  Patient  Information  Informant: Self (Patient)  Demonstrates Understanding of Importance of Adherence: Yes  Does the patient have any barriers to self-administration (including physical and mental?): No  Barriers to Self-Administration: None  Action Taken to Mitigate Barriers for Self-Administration: N/A  Medication Information  Medication: afatinib dimaleate (Gilotrif)  Patient Reported Missed Doses in the Last 4 Weeks: 0  Estimated Medication Adherence Level: Good  Adherence Estimation Source: Claims history  Barriers to Adherence: No Problems identified  Action Taken to Address Barriers to Adherence: N/A  What concerns do you have regarding your medications?: None   The importance of adherence was discussed and patient/caregiver was advised to take the medication as prescribed by their provider. Encouraged patient/caregiver to call physician's office or specialty pharmacy if they have a question regarding a missed dose.    General Assessment  Changes to home medications, OTCs or supplements: No  Current Medications[1]  Reported new allergies: No  Reported new medical conditions: No  Additional monitoring reviewed: Oncology - CBC-diff:   Lab Results   Component Value Date    WBC 10.6 05/27/2025    RBC 4.41 (L) 05/27/2025    HGB 12.4 (L) 05/27/2025    HCT 38.2 (L) 05/27/2025    MCV 87 05/27/2025    MCHC 32.5 05/27/2025     05/27/2025    RDW 15.8 (H) 05/27/2025    NEUTOPHILPCT 79.3 05/27/2025    IGPCT 0.6 05/27/2025    LYMPHOPCT 6.2 05/27/2025    MONOPCT 9.9 05/27/2025    EOSPCT 3.7 05/27/2025    BASOPCT 0.3 05/27/2025    NEUTROABS 8.38 (H) 05/27/2025    LYMPHSABS 0.66 (L) 05/27/2025    MONOSABS 1.05 (H) 05/27/2025    EOSABS 0.39 05/27/2025    BASOSABS 0.03 05/27/2025    and CMP:   Lab Results   Component Value Date    GLUCOSE 96 05/27/2025     05/27/2025    K 4.4 05/27/2025     05/27/2025    CO2 29 05/27/2025    ANIONGAP 15 05/27/2025    BUN 37 (H) 05/27/2025    CREATININE 1.13 05/27/2025    GFRF  CANCELED 09/13/2023    CALCIUM 9.7 05/27/2025    ALBUMIN 4.1 05/27/2025    ALKPHOS 47 05/27/2025    PROT 6.6 05/27/2025    AST 18 05/27/2025    BILITOT 0.4 05/27/2025    ALT 18 05/27/2025     Is laboratory follow up needed? No    Advised to contact the pharmacy if there are any changes to the patient's medication list, including prescriptions, OTC medications, herbal products, or supplements.    Impression/Plan  This patient has been identified as high risk due to Geriatric (over 65 years of age).  The following action was taken:Patient/caregiver encouraged to participate in patient management program          QOL/Patient Satisfaction  Rate your quality of life on scale of 1-10: 9  Rate your satisfaction with  Specialty Pharmacy on scale of 1-10: 9    Provided contact information ([phone]) for [facility] and reviewed dispensing process, refill timeline and patient management follow up. Confirmed understanding of education conducted during assessment. All questions and concerns were addressed and patient/caregiver was encouraged to reach out for additional questions or concerns.    Based on the patient's diagnosis, medication list, progress towards goals, adherence, tolerance, and medication list, medication remains appropriate: Therapy remains appropriate (I attest)    Yokasta Pastor, PharmD       [1]   Current Outpatient Medications   Medication Sig Dispense Refill    afatinib (Gilotrif) 30 mg tablet Take 1 tablet (30 mg total) by mouth once daily.  Take at least 1 hour before a meal or 2 hours after a meal. 30 tablet 2    aspirin 81 mg EC tablet Take 1 tablet (81 mg) by mouth once daily.      atorvastatin (Lipitor) 80 mg tablet Take 1 tablet (80 mg) by mouth once daily. 90 tablet 3    doxazosin (Cardura) 4 mg tablet Take 1 tablet (4 mg) by mouth once daily. Take 1 tablet by mouth daily in the evening 90 tablet 3    fluticasone-umeclidin-vilanter (Trelegy Ellipta) 100-62.5-25 mcg  blister with device INHALE 1 PUFF EVERY DAY 3 each 3    ipratropium-albuteroL (Duo-Neb) 0.5-2.5 mg/3 mL nebulizer solution Take 3 mL by nebulization 3 times a day as needed for wheezing or shortness of breath.      losartan (Cozaar) 100 mg tablet Take 1 tablet (100 mg) by mouth once daily. 90 tablet 3    magnesium oxide (Mag-Ox) 400 mg (241.3 mg magnesium) tablet Take 1 tablet (400 mg) by mouth once daily. 3 tablet 0    OLANZapine (ZyPREXA) 7.5 mg tablet Take 1 tablet (7.5 mg) by mouth once daily at bedtime. 1 tab at bedtime for 4 days, starting the night of chemo 30 tablet 6     No current facility-administered medications for this visit.

## 2025-06-16 ENCOUNTER — APPOINTMENT (OUTPATIENT)
Dept: WOUND CARE | Facility: CLINIC | Age: 75
End: 2025-06-16
Payer: MEDICARE

## 2025-06-18 ENCOUNTER — APPOINTMENT (OUTPATIENT)
Dept: WOUND CARE | Facility: CLINIC | Age: 75
End: 2025-06-18
Payer: MEDICARE

## 2025-06-19 ENCOUNTER — PHARMACY VISIT (OUTPATIENT)
Dept: PHARMACY | Facility: CLINIC | Age: 75
End: 2025-06-19
Payer: COMMERCIAL

## 2025-06-27 DIAGNOSIS — C78.02 SQUAMOUS CELL CARCINOMA METASTATIC TO BOTH LUNGS: ICD-10-CM

## 2025-06-27 DIAGNOSIS — C78.01 SQUAMOUS CELL CARCINOMA METASTATIC TO BOTH LUNGS: ICD-10-CM

## 2025-06-27 DIAGNOSIS — C44.92 SQUAMOUS CELL CARCINOMA METASTATIC TO BOTH LUNGS: ICD-10-CM

## 2025-07-03 ASSESSMENT — ENCOUNTER SYMPTOMS
NUMBNESS: 0
ABDOMINAL PAIN: 0
SORE THROAT: 0
HEADACHES: 0
CHILLS: 0
SHORTNESS OF BREATH: 0
LEG SWELLING: 0
LIGHT-HEADEDNESS: 0
TROUBLE SWALLOWING: 0
ARTHRALGIAS: 0
VOMITING: 0
NAUSEA: 0
APPETITE CHANGE: 1
CONSTIPATION: 0
COUGH: 0
DIARRHEA: 0
FEVER: 0

## 2025-07-07 ENCOUNTER — SPECIALTY PHARMACY (OUTPATIENT)
Dept: PHARMACY | Facility: CLINIC | Age: 75
End: 2025-07-07

## 2025-07-08 ENCOUNTER — TELEPHONE (OUTPATIENT)
Dept: ADMISSION | Facility: HOSPITAL | Age: 75
End: 2025-07-08
Payer: MEDICARE

## 2025-07-08 ENCOUNTER — APPOINTMENT (OUTPATIENT)
Dept: HEMATOLOGY/ONCOLOGY | Facility: HOSPITAL | Age: 75
End: 2025-07-08
Payer: MEDICARE

## 2025-07-08 DIAGNOSIS — C10.9 OROPHARYNGEAL CANCER (MULTI): Primary | ICD-10-CM

## 2025-07-08 NOTE — TELEPHONE ENCOUNTER
Pt called to cancel FUV today d/t increasing weakness and fatigue.   Pt's friend advising that he is sleeping more than awake, in bed most of day/night. Is not able to complete ADLs. Is drinking ensure and small bites only.   Last FUV 5/27  Advised pt /friend pt needs to be evaluated, pt states he is too weak to come for appt, declined directive to ED.

## 2025-07-09 ENCOUNTER — NUTRITION (OUTPATIENT)
Dept: HEMATOLOGY/ONCOLOGY | Facility: HOSPITAL | Age: 75
End: 2025-07-09

## 2025-07-09 ENCOUNTER — OFFICE VISIT (OUTPATIENT)
Dept: HEMATOLOGY/ONCOLOGY | Facility: HOSPITAL | Age: 75
End: 2025-07-09
Payer: MEDICARE

## 2025-07-09 ENCOUNTER — LAB (OUTPATIENT)
Dept: LAB | Facility: HOSPITAL | Age: 75
End: 2025-07-09
Payer: MEDICARE

## 2025-07-09 VITALS
SYSTOLIC BLOOD PRESSURE: 158 MMHG | WEIGHT: 107.4 LBS | DIASTOLIC BLOOD PRESSURE: 66 MMHG | BODY MASS INDEX: 17.33 KG/M2 | TEMPERATURE: 98.4 F | RESPIRATION RATE: 19 BRPM | HEART RATE: 116 BPM | OXYGEN SATURATION: 98 %

## 2025-07-09 DIAGNOSIS — R53.83 OTHER FATIGUE: ICD-10-CM

## 2025-07-09 DIAGNOSIS — C78.02 SQUAMOUS CELL CARCINOMA METASTATIC TO BOTH LUNGS: ICD-10-CM

## 2025-07-09 DIAGNOSIS — R63.0 APPETITE LOSS: Primary | ICD-10-CM

## 2025-07-09 DIAGNOSIS — R63.4 WEIGHT LOSS: ICD-10-CM

## 2025-07-09 DIAGNOSIS — C78.01 SQUAMOUS CELL CARCINOMA METASTATIC TO BOTH LUNGS: ICD-10-CM

## 2025-07-09 DIAGNOSIS — C10.9 OROPHARYNGEAL CANCER (MULTI): ICD-10-CM

## 2025-07-09 DIAGNOSIS — K59.00 CONSTIPATION, UNSPECIFIED CONSTIPATION TYPE: ICD-10-CM

## 2025-07-09 DIAGNOSIS — G47.9 SLEEP DIFFICULTIES: ICD-10-CM

## 2025-07-09 DIAGNOSIS — R53.1 WEAKNESS: ICD-10-CM

## 2025-07-09 DIAGNOSIS — C44.92 SQUAMOUS CELL CARCINOMA METASTATIC TO BOTH LUNGS: ICD-10-CM

## 2025-07-09 LAB
ALBUMIN SERPL BCP-MCNC: 4.1 G/DL (ref 3.4–5)
ALP SERPL-CCNC: 48 U/L (ref 33–136)
ALT SERPL W P-5'-P-CCNC: 21 U/L (ref 10–52)
ANION GAP SERPL CALC-SCNC: 13 MMOL/L (ref 10–20)
AST SERPL W P-5'-P-CCNC: 21 U/L (ref 9–39)
BASOPHILS # BLD AUTO: 0.06 X10*3/UL (ref 0–0.1)
BASOPHILS NFR BLD AUTO: 0.5 %
BILIRUB SERPL-MCNC: 0.5 MG/DL (ref 0–1.2)
BUN SERPL-MCNC: 36 MG/DL (ref 6–23)
CALCIUM SERPL-MCNC: 10.3 MG/DL (ref 8.6–10.3)
CHLORIDE SERPL-SCNC: 100 MMOL/L (ref 98–107)
CO2 SERPL-SCNC: 29 MMOL/L (ref 21–32)
CREAT SERPL-MCNC: 0.95 MG/DL (ref 0.5–1.3)
EGFRCR SERPLBLD CKD-EPI 2021: 85 ML/MIN/1.73M*2
EOSINOPHIL # BLD AUTO: 0.6 X10*3/UL (ref 0–0.4)
EOSINOPHIL NFR BLD AUTO: 5.3 %
ERYTHROCYTE [DISTWIDTH] IN BLOOD BY AUTOMATED COUNT: 15.9 % (ref 11.5–14.5)
GLUCOSE SERPL-MCNC: 118 MG/DL (ref 74–99)
HCT VFR BLD AUTO: 41 % (ref 41–52)
HGB BLD-MCNC: 13.3 G/DL (ref 13.5–17.5)
IMM GRANULOCYTES # BLD AUTO: 0.06 X10*3/UL (ref 0–0.5)
IMM GRANULOCYTES NFR BLD AUTO: 0.5 % (ref 0–0.9)
LYMPHOCYTES # BLD AUTO: 0.74 X10*3/UL (ref 0.8–3)
LYMPHOCYTES NFR BLD AUTO: 6.5 %
MAGNESIUM SERPL-MCNC: 1.75 MG/DL (ref 1.6–2.4)
MCH RBC QN AUTO: 28 PG (ref 26–34)
MCHC RBC AUTO-ENTMCNC: 32.4 G/DL (ref 32–36)
MCV RBC AUTO: 86 FL (ref 80–100)
MONOCYTES # BLD AUTO: 0.94 X10*3/UL (ref 0.05–0.8)
MONOCYTES NFR BLD AUTO: 8.3 %
NEUTROPHILS # BLD AUTO: 8.93 X10*3/UL (ref 1.6–5.5)
NEUTROPHILS NFR BLD AUTO: 78.9 %
NRBC BLD-RTO: 0 /100 WBCS (ref 0–0)
PLATELET # BLD AUTO: 270 X10*3/UL (ref 150–450)
POTASSIUM SERPL-SCNC: 3.9 MMOL/L (ref 3.5–5.3)
PROT SERPL-MCNC: 6.8 G/DL (ref 6.4–8.2)
RBC # BLD AUTO: 4.75 X10*6/UL (ref 4.5–5.9)
SODIUM SERPL-SCNC: 138 MMOL/L (ref 136–145)
WBC # BLD AUTO: 11.3 X10*3/UL (ref 4.4–11.3)

## 2025-07-09 PROCEDURE — 99215 OFFICE O/P EST HI 40 MIN: CPT | Performed by: STUDENT IN AN ORGANIZED HEALTH CARE EDUCATION/TRAINING PROGRAM

## 2025-07-09 PROCEDURE — 3078F DIAST BP <80 MM HG: CPT | Performed by: STUDENT IN AN ORGANIZED HEALTH CARE EDUCATION/TRAINING PROGRAM

## 2025-07-09 PROCEDURE — 80053 COMPREHEN METABOLIC PANEL: CPT

## 2025-07-09 PROCEDURE — 36415 COLL VENOUS BLD VENIPUNCTURE: CPT

## 2025-07-09 PROCEDURE — 1159F MED LIST DOCD IN RCRD: CPT | Performed by: STUDENT IN AN ORGANIZED HEALTH CARE EDUCATION/TRAINING PROGRAM

## 2025-07-09 PROCEDURE — 1036F TOBACCO NON-USER: CPT | Performed by: STUDENT IN AN ORGANIZED HEALTH CARE EDUCATION/TRAINING PROGRAM

## 2025-07-09 PROCEDURE — 83735 ASSAY OF MAGNESIUM: CPT

## 2025-07-09 PROCEDURE — 99417 PROLNG OP E/M EACH 15 MIN: CPT | Performed by: STUDENT IN AN ORGANIZED HEALTH CARE EDUCATION/TRAINING PROGRAM

## 2025-07-09 PROCEDURE — 85025 COMPLETE CBC W/AUTO DIFF WBC: CPT

## 2025-07-09 PROCEDURE — 3075F SYST BP GE 130 - 139MM HG: CPT | Performed by: STUDENT IN AN ORGANIZED HEALTH CARE EDUCATION/TRAINING PROGRAM

## 2025-07-09 PROCEDURE — 1126F AMNT PAIN NOTED NONE PRSNT: CPT | Performed by: STUDENT IN AN ORGANIZED HEALTH CARE EDUCATION/TRAINING PROGRAM

## 2025-07-09 PROCEDURE — 1160F RVW MEDS BY RX/DR IN RCRD: CPT | Performed by: STUDENT IN AN ORGANIZED HEALTH CARE EDUCATION/TRAINING PROGRAM

## 2025-07-09 RX ORDER — MIRTAZAPINE 15 MG/1
15 TABLET, FILM COATED ORAL NIGHTLY
Qty: 30 TABLET | Refills: 3 | Status: SHIPPED | OUTPATIENT
Start: 2025-07-09

## 2025-07-09 RX ORDER — SENNOSIDES 8.6 MG/1
1 TABLET ORAL DAILY
Qty: 120 TABLET | Refills: 2 | Status: SHIPPED | OUTPATIENT
Start: 2025-07-09

## 2025-07-09 ASSESSMENT — ENCOUNTER SYMPTOMS
NAUSEA: 0
LEG SWELLING: 0
SHORTNESS OF BREATH: 0
VOMITING: 0
SORE THROAT: 0
CHILLS: 0
FEVER: 0
HEADACHES: 0
TROUBLE SWALLOWING: 0
ABDOMINAL PAIN: 0
LIGHT-HEADEDNESS: 0
APPETITE CHANGE: 1
DIARRHEA: 0
CONSTIPATION: 0
NUMBNESS: 0
ARTHRALGIAS: 0
COUGH: 0
FATIGUE: 1

## 2025-07-09 ASSESSMENT — PAIN SCALES - GENERAL: PAINLEVEL_OUTOF10: 0-NO PAIN

## 2025-07-09 NOTE — PATIENT INSTRUCTIONS
Appetite Loss/ Sleep Difficulties  - STOP Olanzapine at bedtime  - START Mirtazapine 15mg at bedtime. We may need to increase this dose depending on how your appetite is.

## 2025-07-09 NOTE — PROGRESS NOTES
"NUTRITION Assessment NOTE    Nutrition Assessment     Reason for Visit:  Phong Wong is a 75 y.o. 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   Received concurrent chemoRT and finished 9/2024. Hx of PEG tube.    Visited pt while in ACC d/t weakness and fatigue.   Request to see d/t poor PO intake and weight loss.     No fluid received during visit as pt was hemodynamically stable.   On Olanzipine to help with appetite and being switched to Mirtazipine. Starting Senna for constipation.      Problem List[1] Medical History[2]    Nutrition Significant labs:  Lab Results   Component Value Date/Time    GLUCOSE 118 (H) 07/09/2025 1038     07/09/2025 1038    K 3.9 07/09/2025 1038     07/09/2025 1038    CO2 29 07/09/2025 1038    ANIONGAP 13 07/09/2025 1038    BUN 36 (H) 07/09/2025 1038    CREATININE 0.95 07/09/2025 1038    EGFR 85 07/09/2025 1038    CALCIUM 10.3 07/09/2025 1038    ALBUMIN 4.1 07/09/2025 1038    ALKPHOS 48 07/09/2025 1038    PROT 6.8 07/09/2025 1038    AST 21 07/09/2025 1038    BILITOT 0.5 07/09/2025 1038    ALT 21 07/09/2025 1038    MG 1.75 07/09/2025 1038    PHOS 3.9 11/02/2023 0603     Lab Results   Component Value Date/Time    VITD25 53 05/31/2023 1210         Anthropometrics:  Height: 167.6 cm (5' 5.98\")   Weight: 48.7 kg (107 lb 5.8 oz)   BMI (Calculated): 17.34    IBW/kg (Dietitian Calculated): 64.4 kg            Weight History:   Daily Weight  07/10/25 : 48.7 kg (107 lb 5.8 oz)  07/09/25 : 48.7 kg (107 lb 6.4 oz)  05/29/25 : 51.7 kg (114 lb)  05/27/25 : 51.7 kg (113 lb 15.7 oz)  03/25/25 : 52.9 kg (116 lb 9.6 oz)  02/04/25 : 54.3 kg (119 lb 11.4 oz)  01/21/25 : 53.1 kg (117 lb 1 oz)  12/18/24 : 54.5 kg (120 lb 2.4 oz)  11/26/24 : 54.9 kg (121 lb 0.5 oz)  11/20/24 : 52.6 kg (116 lb)    Weight Change %:  Weight History / % Weight Change: wt decreased 6% in 6 weeks. down 8% in about 3 months  Significant Weight Loss: Yes  Interpretation of Weight Loss: " >7.5% in 3 months    Nutrition History:  Food and Nutrient History  Food and Nutrient History: Pt states no N/V just no appetite so not eating much. Drinking 2 Ensure high protein/high calorie per day. Pt is not interested in having PEG tube replaced.    Food Intake  Meal 1: cream of wheat  Snacks : Ensure  Meal 3: 4-5 chicken nuggets or 1/2 sloppy yaritza sandwich or portion of a TV dinner. cake or ice cream  Snacks: Ensure  Fluid Intake: coffee    Food Supplement Intake  Oral Nutrition Supplements: Ensure Plus  Frequency: 350  Protein: 16         Current Medications[3]     Nutrition Focused Physical Exam Findings:    Subcutaneous Fat Loss  Defer Subcutaneous Fat Loss Assessment: Defer all  Defer All Reason: Patient lying in bed in ACC fully covered in blanket up to chin.                   Estimated Needs:  Weight Used for Equation Calculations: 48.7 kg (107 lb 5.8 oz)    Estimated Energy Needs  Total Energy Estimated Needs in 24 hours (kCal): 1460 kCal  Energy Estimated Needs per kg Body Weight in 24 hours (kCal/kg): 30 kCal/kg  Method for Estimating Needs: kcal/kg ABW  Estimated Protein Needs  Total Protein Estimated Needs in 24 Hours (g): 68 g  Protein Estimated Needs per kg Body Weight in 24 Hours (g/kg): 1.4 g/kg  Method for Estimating 24 Hour Protein Needs: g/kg ABW  Estimated Fluid Needs  Total Fluid Estimated Needs in 24 Hours (mL): 1460 mL  Total Fluid Estimated Needs in 24 hours (mL/kg): 30 mL/kg  Method for Estimating 24 Hour Fluid Needs: ml/kg             Nutrition Diagnosis   Malnutrition Diagnosis  Patient has Malnutrition Diagnosis: Yes  Diagnosis Status: Active  Malnutrition Diagnosis: Severe malnutrition related to chronic disease or condition  As Evidenced by: <75% protein and energy needs for > 1 month, 8# weight loss in 3 months with a low BMI    Nutrition Diagnosis  Patient has Nutrition Diagnosis: Yes  Diagnosis Status (1): Active  Nutrition Diagnosis 1: Inadequate protein energy intake  Related  to (1): SCC of oropharynx  As Evidenced by (1): nutrition impact symptoms (poor appetite) affecting oral intake and weight       Nutrition Interventions/Recommendations   Nutrition Prescription: Individualized Nutrition Prescription Provided for : Oral nutrition     Recommendations: Individualized Nutrition Prescription Provided for : High calorie and high protein    Nutrition Interventions:   Food and Nutrient Delivery: Food and Nutrition Delivery  Meals & Snacks: Energy-modified diet, Protein-modified diet  Goals: small frequent meals/snacks, adequate fluids  Medical Food Supplement: Commercial beverage medical food supplement therapy  Goals: Boost VHC BID (1060 kcals, 44g protein)     Coordination of Care:       Nutrition Education:   Nutrition Education Content: Nutrition Education Content: Content related nutrition education  Goals:   Discussed making an effort to eat small snacks q 2-3h. Suggestions- pudding, cottage cheese with canned fruit, peanut butter on bread/toast/crackers.   Provided samples of Boost VHC along with information to purchase at  Pharmacy at cost. BID consumption of ONS will meet 2/3 of patient's estimated needs.   Reviewed hydration requirement as dehydration can cause lethargy.  Discussed that these changes can help improve patient quality of life and be able to participate in activities that patient enjoys.   Handouts provided/ reviewed: Poor Appetite, Foods that are Easy to Chew and Swallow                 Nutrition Monitoring and Evaluation   Food and Nutrient Intake  Monitoring and Evaluation Plan: Energy intake, Fluid intake, Protein intake  Energy Intake: Estimated energy intake  Criteria: >75% needs, diet recall  Fluid Intake: Estimated fluid intake  Criteria: Maintain hydration  Estimated protein intake: Estimated protein intake  Criteria: >75% needs, diet recall    Anthropometric measurements  Monitoring and Evaluation Plan: Weight  Body Weight: Measured body weight  Criteria:  Prevent further weight loss                   Follow Up: Planned follow up visit: will follow up when pt returns to clinic with Dr. Parks, 8/26      Time Spent  Prep time on day of patient encounter: 5 minutes  Time spent directly with patient, family or caregiver: 15 minutes  Additional Time Spent on Patient Care Activities: 5 minutes  Documentation Time: 25 minutes  Other Time Spent: 0 minutes  Total: 50 minutes             [1]   Patient Active Problem List  Diagnosis    Arteriosclerosis of carotid artery    Asymptomatic bilateral carotid artery stenosis    Aorto-iliac disease    Atherosclerotic heart disease of native coronary artery without angina pectoris    PAD (peripheral artery disease)    Benign essential hypertension    Chronic obstructive pulmonary disease (Multi)    Chronic renal impairment, stage 3a (Multi)    Cigarette nicotine dependence in remission    Erectile dysfunction    Hyperlipidemia    Low vitamin B12 level    Prediabetes    Prostate cancer (Multi)    PSA elevation    Serum creatinine raised    Vitamin D deficiency    Acute respiratory failure, unspecified whether with hypoxia or hypercapnia    Chronic venous insufficiency    Non-pressure chronic ulcer of other part of left lower leg limited to breakdown of skin    Exudative age-related macular degeneration, bilateral, with active choroidal neovascularization    Oropharyngeal cancer (Multi)    Anemia associated with chemotherapy    Asthma    Renal artery atherosclerosis    Renal atrophy, left    Lymphedema due to and not concurrent with ionizing radiotherapy    Squamous cell carcinoma metastatic to both lungs    Joint pain following chemotherapy    Chemotherapy induced diarrhea    Drug-induced aplastic anemia    Pressure ulcer of right buttock, stage 2 (Multi)    Protein-calorie malnutrition, unspecified severity (Multi)    Poisoning by other opioids, accidental (unintentional), initial encounter (Multi)    Atheroembolism of left renal  artery (Multi)    Pancytopenia    Non-pressure chronic ulcer of left calf with fat layer exposed (Multi)    Non-pressure chronic ulcer of skin of other sites limited to breakdown of skin   [2]   Past Medical History:  Diagnosis Date    Personal history of diseases of the blood and blood-forming organs and certain disorders involving the immune mechanism 07/27/2017    History of thrombocytosis   [3]   Current Outpatient Medications:     afatinib (Gilotrif) 30 mg tablet, Take 1 tablet (30 mg total) by mouth once daily.  Take at least 1 hour before a meal or 2 hours after a meal., Disp: 30 tablet, Rfl: 2    aspirin 81 mg EC tablet, Take 1 tablet (81 mg) by mouth once daily., Disp: , Rfl:     atorvastatin (Lipitor) 80 mg tablet, Take 1 tablet (80 mg) by mouth once daily., Disp: 90 tablet, Rfl: 3    doxazosin (Cardura) 4 mg tablet, Take 1 tablet (4 mg) by mouth once daily. Take 1 tablet by mouth daily in the evening, Disp: 90 tablet, Rfl: 3    fluticasone-umeclidin-vilanter (Trelegy Ellipta) 100-62.5-25 mcg blister with device, INHALE 1 PUFF EVERY DAY, Disp: 3 each, Rfl: 3    ipratropium-albuteroL (Duo-Neb) 0.5-2.5 mg/3 mL nebulizer solution, Take 3 mL by nebulization 3 times a day as needed for wheezing or shortness of breath., Disp: , Rfl:     losartan (Cozaar) 100 mg tablet, Take 1 tablet (100 mg) by mouth once daily., Disp: 90 tablet, Rfl: 3    magnesium oxide (Mag-Ox) 400 mg (241.3 mg magnesium) tablet, Take 1 tablet (400 mg) by mouth once daily., Disp: 3 tablet, Rfl: 0    mirtazapine (Remeron) 15 mg tablet, Take 1 tablet (15 mg) by mouth once daily at bedtime., Disp: 30 tablet, Rfl: 3    sennosides (senna) 8.6 mg tablet, Take 1 tablet (8.6 mg) by mouth once daily. Can increase up to 2 tabs twice a day for Constipation., Disp: 120 tablet, Rfl: 2  No current facility-administered medications for this visit.

## 2025-07-09 NOTE — PROGRESS NOTES
[facility]  Acute Care Clinic    Patient ID: Phong Wong is a 75 y.o. 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   [facility]: Dr. Vianca Steen     Past Medical History:   Past Medical History:  07/27/2017: Personal history of diseases of the blood and blood-  forming organs and certain disorders involving the immune mechanism      Comment:  History of thrombocytosis   Surgical History:    Surgical History[1]   Family History:    Family History[2]  Family Oncology History:    Cancer-related family history is not on file.  Social History:    Social History[3]     Subjective   Chief Complaint: Weakness     ELIZA Darling is a 75 y.o. male with locally advanced oral cavity cancer, now metastatic of lungs, currently on Afatinib, who presents to [facility] with weakness and fatigue.     He was feeling too tired and unwell yesterday to com to his scheduled [facility] follow up. Today he feels a little better but reports poor PO intake, only drinking 2 Ensure plus a day and eating very little else of substance. He reports poor appetite despite taking Olanzapine 7.5mg nightly. He has not had any more diarrhea but now has constipation.     ROS  Review of Systems   Constitutional:  Positive for appetite change and fatigue. Negative for chills and fever.   HENT:   Negative for hearing loss, lump/mass, mouth sores, nosebleeds, sore throat, tinnitus and trouble swallowing.    Respiratory:  Negative for cough and shortness of breath.    Cardiovascular:  Negative for chest pain and leg swelling.   Gastrointestinal:  Negative for abdominal pain, constipation, diarrhea, nausea and vomiting.   Musculoskeletal:  Negative for arthralgias.   Skin:  Negative for rash.   Neurological:  Negative for headaches, light-headedness and numbness.     Allergies  RX Allergies[4]     Medications  Current  Outpatient Medications   Medication Instructions    afatinib (GILOTRIF) 30 mg, oral, Daily, Take at least 1 hour before a meal or 2 hours after a meal.    aspirin 81 mg EC tablet 1 tablet, Daily    atorvastatin (LIPITOR) 80 mg, oral, Daily    doxazosin (CARDURA) 4 mg, oral, Daily, Take 1 tablet by mouth daily in the evening    fluticasone-umeclidin-vilanter (Trelegy Ellipta) 100-62.5-25 mcg blister with device INHALE 1 PUFF EVERY DAY    ipratropium-albuteroL (Duo-Neb) 0.5-2.5 mg/3 mL nebulizer solution Take 3 mL by nebulization 3 times a day as needed for wheezing or shortness of breath.    losartan (COZAAR) 100 mg, oral, Daily    magnesium oxide (MAG-OX) 400 mg, oral, Daily    mirtazapine (REMERON) 15 mg, oral, Nightly      Objective   Vitals: /66 (BP Location: Right arm, Patient Position: Standing)   Pulse (!) 116   Temp 36.9 °C (98.4 °F) (Temporal)   Resp 19   Wt 48.7 kg (107 lb 6.4 oz)   SpO2 98%   BMI 17.33 kg/m²   Weight:   Daily Weight  07/09/25 : 48.7 kg (107 lb 6.4 oz)  05/29/25 : 51.7 kg (114 lb)  05/27/25 : 51.7 kg (113 lb 15.7 oz)  03/25/25 : 52.9 kg (116 lb 9.6 oz)  02/04/25 : 54.3 kg (119 lb 11.4 oz)  01/21/25 : 53.1 kg (117 lb 1 oz)  12/18/24 : 54.5 kg (120 lb 2.4 oz)    Physical Exam  Vitals reviewed.   Constitutional:       Appearance: Normal appearance. He is well-developed.   HENT:      Head: Normocephalic and atraumatic.      Right Ear: External ear normal. No tenderness.      Left Ear: External ear normal. No tenderness.      Nose: Nose normal.      Mouth/Throat:      Lips: Pink.      Mouth: Mucous membranes are moist. No oral lesions.      Tongue: No lesions.   Eyes:      General: Lids are normal.      Extraocular Movements: Extraocular movements intact.      Conjunctiva/sclera: Conjunctivae normal.      Pupils: Pupils are equal, round, and reactive to light.   Neck:      Thyroid: No thyroid mass.   Cardiovascular:      Rate and Rhythm: Normal rate and regular rhythm.      Pulses:  Normal pulses.      Heart sounds: No murmur heard.     No gallop.   Pulmonary:      Effort: Pulmonary effort is normal. No respiratory distress.      Breath sounds: Normal breath sounds and air entry.   Abdominal:      General: Abdomen is flat. Bowel sounds are normal.      Palpations: Abdomen is soft. There is no mass.      Tenderness: There is no abdominal tenderness.   Musculoskeletal:         General: Normal range of motion.      Cervical back: Normal range of motion and neck supple. Normal range of motion.      Right lower leg: No edema.      Left lower leg: No edema.   Lymphadenopathy:      Cervical: No cervical adenopathy.   Skin:     General: Skin is warm and dry.   Neurological:      General: No focal deficit present.      Mental Status: He is alert and oriented to person, place, and time. Mental status is at baseline.      Gait: Gait is intact.   Psychiatric:         Attention and Perception: Attention normal.         Mood and Affect: Mood and affect normal.         Behavior: Behavior is cooperative.       Diagnostic Results     Labs  Lab Results   Component Value Date    WBC 11.3 07/09/2025    HGB 13.3 (L) 07/09/2025    HCT 41.0 07/09/2025    MCV 86 07/09/2025     07/09/2025      Lab Results   Component Value Date    NEUTROABS 8.93 (H) 07/09/2025    BANDSABS 0.08 09/19/2024    LYMPHOABS 0.29 (L) 09/19/2024    MONOABS 0.04 (L) 09/19/2024    EOSABS 0.60 (H) 07/09/2025      Lab Results   Component Value Date    GLUCOSE 118 (H) 07/09/2025    CALCIUM 10.3 07/09/2025     07/09/2025    K 3.9 07/09/2025    CO2 29 07/09/2025     07/09/2025    BUN 36 (H) 07/09/2025    CREATININE 0.95 07/09/2025    MG 1.75 07/09/2025    PHOS 3.9 11/02/2023    ALBUMIN 4.1 07/09/2025    PROT 6.8 07/09/2025     Lab Results   Component Value Date    ALT 21 07/09/2025    AST 21 07/09/2025    ALKPHOS 48 07/09/2025    BILITOT 0.5 07/09/2025    BILIDIR 0.2 05/14/2022      Lab Results   Component Value Date    CORTISOL  16.1 05/23/2024    ACTH 26.3 05/23/2024    TSH 2.96 05/27/2025    T3FREE 3.4 03/27/2024    FREET4 1.09 06/20/2024     Lab Results   Component Value Date    RETICCTPCT 2.0 10/24/2023    RETIC 0.045 10/24/2023    IMMRETICFR 13.9 10/24/2023    RETICHGB 35 10/24/2023     Lab Results   Component Value Date    SEDRATE 10 08/10/2022    CRP 0.13 08/10/2022     10/24/2023    LACTATE 0.9 10/19/2023    PROCAL 0.85 (H) 10/19/2023    FERRITIN 888 (H) 10/24/2023    IRON 39 10/25/2023    UIBC 125 10/25/2023    TIBC 164 (L) 10/25/2023    IRONSAT 24 (L) 10/25/2023     Images    Assessment/Plan   ASSESSMENT  Phong Wong is a 75 y.o. male with locally advanced oral cavity cancer metastatic to lungs, on Afatinib 30mg every day and tolerating well. Presents to ACC today with general fatigue and weakness likely related to his appetite loss and continuing weight loss. Will start patient on a different medication for appetite stimulation and to help with his sleep difficulties.     ACC Course  - Orthos negative  - Labs at baseline. Renal function is actually improved from his baseline  - Dietician consulted today for weight loss and appetite loss.     Dispo:  - Patient discharged home with no needs after ACC course complete  - D/c Olanzapine, Start Mirtazapine 15mg at bedtime  - Senna 1 tab nightly for constipation  - Follow up w/ Oncology as scheduled           [1]   Past Surgical History:  Procedure Laterality Date    CT ABDOMEN PELVIS ANGIOGRAM W AND/OR WO IV CONTRAST  9/3/2014    CT ABDOMEN PELVIS ANGIOGRAM W AND/OR WO IV CONTRAST 9/3/2014 [facility]    IR ANGIOGRAM AORTA ABDOMEN  11/6/2014    IR ANGIOGRAM AORTA ABDOMEN 11/6/2014 [facility]   [2]   Family History  Problem Relation Name Age of Onset    Aortic aneurysm Father          abdominal   [3]   Social History  Tobacco Use    Smoking status: Former     Current packs/day: 0.00     Average packs/day: 1 pack/day for 40.0 years (40.0 ttl pk-yrs)     Types:  Cigarettes     Start date:      Quit date:      Years since quittin.5     Passive exposure: Past    Smokeless tobacco: Never   Substance Use Topics    Alcohol use: Yes     Alcohol/week: 12.0 standard drinks of alcohol     Types: 12 Cans of beer per week    Drug use: Never   [4]   Allergies  Allergen Reactions    Codeine Nausea Only

## 2025-07-10 ENCOUNTER — SPECIALTY PHARMACY (OUTPATIENT)
Dept: PHARMACY | Facility: CLINIC | Age: 75
End: 2025-07-10

## 2025-07-10 ENCOUNTER — APPOINTMENT (OUTPATIENT)
Dept: PRIMARY CARE | Facility: CLINIC | Age: 75
End: 2025-07-10
Payer: MEDICARE

## 2025-07-10 VITALS — WEIGHT: 107.36 LBS | BODY MASS INDEX: 17.25 KG/M2 | HEIGHT: 66 IN

## 2025-07-17 ENCOUNTER — APPOINTMENT (OUTPATIENT)
Dept: PRIMARY CARE | Facility: CLINIC | Age: 75
End: 2025-07-17
Payer: MEDICARE

## 2025-07-17 ENCOUNTER — TELEMEDICINE (OUTPATIENT)
Dept: PRIMARY CARE | Facility: CLINIC | Age: 75
End: 2025-07-17
Payer: MEDICARE

## 2025-07-17 DIAGNOSIS — Z12.11 SCREENING FOR COLON CANCER: ICD-10-CM

## 2025-07-17 DIAGNOSIS — I65.29 ARTERIOSCLEROSIS OF CAROTID ARTERY, UNSPECIFIED LATERALITY: Primary | ICD-10-CM

## 2025-07-17 DIAGNOSIS — C61 PROSTATE CANCER (MULTI): ICD-10-CM

## 2025-07-17 DIAGNOSIS — N18.31 CHRONIC RENAL IMPAIRMENT, STAGE 3A (MULTI): ICD-10-CM

## 2025-07-17 DIAGNOSIS — C10.9 OROPHARYNGEAL CANCER (MULTI): ICD-10-CM

## 2025-07-17 DIAGNOSIS — I10 BENIGN ESSENTIAL HYPERTENSION: ICD-10-CM

## 2025-07-17 DIAGNOSIS — E78.5 HYPERLIPIDEMIA, UNSPECIFIED HYPERLIPIDEMIA TYPE: ICD-10-CM

## 2025-07-17 DIAGNOSIS — J44.9 CHRONIC OBSTRUCTIVE PULMONARY DISEASE, UNSPECIFIED COPD TYPE (MULTI): ICD-10-CM

## 2025-07-17 PROCEDURE — 99213 OFFICE O/P EST LOW 20 MIN: CPT | Performed by: INTERNAL MEDICINE

## 2025-07-17 PROCEDURE — 1159F MED LIST DOCD IN RCRD: CPT | Performed by: INTERNAL MEDICINE

## 2025-07-17 PROCEDURE — 1160F RVW MEDS BY RX/DR IN RCRD: CPT | Performed by: INTERNAL MEDICINE

## 2025-07-17 NOTE — PROGRESS NOTES
Subjective   Patient ID: Phong Wong is a 75 y.o. male who presents for No chief complaint on file..  History of Present Illness  The patient presents via virtual visit for evaluation of cerebrovascular disease/coronary artery disease, hypertension, hyperlipidemia, PAD, renal insufficiency, oropharyngeal cancer, prostate cancer, weight loss/decreased appetite, and COPD.    He reports no recent falls, depression, headaches, dizziness, sinus issues, chest pain, shortness of breath, abdominal pain, nausea, vomiting, diarrhea, or leg swelling. He does not experience any current pain. He continues to feel slightly fatigued and tired. His appetite is improving but remains suboptimal. He does not have any difficulty swallowing food. He is not experiencing constipation or dehydration.    He has not monitored his blood pressure recently, but it was recorded as 158/66 during a recent doctor's visit. He has a home blood pressure monitor but has not been using it. He is not currently taking amlodipine.    He has not received any vaccinations for influenza, pneumonia, or shingles.      PMHx, FHx, Social Hx, Surg Hx personally reviewed at this appointment. No pertinent findings and/or changes from prior (if applicable).    ROS: Unless specified above, pt denies wt gain/loss f/c HA LoC CP SOB NVDC. See HPI above, and scanned sheet (if applicable). All other systems are reviewed and are without complaint.     Objective     There were no vitals taken for this visit.     Physical Exam    Physical Exam  Pulmonary:      Effort: Pulmonary effort is normal.     Neurological:      Mental Status: He is alert.     Psychiatric:         Mood and Affect: Mood normal.         Behavior: Behavior normal.         Thought Content: Thought content normal.         Judgment: Judgment normal.          Lab Results   Component Value Date    WBC 11.3 07/09/2025    HGB 13.3 (L) 07/09/2025    HCT 41.0 07/09/2025     07/09/2025    CHOL 159 12/18/2024     TRIG 158 (H) 12/18/2024    HDL 36.7 12/18/2024    ALT 21 07/09/2025    AST 21 07/09/2025     07/09/2025    K 3.9 07/09/2025     07/09/2025    CREATININE 0.95 07/09/2025    BUN 36 (H) 07/09/2025    CO2 29 07/09/2025    TSH 2.96 05/27/2025    PSA 0.16 09/25/2024    INR 1.1 07/11/2024    HGBA1C 5.1 08/25/2023     par     Current Outpatient Medications   Medication Instructions    afatinib (GILOTRIF) 30 mg, oral, Daily, Take at least 1 hour before a meal or 2 hours after a meal.    aspirin 81 mg EC tablet 1 tablet, Daily    atorvastatin (LIPITOR) 80 mg, oral, Daily    doxazosin (CARDURA) 4 mg, oral, Daily, Take 1 tablet by mouth daily in the evening    fluticasone-umeclidin-vilanter (Trelegy Ellipta) 100-62.5-25 mcg blister with device INHALE 1 PUFF EVERY DAY    ipratropium-albuteroL (Duo-Neb) 0.5-2.5 mg/3 mL nebulizer solution Take 3 mL by nebulization 3 times a day as needed for wheezing or shortness of breath.    losartan (COZAAR) 100 mg, oral, Daily    magnesium oxide (MAG-OX) 400 mg, oral, Daily    mirtazapine (REMERON) 15 mg, oral, Nightly    sennosides (SENNA) 8.6 mg, oral, Daily, Can increase up to 2 tabs twice a day for Constipation.        CT soft tissue neck wo IV contrast  Narrative: Interpreted By:  Tom Woodard,   STUDY:  CT SOFT TISSUE NECK WO IV CONTRAST;  5/23/2025 1:10 pm      INDICATION:  Signs/Symptoms:HNSCC.      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      ACCESSION NUMBER(S):  [id number]      ORDERING CLINICIAN:  LOS VACA      TECHNIQUE:  Axial CT images of the neck were obtained.  The images were  reformatted in angled axial, coronal and sagittal planes.      FINDINGS:  Again demonstrated is confluence of soft tissue density structures in  the right mid lateral neck. Some of what is included in this area are  the sternocleidomastoid muscle, and the carotid sheath structures.  Cross-sectional measurement along the more  superior aspect of the  region measures 33 x 13 mm greatest axial dimensions currently and  was previously 27 x 11 mm. More inferiorly measurements are 40 x 19  mm currently versus 39 x 19 mm previously.      Emphysematous changes in the upper lungs. There are 2 spiculated left  lung upper lobe masses, the larger currently measuring 11.8 mm  greatest axial dimensions previously 10.0 mm in the smaller currently  measuring 8.5 mm and previously 7.3 mm.      Induration of the fat and fascial planes along the anterior neck is  similar to prior and likely a treatment effect.      No newly enlarged cervical chain lymph nodes noted.      No destructive osseous lesions or acute osseous abnormalities.          Impression: The right-sided neck mass appears slightly increased in size from  prior.      Slight increase in size of the 2 spiculated masses previously seen in  the left lung upper lobe.      No newly enlarged cervical chain lymph nodes.      MACRO:  None      Signed by: Tom Woodard 5/26/2025 7:46 PM  Dictation workstation:   [id number]           Assessment & Plan  1. Cerebrovascular disease/coronary artery disease.  - Risk factors will be modified.    2. Hypertension.  - Blood pressure will be monitored at home.  - Recent reading was 158/66 mmHg.  - Advised to keep an eye on blood pressure and use the home machine regularly..  Schedule appointment for blood pressure check    3. Hyperlipidemia.  - Statin therapy will be continued.    4. Peripheral artery disease (PAD).  - Risk factors will be modified.    5. Renal insufficiency.  - Creatinine levels have remained stable.    6. Oropharyngeal cancer.  - Follow-up with oncology as scheduled.    7. Prostate cancer.  - Follow-up with oncology.    8. Weight loss/decreased appetite.  - Recently started on mirtazapine.  - Symptoms will be monitored.    9. Chronic obstructive pulmonary disease (COPD).  - Continue with Trelegy.    10. Health maintenance.  - Refuses  immunizations.  - Eye and dental appointments will be scheduled.  - Cologuard will be resent.          Harshil Weiss MD       This medical note was created with the assistance of artificial intelligence (AI) for documentation purposes. The content has been reviewed and confirmed by the healthcare provider for accuracy and completeness. Patient consented to the use of audio recording and use of AI during their visit.

## 2025-08-11 ENCOUNTER — SPECIALTY PHARMACY (OUTPATIENT)
Dept: PHARMACY | Facility: CLINIC | Age: 75
End: 2025-08-11

## 2025-08-14 LAB — NONINV COLON CA DNA+OCC BLD SCRN STL QL: POSITIVE

## 2025-08-19 LAB
ALBUMIN SERPL-MCNC: 4.2 G/DL (ref 3.6–5.1)
ALBUMIN/GLOB SERPL: 1.8 (CALC) (ref 1–2.5)
ALP SERPL-CCNC: 59 U/L (ref 35–144)
ALT SERPL-CCNC: 13 U/L (ref 9–46)
AST SERPL-CCNC: 16 U/L (ref 10–35)
BILIRUB SERPL-MCNC: 0.5 MG/DL (ref 0.2–1.2)
BUN SERPL-MCNC: 23 MG/DL (ref 7–25)
BUN/CREAT SERPL: NORMAL (CALC) (ref 6–22)
CALCIUM SERPL-MCNC: 9.6 MG/DL (ref 8.6–10.3)
CHLORIDE SERPL-SCNC: 100 MMOL/L (ref 98–110)
CO2 SERPL-SCNC: 25 MMOL/L (ref 20–32)
CREAT SERPL-MCNC: 1.13 MG/DL (ref 0.7–1.28)
EGFRCR SERPLBLD CKD-EPI 2021: 69 ML/MIN/1.73M2
GLOBULIN SER CALC-MCNC: 2.3 G/DL (CALC) (ref 1.9–3.7)
GLUCOSE SERPL-MCNC: 118 MG/DL (ref 65–139)
POTASSIUM SERPL-SCNC: 3.9 MMOL/L (ref 3.5–5.3)
PROT SERPL-MCNC: 6.5 G/DL (ref 6.1–8.1)
SODIUM SERPL-SCNC: 138 MMOL/L (ref 135–146)

## 2025-08-22 ENCOUNTER — HOSPITAL ENCOUNTER (OUTPATIENT)
Dept: RADIOLOGY | Facility: CLINIC | Age: 75
End: 2025-08-22
Payer: MEDICARE

## 2025-08-22 ENCOUNTER — TELEPHONE (OUTPATIENT)
Dept: ADMISSION | Facility: HOSPITAL | Age: 75
End: 2025-08-22
Payer: MEDICARE

## 2025-08-22 ENCOUNTER — APPOINTMENT (OUTPATIENT)
Dept: RADIOLOGY | Facility: CLINIC | Age: 75
End: 2025-08-22
Payer: MEDICARE

## 2025-08-24 DIAGNOSIS — R19.5 POSITIVE COLORECTAL CANCER SCREENING USING COLOGUARD TEST: Primary | ICD-10-CM

## 2025-08-25 ENCOUNTER — HOSPITAL ENCOUNTER (OUTPATIENT)
Dept: RADIOLOGY | Facility: CLINIC | Age: 75
Discharge: HOME | End: 2025-08-25
Payer: MEDICARE

## 2025-08-25 DIAGNOSIS — C10.9 OROPHARYNGEAL CANCER (MULTI): ICD-10-CM

## 2025-08-25 PROCEDURE — 70491 CT SOFT TISSUE NECK W/DYE: CPT

## 2025-08-25 PROCEDURE — 2550000001 HC RX 255 CONTRASTS: Performed by: STUDENT IN AN ORGANIZED HEALTH CARE EDUCATION/TRAINING PROGRAM

## 2025-08-25 PROCEDURE — 71260 CT THORAX DX C+: CPT

## 2025-08-25 PROCEDURE — 70491 CT SOFT TISSUE NECK W/DYE: CPT | Performed by: RADIOLOGY

## 2025-08-25 PROCEDURE — 71260 CT THORAX DX C+: CPT | Performed by: INTERNAL MEDICINE

## 2025-08-25 RX ADMIN — IOHEXOL 75 ML: 350 INJECTION, SOLUTION INTRAVENOUS at 14:05

## 2025-08-26 ENCOUNTER — APPOINTMENT (OUTPATIENT)
Dept: HEMATOLOGY/ONCOLOGY | Facility: HOSPITAL | Age: 75
End: 2025-08-26
Payer: MEDICARE

## 2025-09-02 ENCOUNTER — OFFICE VISIT (OUTPATIENT)
Dept: HEMATOLOGY/ONCOLOGY | Facility: HOSPITAL | Age: 75
End: 2025-09-02
Payer: MEDICARE

## 2025-09-02 VITALS
RESPIRATION RATE: 20 BRPM | OXYGEN SATURATION: 96 % | WEIGHT: 97.88 LBS | TEMPERATURE: 97.7 F | DIASTOLIC BLOOD PRESSURE: 74 MMHG | BODY MASS INDEX: 15.81 KG/M2 | SYSTOLIC BLOOD PRESSURE: 151 MMHG | HEART RATE: 116 BPM

## 2025-09-02 DIAGNOSIS — C01 SQUAMOUS CELL CARCINOMA OF BASE OF TONGUE: Primary | ICD-10-CM

## 2025-09-02 PROCEDURE — 3077F SYST BP >= 140 MM HG: CPT | Performed by: STUDENT IN AN ORGANIZED HEALTH CARE EDUCATION/TRAINING PROGRAM

## 2025-09-02 PROCEDURE — 99215 OFFICE O/P EST HI 40 MIN: CPT | Performed by: STUDENT IN AN ORGANIZED HEALTH CARE EDUCATION/TRAINING PROGRAM

## 2025-09-02 PROCEDURE — 1126F AMNT PAIN NOTED NONE PRSNT: CPT | Performed by: STUDENT IN AN ORGANIZED HEALTH CARE EDUCATION/TRAINING PROGRAM

## 2025-09-02 PROCEDURE — G2211 COMPLEX E/M VISIT ADD ON: HCPCS | Performed by: STUDENT IN AN ORGANIZED HEALTH CARE EDUCATION/TRAINING PROGRAM

## 2025-09-02 PROCEDURE — 3078F DIAST BP <80 MM HG: CPT | Performed by: STUDENT IN AN ORGANIZED HEALTH CARE EDUCATION/TRAINING PROGRAM

## 2025-09-02 PROCEDURE — 1036F TOBACCO NON-USER: CPT | Performed by: STUDENT IN AN ORGANIZED HEALTH CARE EDUCATION/TRAINING PROGRAM

## 2025-09-02 ASSESSMENT — ENCOUNTER SYMPTOMS
SORE THROAT: 0
LEG SWELLING: 0
LIGHT-HEADEDNESS: 0
VOMITING: 0
TROUBLE SWALLOWING: 0
COUGH: 0
DIARRHEA: 0
ARTHRALGIAS: 0
SHORTNESS OF BREATH: 0
CONSTIPATION: 0
NUMBNESS: 0
APPETITE CHANGE: 1
CHILLS: 0
NAUSEA: 0
ABDOMINAL PAIN: 0
HEADACHES: 0
FEVER: 0

## 2025-09-02 ASSESSMENT — PAIN SCALES - GENERAL: PAINLEVEL_OUTOF10: 0-NO PAIN
